# Patient Record
Sex: MALE | Race: ASIAN | NOT HISPANIC OR LATINO | Employment: OTHER | URBAN - METROPOLITAN AREA
[De-identification: names, ages, dates, MRNs, and addresses within clinical notes are randomized per-mention and may not be internally consistent; named-entity substitution may affect disease eponyms.]

---

## 2017-01-09 DIAGNOSIS — E11.9 TYPE 2 DIABETES MELLITUS WITHOUT COMPLICATION (H): Primary | ICD-10-CM

## 2017-01-09 NOTE — TELEPHONE ENCOUNTER
insulin aspart (NOVOLOG PEN) 100 UNIT/ML injection         Last Written Prescription Date: 11/30/2016  Last Fill Quantity: 9 mL, # refills: 0  Last Office Visit with [facility], P or [facility] prescribing provider:  8/11/2016        BP Readings from Last 3 Encounters:   08/11/16 118/52   01/21/16 104/52   07/28/15 132/72     MICROL       16   8/3/2016  No results found for this basename: microalbumin  CREATININE   Date Value Ref Range Status   08/03/2016 0.96 0.66 - 1.25 mg/dL Final   ]  GFR ESTIMATE   Date Value Ref Range Status   08/03/2016 76 >60 mL/min/1.7m2 Final     Comment:     Non  GFR Calc   01/18/2016 80 >60 mL/min/1.7m2 Final     Comment:     Non  GFR Calc   07/20/2015 80 >60 mL/min/1.7m2 Final     Comment:     Non  GFR Calc     GFR ESTIMATE IF BLACK   Date Value Ref Range Status   08/03/2016 >90   GFR Calc   >60 mL/min/1.7m2 Final   01/18/2016 >90   GFR Calc   >60 mL/min/1.7m2 Final   07/20/2015 >90   GFR Calc   >60 mL/min/1.7m2 Final     CHOL      149   8/3/2016  HDL       80   8/3/2016  LDL       55   8/3/2016  TRIG       70   8/3/2016  CHOLHDLRATIO      1.8   7/20/2015  AST       22   8/3/2016  ALT       33   8/3/2016  A1C      9.1   8/3/2016  A1C      9.8   1/18/2016  A1C      9.2   7/20/2015  A1C      9.4   4/6/2015  A1C      7.9   8/30/2014  POTASSIUM   Date Value Ref Range Status   08/03/2016 3.8 3.4 - 5.3 mmol/L Final

## 2017-01-11 ENCOUNTER — CARE COORDINATION (OUTPATIENT)
Dept: GERIATRIC MEDICINE | Facility: CLINIC | Age: 77
End: 2017-01-11

## 2017-01-11 RX ORDER — CELECOXIB 200 MG/1
200 CAPSULE ORAL EVERY EVENING
COMMUNITY
End: 2018-01-26

## 2017-01-11 NOTE — PROGRESS NOTES
Went to client s home to complete his annual health risk assessment, his PCA assessment and to screen him for EW services. Client,  and this CM were present for the assessment. Client s son was present in the home (stayed in the kitchen) but only participated in the assessment if needed.  Home Environment: Client lives in a single family home that is rambler style. There are a few steps to get into the home but otherwise client is on the main level. The home is owned by client s son, Brandan, but Brandan does not live at this address. Client and one of his other sons and daughter-in-law live at this address. Client s wife does spend much of her day at this address but technically lives at a different address and goes to that address in the evening. The home was very neat and clean with no odor present.  Social History: Client shared that there have been no recent changes in his social history.   Health Issues: Client s largest health issue is a his chronic pain. Client reports sever knee pain and shared that the MD recommended a knee replacement but reports he is fearful of surgery and does not want to get it done. Client also has chronic severe headaches. 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 Client shared that he now has daily headaches and that the pain will radiate from the center of his head to the right side and down the back of his head. Client shared that his neurologist did recommend surgery to remove the tumor but his PCP does not recommend it. Client stated that he does not want surgery. Client only takes tylenol for the pain and reports that it will help some. Encouraged client to talk with his PCP about alternative pain treatments since client is reporting that he continues to have pain. Client reports that this tumor causes hearing loss and dizziness. Client does have hearing aides and elects not to  wear them. Loud noises only cause more headaches. Speakers need to elevate tones. Client shared that when his headaches are sever he struggles with sleeping and focusing. Has headaches daily. Client reported a headache during this assessment and several times would tap the side of his head or put his head down in his hands. During the assessment client regularly put his head in his hands due to a headache. Client is diabetic and checks his blood sugars BID. He is on insulin (scheduled in the am and sliding scale in the pm). Client s diet is not very conducive to a diabetic diet. A1C continues to be elevated but daily BS readings are good. PCP has been monitoring this. Continued to encourage client and family about following a proper balanced diabetic diet vegetables, lean meats with fruits and low carb rice (as this is an important part of client s diet). Client does have an enlarged prostrate which creates an increase in urination. Client shared that he did get his vision checked last year and reports his vision is good with his glasses. However client's son shared that at the most recent eye appointment client was told that he does not see 20/20 with his glasses because of cataract and it was recommended to have cataract surgery and client declined. Client is current with his flu shot and pnuemovac shot and zostovax shot and tetanus. Client goes to the MD routinely. Client does not take calcium +D . Does take ASA daily, however it is OTC and reviewed the PIP program. Client shared that he has a lot of pain his right knee and gets cortisone injections about every 3 months. Son reports that the relief from pain only lasts about 2-3 weeks before the pain is back. Son shared that the orthopedic MD recommended surgery but client has declined. Reporting that the pain is in his low back as well but that he rests and it helps with the pain. He reports that he will take Tylenol PRN for this pain. No hospitalizations or ER  visits in the past year. Has had no falls in the past year but reports being fearful of falling.  ADL s/IADL s: Client reports that he needs assist with dressing, grooming, and bathing. Observed client at this assessment to be able to bring both arms up to his mouth. Client shared when he uses utensils his hands shake more when he uses fine motor skills. Observed client to ambulate with a standard front wheeled walker. Client's gait was slow but steady but would not bend his right knee. However most recent PCP note indicates that client uses a stationary bike for 15-30min per day. Client's son shared that the stationary bike is a sit down style and that client does use this when he is feeling good. Client reports that he receives no assist with ambulation. Observed client get off the couch on his own but he reports that when he has a headache or when he is really shaky he needs assist. Continues to report that he has dizziness at times. Observed client to ambulate to the bathroom without assist. Reports that he has frequent urination and reports that client has been able to make it to the toilet. Reports needing a pull up when he leaves the home in the event he is unable to get to a bathroom fast enough. Client needs help with getting on/off toilet and getting his pants back up. Client sits for all toileting activities. Client is able to position himself. Client shared that he doesn t use the phone and at times he will call his son if he needs something but otherwise doesn t use the phone. Client does not do any shopping, cooking, cleaning or laundry. Client s son (Brandan) sets up his medications weekly and does administer these medications. Client needs assistance with transportation. Son, Brandan, does the finances.   Mood/Behavior: Client was very pleasant during the assessment and at this assessment had the endurance to continue with the assessment until the end, however was starting to need to rest his head. Son  shared that client has been more angry lately and will more readily get upset with the small grandchildren, especially when they make noise. Encouraged son to try putting in some sound proofing in the bedroom so when client needs to get away from the noise his room will be very quiet. Client reports some memory loss however client was able to recall recent and past events and is able to direct his own care. Per client s son client does have some forgetfulness but that client is able to direct his own care and that client is able to recall events that are going on. Was able to recall this CM and past assessments. Was able to recall the surgical recommendations by MDs that he has elected not to pursue. No MH dx. Client scored 7 on the PHQ9. Client reported that his nightmares continue. Talked with client and family about ADC and client declined stating that the noise would only trigger headaches.  Services: Client is open to EW and is receiving PCA services along with Glucerna. The [employer] company stated that glucerna is EW only and client therefore needs to remain open to EW. Client reports drinking 2-3 cans of glucerna per day. Client would like to continue his PCA and Glucerna. PCA assessment completed and the recommended PCA is 16 units per day, which is the same as last assessment. Talked with client about more socialization such as the monthly [place] meeting or adult day care and client stated that he isn t interested particularly because of the headaches and that with loud noises triggering his headaches he doesn t want to cause his family to have to always come and get him when he can t stay due to a severe headache. Reviewed silver sneakers and client shared that he was interested in this.   POC: Client wants to continue living at his son s home with his services. Client would not call 911 due to language barrier and would call family in the event of an emergency. Client shared that there is someone always in  the home should he require intermittent assistance. Client would be able to evacuate in the event of an emergency, stating an emergency plan if there was a fire in the house. Client stated a plan in the event of a community emergency as well. Client did state if need be he does know how to dial 911. Reviewed that this CM is available PRN, will follow up in 6 months and will make annual home visits.  See [facility] for further information  ANASTACIA Velázquez  [employer]  [phone]

## 2017-01-12 ENCOUNTER — CARE COORDINATION (OUTPATIENT)
Dept: GERIATRIC MEDICINE | Facility: CLINIC | Age: 77
End: 2017-01-12

## 2017-01-12 ASSESSMENT — PATIENT HEALTH QUESTIONNAIRE - PHQ9: SUM OF ALL RESPONSES TO PHQ QUESTIONS 1-9: 7

## 2017-01-12 NOTE — PROGRESS NOTES
Completed POC and PCA assessment.  Gave to CMS to process and mail to client.  Updated EPIC POC and forward to CMS.  ANASTACIA Velázquez  [employer]  [phone]

## 2017-01-23 DIAGNOSIS — E11.9 TYPE 2 DIABETES MELLITUS WITHOUT COMPLICATION (H): Primary | ICD-10-CM

## 2017-01-23 RX ORDER — LANCETS
1 EACH MISCELLANEOUS 3 TIMES DAILY
Qty: 300 BOX | Refills: 3 | Status: SHIPPED | OUTPATIENT
Start: 2017-01-23 | End: 2018-01-12

## 2017-01-23 NOTE — TELEPHONE ENCOUNTER
Prescription approved per [facility]ill [facility]. MUKUL Babin R.N.

## 2017-01-27 ENCOUNTER — TELEPHONE (OUTPATIENT)
Dept: INTERNAL MEDICINE | Facility: CLINIC | Age: 77
End: 2017-01-27

## 2017-01-27 DIAGNOSIS — Z79.4 TYPE 2 DIABETES MELLITUS WITHOUT COMPLICATION, WITH LONG-TERM CURRENT USE OF INSULIN (H): ICD-10-CM

## 2017-01-27 DIAGNOSIS — E11.9 TYPE 2 DIABETES MELLITUS WITHOUT COMPLICATION, WITH LONG-TERM CURRENT USE OF INSULIN (H): ICD-10-CM

## 2017-01-27 NOTE — TELEPHONE ENCOUNTER
Spoke to pt's son. Pt is due for 6 month follow-up appt. Appt scheduled 2/10/17.  Fasting LAB appt scheduled prior to appt on 2/6/17.  Please order labs that pt will need.       [facility] is calling requesting refill of  insulin aspart (NOVOLOG PEN) 100 UNIT/ML injection. Pt is ok on med for now, will wait until appt with PCP.          Last Written Prescription Date: 1/10/17  Last Fill Quantity: 9ml, # refills: 0  Last Office Visit with [facility], [facility] or [facility] prescribing provider:  8/11/16

## 2017-01-29 PROBLEM — Z79.4 TYPE 2 DIABETES MELLITUS WITHOUT COMPLICATION, WITH LONG-TERM CURRENT USE OF INSULIN (H): Status: ACTIVE | Noted: 2017-01-29

## 2017-01-29 PROBLEM — E11.9 TYPE 2 DIABETES MELLITUS WITHOUT COMPLICATION, WITH LONG-TERM CURRENT USE OF INSULIN (H): Status: ACTIVE | Noted: 2017-01-29

## 2017-02-01 DIAGNOSIS — Z79.4 TYPE 2 DIABETES MELLITUS WITHOUT COMPLICATION, WITH LONG-TERM CURRENT USE OF INSULIN (H): Primary | ICD-10-CM

## 2017-02-01 DIAGNOSIS — E11.9 TYPE 2 DIABETES MELLITUS WITHOUT COMPLICATION, WITH LONG-TERM CURRENT USE OF INSULIN (H): Primary | ICD-10-CM

## 2017-02-01 NOTE — TELEPHONE ENCOUNTER
insulin aspart (NOVOLOG PEN) 100 UNIT/ML injection         Last Written Prescription Date: 11/10/2017  Last Fill Quantity: 9mL, # refills: 0  Last Office Visit with [facility], [facility] or [facility] prescribing provider:  8/11/2016   Next 5 appointments (look out 90 days)     Feb 10, 2017  8:15 AM   SHORT with Alfredo Aragon MD, [facility]   [facility] ([facility]    [address] [address] [phone]                   BP Readings from Last 3 Encounters:   08/11/16 118/52   01/21/16 104/52   07/28/15 132/72     MICROL       16   8/3/2016  No results found for this basename: microalbumin  CREATININE   Date Value Ref Range Status   08/03/2016 0.96 0.66 - 1.25 mg/dL Final   ]  GFR ESTIMATE   Date Value Ref Range Status   08/03/2016 76 >60 mL/min/1.7m2 Final     Comment:     Non  GFR Calc   01/18/2016 80 >60 mL/min/1.7m2 Final     Comment:     Non  GFR Calc   07/20/2015 80 >60 mL/min/1.7m2 Final     Comment:     Non  GFR Calc     GFR ESTIMATE IF BLACK   Date Value Ref Range Status   08/03/2016 >90   GFR Calc   >60 mL/min/1.7m2 Final   01/18/2016 >90   GFR Calc   >60 mL/min/1.7m2 Final   07/20/2015 >90   GFR Calc   >60 mL/min/1.7m2 Final     CHOL      149   8/3/2016  HDL       80   8/3/2016  LDL       55   8/3/2016  TRIG       70   8/3/2016  CHOLHDLRATIO      1.8   7/20/2015  AST       22   8/3/2016  ALT       33   8/3/2016  A1C      9.1   8/3/2016  A1C      9.8   1/18/2016  A1C      9.2   7/20/2015  A1C      9.4   4/6/2015  A1C      7.9   8/30/2014  POTASSIUM   Date Value Ref Range Status   08/03/2016 3.8 3.4 - 5.3 mmol/L Final

## 2017-02-02 ENCOUNTER — TELEPHONE (OUTPATIENT)
Dept: INTERNAL MEDICINE | Facility: CLINIC | Age: 77
End: 2017-02-02

## 2017-02-02 DIAGNOSIS — E11.9 TYPE 2 DIABETES MELLITUS WITHOUT COMPLICATION (H): Primary | ICD-10-CM

## 2017-02-02 NOTE — TELEPHONE ENCOUNTER
[facility] pharmacy requesting refill of RX insulin aspart (NOVOLOG PEN) 100 UNIT/ML injection.  Prescription approved per [facility] Refill Protocol. Pt has upcoming appt with PCP next week.

## 2017-02-06 DIAGNOSIS — E11.9 TYPE 2 DIABETES MELLITUS WITHOUT COMPLICATION, WITH LONG-TERM CURRENT USE OF INSULIN (H): ICD-10-CM

## 2017-02-06 DIAGNOSIS — Z79.4 TYPE 2 DIABETES MELLITUS WITHOUT COMPLICATION, WITH LONG-TERM CURRENT USE OF INSULIN (H): ICD-10-CM

## 2017-02-06 LAB
ANION GAP SERPL CALCULATED.3IONS-SCNC: 2 MMOL/L (ref 3–14)
BUN SERPL-MCNC: 20 MG/DL (ref 7–30)
CALCIUM SERPL-MCNC: 8.6 MG/DL (ref 8.5–10.1)
CHLORIDE SERPL-SCNC: 105 MMOL/L (ref 94–109)
CO2 SERPL-SCNC: 32 MMOL/L (ref 20–32)
CREAT SERPL-MCNC: 1.01 MG/DL (ref 0.66–1.25)
GFR SERPL CREATININE-BSD FRML MDRD: 71 ML/MIN/1.7M2
GLUCOSE SERPL-MCNC: 157 MG/DL (ref 70–99)
HBA1C MFR BLD: 9.1 % (ref 4.3–6)
POTASSIUM SERPL-SCNC: 4.5 MMOL/L (ref 3.4–5.3)
SODIUM SERPL-SCNC: 139 MMOL/L (ref 133–144)

## 2017-02-06 PROCEDURE — 83036 HEMOGLOBIN GLYCOSYLATED A1C: CPT | Performed by: INTERNAL MEDICINE

## 2017-02-06 PROCEDURE — 80048 BASIC METABOLIC PNL TOTAL CA: CPT | Performed by: INTERNAL MEDICINE

## 2017-02-06 PROCEDURE — 36415 COLL VENOUS BLD VENIPUNCTURE: CPT | Performed by: INTERNAL MEDICINE

## 2017-02-09 DIAGNOSIS — Z53.9 DIAGNOSIS NOT YET DEFINED: Primary | ICD-10-CM

## 2017-02-09 PROCEDURE — G0179 MD RECERTIFICATION HHA PT: HCPCS | Performed by: INTERNAL MEDICINE

## 2017-02-24 DIAGNOSIS — E78.5 HYPERLIPIDEMIA LDL GOAL <100: ICD-10-CM

## 2017-02-24 RX ORDER — ATORVASTATIN CALCIUM 80 MG/1
80 TABLET, FILM COATED ORAL DAILY
Qty: 30 TABLET | Refills: 5 | Status: SHIPPED | OUTPATIENT
Start: 2017-02-24 | End: 2017-08-13

## 2017-02-24 NOTE — TELEPHONE ENCOUNTER
atorvastatin     Last Written Prescription Date: 08/04/16  Last Fill Quantity: 30, # refills: 4  [place] [facility] [place] [facility] or [facility] prescribing provider: 08/11/16       Lab Results   Component Value Date    CHOL 149 08/03/2016     Lab Results   Component Value Date    HDL 80 08/03/2016     Lab Results   Component Value Date    LDL 55 08/03/2016     Lab Results   Component Value Date    TRIG 70 08/03/2016     Lab Results   Component Value Date    CHOLHDLRATIO 1.8 07/20/2015

## 2017-03-10 ENCOUNTER — TELEPHONE (OUTPATIENT)
Dept: INTERNAL MEDICINE | Facility: CLINIC | Age: 77
End: 2017-03-10

## 2017-03-10 DIAGNOSIS — E63.9 NUTRITIONAL DEFICIENCY: ICD-10-CM

## 2017-03-10 NOTE — TELEPHONE ENCOUNTER
[facility] is requesting Glucerna for the patient  Paperwork was faxed over  Please approve and fax back

## 2017-03-17 ENCOUNTER — OFFICE VISIT (OUTPATIENT)
Dept: INTERNAL MEDICINE | Facility: CLINIC | Age: 77
End: 2017-03-17
Payer: COMMERCIAL

## 2017-03-17 VITALS
OXYGEN SATURATION: 97 % | TEMPERATURE: 97.9 F | BODY MASS INDEX: 28.08 KG/M2 | SYSTOLIC BLOOD PRESSURE: 112 MMHG | DIASTOLIC BLOOD PRESSURE: 58 MMHG | HEART RATE: 69 BPM | WEIGHT: 174.7 LBS | HEIGHT: 66 IN

## 2017-03-17 DIAGNOSIS — M19.041 OSTEOARTHRITIS OF BOTH HANDS, UNSPECIFIED OSTEOARTHRITIS TYPE: ICD-10-CM

## 2017-03-17 DIAGNOSIS — M19.042 OSTEOARTHRITIS OF BOTH HANDS, UNSPECIFIED OSTEOARTHRITIS TYPE: ICD-10-CM

## 2017-03-17 DIAGNOSIS — Z79.4 TYPE 2 DIABETES MELLITUS WITHOUT COMPLICATION, WITH LONG-TERM CURRENT USE OF INSULIN (H): Primary | ICD-10-CM

## 2017-03-17 DIAGNOSIS — E11.9 TYPE 2 DIABETES MELLITUS WITHOUT COMPLICATION, WITH LONG-TERM CURRENT USE OF INSULIN (H): Primary | ICD-10-CM

## 2017-03-17 DIAGNOSIS — M54.32 BILATERAL SCIATICA: ICD-10-CM

## 2017-03-17 DIAGNOSIS — M54.31 BILATERAL SCIATICA: ICD-10-CM

## 2017-03-17 PROCEDURE — 99213 OFFICE O/P EST LOW 20 MIN: CPT | Performed by: INTERNAL MEDICINE

## 2017-03-17 NOTE — MR AVS SNAPSHOT
"              After Visit Summary   3/17/2017    Rabia Reed    MRN: [id number]           Patient Information     Date Of Birth          1940        Visit Information        Provider Department      3/17/2017 2:00 PM Alfredo Aragon MD; [facility]        Care Instructions     Follow-up with [facility] re: back and possible  Lumbar steroid injection   Stretching neck, hands in AM with warm water/heat   Possible future cataract surgery  Continue current medications  Fasting labs in October        Follow-ups after your visit        Who to contact     If you have questions or need follow up information about today's clinic visit or your schedule please contact [facility] directly at [phone].  Normal or non-critical lab and imaging results will be communicated to you by Ocschart, letter or phone within 4 business days after the clinic has received the results. If you do not hear from us within 7 days, please contact the clinic through Ocschart or phone. If you have a critical or abnormal lab result, we will notify you by phone as soon as possible.  Submit refill requests through [employer] or call your pharmacy and they will forward the refill request to us. Please allow 3 business days for your refill to be completed.          Additional Information About Your Visit        MyChart Information     [employer] lets you send messages to your doctor, view your test results, renew your prescriptions, schedule appointments and more. To sign up, go to [email or web address] . Click on \"Log in\" on the left side of the screen, which will take you to the Welcome page. Then click on \"Sign up Now\" on the right side of the page.     You will be asked to enter the access code listed below, as well as some personal information. Please follow the directions to create your username and password.     Your access code is: [email or web address]  Expires: 6/15/2017 " " 2:40 PM     Your access code will  in 90 days. If you need help or a new code, please call your [facility] or [phone].        Care EveryWhere ID     This is your Care EveryWhere ID. This could be used by other organizations to access your [place] medical records  [id number]        Your Vitals Were     Pulse Temperature Height Pulse Oximetry BMI (Body Mass Index)       69 97.9  F (36.6  C) (Oral) 5' 5.5\" (1.664 m) 97% 28.63 kg/m2        Blood Pressure from Last 3 Encounters:   17 112/58   16 118/52   16 104/52    Weight from Last 3 Encounters:   17 174 lb 11.2 oz (79.2 kg)   16 172 lb (78 kg)   16 172 lb (78 kg)              Today, you had the following     No orders found for display       Primary Care Provider Office Phone # Fax #    Alfredo Aragon [phone]       [facility] [address] [place] [id number]        Thank you!     Thank you for choosing [facility]  for your care. Our goal is always to provide you with excellent care. Hearing back from our patients is one way we can continue to improve our services. Please take a few minutes to complete the written survey that you may receive in the mail after your visit with us. Thank you!             Your Updated Medication List - Protect others around you: Learn how to safely use, store and throw away your medicines at www.disposemymeds.org.          This list is accurate as of: 3/17/17  2:40 PM.  Always use your most recent med list.                   Brand Name Dispense Instructions for use    ascorbic acid 1000 MG Tabs    vitamin C     Take 1,000 mg by mouth daily.       aspirin 81 MG tablet      1 tab po QD (Once per day)       atorvastatin 80 MG tablet    LIPITOR    30 tablet    Take 1 tablet (80 mg) by mouth daily       blood glucose monitoring lancets     300 Box    1 each 3 times daily       blood glucose monitoring test strip    no brand " specified    300 each    1 strip by In Vitro route 3 times daily       celecoxib 200 MG capsule    celeBREX     Take 200 mg by mouth daily       DOCUSATE SODIUM PO      Take 100 mg by mouth as needed for constipation       finasteride 5 MG tablet    PROSCAR    30 tablet    Take 1 tablet (5 mg) by mouth daily       GLUCERNA Liqd     1 month    1 can twice a day       insulin aspart 100 UNIT/ML injection    NovoLOG PEN    9 mL    Inject 10 Units Subcutaneous At Bedtime       insulin aspart prot & aspart injection    NovoLOG MIX 70/30 PEN    3 mL    30 units before breakfast, 20 units before dinner       ONE TOUCH ULTRA SYSTEM KIT W/DEVICE Kit     1 kit    One touch ultra 2 if covered by insurance       * pen needles 31G X 6 MM Misc     100 each    1 Device 3 times daily       * insulin pen needle 29G X 12.7MM    ADVOCATE INSULIN PEN NEEDLES    100 each    Use three pen needles daily or as directed.       tamsulosin 0.4 MG capsule    FLOMAX    30 capsule    Take 1 capsule (0.4 mg) by mouth daily       TYLENOL 500 MG tablet   Generic drug:  acetaminophen      Take 2 tablets by mouth every 6 hours as needed.       vardenafil 20 MG tablet    LEVITRA    5 tablet    Take 1 tablet (20 mg) by mouth daily as needed for erectile dysfunction       * Notice:  This list has 2 medication(s) that are the same as other medications prescribed for you. Read the directions carefully, and ask your doctor or other care provider to review them with you.

## 2017-03-17 NOTE — PROGRESS NOTES
SUBJECTIVE:                                                    Rabia Reed is a 76 year old male who presents to clinic today for the following health issues:      Diabetes Follow-up    Patient is checking blood sugars: 4 times daily.   Blood sugar testing frequency justification: Uncontrolled diabetes review they played nap they play national I think  Results are as follows:         am -          lunchtime - 100-170         suppertime - 116-180         bedtime - 112-169    Diabetic concerns: blood sugar frequently over 200 and low blood sugar several less than 70 in the past few weeks     Symptoms of hypoglycemia (low blood sugar): none     Paresthesias (numbness or burning in feet) or sores: Yes numbness in hands     Date of last diabetic eye exam: 10/2015        Pt's past medical history, family history, habits, medications and allergies were reviewed with the patient today.  See snap shot for  HCM status. Most recent lab results reviewed with pt. Problem list and histories reviewed & adjusted, as indicated.  Additional history as below:    Stiffness in joints hands with osteoarthritis. Denies CP, SOB, abdominal pain, polyuria, polydipsia  or skin problems. Stable gait with use of cane. No recent falls.  Patient has some chronic back pain with bilateral lower extremity radicular symptoms to the feet. Has seen orthopedics previously and has undergone an epidural steroid injection with benefit about a year ago per patient son who accompanies the patient today.  No records in the chart to review regarding this.  Patient's A1c lab chronically runs higher than his blood sugars. This has been confirmed with previously using continuous glucometer monitoring.  Has bilateral cataracts and has been told that he would be a surgical candidate but patient is worried about the surgery and therefore is not ready to proceed at this time. Patient describes mild clouding of vision    Lab Results   Component Value Date     " 02/06/2017     Lab Results   Component Value Date    A1C 9.1 02/06/2017     Lab Results   Component Value Date    CHOL 149 08/03/2016     Lab Results   Component Value Date    LDL 55 08/03/2016     Lab Results   Component Value Date    HDL 80 08/03/2016     Lab Results   Component Value Date    TRIG 70 08/03/2016     Lab Results   Component Value Date    CR 1.01 02/06/2017     Lab Results   Component Value Date    ALT 33 08/03/2016     Lab Results   Component Value Date    AST 22 08/03/2016     Lab Results   Component Value Date    MICROL 16 08/03/2016     Lab Results   Component Value Date    TSH 3.01 07/20/2015            Additional ROS:   Constitutional, HEENT, Cardiovascular, Pulmonary, GI and , Neuro, MSK and Psych review of systems/symptoms are otherwise negative or unchanged from previous, except as noted above.      OBJECTIVE:  /58  Pulse 69  Temp 97.9  F (36.6  C) (Oral)  Ht 5' 5.5\" (1.664 m)  Wt 174 lb 11.2 oz (79.2 kg)  SpO2 97%  BMI 28.63 kg/m2   Estimated body mass index is 28.63 kg/(m^2) as calculated from the following:    Height as of this encounter: 5' 5.5\" (1.664 m).    Weight as of this encounter: 174 lb 11.2 oz (79.2 kg).  Eye: PERRL, EOMI. Bilateral cataracts  HENT: ear canals and TM's normal and nose and mouth without ulcers or lesions   Neck: no adenopathy. Thyroid normal to palpation. No bruits  Pulm: Lungs clear to auscultation   CV: Regular rates and rhythm  GI: Soft, nontender, Normal active bowel sounds, No hepatosplenomegaly or masses palpable  Ext: Peripheral pulses intact. No edema. DIP and PIP joints hands with osteoarthritis changes. No acute warmth/erythema  Neuro: Normal strength and tone, sensory exam grossly normal. Stable gait with cane  Back: Mild tenderness to palpation bilateral paralumbar area. Neg SLRT bilaterally despite subjective sx .       Assessment/Plan: (See plan discussion below for further details)  1. Type 2 diabetes mellitus without complication, " with long-term current use of insulin (H)   continue current medications. Fasting labs in six months  - Hemoglobin A1c; Future  - Comprehensive metabolic panel; Future  - Lipid panel reflex to direct LDL; Future  - Albumin Random Urine Quantitative; Future  - TSH with free T4 reflex; Future    2. Osteoarthritis of both hands, unspecified osteoarthritis type  See plan discussion below. Continue Celebrex    3. Bilateral sciatica  Follow-up with ortho    Plan discussion:   Follow-up with [facility] re: back and possible  Lumbar steroid injection   Stretching  hands in AM with warm water/heat   Possible future cataract surgery  Continue current medications  Fasting labs in October       Alfredo Aragon MD  Internal Medicine Department  [facility]

## 2017-03-17 NOTE — PATIENT INSTRUCTIONS
Follow-up with [facility] re: back and possible  Lumbar steroid injection   Stretching neck, hands in AM with warm water/heat   Possible future cataract surgery  Continue current medications  Fasting labs in October

## 2017-03-19 PROBLEM — M19.041 OSTEOARTHRITIS OF BOTH HANDS, UNSPECIFIED OSTEOARTHRITIS TYPE: Status: ACTIVE | Noted: 2017-03-19

## 2017-03-19 PROBLEM — M19.042 OSTEOARTHRITIS OF BOTH HANDS, UNSPECIFIED OSTEOARTHRITIS TYPE: Status: ACTIVE | Noted: 2017-03-19

## 2017-04-09 DIAGNOSIS — R35.0 URINE FREQUENCY: ICD-10-CM

## 2017-04-10 NOTE — TELEPHONE ENCOUNTER
tamsulosin (FLOMAX) 0.4 MG 24 hr capsule         Last Written Prescription Date: 4/20/2016  Last Fill Quantity: 30, # refills: 8    Last Office Visit with [facility], [facility] or [facility] prescribing provider:  3/17/2017   Future Office Visit:      BP Readings from Last 3 Encounters:   03/17/17 112/58   08/11/16 118/52   01/21/16 104/52

## 2017-04-11 DIAGNOSIS — E11.9 TYPE 2 DIABETES MELLITUS WITHOUT COMPLICATION (H): ICD-10-CM

## 2017-04-11 RX ORDER — TAMSULOSIN HYDROCHLORIDE 0.4 MG/1
CAPSULE ORAL
Qty: 30 CAPSULE | Refills: 10 | Status: ON HOLD | OUTPATIENT
Start: 2017-04-11 | End: 2017-09-14

## 2017-04-11 NOTE — TELEPHONE ENCOUNTER
blood glucose monitoring (NO BRAND SPECIFIED) test strip      Last Written Prescription Date: 7/18/2016  Last Fill Quantity: 300,  # refills: 3   Last Office Visit with G, [facility] or [facility] prescribing provider: 3/17/2017

## 2017-05-02 PROCEDURE — G0179 MD RECERTIFICATION HHA PT: HCPCS | Performed by: INTERNAL MEDICINE

## 2017-06-10 DIAGNOSIS — Z53.9 DIAGNOSIS NOT YET DEFINED: Primary | ICD-10-CM

## 2017-06-10 PROCEDURE — G0179 MD RECERTIFICATION HHA PT: HCPCS | Performed by: INTERNAL MEDICINE

## 2017-06-20 ENCOUNTER — OFFICE VISIT (OUTPATIENT)
Dept: DERMATOLOGY | Facility: CLINIC | Age: 77
End: 2017-06-20
Payer: COMMERCIAL

## 2017-06-20 VITALS — SYSTOLIC BLOOD PRESSURE: 141 MMHG | HEART RATE: 71 BPM | DIASTOLIC BLOOD PRESSURE: 76 MMHG | OXYGEN SATURATION: 96 %

## 2017-06-20 DIAGNOSIS — I87.2 VENOUS STASIS DERMATITIS OF BOTH LOWER EXTREMITIES: Primary | ICD-10-CM

## 2017-06-20 DIAGNOSIS — N52.9 ERECTILE DYSFUNCTION, UNSPECIFIED ERECTILE DYSFUNCTION TYPE: ICD-10-CM

## 2017-06-20 PROCEDURE — 99202 OFFICE O/P NEW SF 15 MIN: CPT | Performed by: PHYSICIAN ASSISTANT

## 2017-06-20 RX ORDER — TRIAMCINOLONE ACETONIDE 1 MG/G
OINTMENT TOPICAL 2 TIMES DAILY
Qty: 80 G | Refills: 3 | Status: ON HOLD | OUTPATIENT
Start: 2017-06-20 | End: 2017-09-14

## 2017-06-20 NOTE — PROGRESS NOTES
HPI:   Rabia Reed is a 76 year old male who presents for evaluation of a rash on the lower legs  chief complaint  Location: bilateral lower legs   Condition present for:  Few years - rash is good currently, but will flare up in the winter. Has itchy, raised, red patches on both lower legs.    Previous treatments include: OTC Aveeno lotion once per day - this has helped    Review Of Systems  Eyes: negative  Ears/Nose/Throat: negative  Respiratory: No shortness of breath, dyspnea on exertion, cough, or hemoptysis  Cardiovascular: negative  Gastrointestinal: negative  Genitourinary: negative  Musculoskeletal: negative  Neurologic: negative  Psychiatric: negative        PHYSICAL EXAM:      Skin exam performed as follows: Type 3 skin. Mood appropriate  Alert and Oriented X 3. Well developed, well nourished in no distress.  General appearance: Normal  Head including face: Normal  Eyes: conjunctiva and lids: Normal  Mouth: Lips, teeth, gums: Normal  Neck: Normal  Chest-breast/axillae: Normal  Back: Normal  Spleen and liver: Normal  Cardiovascular: Exam of peripheral vascular system by observation for swelling, varicosities, edema: Normal  Genitalia: groin, buttocks: Normal  Extremities: digits/nails (clubbing): Normal  Eccrine and Apocrine glands: Normal  Right upper extremity: Normal  Left upper extremity: Normal  Right lower extremity: Normal  Left lower extremity: Normal  Skin: Scalp and body hair: See below    1. PIH on bilateral lower legs  2. Open comedones on cheeks    ASSESSMENT/PLAN:     1. Venous stasis dermatitis on bilateral lower legs; great today - advised on diagnosis and treatment options. Discussed from sluggish venous return and from increased pressure in lower legs.  Has tried OTC Aveeno in the past.   --Start TAC ointment BID PRN - only use this when flared for 1-2 weeks then PRN only  --Continue Aveeno moisturizer BID PRN        Follow-up: yearly/PRN sooner  CC:   Scribed By: Viola Alejandra MS,  CARI

## 2017-06-20 NOTE — MR AVS SNAPSHOT
"              After Visit Summary   2017    Rabia Reed    MRN: [id number]           Patient Information     Date Of Birth          1940        Visit Information        Provider Department      2017 9:15 AM Viola Alejandra PA-C; [facility] [employer] [facility]        Today's Diagnoses     Venous stasis dermatitis of both lower extremities    -  1       Follow-ups after your visit        Who to contact     If you have questions or need follow up information about today's clinic visit or your schedule please contact [facility] directly at [phone].  Normal or non-critical lab and imaging results will be communicated to you by Tigerstripehart, letter or phone within 4 business days after the clinic has received the results. If you do not hear from us within 7 days, please contact the clinic through Tigerstripehart or phone. If you have a critical or abnormal lab result, we will notify you by phone as soon as possible.  Submit refill requests through [employer] or call your pharmacy and they will forward the refill request to us. Please allow 3 business days for your refill to be completed.          Additional Information About Your Visit        MyChart Information     [employer] lets you send messages to your doctor, view your test results, renew your prescriptions, schedule appointments and more. To sign up, go to [email or web address] . Click on \"Log in\" on the left side of the screen, which will take you to the Welcome page. Then click on \"Sign up Now\" on the right side of the page.     You will be asked to enter the access code listed below, as well as some personal information. Please follow the directions to create your username and password.     Your access code is: [email or web address]  Expires: 2017 10:12 AM     Your access code will  in 90 days. If you need help or a new code, please call your [facility] or [phone].        Care " EveryWhere ID     This is your Care EveryWhere ID. This could be used by other organizations to access your [facility] medical records  [id number]        Your Vitals Were     Pulse Pulse Oximetry                71 96%           Blood Pressure from Last 3 Encounters:   06/20/17 141/76   03/17/17 112/58   08/11/16 118/52    Weight from Last 3 Encounters:   03/17/17 79.2 kg (174 lb 11.2 oz)   08/11/16 78 kg (172 lb)   01/21/16 78 kg (172 lb)              Today, you had the following     No orders found for display         Today's Medication Changes          These changes are accurate as of: 6/20/17 10:12 AM.  If you have any questions, ask your nurse or doctor.               Start taking these medicines.        Dose/Directions    triamcinolone 0.1 % ointment   Commonly known as:  KENALOG   Used for:  Venous stasis dermatitis of both lower extremities   Started by:  Viola Alejandra PA-C        Apply topically 2 times daily Apply to lower legs BID x 1-2 weeks PRN   Quantity:  80 g   Refills:  3            Where to get your medicines      These medications were sent to [id number] - [facility] [address] [address]  [address] [facility] [address]     Phone:  [phone]     triamcinolone 0.1 % ointment                Primary Care Provider Office Phone # Fax #    Alfredo Aragon [phone]       [facility] [address]  [facility] [address]        Thank you!     Thank you for choosing [facility]  for your care. Our goal is always to provide you with excellent care. Hearing back from our patients is one way we can continue to improve our services. Please take a few minutes to complete the written survey that you may receive in the mail after your visit with us. Thank you!             Your Updated Medication List - Protect others around you: Learn how to safely use, store and throw away your medicines at www.disposemymeds.org.           This list is accurate as of: 6/20/17 10:12 AM.  Always use your most recent med list.                   Brand Name Dispense Instructions for use    ascorbic acid 1000 MG Tabs    vitamin C     Take 1,000 mg by mouth daily.       aspirin 81 MG tablet      1 tab po QD (Once per day)       atorvastatin 80 MG tablet    LIPITOR    30 tablet    Take 1 tablet (80 mg) by mouth daily       blood glucose monitoring lancets     300 Box    1 each 3 times daily       celecoxib 200 MG capsule    celeBREX     Take 200 mg by mouth daily       DOCUSATE SODIUM PO      Take 100 mg by mouth as needed for constipation       finasteride 5 MG tablet    PROSCAR    30 tablet    Take 1 tablet (5 mg) by mouth daily       GLUCERNA Liqd     1 month    1 can twice a day       insulin aspart 100 UNIT/ML injection    NovoLOG PEN    9 mL    Inject 10 Units Subcutaneous At Bedtime       insulin aspart prot & aspart injection    NovoLOG MIX 70/30 PEN    3 mL    30 units before breakfast, 20 units before dinner       ONE TOUCH ULTRA SYSTEM KIT W/DEVICE Kit     1 kit    One touch ultra 2 if covered by insurance       ONE TOUCH ULTRA test strip   Generic drug:  blood glucose monitoring     200 each    test 3 times daily       * pen needles 31G X 6 MM Misc     100 each    1 Device 3 times daily       * insulin pen needle 29G X 12.7MM    ADVOCATE INSULIN PEN NEEDLES    100 each    Use three pen needles daily or as directed.       tamsulosin 0.4 MG capsule    FLOMAX    30 capsule    TAKE 1 CAPSULE (0.4 MG) BY MOUTH DAILY       triamcinolone 0.1 % ointment    KENALOG    80 g    Apply topically 2 times daily Apply to lower legs BID x 1-2 weeks PRN       TYLENOL 500 MG tablet   Generic drug:  acetaminophen      Take 2 tablets by mouth every 6 hours as needed.       vardenafil 20 MG tablet    LEVITRA    5 tablet    Take 1 tablet (20 mg) by mouth daily as needed for erectile dysfunction       * Notice:  This list has 2 medication(s) that are the same as other  medications prescribed for you. Read the directions carefully, and ask your doctor or other care provider to review them with you.

## 2017-06-20 NOTE — NURSING NOTE
"Initial /76  Pulse 71  SpO2 96% Estimated body mass index is 28.63 kg/(m^2) as calculated from the following:    Height as of 3/17/17: 1.664 m (5' 5.5\").    Weight as of 3/17/17: 79.2 kg (174 lb 11.2 oz). .      "

## 2017-06-21 RX ORDER — VARDENAFIL HCL 20 MG
TABLET ORAL
Qty: 2 TABLET | Refills: 2 | Status: ON HOLD | OUTPATIENT
Start: 2017-06-21 | End: 2017-09-14

## 2017-06-29 ENCOUNTER — CARE COORDINATION (OUTPATIENT)
Dept: GERIATRIC MEDICINE | Facility: CLINIC | Age: 77
End: 2017-06-29

## 2017-06-29 NOTE — PROGRESS NOTES
Called client's son, Brandan, to complete 6 month assessment and left a message requesting a return call.  Charis Garcia, [facility]  [phone]

## 2017-07-06 ENCOUNTER — CARE COORDINATION (OUTPATIENT)
Dept: GERIATRIC MEDICINE | Facility: CLINIC | Age: 77
End: 2017-07-06

## 2017-07-06 NOTE — PROGRESS NOTES
[facility] Six-Month Telephone Assessment    6 month telephone assessment completed on 7/6/17.    ER visits: No  Hospitalizations: No  TCU stays: No  Significant health status changes: Son shared that client is doing about the same, and has increased pain because of the change in MD.  Especially his back pain.  Falls/Injuries: No, but he slipped and caught himself so he didn't fall.  ADL/IADL changes: No  Changes in services: No    Caregiver Assessment follow up:  NA    Goals: See POC in chart for goal progress documentation.  Had to see a new orthopedic MD for his back pain wants therapy visits before giving more steroid injections.  Son shared that he will get client scheduled for the therapy visits.  Son shared that client's blood sugars have been good however his last A1C in Feb indicated no change from the previous lab.      Will see client in 6 months for an annual health risk assessment.   Encouraged client to call CM with any questions or concerns in the meantime.   ANASTACIA Velázquez  [facility]  [phone]

## 2017-07-06 NOTE — PROGRESS NOTES
Called client's son, Brandan, to completed 6 month assessment and left a message requesting a return call.  Charis Garcia, [facility]  [phone]

## 2017-07-10 DIAGNOSIS — Z79.4 TYPE 2 DIABETES MELLITUS WITHOUT COMPLICATION, WITH LONG-TERM CURRENT USE OF INSULIN (H): ICD-10-CM

## 2017-07-10 DIAGNOSIS — E11.9 TYPE 2 DIABETES MELLITUS WITHOUT COMPLICATION, WITH LONG-TERM CURRENT USE OF INSULIN (H): ICD-10-CM

## 2017-07-11 NOTE — TELEPHONE ENCOUNTER
insulin aspart prot & aspart (NOVOLOG MIX 70/30 PEN) injection         Last Written Prescription Date: 2/01/2017  Last Fill Quantity: 3mL, # refills: 0  Last Office Visit with G, P or [facility] prescribing provider:  3/17/2017        BP Readings from Last 3 Encounters:   06/20/17 141/76   03/17/17 112/58   08/11/16 118/52     Lab Results   Component Value Date    MICROL 16 08/03/2016     Lab Results   Component Value Date    UMALCR 14.42 08/03/2016     Creatinine   Date Value Ref Range Status   02/06/2017 1.01 0.66 - 1.25 mg/dL Final   ]  GFR Estimate   Date Value Ref Range Status   02/06/2017 71 >60 mL/min/1.7m2 Final     Comment:     Non  GFR Calc   08/03/2016 76 >60 mL/min/1.7m2 Final     Comment:     Non  GFR Calc   01/18/2016 80 >60 mL/min/1.7m2 Final     Comment:     Non  GFR Calc     GFR Estimate If Black   Date Value Ref Range Status   02/06/2017 86 >60 mL/min/1.7m2 Final     Comment:      GFR Calc   08/03/2016 >90   GFR Calc   >60 mL/min/1.7m2 Final   01/18/2016 >90   GFR Calc   >60 mL/min/1.7m2 Final     Lab Results   Component Value Date    CHOL 149 08/03/2016     Lab Results   Component Value Date    HDL 80 08/03/2016     Lab Results   Component Value Date    LDL 55 08/03/2016     Lab Results   Component Value Date    TRIG 70 08/03/2016     Lab Results   Component Value Date    CHOLHDLRATIO 1.8 07/20/2015     Lab Results   Component Value Date    AST 22 08/03/2016     Lab Results   Component Value Date    ALT 33 08/03/2016     Lab Results   Component Value Date    A1C 9.1 02/06/2017    A1C 9.1 08/03/2016    A1C 9.8 01/18/2016    A1C 9.2 07/20/2015    A1C 9.4 04/06/2015     Potassium   Date Value Ref Range Status   02/06/2017 4.5 3.4 - 5.3 mmol/L Final

## 2017-07-12 RX ORDER — INSULIN ASPART 100 [IU]/ML
INJECTION, SUSPENSION SUBCUTANEOUS
Qty: 45 ML | Refills: 3 | Status: ON HOLD | OUTPATIENT
Start: 2017-07-12 | End: 2017-09-14

## 2017-08-02 DIAGNOSIS — Z53.9 DIAGNOSIS NOT YET DEFINED: Primary | ICD-10-CM

## 2017-08-02 PROCEDURE — G0179 MD RECERTIFICATION HHA PT: HCPCS | Performed by: INTERNAL MEDICINE

## 2017-08-13 DIAGNOSIS — E78.5 HYPERLIPIDEMIA LDL GOAL <100: ICD-10-CM

## 2017-08-14 NOTE — TELEPHONE ENCOUNTER
Atorvastatin Calcium Oral Tablet 80 MG     Last Written Prescription Date: 02/24/17  Last Fill Quantity: 30, # refills: 5  Last Office Visit with G, P or [facility] prescribing provider: 03/17/17       Lab Results   Component Value Date    CHOL 149 08/03/2016     Lab Results   Component Value Date    HDL 80 08/03/2016     Lab Results   Component Value Date    LDL 55 08/03/2016     Lab Results   Component Value Date    TRIG 70 08/03/2016     Lab Results   Component Value Date    CHOLHDLRATIO 1.8 07/20/2015

## 2017-08-15 RX ORDER — ATORVASTATIN CALCIUM 80 MG/1
TABLET, FILM COATED ORAL
Qty: 30 TABLET | Refills: 0 | Status: SHIPPED | OUTPATIENT
Start: 2017-08-15 | End: 2017-09-02

## 2017-09-02 DIAGNOSIS — E78.5 HYPERLIPIDEMIA LDL GOAL <100: ICD-10-CM

## 2017-09-03 NOTE — TELEPHONE ENCOUNTER
atorvastatin (LIPITOR) 80 MG tablet     Last Written Prescription Date: 8/15/17  Last Fill Quantity: 30, # refills: 0  Last Office Visit with [facility], [facility] or [facility] prescribing provider: 3/17/17  Next 5 appointments (look out 90 days)     Sep 07, 2017  4:00 PM CDT   Pre-Op physical with Alfredo Aragon MD   [facility]    [address] [place] [id number] [phone]                   Lab Results   Component Value Date    CHOL 149 08/03/2016     Lab Results   Component Value Date    HDL 80 08/03/2016     Lab Results   Component Value Date    LDL 55 08/03/2016     Lab Results   Component Value Date    TRIG 70 08/03/2016     Lab Results   Component Value Date    CHOLHDLRATIO 1.8 07/20/2015

## 2017-09-06 NOTE — TELEPHONE ENCOUNTER
Routing refill request to provider for review/approval because:  Anabela given x1 and patient did not follow up, please advise  Labs not current:  lipids

## 2017-09-07 ENCOUNTER — OFFICE VISIT (OUTPATIENT)
Dept: INTERNAL MEDICINE | Facility: CLINIC | Age: 77
End: 2017-09-07
Payer: COMMERCIAL

## 2017-09-07 VITALS
SYSTOLIC BLOOD PRESSURE: 120 MMHG | OXYGEN SATURATION: 95 % | HEART RATE: 83 BPM | WEIGHT: 178 LBS | DIASTOLIC BLOOD PRESSURE: 78 MMHG | TEMPERATURE: 98 F | BODY MASS INDEX: 29.17 KG/M2

## 2017-09-07 DIAGNOSIS — R35.0 URINARY FREQUENCY: ICD-10-CM

## 2017-09-07 DIAGNOSIS — M25.561 CHRONIC PAIN OF RIGHT KNEE: ICD-10-CM

## 2017-09-07 DIAGNOSIS — G89.29 CHRONIC PAIN OF RIGHT KNEE: ICD-10-CM

## 2017-09-07 DIAGNOSIS — Z01.818 PREOP GENERAL PHYSICAL EXAM: Primary | ICD-10-CM

## 2017-09-07 DIAGNOSIS — E11.9 TYPE 2 DIABETES MELLITUS WITHOUT COMPLICATION, WITH LONG-TERM CURRENT USE OF INSULIN (H): ICD-10-CM

## 2017-09-07 DIAGNOSIS — R79.0 ABNORMAL IRON SATURATION: ICD-10-CM

## 2017-09-07 DIAGNOSIS — R71.8 ELEVATED MCV: ICD-10-CM

## 2017-09-07 DIAGNOSIS — Z79.4 TYPE 2 DIABETES MELLITUS WITHOUT COMPLICATION, WITH LONG-TERM CURRENT USE OF INSULIN (H): ICD-10-CM

## 2017-09-07 DIAGNOSIS — R97.20 ELEVATED PROSTATE SPECIFIC ANTIGEN (PSA): ICD-10-CM

## 2017-09-07 LAB
ALBUMIN UR-MCNC: NEGATIVE MG/DL
APPEARANCE UR: CLEAR
BILIRUB UR QL STRIP: NEGATIVE
COLOR UR AUTO: YELLOW
ERYTHROCYTE [DISTWIDTH] IN BLOOD BY AUTOMATED COUNT: 12.3 % (ref 10–15)
GLUCOSE UR STRIP-MCNC: 500 MG/DL
HBA1C MFR BLD: 8.5 % (ref 4.3–6)
HCT VFR BLD AUTO: 42.3 % (ref 40–53)
HGB BLD-MCNC: 14 G/DL (ref 13.3–17.7)
HGB UR QL STRIP: NEGATIVE
KETONES UR STRIP-MCNC: NEGATIVE MG/DL
LEUKOCYTE ESTERASE UR QL STRIP: NEGATIVE
MCH RBC QN AUTO: 34.4 PG (ref 26.5–33)
MCHC RBC AUTO-ENTMCNC: 33.1 G/DL (ref 31.5–36.5)
MCV RBC AUTO: 104 FL (ref 78–100)
NITRATE UR QL: NEGATIVE
PH UR STRIP: 6 PH (ref 5–7)
PLATELET # BLD AUTO: 187 10E9/L (ref 150–450)
RBC # BLD AUTO: 4.07 10E12/L (ref 4.4–5.9)
SOURCE: ABNORMAL
SP GR UR STRIP: 1.02 (ref 1–1.03)
UROBILINOGEN UR STRIP-ACNC: 0.2 EU/DL (ref 0.2–1)
WBC # BLD AUTO: 6.9 10E9/L (ref 4–11)

## 2017-09-07 PROCEDURE — 87641 MR-STAPH DNA AMP PROBE: CPT | Performed by: INTERNAL MEDICINE

## 2017-09-07 PROCEDURE — 83036 HEMOGLOBIN GLYCOSYLATED A1C: CPT | Performed by: INTERNAL MEDICINE

## 2017-09-07 PROCEDURE — 93000 ELECTROCARDIOGRAM COMPLETE: CPT | Performed by: INTERNAL MEDICINE

## 2017-09-07 PROCEDURE — 82728 ASSAY OF FERRITIN: CPT | Performed by: INTERNAL MEDICINE

## 2017-09-07 PROCEDURE — 80053 COMPREHEN METABOLIC PANEL: CPT | Performed by: INTERNAL MEDICINE

## 2017-09-07 PROCEDURE — 82043 UR ALBUMIN QUANTITATIVE: CPT | Performed by: INTERNAL MEDICINE

## 2017-09-07 PROCEDURE — 85027 COMPLETE CBC AUTOMATED: CPT | Performed by: INTERNAL MEDICINE

## 2017-09-07 PROCEDURE — 99215 OFFICE O/P EST HI 40 MIN: CPT | Performed by: INTERNAL MEDICINE

## 2017-09-07 PROCEDURE — 81003 URINALYSIS AUTO W/O SCOPE: CPT | Performed by: INTERNAL MEDICINE

## 2017-09-07 PROCEDURE — 83550 IRON BINDING TEST: CPT | Performed by: INTERNAL MEDICINE

## 2017-09-07 PROCEDURE — 84443 ASSAY THYROID STIM HORMONE: CPT | Performed by: INTERNAL MEDICINE

## 2017-09-07 PROCEDURE — 83540 ASSAY OF IRON: CPT | Performed by: INTERNAL MEDICINE

## 2017-09-07 PROCEDURE — 85045 AUTOMATED RETICULOCYTE COUNT: CPT | Performed by: INTERNAL MEDICINE

## 2017-09-07 PROCEDURE — 36415 COLL VENOUS BLD VENIPUNCTURE: CPT | Performed by: INTERNAL MEDICINE

## 2017-09-07 RX ORDER — ATORVASTATIN CALCIUM 80 MG/1
TABLET, FILM COATED ORAL
Qty: 90 TABLET | Refills: 0 | Status: ON HOLD | OUTPATIENT
Start: 2017-09-07 | End: 2017-09-14

## 2017-09-07 NOTE — PATIENT INSTRUCTIONS
Labs as ordered  Stop Aspirin and Celebrex between now and surgery  Nothing to eat/drink after midnight prior to surgery   Hold medication including insulin the day of surgery  The night before surgery, then take 15 units of 70/30 insulin with supper and 5 units of Novolog at bedtime. Continue usual doses of insulin otherwise between now and surgery  Fasting lab for cholesterol in the next 1 month  Flu shot in October if not given in hospital

## 2017-09-07 NOTE — PROGRESS NOTES
[address] [address]  [phone]  Dept: [phone]    PRE-OP EVALUATION:  Today's date: 2017    Rabia Reed (: 1940) presents for pre-operative evaluation assessment as requested by Dr. Allen.  He requires evaluation and anesthesia risk assessment prior to undergoing surgery/procedure for treatment of Chondromalacia of Patella .  Proposed procedure: Right Total Knee Arthoplasty    Date of Surgery/ Procedure: 2017  Time of Surgery/ Procedure: 7:30am  Hospital/Surgical Facility: [facility]  Primary Physician: Alfredo Aragon  Type of Anesthesia Anticipated: General    Patient has a Health Care Directive or Living Will:  NO    Preop Questions 2017   1.  Do you have a history of heart attack, stroke, stent, bypass or surgery on an artery in the head, neck, heart or legs? No   2.  Do you ever have any pain or discomfort in your chest? No   3.  Do you have a history of  Heart Failure? No   4.   Are you troubled by shortness of breath when:  walking on a level surface, or up a slight hill, or at night? No   5.  Do you currently have a cold, bronchitis or other respiratory infection? No   6.  Do you have a cough, shortness of breath, or wheezing? No   7.  Do you sometimes get pains in the calves of your legs when you walk? YES - only right below arthritic knee with walking and same sx when just having prolonged standing.  No more distal calf sx and does not get the pains when riding on a bike   8. Do you or anyone in your family have previous history of blood clots? No   9.  Do you or does anyone in your family have a serious bleeding problem such as prolonged bleeding following surgeries or cuts? No   10. Have you ever had problems with anemia or been told to take iron pills? No   11. Have you had any abnormal blood loss such as black, tarry or bloody stools? No   12. Have you ever had a blood transfusion? No   13. Have you or  any of your relatives ever had problems with anesthesia? No   14. Do you have sleep apnea, excessive snoring or daytime drowsiness? YES - snores occ. Has not been tested for JAG   15. Do you have any prosthetic heart valves? No   16. Do you have prosthetic joints? No           HPI:                                                      Due to language barrier, an  was present during the history-taking and subsequent discussion (and for part of the physical exam) with this patient.    Brief HPI related to upcoming procedure: Hx DJD right knee. Has failed conservative treatment including cortisone injections. Now presens for clearance to undergo surgical correction as above. See below for other medical issues.    Exercising with stationary bike 2-3x/week for 30-45 min without chest pain or shortness of breath. Hx diabetes.  A1C has chronically run higher than expected for level of blood sugar control and this has been confirmed through previous 7 day continuous glucometer studies and also with Endocrinology consultations. Most recently, blood sugars  in AM and approx 125 before supper. 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 Pt has declined surgery for this and last imaging from 2012 showed it to be 12mm size and unchanged from 4 years prior to that. Pt ambulates with a cane. No recent falls.  Stable mild chronic memory loss in addition      MEDICAL HISTORY:                                                    Patient Active Problem List    Diagnosis Date Noted     Osteoarthritis of both hands, unspecified osteoarthritis type 03/19/2017     Priority: Medium     Type 2 diabetes mellitus without complication, with long-term current use of insulin (H) 01/29/2017     Priority: Medium     Erectile dysfunction, unspecified erectile dysfunction type 01/25/2016     Priority: Medium     Elevated prostate specific antigen (PSA) 11/06/2013     Priority: Medium     ACP  "(advance care planning) 2012     Priority: Medium     Advance Care Planning 2017: ACP Review of Chart / Resources Provided:  Reviewed chart for advance care plan.  Rabia Reed has no plan or code status on file. Discussed available resources and provided with information. Confirmed/documented legally designated decision makers.  Added by Charis Garcia  Advance Care Planning 2016: ACP Review of Chart / Resources Provided:  Reviewed chart for advance care plan.  Rabia Reed has no plan or code status on file. Discussed available resources and provided with information. Confirmed/documented legally designated decision makers.  Added by Charis Garcia  Discussed advance care planning with patient; information given to patient to review. 2012          Health Care Home 2011     Priority: Medium     [facility] CARE COORDINATOR  ANASTACIA VELÁZQUEZ  [phone]  [facility] CARE PLAN SUMMARY    Client Name:  Rabia Reed  Address:  [address] [place], [place] [address] Phone: [phone] (home)    :  1940 Date of Assessment:  2017   Health Plan:  [employer]  Health Plan Number: [id number] Medical Assistance Number: [id number]  Financial Worker:     BALBIR :  ANASTACIA Velázquez  Phone:  [phone]   Fax:  [phone]   [facility] Enrollment Date: 2008 Case Mix:  D  Rate Cell:  B  Waiver Type:  EW   Primary Emergency Contact:  Brandan Reed  [place] Phone: [phone]  Work Phone: [phone]  Relation: Son Language:  Tajik  :  Yes:    Health Care Agent/POA:  None Advanced Directives/Living Will:  None   Primary Care Clinic:  [facility] Primary Dx;  Diabetes [EF11.9]  Secondary Dx:  Acoustic nerve disorder [H93.3X9]   Primary Physician:  Alfredo Aragon  Fax Number:  [phone]  Telephone Number:  [phone] Height:  5' 7\"  Weight:  172 lbs   Specialty Physician:    Dr. Irizarry- [facility] ENT  Audiologist:    Dr. Aj "  - [facility] (ENT)   Eye Care Provider:   Dr. Dixon, yearly appt.  Wears corrective lenses Dental Care Provider:    Dr. Edenilson Anna, [address] (made dentures)   Other:          [facility] CURRENT SERVICES SUMMARY  Equipment owned/DME history: bath bench w/o back, standard walker, built up silverware,  SERVICE TYPE/PROVIDER NAME/PHONE AUTH DATE FREQUENCY Units OR $ Amt DESCRIPTION   Medical Transportation: [facility] [facility] [phone]  Fax:  2/1/17-1/31/18 as needed     DME: [employer] [phone]  Fax: [phone] 2/1/17-1/31/18 Monthly $2.86 per can 2 cans of glucerna per day (Per [employer], glucerna is EW only)   DME: [facility] [phone]  Fax: [phone] 2/1/17-1/31/18 as needed  Pull ups (Med) Family to order as needed   PCA: [facility] [phone]  Fax: [phone] 1/12/17-1/10/18 daily  16 units per day and PCA supervision every other month     1/11/2017: Client declined ADC.              Allergies:    Allergies   Allergen Reactions     No Known Drug Allergies      Diagnoses:   Patient Active Problem List   Diagnosis     IMPOTENCE, ORGANIC ORIGN     CHONDROMALACIA PATELLAE     MEMORY LOSS     ACOUSTIC NERVE DISORDERS     TYPE 2 DIABETES, HBA1C GOAL < 7%     HYPERLIPIDEMIA LDL GOAL <100     BPH (benign prostatic hypertrophy)     Health Care Home     Pain in joint, lower leg     ACP (advance care planning)     Elevated prostate specific antigen (PSA)                            BPH (benign prostatic hypertrophy) 12/23/2010     Priority: Medium     HYPERLIPIDEMIA LDL GOAL <100 10/31/2010     Priority: Medium     Disorder of acoustic nerve      Priority: Medium     acoustical neuroma right side  Problem list name updated by automated process. 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     Priority: Medium      Past Medical History:   Diagnosis Date     Calculus of kidney       Chondromalacia of patella 5/03    right     Disorders of acoustic nerve 2/08    acoustical neuroma right side     Elevated prostate specific antigen (PSA) 11/6/2013     Essential hypertension, benign      Hyperlipidemia LDL goal <100       Impotence of organic origin      Inguinal hernia without mention of obstruction or gangrene, unilateral or unspecified, (not specified as recurrent) 1/03    right     Memory loss      Osteoarthritis of both hands, unspecified osteoarthritis type 3/19/2017     Tobacco use disorder      Type 2 diabetes mellitus without complication  (goal A1C<7) 10/24/2015     Unspecified nasal polyp      Past Surgical History:   Procedure Laterality Date     C APPENDECTOMY       C NONSPECIFIC PROCEDURE  4/01    nasal polyp excision     C NONSPECIFIC PROCEDURE  1/03    [facility] repair     C NONSPECIFIC PROCEDURE  April 2011     right knee arthroscopy     Current Outpatient Prescriptions   Medication Sig Dispense Refill     atorvastatin (LIPITOR) 80 MG tablet TAKE ONE TABLET BY MOUTH ONE TIME DAILY  30 tablet 0     NOVOLOG MIX 70/30 FLEXPEN (70-30) 100 UNIT/ML susp inject 30 units daily before breakfast & 20 units before dinner 45 mL 3     LEVITRA 20 MG tablet TAKE 1 TABLET (20 MG) BY MOUTH DAILY AS NEEDED FOR ERECTILE DYSFUNCTION 2 tablet 2     triamcinolone (KENALOG) 0.1 % ointment Apply topically 2 times daily Apply to lower legs BID x 1-2 weeks PRN 80 g 3     tamsulosin (FLOMAX) 0.4 MG capsule TAKE 1 CAPSULE (0.4 MG) BY MOUTH DAILY 30 capsule 10     ONE TOUCH ULTRA test strip test 3 times daily 200 each 3     insulin aspart (NOVOLOG PEN) 100 UNIT/ML injection Inject 10 Units Subcutaneous At Bedtime 9 mL 1     blood glucose monitoring (ONE TOUCH ULTRASOFT) lancets 1 each 3 times daily 300 Box 3     celecoxib (CELEBREX) 200 MG capsule Take 200 mg by mouth daily       insulin pen needle (ADVOCATE INSULIN PEN NEEDLES) 29G X 12.7MM Use three pen needles daily or as directed. 100 each 11     DOCUSATE SODIUM  PO Take 100 mg by mouth as needed for constipation       Insulin Pen Needle (PEN NEEDLES) 31G X 6 MM MISC 1 Device 3 times daily 100 each 11     finasteride (PROSCAR) 5 MG tablet Take 1 tablet (5 mg) by mouth daily 30 tablet 11     Blood Glucose Monitoring Suppl (ONE TOUCH ULTRA SYSTEM KIT) W/DEVICE KIT One touch ultra 2 if covered by insurance 1 kit 0     ascorbic acid (VITAMIN C) 1000 MG TABS Take 1,000 mg by mouth daily.       acetaminophen (TYLENOL) 500 MG tablet Take 2 tablets by mouth every 6 hours as needed.       GLUCERNA OR LIQD 1 can twice a day 1 month 11     ASPIRIN 81 MG OR TABS 1 tab po QD (Once per day)       OTC products: None, except as noted above    Allergies   Allergen Reactions     No Known Drug Allergies       Latex Allergy: NO    Social History   Substance Use Topics     Smoking status: Former Smoker     Packs/day: 0.50     Years: 25.00     Quit date: 1/17/2001     Smokeless tobacco: Never Used     Alcohol use No     History   Drug Use No       REVIEW OF SYSTEMS:                                                    C: NEGATIVE for fever, chills, change in weight  I: NEGATIVE for worrisome rashes, moles or lesions  E: NEGATIVE for   irritation with glasses. Mild reduction  acuity bilaterally with cataracts. Thinking about surgery in future but declines at this time (right worse than left)  E/M: NEGATIVE for ear pain, mouth and throat problems. See above re: right accoustical schwannoma  R: NEGATIVE for significant cough or SOB at rest and with using stationary bike  CV: NEGATIVE for chest pain, palpitations or peripheral edema  GI: NEGATIVE for nausea, abdominal pain, heartburn, or change in bowel habits. BM every other day  : NEGATIVE for  dysuria, or hematuria. Has nocturia every 2-3 hrs  M:  See HPI  N: NEGATIVE for weakness, paresthesias. Chronic  Mild unsteadiness with accoustical schwannoma.  Using cane with ambulation. No falls  Past 6 mos  E: POSITIVE for chronic diabetes mellitus.  Recent blood sugars as above. A1Cs don't correlate with blood sugar control. See below for DM labs  H: NEGATIVE for bleeding problems  P: NEGATIVE for changes in mood or affect    Lab Results   Component Value Date     09/07/2017     Lab Results   Component Value Date    A1C 8.5 09/07/2017     Lab Results   Component Value Date    CHOL 149 08/03/2016     Lab Results   Component Value Date    LDL 55 08/03/2016     Lab Results   Component Value Date    HDL 80 08/03/2016     Lab Results   Component Value Date    TRIG 70 08/03/2016     Lab Results   Component Value Date    CR 0.99 09/07/2017     Lab Results   Component Value Date    ALT 43 09/07/2017     Lab Results   Component Value Date    AST 32 09/07/2017     Lab Results   Component Value Date    MICROL 14 09/07/2017     Lab Results   Component Value Date    TSH 1.81 09/07/2017         EXAM:                                                    /78  Pulse 83  Temp 98  F (36.7  C) (Oral)  Wt 178 lb (80.7 kg)  SpO2 95%  BMI 29.17 kg/m2  Eye: PERRL, EOMI  HENT: ear canals and TM's normal and nose and mouth without ulcers or lesions   Neck: no adenopathy. Thyroid normal to palpation. No bruits  Pulm: Lungs clear to auscultation   CV: Regular rates and rhythm  GI: Soft, nontender, Normal active bowel sounds, No hepatosplenomegaly or masses palpable  Ext: Peripheral pulses intact. Trace BLE edema. Tenderness right knee joint line without effusion. Ligament exam grossly intact  Neuro: Normal strength and tone, sensory exam grossly normal.  Gait mildly antalgic but stable with use cane      DIAGNOSTICS:                                                    EKG:  NSR. Rate 79. No Acute ST/T changes    Recent Labs   Lab Test  02/06/17   0953  08/03/16   0813   02/18/14   0950   04/04/11   1652   HGB   --    --    --   14.4   --   14.6   PLT   --    --    --   208   --   196   NA  139  139   < >  143   < >  137   POTASSIUM  4.5  3.8   < >  3.9   < >  3.9   CR  1.01   0.96   < >  0.85   < >  0.90   A1C  9.1*  9.1*   < >  8.8*   < >  9.0*    < > = values in this interval not displayed.      Component      Latest Ref Rng & Units 9/7/2017   Sodium      133 - 144 mmol/L 140   Potassium      3.4 - 5.3 mmol/L 4.4   Chloride      94 - 109 mmol/L 105   Carbon Dioxide      20 - 32 mmol/L 32   Anion Gap      3 - 14 mmol/L 3   Glucose      70 - 99 mg/dL 188 (H)   Urea Nitrogen      7 - 30 mg/dL 20   Creatinine      0.66 - 1.25 mg/dL 0.99   GFR Estimate      >60 mL/min/1.7m2 73   GFR Estimate If Black      >60 mL/min/1.7m2 88   Calcium      8.5 - 10.1 mg/dL 8.2 (L)   Bilirubin Total      0.2 - 1.3 mg/dL 0.6   Albumin      3.4 - 5.0 g/dL 3.3 (L)   Protein Total      6.8 - 8.8 g/dL 7.0   Alkaline Phosphatase      40 - 150 U/L 115   ALT      0 - 70 U/L 43   AST      0 - 45 U/L 32   WBC      4.0 - 11.0 10e9/L 6.9   RBC Count      4.4 - 5.9 10e12/L 4.07 (L)   Hemoglobin      13.3 - 17.7 g/dL 14.0   Hematocrit      40.0 - 53.0 % 42.3   MCV      78 - 100 fl 104 (H)   MCH      26.5 - 33.0 pg 34.4 (H)   MCHC      31.5 - 36.5 g/dL 33.1   RDW      10.0 - 15.0 % 12.3   Platelet Count      150 - 450 10e9/L 187   Iron      35 - 180 ug/dL 142   Iron Binding Cap      240 - 430 ug/dL 266   Iron Saturation Index      15 - 46 % 53 (H)   % Retic      0.5 - 2.0 % 2.5 (H)   Absolute Retic      25 - 95 10e9/L 106.3 (H)   Specimen Description       Nares   S Aur Meth Resis PCR      NEG:Negative Negative   Hemoglobin A1C      4.3 - 6.0 % 8.5 (H)   TSH      0.40 - 4.00 mU/L 1.81   Ferritin      26 - 388 ng/mL 536 (H)       IMPRESSION:                                                    Reason for surgery/procedure:  Right knee DJD  Diagnosis/reason for consult:  Diabetes (now insulin dependent - recent blood sugar control better than that reflected by A1C score (See HPI above for details), elevated iron sats of unclear etiology (needs additional lab work-up), 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    REVISED CARDIAC RISK INDEX  The patient has the following serious cardiovascular risks for perioperative complications such as (MI, PE, VFib and 3  AV Block):  Diabetes Mellitus (on Insulin)  INTERPRETATION: 1 risks: Class II (low risk - 0.9% complication rate)    The patient has the following additional risks for perioperative complications:  Mild memory loss, hx snoring (pt not aware of known apnea but wife not here today confirms whether she has witnessed before)         RECOMMENDATIONS:                                                        Obstructive Sleep Apnea (or suspected sleep apnea)  Hospital staff are advised to monitor for sleep related oxygen desaturations due to risk for JAG with hx snoring    APPROVAL GIVEN to proceed with proposed procedure, without further diagnostic evaluation     PLAN:  Labs as ordered  Stop Aspirin and Celebrex between now and surgery  Nothing to eat/drink after midnight prior to surgery   Hold medication including insulin the day of surgery  The night before surgery, then take 15 units of 70/30 insulin with supper and 5 units of Novolog at bedtime. Continue usual doses of insulin otherwise between now and surgery  Fasting lab for cholesterol in the next 1 month  Flu shot in October if not given in hospital  Additional labs for work-up of elevated MCV and iron/iron sats  Monitor blood sugars closely 4x/day while hospitalized with insulin management per protocol       Signed Electronically by: Alfredo Aragon MD    Copy of this evaluation report is provided to requesting physician.    [facility] Preop Guidelines

## 2017-09-07 NOTE — NURSING NOTE
"Chief Complaint   Patient presents with     Pre-Op Exam       Initial /78  Pulse 83  Temp 98  F (36.7  C) (Oral)  Wt 178 lb (80.7 kg)  SpO2 95%  BMI 29.17 kg/m2 Estimated body mass index is 29.17 kg/(m^2) as calculated from the following:    Height as of 3/17/17: 5' 5.5\" (1.664 m).    Weight as of this encounter: 178 lb (80.7 kg).  Medication Reconciliation: complete  "

## 2017-09-07 NOTE — MR AVS SNAPSHOT
After Visit Summary   9/7/2017    Rabia Reed    MRN: [id number]           Patient Information     Date Of Birth          1940        Visit Information        Provider Department      9/7/2017 3:45 PM Alfredo Aragon MD; [facility] [employer] [employer] [facility]        Today's Diagnoses     Preop general physical exam    -  1    Type 2 diabetes mellitus without complication, with long-term current use of insulin (H)        Urinary frequency          Care Instructions    Labs as ordered  Stop Aspirin and Celebrex between now and surgery  Nothing to eat/drink after midnight prior to surgery   Hold medication including insulin the day of surgery  The night before surgery, then take 15 units of 70/30 insulin with supper and 5 units of Novolog at bedtime. Continue usual doses of insulin otherwise between now and surgery  Fasting lab for cholesterol in the next 1 month  Flu shot in October if not given in hospital          Follow-ups after your visit        Your next 10 appointments already scheduled     Sep 14, 2017   Procedure with Darnell Allen MD   [facility] (--)    [address]  [facility] [address] [phone]              Who to contact     If you have questions or need follow up information about today's clinic visit or your schedule please contact [facility] directly at [phone].  Normal or non-critical lab and imaging results will be communicated to you by MyChart, letter or phone within 4 business days after the clinic has received the results. If you do not hear from us within 7 days, please contact the clinic through MyChart or phone. If you have a critical or abnormal lab result, we will notify you by phone as soon as possible.  Submit refill requests through [employer] or call your pharmacy and they will forward the refill request to us. Please allow 3 business days for your refill to be completed.           Additional Information About Your Visit        MyChart Information     [email or web address] gives you secure access to your electronic health record. If you see a primary care provider, you can also send messages to your care team and make appointments. If you have questions, please call your primary care clinic.  If you do not have a primary care provider, please call [phone] and they will assist you.        Care EveryWhere ID     This is your Care EveryWhere ID. This could be used by other organizations to access your [facility] medical records  [id number]        Your Vitals Were     Pulse Temperature Pulse Oximetry BMI (Body Mass Index)          83 98  F (36.7  C) (Oral) 95% 29.17 kg/m2         Blood Pressure from Last 3 Encounters:   09/07/17 120/78   06/20/17 141/76   03/17/17 112/58    Weight from Last 3 Encounters:   09/07/17 178 lb (80.7 kg)   03/17/17 174 lb 11.2 oz (79.2 kg)   08/11/16 172 lb (78 kg)              We Performed the Following     Albumin Random Urine Quantitative     CBC with platelets     Comprehensive metabolic panel     EKG 12-lead complete w/read - Clinics     Ferritin     Hemoglobin A1c     Iron and iron binding capacity     Reticulocyte count     TSH with free T4 reflex     UA reflex to Microscopic and Culture        Primary Care Provider Office Phone # Fax #    Alfredo Aragon [phone]       [address] [facility] [address]        Equal Access to Services     KALEIGH BRAND : Hadii keanu braxtono Sojose manuel, waaxda luqadaha, qaybta kaalmada alfonzo jaime. So [facility] [phone].    ATENCIÓN: Si habla español, tiene a sylvester disposición servicios gratuitos de asistencia lingüística. Carmela al [phone].    We comply with applicable federal civil rights laws and [place] laws. We do not discriminate on the basis of race, color, national origin, age, disability sex, sexual orientation or gender identity.            Thank you!     Thank  you for choosing [facility]  for your care. Our goal is always to provide you with excellent care. Hearing back from our patients is one way we can continue to improve our services. Please take a few minutes to complete the written survey that you may receive in the mail after your visit with us. Thank you!             Your Updated Medication List - Protect others around you: Learn how to safely use, store and throw away your medicines at www.disposemymeds.org.          This list is accurate as of: 9/7/17  4:45 PM.  Always use your most recent med list.                   Brand Name Dispense Instructions for use Diagnosis    ascorbic acid 1000 MG Tabs    vitamin C     Take 1,000 mg by mouth daily.        aspirin 81 MG tablet      1 tab po QD (Once per day)        atorvastatin 80 MG tablet    LIPITOR    30 tablet    TAKE ONE TABLET BY MOUTH ONE TIME DAILY    Hyperlipidemia LDL goal <100       blood glucose monitoring lancets     300 Box    1 each 3 times daily    Type 2 diabetes mellitus without complication (H)       celecoxib 200 MG capsule    celeBREX     Take 200 mg by mouth daily        DOCUSATE SODIUM PO      Take 100 mg by mouth as needed for constipation        finasteride 5 MG tablet    PROSCAR    30 tablet    Take 1 tablet (5 mg) by mouth daily    BPH (benign prostatic hypertrophy)       GLUCERNA Liqd     1 month    1 can twice a day    Type II or unspecified type diabetes mellitus without mention of complication, not stated as uncontrolled, Nutritional deficiency       insulin aspart 100 UNIT/ML injection    NovoLOG PEN    9 mL    Inject 10 Units Subcutaneous At Bedtime    Type 2 diabetes mellitus without complication (H)       LEVITRA 20 MG tablet   Generic drug:  vardenafil     2 tablet    TAKE 1 TABLET (20 MG) BY MOUTH DAILY AS NEEDED FOR ERECTILE DYSFUNCTION    Erectile dysfunction, unspecified erectile dysfunction type       NovoLOG MIX 70/30 FLEXPEN injection   Generic drug:   insulin aspart prot & aspart     45 mL    inject 30 units daily before breakfast & 20 units before dinner    Type 2 diabetes mellitus without complication, with long-term current use of insulin (H)       ONE TOUCH ULTRA SYSTEM KIT W/DEVICE Kit     1 kit    One touch ultra 2 if covered by insurance    Type II or unspecified type diabetes mellitus without mention of complication, not stated as uncontrolled       ONE TOUCH ULTRA test strip   Generic drug:  blood glucose monitoring     200 each    test 3 times daily    Type 2 diabetes mellitus without complication (H)       * pen needles 31G X 6 MM Misc     100 each    1 Device 3 times daily    Type 2 diabetes, HbA1c goal < 7% (H)       * insulin pen needle 29G X 12.7MM    ADVOCATE INSULIN PEN NEEDLES    100 each    Use three pen needles daily or as directed.    Type 2 diabetes, HbA1c goal < 7% (H)       tamsulosin 0.4 MG capsule    FLOMAX    30 capsule    TAKE 1 CAPSULE (0.4 MG) BY MOUTH DAILY    Urine frequency       triamcinolone 0.1 % ointment    KENALOG    80 g    Apply topically 2 times daily Apply to lower legs BID x 1-2 weeks PRN    Venous stasis dermatitis of both lower extremities       TYLENOL 500 MG tablet   Generic drug:  acetaminophen      Take 2 tablets by mouth every 6 hours as needed.        * Notice:  This list has 2 medication(s) that are the same as other medications prescribed for you. Read the directions carefully, and ask your doctor or other care provider to review them with you.

## 2017-09-08 LAB
ALBUMIN SERPL-MCNC: 3.3 G/DL (ref 3.4–5)
ALP SERPL-CCNC: 115 U/L (ref 40–150)
ALT SERPL W P-5'-P-CCNC: 43 U/L (ref 0–70)
ANION GAP SERPL CALCULATED.3IONS-SCNC: 3 MMOL/L (ref 3–14)
AST SERPL W P-5'-P-CCNC: 32 U/L (ref 0–45)
BILIRUB SERPL-MCNC: 0.6 MG/DL (ref 0.2–1.3)
BUN SERPL-MCNC: 20 MG/DL (ref 7–30)
CALCIUM SERPL-MCNC: 8.2 MG/DL (ref 8.5–10.1)
CHLORIDE SERPL-SCNC: 105 MMOL/L (ref 94–109)
CO2 SERPL-SCNC: 32 MMOL/L (ref 20–32)
CREAT SERPL-MCNC: 0.99 MG/DL (ref 0.66–1.25)
CREAT UR-MCNC: 93 MG/DL
FERRITIN SERPL-MCNC: 536 NG/ML (ref 26–388)
GFR SERPL CREATININE-BSD FRML MDRD: 73 ML/MIN/1.7M2
GLUCOSE SERPL-MCNC: 188 MG/DL (ref 70–99)
IRON SATN MFR SERPL: 53 % (ref 15–46)
IRON SERPL-MCNC: 142 UG/DL (ref 35–180)
MICROALBUMIN UR-MCNC: 14 MG/L
MICROALBUMIN/CREAT UR: 15.25 MG/G CR (ref 0–17)
MRSA DNA SPEC QL NAA+PROBE: NEGATIVE
POTASSIUM SERPL-SCNC: 4.4 MMOL/L (ref 3.4–5.3)
PROT SERPL-MCNC: 7 G/DL (ref 6.8–8.8)
RETICS # AUTO: 106.3 10E9/L (ref 25–95)
RETICS/RBC NFR AUTO: 2.5 % (ref 0.5–2)
SODIUM SERPL-SCNC: 140 MMOL/L (ref 133–144)
SPECIMEN SOURCE: NORMAL
TIBC SERPL-MCNC: 266 UG/DL (ref 240–430)
TSH SERPL DL<=0.005 MIU/L-ACNC: 1.81 MU/L (ref 0.4–4)

## 2017-09-08 NOTE — TELEPHONE ENCOUNTER
Pt seen today but not fasting. Was instructed to have fasting labs in the next month or so after recovered from knee replacement surgery. Liver labs were drawn today and pending at this time. Will refill med for 3 mos and await FLP as previously ordered

## 2017-09-12 RX ORDER — CLINDAMYCIN PHOSPHATE 900 MG/50ML
900 INJECTION, SOLUTION INTRAVENOUS SEE ADMIN INSTRUCTIONS
Status: CANCELLED | OUTPATIENT
Start: 2017-09-12

## 2017-09-12 RX ORDER — CLINDAMYCIN PHOSPHATE 900 MG/50ML
900 INJECTION, SOLUTION INTRAVENOUS
Status: CANCELLED | OUTPATIENT
Start: 2017-09-12

## 2017-09-13 ENCOUNTER — CARE COORDINATION (OUTPATIENT)
Dept: GERIATRIC MEDICINE | Facility: CLINIC | Age: 77
End: 2017-09-13

## 2017-09-13 NOTE — PROGRESS NOTES
Received a call from client's son, Brandan, sharing that client will be going in for a right total knee replacement on 9/14/17.  Brandan inquired about TCUs.  Provided the names of local TCUs near client's home and near family.  Brandan shared that he will be talking with the  after client's surgery regarding rehab.  Asked Brandan how client finally made the decision to have the surgery.  Brandan shared that the pain was getting to unbearable for client and he could not take it any more.  Reviewed with Brandan that this CM would follow up during the transition.  Also encouraged Brandan to make communication sheets for client during his stay, with commonly asked questions with English on one side and Nepali on the other.  Brandan shared that he would make that. No further questions.  Charis Garcia, [facility]  [phone]

## 2017-09-14 ENCOUNTER — APPOINTMENT (OUTPATIENT)
Dept: GENERAL RADIOLOGY | Facility: CLINIC | Age: 77
DRG: 470 | End: 2017-09-14
Attending: ORTHOPAEDIC SURGERY
Payer: COMMERCIAL

## 2017-09-14 ENCOUNTER — HOSPITAL ENCOUNTER (INPATIENT)
Facility: CLINIC | Age: 77
LOS: 3 days | Discharge: NURSING, REHAB OR LONG-TERM CARE | DRG: 470 | End: 2017-09-17
Attending: ORTHOPAEDIC SURGERY | Admitting: ORTHOPAEDIC SURGERY
Payer: COMMERCIAL

## 2017-09-14 ENCOUNTER — APPOINTMENT (OUTPATIENT)
Dept: PHYSICAL THERAPY | Facility: CLINIC | Age: 77
DRG: 470 | End: 2017-09-14
Attending: ORTHOPAEDIC SURGERY
Payer: COMMERCIAL

## 2017-09-14 ENCOUNTER — ANESTHESIA EVENT (OUTPATIENT)
Dept: SURGERY | Facility: CLINIC | Age: 77
DRG: 470 | End: 2017-09-14
Payer: COMMERCIAL

## 2017-09-14 ENCOUNTER — ANESTHESIA (OUTPATIENT)
Dept: SURGERY | Facility: CLINIC | Age: 77
DRG: 470 | End: 2017-09-14
Payer: COMMERCIAL

## 2017-09-14 DIAGNOSIS — Z96.652 STATUS POST TOTAL LEFT KNEE REPLACEMENT: Primary | ICD-10-CM

## 2017-09-14 DIAGNOSIS — K59.03 DRUG-INDUCED CONSTIPATION: ICD-10-CM

## 2017-09-14 DIAGNOSIS — Z79.4 TYPE 2 DIABETES MELLITUS WITHOUT COMPLICATION, WITH LONG-TERM CURRENT USE OF INSULIN (H): ICD-10-CM

## 2017-09-14 DIAGNOSIS — E11.9 TYPE 2 DIABETES MELLITUS WITHOUT COMPLICATION, WITH LONG-TERM CURRENT USE OF INSULIN (H): ICD-10-CM

## 2017-09-14 LAB
ABO + RH BLD: NORMAL
ABO + RH BLD: NORMAL
BLD GP AB SCN SERPL QL: NORMAL
BLOOD BANK CMNT PATIENT-IMP: NORMAL
CREAT SERPL-MCNC: 0.82 MG/DL (ref 0.66–1.25)
GFR SERPL CREATININE-BSD FRML MDRD: >90 ML/MIN/1.7M2
GLUCOSE BLDC GLUCOMTR-MCNC: 104 MG/DL (ref 70–99)
GLUCOSE BLDC GLUCOMTR-MCNC: 170 MG/DL (ref 70–99)
GLUCOSE BLDC GLUCOMTR-MCNC: 177 MG/DL (ref 70–99)
GLUCOSE BLDC GLUCOMTR-MCNC: 183 MG/DL (ref 70–99)
GLUCOSE BLDC GLUCOMTR-MCNC: 204 MG/DL (ref 70–99)
GLUCOSE SERPL-MCNC: 99 MG/DL (ref 70–99)
INR PPP: 0.95 (ref 0.86–1.14)
PLATELET # BLD AUTO: 168 10E9/L (ref 150–450)
SPECIMEN EXP DATE BLD: NORMAL

## 2017-09-14 PROCEDURE — 86850 RBC ANTIBODY SCREEN: CPT | Performed by: ORTHOPAEDIC SURGERY

## 2017-09-14 PROCEDURE — 27210995 ZZH RX 272: Performed by: ORTHOPAEDIC SURGERY

## 2017-09-14 PROCEDURE — 27210794 ZZH OR GENERAL SUPPLY STERILE: Performed by: ORTHOPAEDIC SURGERY

## 2017-09-14 PROCEDURE — 25800025 ZZH RX 258: Performed by: ORTHOPAEDIC SURGERY

## 2017-09-14 PROCEDURE — 25000128 H RX IP 250 OP 636: Performed by: NURSE ANESTHETIST, CERTIFIED REGISTERED

## 2017-09-14 PROCEDURE — 25000125 ZZHC RX 250: Performed by: NURSE ANESTHETIST, CERTIFIED REGISTERED

## 2017-09-14 PROCEDURE — 40000171 ZZH STATISTIC PRE-PROCEDURE ASSESSMENT III: Performed by: ORTHOPAEDIC SURGERY

## 2017-09-14 PROCEDURE — C1776 JOINT DEVICE (IMPLANTABLE): HCPCS | Performed by: ORTHOPAEDIC SURGERY

## 2017-09-14 PROCEDURE — 25000128 H RX IP 250 OP 636: Performed by: ORTHOPAEDIC SURGERY

## 2017-09-14 PROCEDURE — 85610 PROTHROMBIN TIME: CPT | Performed by: ANESTHESIOLOGY

## 2017-09-14 PROCEDURE — 25000128 H RX IP 250 OP 636: Performed by: ANESTHESIOLOGY

## 2017-09-14 PROCEDURE — 97161 PT EVAL LOW COMPLEX 20 MIN: CPT | Mod: GP | Performed by: PHYSICAL THERAPIST

## 2017-09-14 PROCEDURE — 00000146 ZZHCL STATISTIC GLUCOSE BY METER IP

## 2017-09-14 PROCEDURE — 99207 ZZC CONSULT E&M CHANGED TO INITIAL LEVEL: CPT | Performed by: PHYSICIAN ASSISTANT

## 2017-09-14 PROCEDURE — 85049 AUTOMATED PLATELET COUNT: CPT | Performed by: ORTHOPAEDIC SURGERY

## 2017-09-14 PROCEDURE — 71000012 ZZH RECOVERY PHASE 1 LEVEL 1 FIRST HR: Performed by: ORTHOPAEDIC SURGERY

## 2017-09-14 PROCEDURE — 82947 ASSAY GLUCOSE BLOOD QUANT: CPT | Performed by: ANESTHESIOLOGY

## 2017-09-14 PROCEDURE — 86901 BLOOD TYPING SEROLOGIC RH(D): CPT | Performed by: ORTHOPAEDIC SURGERY

## 2017-09-14 PROCEDURE — 25000132 ZZH RX MED GY IP 250 OP 250 PS 637: Performed by: ORTHOPAEDIC SURGERY

## 2017-09-14 PROCEDURE — 97110 THERAPEUTIC EXERCISES: CPT | Mod: GP | Performed by: PHYSICAL THERAPIST

## 2017-09-14 PROCEDURE — 36415 COLL VENOUS BLD VENIPUNCTURE: CPT | Performed by: ANESTHESIOLOGY

## 2017-09-14 PROCEDURE — 27110028 ZZH OR GENERAL SUPPLY NON-STERILE: Performed by: ORTHOPAEDIC SURGERY

## 2017-09-14 PROCEDURE — 25000125 ZZHC RX 250: Performed by: ORTHOPAEDIC SURGERY

## 2017-09-14 PROCEDURE — 36000063 ZZH SURGERY LEVEL 4 EA 15 ADDTL MIN: Performed by: ORTHOPAEDIC SURGERY

## 2017-09-14 PROCEDURE — 99222 1ST HOSP IP/OBS MODERATE 55: CPT | Performed by: PHYSICIAN ASSISTANT

## 2017-09-14 PROCEDURE — 71000013 ZZH RECOVERY PHASE 1 LEVEL 1 EA ADDTL HR: Performed by: ORTHOPAEDIC SURGERY

## 2017-09-14 PROCEDURE — 40000193 ZZH STATISTIC PT WARD VISIT: Performed by: PHYSICAL THERAPIST

## 2017-09-14 PROCEDURE — 36000093 ZZH SURGERY LEVEL 4 1ST 30 MIN: Performed by: ORTHOPAEDIC SURGERY

## 2017-09-14 PROCEDURE — 82565 ASSAY OF CREATININE: CPT | Performed by: ORTHOPAEDIC SURGERY

## 2017-09-14 PROCEDURE — 40000986 XR KNEE PORT RT 1/2 VW: Mod: RT

## 2017-09-14 PROCEDURE — 36415 COLL VENOUS BLD VENIPUNCTURE: CPT | Performed by: ORTHOPAEDIC SURGERY

## 2017-09-14 PROCEDURE — 86900 BLOOD TYPING SEROLOGIC ABO: CPT | Performed by: ORTHOPAEDIC SURGERY

## 2017-09-14 PROCEDURE — 25000125 ZZHC RX 250: Performed by: ANESTHESIOLOGY

## 2017-09-14 PROCEDURE — 0SRC0J9 REPLACEMENT OF RIGHT KNEE JOINT WITH SYNTHETIC SUBSTITUTE, CEMENTED, OPEN APPROACH: ICD-10-PCS | Performed by: ORTHOPAEDIC SURGERY

## 2017-09-14 PROCEDURE — 37000008 ZZH ANESTHESIA TECHNICAL FEE, 1ST 30 MIN: Performed by: ORTHOPAEDIC SURGERY

## 2017-09-14 PROCEDURE — 27810169 ZZH OR IMPLANT GENERAL: Performed by: ORTHOPAEDIC SURGERY

## 2017-09-14 PROCEDURE — 37000009 ZZH ANESTHESIA TECHNICAL FEE, EACH ADDTL 15 MIN: Performed by: ORTHOPAEDIC SURGERY

## 2017-09-14 PROCEDURE — S0138 FINASTERIDE, 5 MG: HCPCS | Performed by: ORTHOPAEDIC SURGERY

## 2017-09-14 PROCEDURE — 12000007 ZZH R&B INTERMEDIATE

## 2017-09-14 DEVICE — BONE CEMENT RADIOPAQUE SIMPLEX HV FULL DOSE 6194-1-001: Type: IMPLANTABLE DEVICE | Site: KNEE | Status: FUNCTIONAL

## 2017-09-14 RX ORDER — PROCHLORPERAZINE MALEATE 5 MG
5 TABLET ORAL EVERY 6 HOURS PRN
Status: DISCONTINUED | OUTPATIENT
Start: 2017-09-14 | End: 2017-09-17 | Stop reason: HOSPADM

## 2017-09-14 RX ORDER — CELECOXIB 200 MG/1
200 CAPSULE ORAL EVERY EVENING
Status: DISCONTINUED | OUTPATIENT
Start: 2017-09-14 | End: 2017-09-17 | Stop reason: HOSPADM

## 2017-09-14 RX ORDER — AMOXICILLIN 250 MG
1-2 CAPSULE ORAL 2 TIMES DAILY
Status: DISCONTINUED | OUTPATIENT
Start: 2017-09-14 | End: 2017-09-17 | Stop reason: HOSPADM

## 2017-09-14 RX ORDER — CHLORHEXIDINE GLUCONATE 40 MG/ML
SOLUTION TOPICAL ONCE
Status: DISCONTINUED | OUTPATIENT
Start: 2017-09-14 | End: 2017-09-14 | Stop reason: HOSPADM

## 2017-09-14 RX ORDER — ACETAMINOPHEN 325 MG/1
975 TABLET ORAL EVERY 8 HOURS
Status: COMPLETED | OUTPATIENT
Start: 2017-09-14 | End: 2017-09-17

## 2017-09-14 RX ORDER — PROPOFOL 10 MG/ML
INJECTION, EMULSION INTRAVENOUS PRN
Status: DISCONTINUED | OUTPATIENT
Start: 2017-09-14 | End: 2017-09-14

## 2017-09-14 RX ORDER — FENTANYL CITRATE 0.05 MG/ML
25-50 INJECTION, SOLUTION INTRAMUSCULAR; INTRAVENOUS
Status: DISCONTINUED | OUTPATIENT
Start: 2017-09-14 | End: 2017-09-14 | Stop reason: HOSPADM

## 2017-09-14 RX ORDER — CEFAZOLIN SODIUM 2 G/100ML
2 INJECTION, SOLUTION INTRAVENOUS
Status: COMPLETED | OUTPATIENT
Start: 2017-09-14 | End: 2017-09-14

## 2017-09-14 RX ORDER — OXYCODONE HCL 10 MG/1
10 TABLET, FILM COATED, EXTENDED RELEASE ORAL EVERY 12 HOURS
Status: COMPLETED | OUTPATIENT
Start: 2017-09-14 | End: 2017-09-17

## 2017-09-14 RX ORDER — DIPHENHYDRAMINE HCL 12.5MG/5ML
12.5 LIQUID (ML) ORAL EVERY 6 HOURS PRN
Status: DISCONTINUED | OUTPATIENT
Start: 2017-09-14 | End: 2017-09-17 | Stop reason: HOSPADM

## 2017-09-14 RX ORDER — FENTANYL CITRATE 50 UG/ML
INJECTION, SOLUTION INTRAMUSCULAR; INTRAVENOUS PRN
Status: DISCONTINUED | OUTPATIENT
Start: 2017-09-14 | End: 2017-09-14

## 2017-09-14 RX ORDER — TAMSULOSIN HYDROCHLORIDE 0.4 MG/1
0.4 CAPSULE ORAL DAILY
Status: DISCONTINUED | OUTPATIENT
Start: 2017-09-14 | End: 2017-09-17 | Stop reason: HOSPADM

## 2017-09-14 RX ORDER — CEFAZOLIN SODIUM 2 G/100ML
2 INJECTION, SOLUTION INTRAVENOUS EVERY 8 HOURS
Status: COMPLETED | OUTPATIENT
Start: 2017-09-14 | End: 2017-09-15

## 2017-09-14 RX ORDER — LIDOCAINE HYDROCHLORIDE 20 MG/ML
INJECTION, SOLUTION INFILTRATION; PERINEURAL PRN
Status: DISCONTINUED | OUTPATIENT
Start: 2017-09-14 | End: 2017-09-14

## 2017-09-14 RX ORDER — ONDANSETRON 2 MG/ML
INJECTION INTRAMUSCULAR; INTRAVENOUS PRN
Status: DISCONTINUED | OUTPATIENT
Start: 2017-09-14 | End: 2017-09-14

## 2017-09-14 RX ORDER — EPHEDRINE SULFATE 50 MG/ML
INJECTION, SOLUTION INTRAMUSCULAR; INTRAVENOUS; SUBCUTANEOUS PRN
Status: DISCONTINUED | OUTPATIENT
Start: 2017-09-14 | End: 2017-09-14

## 2017-09-14 RX ORDER — ONDANSETRON 2 MG/ML
4 INJECTION INTRAMUSCULAR; INTRAVENOUS EVERY 30 MIN PRN
Status: DISCONTINUED | OUTPATIENT
Start: 2017-09-14 | End: 2017-09-14 | Stop reason: HOSPADM

## 2017-09-14 RX ORDER — PROPOFOL 10 MG/ML
INJECTION, EMULSION INTRAVENOUS CONTINUOUS PRN
Status: DISCONTINUED | OUTPATIENT
Start: 2017-09-14 | End: 2017-09-14

## 2017-09-14 RX ORDER — ONDANSETRON 4 MG/1
4 TABLET, ORALLY DISINTEGRATING ORAL EVERY 30 MIN PRN
Status: DISCONTINUED | OUTPATIENT
Start: 2017-09-14 | End: 2017-09-14 | Stop reason: HOSPADM

## 2017-09-14 RX ORDER — OXYCODONE HYDROCHLORIDE 5 MG/1
5-10 TABLET ORAL
Status: DISCONTINUED | OUTPATIENT
Start: 2017-09-14 | End: 2017-09-17 | Stop reason: HOSPADM

## 2017-09-14 RX ORDER — ONDANSETRON 4 MG/1
4 TABLET, ORALLY DISINTEGRATING ORAL EVERY 6 HOURS PRN
Status: DISCONTINUED | OUTPATIENT
Start: 2017-09-14 | End: 2017-09-17 | Stop reason: HOSPADM

## 2017-09-14 RX ORDER — FINASTERIDE 5 MG/1
5 TABLET, FILM COATED ORAL EVERY EVENING
Status: DISCONTINUED | OUTPATIENT
Start: 2017-09-14 | End: 2017-09-17 | Stop reason: HOSPADM

## 2017-09-14 RX ORDER — LIDOCAINE 40 MG/G
CREAM TOPICAL
Status: DISCONTINUED | OUTPATIENT
Start: 2017-09-14 | End: 2017-09-17 | Stop reason: HOSPADM

## 2017-09-14 RX ORDER — CEFAZOLIN SODIUM 1 G/3ML
1 INJECTION, POWDER, FOR SOLUTION INTRAMUSCULAR; INTRAVENOUS SEE ADMIN INSTRUCTIONS
Status: DISCONTINUED | OUTPATIENT
Start: 2017-09-14 | End: 2017-09-14 | Stop reason: HOSPADM

## 2017-09-14 RX ORDER — KETOROLAC TROMETHAMINE 30 MG/ML
30 INJECTION, SOLUTION INTRAMUSCULAR; INTRAVENOUS ONCE
Status: COMPLETED | OUTPATIENT
Start: 2017-09-14 | End: 2017-09-14

## 2017-09-14 RX ORDER — ALUMINA, MAGNESIA, AND SIMETHICONE 2400; 2400; 240 MG/30ML; MG/30ML; MG/30ML
15-30 SUSPENSION ORAL EVERY 4 HOURS PRN
Status: DISCONTINUED | OUTPATIENT
Start: 2017-09-14 | End: 2017-09-17 | Stop reason: HOSPADM

## 2017-09-14 RX ORDER — NICOTINE POLACRILEX 4 MG
15-30 LOZENGE BUCCAL
Status: DISCONTINUED | OUTPATIENT
Start: 2017-09-14 | End: 2017-09-17 | Stop reason: HOSPADM

## 2017-09-14 RX ORDER — DIPHENHYDRAMINE HYDROCHLORIDE 50 MG/ML
12.5 INJECTION INTRAMUSCULAR; INTRAVENOUS EVERY 6 HOURS PRN
Status: DISCONTINUED | OUTPATIENT
Start: 2017-09-14 | End: 2017-09-17 | Stop reason: HOSPADM

## 2017-09-14 RX ORDER — SODIUM CHLORIDE, SODIUM LACTATE, POTASSIUM CHLORIDE, CALCIUM CHLORIDE 600; 310; 30; 20 MG/100ML; MG/100ML; MG/100ML; MG/100ML
INJECTION, SOLUTION INTRAVENOUS CONTINUOUS
Status: DISCONTINUED | OUTPATIENT
Start: 2017-09-14 | End: 2017-09-14 | Stop reason: HOSPADM

## 2017-09-14 RX ORDER — ACETAMINOPHEN 325 MG/1
650 TABLET ORAL EVERY 4 HOURS PRN
Status: DISCONTINUED | OUTPATIENT
Start: 2017-09-17 | End: 2017-09-17 | Stop reason: HOSPADM

## 2017-09-14 RX ORDER — HYDROMORPHONE HYDROCHLORIDE 1 MG/ML
.3-.5 INJECTION, SOLUTION INTRAMUSCULAR; INTRAVENOUS; SUBCUTANEOUS EVERY 30 MIN PRN
Status: DISCONTINUED | OUTPATIENT
Start: 2017-09-14 | End: 2017-09-17 | Stop reason: HOSPADM

## 2017-09-14 RX ORDER — HYDROMORPHONE HYDROCHLORIDE 1 MG/ML
.3-.5 INJECTION, SOLUTION INTRAMUSCULAR; INTRAVENOUS; SUBCUTANEOUS EVERY 5 MIN PRN
Status: DISCONTINUED | OUTPATIENT
Start: 2017-09-14 | End: 2017-09-14 | Stop reason: HOSPADM

## 2017-09-14 RX ORDER — NALOXONE HYDROCHLORIDE 0.4 MG/ML
.1-.4 INJECTION, SOLUTION INTRAMUSCULAR; INTRAVENOUS; SUBCUTANEOUS
Status: DISCONTINUED | OUTPATIENT
Start: 2017-09-14 | End: 2017-09-17 | Stop reason: HOSPADM

## 2017-09-14 RX ORDER — MAGNESIUM HYDROXIDE 1200 MG/15ML
LIQUID ORAL PRN
Status: DISCONTINUED | OUTPATIENT
Start: 2017-09-14 | End: 2017-09-14 | Stop reason: HOSPADM

## 2017-09-14 RX ORDER — SODIUM CHLORIDE 9 MG/ML
INJECTION, SOLUTION INTRAVENOUS CONTINUOUS
Status: DISCONTINUED | OUTPATIENT
Start: 2017-09-14 | End: 2017-09-16 | Stop reason: CLARIF

## 2017-09-14 RX ORDER — ONDANSETRON 2 MG/ML
4 INJECTION INTRAMUSCULAR; INTRAVENOUS EVERY 6 HOURS PRN
Status: DISCONTINUED | OUTPATIENT
Start: 2017-09-14 | End: 2017-09-17 | Stop reason: HOSPADM

## 2017-09-14 RX ORDER — ATORVASTATIN CALCIUM 20 MG/1
80 TABLET, FILM COATED ORAL EVERY EVENING
Status: DISCONTINUED | OUTPATIENT
Start: 2017-09-14 | End: 2017-09-17 | Stop reason: HOSPADM

## 2017-09-14 RX ORDER — DEXTROSE MONOHYDRATE 25 G/50ML
25-50 INJECTION, SOLUTION INTRAVENOUS
Status: DISCONTINUED | OUTPATIENT
Start: 2017-09-14 | End: 2017-09-17 | Stop reason: HOSPADM

## 2017-09-14 RX ADMIN — HYDROMORPHONE HYDROCHLORIDE 0.5 MG: 1 INJECTION, SOLUTION INTRAMUSCULAR; INTRAVENOUS; SUBCUTANEOUS at 15:33

## 2017-09-14 RX ADMIN — SODIUM CHLORIDE, POTASSIUM CHLORIDE, SODIUM LACTATE AND CALCIUM CHLORIDE: 600; 310; 30; 20 INJECTION, SOLUTION INTRAVENOUS at 06:34

## 2017-09-14 RX ADMIN — CEFAZOLIN SODIUM 2 G: 2 INJECTION, SOLUTION INTRAVENOUS at 16:43

## 2017-09-14 RX ADMIN — CEFAZOLIN 1 G: 1 INJECTION, POWDER, FOR SOLUTION INTRAMUSCULAR; INTRAVENOUS at 09:38

## 2017-09-14 RX ADMIN — FENTANYL CITRATE 50 MCG: 50 INJECTION, SOLUTION INTRAMUSCULAR; INTRAVENOUS at 08:55

## 2017-09-14 RX ADMIN — TRANEXAMIC ACID 1 G: 100 INJECTION, SOLUTION INTRAVENOUS at 07:39

## 2017-09-14 RX ADMIN — FENTANYL CITRATE 50 MCG: 50 INJECTION, SOLUTION INTRAMUSCULAR; INTRAVENOUS at 10:45

## 2017-09-14 RX ADMIN — FENTANYL CITRATE 100 MCG: 50 INJECTION, SOLUTION INTRAMUSCULAR; INTRAVENOUS at 07:36

## 2017-09-14 RX ADMIN — SODIUM CHLORIDE: 9 INJECTION, SOLUTION INTRAVENOUS at 13:26

## 2017-09-14 RX ADMIN — ACETAMINOPHEN 975 MG: 325 TABLET, FILM COATED ORAL at 13:23

## 2017-09-14 RX ADMIN — TRANEXAMIC ACID 1 G: 100 INJECTION, SOLUTION INTRAVENOUS at 09:45

## 2017-09-14 RX ADMIN — Medication 10 MG: at 07:40

## 2017-09-14 RX ADMIN — ACETAMINOPHEN 975 MG: 325 TABLET, FILM COATED ORAL at 22:02

## 2017-09-14 RX ADMIN — LIDOCAINE HYDROCHLORIDE 100 MG: 20 INJECTION, SOLUTION INFILTRATION; PERINEURAL at 07:36

## 2017-09-14 RX ADMIN — SODIUM CHLORIDE, POTASSIUM CHLORIDE, SODIUM LACTATE AND CALCIUM CHLORIDE: 600; 310; 30; 20 INJECTION, SOLUTION INTRAVENOUS at 08:07

## 2017-09-14 RX ADMIN — OXYCODONE HYDROCHLORIDE 10 MG: 5 TABLET ORAL at 13:23

## 2017-09-14 RX ADMIN — Medication 10 MG: at 07:43

## 2017-09-14 RX ADMIN — HYDROMORPHONE HYDROCHLORIDE 0.5 MG: 1 INJECTION, SOLUTION INTRAMUSCULAR; INTRAVENOUS; SUBCUTANEOUS at 10:46

## 2017-09-14 RX ADMIN — FENTANYL CITRATE 50 MCG: 50 INJECTION, SOLUTION INTRAMUSCULAR; INTRAVENOUS at 08:14

## 2017-09-14 RX ADMIN — FENTANYL CITRATE 50 MCG: 50 INJECTION, SOLUTION INTRAMUSCULAR; INTRAVENOUS at 09:38

## 2017-09-14 RX ADMIN — SENNOSIDES AND DOCUSATE SODIUM 2 TABLET: 8.6; 5 TABLET ORAL at 22:02

## 2017-09-14 RX ADMIN — CELECOXIB 200 MG: 200 CAPSULE ORAL at 19:48

## 2017-09-14 RX ADMIN — PROPOFOL 120 MCG/KG/MIN: 10 INJECTION, EMULSION INTRAVENOUS at 07:36

## 2017-09-14 RX ADMIN — CEFAZOLIN SODIUM 2 G: 2 INJECTION, SOLUTION INTRAVENOUS at 07:38

## 2017-09-14 RX ADMIN — FINASTERIDE 5 MG: 5 TABLET, FILM COATED ORAL at 19:48

## 2017-09-14 RX ADMIN — LIDOCAINE HYDROCHLORIDE 1 ML: 10 INJECTION, SOLUTION EPIDURAL; INFILTRATION; INTRACAUDAL; PERINEURAL at 06:34

## 2017-09-14 RX ADMIN — ONDANSETRON 4 MG: 2 INJECTION INTRAMUSCULAR; INTRAVENOUS at 10:01

## 2017-09-14 RX ADMIN — HYDROMORPHONE HYDROCHLORIDE 0.5 MG: 1 INJECTION, SOLUTION INTRAMUSCULAR; INTRAVENOUS; SUBCUTANEOUS at 11:27

## 2017-09-14 RX ADMIN — ATORVASTATIN CALCIUM 80 MG: 20 TABLET, FILM COATED ORAL at 19:47

## 2017-09-14 RX ADMIN — OXYCODONE HYDROCHLORIDE 10 MG: 10 TABLET, FILM COATED, EXTENDED RELEASE ORAL at 18:27

## 2017-09-14 RX ADMIN — KETOROLAC TROMETHAMINE 30 MG: 30 INJECTION, SOLUTION INTRAMUSCULAR; INTRAVENOUS at 11:12

## 2017-09-14 RX ADMIN — PROPOFOL 200 MG: 10 INJECTION, EMULSION INTRAVENOUS at 07:36

## 2017-09-14 ASSESSMENT — LIFESTYLE VARIABLES: TOBACCO_USE: 1

## 2017-09-14 NOTE — ANESTHESIA PROCEDURE NOTES
Peripheral nerve/Neuraxial procedure note : Adductor canal  Pre-Procedure  Performed by AMBREEN CALDERON  Location: pre-op      Pre-Anesthestic Checklist: patient identified, IV checked, site marked, risks and benefits discussed, informed consent, monitors and equipment checked, at physician/surgeon's request and post-op pain management    Timeout  Correct Patient: Yes   Correct Procedure: Yes   Correct Site: Yes   Correct Laterality: Yes   Correct Position: Yes   Site Marked: Yes   .   Procedure Documentation    .    Procedure:    Adductor canal.  Local skin infiltrated with 3 mL of 1% lidocaine.     Ultrasound used to identify targeted nerve, plexus, or vascular marker and placed a needle adjacent to it., Ultrasound was used to visualize the spread of the anesthetic in close proximity to the above stated nerve. A permanent image is entered into the patient's record.  Patient Prep;mask, sterile gloves, chlorhexidine gluconate and isopropyl alcohol, patient draped.  Nerve Stim: Initial Level 1 mA. Lowest motor response mA..  Needle: insulated, short bevel Needle Gauge: 20.    Needle Length (Inches) 2  Insertion Method: Single Shot.       Assessment/Narrative  Paresthesias: No.  .  The placement was negative for: blood aspirated, painful injection and site bleeding.  Bolus given via needle..   Secured via.   Complications: none. Comments:  Single shot femoral nerve block via adductor canal  35 ml 0.5% Ropivacaine with 1:400,000 Epinephrine

## 2017-09-14 NOTE — ANESTHESIA CARE TRANSFER NOTE
Patient: Rabia Swansonuyen    Procedure(s):  RIGHT TOTAL KNEE ARTHROPLASTY  - Wound Class: I-Clean    Diagnosis: RIGHT KNEE DJD  Diagnosis Additional Information: Right knee degenerative arthritis    Anesthesia Type:   General, LMA     Note:  Airway :LMA  Patient transferred to:PACU  Comments: Report to RN, VSS      Vitals: (Last set prior to Anesthesia Care Transfer)    CRNA VITALS  9/14/2017 0956 - 9/14/2017 1026      9/14/2017             Resp Rate (observed): (!)  1    Resp Rate (set): 10                Electronically Signed By: JERMAIN Chilel [facility]  September 14, 2017  10:26 AM

## 2017-09-14 NOTE — IP AVS SNAPSHOT
"          [place] [address] [facility]: [phone]                                              INTERAGENCY TRANSFER FORM - LAB / IMAGING / EKG / EMG RESULTS   2017                    Hospital Admission Date: 2017  PIYUSH BOND   : 1940  Sex: Male        Attending Provider: Darnell Allen MD     Allergies:  No Known Drug Allergies    Infection:  None   Service:  SURGERY    Ht:  1.664 m (5' 5.5\")   Wt:  80.7 kg (178 lb)   Admission Wt:  --    BMI:  29.17 kg/m 2   BSA:  1.93 m 2            Patient PCP Information     Provider PCP Type    Alfredo Aragon MD General         Lab Results - 3 Days      Glucose by meter [[id number]]  Resulted: 17 1301, Result status: Final result    Ordering provider: Darnell Allen MD  17 1257 Resulting lab: POINT OF CARE TEST, GLUCOSE    Specimen Information    Type Source Collected On     17 1257          Components       Value Reference Range Flag Lab   Glucose 96 70 - 99 mg/dL  170            Glucose by meter [[id number]]  Resulted: 17 1120, Result status: Final result    Ordering provider: Darnell Allen MD  17 1116 Resulting lab: POINT OF CARE TEST, GLUCOSE    Specimen Information    Type Source Collected On     17 1116          Components       Value Reference Range Flag Lab   Glucose 90 70 - 99 mg/dL  170            Glucose by meter [[id number]] (Abnormal)  Resulted: 17 0925, Result status: Final result    Ordering provider: Darnell Allen MD  17 0920 Resulting lab: POINT OF CARE TEST, GLUCOSE    Specimen Information    Type Source Collected On     17 0920          Components       Value Reference Range Flag Lab   Glucose 111 70 - 99 mg/dL H 170            Hemoglobin [[id number]] (Abnormal)  Resulted: 17 0700, Result status: Final result    Ordering provider: Darnell Allen MD  17 0000 Resulting lab: [facility]    Specimen Information    Type Source Collected On   Blood "  09/16/17 0630          Components       Value Reference Range Flag Lab   Hemoglobin 12.2 13.3 - 17.7 g/dL L FrStHsLb            Glucose [[id number]]  Resulted: 09/16/17 0655, Result status: Final result    Ordering provider: Andrea Emmanuel MD  09/16/17 0000 Resulting lab: [facility]    Specimen Information    Type Source Collected On   Blood  09/16/17 0630          Components       Value Reference Range Flag Lab   Glucose 85 70 - 99 mg/dL  FrStHsLb            Glucose by meter [[id number]]  Resulted: 09/16/17 0236, Result status: Final result    Ordering provider: Darnell Allen MD  09/16/17 0201 Resulting lab: POINT OF CARE TEST, GLUCOSE    Specimen Information    Type Source Collected On     09/16/17 0201          Components       Value Reference Range Flag Lab   Glucose 70 70 - 99 mg/dL  170            Glucose by meter [[id number]]  Resulted: 09/15/17 2326, Result status: Final result    Ordering provider: Darnell Allen MD  09/15/17 2321 Resulting lab: POINT OF CARE TEST, GLUCOSE    Specimen Information    Type Source Collected On     09/15/17 2321          Components       Value Reference Range Flag Lab   Glucose 76 70 - 99 mg/dL  170            Glucose by meter [[id number]] (Abnormal)  Resulted: 09/15/17 2231, Result status: Final result    Ordering provider: Darnell Allen MD  09/15/17 2218 Resulting lab: POINT OF CARE TEST, GLUCOSE    Specimen Information    Type Source Collected On     09/15/17 2218          Components       Value Reference Range Flag Lab   Glucose 64 70 - 99 mg/dL L 170            Glucose by meter [[id number]] (Abnormal)  Resulted: 09/15/17 1830, Result status: Final result    Ordering provider: Darnell Allen MD  09/15/17 1826 Resulting lab: POINT OF CARE TEST, GLUCOSE    Specimen Information    Type Source Collected On     09/15/17 1826          Components       Value Reference Range Flag Lab   Glucose 219 70 - 99 mg/dL H 170            Glucose by meter  [[id number]] (Abnormal)  Resulted: 09/15/17 1231, Result status: Final result    Ordering provider: Darnell Allen MD  09/15/17 1224 Resulting lab: POINT OF CARE TEST, GLUCOSE    Specimen Information    Type Source Collected On     09/15/17 1224          Components       Value Reference Range Flag Lab   Glucose 243 70 - 99 mg/dL H 170            Hemoglobin [[id number]] (Abnormal)  Resulted: 09/15/17 0715, Result status: Final result    Ordering provider: Darnell Allen MD  09/15/17 0000 Resulting lab: [facility]    Specimen Information    Type Source Collected On   Blood  09/15/17 0708          Components       Value Reference Range Flag Lab   Hemoglobin 11.8 13.3 - 17.7 g/dL L FrStHsLb            Glucose by meter [[id number]] (Abnormal)  Resulted: 09/15/17 0712, Result status: Final result    Ordering provider: Darnell Allen MD  09/15/17 0653 Resulting lab: POINT OF CARE TEST, GLUCOSE    Specimen Information    Type Source Collected On     09/15/17 0653          Components       Value Reference Range Flag Lab   Glucose 230 70 - 99 mg/dL H 170            Glucose by meter [[id number]] (Abnormal)  Resulted: 09/15/17 0245, Result status: Final result    Ordering provider: Darnell Allen MD  09/15/17 0231 Resulting lab: POINT OF CARE TEST, GLUCOSE    Specimen Information    Type Source Collected On     09/15/17 0231          Components       Value Reference Range Flag Lab   Glucose 273 70 - 99 mg/dL H 170            Glucose by meter [[id number]] (Abnormal)  Resulted: 09/14/17 2201, Result status: Final result    Ordering provider: Darnell Allen MD  09/14/17 2156 Resulting lab: POINT OF CARE TEST, GLUCOSE    Specimen Information    Type Source Collected On     09/14/17 2156          Components       Value Reference Range Flag Lab   Glucose 183 70 - 99 mg/dL H 170            Glucose by meter [[id number]] (Abnormal)  Resulted: 09/14/17 2011, Result status: Final result    Ordering provider: Darnell Allen  MD [place]  09/14/17 2007 Resulting lab: POINT OF CARE TEST, GLUCOSE    Specimen Information    Type Source Collected On     09/14/17 2007          Components       Value Reference Range Flag Lab   Glucose 204 70 - 99 mg/dL H 170            Glucose by meter [[id number]] (Abnormal)  Resulted: 09/14/17 1810, Result status: Final result    Ordering provider: Darnell Allen MD  09/14/17 1803 Resulting lab: POINT OF CARE TEST, GLUCOSE    Specimen Information    Type Source Collected On     09/14/17 1803          Components       Value Reference Range Flag Lab   Glucose 177 70 - 99 mg/dL H 170            Creatinine [[id number]]  Resulted: 09/14/17 1508, Result status: Final result    Ordering provider: Darnell Allen MD  09/14/17 1219 Resulting lab: [facility]    Specimen Information    Type Source Collected On   Blood  09/14/17 1435          Components       Value Reference Range Flag Lab   Creatinine 0.82 0.66 - 1.25 mg/dL  FrStHsLb   GFR Estimate >90 >60 mL/min/1.7m2  FrStHsLb   Comment:  Non  GFR Calc   GFR Estimate If Black >90 >60 mL/min/1.7m2  FrStHsLb   Comment:   GFR Calc            Platelet count [[id number]]  Resulted: 09/14/17 1458, Result status: Final result    Ordering provider: Darnell Allen MD  09/14/17 1219 Resulting lab: [facility]    Specimen Information    Type Source Collected On   Blood  09/14/17 1435          Components       Value Reference Range Flag Lab   Platelet Count 168 150 - 450 10e9/L  FrStHsLb            Glucose by meter [[id number]] (Abnormal)  Resulted: 09/14/17 1101, Result status: Final result    Ordering provider: Darnell Allen MD  09/14/17 1056 Resulting lab: POINT OF CARE TEST, GLUCOSE    Specimen Information    Type Source Collected On     09/14/17 1056          Components       Value Reference Range Flag Lab   Glucose 170 70 - 99 mg/dL H 170            Glucose [[id number]]  Resulted: 09/14/17 0709, Result status: Final  result    Ordering provider: Carmen Clarke  09/14/17 0614 Resulting lab: [facility]    Specimen Information    Type Source Collected On   Blood  09/14/17 0649          Components       Value Reference Range Flag Lab   Glucose 99 70 - 99 mg/dL  [facility]            INR [[id number]]  Resulted: 09/14/17 0708, Result status: Final result    Ordering provider: Carmen Clarke  09/14/17 0614 Resulting lab: [facility]    Specimen Information    Type Source Collected On   Blood  09/14/17 0649          Components       Value Reference Range Flag Lab   INR 0.95 0.86 - 1.14  [facility]            Glucose by meter [[id number]] (Abnormal)  Resulted: 09/14/17 0650, Result status: Final result    Ordering provider: Darnell Allen MD  09/14/17 0647 Resulting lab: POINT OF CARE TEST, GLUCOSE    Specimen Information    Type Source Collected On     09/14/17 0647          Components       Value Reference Range Flag Lab   Glucose 104 70 - 99 mg/dL H 170            Testing Performed By     Lab - Abbreviation Name Director Address Valid Date Range    14 - FrStHsLb [facility] Unknown [address] [place] [address] 05/08/15 1057 - Present    170 - Unknown POINT OF CARE TEST, GLUCOSE Unknown Unknown 10/31/11 1114 - Present            Unresulted Labs (24h ago through future)    Start       Ordered    09/17/17 0600  Platelet count  (enoxaparin (LOVENOX) (Weight  kg with CrCl greater than 30 mL/min is prechecked))  EVERY THREE DAYS,   Routine     Comments:  Repeat every 3 days while on VTE prophylaxis. If no result is listed, this lab has not been done the past 365 days. LATEST LAB RESULT: Platelet Count (10e9/L)       Date                     Value                 09/07/2017               187              ----------      09/14/17 1219    09/17/17 0600  Creatinine  EVERY THREE DAYS,   Routine      09/16/17 1113    Unscheduled  Hemoglobin  CONDITIONAL X 2,   Routine      Comments:  Release on POD 1 and POD 2 if the morning Hgb is less than 8.0    09/14/17 1219         Imaging Results - 3 Days      XR Abdomen 1 View [[id number]]  Resulted: 09/16/17 1327, Result status: Final result    Ordering provider: Andrea Emmanuel MD  09/16/17 1012 Resulted by: Brenden Dorman MD    Performed: 09/16/17 1137 - 09/16/17 1147 Resulting lab: RADIOLOGY RESULTS    Narrative:       XR ABDOMEN 1 VW   9/16/2017 11:47 AM      HISTORY:  evaluate for obstruction - upright    COMPARISON: None.    FINDINGS: The gas pattern is normal. There is a dense calcification in  the left midabdomen. This measures about 1.5 cm in diameter. This is  projected over the region of the left kidney and it probably  represents a renal calculus. No free air.      Impression:       IMPRESSION:    1. No evidence for bowel obstruction.  2. Dense calcification in the left midabdomen. This probably  represents a kidney stone.  3. Moderate amount of stool..    ANDREAS DORMAN MD      XR Knee Port Right 1/2 Views [[id number]]  Resulted: 09/14/17 1245, Result status: Final result    Ordering provider: Darnell Allen MD  09/14/17 1046 Resulted by: Aga Tang MD    Performed: 09/14/17 1110 - 09/14/17 1125 Resulting lab: RADIOLOGY RESULTS    Narrative:       XR KNEE PORT RT 1/2 VW 9/14/2017 11:25 AM    HISTORY: Postop.    COMPARISON: None.      Impression:       IMPRESSION: Status post knee arthroplasty.  Hardware is intact.  Alignment is anatomic. There is a surgical drain within the knee  joint.    AGA TANG MD      Testing Performed By     Lab - Abbreviation Name Director Address Valid Date Range    104 - Rad Rslts RADIOLOGY RESULTS Unknown Unknown 02/16/05 1553 - Present            Encounter-Level Documents:     There are no encounter-level documents.      Order-Level Documents:     There are no order-level documents.

## 2017-09-14 NOTE — ANESTHESIA POSTPROCEDURE EVALUATION
Patient: Rabia Swansonuyen    Procedure(s):  RIGHT TOTAL KNEE ARTHROPLASTY  - Wound Class: I-Clean    Diagnosis:RIGHT KNEE DJD  Diagnosis Additional Information: Right knee degenerative arthritis    Anesthesia Type:  General, LMA    Note:  Anesthesia Post Evaluation    Patient location during evaluation: PACU  Patient participation: Able to fully participate in evaluation  Level of consciousness: awake  Pain management: adequate  Airway patency: patent  Cardiovascular status: acceptable  Respiratory status: acceptable  Hydration status: acceptable  PONV: none     Anesthetic complications: None          Last vitals:  Vitals:    09/14/17 1200 09/14/17 1230 09/14/17 1300   BP: 139/89 (!) 154/92 140/87   Resp: 16 16 16   Temp: 36.5  C (97.7  F)     SpO2: 100% 99% 96%         Electronically Signed By: Rah Tatum MD  September 14, 2017  1:11 PM

## 2017-09-14 NOTE — IP AVS SNAPSHOT
` `     [place] [address] [facility]: [phone]                 INTERAGENCY TRANSFER FORM - NOTES (H&P, Discharge Summary, Consults, Procedures, Therapies)   2017                    Hospital Admission Date: 2017  RABIA BOND   : 1940  Sex: Male        Patient PCP Information     Provider PCP Type    Alfredo Aragon MD General      History & Physicals     No notes of this type exist for this encounter.      Discharge Summaries     No notes of this type exist for this encounter.         Consult Notes      Consults by Vic Guadalupe MD at 2017  9:32 PM     Author:  Vic Guadalupe MD Service:  Hospitalist Author Type:  Physician    Filed:  9/15/2017 12:39 AM Date of Service:  2017  9:32 PM Creation Time:  2017  9:17 PM    Status:  Addendum :  Vic Guadalupe MD (Physician)     Consult Orders:    1. Hospitalist IP Consult: Patient to be seen: Routine - within 24 hours; insulin management; Consultant may enter orders: Yes [[id number]] ordered by Jose Alfredo Fernandez MD at 17                [facility]    Hospitalist Consultation    Date of Admission:  2017  Date of Consult (When I saw the patient):[KE1.1] 17[KE1.2]    Assessment & Plan[KE1.3]   Rabia Bond is a 77 year old, Turkmen speaking male with PMHx of acoustic schwannoma, insulin dependent T2DM, OA, hyperlipidemia, and HTN who was admitted on 2017 and is s/p right total knee arthroplasty. I was asked to see the patient for diabetes management. Vitals currently WNL. Pt currently stale.     S/P right TKA: Surgery performed by Dr. Allen. EBL 25 ccs. Peripheral nerve block performed.   -- Defer routine post-operative cares, IVF, DVT prophylaxis and pain control to primary service   -- Bowel regimen while on narcotics   -- Encourage pulmonary toilet; incentive spirometer at bedside   -- PT and OT in the AM   -- CBC and BMP in AM     T2DM, insulin dependent,  uncontrolled: A1C 8.5 from 9/7/17. Maintained on Novolog 70/30 at 30 U with breakfast, 20 U with dinner and Novolog 10-15 U at bedtime. Last dose of 70/30 was 15 U evening prior to admission, took Novolog 5 U at bedtime as well. Tried to advance diet with episode of vomiting this evening.   -- Novolog 70/30 at reduced dose of 15 U with breakfast and 10 U with dinner   -- Novolog 5 U at bedtime   -- Medium dose SSI ordered   -- BS per protocol[KE1.1]     Recent Labs  Lab 09/14/17 2007 09/14/17  1803 09/14/17  1056 09/14/17  0649 09/14/17  0647   GLC  --   --   --  99  --    * 177* 170*  --  104*[KE1.4]     Hyperlipidemia: Continue PTA statin     Acoustic schwannoma: Hx right hearing loss and some mild chronic imbalance from an accoustical schwannoma. Pt has declined surgery for this and last imaging from 2012 showed it to be 12mm size and unchanged from 4 years prior to that. Pt ambulates with a cane.    DVT Prophylaxis: Defer to primary service  Code Status:[KE1.1] Full Code[KE1.3]    Disposition: Expected discharge per Ortho[KE1.1]    Mercedez Hurt[KE1.3], CARI    This patient was discussed with Dr. Guadalupe of the Hospitalist Service who agrees with current plans as outlined above.[KE1.1]     Reason for Consult[KE1.3]   Reason for consult: I was asked by Dr. Allen to evaluate this patient for diabetes management .[KE1.1]    Primary Care Physician   Alfredo Aragon    Chief Complaint[KE1.3]   S/P Right TKA    History is obtained from the patient. Daughter used as .[KE1.1]    History of Present Illness[KE1.3]   Rabia Reed is a 77 year old, Spanish speaking male with PMHx of acoustic schwannoma, insulin dependent T2DM, OA, hyperlipidemia, and HTN who was admitted on 9/14/2017 and is s/p right total knee arthroplasty. I was asked to see the patient for diabetes management. Vitals currently WNL. Pt currently stale.     Pt attempted to eat supper brought in by family with resultant emesis. Has  not tried anything since. Pain well controlled. Denies SOB or chest pain. No recent changes to PTA medication regimen. Diabetic, managed by PCP. Last dose of 70/30 was 15 U evening prior to admission, took Novolog 5 U at bedtime as well. Tried to advance diet with episode of vomiting this evening. No acute concerns.[KE1.1]     Past Medical History[KE1.3]    1. Acoustic schwannoma   2. T2DM, insulin dependent, uncontrolled; last A1C 8.5 on 17   3. Osteoarthritis   4. Erectile dysfunction   5. Hyperlipidemia   6. HTN[KE1.1]     Past Surgical History[KE1.3]   I have reviewed this patient's surgical history and updated it with pertinent information if needed.[KE1.1]  Past Surgical History:   Procedure Laterality Date     C APPENDECTOMY       C NONSPECIFIC PROCEDURE      nasal polyp excision     C NONSPECIFIC PROCEDURE      RIH repair     C NONSPECIFIC PROCEDURE  2011     right knee arthroscopy[KE1.2]       Prior to Admission Medications   Prior to Admission Medications   Prescriptions Last Dose Informant Patient Reported? Taking?   ASPIRIN PO 2017 at pm Son Yes Yes   Sig: Take 81 mg by mouth every evening   ATORVASTATIN CALCIUM PO 2017 at 1800 Son Yes Yes   Sig: Take 80 mg by mouth every evening   Blood Glucose Monitoring Suppl (ONE TOUCH ULTRA SYSTEM KIT) W/DEVICE KIT  Son No No   Sig: One touch ultra 2 if covered by insurance   DOCUSATE SODIUM PO more than a week at prn Son Yes Yes   Sig: Take 100 mg by mouth daily as needed for constipation    Insulin Pen Needle (PEN NEEDLES) 31G X 6 MM MISC  Son No No   Si Device 3 times daily   ONE TOUCH ULTRA test strip  Son No No   Sig: test 3 times daily   TAMSULOSIN HCL PO 2017 at 1800 Son Yes Yes   Sig: Take 0.4 mg by mouth daily   TRAMADOL HCL PO 2017 at hs Son Yes Yes   Sig: Take 100 mg by mouth nightly as needed for moderate to severe pain (Takes 2 x 50mg tablet = 100mg dose)   Vardenafil HCl (LEVITRA PO) more than a week at prn Son  Yes Yes   Sig: Take 20 mg by mouth daily as needed (erectile dysfunction)   ascorbic acid (VITAMIN C) 1000 MG TABS 2017 Son Yes Yes   Sig: Take 1,000 mg by mouth daily.   blood glucose monitoring (ONE TOUCH ULTRASOFT) lancets  Son No No   Si each 3 times daily   celecoxib (CELEBREX) 200 MG capsule 2017 at pm Son Yes Yes   Sig: Take 200 mg by mouth every evening    finasteride (PROSCAR) 5 MG tablet 2017 at 1800 Son Yes Yes   Sig: Take 5 mg by mouth every evening    insulin aspart (NOVOLOG FLEXPEN) 100 UNIT/ML injection 5 units on 2017 at hs Son Yes Yes   Sig: Inject 10-15 Units Subcutaneous At Bedtime   insulin aspart prot & aspart (NOVOLOG MIX 70/30 FLEXPEN) injection 2017 at am Son Yes Yes   Sig: Inject 30 Units Subcutaneous every morning (before breakfast)   insulin aspart prot & aspart (NOVOLOG MIX 70/30 FLEXPEN) injection 15 units 2017 at pm Son Yes Yes   Sig: Inject 20 Units Subcutaneous every evening   insulin pen needle (ADVOCATE INSULIN PEN NEEDLES) 29G X 12.7MM  Son No No   Sig: Use three pen needles daily or as directed.      Facility-Administered Medications: None     Allergies   Allergies   Allergen Reactions     No Known Drug Allergies        Social History[KE1.3]   I have reviewed this patient's social history and updated it with pertinent information if needed. aRbia Reed[KE1.1]  reports that he quit smoking about 16 years ago. He has a 21.00 pack-year smoking history. He has never used smokeless tobacco. He reports that he does not drink alcohol or use illicit drugs.[KE1.2]    Family History[KE1.3]   I have reviewed this patient's family history and updated it with pertinent information if needed.[KE1.1]   Family History   Problem Relation Age of Onset     DIABETES Mother       age 85     Family History Negative Father       age 38,  in war     DIABETES Sister      DIABETES Brother      borderline diabetic[KE1.2]     Review of Systems[KE1.3]   The  10 point Review of Systems is negative other than noted in the HPI.[KE1.1]    Physical Exam   Temp: 97.8  F (36.6  C) Temp src: Oral BP: 127/70 Pulse: 67 Heart Rate: 64 Resp: 16 SpO2: 99 % O2 Device: Nasal cannula Oxygen Delivery: 2 LPM[KE1.3]  Vital Signs with Ranges[KE1.1]  Temp:  [97.4  F (36.3  C)-97.8  F (36.6  C)] 97.8  F (36.6  C)  Pulse:  [67-80] 67  Heart Rate:  [64-90] 64  Resp:  [14-18] 16  BP: (119-161)/(68-92) 127/70  SpO2:  [95 %-100 %] 99 %  0 lbs 0 oz[KE1.3]    CONSTITUTIONAL: Pt laying in bed, dressed in hospital garb. Appears comfortable. Cooperative with interview. Accompanied by daughter at bedside.  HEENT: Normocephalic, atraumatic. Negative for conjunctival redness or scleral icterus.  Oral mucosa pink and moist; negative for ulcerations, erythema, or exudates.    CARDIOVASCULAR: RRR, no murmurs, rubs, or extra heart sounds appreciated. Pulses +2/4 and regular in upper and lower extremities, bilaterally.   RESPIRATORY: No increased work of breathing.  CTA, bilat; no wheezes, rales, or rhonchi appreciated.  GASTROINTESTINAL:  Abdomen soft, non-distended. BS auscultated in all four quadrants. Negative for tenderness to palpation.  No masses or organomegaly noted.  MUSCULOSKELETAL: No gross deformities noted. Normal muscle tone.   HEMATOLOGIC/LYMPHATIC/IMMUNOLOGIC: Negative for lower extremity edema, bilaterally.  NEUROLOGIC: Alert and oriented to person, place, and time.  strength intact. No focal neuro deficits appreciated  SKIN: Warm, dry, intact. No jaundice noted. Negative for suspicious lesions, rashes, bruising, open sores or abrasions.[KE1.1]     Data[KE1.3]   -Data reviewed today: See below[KE1.1]     Recent Labs  Lab 09/14/17  1435 09/14/17  0649     --    INR  --  0.95   CR 0.82  --    GLC  --  99       Recent Results (from the past 24 hour(s))   XR Knee Port Right 1/2 Views    Narrative    XR KNEE PORT RT 1/2 VW 9/14/2017 11:25 AM    HISTORY: Postop.    COMPARISON: None.       Impression    IMPRESSION: Status post knee arthroplasty.  Hardware is intact.  Alignment is anatomic. There is a surgical drain within the knee  joint.    AGA DIAZ MD[KE1.3]                  Revision History        User Key Date/Time User Provider Type Action    > [N/A] 9/15/2017 12:39 AM Vic Guadalupe MD Physician Addend     [N/A] 9/14/2017 11:46 PM Mercedez Hurt PA-C Physician Assistant - C Addend     KE1.4 9/14/2017  9:32 PM Mercedez Hurt PA-C Physician Assistant - C Sign     KE1.2 9/14/2017  9:19 PM Merceedz Hurt PA-C Physician Assistant - C      KE1.3 9/14/2017  9:18 PM Mercedez Hurt PA-C Physician Assistant - C      KE1.1 9/14/2017  9:17 PM Mercedez Hurt PA-C Physician Assistant - C                      Progress Notes - Physician (Notes from 09/13/17 through 09/16/17)      Progress Notes by Iraida Montilla LICSW at 9/16/2017  4:12 PM     Author:  Iraida Montilla LICSW Service:  Social Work Author Type:      Filed:  9/16/2017  4:13 PM Date of Service:  9/16/2017  4:12 PM Creation Time:  9/16/2017  4:12 PM    Status:  Signed :  Iraida Montilla LICSW ()         SW:  D:  Received a call back from [facility].  They have a bed available for patient tomorrow.  Updated patient's son as to the above information.  P:  Will continue to follow.[SJ1.1]     Revision History        User Key Date/Time User Provider Type Action    > SJ1.1 9/16/2017  4:13 PM Iriada Montilla LICSW  Sign            Progress Notes by Darnell Allen MD at 9/16/2017 12:59 PM     Author:  Darnell Allen MD Service:  Orthopedics Author Type:  Physician    Filed:  9/16/2017  1:06 PM Date of Service:  9/16/2017 12:59 PM Creation Time:  9/16/2017 12:59 PM    Status:  Signed :  Darnell Allen MD (Physician)         Rabia Reed  9/16/2017  POD # 2    Doing well.  No immediate surgical complications identified.  No  excessive bleeding  Pain control marginal  Objective:  Blood pressure 135/63, pulse 67, temperature 98.1  F (36.7  C), temperature source Oral, resp. rate 16, SpO2 95 %.    Temperatures:  Current - Temp: 98.1  F (36.7  C); Max - Temp  Av.4  F (36.9  C)  Min: 97.5  F (36.4  C)  Max: 99.2  F (37.3  C)  Pulse range: No Data Recorded  Blood pressure range: Systolic (24hrs), Av , Min:135 , Max:149   ; Diastolic (24hrs), Av, Min:63, Max:74    Exam:  CMS: intact  alert, stable, wound ok      Labs:  Recent Labs   Lab Test  17   1700  17   0953  16   0813   POTASSIUM  4.4  4.5  3.8     Recent Labs   Lab Test  17   0630  09/15/17   0708  17   1700   HGB  12.2*  11.8*  14.0     Recent Labs   Lab Test  17   0649   INR  0.95     Recent Labs   Lab Test  17   1435  17   1700  14   0950   PLT  168  187  208       PLAN:  Continue physical therapy  Pain control measures  Additional problems to be followed by medicine physician  DC tomorrow. Paperwork and dictation done[SM1.1]         Revision History        User Key Date/Time User Provider Type Action    > SM1.1 2017  1:06 PM Darnell Allen MD Physician Sign            Progress Notes by Andrea Emmanuel MD at 2017 10:07 AM     Author:  Andrea Emmanuel MD Service:  Hospitalist Author Type:  Physician    Filed:  2017 10:21 AM Date of Service:  2017 10:07 AM Creation Time:  2017 10:07 AM    Status:  Signed :  Andrea Emmanuel MD (Physician)         [facility]  Hospitalist Progress Note  Date of service (when I saw the patient)09/15/2017:         Assessment and Plan:    Mr Rabia Reed is a 77 year old, Anguillan speaking male with PMHx of acoustic schwannoma, insulin dependent T2DM, OA, hyperlipidemia, and HTN who was admitted on 2017 and is s/p right total knee arthroplasty. I was asked to see the patient for diabetes management. Vitals  currently WNL. Pt currently stale.    [AM1.1]  Abdominal distension;  no flatus of BMs.   Abdominal X-ray to evaluate for obstruction[AM1.2]    S/P right TKA:   - Surgery performed by Dr. Allen.[AM1.1]under loco-regional[AM1.2] nerve block performed.   - Defer routine post-operative cares, IVF, DVT prophylaxis and pain control to primary service   --[AM1.1] Continue b[AM1.2]owel regimen while on narcotics   -- Encourage pulmonary toilet; incentive spirometer at bedside   -- PT and OT        T2DM, insulin dependent, uncontrolled:[AM1.1]  Glycemic levels well controled.[AM1.2]  A1C 8.5 from 9/7/17. Maintained on Novolog 70/30 at 30 U with breakfast, 20 U with dinner and Novolog 10-15 U at bedtime. Last dose of 70/30 was 15 U evening prior to admission, took Novolog 5 U at bedtime as well. Tried to advance diet with episode of vomiting this evening.   - BS > 200 on lower dose of 70/30   -[AM1.1] continue current dose of Lantus;[AM1.2] 20 U in AM, 30 U in PM, continue Medium ISS AC & HS       Acute Blood loss anemia:[AM1.1] stable[AM1.2]  - due to recent surgery  - Hgb 14.0preop -> 11.8   - monitor     Hyperlipidemia:   - Continue PTA statin       Acoustic schwannoma:   - Hx right hearing loss and some mild chronic imbalance from an accoustical schwannoma. Pt has declined surgery for this and last imaging from 2012 showed it to be 12mm size and unchanged from 4 years prior to that. Pt ambulates with a cane.      DVT Prophylaxis: Defer to primary service  Code Status: Full Code      Disposition: Expected discharge per Ortho[AM1.1]    Andrea Emmanuel MD  Internal Medicine, Hospitalist  Pager#; [phone][AM1.2]                 Interval History:   Pt seen with pt's son interpreting. No new c/o. RN reports elevated BS.              Medications:       insulin aspart prot & aspart  20 Units Subcutaneous Daily with supper     insulin aspart prot & aspart  30 Units Subcutaneous QAM AC     atorvastatin (LIPITOR) tablet 80 mg   80 mg Oral QPM     celecoxib  200 mg Oral QPM     finasteride  5 mg Oral QPM     tamsulosin (FLOMAX) capsule 0.4 mg  0.4 mg Oral Daily     sodium chloride (PF)  3 mL Intracatheter Q8H     enoxaparin  40 mg Subcutaneous Q24H     acetaminophen  975 mg Oral Q8H     senna-docusate  1-2 tablet Oral BID     oxyCODONE  10 mg Oral Q12H     insulin aspart  1-7 Units Subcutaneous TID AC     insulin aspart  1-5 Units Subcutaneous At Bedtime     diphenhydrAMINE, hydrOXYzine, lidocaine (buffered or not buffered), lidocaine 4%, sodium chloride (PF), sore throat lozenge, naloxone, alum & mag hydroxide-simethicone, [START ON 2017] acetaminophen, oxyCODONE, diphenhydrAMINE **OR** diphenhydrAMINE, ondansetron **OR** ondansetron, prochlorperazine **OR** prochlorperazine, melatonin, diazepam, HYDROmorphone, glucose **OR** dextrose **OR** glucagon               Physical Exam:   Blood pressure 149/69, pulse 67, temperature 98.2  F (36.8  C), resp. rate 16, SpO2 96 %.  Wt Readings from Last 4 Encounters:   17 80.7 kg (178 lb)   17 79.2 kg (174 lb 11.2 oz)   16 78 kg (172 lb)   16 78 kg (172 lb)         Vital Sign Ranges  Temperature Temp  Av.9  F (36.6  C)  Min: 97.5  F (36.4  C)  Max: 98.2  F (36.8  C)   Blood pressure Systolic (24hrs), Av , Min:124 , Max:137        Diastolic (24hrs), Av, Min:66, Max:73      Pulse Pulse  Av  Min: 67  Max: 67   Respirations Resp  Av  Min: 16  Max: 16   Pulse oximetry SpO2  Av %  Min: 97 %  Max: 99 %         Intake/Output Summary (Last 24 hours) at 09/15/17 1614  Last data filed at 09/15/17 1500   Gross per 24 hour   Intake              600 ml   Output             2025 ml   Net            -1425 ml       Constitutional: Sleeping, wakes up when called,no apparent distress   Lungs: Clear to auscultation bilaterally, no crackles or wheezing   Cardiovascular: Regular rate and rhythm, normal S1 and S2, and no murmur noted   Abdomen:[AM1.1] mildly  distended, hypoactive BS, Nu guardinsg[AM1.2]   Skin: No rashes, no cyanosis, no edema   Neuro:                Data:   All laboratory data reviewed[AM1.1]       Revision History        User Key Date/Time User Provider Type Action    > AM1.2 9/16/2017 10:21 AM Andrea Emmanuel MD Physician Sign     AM1.1 9/16/2017 10:07 AM Andrea Emmanuel MD Physician             Progress Notes by Geraldine Escalante MD at 9/15/2017  4:14 PM     Author:  Geraldine Escalante MD Service:  Hospitalist Author Type:  Physician    Filed:  9/15/2017  4:58 PM Date of Service:  9/15/2017  4:14 PM Creation Time:  9/15/2017  4:14 PM    Status:  Signed :  Geraldine Escalante MD (Physician)         [facility]  Hospitalist Progress Note  Date of service (when I saw the patient)09/15/2017:         Assessment and Plan:    Mr Rabia Reed is a 77 year old, Nepali speaking male with PMHx of acoustic schwannoma, insulin dependent T2DM, OA, hyperlipidemia, and HTN who was admitted on 9/14/2017 and is s/p right total knee arthroplasty. I was asked to see the patient for diabetes management. Vitals currently WNL. Pt currently stale.      S/P right TKA:[PK1.1]   -[PK1.2] Surgery performed by Dr. Allen. EBL 25 ccs. Peripheral nerve block performed.   - Defer routine post-operative cares, IVF, DVT prophylaxis and pain control to primary service   -- Bowel regimen while on narcotics   -- Encourage pulmonary toilet; incentive spirometer at bedside   -- PT and OT in the AM   -- CBC and BMP in AM       T2DM, insulin dependent, uncontrolled: A1C 8.5 from 9/7/17. Maintained on Novolog 70/30 at 30 U with breakfast, 20 U with dinner and Novolog 10-15 U at bedtime. Last dose of 70/30 was 15 U evening prior to admission, took Novolog 5 U at bedtime as well. Tried to advance diet with episode of vomiting this evening.   -[PK1.1] BS > 200 on lower dose of 70/30   - increase back to home doses 20 U in AM,  30 U in PM, continue Medium ISS AC & HS[PK1.2]  -[PK1.1] d/c scheduled[PK1.2] Novolog 5 U at bedtime   -[PK1.1] as he is eating food from home,family instructed for them to request RN to give necessary ISS before meal     Acute Blood loss anemia:  - due to recent surgery  - Hgb 14.0preop -> 11.8   - monitor[PK1.2]     Hyperlipidemia:[PK1.1]   -[PK1.2] Continue PTA statin       Acoustic schwannoma:[PK1.1]   -[PK1.2] Hx right hearing loss and some mild chronic imbalance from an accoustical schwannoma. Pt has declined surgery for this and last imaging from 2012 showed it to be 12mm size and unchanged from 4 years prior to that. Pt ambulates with a cane.      DVT Prophylaxis: Defer to primary service  Code Status: Full Code      Disposition: Expected discharge per Jomar Escalante MD.  Hospitalist [phone] (7am -6 pm)               Interval History:[PK1.1]   Pt seen with pt's son interpreting. No new c/o. RN reports elevated BS.[PK1.2]              Medications:       atorvastatin (LIPITOR) tablet 80 mg  80 mg Oral QPM     celecoxib  200 mg Oral QPM     finasteride  5 mg Oral QPM     tamsulosin (FLOMAX) capsule 0.4 mg  0.4 mg Oral Daily     sodium chloride (PF)  3 mL Intracatheter Q8H     enoxaparin  40 mg Subcutaneous Q24H     acetaminophen  975 mg Oral Q8H     senna-docusate  1-2 tablet Oral BID     oxyCODONE  10 mg Oral Q12H     insulin aspart  1-7 Units Subcutaneous TID AC     insulin aspart  1-5 Units Subcutaneous At Bedtime     insulin aspart  5 Units Subcutaneous At Bedtime     insulin aspart prot & aspart  15 Units Subcutaneous QAM AC     insulin aspart prot & aspart  10 Units Subcutaneous Daily with supper     diphenhydrAMINE, hydrOXYzine, lidocaine (buffered or not buffered), lidocaine 4%, sodium chloride (PF), sore throat lozenge, naloxone, alum & mag hydroxide-simethicone, [START ON 9/17/2017] acetaminophen, oxyCODONE, diphenhydrAMINE **OR** diphenhydrAMINE, ondansetron **OR** ondansetron,  prochlorperazine **OR** prochlorperazine, melatonin, diazepam, HYDROmorphone, glucose **OR** dextrose **OR** glucagon               Physical Exam:   Blood pressure 136/70, pulse 67, temperature 98  F (36.7  C), temperature source Oral, resp. rate 16, SpO2 97 %.  Wt Readings from Last 4 Encounters:   17 80.7 kg (178 lb)   17 79.2 kg (174 lb 11.2 oz)   16 78 kg (172 lb)   16 78 kg (172 lb)         Vital Sign Ranges  Temperature Temp  Av.9  F (36.6  C)  Min: 97.5  F (36.4  C)  Max: 98.2  F (36.8  C)   Blood pressure Systolic (24hrs), Av , Min:124 , Max:137        Diastolic (24hrs), Av, Min:66, Max:73      Pulse Pulse  Av  Min: 67  Max: 67   Respirations Resp  Av  Min: 16  Max: 16   Pulse oximetry SpO2  Av %  Min: 97 %  Max: 99 %         Intake/Output Summary (Last 24 hours) at 09/15/17 1614  Last data filed at 09/15/17 1500   Gross per 24 hour   Intake              600 ml   Output             2025 ml   Net            -1425 ml       Constitutional:[PK1.1] Sleeping, wakes up when called[PK1.2],no apparent distress   Lungs: Clear to auscultation bilaterally, no crackles or wheezing   Cardiovascular: Regular rate and rhythm, normal S1 and S2, and no murmur noted   Abdomen: Normal bowel sounds, soft, non-distended, non-tender   Skin: No rashes, no cyanosis, no edema   Neuro:                Data:[PK1.1]   All laboratory data reviewed[PK1.3]       Revision History        User Key Date/Time User Provider Type Action    > PK1.2 9/15/2017  4:58 PM Geraldine Escalante MD Physician Sign     PK1.3 9/15/2017  4:24 PM Geraldine Escalante MD Physician      PK1.1 9/15/2017  4:14 PM Geraldine Escalante MD Physician             Progress Notes by Daphne Jj MSW at 9/15/2017  4:30 PM     Author:  Daphne Jj MSW Service:  (none) Author Type:      Filed:  9/15/2017  4:37 PM Date of Service:  9/15/2017  4:30 PM Creation Time:  9/15/2017  4:30 PM     Status:  Signed :  Daphne Jj MSW ()         Care Transition Initial Assessment -   Reason For Consult: discharge planning  Met with: Reviewed medical record   Active Problems:    Status post total left knee replacement         DATA  Lives With: child(bernie), adult  Living Arrangements: house    Identified issues/concerns regarding health management: SW was consulted for discharge planning. Patient is Rabia, a 77 year-old male who was admitted on 9/14 for a total knee replacement. His tentative discharge date is 9/17. Patient speaks Arabic. Care coordinator met with patient and family for discharge planning, and relayed information to SW. SW reviewed medical record. Per physical therapy, recommendation is TCU. Patient requested [facility]. SW placed these referrals. Patient would prefer a private room, and is aware of the fees.             Quality Of Family Relationships: supportive, helpful, involved  Transportation Available: family or friend will provide (pt's children assist wtih transport)    ASSESSMENT  Cognitive Status:  awake, alert and oriented  Concerns to be addressed:  Discharge planning     PLAN  Financial costs for the patient includes Private room fees.  Patient given options and choices for discharge TCU choice.  Patient/family is agreeable to the plan?  Yes:   Patient Goals and Preferences: TCU.  Patient anticipates discharging to:  TCU.[NO1.1]    Daphne Jj[NO1.2], JESSICA FORTE[NO1.1]           Revision History        User Key Date/Time User Provider Type Action    > NO1.2 9/15/2017  4:37 PM Daphne Jj MSW  Sign     NO1.1 9/15/2017  4:30 PM Daphne Jj MSW              Progress Notes by Daphne Jj MSW at 9/15/2017  3:16 PM     Author:  Daphne Jj MSW Service:  (none) Author Type:      Filed:  9/15/2017  3:16 PM Date of Service:  9/15/2017  3:16 PM Creation Time:  9/15/2017  3:16  PM    Status:  Signed :  Daphne Jj MSW ()         DESHAWN    D: Received phone call from Charis at [facility]'s case management. Her phone number is [phone].    P: Continue to follow.[NO1.1]     Revision History        User Key Date/Time User Provider Type Action    > NO1.1 9/15/2017  3:16 PM Daphne Jj MSW  Sign            Progress Notes by Divya Vidal RN at 9/15/2017 10:27 AM     Author:  Divya Vidal RN Service:  Care Coordinator Author Type:      Filed:  9/15/2017 10:34 AM Date of Service:  9/15/2017 10:27 AM Creation Time:  9/15/2017 10:27 AM    Status:  Signed :  Divya Vidal RN ()         Spoke to son and patient while the  is here regarding discharge planning.  Ortho MD placed a SW consult for TCU discharge.  Patient says, through  that it it okay for son to assist in discharge planning.  Son says he would like to have [facility] as [facility]rick choice, [facility] as second choice, and [facility] as third choice.  He understands that his father will likely need transportation at discharge and this is billed to him/not covered under insurance.  He also says his father is hard of hearing, and that he prefers his father have a private room for a fee.  He understands it is $35-$45 estimated daily rate and not covered under insurance.  He plans to talk to his siblings about sharing this cost.  SW updated, will follow. Divya Rodríguez RN[JB1.1]     Revision History        User Key Date/Time User Provider Type Action    > JB1.1 9/15/2017 10:34 AM Divya Vidal RN Case Manager Sign            Progress Notes by Darnell Allen MD at 9/15/2017  9:28 AM     Author:  Darnell Allen MD Service:  Orthopedics Author Type:  Physician    Filed:  9/15/2017  9:33 AM Date of Service:  9/15/2017  9:28 AM Creation Time:  9/15/2017  9:28 AM    Status:  Signed :  Darnell Allen MD (Physician)          Rabia Reed  9/15/2017  POD # 1    Doing well.  No immediate surgical complications identified.  No excessive bleeding  Pain well-controlled.  Objective:  Blood pressure 133/66, pulse 67, temperature 97.5  F (36.4  C), temperature source Oral, resp. rate 16, SpO2 98 %.    Temperatures:  Current - Temp: 97.5  F (36.4  C); Max - Temp  Av.7  F (36.5  C)  Min: 97.4  F (36.3  C)  Max: 98.2  F (36.8  C)  Pulse range: Pulse  Av.5  Min: 67  Max: 80  Blood pressure range: Systolic (24hrs), Av , Min:119 , Max:161   ; Diastolic (24hrs), Av, Min:66, Max:92    Exam:  CMS: intact  alert, stable, dressing dry      Labs:  Recent Labs   Lab Test  17   1700  17   0953  16   0813   POTASSIUM  4.4  4.5  3.8     Recent Labs   Lab Test  09/15/17   0708  17   1700  14   0950   HGB  11.8*  14.0  14.4     Recent Labs   Lab Test  17   0649   INR  0.95     Recent Labs   Lab Test  17   1435  17   1700  14   0950   PLT  168  187  208       PLAN:  Start physical therapy  Pain control measures  Additional problems to be followed by medicine physician  Social service to see for TCU placement[SM1.1]         Revision History        User Key Date/Time User Provider Type Action    > SM1.1 9/15/2017  9:33 AM Darnell Allen MD Physician Sign            Progress Notes by Charlotte Emery, PT at 2017  4:55 PM     Author:  Charlotte Emery, PT Service:  (none) Author Type:  Physical Therapist    Filed:  2017  4:56 PM Date of Service:  2017  4:55 PM Creation Time:  2017  4:55 PM    Status:  Signed :  Charlotte Emery, PT (Physical Therapist)          17 1557   Quick Adds   Type of Visit Initial PT Evaluation   Living Environment   Lives With child(bernie), adult   Living Arrangements house   Home Accessibility stairs to enter home;bed and bath on same level;tub/shower is not walk in   Number of Stairs to Enter Home 2   Transportation Available family or  friend will provide  (pt's children assist wtih transport)   Self-Care   Dominant Hand right   Usual Activity Tolerance good   Current Activity Tolerance poor   Regular Exercise yes   Activity/Exercise Type biking  (stationary bicycle)   Exercise Amount/Frequency daily;30 mins   Equipment Currently Used at Home walker, rolling;cane, straight;shower chair   Functional Level Prior   Ambulation 1-->assistive equipment   Transferring 1-->assistive equipment   Toileting 1-->assistive equipment   Bathing 1-->assistive equipment   Dressing 0-->independent   Eating 0-->independent   Communication 0-->understands/communicates without difficulty   Swallowing 0-->swallows foods/liquids without difficulty   Cognition 1 - attention or memory deficits   Fall history within last six months no   Which of the above functional risks had a recent onset or change? ambulation;transferring;toileting;bathing;dressing   Prior Functional Level Comment Pt lives with his adult son in a house with 2 steps to enter, all needs met on main level with tub/shower. Pt was Mod I with FWW vs SEC for functional mobility. Pt was IND with ADLs/IADLs at baseline, however, pt's daughter does check on pt daily and assists as needed.    General Information   Onset of Illness/Injury or Date of Surgery - Date 09/14/17   Referring Physician Darnell Allen MD   Patient/Family Goals Statement Pt's son hopeful for pt to go to TCU   Pertinent History of Current Problem (include personal factors and/or comorbidities that impact the POC) Pt is a 78yo male seen POD#0 s/p R total knee arthroplasty   Precautions/Limitations fall precautions   Weight-Bearing Status - RLE weight-bearing as tolerated   General Observations Pt sleeping upon arrival,  present and son at bedside, pt with IV, hemovac, mayes   General Info Comments Activity: ambulate with assistance 3 times daily, out of bed day of surgery if pt tolerates   Cognitive Status Examination   Orientation  orientation to person, place and time   Level of Consciousness lethargic/somnolent   Follows Commands and Answers Questions 100% of the time;able to follow single-step instructions   Memory (pt's son reports baseline mild memory impairment)   Pain Assessment   Patient Currently in Pain No   Integumentary/Edema   Integumentary/Edema Comments Incision R knee with dressing and ace wrap intact, hemovac present   Posture    Posture Forward head position   Range of Motion (ROM)   ROM Comment RLE limited by pain, LLE WNL with AAROM   Strength   Strength Comments RLE limited by pain   Bed Mobility   Bed Mobility Comments unable to assess d/t lethargy   Transfer Skills   Transfer Comments unable to assess d/t lethargy   Gait   Gait Comments unable to assess d/t lethargy   Balance   Balance Comments unable to assess d/t lethargy   Sensory Examination   Sensory Perception Comments Denies N/T   Coordination   Coordination Comments unable to assess d/t lethargy   General Therapy Interventions   Planned Therapy Interventions balance training;bed mobility training;gait training;ROM;strengthening;stretching;transfer training;home program guidelines;progressive activity/exercise   Clinical Impression   Criteria for Skilled Therapeutic Intervention yes, treatment indicated   PT Diagnosis Impaired gait   Influenced by the following impairments Pain, weakness, decreased ROM, decreased activity tolerance   Functional limitations due to impairments Decreased safety and independence with functional transfers, gait, stairs   Clinical Presentation Stable/Uncomplicated   Clinical Decision Making (Complexity) Low complexity   Therapy Frequency` 2 times/day   Predicted Duration of Therapy Intervention (days/wks) 4 days   Anticipated Discharge Disposition Transitional Care Facility;Home with Home Therapy   Risk & Benefits of therapy have been explained Yes   Patient, Family & other staff in agreement with plan of care Yes   [facility]  "AM-PAC TM \"6 Clicks\"   2016, Trustees of [facility], under license to [employer].  All rights reserved.   6 Clicks Short Forms Basic Mobility Inpatient Short Form   [facility]-PAC  \"6 Clicks\" V.2 Basic Mobility Inpatient Short Form   1. Turning from your back to your side while in a flat bed without using bedrails? 2 - A Lot   2. Moving from lying on your back to sitting on the side of a flat bed without using bedrails? 2 - A Lot   3. Moving to and from a bed to a chair (including a wheelchair)? 2 - A Lot   4. Standing up from a chair using your arms (e.g., wheelchair, or bedside chair)? 2 - A Lot   5. To walk in hospital room? 2 - A Lot   6. Climbing 3-5 steps with a railing? 1 - Total   Basic Mobility Raw Score (Score out of 24.Lower scores equate to lower levels of function) 11   Total Evaluation Time   Total Evaluation Time (Minutes) 8[KJ1.1]        Revision History        User Key Date/Time User Provider Type Action    > KJ1.1 9/14/2017  4:56 PM Charlotte Emery, PT Physical Therapist Sign            Progress Notes by Leticia Izaguirre RN at 9/14/2017 11:51 AM     Author:  Leticia Izaguirre, RN Service:  (none) Author Type:  Registered Nurse    Filed:  9/14/2017 11:52 AM Date of Service:  9/14/2017 11:51 AM Creation Time:  9/14/2017 11:51 AM    Status:  Signed :  Leticia Izaguirre RN (Registered Nurse)         1145HR - DALJIT Tatum updated on pt stable status, pt now fully awake, VS and surgical sites are stable, pt reports improved pain.  Okay for pt to transfer to floor per DALJIT Tatum.[LN1.1]     Revision History        User Key Date/Time User Provider Type Action    > LN1.1 9/14/2017 11:52 AM Leticia Izaguirre, RN Registered Nurse Sign            Progress Notes by Nano Bradley at 9/14/2017  6:17 AM     Author:  Nano Bradley Service:  (none) Author Type:  (none)    Filed:  9/14/2017  6:17 AM Date of Service:  9/14/2017  6:17 AM Creation Time:  " 9/14/2017  6:17 AM    Status:  Signed :  Nano Bradley         Admission medication history interview status for the 9/14/2017  admission is complete. See EPIC admission navigator for prior to admission medications     Medication history source reliability:Good    Medication history interview source(s):Son    Medication history resources (including written lists, pill bottles, clinic record):None    [facility] pharmacy[facility]    Additional medication history information not noted on PTA med list :None    Time spent in this activity: 45 minutes    Prior to Admission medications    Medication Sig Last Dose Taking? Auth Provider   ATORVASTATIN CALCIUM PO Take 80 mg by mouth every evening 9/13/2017 at 1800 Yes Reported, Patient   insulin aspart prot & aspart (NOVOLOG MIX 70/30 FLEXPEN) injection Inject 30 Units Subcutaneous every morning (before breakfast) 9/13/2017 at am Yes Reported, Patient   insulin aspart prot & aspart (NOVOLOG MIX 70/30 FLEXPEN) injection Inject 20 Units Subcutaneous every evening 15 units 9/13/2017 at pm Yes Reported, Patient   insulin aspart (NOVOLOG FLEXPEN) 100 UNIT/ML injection Inject 10-15 Units Subcutaneous At Bedtime 5 units on 9/13/2017 at hs Yes Reported, Patient   TAMSULOSIN HCL PO Take 0.4 mg by mouth daily 9/13/2017 at 1800 Yes Reported, Patient   ASPIRIN PO Take 81 mg by mouth every evening 9/7/2017 at pm Yes Reported, Patient   TRAMADOL HCL PO Take 100 mg by mouth nightly as needed for moderate to severe pain (Takes 2 x 50mg tablet = 100mg dose) 9/12/2017 at hs Yes Reported, Patient   Vardenafil HCl (LEVITRA PO) Take 20 mg by mouth daily as needed (erectile dysfunction) more than a week at prn Yes Reported, Patient   celecoxib (CELEBREX) 200 MG capsule Take 200 mg by mouth every evening  9/7/2017 at pm Yes Reported, Patient   DOCUSATE SODIUM PO Take 100 mg by mouth daily as needed for constipation  more than a week at prn Yes Reported, Patient   finasteride (PROSCAR) 5 MG  tablet Take 5 mg by mouth every evening  9/13/2017 at 1800 Yes Alfredo Aragon MD   ascorbic acid (VITAMIN C) 1000 MG TABS Take 1,000 mg by mouth daily. 8/31/2017 Yes Reported, Patient   ONE TOUCH ULTRA test strip test 3 times daily   Alfredo Aragon MD   blood glucose monitoring (ONE TOUCH ULTRASOFT) lancets 1 each 3 times daily   Alfredo Aragon MD   insulin pen needle (ADVOCATE INSULIN PEN NEEDLES) 29G X 12.7MM Use three pen needles daily or as directed.   Alfredo Aragon MD   Insulin Pen Needle (PEN NEEDLES) 31G X 6 MM MISC 1 Device 3 times daily   Alfredo Aragon MD   Blood Glucose Monitoring Suppl (ONE TOUCH ULTRA SYSTEM KIT) W/DEVICE KIT One touch ultra 2 if covered by insurance   Alfredo Aragon MD[DS1.1]            Revision History        User Key Date/Time User Provider Type Action    > DS1.1 9/14/2017  6:17 AM Nano Bradley (none) Sign                  Procedure Notes     No notes of this type exist for this encounter.         Progress Notes - Therapies (Notes from 09/13/17 through 09/16/17)      Progress Notes by Charlotte Emery, PT at 9/14/2017  4:55 PM     Author:  Charlotte Emery PT Service:  (none) Author Type:  Physical Therapist    Filed:  9/14/2017  4:56 PM Date of Service:  9/14/2017  4:55 PM Creation Time:  9/14/2017  4:55 PM    Status:  Signed :  Charlotte Emery PT (Physical Therapist)          09/14/17 1557   Quick Adds   Type of Visit Initial PT Evaluation   Living Environment   Lives With child(bernie), adult   Living Arrangements house   Home Accessibility stairs to enter home;bed and bath on same level;tub/shower is not walk in   Number of Stairs to Enter Home 2   Transportation Available family or friend will provide  (pt's children assist wtih transport)   Self-Care   Dominant Hand right   Usual Activity Tolerance good   Current Activity Tolerance poor   Regular Exercise yes   Activity/Exercise Type biking  (stationary bicycle)   Exercise Amount/Frequency daily;30 mins   Equipment  Currently Used at Home walker, rolling;cane, straight;shower chair   Functional Level Prior   Ambulation 1-->assistive equipment   Transferring 1-->assistive equipment   Toileting 1-->assistive equipment   Bathing 1-->assistive equipment   Dressing 0-->independent   Eating 0-->independent   Communication 0-->understands/communicates without difficulty   Swallowing 0-->swallows foods/liquids without difficulty   Cognition 1 - attention or memory deficits   Fall history within last six months no   Which of the above functional risks had a recent onset or change? ambulation;transferring;toileting;bathing;dressing   Prior Functional Level Comment Pt lives with his adult son in a house with 2 steps to enter, all needs met on main level with tub/shower. Pt was Mod I with FWW vs SEC for functional mobility. Pt was IND with ADLs/IADLs at baseline, however, pt's daughter does check on pt daily and assists as needed.    General Information   Onset of Illness/Injury or Date of Surgery - Date 09/14/17   Referring Physician Darnell Allen MD   Patient/Family Goals Statement Pt's son hopeful for pt to go to TCU   Pertinent History of Current Problem (include personal factors and/or comorbidities that impact the POC) Pt is a 78yo male seen POD#0 s/p R total knee arthroplasty   Precautions/Limitations fall precautions   Weight-Bearing Status - RLE weight-bearing as tolerated   General Observations Pt sleeping upon arrival,  present and son at bedside, pt with IV, hemovac, mayes   General Info Comments Activity: ambulate with assistance 3 times daily, out of bed day of surgery if pt tolerates   Cognitive Status Examination   Orientation orientation to person, place and time   Level of Consciousness lethargic/somnolent   Follows Commands and Answers Questions 100% of the time;able to follow single-step instructions   Memory (pt's son reports baseline mild memory impairment)   Pain Assessment   Patient Currently in Pain No  "  Integumentary/Edema   Integumentary/Edema Comments Incision R knee with dressing and ace wrap intact, hemovac present   Posture    Posture Forward head position   Range of Motion (ROM)   ROM Comment RLE limited by pain, LLE WNL with AAROM   Strength   Strength Comments RLE limited by pain   Bed Mobility   Bed Mobility Comments unable to assess d/t lethargy   Transfer Skills   Transfer Comments unable to assess d/t lethargy   Gait   Gait Comments unable to assess d/t lethargy   Balance   Balance Comments unable to assess d/t lethargy   Sensory Examination   Sensory Perception Comments Denies N/T   Coordination   Coordination Comments unable to assess d/t lethargy   General Therapy Interventions   Planned Therapy Interventions balance training;bed mobility training;gait training;ROM;strengthening;stretching;transfer training;home program guidelines;progressive activity/exercise   Clinical Impression   Criteria for Skilled Therapeutic Intervention yes, treatment indicated   PT Diagnosis Impaired gait   Influenced by the following impairments Pain, weakness, decreased ROM, decreased activity tolerance   Functional limitations due to impairments Decreased safety and independence with functional transfers, gait, stairs   Clinical Presentation Stable/Uncomplicated   Clinical Decision Making (Complexity) Low complexity   Therapy Frequency` 2 times/day   Predicted Duration of Therapy Intervention (days/wks) 4 days   Anticipated Discharge Disposition Transitional Care Facility;Home with Home Therapy   Risk & Benefits of therapy have been explained Yes   Patient, Family & other staff in agreement with plan of care Yes   [facility] [employer]-PAC TM \"6 Clicks\"   2016, Trustees of [facility], under license to [employer].  All rights reserved.   6 Clicks Short Forms Basic Mobility Inpatient Short Form   [facility] AM-PAC  \"6 Clicks\" V.2 Basic Mobility Inpatient Short Form   1. Turning from your back to your side " while in a flat bed without using bedrails? 2 - A Lot   2. Moving from lying on your back to sitting on the side of a flat bed without using bedrails? 2 - A Lot   3. Moving to and from a bed to a chair (including a wheelchair)? 2 - A Lot   4. Standing up from a chair using your arms (e.g., wheelchair, or bedside chair)? 2 - A Lot   5. To walk in hospital room? 2 - A Lot   6. Climbing 3-5 steps with a railing? 1 - Total   Basic Mobility Raw Score (Score out of 24.Lower scores equate to lower levels of function) 11   Total Evaluation Time   Total Evaluation Time (Minutes) 8[KJ1.1]        Revision History        User Key Date/Time User Provider Type Action    > KJ1.1 9/14/2017  4:56 PM Charlotte Emery, PT Physical Therapist Sign            Progress Notes by Nano Bradley at 9/14/2017  6:17 AM     Author:  Nano Bradley Service:  (none) Author Type:  (none)    Filed:  9/14/2017  6:17 AM Date of Service:  9/14/2017  6:17 AM Creation Time:  9/14/2017  6:17 AM    Status:  Signed :  Nano Bradley         Admission medication history interview status for the 9/14/2017  admission is complete. See EPIC admission navigator for prior to admission medications     Medication history source reliability:Good    Medication history interview source(s):Son    Medication history resources (including written lists, pill bottles, clinic record):None    [facility] pharmacy[facility]    Additional medication history information not noted on PTA med list :None    Time spent in this activity: 45 minutes    Prior to Admission medications    Medication Sig Last Dose Taking? Auth Provider   ATORVASTATIN CALCIUM PO Take 80 mg by mouth every evening 9/13/2017 at 1800 Yes Reported, Patient   insulin aspart prot & aspart (NOVOLOG MIX 70/30 FLEXPEN) injection Inject 30 Units Subcutaneous every morning (before breakfast) 9/13/2017 at am Yes Reported, Patient   insulin aspart prot & aspart (NOVOLOG MIX 70/30 FLEXPEN) injection Inject 20  Units Subcutaneous every evening 15 units 9/13/2017 at pm Yes Reported, Patient   insulin aspart (NOVOLOG FLEXPEN) 100 UNIT/ML injection Inject 10-15 Units Subcutaneous At Bedtime 5 units on 9/13/2017 at hs Yes Reported, Patient   TAMSULOSIN HCL PO Take 0.4 mg by mouth daily 9/13/2017 at 1800 Yes Reported, Patient   ASPIRIN PO Take 81 mg by mouth every evening 9/7/2017 at pm Yes Reported, Patient   TRAMADOL HCL PO Take 100 mg by mouth nightly as needed for moderate to severe pain (Takes 2 x 50mg tablet = 100mg dose) 9/12/2017 at hs Yes Reported, Patient   Vardenafil HCl (LEVITRA PO) Take 20 mg by mouth daily as needed (erectile dysfunction) more than a week at prn Yes Reported, Patient   celecoxib (CELEBREX) 200 MG capsule Take 200 mg by mouth every evening  9/7/2017 at pm Yes Reported, Patient   DOCUSATE SODIUM PO Take 100 mg by mouth daily as needed for constipation  more than a week at prn Yes Reported, Patient   finasteride (PROSCAR) 5 MG tablet Take 5 mg by mouth every evening  9/13/2017 at 1800 Yes Alfredo Aragon MD   ascorbic acid (VITAMIN C) 1000 MG TABS Take 1,000 mg by mouth daily. 8/31/2017 Yes Reported, Patient   ONE TOUCH ULTRA test strip test 3 times daily   Alfredo Aragon MD   blood glucose monitoring (ONE TOUCH ULTRASOFT) lancets 1 each 3 times daily   Alfredo Aragon MD   insulin pen needle (ADVOCATE INSULIN PEN NEEDLES) 29G X 12.7MM Use three pen needles daily or as directed.   Alfredo Aragon MD   Insulin Pen Needle (PEN NEEDLES) 31G X 6 MM MISC 1 Device 3 times daily   Alfredo Aragon MD   Blood Glucose Monitoring Suppl (ONE TOUCH ULTRA SYSTEM KIT) W/DEVICE KIT One touch ultra 2 if covered by insurance   Alfredo Aragon MD[DS1.1]            Revision History        User Key Date/Time User Provider Type Action    > DS1.1 9/14/2017  6:17 AM Nano Bradley (none) Sign

## 2017-09-14 NOTE — PROGRESS NOTES
09/14/17 1557   Quick Adds   Type of Visit Initial PT Evaluation   Living Environment   Lives With child(bernie), adult   Living Arrangements house   Home Accessibility stairs to enter home;bed and bath on same level;tub/shower is not walk in   Number of Stairs to Enter Home 2   Transportation Available family or friend will provide  (pt's children assist wtih transport)   Self-Care   Dominant Hand right   Usual Activity Tolerance good   Current Activity Tolerance poor   Regular Exercise yes   Activity/Exercise Type biking  (stationary bicycle)   Exercise Amount/Frequency daily;30 mins   Equipment Currently Used at Home walker, rolling;cane, straight;shower chair   Functional Level Prior   Ambulation 1-->assistive equipment   Transferring 1-->assistive equipment   Toileting 1-->assistive equipment   Bathing 1-->assistive equipment   Dressing 0-->independent   Eating 0-->independent   Communication 0-->understands/communicates without difficulty   Swallowing 0-->swallows foods/liquids without difficulty   Cognition 1 - attention or memory deficits   Fall history within last six months no   Which of the above functional risks had a recent onset or change? ambulation;transferring;toileting;bathing;dressing   Prior Functional Level Comment Pt lives with his adult son in a house with 2 steps to enter, all needs met on main level with tub/shower. Pt was Mod I with FWW vs SEC for functional mobility. Pt was IND with ADLs/IADLs at baseline, however, pt's daughter does check on pt daily and assists as needed.    General Information   Onset of Illness/Injury or Date of Surgery - Date 09/14/17   Referring Physician Darnell Allen MD   Patient/Family Goals Statement Pt's son hopeful for pt to go to TCU   Pertinent History of Current Problem (include personal factors and/or comorbidities that impact the POC) Pt is a 76yo male seen POD#0 s/p R total knee arthroplasty   Precautions/Limitations fall precautions   Weight-Bearing  Status - RLE weight-bearing as tolerated   General Observations Pt sleeping upon arrival,  present and son at bedside, pt with IV, hemovac, mayes   General Info Comments Activity: ambulate with assistance 3 times daily, out of bed day of surgery if pt tolerates   Cognitive Status Examination   Orientation orientation to person, place and time   Level of Consciousness lethargic/somnolent   Follows Commands and Answers Questions 100% of the time;able to follow single-step instructions   Memory (pt's son reports baseline mild memory impairment)   Pain Assessment   Patient Currently in Pain No   Integumentary/Edema   Integumentary/Edema Comments Incision R knee with dressing and ace wrap intact, hemovac present   Posture    Posture Forward head position   Range of Motion (ROM)   ROM Comment RLE limited by pain, LLE WNL with AAROM   Strength   Strength Comments RLE limited by pain   Bed Mobility   Bed Mobility Comments unable to assess d/t lethargy   Transfer Skills   Transfer Comments unable to assess d/t lethargy   Gait   Gait Comments unable to assess d/t lethargy   Balance   Balance Comments unable to assess d/t lethargy   Sensory Examination   Sensory Perception Comments Denies N/T   Coordination   Coordination Comments unable to assess d/t lethargy   General Therapy Interventions   Planned Therapy Interventions balance training;bed mobility training;gait training;ROM;strengthening;stretching;transfer training;home program guidelines;progressive activity/exercise   Clinical Impression   Criteria for Skilled Therapeutic Intervention yes, treatment indicated   PT Diagnosis Impaired gait   Influenced by the following impairments Pain, weakness, decreased ROM, decreased activity tolerance   Functional limitations due to impairments Decreased safety and independence with functional transfers, gait, stairs   Clinical Presentation Stable/Uncomplicated   Clinical Decision Making (Complexity) Low complexity  "  Therapy Frequency` 2 times/day   Predicted Duration of Therapy Intervention (days/wks) 4 days   Anticipated Discharge Disposition Transitional Care Facility;Home with Home Therapy   Risk & Benefits of therapy have been explained Yes   Patient, Family & other staff in agreement with plan of care Yes   [facility] TM \"6 Clicks\"   2016, Trustees of [facility], under license to [employer].  All rights reserved.   6 Clicks Short Forms Basic Mobility Inpatient Short Form   [facility]  \"6 Clicks\" V.2 Basic Mobility Inpatient Short Form   1. Turning from your back to your side while in a flat bed without using bedrails? 2 - A Lot   2. Moving from lying on your back to sitting on the side of a flat bed without using bedrails? 2 - A Lot   3. Moving to and from a bed to a chair (including a wheelchair)? 2 - A Lot   4. Standing up from a chair using your arms (e.g., wheelchair, or bedside chair)? 2 - A Lot   5. To walk in hospital room? 2 - A Lot   6. Climbing 3-5 steps with a railing? 1 - Total   Basic Mobility Raw Score (Score out of 24.Lower scores equate to lower levels of function) 11   Total Evaluation Time   Total Evaluation Time (Minutes) 8     "

## 2017-09-14 NOTE — BRIEF OP NOTE
[facility] Brief Operative Note    Pre-operative diagnosis: RIGHT KNEE DJD   Post-operative diagnosis * No post-op diagnosis entered *  SAME   Procedure: Procedure(s):  RIGHT TOTAL KNEE ARTHROPLASTY  - Wound Class: I-Clean   Surgeon(s): Surgeon(s) and Role:     * Darnell Allen MD - Primary     * Mana Beasley PA-C - Assisting   Estimated blood loss: 25 mL    Specimens: * No specimens in log *   Findings: Medial oa

## 2017-09-14 NOTE — IP AVS SNAPSHOT
"` `     [place] [address] [facility]: [phone]                                              INTERAGENCY TRANSFER FORM - NURSING   2017                    Hospital Admission Date: 2017  PIYUSH BOND   : 1940  Sex: Male        Attending Provider: Darnell Allen MD     Allergies:  No Known Drug Allergies    Infection:  None   Service:  SURGERY    Ht:  1.664 m (5' 5.5\")   Wt:  80.7 kg (178 lb)   Admission Wt:  --    BMI:  29.17 kg/m 2   BSA:  1.93 m 2            Patient PCP Information     Provider PCP Type    Alfredo Aragon MD General      Current Code Status     Date Active Code Status Order ID Comments User Context       2017 12:19 PM Full Code [id number]  Darnell Allen MD Inpatient       Code Status History     Date Active Date Inactive Code Status Order ID Comments User Context    This patient has a current code status but no historical code status.      Advance Directives        Does patient have a scanned Advance Directive/ACP document in EPIC?           No        Hospital Problems as of 2017              Priority Class Noted POA    Status post total left knee replacement Medium  2017 Yes      Non-Hospital Problems as of 2017              Priority Class Noted    Chondromalacia of patella Medium  2003    Memory loss Medium  Unknown    Disorder of acoustic nerve Medium  Unknown    HYPERLIPIDEMIA LDL GOAL <100 Medium  10/31/2010    BPH (benign prostatic hypertrophy) Medium  2010    Health Care Home Medium  2011    ACP (advance care planning) Medium  2012    Elevated prostate specific antigen (PSA) Medium  2013    Erectile dysfunction, unspecified erectile dysfunction type Medium  2016    Type 2 diabetes mellitus without complication, with long-term current use of insulin (H) Medium  2017    Osteoarthritis of both hands, unspecified osteoarthritis type Medium  3/19/2017      Immunizations     Name Date      Influenza (H1N1) " 01/14/10     Influenza (High Dose) 3 valent vaccine 09/21/15     Influenza (High Dose) 3 valent vaccine 09/14/14     Influenza (High Dose) 3 valent vaccine 09/12/13     Influenza (High Dose) 3 valent vaccine 09/10/12     Influenza (IIV3) 09/09/11     Influenza (IIV3) 09/09/11     Influenza (IIV3) 10/05/09     Influenza (IIV3) 11/04/08     Influenza (IIV3) 11/09/07     Influenza (IIV3) 11/04/05     Influenza (IIV3) 11/16/04     Influenza (IIV3) 12/01/01     Pneumococcal (PCV 13) 07/28/15     Pneumococcal (PCV 7) 01/24/12     Pneumococcal 23 valent 01/18/02     TD (ADULT, 7+) 04/27/01     TDAP Vaccine (Adacel) 08/24/12     Zoster vaccine, live 01/14/10          END      ASSESSMENT     Discharge Profile Flowsheet     EXPECTED DISCHARGE     Did the patient decline [facility]  and sign paper waiver form? (Place signed waiver form on chart)  no 09/13/17 1333    Expected Discharge Date  09/17/17 (TCU) 09/15/17 1010   FINAL RESOURCES      DISCHARGE NEEDS ASSESSMENT     Resources List  Skilled Nursing Facility 09/17/17 1217    Equipment Currently Used at Home  walker, rolling;cane, straight;shower chair 09/14/17 1602   Skilled Nursing Facility  [facility] [phone], Fax: [phone] 09/17/17 1217    Transportation Available  family or friend will provide (pt's children assist wtih transport) 09/14/17 1602   PAS Number  [id number] 09/17/17 1217    # of Referrals Placed by [facility]  Transportation;Senior Linkage Line;Post Acute Facilities 09/17/17 1217   Senior Linkage Line Referral Placed  09/17/17 09/17/17 1217    GASTROINTESTINAL (ADULT,PEDIATRIC,OB)     SKIN      GI WDL  ex 09/17/17 0849   Inspection of bony prominences  Full 09/17/17 0849    Abdominal Appearance  distended;firm 09/17/17 0849   Skin WDL  ex 09/17/17 0849    All Quadrants Bowel Sounds  audible and active in all quadrants 09/17/17 0849   Skin Temperature  warm 09/16/17 2101    Passing flatus  no 09/17/17 0034   Skin Moisture  dry 09/16/17  "2101    COMMUNICATION ASSESSMENT     Skin Integrity  incision(s);scar(s);scab(s) 09/17/17 0849    Patient's communication style  spoken language (non-English) 09/13/17 1333   Skin Elasticity  quick return to original state 09/16/17 1735    Patient's primary language  Indonesian 09/13/17 1333   SAFETY       services offered to the patient? (Install  services phone or TTY, if applicable)  yes 09/13/17 1333   Safety WDL  WDL 09/17/17 1146                 Assessment WDL (Within Defined Limits) Definitions           Safety WDL     Effective: 09/28/15    Row Information: <b>WDL Definition:</b> Bed in low position, wheels locked; call light in reach; upper side rails up x 2; ID band on<br> <font color=\"gray\"><i>Item=AS safety wdl>>List=AS safety wdl>>Version=F14</i></font>      Skin WDL     Effective: 09/28/15    Row Information: <b>WDL Definition:</b> Warm; dry; intact; elastic; without discoloration; pressure points without redness<br> <font color=\"gray\"><i>Item=AS skin wdl>>List=AS skin wdl>>Version=F14</i></font>      Vitals     Vital Signs Flowsheet     VITAL SIGNS     Pain Orientation  Right 09/17/17 1222    Temp  98.4  F (36.9  C) 09/17/17 0750   Pain Descriptors  Aching 09/16/17 2053    Temp src  Oral 09/17/17 0750   Pain Management Interventions  around-the-clock dosing utilized;cold applied 09/16/17 2101    Resp  16 09/17/17 0845   Pain Intervention(s)  Medication (See eMAR) 09/17/17 1222    Pulse  76 09/17/17 0750   Response to Interventions  Absence of nonverbal indicators of pain 09/17/17 0114    Heart Rate  77 09/17/17 0750   ANALGESIA SIDE EFFECTS MONITORING      Pulse/Heart Rate Source  Monitor 09/17/17 0750   Side Effects Monitoring: Respiratory Quality  R 09/17/17 1222    BP  113/63 09/17/17 0750   Side Effects Monitoring: Respiratory Depth  N 09/17/17 1222    BP Location  Right arm 09/17/17 0750   Side Effects Monitoring: Sedation Level  1 09/17/17 1222    OXYGEN THERAPY     " POSITIONING      SpO2  94 % 09/17/17 0750   Body Position  supine 09/17/17 1146    O2 Device  None (Room air) 09/17/17 0750   Head of Bed (HOB)  HOB at 20-30 degrees 09/17/17 1146    Oxygen Delivery  -- 09/17/17 0048   Chair  Upright in chair 09/17/17 1146    PAIN/COMFORT     Positioning/Transfer Devices  immobilization device (on at night) 09/16/17 2101    Patient Currently in Pain  yes 09/17/17 1222   DAILY CARE      Preferred Pain Scale  number (Numeric Rating Pain Scale) 09/17/17 1222   Activity Type  activity adjusted per tolerance 09/17/17 1146    Patient's Stated Pain Goal  3 09/15/17 0132   Activity Level of Assistance  assistance, 2 people 09/17/17 1146    0-10 Pain Scale  6 09/17/17 1222   Activity Assistive Device  gait belt;walker 09/17/17 1146    Pain Location  Knee 09/17/17 1222                 Patient Lines/Drains/Airways Status    Active LINES/DRAINS/AIRWAYS     Name: Placement date: Placement time: Site: Days: Last dressing change:    Incision/Surgical Site 09/14/17 Right;Anterior Knee 09/14/17   1011    3             Patient Lines/Drains/Airways Status    Active PICC/CVC     None            Intake/Output Detail Report     Date Intake     Output     Net    Shift P.O. I.V. IV Piggyback Total Urine Drains Blood Total       Noc 09/15/17 2300 - 09/16/17 0659 -- -- -- -- 1000 -- -- 1000 -1000    Day 09/16/17 0700 - 09/16/17 1459 -- -- -- -- -- -- -- -- 0    Lavinia 09/16/17 1500 - 09/16/17 2259 300 -- -- 300 550 -- -- 550 -250    Noc 09/16/17 2300 - 09/17/17 0659 -- -- -- -- 725 -- -- 725 -725    Day 09/17/17 0700 - 09/17/17 1459 -- -- -- -- -- -- -- -- 0      Last Void/BM       Most Recent Value    Urine Occurrence     Stool Occurrence 1 at 09/17/2017 1200      Case Management/Discharge Planning     Case Management/Discharge Planning Flowsheet     REFERRAL INFORMATION     Expected Discharge Date  09/17/17 (TCU) 09/15/17 1010    Did the Initial Social Work Assessment result in a Social Work Case?  Yes  09/15/17 1630   DISCHARGE PLANNING      Admission Type  inpatient 09/15/17 1630   Transportation Available  family or friend will provide (pt's children assist wtih transport) 09/14/17 1602    Arrived From  home or self-care 09/15/17 1630   FINAL NOTE      Referral Source  physician 09/15/17 1630   Final Note  D/C to [place] 09/17/17 1217    # of Referrals Placed by CTS  Transportation;Senior Linkage Line;Post Acute Facilities 09/17/17 1217   FINAL RESOURCES      Post Acute Facilities  TCU 09/17/17 1217   Equipment Currently Used at Home  walker, rolling;cane, straight;shower chair 09/14/17 1602    Reason For Consult  discharge planning 09/15/17 1630   Resources List  Skilled Nursing Facility 09/17/17 1217    Record Reviewed  medical record 09/15/17 1630   Skilled Nursing Facility  [facility] [phone], Fax: [phone] 09/17/17 1217     Assigned to Case  Daphne Jj 09/15/17 1630   PAS Number  [id number] 09/17/17 1217    LIVING ENVIRONMENT     Senior Linkage Line Referral Placed  09/17/17 09/17/17 1217    Lives With  child(bernie), adult 09/15/17 1630   ABUSE RISK SCREEN      Living Arrangements  house 09/15/17 1630   QUESTION TO PATIENT:  Has a member of your family or a partner(now or in the past) intimidated, hurt, manipulated, or controlled you in any way?  no 09/14/17 0612    Quality Of Family Relationships  supportive;helpful;involved 09/15/17 1630   QUESTION TO PATIENT: Do you feel safe going back to the place where you are living?  yes 09/14/17 0612    Able to Return to Prior Living Arrangements  no 09/15/17 1630   OBSERVATION: Is there reason to believe there has been maltreatment of a vulnerable adult (ie. Physical/Sexual/Emotional abuse, self neglect, lack of adequate food, shelter, medical care, or financial exploitation)?  no 09/14/17 0612    COPING/STRESS     (R) MENTAL HEALTH SUICIDE RISK      Major Change/Loss/Stressor  none 09/14/17 0706   Are you depressed or  being treated for depression?  No 09/14/17 1248    EXPECTED DISCHARGE

## 2017-09-14 NOTE — IP AVS SNAPSHOT
[address] Special[facility]    [address] [place] [address]    Phone:  [phone]                                       After Visit Summary   9/14/2017    Rabia Reed    MRN: [id number]           After Visit Summary Signature Page     I have received my discharge instructions, and my questions have been answered. I have discussed any challenges I see with this plan with the nurse or doctor.    ..........................................................................................................................................  Patient/Patient Representative Signature      ..........................................................................................................................................  Patient Representative Print Name and Relationship to Patient    ..................................................               ................................................  Date                                            Time    ..........................................................................................................................................  Reviewed by Signature/Title    ...................................................              ..............................................  Date                                                            Time

## 2017-09-14 NOTE — OP NOTE
DATE OF PROCEDURE:  09/14/2017       PREOPERATIVE DIAGNOSIS:  Right knee osteoarthrosis.      POSTOPERATIVE DIAGNOSIS:  Right knee osteoarthrosis.      PROCEDURE:  Right total knee arthroplasty.      ASSISTANT:  LINDSEY Cardenas      ANESTHESIA:  Regional.      COMPLICATIONS:  None.      DESCRIPTION OF PROCEDURE:  Mr. Rabia Reed was brought to the operating room.  An anesthetic was administered.  He received prophylactic antibiotics.  These will be discontinued within 24 hours of surgery.  He will be on Lovenox, changed over to aspirin for DVT prophylaxis.  His right lower extremity was prepped and draped in the usual sterile fashion.  A timeout was called.  The limb was exsanguinated and tourniquet inflated.  I made a sharp anterior incision.  I made a medial arthrotomy.  A straw-colored effusion was expressed.  He did have about a 15-degree flexion contracture.  He had significant varus deformity.  I went ahead and cut the patella from 26 to 16,    prepared it for a size 35 button.  I placed an intramedullary guide.  We used a 5 degree valgus bushing.  I made my distal femoral cut.  I elected to take an additional 3 mm of bone due to the fact that he had a flexion contracture, and I was going to use a PS component.  I measured it to be a size 4, made my 5-in-1 cut and then my box cut.      I set my tibial extramedullary cutting guide in the long axis of the tibia.  I took 2 mm from the more deficient medial compartment.  I ended up having to take 2 more.  I did go ahead and remove some of the medial bone to lateralize the component somewhat.  I measured it to be a size 4.  I cemented everything in place with a 12.  The lateral side did lift off a little bit.  I debrided all cement.  A 4 was stable in extension with just a little lateral laxity.  Once the extensor mechanism closed passively, we flex to 140.  During cementation, I did copiously irrigate and then soak for a total of 3 minutes with a dilute  Betadine solution.  I placed a drain deep, closed the extensor mechanism with Ethibond, 2-0 in the subq, staples in the skin.  Bulky sterile dressing was applied.        He returned to the recovery room in stable condition.  All sponge and needle counts were correct at the end of the procedure.  There were no known complications.         ROSALES AMEZCUA MD             D: 2017 10:25   T: 2017 17:27   MT: MADISYN#[id number]      Name:     PIYUSH BOND   MRN:      [id number]        Account:        [id number]   :      1940           Procedure Date: 2017      Document: [id number]

## 2017-09-14 NOTE — PLAN OF CARE
Problem: Goal Outcome Summary  Goal: Goal Outcome Summary  PT: PT orders received, evaluation completed, treatment initiated. Pt is a 78yo male seen POD#0 s/p R total knee arthroplasty. Pt's son present at time of evaluation and providing PLOF information d/t pt's lethargy. Pt lives with his adult son in a house with 2 steps to enter, all needs met on main level with tub/shower. Pt was Mod I with FWW vs SEC for functional mobility. Pt was IND with ADLs/IADLs at baseline, however, pt's daughter does check on pt daily and assists as needed.      Discharge Planner PT   Patient plan for discharge: Pt's son reports interest in TCU, pt in agreement  Current status:  present. Pt lethargic upon PT arrival, able to awaken for introduction of PT role and POC. Pt drowsy but able to participate in supine TKA exercises, guarding with flexion. Pain 0/10 at rest, 3/10 with exercise. R knee AAROM 16-58 degrees. Deferred mobility at this time d/t lethargy as pt having difficulty staying awake during exercises. Encouraged pt to dangle with nursing this evening when more alert. Updated RN.   Barriers to return to prior living situation: weakness, decreased ROM, decreased activity tolerance, stairs to enter  Recommendations for discharge: TBD on POD2  Rationale for recommendations: TBD on POD2       Entered by: Charlotte Emery 09/14/2017 4:44 PM

## 2017-09-14 NOTE — PROGRESS NOTES
Admission medication history interview status for the 9/14/2017  admission is complete. See EPIC admission navigator for prior to admission medications     Medication history source reliability:Good    Medication history interview source(s):Son    Medication history resources (including written lists, pill bottles, clinic record):None    [facility] pharmacy[facility]    Additional medication history information not noted on PTA med list :None    Time spent in this activity: 45 minutes    Prior to Admission medications    Medication Sig Last Dose Taking? Auth Provider   ATORVASTATIN CALCIUM PO Take 80 mg by mouth every evening 9/13/2017 at 1800 Yes Reported, Patient   insulin aspart prot & aspart (NOVOLOG MIX 70/30 FLEXPEN) injection Inject 30 Units Subcutaneous every morning (before breakfast) 9/13/2017 at am Yes Reported, Patient   insulin aspart prot & aspart (NOVOLOG MIX 70/30 FLEXPEN) injection Inject 20 Units Subcutaneous every evening 15 units 9/13/2017 at pm Yes Reported, Patient   insulin aspart (NOVOLOG FLEXPEN) 100 UNIT/ML injection Inject 10-15 Units Subcutaneous At Bedtime 5 units on 9/13/2017 at hs Yes Reported, Patient   TAMSULOSIN HCL PO Take 0.4 mg by mouth daily 9/13/2017 at 1800 Yes Reported, Patient   ASPIRIN PO Take 81 mg by mouth every evening 9/7/2017 at pm Yes Reported, Patient   TRAMADOL HCL PO Take 100 mg by mouth nightly as needed for moderate to severe pain (Takes 2 x 50mg tablet = 100mg dose) 9/12/2017 at hs Yes Reported, Patient   Vardenafil HCl (LEVITRA PO) Take 20 mg by mouth daily as needed (erectile dysfunction) more than a week at prn Yes Reported, Patient   celecoxib (CELEBREX) 200 MG capsule Take 200 mg by mouth every evening  9/7/2017 at pm Yes Reported, Patient   DOCUSATE SODIUM PO Take 100 mg by mouth daily as needed for constipation  more than a week at prn Yes Reported, Patient   finasteride (PROSCAR) 5 MG tablet Take 5 mg by mouth every evening  9/13/2017 at 1800 Yes Alfredo Aragon MD    ascorbic acid (VITAMIN C) 1000 MG TABS Take 1,000 mg by mouth daily. 8/31/2017 Yes Reported, Patient   ONE TOUCH ULTRA test strip test 3 times daily   Alfredo Aragon MD   blood glucose monitoring (ONE TOUCH ULTRASOFT) lancets 1 each 3 times daily   Alfredo Aragon MD   insulin pen needle (ADVOCATE INSULIN PEN NEEDLES) 29G X 12.7MM Use three pen needles daily or as directed.   Alfredo Aragon MD   Insulin Pen Needle (PEN NEEDLES) 31G X 6 MM MISC 1 Device 3 times daily   Alfredo Aragon MD   Blood Glucose Monitoring Suppl (ONE TOUCH ULTRA SYSTEM KIT) W/DEVICE KIT One touch ultra 2 if covered by insurance   Alfredo Aragon MD

## 2017-09-14 NOTE — IP AVS SNAPSHOT
MRN:[id number]                      After Visit Summary   9/14/2017    Rabia Reed    MRN: [id number]           Thank you!     Thank you for choosing [facility] for your care. Our goal is always to provide you with excellent care. Hearing back from our patients is one way we can continue to improve our services. Please take a few minutes to complete the written survey that you may receive in the mail after you visit with us. Thank you!        Patient Information     Date Of Birth          1940        Designated Caregiver       Most Recent Value    Caregiver    Will someone help with your care after discharge? yes    Name of designated caregiver chucho    Phone number of caregiver     Caregiver address [place]      About your hospital stay     You were admitted on:  September 14, 2017 You last received care in the:  [facility] [address] [facility]    You were discharged on:  September 17, 2017       Who to Call     For medical emergencies, please call 911.  For non-urgent questions about your medical care, please call your primary care provider or clinic, [phone]  For questions related to your surgery, please call your surgery clinic        Attending Provider     Provider Specialty    Darnell Allen MD Orthopedics       Primary Care Provider Office Phone # Fax #    Alfredo Aragon [phone]      After Care Instructions     Activity - Ambulate in hallway       Every shift            Activity - Up ad vera           Additional Discharge Instructions       Knee immobilizer qhs for 7 days after discharge from hospital  Suman rodriguez until seen in follow up. May be off at night and to shower            General info for SNF       Length of Stay Estimate: Short Term Care: Estimated # of Days <30  Condition at Discharge: Improving  Level of care:skilled   Rehabilitation Potential: Excellent  Admission H&P remains valid and up-to-date: Yes  Recent Chemotherapy:  "N/A  Use Nursing Home Standing Orders: Yes            Mantoux instructions       Give two-step Mantoux (PPD) Per Facility Policy Yes            Weight bearing status       WBAT            Wound care (specify)       Site:   Left knee  Instructions:  Daily dry dressing change until wound dry then leave open to air                  Follow-up Appointments     Follow Up and recommended labs and tests       F/U Dr. Allen 2 weeks post op                  Additional Services     Occupational Therapy Adult Consult       Evaluate and treat as clinically indicated.    Reason:  tka            Physical Therapy Adult Consult       Evaluate and treat as clinically indicated.    Reason:  tka                  Future tests that were ordered for you     AntiEmbolism Stockings       Bilateral below knee length.On in the morning, off at night                  Further instructions from your care team       TOTAL KNEE REPLACEMENT TAKE HOME INSTRUCTIONS  Your surgeon will answer any questions about your progress. General guidelines for your care are listed below. Your surgeon may give additional instructions for your care at home. Please follow them carefully    Activity Level  1. Physical activity may be resumed gradually according to your comfort level and your surgeon s instructions. Follow your exercise program as instructed by your therapist. Do exercises at least twice a day. you may ice your knee after exercising.  2. Complete exercises two hours before bedtime to minimize the effect pain may have on sleep.  Refer to pages 19-22 of you \"Total Knee Replacement\" booklet for details  3. Do not wear your knee immobilizer unless your doctor has specifically told you to continue it.    Good Health Practices  1. Maintain an adequate fluid intake and eat a well balanced diet.  2. Be sure to include the basic food groups, such as dairy products, meat/fish, vegetables, and fruit. Each of these foods contribute to your wound healing and " increasing your strength.  3. Surgery, decreased activity and pain medication all contribute to a decrease in bowel activity that can result in constipation. It is recommended that you increase your liquid intake, add fiber to your diet, increase activity, and decrease pain medication use. If you have any problems, notify your physician.  4. Wear your anti-embolism stockings day and night until seen by your surgeon if you were issued these at discharge.  (Not all patients will have anti-embolism stockings) Remove twice a day for one hour at a time. You may hand wash and air dry your stockings.  5. Notify your dentist of your total knee surgery and call your dentist one week before a dental appointment for antibiotics, if your dentist will not prescribe antibiotics then call your surgeon to ask for next steps.      Incision/Dressing Care  1. Keep incision clean and dry per surgeons instructions  2. Cover incision if you are still having drainage.  3.  If you have a waterproof dressing __________________________   Shower.    Things to Watch For  1. Check incision daily for increased redness, tenderness, swelling, or drainage along the incision line. If these occur, please notify your doctor. Also, call if you develop a fever above 101 .  2. Please notify your doctor if you experience any calf pain and/or if you have surgical pain not relieved by the pain medication prescribed by your doctor.        Revised 05/8/17      Patient will discharge to [facility] today via [facility] wheelchair at 13:00.  [facility]'s phone number is [phone].    Pending Results     No orders found from 9/12/2017 to 9/15/2017.            Statement of Approval     Ordered          09/17/17 1038  I have reviewed and agree with all the recommendations and orders detailed in this document.  EFFECTIVE NOW     Approved and electronically signed by:  Darnell Elder MD           09/16/17 1033  I have reviewed and agree with all the  recommendations and orders detailed in this document.  EFFECTIVE NOW     Approved and electronically signed by:  Darnell Allen MD             Admission Information     Date & Time Provider Department Dept. Phone    9/14/2017 Darnell Allen MD [facility] [address] [facility] [phone]      Your Vitals Were     Blood Pressure Pulse Temperature Respirations Pulse Oximetry       113/63 (BP Location: Right arm) 76 98.4  F (36.9  C) (Oral) 16 94%       MyChart Information     [employer] gives you secure access to your electronic health record. If you see a primary care provider, you can also send messages to your care team and make appointments. If you have questions, please call your primary care clinic.  If you do not have a primary care provider, please call [phone] and they will assist you.        Care EveryWhere ID     This is your Care EveryWhere ID. This could be used by other organizations to access your [facility] medical records  [id number]        Equal Access to Services     KALEIGH BRAND AH: Hadii keanu braxtono Sojose manuel, waaxda luqadaha, qaybta kaalmada ademarco antonio, alfonzo oconnor . So [facility] [phone].    ATENCIÓN: Si habla español, tiene a sylvester disposición servicios gratuitos de asistencia lingüística. Llame al [phone].    We comply with applicable federal civil rights laws and [place] laws. We do not discriminate on the basis of race, color, national origin, age, disability sex, sexual orientation or gender identity.               Review of your medicines      START taking        Dose / Directions    melatonin 3 MG tablet        Dose:  3 mg   Take 1 tablet (3 mg) by mouth nightly as needed for sleep   Refills:  0       * oxyCODONE 10 MG 12 hr tablet   Commonly known as:  OXYCONTIN        Dose:  10 mg   Take 1 tablet (10 mg) by mouth every 12 hours   Quantity:  10 tablet   Refills:  0       * oxyCODONE 5 MG IR tablet   Commonly known as:  ROXICODONE        Dose:   5-10 mg   Take 1-2 tablets (5-10 mg) by mouth every 3 hours as needed for moderate to severe pain   Quantity:  60 tablet   Refills:  0       senna-docusate 8.6-50 MG per tablet   Commonly known as:  SENOKOT-S;PERICOLACE        Dose:  1-2 tablet   Take 1-2 tablets by mouth 2 times daily   Quantity:  100 tablet   Refills:  0       * Notice:  This list has 2 medication(s) that are the same as other medications prescribed for you. Read the directions carefully, and ask your doctor or other care provider to review them with you.      CONTINUE these medicines which may have CHANGED, or have new prescriptions. If we are uncertain of the size of tablets/capsules you have at home, strength may be listed as something that might have changed.        Dose / Directions    aspirin 325 MG tablet   This may have changed:    - medication strength  - how much to take  - when to take this        Dose:  325 mg   Take 1 tablet (325 mg) by mouth 2 times daily   Quantity:  90 tablet   Refills:  3       * insulin aspart prot & aspart injection   Commonly known as:  NovoLOG MIX 70/30 FLEXPEN   This may have changed:  how much to take        Dose:  20 Units   Inject 20 Units Subcutaneous every morning (before breakfast)   Quantity:  3 mL   Refills:  1       * insulin aspart prot & aspart injection   Commonly known as:  NovoLOG MIX 70/30 FLEXPEN   This may have changed:  how much to take   Used for:  Type 2 diabetes mellitus without complication, with long-term current use of insulin (H)        Dose:  15 Units   Inject 15 Units Subcutaneous every evening   Quantity:  3 mL   Refills:  1       * Notice:  This list has 2 medication(s) that are the same as other medications prescribed for you. Read the directions carefully, and ask your doctor or other care provider to review them with you.      CONTINUE these medicines which have NOT CHANGED        Dose / Directions    ascorbic acid 1000 MG Tabs   Commonly known as:  vitamin C        Dose:   1000 mg   Take 1,000 mg by mouth daily.   Refills:  0       ATORVASTATIN CALCIUM PO        Dose:  80 mg   Take 80 mg by mouth every evening   Refills:  0       blood glucose monitoring lancets   Used for:  Type 2 diabetes mellitus without complication (H)        Dose:  1 each   1 each 3 times daily   Quantity:  300 Box   Refills:  3       celecoxib 200 MG capsule   Commonly known as:  celeBREX        Dose:  200 mg   Take 200 mg by mouth every evening   Refills:  0       finasteride 5 MG tablet   Commonly known as:  PROSCAR   Used for:  BPH (benign prostatic hypertrophy)        Dose:  5 mg   Take 5 mg by mouth every evening   Quantity:  30 tablet   Refills:  11       LEVITRA PO        Dose:  20 mg   Take 20 mg by mouth daily as needed (erectile dysfunction)   Refills:  0       NovoLOG FLEXPEN 100 UNIT/ML injection   Generic drug:  insulin aspart        Dose:  10-15 Units   Inject 10-15 Units Subcutaneous At Bedtime   Refills:  0       ONE TOUCH ULTRA SYSTEM KIT W/DEVICE Kit   Used for:  Type II or unspecified type diabetes mellitus without mention of complication, not stated as uncontrolled        One touch ultra 2 if covered by insurance   Quantity:  1 kit   Refills:  0       ONE TOUCH ULTRA test strip   Used for:  Type 2 diabetes mellitus without complication (H)   Generic drug:  blood glucose monitoring        test 3 times daily   Quantity:  200 each   Refills:  3       * pen needles 31G X 6 MM Misc   Used for:  Type 2 diabetes, HbA1c goal < 7% (H)        Dose:  1 Device   1 Device 3 times daily   Quantity:  100 each   Refills:  11       * insulin pen needle 29G X 12.7MM   Commonly known as:  ADVOCATE INSULIN PEN NEEDLES   Used for:  Type 2 diabetes, HbA1c goal < 7% (H)        Use three pen needles daily or as directed.   Quantity:  100 each   Refills:  11       TAMSULOSIN HCL PO        Dose:  0.4 mg   Take 0.4 mg by mouth daily   Refills:  0       * Notice:  This list has 2 medication(s) that are the same as  other medications prescribed for you. Read the directions carefully, and ask your doctor or other care provider to review them with you.      STOP taking     DOCUSATE SODIUM PO           TRAMADOL HCL PO                Where to get your medicines      Some of these will need a paper prescription and others can be bought over the counter. Ask your nurse if you have questions.     You don't need a prescription for these medications     aspirin 325 MG tablet    insulin aspart prot & aspart injection    insulin aspart prot & aspart injection    melatonin 3 MG tablet    senna-docusate 8.6-50 MG per tablet         Information about where to get these medications is not yet available     ! Ask your nurse or doctor about these medications     oxyCODONE 10 MG 12 hr tablet    oxyCODONE 5 MG IR tablet                Protect others around you: Learn how to safely use, store and throw away your medicines at [email or web address].             Medication List: This is a list of all your medications and when to take them. Check marks below indicate your daily home schedule. Keep this list as a reference.      Medications           Morning Afternoon Evening Bedtime As Needed    ascorbic acid 1000 MG Tabs   Commonly known as:  vitamin C   Take 1,000 mg by mouth daily.                                aspirin 325 MG tablet   Take 1 tablet (325 mg) by mouth 2 times daily                                ATORVASTATIN CALCIUM PO   Take 80 mg by mouth every evening   Last time this was given:  80 mg on 9/16/2017  8:18 PM                                blood glucose monitoring lancets   1 each 3 times daily                                celecoxib 200 MG capsule   Commonly known as:  celeBREX   Take 200 mg by mouth every evening   Last time this was given:  200 mg on 9/16/2017  8:18 PM                                finasteride 5 MG tablet   Commonly known as:  PROSCAR   Take 5 mg by mouth every evening   Last time this was given:  5 mg on  9/16/2017  8:18 PM                                * insulin aspart prot & aspart injection   Commonly known as:  NovoLOG MIX 70/30 FLEXPEN   Inject 20 Units Subcutaneous every morning (before breakfast)   Last time this was given:  15 Units on 9/17/2017  8:32 AM                                * insulin aspart prot & aspart injection   Commonly known as:  NovoLOG MIX 70/30 FLEXPEN   Inject 15 Units Subcutaneous every evening   Last time this was given:  15 Units on 9/17/2017  8:32 AM                                LEVITRA PO   Take 20 mg by mouth daily as needed (erectile dysfunction)                                melatonin 3 MG tablet   Take 1 tablet (3 mg) by mouth nightly as needed for sleep                                NovoLOG FLEXPEN 100 UNIT/ML injection   Inject 10-15 Units Subcutaneous At Bedtime   Last time this was given:  2 Units on 9/15/2017  6:27 PM   Generic drug:  insulin aspart                                ONE TOUCH ULTRA SYSTEM KIT W/DEVICE Kit   One touch ultra 2 if covered by insurance                                ONE TOUCH ULTRA test strip   test 3 times daily   Generic drug:  blood glucose monitoring                                * oxyCODONE 10 MG 12 hr tablet   Commonly known as:  OXYCONTIN   Take 1 tablet (10 mg) by mouth every 12 hours   Last time this was given:  10 mg on 9/17/2017  6:20 AM                                * oxyCODONE 5 MG IR tablet   Commonly known as:  ROXICODONE   Take 1-2 tablets (5-10 mg) by mouth every 3 hours as needed for moderate to severe pain   Last time this was given:  10 mg on 9/17/2017 12:22 PM                                * pen needles 31G X 6 MM Misc   1 Device 3 times daily                                * insulin pen needle 29G X 12.7MM   Commonly known as:  ADVOCATE INSULIN PEN NEEDLES   Use three pen needles daily or as directed.                                senna-docusate 8.6-50 MG per tablet   Commonly known as:  SENOKOT-S;PERICOLACE   Take  1-2 tablets by mouth 2 times daily   Last time this was given:  2 tablets on 9/17/2017  8:34 AM                                TAMSULOSIN HCL PO   Take 0.4 mg by mouth daily   Last time this was given:  0.4 mg on 9/17/2017  8:34 AM                                * Notice:  This list has 6 medication(s) that are the same as other medications prescribed for you. Read the directions carefully, and ask your doctor or other care provider to review them with you.

## 2017-09-14 NOTE — PLAN OF CARE
Alert and oriented x4. VSS. Pain controled with oxycodone, dilaudid, scheduled tylenol and oxycontin. CMS intact. Ledesma patent, HVC patent, IVF runnin. Speaks mostly Prydeinig some english. Dressing CDI. Plan to continue to monitor.

## 2017-09-14 NOTE — ANESTHESIA PREPROCEDURE EVALUATION
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     Elevated prostate specific antigen (PSA) 11/6/2013     Essential hypertension, benign      Hyperlipidemia LDL goal <100       Impotence of organic origin      Inguinal hernia without mention of obstruction or gangrene, unilateral or unspecified, (not specified as recurrent) 1/03    right     Memory loss      Osteoarthritis of both hands, unspecified osteoarthritis type 3/19/2017     Tobacco use disorder      Type 2 diabetes mellitus without complication  (goal A1C<7) 10/24/2015     Unspecified nasal polyp      Urinary frequency     nocturia x 5     Past Surgical History:   Procedure Laterality Date     C APPENDECTOMY       C NONSPECIFIC PROCEDURE  4/01    nasal polyp excision     C NONSPECIFIC PROCEDURE  1/03    RIH repair     C NONSPECIFIC PROCEDURE  April 2011     right knee arthroscopy     Prior to Admission medications    Medication Sig Start Date End Date Taking? Authorizing Provider   ATORVASTATIN CALCIUM PO Take 80 mg by mouth every evening   Yes Reported, Patient   insulin aspart prot & aspart (NOVOLOG MIX 70/30 FLEXPEN) injection Inject 30 Units Subcutaneous every morning (before breakfast)   Yes Reported, Patient   insulin aspart prot & aspart (NOVOLOG MIX 70/30 FLEXPEN) injection Inject 20 Units Subcutaneous every evening   Yes Reported, Patient   insulin aspart (NOVOLOG FLEXPEN) 100 UNIT/ML injection Inject 10-15 Units Subcutaneous At Bedtime   Yes Reported, Patient   TAMSULOSIN HCL PO Take 0.4 mg by mouth daily   Yes Reported, Patient   ASPIRIN PO Take 81 mg by mouth every evening   Yes Reported, Patient   TRAMADOL HCL PO Take 100 mg by mouth nightly as needed for  moderate to severe pain (Takes 2 x 50mg tablet = 100mg dose)   Yes Reported, Patient   Vardenafil HCl (LEVITRA PO) Take 20 mg by mouth daily as needed (erectile dysfunction)   Yes Reported, Patient   celecoxib (CELEBREX) 200 MG capsule Take 200 mg by mouth every evening    Yes Reported, Patient   DOCUSATE SODIUM PO Take 100 mg by mouth daily as needed for constipation    Yes Reported, Patient   finasteride (PROSCAR) 5 MG tablet Take 5 mg by mouth every evening  2/19/15  Yes Alfredo Aragon MD   ascorbic acid (VITAMIN C) 1000 MG TABS Take 1,000 mg by mouth daily.   Yes Reported, Patient   ONE TOUCH ULTRA test strip test 3 times daily 4/11/17   Alfredo Aragon MD   blood glucose monitoring (ONE TOUCH ULTRASOFT) lancets 1 each 3 times daily 1/23/17   Alfredo Aragon MD   insulin pen needle (ADVOCATE INSULIN PEN NEEDLES) 29G X 12.7MM Use three pen needles daily or as directed. 9/26/16   Alfredo Aragon MD   Insulin Pen Needle (PEN NEEDLES) 31G X 6 MM MISC 1 Device 3 times daily 10/2/15   Alfredo Aragon MD   Blood Glucose Monitoring Suppl (ONE TOUCH ULTRA SYSTEM KIT) W/DEVICE KIT One touch ultra 2 if covered by insurance 8/5/14   Alfredo Aragon MD     Current Facility-Administered Medications Ordered in Epic   Medication Dose Route Frequency Last Rate Last Dose     chlorhexidine 4 % solution   Topical Once        Or     chlorhexidine 2 % pads   Topical Once        Or     antimicrobial soap   Topical Once         chlorhexidine 4 % solution   Topical Once        Or     chlorhexidine 2 % pads   Topical Once        Or     antimicrobial soap   Topical Once         chlorhexidine 2 % pads   Topical Once        Or     antimicrobial soap   Topical Once         ceFAZolin sodium-dextrose (ANCEF) infusion 2 g  2 g Intravenous Pre-Op/Pre-procedure x 1 dose         ceFAZolin (ANCEF) 1 g vial to attach to  ml bag for ADULT or 50 ml bag for PEDS  1 g Intravenous See Admin Instructions         tranexamic acid (CYKLOKAPRON) 1 g in NaCl 0.9 %  60 mL bolus  1 g Intravenous Once         tranexamic acid (CYKLOKAPRON) 1 g in NaCl 0.9 % 60 mL bolus  1 g Intravenous Once         lidocaine 1 % 1 mL  1 mL Other Q1H PRN   1 mL at 09/14/17 0634     lactated ringers infusion   Intravenous Continuous 25 mL/hr at 09/14/17 0634       No current Epic-ordered outpatient prescriptions on file.     Wt Readings from Last 1 Encounters:   09/07/17 80.7 kg (178 lb)     Temp Readings from Last 1 Encounters:   09/07/17 36.7  C (98  F) (Oral)     BP Readings from Last 6 Encounters:   09/07/17 120/78   06/20/17 141/76   03/17/17 112/58   08/11/16 118/52   01/21/16 104/52   07/28/15 132/72     Pulse Readings from Last 4 Encounters:   09/07/17 83   06/20/17 71   03/17/17 69   08/11/16 71     Resp Readings from Last 1 Encounters:   05/12/09 16     SpO2 Readings from Last 1 Encounters:   09/07/17 95%     Recent Labs   Lab Test  09/07/17   1700  02/06/17   0953   NA  140  139   POTASSIUM  4.4  4.5   CHLORIDE  105  105   CO2  32  32   ANIONGAP  3  2*   GLC  188*  157*   BUN  20  20   CR  0.99  1.01   MABEL  8.2*  8.6     Recent Labs   Lab Test  09/07/17   1700  08/03/16   0813   AST  32  22   ALT  43  33     Recent Labs   Lab Test  09/07/17   1700  02/18/14   0950   WBC  6.9  7.7   HGB  14.0  14.4   PLT  187  208     No results for input(s): INR in the last 94989 hours.    Invalid input(s): APTT   No results for input(s): TROPI in the last 70966 hours.  RECENT LABS:   ECG:   ECHO:   CXR:    Anesthesia Evaluation     . Pt has had prior anesthetic.     No history of anesthetic complications          ROS/MED HX    ENT/Pulmonary:     (+)tobacco use, Past use , . .   (-) sleep apnea   Neurologic:       Cardiovascular:     (+) Dyslipidemia, hypertension----. : . . . :. .       METS/Exercise Tolerance:     Hematologic:         Musculoskeletal:   (+) arthritis, , , -       GI/Hepatic:        (-) GERD   Renal/Genitourinary:     (+) Nephrolithiasis , BPH,       Endo:     (+) type II DM .       Psychiatric:         Infectious Disease:         Malignancy:         Other:                     Physical Exam  Normal systems: cardiovascular and pulmonary    Airway   Mallampati: I  Neck ROM: full    Dental   (+) caps    Cardiovascular       Pulmonary                     Anesthesia Plan      History & Physical Review      ASA Status:  3 .    NPO Status:  > 8 hours    Plan for General and LMA with Intravenous induction. Maintenance will be Balanced.    PONV prophylaxis:  Ondansetron (or other 5HT-3)  Pt requesting general anesthesia. SAB offered and declined.      Postoperative Care  Postoperative pain management:  IV analgesics and Peripheral nerve block (Single Shot).      Consents  Anesthetic plan, risks, benefits and alternatives discussed with:  Patient and Daughter/Son..                          .

## 2017-09-14 NOTE — IP AVS SNAPSHOT
[place] [address] [facility]: [phone]            Medication Administration Report for Rabia Reed as of 09/17/17 1245   Legend:    Given Hold Not Given Due Canceled Entry Other Actions    Time Time (Time) Time  Time-Action       Inactive    Active    Linked        Medications 09/11/17 09/12/17 09/13/17 09/14/17 09/15/17 09/16/17 09/17/17    acetaminophen (TYLENOL) tablet 650 mg  Dose: 650 mg Freq: EVERY 4 HOURS PRN Route: PO  PRN Reason: other  PRN Comment: surgical pain  Start: 09/17/17 1400   Admin Instructions: May give first dose 4 hours after last scheduled dose of acetaminophen.  Maximum acetaminophen dose from all sources = 75 mg/kg/day not to exceed 4 grams/day.               alum & mag hydroxide-simethicone (MYLANTA ES/MAALOX  ES) suspension 15-30 mL  Dose: 15-30 mL Freq: EVERY 4 HOURS PRN Route: PO  PRN Reason: indigestion  Start: 09/14/17 1219   Admin Instructions: Shake well.               atorvastatin (LIPITOR) tablet 80 mg  Dose: 80 mg Freq: EVERY EVENING Route: PO  Start: 09/14/17 2000 1947 (80 mg)-Given        2018 (80 mg)-Given        2018 (80 mg)-Given        [ ] 2000           benzocaine-menthol (CHLORASEPTIC) 6-10 MG lozenge 1-2 lozenge  Dose: 1-2 lozenge Freq: EVERY 1 HOUR PRN Route: BU  PRN Reason: sore throat  PRN Comment: sore throat without fever  Start: 09/14/17 1219              celecoxib (celeBREX) capsule 200 mg  Dose: 200 mg Freq: EVERY EVENING Route: PO  Start: 09/14/17 2000 1948 (200 mg)-Given        2018 (200 mg)-Given        2018 (200 mg)-Given        [ ] 2000           diazepam (VALIUM) half-tab 2.5-5 mg  Dose: 2.5-5 mg Freq: EVERY 6 HOURS PRN Route: PO  PRN Reason: other  PRN Comment: spasm  Start: 09/14/17 1219   Admin Instructions: For max of 9 doses post op with first dose given in PACU.               diphenhydrAMINE (BENADRYL) capsule 25 mg  Dose: 25 mg Freq: EVERY 6 HOURS PRN Route: PO  PRN Reason: itching  Start: 09/15/17 113               diphenhydrAMINE (BENADRYL) solution 12.5 mg  Dose: 12.5 mg Freq: EVERY 6 HOURS PRN Route: PO  PRN Reason: itching  Start: 09/14/17 1219   Admin Instructions: Caution to be used when administering multiple CNS depressing meds within a short time frame.              Or  diphenhydrAMINE (BENADRYL) injection 12.5 mg  Dose: 12.5 mg Freq: EVERY 6 HOURS PRN Route: IV  PRN Reason: itching  PRN Comment: Only give if patient unable to take PO.  Start: 09/14/17 1219   Admin Instructions: Caution to be used when administering multiple CNS depressing meds within a short time frame.               enoxaparin (LOVENOX) injection 40 mg  Dose: 40 mg Freq: EVERY 24 HOURS Route: SC  Start: 09/15/17 0700   Admin Instructions: Check to make sure start date/time is 12-24 hours post op unless documented complication, AND no sooner than 22 hours post op if spinal anesthesia used.   Continue until discharge to home. HOLD if platelet count falls below 50% of baseline or less than 100,000/ L and notify provider.         0651 (40 mg)-Given        0622 (40 mg)-Given        0620 (40 mg)-Given           finasteride (PROSCAR) tablet 5 mg  Dose: 5 mg Freq: EVERY EVENING Route: PO  Start: 09/14/17 2000   Admin Instructions: *Do not handle tablets if you are pregnant*        1948 (5 mg)-Given        2016 (5 mg)-Given        2018 (5 mg)-Given        [ ] 2000           glucose 40 % gel 15-30 g  Dose: 15-30 g Freq: EVERY 15 MIN PRN Route: PO  PRN Reason: low blood sugar  Start: 09/14/17 2101   Admin Instructions: Give 15 g for BG 51 to 69 mg/dL IF patient is conscious and able to swallow. Give 30 g for BG less than or equal to 50 mg/dL IF patient is conscious and able to swallow. Do NOT give glucose gel via enteral tube.  IF patient has enteral tube: give apple juice 120 mL (4 oz or 15 g of CHO) via enteral tube for BG 51 to 69 mg/dL.  Give apple juice 240 mL (8 oz or 30 g of CHO) via enteral tube for BG less than or equal to 50 mg/dL.    ~Oral gel is  preferable for conscious and able to swallow patient.   ~IF gel unavailable or patient refuses may provide apple juice 120 mL (4 oz or 15 g of CHO). Document juice on I and O flowsheet.          2213 (15 g)-Given           Or  dextrose 50 % injection 25-50 mL  Dose: 25-50 mL Freq: EVERY 15 MIN PRN Route: IV  PRN Reason: low blood sugar  Start: 09/14/17 2101   Admin Instructions: Use if have IV access, BG less than 70 mg/dL and meet dose criteria below:  Dose if conscious and alert (or disorientated) and NPO = 25 mL  Dose if unconscious / not alert = 50 mL  Vesicant.                     Or  glucagon injection 1 mg  Dose: 1 mg Freq: EVERY 15 MIN PRN Route: SC  PRN Reason: low blood sugar  PRN Comment: May repeat x 1 only  Start: 09/14/17 2101   Admin Instructions: May give SQ or IM. ONLY use glucagon IF patient has NO IV access AND is UNABLE to swallow AND blood glucose is LESS than or EQUAL to 50 mg/dL.                      HYDROmorphone (PF) (DILAUDID) injection 0.3-0.5 mg  Dose: 0.3-0.5 mg Freq: EVERY 30 MIN PRN Route: IV  PRN Reasons: moderate to severe pain,other  PRN Comment: breakthrough pain  Start: 09/14/17 1431       1533 (0.5 mg)-Given        1458 (0.5 mg)-Given             hydrOXYzine (ATARAX) tablet 25 mg  Dose: 25 mg Freq: EVERY 6 HOURS PRN Route: PO  PRN Reason: itching  Start: 09/15/17 1139        1225 (25 mg)-Given        2027 (25 mg)-Given            insulin aspart (NovoLOG) inj (RAPID ACTING)  Dose: 1-5 Units Freq: AT BEDTIME Route: SC  Start: 09/14/17 2200   Admin Instructions: MEDIUM INSULIN RESISTANCE DOSING    Do Not give Bedtime Correction Insulin if BG less than  200.   For  - 249 give 1 units.   For  - 299 give 2 units.   For  - 349 give 3 units.   For  -399 give 4 units.   For BG greater than or equal to 400 give 5 units.  Notify provider if glucose greater than or equal to 350 mg/dL after administration of correction dose.  If given at mealtime, must be administered  5 min before meal or immediately after.         (0027)-Not Given       (2227)-Not Given        (2134)-Not Given        [ ] 2200           insulin aspart (NovoLOG) inj (RAPID ACTING)  Dose: 1-7 Units Freq: 3 TIMES DAILY BEFORE MEALS Route: SC  Start: 09/15/17 0730   Admin Instructions: Correction Scale - MEDIUM INSULIN RESISTANCE DOSING     Do Not give Correction Insulin if Pre-Meal BG less than 140.   For Pre-Meal  - 189 give 1 unit.   For Pre-Meal  - 239 give 2 units.   For Pre-Meal  - 289 give 3 units.   For Pre-Meal  - 339 give 4 units.   For Pre-Meal - 399 give 5 units.   For Pre-Meal -449 give 6 units  For Pre-Meal BG greater than or equal to 450 give 7 units.   To be given with prandial insulin, and based on pre-meal blood glucose.    Notify provider if glucose greater than or equal to 350 mg/dL after administration of correction dose.  If given at mealtime, must be administered 5 min before meal or immediately after.         0903 (2 Units)-Given       1230 (3 Units)-Given       1827 (2 Units)-Given        (0921)-Not Given [C]       (1333)-Not Given       (1644)-Not Given [C]        (0747)-Not Given       (1144)-Not Given       [ ] 1700           insulin aspart prot & aspart (NovoLOG MIX 70/30 PEN) injection 15 Units  Dose: 15 Units Freq: EVERY MORNING BEFORE BREAKFAST Route: SC  Start: 09/17/17 0800   Admin Instructions: Must be administered 5 min before meal or immediately after.           0832 (15 Units)-Given           insulin aspart prot & aspart (NovoLOG MIX 70/30 PEN) injection 20 Units  Dose: 20 Units Freq: DAILY WITH SUPPER Route: SC  Start: 09/15/17 1700   Admin Instructions: Must be administered 5 min before meal or immediately after.         1826 (20 Units)-Given [C]        1858 (17 Units)-Given [C]        [ ] 1700           lidocaine (LMX4) cream  Freq: EVERY 1 HOUR PRN Route: Top  PRN Reason: pain  PRN Comment: with VAD insertion or accessing implanted  "port.  Start: 09/14/17 1219   Admin Instructions: Do NOT give if patient has a history of allergy to any local anesthetic or any \"fareed\" product.   Apply 30 minutes prior to VAD insertion or port access.  MAX Dose:  2.5 g (  of 5 g tube)               lidocaine 1 % 1 mL  Dose: 1 mL Freq: EVERY 1 HOUR PRN Route: OTHER  PRN Comment: mild pain with VAD insertion or accessing implanted port  Start: 09/14/17 1219   Admin Instructions: Do NOT give if patient has a history of allergy to any local anesthetic or any \"fareed\" product. MAX dose 1 mL subcutaneous OR intradermal in divided doses.               melatonin tablet 3 mg  Dose: 3 mg Freq: AT BEDTIME PRN Route: PO  PRN Reason: sleep  Start: 09/15/17 2000   Admin Instructions: POD 1.  Do not give unless at least 6 hours of uninterrupted sleep is expected.               naloxone (NARCAN) injection 0.1-0.4 mg  Dose: 0.1-0.4 mg Freq: EVERY 2 MIN PRN Route: IV  PRN Reason: opioid reversal  Start: 09/14/17 1219   Admin Instructions: For respiratory rate LESS than or EQUAL to 8.  Partial reversal dose:  0.1 mg titrated q 2 minutes for Analgesia Side Effects Monitoring Sedation Level of 3 (frequently drowsy, arousable, drifts to sleep during conversation).Full reversal dose:  0.4 mg bolus for Analgesia Side Effects Monitoring Sedation Level of 4 (somnolent, minimal or no response to stimulation).               ondansetron (ZOFRAN-ODT) ODT tab 4 mg  Dose: 4 mg Freq: EVERY 6 HOURS PRN Route: PO  PRN Reasons: nausea,vomiting  Start: 09/14/17 1219   Admin Instructions: This is Step 1 of nausea and vomiting management.  If nausea not resolved in 15 minutes, go to Step 2 prochlorperazine (COMPAZINE). Do not push through foil backing. Peel back foil and gently remove. Place on tongue immediately. Administration with liquid unnecessary          0922 (4 mg)-Given       1642 (4 mg)-Given           Or  ondansetron (ZOFRAN) injection 4 mg  Dose: 4 mg Freq: EVERY 6 HOURS PRN Route: IV  PRN " Reasons: nausea,vomiting  Start: 09/14/17 1219   Admin Instructions: This is Step 1 of nausea and vomiting management.  If nausea not resolved in 15 minutes, go to Step 2 prochlorperazine (COMPAZINE).  Irritant.                             oxyCODONE (ROXICODONE) IR tablet 5-10 mg  Dose: 5-10 mg Freq: EVERY 3 HOURS PRN Route: PO  PRN Reason: moderate to severe pain  Start: 09/14/17 1219   Admin Instructions: Hold while on PCA or with regular IV opioid dosing.  IF CrCl UNKNOWN start at lowest end of dosing range.        1323 (10 mg)-Given        0101 (10 mg)-Given       0908 (10 mg)-Given       1225 (10 mg)-Given       1718 (10 mg)-Given       2019 (10 mg)-Given        0912 (10 mg)-Given       1235 (10 mg)-Given       1642 (10 mg)-Given       2018 (10 mg)-Given       2322 (10 mg)-Given        0834 (5 mg)-Given       1222 (10 mg)-Given           prochlorperazine (COMPAZINE) injection 5 mg  Dose: 5 mg Freq: EVERY 6 HOURS PRN Route: IV  PRN Reasons: nausea,vomiting  Start: 09/14/17 1219   Admin Instructions: This is Step 2 of nausea and vomiting management.   If nausea not resolved in 15 minutes, give metoclopramide (REGLAN) if ordered (step 3 of nausea and vomiting management)              Or  prochlorperazine (COMPAZINE) tablet 5 mg  Dose: 5 mg Freq: EVERY 6 HOURS PRN Route: PO  PRN Reasons: nausea,vomiting  Start: 09/14/17 1219   Admin Instructions: This is Step 2 of nausea and vomiting management.   If nausea not resolved in 15 minutes, give metoclopramide (REGLAN) if ordered (step 3 of nausea and vomiting management)               senna-docusate (SENOKOT-S;PERICOLACE) 8.6-50 MG per tablet 1-2 tablet  Dose: 1-2 tablet Freq: 2 TIMES DAILY Route: PO  Start: 09/14/17 1230   Admin Instructions: Start with 1 tablet PO BID, If no bowel movement in 24 hours, increase to 2 tablets PO BID.  Hold for loose stools.        (1317)-Not Given       2202 (2 tablet)-Given        0908 (1 tablet)-Given       2019 (2 tablet)-Given         0922 (2 tablet)-Given       2018 (1 tablet)-Given        0834 (2 tablet)-Given       [ ] 2100           tamsulosin (FLOMAX) capsule 0.4 mg  Dose: 0.4 mg Freq: DAILY Route: PO  Start: 09/14/17 1230       (1317)-Not Given        0908 (0.4 mg)-Given        0922 (0.4 mg)-Given        0834 (0.4 mg)-Given          Completed Medications  Medications 09/11/17 09/12/17 09/13/17 09/14/17 09/15/17 09/16/17 09/17/17         Dose: 975 mg Freq: EVERY 8 HOURS Route: PO  Start: 09/14/17 1400   End: 09/17/17 0620   Admin Instructions: Do not use if patient has an active opioid/acetaminophen analgesic order for pain  Maximum acetaminophen dose from all sources = 75 mg/kg/day not to exceed 4 grams/day.        1323 (975 mg)-Given       2202 (975 mg)-Given        0511 (975 mg)-Given       1458 (975 mg)-Given       2227 (975 mg)-Given        0622 (975 mg)-Given       1425 (975 mg)-Given       2213 (975 mg)-Given        0620 (975 mg)-Given             Dose: 2 g Freq: EVERY 8 HOURS Route: IV  Indications of Use: SURGICAL PROPHYLAXIS  Start: 09/14/17 1730   End: 09/15/17 0132   Admin Instructions: First post-op dose due 8 hours after intra-op dose, see eMAR.          1643 (2 g)-Given        0102 (2 g)-Given               Dose: 10 mg Freq: EVERY 12 HOURS Route: PO  Start: 09/14/17 1800   End: 09/17/17 0620   Admin Instructions: DO NOT CRUSH.        1827 (10 mg)-Given        0511 (10 mg)-Given       1817 (10 mg)-Given        0622 (10 mg)-Given       1642 (10 mg)-Given               0620 (10 mg)-Given             Dose: 1 enema Freq: ONCE Route: RE  Start: 09/17/17 1045   End: 09/17/17 1141   Admin Instructions: For children greater than or equal to 12 years           1141 (1 enema)-Given          Discontinued Medications  Medications 09/11/17 09/12/17 09/13/17 09/14/17 09/15/17 09/16/17 09/17/17         Rate: 75 mL/hr Freq: CONTINUOUS Route: IV  Start: 09/14/17 1230   End: 09/16/17 1237   Admin Instructions: Change to saline lock when PO  well tolerated.        1326 ( )-New Bag        0240 ( )-New Bag        1237-Med Discontinued          Dose: 5 Units Freq: AT BEDTIME Route: SC  Start: 09/14/17 2200   End: 09/15/17 1659   Admin Instructions: If given at mealtime, must be administered 5 min before meal or immediately after.         0028 (5 Units)-Given       1659-Med Discontinued           Dose: 10 Units Freq: DAILY WITH SUPPER Route: SC  Start: 09/14/17 2130   End: 09/15/17 1621   Admin Instructions: Must be administered 5 min before meal or immediately after.         (0027)-Not Given       1621-Med Discontinued           Dose: 13 Units Freq: DAILY WITH SUPPER Route: SC  Start: 09/15/17 1700   End: 09/15/17 1659   Admin Instructions: Must be administered 5 min before meal or immediately after.         1659-Med Discontinued           Dose: 15 Units Freq: EVERY MORNING BEFORE BREAKFAST Route: SC  Start: 09/15/17 0730   End: 09/15/17 1621   Admin Instructions: Must be administered 5 min before meal or immediately after.         0903 (15 Units)-Given       1621-Med Discontinued           Dose: 18 Units Freq: EVERY MORNING BEFORE BREAKFAST Route: SC  Start: 09/16/17 0730   End: 09/15/17 1659   Admin Instructions: Must be administered 5 min before meal or immediately after.         1659-Med Discontinued           Dose: 30 Units Freq: EVERY MORNING BEFORE BREAKFAST Route: SC  Start: 09/16/17 0730   End: 09/17/17 0746   Admin Instructions: Must be administered 5 min before meal or immediately after.          0930 (30 Units)-Given        0746-Med Discontinued               Dose: 3 mL Freq: EVERY 8 HOURS Route: IK  Start: 09/14/17 1400   End: 09/16/17 1237   Admin Instructions: And Q1H PRN, to lock peripheral IV dormant line.        (1317)-Not Given       (2246)-Not Given               1539 (3 mL)-Given       (2227)-Not Given        0623 (3 mL)-Given       1237-Med Discontinued          Dose: 3 mL Freq: EVERY 1 HOUR PRN Route: IK  PRN Reason: line flush  PRN  Comment: for peripheral IV flush post IV meds  Start: 09/14/17 1219   End: 09/16/17 1237         1237-Med Discontinued     Medications 09/11/17 09/12/17 09/13/17 09/14/17 09/15/17 09/16/17 09/17/17

## 2017-09-14 NOTE — PROGRESS NOTES
1145HR - DALJIT Tatum updated on pt stable status, pt now fully awake, VS and surgical sites are stable, pt reports improved pain.  Okay for pt to transfer to floor per DALJIT Tatum.

## 2017-09-15 ENCOUNTER — CARE COORDINATION (OUTPATIENT)
Dept: GERIATRIC MEDICINE | Facility: CLINIC | Age: 77
End: 2017-09-15

## 2017-09-15 ENCOUNTER — APPOINTMENT (OUTPATIENT)
Dept: PHYSICAL THERAPY | Facility: CLINIC | Age: 77
DRG: 470 | End: 2017-09-15
Attending: ORTHOPAEDIC SURGERY
Payer: COMMERCIAL

## 2017-09-15 LAB
GLUCOSE BLDC GLUCOMTR-MCNC: 219 MG/DL (ref 70–99)
GLUCOSE BLDC GLUCOMTR-MCNC: 230 MG/DL (ref 70–99)
GLUCOSE BLDC GLUCOMTR-MCNC: 243 MG/DL (ref 70–99)
GLUCOSE BLDC GLUCOMTR-MCNC: 273 MG/DL (ref 70–99)
GLUCOSE BLDC GLUCOMTR-MCNC: 64 MG/DL (ref 70–99)
GLUCOSE BLDC GLUCOMTR-MCNC: 76 MG/DL (ref 70–99)
HGB BLD-MCNC: 11.8 G/DL (ref 13.3–17.7)

## 2017-09-15 PROCEDURE — S0138 FINASTERIDE, 5 MG: HCPCS | Performed by: ORTHOPAEDIC SURGERY

## 2017-09-15 PROCEDURE — 99207 ZZC MOONLIGHTING INDICATOR: CPT | Performed by: INTERNAL MEDICINE

## 2017-09-15 PROCEDURE — 12000007 ZZH R&B INTERMEDIATE

## 2017-09-15 PROCEDURE — 99232 SBSQ HOSP IP/OBS MODERATE 35: CPT | Performed by: INTERNAL MEDICINE

## 2017-09-15 PROCEDURE — 25000128 H RX IP 250 OP 636: Performed by: ORTHOPAEDIC SURGERY

## 2017-09-15 PROCEDURE — 97116 GAIT TRAINING THERAPY: CPT | Mod: GP

## 2017-09-15 PROCEDURE — 25000131 ZZH RX MED GY IP 250 OP 636 PS 637: Performed by: PHYSICIAN ASSISTANT

## 2017-09-15 PROCEDURE — 97530 THERAPEUTIC ACTIVITIES: CPT | Mod: GP

## 2017-09-15 PROCEDURE — 00000146 ZZHCL STATISTIC GLUCOSE BY METER IP

## 2017-09-15 PROCEDURE — 40000193 ZZH STATISTIC PT WARD VISIT

## 2017-09-15 PROCEDURE — 85018 HEMOGLOBIN: CPT | Performed by: ORTHOPAEDIC SURGERY

## 2017-09-15 PROCEDURE — 25000132 ZZH RX MED GY IP 250 OP 250 PS 637: Performed by: ORTHOPAEDIC SURGERY

## 2017-09-15 PROCEDURE — 99207 ZZC CDG-MDM COMPONENT: MEETS MODERATE - UP CODED: CPT | Performed by: INTERNAL MEDICINE

## 2017-09-15 PROCEDURE — 97110 THERAPEUTIC EXERCISES: CPT | Mod: GP

## 2017-09-15 PROCEDURE — 25000131 ZZH RX MED GY IP 250 OP 636 PS 637: Performed by: INTERNAL MEDICINE

## 2017-09-15 PROCEDURE — 36415 COLL VENOUS BLD VENIPUNCTURE: CPT | Performed by: ORTHOPAEDIC SURGERY

## 2017-09-15 RX ORDER — HYDROXYZINE HYDROCHLORIDE 25 MG/1
25 TABLET, FILM COATED ORAL EVERY 6 HOURS PRN
Status: DISCONTINUED | OUTPATIENT
Start: 2017-09-15 | End: 2017-09-17 | Stop reason: HOSPADM

## 2017-09-15 RX ORDER — DIPHENHYDRAMINE HCL 25 MG
25 CAPSULE ORAL EVERY 6 HOURS PRN
Status: DISCONTINUED | OUTPATIENT
Start: 2017-09-15 | End: 2017-09-17 | Stop reason: HOSPADM

## 2017-09-15 RX ADMIN — FINASTERIDE 5 MG: 5 TABLET, FILM COATED ORAL at 20:16

## 2017-09-15 RX ADMIN — CEFAZOLIN SODIUM 2 G: 2 INJECTION, SOLUTION INTRAVENOUS at 01:02

## 2017-09-15 RX ADMIN — INSULIN ASPART 2 UNITS: 100 INJECTION, SOLUTION INTRAVENOUS; SUBCUTANEOUS at 09:03

## 2017-09-15 RX ADMIN — INSULIN ASPART 3 UNITS: 100 INJECTION, SOLUTION INTRAVENOUS; SUBCUTANEOUS at 12:30

## 2017-09-15 RX ADMIN — INSULIN ASPART 20 UNITS: 100 INJECTION, SUSPENSION SUBCUTANEOUS at 18:26

## 2017-09-15 RX ADMIN — OXYCODONE HYDROCHLORIDE 10 MG: 10 TABLET, FILM COATED, EXTENDED RELEASE ORAL at 05:11

## 2017-09-15 RX ADMIN — SODIUM CHLORIDE: 9 INJECTION, SOLUTION INTRAVENOUS at 02:40

## 2017-09-15 RX ADMIN — OXYCODONE HYDROCHLORIDE 10 MG: 10 TABLET, FILM COATED, EXTENDED RELEASE ORAL at 18:17

## 2017-09-15 RX ADMIN — TAMSULOSIN HYDROCHLORIDE 0.4 MG: 0.4 CAPSULE ORAL at 09:08

## 2017-09-15 RX ADMIN — OXYCODONE HYDROCHLORIDE 10 MG: 5 TABLET ORAL at 17:18

## 2017-09-15 RX ADMIN — ATORVASTATIN CALCIUM 80 MG: 20 TABLET, FILM COATED ORAL at 20:18

## 2017-09-15 RX ADMIN — INSULIN ASPART 5 UNITS: 100 INJECTION, SOLUTION INTRAVENOUS; SUBCUTANEOUS at 00:28

## 2017-09-15 RX ADMIN — CELECOXIB 200 MG: 200 CAPSULE ORAL at 20:18

## 2017-09-15 RX ADMIN — ENOXAPARIN SODIUM 40 MG: 40 INJECTION SUBCUTANEOUS at 06:51

## 2017-09-15 RX ADMIN — ACETAMINOPHEN 975 MG: 325 TABLET, FILM COATED ORAL at 22:27

## 2017-09-15 RX ADMIN — SENNOSIDES AND DOCUSATE SODIUM 1 TABLET: 8.6; 5 TABLET ORAL at 09:08

## 2017-09-15 RX ADMIN — OXYCODONE HYDROCHLORIDE 10 MG: 5 TABLET ORAL at 01:01

## 2017-09-15 RX ADMIN — INSULIN ASPART 2 UNITS: 100 INJECTION, SOLUTION INTRAVENOUS; SUBCUTANEOUS at 18:27

## 2017-09-15 RX ADMIN — OXYCODONE HYDROCHLORIDE 10 MG: 5 TABLET ORAL at 12:25

## 2017-09-15 RX ADMIN — SENNOSIDES AND DOCUSATE SODIUM 2 TABLET: 8.6; 5 TABLET ORAL at 20:19

## 2017-09-15 RX ADMIN — HYDROXYZINE HYDROCHLORIDE 25 MG: 25 TABLET ORAL at 12:25

## 2017-09-15 RX ADMIN — INSULIN ASPART 15 UNITS: 100 INJECTION, SUSPENSION SUBCUTANEOUS at 09:03

## 2017-09-15 RX ADMIN — ACETAMINOPHEN 975 MG: 325 TABLET, FILM COATED ORAL at 14:58

## 2017-09-15 RX ADMIN — OXYCODONE HYDROCHLORIDE 10 MG: 5 TABLET ORAL at 09:08

## 2017-09-15 RX ADMIN — HYDROMORPHONE HYDROCHLORIDE 0.5 MG: 1 INJECTION, SOLUTION INTRAMUSCULAR; INTRAVENOUS; SUBCUTANEOUS at 14:58

## 2017-09-15 RX ADMIN — OXYCODONE HYDROCHLORIDE 10 MG: 5 TABLET ORAL at 20:19

## 2017-09-15 RX ADMIN — ACETAMINOPHEN 975 MG: 325 TABLET, FILM COATED ORAL at 05:11

## 2017-09-15 NOTE — PROGRESS NOTES
Client was admitted on 9/14/17 to [facility] for a planned surgery, Right total knee arthroplasty.  Called client's son, Brandan, to follow up and left a message.  Called the hospital Daphne HESS, and updated her on the community POC.  Charis Garcia, [facility]  [phone]

## 2017-09-15 NOTE — PROGRESS NOTES
Care Transition Initial Assessment - DESHAWN  Reason For Consult: discharge planning  Met with: Reviewed medical record   Active Problems:    Status post total left knee replacement         DATA  Lives With: child(bernie), adult  Living Arrangements: house    Identified issues/concerns regarding health management: SW was consulted for discharge planning. Patient is Rabia, a 77 year-old male who was admitted on 9/14 for a total knee replacement. His tentative discharge date is 9/17. Patient speaks Maltese. Care coordinator met with patient and family for discharge planning, and relayed information to DESHAWN. DESHAWN reviewed medical record. Per physical therapy, recommendation is TCU. Patient requested Lili Sena and Harpreet Song. SW placed these referrals. Patient would prefer a private room, and is aware of the fees.             Quality Of Family Relationships: supportive, helpful, involved  Transportation Available: family or friend will provide (pt's children assist wtih transport)    ASSESSMENT  Cognitive Status:  awake, alert and oriented  Concerns to be addressed:  Discharge planning     PLAN  Financial costs for the patient includes Private room fees.  Patient given options and choices for discharge TCU choice.  Patient/family is agreeable to the plan?  Yes:   Patient Goals and Preferences: TCU.  Patient anticipates discharging to:  TCU.    Daphne Jj, MSW, LGSW

## 2017-09-15 NOTE — PLAN OF CARE
Problem: Goal Outcome Summary  Goal: Goal Outcome Summary  Discharge Planner PT   Patient plan for discharge: Son and pt plan on TCu  Current status: Pt requires mod assist for bed mobility, fair sitting balance at EOB. Pt performed sit to/from stand with min assist. Pt ambulated 5' with FWW and KI donned with min assist. Pt with minimal weightbearing thru right LE, cues to weight shift onto extremity. Right knee AAROM 14-66 degrees.  Barriers to return to prior living situation: Decreased activity tolerance, level of assist, limited mobility.  Recommendations for discharge: TBD POD #2  Rationale for recommendations: TBD POD#2       Entered by: Carmen Caban 09/15/2017 12:24 PM

## 2017-09-15 NOTE — PLAN OF CARE
Problem: Goal Outcome Summary  Goal: Goal Outcome Summary  Outcome: Improving  Pt is [place] speaks and understands  very little english.  not requested. Son (Brandan) helps with interpretation ( present or give call) Pt is very stoic about pain. Pain meds must be encouraged. Pt dangled and stood this evening. Has fear about walking due to pain. Continue to monitor

## 2017-09-15 NOTE — PROGRESS NOTES
[facility]  Hospitalist Progress Note  Date of service (when I saw the patient)09/15/2017:         Assessment and Plan:    Mr Rabia Reed is a 77 year old, Northern Irish speaking male with PMHx of acoustic schwannoma, insulin dependent T2DM, OA, hyperlipidemia, and HTN who was admitted on 9/14/2017 and is s/p right total knee arthroplasty. I was asked to see the patient for diabetes management. Vitals currently WNL. Pt currently stale.      S/P right TKA:   - Surgery performed by Dr. Allen. EBL 25 ccs. Peripheral nerve block performed.   - Defer routine post-operative cares, IVF, DVT prophylaxis and pain control to primary service   -- Bowel regimen while on narcotics   -- Encourage pulmonary toilet; incentive spirometer at bedside   -- PT and OT in the AM   -- CBC and BMP in AM       T2DM, insulin dependent, uncontrolled: A1C 8.5 from 9/7/17. Maintained on Novolog 70/30 at 30 U with breakfast, 20 U with dinner and Novolog 10-15 U at bedtime. Last dose of 70/30 was 15 U evening prior to admission, took Novolog 5 U at bedtime as well. Tried to advance diet with episode of vomiting this evening.   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 Pt has declined surgery for this and last imaging from 2012 showed it to be 12mm size and unchanged from 4 years prior to that. Pt ambulates with a cane.      DVT Prophylaxis: Defer to primary service  Code Status: Full Code      Disposition: Expected discharge per Jomar Escalante  MD.  Hospitalist [phone] (7am -6 pm)               Interval History:   Pt seen with pt's son interpreting. No new c/o. RN reports elevated BS.              Medications:       atorvastatin (LIPITOR) tablet 80 mg  80 mg Oral QPM     celecoxib  200 mg Oral QPM     finasteride  5 mg Oral QPM     tamsulosin (FLOMAX) capsule 0.4 mg  0.4 mg Oral Daily     sodium chloride (PF)  3 mL Intracatheter Q8H     enoxaparin  40 mg Subcutaneous Q24H     acetaminophen  975 mg Oral Q8H     senna-docusate  1-2 tablet Oral BID     oxyCODONE  10 mg Oral Q12H     insulin aspart  1-7 Units Subcutaneous TID AC     insulin aspart  1-5 Units Subcutaneous At Bedtime     insulin aspart  5 Units Subcutaneous At Bedtime     insulin aspart prot & aspart  15 Units Subcutaneous QAM AC     insulin aspart prot & aspart  10 Units Subcutaneous Daily with supper     diphenhydrAMINE, hydrOXYzine, lidocaine (buffered or not buffered), lidocaine 4%, sodium chloride (PF), sore throat lozenge, naloxone, alum & mag hydroxide-simethicone, [START ON 2017] acetaminophen, oxyCODONE, diphenhydrAMINE **OR** diphenhydrAMINE, ondansetron **OR** ondansetron, prochlorperazine **OR** prochlorperazine, melatonin, diazepam, HYDROmorphone, glucose **OR** dextrose **OR** glucagon               Physical Exam:   Blood pressure 136/70, pulse 67, temperature 98  F (36.7  C), temperature source Oral, resp. rate 16, SpO2 97 %.  Wt Readings from Last 4 Encounters:   17 80.7 kg (178 lb)   17 79.2 kg (174 lb 11.2 oz)   16 78 kg (172 lb)   16 78 kg (172 lb)         Vital Sign Ranges  Temperature Temp  Av.9  F (36.6  C)  Min: 97.5  F (36.4  C)  Max: 98.2  F (36.8  C)   Blood pressure Systolic (24hrs), Av , Min:124 , Max:137        Diastolic (24hrs), Av, Min:66, Max:73      Pulse Pulse  Av  Min: 67  Max: 67   Respirations Resp  Av  Min: 16  Max: 16   Pulse oximetry SpO2  Av %  Min: 97 %  Max: 99 %         Intake/Output  Summary (Last 24 hours) at 09/15/17 1614  Last data filed at 09/15/17 1500   Gross per 24 hour   Intake              600 ml   Output             2025 ml   Net            -1425 ml       Constitutional: Sleeping, wakes up when called,no apparent distress   Lungs: Clear to auscultation bilaterally, no crackles or wheezing   Cardiovascular: Regular rate and rhythm, normal S1 and S2, and no murmur noted   Abdomen: Normal bowel sounds, soft, non-distended, non-tender   Skin: No rashes, no cyanosis, no edema   Neuro:                Data:   All laboratory data reviewed

## 2017-09-15 NOTE — PLAN OF CARE
Problem: Goal Outcome Summary  Goal: Goal Outcome Summary  Outcome: Improving  Pt is A & O x4, Czech speaking only. Lungs sound clear, bowel sounds active, cms intact. Received oxycodone, tylenol, IV dilaudid. Voiding. SL. Continue to monitor.

## 2017-09-15 NOTE — CONSULTS
[facility]    Hospitalist Consultation    Date of Admission:  9/14/2017  Date of Consult (When I saw the patient): 09/14/17    Assessment & Plan   Rabia Reed is a 77 year old, Welsh speaking male with PMHx of acoustic schwannoma, insulin dependent T2DM, OA, hyperlipidemia, and HTN who was admitted on 9/14/2017 and is s/p right total knee arthroplasty. I was asked to see the patient for diabetes management. Vitals currently WNL. Pt currently stale.     S/P right TKA: Surgery performed by Dr. Allen. EBL 25 ccs. Peripheral nerve block performed.   -- Defer routine post-operative cares, IVF, DVT prophylaxis and pain control to primary service   -- Bowel regimen while on narcotics   -- Encourage pulmonary toilet; incentive spirometer at bedside   -- PT and OT in the AM   -- CBC and BMP in AM     T2DM, insulin dependent, uncontrolled: A1C 8.5 from 9/7/17. Maintained on Novolog 70/30 at 30 U with breakfast, 20 U with dinner and Novolog 10-15 U at bedtime. Last dose of 70/30 was 15 U evening prior to admission, took Novolog 5 U at bedtime as well. Tried to advance diet with episode of vomiting this evening.   -- Novolog 70/30 at reduced dose of 15 U with breakfast and 10 U with dinner   -- Novolog 5 U at bedtime   -- Medium dose SSI ordered   -- BS per protocol     Recent Labs  Lab 09/14/17 2007 09/14/17  1803 09/14/17  1056 09/14/17  0649 09/14/17  0647   GLC  --   --   --  99  --    * 177* 170*  --  104*     Hyperlipidemia: Continue PTA statin     Acoustic schwannoma: Hx right hearing loss and some mild chronic imbalance from an accoustical schwannoma. Pt has declined surgery for this and last imaging from 2012 showed it to be 12mm size and unchanged from 4 years prior to that. Pt ambulates with a cane.    DVT Prophylaxis: Defer to primary service  Code Status: Full Code    Disposition: Expected discharge per Ortho    Mercedez Hurt PA-C    This patient was discussed with Dr. Guadalupe  of the Hospitalist Service who agrees with current plans as outlined above.     Reason for Consult   Reason for consult: I was asked by Dr. Allen to evaluate this patient for diabetes management .    Primary Care Physician   Alfredo Aragon    Chief Complaint   S/P Right TKA    History is obtained from the patient. Daughter used as .    History of Present Illness   Rabia Reed is a 77 year old, Ukrainian speaking male with PMHx of acoustic schwannoma, insulin dependent T2DM, OA, hyperlipidemia, and HTN who was admitted on 9/14/2017 and is s/p right total knee arthroplasty. I was asked to see the patient for diabetes management. Vitals currently WNL. Pt currently stale.     Pt attempted to eat supper brought in by family with resultant emesis. Has not tried anything since. Pain well controlled. Denies SOB or chest pain. No recent changes to PTA medication regimen. Diabetic, managed by PCP. Last dose of 70/30 was 15 U evening prior to admission, took Novolog 5 U at bedtime as well. Tried to advance diet with episode of vomiting this evening. No acute concerns.     Past Medical History    1. Acoustic schwannoma   2. T2DM, insulin dependent, uncontrolled; last A1C 8.5 on 9/7/17   3. Osteoarthritis   4. Erectile dysfunction   5. Hyperlipidemia   6. HTN     Past Surgical History   I have reviewed this patient's surgical history and updated it with pertinent information if needed.  Past Surgical History:   Procedure Laterality Date     C APPENDECTOMY       C NONSPECIFIC PROCEDURE  4/01    nasal polyp excision     C NONSPECIFIC PROCEDURE  1/03    [facility] repair     C NONSPECIFIC PROCEDURE  April 2011     right knee arthroscopy       Prior to Admission Medications   Prior to Admission Medications   Prescriptions Last Dose Informant Patient Reported? Taking?   ASPIRIN PO 9/7/2017 at pm Son Yes Yes   Sig: Take 81 mg by mouth every evening   ATORVASTATIN CALCIUM PO 9/13/2017 at 1800 Son Yes Yes   Sig: Take 80 mg by mouth  every evening   Blood Glucose Monitoring Suppl (ONE TOUCH ULTRA SYSTEM KIT) W/DEVICE KIT  Son No No   Sig: One touch ultra 2 if covered by insurance   DOCUSATE SODIUM PO more than a week at prn Son Yes Yes   Sig: Take 100 mg by mouth daily as needed for constipation    Insulin Pen Needle (PEN NEEDLES) 31G X 6 MM MISC  Son No No   Si Device 3 times daily   ONE TOUCH ULTRA test strip  Son No No   Sig: test 3 times daily   TAMSULOSIN HCL PO 2017 at 1800 Son Yes Yes   Sig: Take 0.4 mg by mouth daily   TRAMADOL HCL PO 2017 at hs Son Yes Yes   Sig: Take 100 mg by mouth nightly as needed for moderate to severe pain (Takes 2 x 50mg tablet = 100mg dose)   Vardenafil HCl (LEVITRA PO) more than a week at prn Son Yes Yes   Sig: Take 20 mg by mouth daily as needed (erectile dysfunction)   ascorbic acid (VITAMIN C) 1000 MG TABS 2017 Son Yes Yes   Sig: Take 1,000 mg by mouth daily.   blood glucose monitoring (ONE TOUCH ULTRASOFT) lancets  Son No No   Si each 3 times daily   celecoxib (CELEBREX) 200 MG capsule 2017 at pm Son Yes Yes   Sig: Take 200 mg by mouth every evening    finasteride (PROSCAR) 5 MG tablet 2017 at 1800 Son Yes Yes   Sig: Take 5 mg by mouth every evening    insulin aspart (NOVOLOG FLEXPEN) 100 UNIT/ML injection 5 units on 2017 at hs Son Yes Yes   Sig: Inject 10-15 Units Subcutaneous At Bedtime   insulin aspart prot & aspart (NOVOLOG MIX 70/30 FLEXPEN) injection 2017 at am Son Yes Yes   Sig: Inject 30 Units Subcutaneous every morning (before breakfast)   insulin aspart prot & aspart (NOVOLOG MIX 70/30 FLEXPEN) injection 15 units 2017 at pm Son Yes Yes   Sig: Inject 20 Units Subcutaneous every evening   insulin pen needle (ADVOCATE INSULIN PEN NEEDLES) 29G X 12.7MM  Son No No   Sig: Use three pen needles daily or as directed.      Facility-Administered Medications: None     Allergies   Allergies   Allergen Reactions     No Known Drug Allergies        Social History    I have reviewed this patient's social history and updated it with pertinent information if needed. Rabia Reed  reports that he quit smoking about 16 years ago. He has a 21.00 pack-year smoking history. He has never used smokeless tobacco. He reports that he does not drink alcohol or use illicit drugs.    Family History   I have reviewed this patient's family history and updated it with pertinent information if needed.   Family History   Problem Relation Age of Onset     DIABETES Mother       age 85     Family History Negative Father       age 38,  in war     DIABETES Sister      DIABETES Brother      borderline diabetic     Review of Systems   The 10 point Review of Systems is negative other than noted in the HPI.    Physical Exam   Temp: 97.8  F (36.6  C) Temp src: Oral BP: 127/70 Pulse: 67 Heart Rate: 64 Resp: 16 SpO2: 99 % O2 Device: Nasal cannula Oxygen Delivery: 2 LPM  Vital Signs with Ranges  Temp:  [97.4  F (36.3  C)-97.8  F (36.6  C)] 97.8  F (36.6  C)  Pulse:  [67-80] 67  Heart Rate:  [64-90] 64  Resp:  [14-18] 16  BP: (119-161)/(68-92) 127/70  SpO2:  [95 %-100 %] 99 %  0 lbs 0 oz    CONSTITUTIONAL: Pt laying in bed, dressed in hospital garb. Appears comfortable. Cooperative with interview. Accompanied by daughter at bedside.  HEENT: Normocephalic, atraumatic. Negative for conjunctival redness or scleral icterus.  Oral mucosa pink and moist; negative for ulcerations, erythema, or exudates.    CARDIOVASCULAR: RRR, no murmurs, rubs, or extra heart sounds appreciated. Pulses +2/4 and regular in upper and lower extremities, bilaterally.   RESPIRATORY: No increased work of breathing.  CTA, bilat; no wheezes, rales, or rhonchi appreciated.  GASTROINTESTINAL:  Abdomen soft, non-distended. BS auscultated in all four quadrants. Negative for tenderness to palpation.  No masses or organomegaly noted.  MUSCULOSKELETAL: No gross deformities noted. Normal muscle tone.    HEMATOLOGIC/LYMPHATIC/IMMUNOLOGIC: Negative for lower extremity edema, bilaterally.  NEUROLOGIC: Alert and oriented to person, place, and time.  strength intact. No focal neuro deficits appreciated  SKIN: Warm, dry, intact. No jaundice noted. Negative for suspicious lesions, rashes, bruising, open sores or abrasions.     Data   -Data reviewed today: See below     Recent Labs  Lab 09/14/17  1435 09/14/17  0649     --    INR  --  0.95   CR 0.82  --    GLC  --  99       Recent Results (from the past 24 hour(s))   XR Knee Port Right 1/2 Views    Narrative    XR KNEE PORT RT 1/2 VW 9/14/2017 11:25 AM    HISTORY: Postop.    COMPARISON: None.      Impression    IMPRESSION: Status post knee arthroplasty.  Hardware is intact.  Alignment is anatomic. There is a surgical drain within the knee  joint.    AGA DIAZ MD

## 2017-09-15 NOTE — PROGRESS NOTES
DESHAWN    D: Received phone call from Charis at [facility]'s case management. Her phone number is [phone].    P: Continue to follow.

## 2017-09-15 NOTE — PROGRESS NOTES
Spoke to son and patient while the  is here regarding discharge planning.  Ortho MD placed a SW consult for TCU discharge.  Patient says, through  that it it okay for son to assist in discharge planning.  Son says he would like to have [facility] as patrick choice, [facility] as second choice, and [facility] as third choice.  He understands that his father will likely need transportation at discharge and this is billed to him/not covered under insurance.  He also says his father is hard of hearing, and that he prefers his father have a private room for a fee.  He understands it is $35-$45 estimated daily rate and not covered under insurance.  He plans to talk to his siblings about sharing this cost.  SW updated, will follow. Divya Rodríguez RN

## 2017-09-15 NOTE — PROGRESS NOTES
Rabia Reed  9/15/2017  POD # 1    Doing well.  No immediate surgical complications identified.  No excessive bleeding  Pain well-controlled.  Objective:  Blood pressure 133/66, pulse 67, temperature 97.5  F (36.4  C), temperature source Oral, resp. rate 16, SpO2 98 %.    Temperatures:  Current - Temp: 97.5  F (36.4  C); Max - Temp  Av.7  F (36.5  C)  Min: 97.4  F (36.3  C)  Max: 98.2  F (36.8  C)  Pulse range: Pulse  Av.5  Min: 67  Max: 80  Blood pressure range: Systolic (24hrs), Av , Min:119 , Max:161   ; Diastolic (24hrs), Av, Min:66, Max:92    Exam:  CMS: intact  alert, stable, dressing dry      Labs:  Recent Labs   Lab Test  17   1700  17   0953  16   0813   POTASSIUM  4.4  4.5  3.8     Recent Labs   Lab Test  09/15/17   0708  17   1700  14   0950   HGB  11.8*  14.0  14.4     Recent Labs   Lab Test  17   0649   INR  0.95     Recent Labs   Lab Test  17   1435  17   1700  14   0950   PLT  168  187  208       PLAN:  Start physical therapy  Pain control measures  Additional problems to be followed by medicine physician  Social service to see for TCU placement

## 2017-09-16 ENCOUNTER — APPOINTMENT (OUTPATIENT)
Dept: PHYSICAL THERAPY | Facility: CLINIC | Age: 77
DRG: 470 | End: 2017-09-16
Attending: ORTHOPAEDIC SURGERY
Payer: COMMERCIAL

## 2017-09-16 ENCOUNTER — APPOINTMENT (OUTPATIENT)
Dept: GENERAL RADIOLOGY | Facility: CLINIC | Age: 77
DRG: 470 | End: 2017-09-16
Attending: HOSPITALIST
Payer: COMMERCIAL

## 2017-09-16 LAB
GLUCOSE BLDC GLUCOMTR-MCNC: 111 MG/DL (ref 70–99)
GLUCOSE BLDC GLUCOMTR-MCNC: 52 MG/DL (ref 70–99)
GLUCOSE BLDC GLUCOMTR-MCNC: 56 MG/DL (ref 70–99)
GLUCOSE BLDC GLUCOMTR-MCNC: 70 MG/DL (ref 70–99)
GLUCOSE BLDC GLUCOMTR-MCNC: 82 MG/DL (ref 70–99)
GLUCOSE BLDC GLUCOMTR-MCNC: 90 MG/DL (ref 70–99)
GLUCOSE BLDC GLUCOMTR-MCNC: 96 MG/DL (ref 70–99)
GLUCOSE SERPL-MCNC: 85 MG/DL (ref 70–99)
HGB BLD-MCNC: 12.2 G/DL (ref 13.3–17.7)

## 2017-09-16 PROCEDURE — 36415 COLL VENOUS BLD VENIPUNCTURE: CPT | Performed by: ORTHOPAEDIC SURGERY

## 2017-09-16 PROCEDURE — 85018 HEMOGLOBIN: CPT | Performed by: ORTHOPAEDIC SURGERY

## 2017-09-16 PROCEDURE — 82947 ASSAY GLUCOSE BLOOD QUANT: CPT | Performed by: ORTHOPAEDIC SURGERY

## 2017-09-16 PROCEDURE — 12000007 ZZH R&B INTERMEDIATE

## 2017-09-16 PROCEDURE — 97116 GAIT TRAINING THERAPY: CPT | Mod: GP

## 2017-09-16 PROCEDURE — 25000125 ZZHC RX 250: Performed by: ORTHOPAEDIC SURGERY

## 2017-09-16 PROCEDURE — 40000894 ZZH STATISTIC OT IP EVAL DEFER: Performed by: OCCUPATIONAL THERAPIST

## 2017-09-16 PROCEDURE — 97110 THERAPEUTIC EXERCISES: CPT | Mod: GP

## 2017-09-16 PROCEDURE — 97530 THERAPEUTIC ACTIVITIES: CPT | Mod: GP

## 2017-09-16 PROCEDURE — 40000193 ZZH STATISTIC PT WARD VISIT

## 2017-09-16 PROCEDURE — 99232 SBSQ HOSP IP/OBS MODERATE 35: CPT | Performed by: HOSPITALIST

## 2017-09-16 PROCEDURE — 25000132 ZZH RX MED GY IP 250 OP 250 PS 637: Performed by: PHYSICIAN ASSISTANT

## 2017-09-16 PROCEDURE — S0138 FINASTERIDE, 5 MG: HCPCS | Performed by: ORTHOPAEDIC SURGERY

## 2017-09-16 PROCEDURE — 00000146 ZZHCL STATISTIC GLUCOSE BY METER IP

## 2017-09-16 PROCEDURE — 25000128 H RX IP 250 OP 636: Performed by: ORTHOPAEDIC SURGERY

## 2017-09-16 PROCEDURE — 25000132 ZZH RX MED GY IP 250 OP 250 PS 637: Performed by: ORTHOPAEDIC SURGERY

## 2017-09-16 PROCEDURE — 74000 XR ABDOMEN 1 VW: CPT

## 2017-09-16 RX ORDER — OXYCODONE HYDROCHLORIDE 5 MG/1
5-10 TABLET ORAL
Qty: 60 TABLET | Refills: 0 | Status: SHIPPED | DISCHARGE
Start: 2017-09-16 | End: 2018-01-26

## 2017-09-16 RX ORDER — AMOXICILLIN 250 MG
1-2 CAPSULE ORAL 2 TIMES DAILY
Qty: 100 TABLET | DISCHARGE
Start: 2017-09-16 | End: 2017-09-28

## 2017-09-16 RX ORDER — ASPIRIN 325 MG
325 TABLET ORAL 2 TIMES DAILY
Qty: 90 TABLET | Refills: 3 | DISCHARGE
Start: 2017-09-16 | End: 2018-10-03

## 2017-09-16 RX ORDER — OXYCODONE HCL 10 MG/1
10 TABLET, FILM COATED, EXTENDED RELEASE ORAL EVERY 12 HOURS
Qty: 10 TABLET | Refills: 0 | Status: SHIPPED | DISCHARGE
Start: 2017-09-16 | End: 2017-09-28

## 2017-09-16 RX ORDER — LANOLIN ALCOHOL/MO/W.PET/CERES
3 CREAM (GRAM) TOPICAL
DISCHARGE
Start: 2017-09-16 | End: 2018-01-26

## 2017-09-16 RX ADMIN — ACETAMINOPHEN 975 MG: 325 TABLET, FILM COATED ORAL at 06:22

## 2017-09-16 RX ADMIN — SENNOSIDES AND DOCUSATE SODIUM 1 TABLET: 8.6; 5 TABLET ORAL at 20:18

## 2017-09-16 RX ADMIN — OXYCODONE HYDROCHLORIDE 10 MG: 5 TABLET ORAL at 23:22

## 2017-09-16 RX ADMIN — OXYCODONE HYDROCHLORIDE 10 MG: 10 TABLET, FILM COATED, EXTENDED RELEASE ORAL at 16:42

## 2017-09-16 RX ADMIN — OXYCODONE HYDROCHLORIDE 10 MG: 5 TABLET ORAL at 20:18

## 2017-09-16 RX ADMIN — ONDANSETRON 4 MG: 4 TABLET, ORALLY DISINTEGRATING ORAL at 16:42

## 2017-09-16 RX ADMIN — INSULIN ASPART 17 UNITS: 100 INJECTION, SUSPENSION SUBCUTANEOUS at 18:58

## 2017-09-16 RX ADMIN — ACETAMINOPHEN 975 MG: 325 TABLET, FILM COATED ORAL at 22:13

## 2017-09-16 RX ADMIN — SENNOSIDES AND DOCUSATE SODIUM 2 TABLET: 8.6; 5 TABLET ORAL at 09:22

## 2017-09-16 RX ADMIN — TAMSULOSIN HYDROCHLORIDE 0.4 MG: 0.4 CAPSULE ORAL at 09:22

## 2017-09-16 RX ADMIN — ACETAMINOPHEN 975 MG: 325 TABLET, FILM COATED ORAL at 14:25

## 2017-09-16 RX ADMIN — HYDROXYZINE HYDROCHLORIDE 25 MG: 25 TABLET ORAL at 20:27

## 2017-09-16 RX ADMIN — OXYCODONE HYDROCHLORIDE 10 MG: 5 TABLET ORAL at 12:35

## 2017-09-16 RX ADMIN — ATORVASTATIN CALCIUM 80 MG: 20 TABLET, FILM COATED ORAL at 20:18

## 2017-09-16 RX ADMIN — OXYCODONE HYDROCHLORIDE 10 MG: 5 TABLET ORAL at 16:42

## 2017-09-16 RX ADMIN — OXYCODONE HYDROCHLORIDE 10 MG: 5 TABLET ORAL at 09:12

## 2017-09-16 RX ADMIN — DEXTROSE 15 G: 15 GEL ORAL at 22:13

## 2017-09-16 RX ADMIN — OXYCODONE HYDROCHLORIDE 10 MG: 10 TABLET, FILM COATED, EXTENDED RELEASE ORAL at 06:22

## 2017-09-16 RX ADMIN — ONDANSETRON 4 MG: 4 TABLET, ORALLY DISINTEGRATING ORAL at 09:22

## 2017-09-16 RX ADMIN — CELECOXIB 200 MG: 200 CAPSULE ORAL at 20:18

## 2017-09-16 RX ADMIN — FINASTERIDE 5 MG: 5 TABLET, FILM COATED ORAL at 20:18

## 2017-09-16 RX ADMIN — ENOXAPARIN SODIUM 40 MG: 40 INJECTION SUBCUTANEOUS at 06:22

## 2017-09-16 NOTE — PLAN OF CARE
Problem: Goal Outcome Summary  Goal: Goal Outcome Summary  Outcome: Improving  Pt doing well tonight, up with SBA of 1 and walker.  Pt taking oxycodone for good pain relief.  Pt's son Brandan at the bedside.  Pt is progressing towards his goals.

## 2017-09-16 NOTE — PROGRESS NOTES
Rabia Reed  2017  POD # 2    Doing well.  No immediate surgical complications identified.  No excessive bleeding  Pain control marginal  Objective:  Blood pressure 135/63, pulse 67, temperature 98.1  F (36.7  C), temperature source Oral, resp. rate 16, SpO2 95 %.    Temperatures:  Current - Temp: 98.1  F (36.7  C); Max - Temp  Av.4  F (36.9  C)  Min: 97.5  F (36.4  C)  Max: 99.2  F (37.3  C)  Pulse range: No Data Recorded  Blood pressure range: Systolic (24hrs), Av , Min:135 , Max:149   ; Diastolic (24hrs), Av, Min:63, Max:74    Exam:  CMS: intact  alert, stable, wound ok      Labs:  Recent Labs   Lab Test  17   1700  17   0953  16   0813   POTASSIUM  4.4  4.5  3.8     Recent Labs   Lab Test  17   0630  09/15/17   0708  17   1700   HGB  12.2*  11.8*  14.0     Recent Labs   Lab Test  17   0649   INR  0.95     Recent Labs   Lab Test  17   1435  17   1700  14   0950   PLT  168  187  208       PLAN:  Continue physical therapy  Pain control measures  Additional problems to be followed by medicine physician  DC tomorrow. Paperwork and dictation done

## 2017-09-16 NOTE — PLAN OF CARE
Problem: Goal Outcome Summary  Goal: Goal Outcome Summary  Discharge Planner PT   Patient plan for discharge: plan per chart is to d/c to TCU  Current status:  present throughout session. Patient reports pain is 5-6/10.  R knee AAROM 15-65.  Patient performed supine > L side of bed with Min A and sit <> stand with FWW, KI and Min A.  Patient ambulated 10 feet with FWW, KI and CGA.  Barriers to return to prior living situation: requires Min A for all functional mobility and ambulation  Recommendations for discharge: patient may benefit from TCU placement  Rationale for recommendations: increased assist required with functional mobility, decreased knee ROM, increased pain       Entered by: Lesa Jaimes 09/16/2017 11:58 AM

## 2017-09-16 NOTE — DISCHARGE SUMMARY
DATE OF ADMISSION:  2017    DATE OF DISCHARGE:  2017       ADMITTING DIAGNOSIS:  Left knee osteoarthrosis.      PROCEDURE THIS ADMISSION:  Left total knee arthroplasty.      HOSPITAL COURSE:  Mr. Piyush Reed was admitted.  He underwent the above-noted procedure.  He tolerated it well.  Postoperatively, he mobilized and stabilized and was ready for discharge to the nursing home setting.      DISCHARGE MEDICATIONS:  Those of admission plus:   1.  OxyContin 10 mg p.o. b.i.d. x3 days, then each day at bedtime for 4 days.   2.  Oxycodone 5-10 mg p.o. q.3 h. p.r.n. pain.   3.  Senokot-S for stool softening.    4.  Aspirin 325 mg 1 p.o. b.i.d. for DVT prophylaxis   5.  Melatonin 3 mg p.o. each day at bedtime p.r.n. insomnia.      DISCHARGE ACTIVITIES:  Weightbear as tolerated, left lower extremity.  Participate in routine physical therapy and occupational therapy.      DISCHARGE FOLLOWUP:  He should have a daily dry dressing change until his wound is dry.  He should wear his ISAIAS hose until seen in followup.  This may be off at night.  He should wear a knee immobilizer at each day at bedtime for 7 days after discharge from the hospital.  He should follow up with Dr. Amezcua 2 weeks postop.         ROSALES AMEZCUA MD             D: 2017 10:32   T: 2017 18:25   MT: [id number]      Name:     PIYUSH REED   MRN:      [id number]        Account:        [id number]   :      1940           Admit Date:                                       Discharge Date:       Document: [id number]

## 2017-09-16 NOTE — PROGRESS NOTES
[facility]  Hospitalist Progress Note  Date of service (when I saw the patient)09/15/2017:         Assessment and Plan:    Mr Rabia Reed is a 77 year old, Guinean speaking male with PMHx of acoustic schwannoma, insulin dependent T2DM, OA, hyperlipidemia, and HTN who was admitted on 9/14/2017 and is s/p right total knee arthroplasty. I was asked to see the patient for diabetes management. Vitals currently WNL. Pt currently stale.      Abdominal distension;  no flatus of BMs.   Abdominal X-ray to evaluate for obstruction    S/P right TKA:   - Surgery performed by Dr. Allen.under loco-regional nerve block performed.   - Defer routine post-operative cares, IVF, DVT prophylaxis and pain control to primary service   -- Continue bowel regimen while on narcotics   -- Encourage pulmonary toilet; incentive spirometer at bedside   -- PT and OT        T2DM, insulin dependent, uncontrolled:  Glycemic levels well controled.  A1C 8.5 from 9/7/17. Maintained on Novolog 70/30 at 30 U with breakfast, 20 U with dinner and Novolog 10-15 U at bedtime. Last dose of 70/30 was 15 U evening prior to admission, took Novolog 5 U at bedtime as well. Tried to advance diet with episode of vomiting this evening.   - BS > 200 on lower dose of 70/30   - continue current dose of Lantus; 20 U in AM, 30 U in PM, continue Medium ISS AC & HS       Acute Blood loss anemia: stable  - due to recent surgery  - Hgb 14.0preop -> 11.8   - monitor     Hyperlipidemia:   - Continue PTA statin       Acoustic schwannoma:   - Hx right hearing loss and some mild chronic imbalance from an accoustical schwannoma. Pt has declined surgery for this and last imaging from 2012 showed it to be 12mm size and unchanged from 4 years prior to that. Pt ambulates with a cane.      DVT Prophylaxis: Defer to primary service  Code Status: Full Code      Disposition: Expected discharge per Jomar Emmanuel MD  Internal Medicine, Hospitalist  Pager#; [phone]                 Interval History:   Pt seen with pt's son interpreting. No new c/o. RN reports elevated BS.              Medications:       insulin aspart prot & aspart  20 Units Subcutaneous Daily with supper     insulin aspart prot & aspart  30 Units Subcutaneous QAM AC     atorvastatin (LIPITOR) tablet 80 mg  80 mg Oral QPM     celecoxib  200 mg Oral QPM     finasteride  5 mg Oral QPM     tamsulosin (FLOMAX) capsule 0.4 mg  0.4 mg Oral Daily     sodium chloride (PF)  3 mL Intracatheter Q8H     enoxaparin  40 mg Subcutaneous Q24H     acetaminophen  975 mg Oral Q8H     senna-docusate  1-2 tablet Oral BID     oxyCODONE  10 mg Oral Q12H     insulin aspart  1-7 Units Subcutaneous TID AC     insulin aspart  1-5 Units Subcutaneous At Bedtime     diphenhydrAMINE, hydrOXYzine, lidocaine (buffered or not buffered), lidocaine 4%, sodium chloride (PF), sore throat lozenge, naloxone, alum & mag hydroxide-simethicone, [START ON 2017] acetaminophen, oxyCODONE, diphenhydrAMINE **OR** diphenhydrAMINE, ondansetron **OR** ondansetron, prochlorperazine **OR** prochlorperazine, melatonin, diazepam, HYDROmorphone, glucose **OR** dextrose **OR** glucagon               Physical Exam:   Blood pressure 149/69, pulse 67, temperature 98.2  F (36.8  C), resp. rate 16, SpO2 96 %.  Wt Readings from Last 4 Encounters:   17 80.7 kg (178 lb)   17 79.2 kg (174 lb 11.2 oz)   16 78 kg (172 lb)   16 78 kg (172 lb)         Vital Sign Ranges  Temperature Temp  Av.9  F (36.6  C)  Min: 97.5  F (36.4  C)  Max: 98.2  F (36.8  C)   Blood pressure Systolic (24hrs), Av , Min:124 , Max:137        Diastolic (24hrs), Av, Min:66, Max:73      Pulse Pulse  Av  Min: 67  Max: 67   Respirations Resp  Av  Min: 16  Max: 16   Pulse oximetry SpO2  Av %  Min: 97 %  Max: 99 %         Intake/Output Summary (Last 24 hours) at 09/15/17 1614  Last data filed at 09/15/17 1500   Gross per 24 hour   Intake               600 ml   Output             2025 ml   Net            -1425 ml       Constitutional: Sleeping, wakes up when called,no apparent distress   Lungs: Clear to auscultation bilaterally, no crackles or wheezing   Cardiovascular: Regular rate and rhythm, normal S1 and S2, and no murmur noted   Abdomen: mildly distended, hypoactive BS, Nu guardinsg   Skin: No rashes, no cyanosis, no edema   Neuro:                Data:   All laboratory data reviewed

## 2017-09-16 NOTE — PLAN OF CARE
Problem: Goal Outcome Summary  Goal: Goal Outcome Summary  OT: Following chart review, pt eval deferred to next level of care, pt to discharge to TCU, IP needs met with PT at this time. Orders completed

## 2017-09-16 NOTE — PROGRESS NOTES
SW:  D:  Received a call back from [facility].  They have a bed available for patient tomorrow.  Updated patient's son as to the above information.  P:  Will continue to follow.

## 2017-09-16 NOTE — PLAN OF CARE
Problem: Goal Outcome Summary  Goal: Goal Outcome Summary  Outcome: Improving  Pt had one episode of nausea and vomiting. Offered ginger ale and crackers with relief. Blood sugar 64, offered orange juice and rechecked after 1 hour and it was 76. Re-checked X2 - 70, 85. No signs and symptoms of hypoglycemia. Voiding adequately per urinal. Up with assist of 1 with a walker. Pain managed by PRN oxycodone, scheduled tylenol and OxyContin. Progressing well per POC.

## 2017-09-17 ENCOUNTER — APPOINTMENT (OUTPATIENT)
Dept: PHYSICAL THERAPY | Facility: CLINIC | Age: 77
DRG: 470 | End: 2017-09-17
Attending: ORTHOPAEDIC SURGERY
Payer: COMMERCIAL

## 2017-09-17 VITALS
DIASTOLIC BLOOD PRESSURE: 63 MMHG | SYSTOLIC BLOOD PRESSURE: 113 MMHG | RESPIRATION RATE: 16 BRPM | OXYGEN SATURATION: 94 % | TEMPERATURE: 98.4 F | HEART RATE: 76 BPM

## 2017-09-17 LAB
CREAT SERPL-MCNC: 1.01 MG/DL (ref 0.66–1.25)
GFR SERPL CREATININE-BSD FRML MDRD: 71 ML/MIN/1.7M2
GLUCOSE BLDC GLUCOMTR-MCNC: 101 MG/DL (ref 70–99)
GLUCOSE BLDC GLUCOMTR-MCNC: 128 MG/DL (ref 70–99)
GLUCOSE BLDC GLUCOMTR-MCNC: 153 MG/DL (ref 70–99)
GLUCOSE BLDC GLUCOMTR-MCNC: 63 MG/DL (ref 70–99)
GLUCOSE BLDC GLUCOMTR-MCNC: 70 MG/DL (ref 70–99)
GLUCOSE BLDC GLUCOMTR-MCNC: 87 MG/DL (ref 70–99)
GLUCOSE BLDC GLUCOMTR-MCNC: 88 MG/DL (ref 70–99)
PLATELET # BLD AUTO: 180 10E9/L (ref 150–450)

## 2017-09-17 PROCEDURE — 97110 THERAPEUTIC EXERCISES: CPT | Mod: GP | Performed by: PHYSICAL THERAPY ASSISTANT

## 2017-09-17 PROCEDURE — 97116 GAIT TRAINING THERAPY: CPT | Mod: GP | Performed by: PHYSICAL THERAPY ASSISTANT

## 2017-09-17 PROCEDURE — 36415 COLL VENOUS BLD VENIPUNCTURE: CPT | Performed by: ORTHOPAEDIC SURGERY

## 2017-09-17 PROCEDURE — 00000146 ZZHCL STATISTIC GLUCOSE BY METER IP

## 2017-09-17 PROCEDURE — 40000193 ZZH STATISTIC PT WARD VISIT: Performed by: PHYSICAL THERAPY ASSISTANT

## 2017-09-17 PROCEDURE — 25000128 H RX IP 250 OP 636: Performed by: ORTHOPAEDIC SURGERY

## 2017-09-17 PROCEDURE — 99232 SBSQ HOSP IP/OBS MODERATE 35: CPT | Performed by: INTERNAL MEDICINE

## 2017-09-17 PROCEDURE — 25000132 ZZH RX MED GY IP 250 OP 250 PS 637: Performed by: ORTHOPAEDIC SURGERY

## 2017-09-17 PROCEDURE — 25000132 ZZH RX MED GY IP 250 OP 250 PS 637: Performed by: INTERNAL MEDICINE

## 2017-09-17 PROCEDURE — 82565 ASSAY OF CREATININE: CPT | Performed by: ORTHOPAEDIC SURGERY

## 2017-09-17 PROCEDURE — 85049 AUTOMATED PLATELET COUNT: CPT | Performed by: ORTHOPAEDIC SURGERY

## 2017-09-17 PROCEDURE — 97530 THERAPEUTIC ACTIVITIES: CPT | Mod: GP | Performed by: PHYSICAL THERAPY ASSISTANT

## 2017-09-17 RX ADMIN — OXYCODONE HYDROCHLORIDE 10 MG: 10 TABLET, FILM COATED, EXTENDED RELEASE ORAL at 06:20

## 2017-09-17 RX ADMIN — SODIUM PHOSPHATE, DIBASIC AND SODIUM PHOSPHATE, MONOBASIC 1 ENEMA: 7; 19 ENEMA RECTAL at 11:41

## 2017-09-17 RX ADMIN — SENNOSIDES AND DOCUSATE SODIUM 2 TABLET: 8.6; 5 TABLET ORAL at 08:34

## 2017-09-17 RX ADMIN — TAMSULOSIN HYDROCHLORIDE 0.4 MG: 0.4 CAPSULE ORAL at 08:34

## 2017-09-17 RX ADMIN — OXYCODONE HYDROCHLORIDE 10 MG: 5 TABLET ORAL at 12:22

## 2017-09-17 RX ADMIN — OXYCODONE HYDROCHLORIDE 5 MG: 5 TABLET ORAL at 08:34

## 2017-09-17 RX ADMIN — ENOXAPARIN SODIUM 40 MG: 40 INJECTION SUBCUTANEOUS at 06:20

## 2017-09-17 RX ADMIN — ACETAMINOPHEN 975 MG: 325 TABLET, FILM COATED ORAL at 06:20

## 2017-09-17 NOTE — PLAN OF CARE
Problem: Goal Outcome Summary  Goal: Goal Outcome Summary  Discharge Planner PT   Patient plan for discharge: TCU  Current status: Pt performed supine to sit transfer with min assist.  Pt c/o feeling dizzy when sitting on EOB and needed to rest a couple minutes.  BP in sitting was 140/72.  Pt transferred sit to/from stand with min assist.  Pt performed gait training x 40 ft using wheeled walker and CGA.  Distance limited as pt needed to void.  Pt reported he thought is blood sugar was low.  Nurse was notified.  Knee AAROM is 12-80 degrees.  Barriers to return to prior living situation: requires assist for all functional mobility and ambulation  Recommendations for discharge: TCU per plan established by the PT.   Rationale for recommendations: increased assist required with functional mobility, decreased knee ROM, increased pain       Entered by: Linette Cox 09/17/2017 2:10 PM       Pt discharged to TCU today.  PT goals not met.

## 2017-09-17 NOTE — PROVIDER NOTIFICATION
MD Notification    Notified Person:  Hospitalist     Notified Persons Name: Dr. Elder    Notification Date/Time: 17 3250    Notification Interaction:  Paged Physician    Purpose of Notification: wondering insulin dosing for this morning. BG last night 56. This morning B    Orders Received: decrease insulin aspart prot & aspart dose in half to 15 units.

## 2017-09-17 NOTE — PROGRESS NOTES
SW:  D:  Received discharge orders for patient.  Bed available at [facility] for today.  [employer] wheelchair ride set up for 13:00 today.  Patient informed of the plan and in agreement to the plan.  Updated [facility] and faxed the orders and the PAS.    PAS-RR    D: Per DHS regulation, SW completed and submitted PAS-RR to [place] [employer] via the Senior LinkAge Line.  PAS-RR confirmation # is : [id number].    I: SW spoke with patient and son and they are aware a PAS-RR has been submitted.  SW reviewed with patient and son that they may be contacted for a follow up appointment within 10 days of hospital discharge if their SNF stay is < 30 days.  Contact information for [facility] LinkAge Line was also provided.    A: Patient and son verbalized understanding.    P: Further questions may be directed to [facility] LinkAge Line at #[phone], option #4 for PAS-RR staff.

## 2017-09-17 NOTE — DISCHARGE INSTRUCTIONS
"TOTAL KNEE REPLACEMENT TAKE HOME INSTRUCTIONS  Your surgeon will answer any questions about your progress. General guidelines for your care are listed below. Your surgeon may give additional instructions for your care at home. Please follow them carefully    Activity Level  1. Physical activity may be resumed gradually according to your comfort level and your surgeon s instructions. Follow your exercise program as instructed by your therapist. Do exercises at least twice a day. you may ice your knee after exercising.  2. Complete exercises two hours before bedtime to minimize the effect pain may have on sleep.  Refer to pages 19-22 of you \"Total Knee Replacement\" booklet for details  3. Do not wear your knee immobilizer unless your doctor has specifically told you to continue it.    Good Health Practices  1. Maintain an adequate fluid intake and eat a well balanced diet.  2. Be sure to include the basic food groups, such as dairy products, meat/fish, vegetables, and fruit. Each of these foods contribute to your wound healing and increasing your strength.  3. Surgery, decreased activity and pain medication all contribute to a decrease in bowel activity that can result in constipation. It is recommended that you increase your liquid intake, add fiber to your diet, increase activity, and decrease pain medication use. If you have any problems, notify your physician.  4. Wear your anti-embolism stockings day and night until seen by your surgeon if you were issued these at discharge.  (Not all patients will have anti-embolism stockings) Remove twice a day for one hour at a time. You may hand wash and air dry your stockings.  5. Notify your dentist of your total knee surgery and call your dentist one week before a dental appointment for antibiotics, if your dentist will not prescribe antibiotics then call your surgeon to ask for next steps.      Incision/Dressing Care  1. Keep incision clean and dry per surgeons " instructions  2. Cover incision if you are still having drainage.  3.  If you have a waterproof dressing __________________________   Shower.    Things to Watch For  1. Check incision daily for increased redness, tenderness, swelling, or drainage along the incision line. If these occur, please notify your doctor. Also, call if you develop a fever above 101 .  2. Please notify your doctor if you experience any calf pain and/or if you have surgical pain not relieved by the pain medication prescribed by your doctor.        Revised 05/8/17      Patient will discharge to [facility] today via [facility] wheelchair at 13:00.  [facility] phone number is [phone].

## 2017-09-17 NOTE — PLAN OF CARE
Problem: Goal Outcome Summary  Goal: Goal Outcome Summary  Outcome: Improving  Difficult to assess orientation d/t language barrier. VSS on RA. Dressing c/d/i. CMS intact. Regular diet. Up with 2 and walker. Abd very distended and pt not passing gas yet. VS active. BS low at beginning of shift but corrected with oral intake. Possible d/c to rehab today. Will continue to monitor.

## 2017-09-17 NOTE — PLAN OF CARE
Problem: Goal Outcome Summary  Goal: Goal Outcome Summary  Outcome: Adequate for Discharge Date Met:  09/17/17  JOELLE SYED. Albanian speaking,  used. Knee pain managed with oxycodone. Dressing changed. CMS intact. Abd distended, firm, fleet enema given with large BM results. Up Ax2 GB walker. Tolerating diet, eating small amounts. , 70,128, insulin dosing changed this morning per MD. D/c to [facility] at 1300. D/c instructions given.

## 2017-09-17 NOTE — PROGRESS NOTES
[facility]    Hospitalist Progress Note    Assessment & Plan   Mr Rabia Reed is a 77 year old, Anguillan speaking male with PMHx of acoustic schwannoma, insulin dependent T2DM, OA, hyperlipidemia, and HTN who was admitted on 9/14/2017 and is s/p right total knee arthroplasty. Hospitalist service consulting for diabetes and medical management.    Constipation: No BM for 4 days; AXR yesterday w/o evidence of obstruction. Continues to receive opioids.  --FLEET enema today  --Continue Senna Docusate  --Add psyllium daily at discharge    S/p R TKA: Surgery performed by Dr. Allen, loco-regional nerve block performed.  - Routine post-op cares, IVF, DVT ppx, pain control to primary service  - Bowel regimen as above  - Continue incentive spirometer  - PT/OT    DM2: A1c 8.5% on 9/7/17. Home regimen is Novolog 70/30, 20 units with breakfast, 15 units with dinner, and short acting sliding scale with meals at at bedtime. A slightly higher regimen of 30units qAM/20units qPM was resumed after surgery however blood sugars dipped significantly to 50-70s on 9/16 and this morning (70s). Discussed with patient--most likely due change from his usual [place]an cuisine. Appetite is improving--patient would like to be discharged on PTA dose.  - Plan to discharge patient on Novolog 20 units qAM and 15 units qPM, with decreased doses given if blood sugar is < 140  - Continue Medium dose SSI AC & HS      Acute bood loss anemia, resolved: From surgery. Last hemoglobin 12.2 on 9/16.      Hyperlipidemia:   - Continue PTA statin       Hx Acoustic schwannoma: Hx right hearing loss and some mild chronic imbalance from an accoustical schwannoma. Pt has declined surgery for this and last imaging from 2012 showed it to be 12mm size and unchanged from 4 years prior to that. Pt ambulates with a cane.    DVT Prophylaxis: Per primary service  Code Status: Full Code  PT/OT: PT ordered    Disposition: Expected discharge today    Darnell  Teressa Elder MD  Text Page  (7am to 6pm)  Interval History   Patient feeling well today--pain is 5/10 and controlled, appetite is good. Discussed his insulin regimen and will decrease his insulin 70/30 to 20units am/15units pm as he is used to eating Albanian food at home. Patient does endorse constipation but does not have abdominal pain--has not had a bowel movement for 4 days.     present during visit.    -Data reviewed today: I reviewed all new labs and imaging results over the last 24 hours. I personally reviewed the abdominal x-ray image(s) showing moderate stool.    Physical Exam   Temp: 98.4  F (36.9  C) Temp src: Oral BP: 113/63 Pulse: 76 Heart Rate: 77 Resp: 16 SpO2: 94 % O2 Device: None (Room air)    There were no vitals filed for this visit.  Vital Signs with Ranges  Temp:  [98  F (36.7  C)-98.4  F (36.9  C)] 98.4  F (36.9  C)  Pulse:  [76] 76  Heart Rate:  [76-92] 77  Resp:  [16-18] 16  BP: (113-142)/(63-74) 113/63  SpO2:  [94 %-97 %] 94 %  I/O last 3 completed shifts:  In: 300 [P.O.:300]  Out: 1275 [Urine:1275]    Constitutional: Elderly male in NAD  Respiratory: Mild crackles at the bases without wheezing, good breath sounds throughout  Cardiovascular: RRR no mrg  GI: Distended, taut, not tender to palpation  Skin/Integumen: R knee with dressings with minimal serosanguinous dressing, and trace pitting edema in R leg only  Other:  Good affect    Medications        insulin aspart prot & aspart  15 Units Subcutaneous QAM AC     sodium phosphate  1 enema Rectal Once     insulin aspart prot & aspart  20 Units Subcutaneous Daily with supper     atorvastatin (LIPITOR) tablet 80 mg  80 mg Oral QPM     celecoxib  200 mg Oral QPM     finasteride  5 mg Oral QPM     tamsulosin (FLOMAX) capsule 0.4 mg  0.4 mg Oral Daily     enoxaparin  40 mg Subcutaneous Q24H     senna-docusate  1-2 tablet Oral BID     insulin aspart  1-7 Units Subcutaneous TID AC     insulin aspart  1-5 Units Subcutaneous At Bedtime        Data     Recent Labs  Lab 09/17/17  0640 09/16/17  0630 09/15/17  0708 09/14/17  1435 09/14/17  0649   HGB  --  12.2* 11.8*  --   --      --   --  168  --    INR  --   --   --   --  0.95   CR 1.01  --   --  0.82  --    GLC  --  85  --   --  99       Imaging:   Recent Results (from the past 24 hour(s))   XR Abdomen 1 View    Narrative    XR ABDOMEN 1 VW   9/16/2017 11:47 AM      HISTORY:  evaluate for obstruction - upright    COMPARISON: None.    FINDINGS: The gas pattern is normal. There is a dense calcification in  the left midabdomen. This measures about 1.5 cm in diameter. This is  projected over the region of the left kidney and it probably  represents a renal calculus. No free air.      Impression    IMPRESSION:    1. No evidence for bowel obstruction.  2. Dense calcification in the left midabdomen. This probably  represents a kidney stone.  3. Moderate amount of stool..    ANDREAS ROBERTSON MD

## 2017-09-18 ENCOUNTER — CARE COORDINATION (OUTPATIENT)
Dept: GERIATRIC MEDICINE | Facility: CLINIC | Age: 77
End: 2017-09-18

## 2017-09-18 ENCOUNTER — ASSISTED LIVING VISIT (OUTPATIENT)
Dept: GERIATRICS | Facility: CLINIC | Age: 77
End: 2017-09-18
Payer: COMMERCIAL

## 2017-09-18 VITALS
OXYGEN SATURATION: 93 % | TEMPERATURE: 98.8 F | RESPIRATION RATE: 18 BRPM | DIASTOLIC BLOOD PRESSURE: 66 MMHG | HEART RATE: 81 BPM | HEIGHT: 65 IN | SYSTOLIC BLOOD PRESSURE: 125 MMHG | WEIGHT: 225.6 LBS | BODY MASS INDEX: 37.59 KG/M2

## 2017-09-18 DIAGNOSIS — Z79.4 TYPE 2 DIABETES MELLITUS WITHOUT COMPLICATION, WITH LONG-TERM CURRENT USE OF INSULIN (H): ICD-10-CM

## 2017-09-18 DIAGNOSIS — L29.9 PRURITIC DISORDER: ICD-10-CM

## 2017-09-18 DIAGNOSIS — Z47.1 AFTERCARE FOLLOWING RIGHT KNEE JOINT REPLACEMENT SURGERY: ICD-10-CM

## 2017-09-18 DIAGNOSIS — D62 ANEMIA DUE TO BLOOD LOSS, ACUTE: ICD-10-CM

## 2017-09-18 DIAGNOSIS — K59.03 DRUG-INDUCED CONSTIPATION: ICD-10-CM

## 2017-09-18 DIAGNOSIS — E11.9 TYPE 2 DIABETES MELLITUS WITHOUT COMPLICATION, WITH LONG-TERM CURRENT USE OF INSULIN (H): ICD-10-CM

## 2017-09-18 DIAGNOSIS — M17.11 PRIMARY OSTEOARTHRITIS OF RIGHT KNEE: Primary | ICD-10-CM

## 2017-09-18 DIAGNOSIS — Z96.651 AFTERCARE FOLLOWING RIGHT KNEE JOINT REPLACEMENT SURGERY: ICD-10-CM

## 2017-09-18 PROCEDURE — 99309 SBSQ NF CARE MODERATE MDM 30: CPT | Performed by: NURSE PRACTITIONER

## 2017-09-18 NOTE — PROGRESS NOTES
[facility]  PRIMARY CARE PROVIDER AND CLINIC:  Alfredo Aragon [address]  / [facility] [address]  Chief Complaint   Patient presents with     Hospital F/U       HPI:    Rabia Reed is a 77 year old  (1940),PMH of DJD, DMII, presented to hospital for elective right TKA admitted to the [facility].  Hospital stay 9/14/17 through 9/17/17.   DJD s/p right TKA Dr. Allen  Anemia: Hgb stable at discharge 12.2 no transfusion   Admitted to this facility for  rehab, medical management and nursing care.  HPI information obtained from: facility chart records, facility staff, patient report and [facility] chart review.  Current issues are:  On exam today patient is alert, resting in bed, needed daughters who are at bedside to translate, concerns today are patient has itchy back and legs, has some scratches on right thigh and buttocks from scratching it appears, daughters state pain in right knee continues, he did have a BM yestarday, no N/V/D, fever, chills, cough, congestion, SOB no other concerns on exam today    Last 3 BPs:125/66, 157/93, 119/66  HR Ranges: 70-93  Admission Weight: 225.6lbs  Current Weight: (9/17) 225.6lbs  Blood Sugar Range:   HS: 128    CODE STATUS/ADVANCE DIRECTIVES DISCUSSION:   CPR/Full code   Patient's living condition: lives in a skilled nursing facility    ALLERGIES:No known drug allergies  PAST MEDICAL HISTORY:  has a past medical history of Calculus of kidney; 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 He also has no past medical history of Basal cell carcinoma; Malignant melanoma (H); PONV (postoperative nausea and vomiting); Skin cancer; or Squamous cell carcinoma.  PAST SURGICAL HISTORY:  has a past surgical history that includes NONSPECIFIC PROCEDURE (4/01); APPENDECTOMY; NONSPECIFIC PROCEDURE (1/03); and NONSPECIFIC PROCEDURE (April 2011).  FAMILY HISTORY: family history includes DIABETES in his brother, mother, and sister; Family History Negative in his father.  SOCIAL HISTORY:  reports that he quit smoking about 16 years ago. He has a 21.00 pack-year smoking history. He has never used smokeless tobacco. He reports that he does not drink alcohol or use illicit drugs.    Post Discharge Medication Reconciliation Status: discharge medications reconciled, continue medications without change.  Current Outpatient Prescriptions   Medication Sig Dispense Refill     DIPHENHYDRAMINE HCL EX (BENADRYL CREAM 1-0.1%) Apply to affected areas topically as needed for itch TID. Thighs, buttocks, and groin) per on call Dr. Salcedo.       insulin aspart prot & aspart (NOVOLOG MIX 70/30 FLEXPEN) injection Inject 20 Units Subcutaneous every morning (before breakfast) 3 mL 1     insulin aspart prot & aspart (NOVOLOG MIX 70/30 FLEXPEN) injection Inject 15 Units Subcutaneous every evening 3 mL 1     melatonin 3 MG tablet Take 1 tablet (3 mg) by mouth nightly as needed for sleep       oxyCODONE (OXYCONTIN) 10 MG 12 hr tablet Take 1 tablet (10 mg) by mouth every 12 hours 10 tablet 0     oxyCODONE (ROXICODONE) 5 MG IR tablet Take 1-2 tablets (5-10 mg) by mouth every 3 hours as needed for moderate to severe pain 60 tablet 0     senna-docusate (SENOKOT-S;PERICOLACE) 8.6-50 MG per tablet Take 1-2 tablets by mouth 2 times daily 100 tablet      aspirin 325 MG tablet Take 1 tablet (325 mg) by mouth 2 times daily 90 tablet 3     ATORVASTATIN  "CALCIUM PO Take 80 mg by mouth every evening       TAMSULOSIN HCL PO Take 0.4 mg by mouth daily       ONE TOUCH ULTRA test strip test 3 times daily 200 each 3     blood glucose monitoring (ONE TOUCH ULTRASOFT) lancets 1 each 3 times daily 300 Box 3     celecoxib (CELEBREX) 200 MG capsule Take 200 mg by mouth every evening        insulin pen needle (ADVOCATE INSULIN PEN NEEDLES) 29G X 12.7MM Use three pen needles daily or as directed. 100 each 11     Insulin Pen Needle (PEN NEEDLES) 31G X 6 MM MISC 1 Device 3 times daily 100 each 11     finasteride (PROSCAR) 5 MG tablet Take 5 mg by mouth every evening  30 tablet 11     Blood Glucose Monitoring Suppl (ONE TOUCH ULTRA SYSTEM KIT) W/DEVICE KIT One touch ultra 2 if covered by insurance 1 kit 0     ascorbic acid (VITAMIN C) 1000 MG TABS Take 1,000 mg by mouth daily.       insulin aspart (NOVOLOG FLEXPEN) 100 UNIT/ML injection Inject 10-15 Units Subcutaneous At Bedtime         ROS:  10 point ROS of systems including Constitutional, Eyes, Respiratory, Cardiovascular, Gastroenterology, Genitourinary, Integumentary, Muscularskeletal, Psychiatric were all negative except for pertinent positives noted in my HPI.    Exam:  /66  Pulse 81  Temp 98.8  F (37.1  C)  Resp 18  Ht 5' 5\" (1.651 m)  Wt 225 lb 9.6 oz (102.3 kg)  SpO2 93%  BMI 37.54 kg/m2  GENERAL APPEARANCE:  Alert, in no distress  ENT:  Mouth and posterior oropharynx normal, moist mucous membranes, normal hearing acuity  EYES:  EOM, conjunctivae, lids, pupils and irises normal, PERRL  RESP:  respiratory effort and palpation of chest normal, lungs clear to auscultation , no respiratory distress  CV:  Palpation and auscultation of heart done , regular rate and rhythm, no murmur, rub, or gallop, peripheral edema trace+ in LE bilaterally  ABDOMEN:  normal bowel sounds, soft, nontender, no hepatosplenomegaly or other masses  M/S:   Examination of:   right upper extremity, left upper extremity and left lower " extremity  Inspection, ROM, stability and muscle strength normal and decreased ROM and strength RLE  SKIN:  Inspection of skin and subcutaneous tissue baseline, did not visualize right knee incnision  NEURO:   Cranial nerves 2-12 are normal tested and grossly at patient's baseline, speech WNL    Lab/Diagnostic data:    CBC RESULTS:   Recent Labs   Lab Test  09/17/17   0640  09/16/17   0630  09/15/17   0708  09/14/17   1435  09/07/17   1700  02/18/14   0950   WBC   --    --    --    --   6.9  7.7   RBC   --    --    --    --   4.07*  4.26*   HGB   --   12.2*  11.8*   --   14.0  14.4   HCT   --    --    --    --   42.3  42.6   MCV   --    --    --    --   104*  100   MCH   --    --    --    --   34.4*  33.8*   MCHC   --    --    --    --   33.1  33.8   RDW   --    --    --    --   12.3  12.3   PLT  180   --    --   168  187  208       Last Basic Metabolic Panel:  Recent Labs   Lab Test  09/17/17   0640  09/16/17   0630  09/14/17   1435  09/14/17   0649  09/07/17   1700  02/06/17   0953   NA   --    --    --    --   140  139   POTASSIUM   --    --    --    --   4.4  4.5   CHLORIDE   --    --    --    --   105  105   MABEL   --    --    --    --   8.2*  8.6   CO2   --    --    --    --   32  32   BUN   --    --    --    --   20  20   CR  1.01   --   0.82   --   0.99  1.01   GLC   --   85   --   99  188*  157*       Liver Function Studies -   Recent Labs   Lab Test  09/07/17   1700  08/03/16   0813   PROTTOTAL  7.0  7.5   ALBUMIN  3.3*  3.3*   BILITOTAL  0.6  0.9   ALKPHOS  115  102   AST  32  22   ALT  43  33       TSH   Date Value Ref Range Status   09/07/2017 1.81 0.40 - 4.00 mU/L Final   07/20/2015 3.01 0.40 - 4.00 mU/L Final   ]    Lab Results   Component Value Date    A1C 8.5 09/07/2017    A1C 9.1 02/06/2017       ASSESSMENT/PLAN:  Primary osteoarthritis of right knee  Aftercare following right knee joint replacement surgery  Acute/ongoing: PT and OT for strengthening, oxycontin 10mg q 12 hours taper dose written,  oxycodone 5-10mg q 3 hours prn, ASA 325mg BID, f/u with ortho as directed     Anemia due to blood loss, acute  Acute; Hgb in aM, follow    Drug-induced constipation  Acute/ongoing: senna s 2 PO BID, add miralax 17gm QD prn    Type 2 diabetes mellitus without complication, with long-term current use of insulin (H)  Ongoing: blood sugar monitoring QID and prn, insulin 70/30 20 units SQ QAM and 15 units SQ qhs, DC novolog 10-15units qhs    Pruritic disorder  Acute/ongoing: start sarna lotion apply to affected areas BID and prn       Orders:  BMP and hgb in AM  DC novolog 10-15 units qhs  Senna s 2 PO BID  miralax 17gm QD prn  sarna lotion to affected areas BID        Electronically signed by:  Tonya Lynn Haase, APRN CNP

## 2017-09-18 NOTE — PROGRESS NOTES
Received a voice mail message from client's son, Brandan, left over the weekend, stating that client's surgery went and that client is recovering well. Brandan shared that PT was working client hard (Which Brandan added that the thought was good for client) and that client has been having pain but that the nurses make sure that client gets pain medication when he has pain.  Brandan also shared that there was a plan for discharge for 9/17/17.  Reviewed EPIC and client discharged from the hospital on 9/17/17 to [facility].  [facility] and left a message with the community POC and requested to be notified of care conferences and discharge plans.  Called client's son, Brandan, and left a message thanking him for the update and to please let this CM know if he has questions and if he learns of any care conference dates and times.  Charis Garcia, [facility]  [phone]

## 2017-09-19 ENCOUNTER — TRANSFERRED RECORDS (OUTPATIENT)
Dept: HEALTH INFORMATION MANAGEMENT | Facility: CLINIC | Age: 77
End: 2017-09-19

## 2017-09-19 ENCOUNTER — NURSING HOME VISIT (OUTPATIENT)
Dept: GERIATRICS | Facility: CLINIC | Age: 77
End: 2017-09-19
Payer: COMMERCIAL

## 2017-09-19 DIAGNOSIS — Z96.651 AFTERCARE FOLLOWING RIGHT KNEE JOINT REPLACEMENT SURGERY: Primary | ICD-10-CM

## 2017-09-19 DIAGNOSIS — E11.9 TYPE 2 DIABETES MELLITUS WITHOUT COMPLICATION, WITH LONG-TERM CURRENT USE OF INSULIN (H): ICD-10-CM

## 2017-09-19 DIAGNOSIS — Z79.4 TYPE 2 DIABETES MELLITUS WITHOUT COMPLICATION, WITH LONG-TERM CURRENT USE OF INSULIN (H): ICD-10-CM

## 2017-09-19 DIAGNOSIS — Z47.1 AFTERCARE FOLLOWING RIGHT KNEE JOINT REPLACEMENT SURGERY: Primary | ICD-10-CM

## 2017-09-19 DIAGNOSIS — D62 ANEMIA DUE TO BLOOD LOSS, ACUTE: ICD-10-CM

## 2017-09-19 LAB
BUN SERPL-MCNC: 18 MG/DL (ref 9–26)
CALCIUM SERPL-MCNC: 8.4 MG/DL (ref 8.4–10.2)
CHLORIDE SERPLBLD-SCNC: 105 MMOL/L (ref 98–109)
CO2 SERPL-SCNC: 25 MMOL/L (ref 22–31)
CREAT SERPL-MCNC: 0.9 MG/DL (ref 0.73–1.18)
GFR SERPL CREATININE-BSD FRML MDRD: >60 ML/MIN/1.73M2
GLUCOSE SERPL-MCNC: 220 MG/DL (ref 70–100)
HEMOGLOBIN: 10.9 G/DL (ref 13.4–17.5)
POTASSIUM SERPL-SCNC: 4.3 MMOL/L (ref 3.5–5.2)
SODIUM SERPL-SCNC: 137 MMOL/L (ref 136–145)

## 2017-09-19 PROCEDURE — 99305 1ST NF CARE MODERATE MDM 35: CPT | Performed by: INTERNAL MEDICINE

## 2017-09-19 NOTE — PROGRESS NOTES
Received a voice mail message form the [facility], Marisol ([phone]).  In her message she shared that she has scheduled a care conference with family for 9/26/17 at 1pm and the son, Brandan, will be there.  Called Marisol back and left a message updating her that this CM will be at the care conference.  Charis Garcia, [facility]  [phone]

## 2017-09-20 ENCOUNTER — CARE COORDINATION (OUTPATIENT)
Dept: GERIATRIC MEDICINE | Facility: CLINIC | Age: 77
End: 2017-09-20

## 2017-09-20 NOTE — PROGRESS NOTES
Received a voice mail message from client's son, Brandan. He is doing well. Brandan shared that client is doing twice per day PT and OT, and is making progress.  Brandan reports that client has had some constipation and the RNs have given him some stool softeners.  Reports that his pain is well managed stating that when client has pain they give him medications.  Reports that client is a little sad about not being home, but family is continuing to encouraging him to make progress.  Brandan also shared the care conference date and time.  No need to return call.  Charis Garcia, [facility]  [phone]

## 2017-09-22 NOTE — PROGRESS NOTES
[place] [facility]  PRIMARY CARE PROVIDER AND CLINIC:  Alfredo Aragon [address] [place] [address] / [facility] [address]      Pt was seen by Dr Morgan on 9/19/17 for an initial TCU visit    HPI:    Rabia Reed is a 77 year old  (1940), PMH  DM type 2, who underwent a R TKA by Dr Allen on 9/14/17.    Post op course was uncomplicated     Admitted to this facility for  rehab, medical management and nursing care.      Pt indicates moderate R knee pain    He denies chest pain, SOB, cough  He has had a BM since surgery.    Vitals stable since admission to TCU.  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 He also has no past medical history of Basal cell carcinoma; Malignant melanoma (H); PONV (postoperative nausea and vomiting); Skin cancer; or Squamous cell carcinoma.  PAST SURGICAL HISTORY:  has a past surgical history that includes NONSPECIFIC PROCEDURE (4/01); APPENDECTOMY; NONSPECIFIC PROCEDURE (1/03); NONSPECIFIC PROCEDURE (April 2011); and Arthroplasty knee (Right, 9/14/2017).  FAMILY HISTORY: family history includes DIABETES in his brother, mother, and sister; Family History Negative in his father.  SOCIAL HISTORY:  reports that he quit smoking about 16  years ago. He has a 21.00 pack-year smoking history. He has never used smokeless tobacco. He reports that he does not drink alcohol or use illicit drugs.        Post Discharge Medication Reconciliation Status:   .  Current Outpatient Prescriptions   Medication Sig Dispense Refill     DIPHENHYDRAMINE HCL EX (BENADRYL CREAM 1-0.1%) Apply to affected areas topically as needed for itch TID. Thighs, buttocks, and groin) per on call Dr. Salcedo.       insulin aspart prot & aspart (NOVOLOG MIX 70/30 FLEXPEN) injection Inject 20 Units Subcutaneous every morning (before breakfast) 3 mL 1     insulin aspart prot & aspart (NOVOLOG MIX 70/30 FLEXPEN) injection Inject 15 Units Subcutaneous every evening 3 mL 1     melatonin 3 MG tablet Take 1 tablet (3 mg) by mouth nightly as needed for sleep       oxyCODONE (OXYCONTIN) 10 MG 12 hr tablet Take 1 tablet (10 mg) by mouth every 12 hours 10 tablet 0     oxyCODONE (ROXICODONE) 5 MG IR tablet Take 1-2 tablets (5-10 mg) by mouth every 3 hours as needed for moderate to severe pain 60 tablet 0     senna-docusate (SENOKOT-S;PERICOLACE) 8.6-50 MG per tablet Take 1-2 tablets by mouth 2 times daily 100 tablet      aspirin 325 MG tablet Take 1 tablet (325 mg) by mouth 2 times daily 90 tablet 3     ATORVASTATIN CALCIUM PO Take 80 mg by mouth every evening       insulin aspart (NOVOLOG FLEXPEN) 100 UNIT/ML injection Inject 10-15 Units Subcutaneous At Bedtime       TAMSULOSIN HCL PO Take 0.4 mg by mouth daily       ONE TOUCH ULTRA test strip test 3 times daily 200 each 3     blood glucose monitoring (ONE TOUCH ULTRASOFT) lancets 1 each 3 times daily 300 Box 3     celecoxib (CELEBREX) 200 MG capsule Take 200 mg by mouth every evening        insulin pen needle (ADVOCATE INSULIN PEN NEEDLES) 29G X 12.7MM Use three pen needles daily or as directed. 100 each 11     Insulin Pen Needle (PEN NEEDLES) 31G X 6 MM MISC 1 Device 3 times daily 100 each 11     finasteride (PROSCAR) 5 MG tablet Take 5 mg by  mouth every evening  30 tablet 11     Blood Glucose Monitoring Suppl (ONE TOUCH ULTRA SYSTEM KIT) W/DEVICE KIT One touch ultra 2 if covered by insurance 1 kit 0     ascorbic acid (VITAMIN C) 1000 MG TABS Take 1,000 mg by mouth daily.         ROS:  10 point ROS neg except as noted above.      Exam:    GENERAL APPEARANCE:  Alert, in no distress, appears well  ENT:  Mouth and posterior oropharynx normal, moist mucous membranes, normal hearing acuity  EYES:  EOM, conjunctivae, lids, pupils and irises normal, PERRL  RESP:  Lungs clear  CV:  RRR no M  ABDOMEN:  Soft, non-tender  M/S:   R knee incision was not examined, trace calf edema  Gait was not assessed  NEURO:   Cranial nerves 2-12 are normal tested and grossly at patient's baseline, speech WNL    Lab/Diagnostic data:    CBC RESULTS:   Recent Labs   Lab Test  09/17/17   0640  09/16/17   0630  09/15/17   0708  09/14/17   1435  09/07/17   1700  02/18/14   0950   WBC   --    --    --    --   6.9  7.7   RBC   --    --    --    --   4.07*  4.26*   HGB   --   12.2*  11.8*   --   14.0  14.4   HCT   --    --    --    --   42.3  42.6   MCV   --    --    --    --   104*  100   MCH   --    --    --    --   34.4*  33.8*   MCHC   --    --    --    --   33.1  33.8   RDW   --    --    --    --   12.3  12.3   PLT  180   --    --   168  187  208       Last Basic Metabolic Panel:  Recent Labs   Lab Test  09/17/17   0640  09/16/17   0630  09/14/17   1435  09/14/17   0649  09/07/17   1700  02/06/17   0953   NA   --    --    --    --   140  139   POTASSIUM   --    --    --    --   4.4  4.5   CHLORIDE   --    --    --    --   105  105   MABEL   --    --    --    --   8.2*  8.6   CO2   --    --    --    --   32  32   BUN   --    --    --    --   20  20   CR  1.01   --   0.82   --   0.99  1.01   GLC   --   85   --   99  188*  157*       Liver Function Studies -   Recent Labs   Lab Test  09/07/17   1700  08/03/16   0813   PROTTOTAL  7.0  7.5   ALBUMIN  3.3*  3.3*   BILITOTAL  0.6  0.9    ALKPHOS  115  102   AST  32  22   ALT  43  33       TSH   Date Value Ref Range Status   09/07/2017 1.81 0.40 - 4.00 mU/L Final   07/20/2015 3.01 0.40 - 4.00 mU/L Final   ]    Lab Results   Component Value Date    A1C 8.5 09/07/2017    A1C 9.1 02/06/2017       ASSESSMENT/PLAN:    Primary osteoarthritis of right knee  Aftercare following right knee joint replacement surgery  Pt is doing well  Plan pain control, therapies, ASA for DVT proph. Bowel regimen      Anemia due to blood loss, acute  Acute, mild  Plan monitor Hgb, symptoms      Type 2 diabetes mellitus without complication, with long-term current use of insulin (H)  Stable  Plan continue current insulin regimen, monitor BG      Jay Morgan MD

## 2017-09-25 ENCOUNTER — CARE COORDINATION (OUTPATIENT)
Dept: GERIATRIC MEDICINE | Facility: CLINIC | Age: 77
End: 2017-09-25

## 2017-09-25 NOTE — PROGRESS NOTES
Received a call from the [facility], Marisol.  She shared that client is planned for discharge from the facility on 9/30/17.  The plan is for home PT and OT, and Marisol shared that she will make a referral to  Home care.  Thanked Marisol for the update.  Charis Garcia, [facility]  [phone]

## 2017-09-26 ENCOUNTER — CARE COORDINATION (OUTPATIENT)
Dept: GERIATRIC MEDICINE | Facility: CLINIC | Age: 77
End: 2017-09-26

## 2017-09-27 ENCOUNTER — CARE COORDINATION (OUTPATIENT)
Dept: GERIATRIC MEDICINE | Facility: CLINIC | Age: 77
End: 2017-09-27

## 2017-09-27 NOTE — PROGRESS NOTES
Late entry from 9/26/17: Went to [facility] for TCU care conference.  Client elected not to attend.  Client's son (Brandan), NHSW (Marisol), NH RN (Viola) and therapy (Elena) and this CM were present.  PT: Client met his goals for bed mobility and transfer, reporting both independent.  Client will still weep with therapy at times due to pain.  Nursing is working on timing of pain medications.  PT continues to work on ROM, stating that client has not achieved the 90degree bend yet, and the goal is 90 degrees, preferably 100-110 degrees.  Can to 4 steps with SBA.  Is ambulating 250 feet with walker.    OT: Dressing himself. Requires assist with bathing and a tub transfer bench has been recommended.  [facility] will issue.  OT has been recommending that client do his own bathing and that client needs more encouragement to do so.    RN: Incision looks good and client has a follow up appointment on 9/29/17 to have the staples removed.  Blood sugars have been in the 150-200 range. The only change in mediations has been the addition of pain meds and an increase in ASA.  RN will place new Rx's to client's pharmacy.  Discharge plan: Plan is for client to discharge home on 9/30/17 at 11am.  [facility] will place referral to  Home Care for home PT and HHA.  Reviewed that client already has PCA and that HHA would be duplication of services.    Met with client and client's son after the care conference.  Client looked so much happier than this CM had ever seen client in the past.  Both client and family talked about how client has been engaging more with his table mates in the dining room, even though client only speaks very little English.  Again talked about [facility].  Reviewed that this CM strongly recommends it.  Both client and son were very agreeable to this.  Reviewed that this CM would allow client about a week at home and would then talk about having client go to [facility] at least once per week.  Client also shared that while he has  pain, the pain is different and he is over all feeling much better, and reported less pain in his hip.  Client and son had no further questions.  Charis Garcia, [facility]  [phone]

## 2017-09-28 ENCOUNTER — DISCHARGE SUMMARY NURSING HOME (OUTPATIENT)
Dept: GERIATRICS | Facility: CLINIC | Age: 77
End: 2017-09-28
Payer: COMMERCIAL

## 2017-09-28 VITALS
HEART RATE: 82 BPM | SYSTOLIC BLOOD PRESSURE: 110 MMHG | OXYGEN SATURATION: 97 % | TEMPERATURE: 97.9 F | HEIGHT: 65 IN | DIASTOLIC BLOOD PRESSURE: 58 MMHG | RESPIRATION RATE: 16 BRPM

## 2017-09-28 DIAGNOSIS — Z96.652 STATUS POST TOTAL LEFT KNEE REPLACEMENT: ICD-10-CM

## 2017-09-28 DIAGNOSIS — M17.11 PRIMARY OSTEOARTHRITIS OF RIGHT KNEE: Primary | ICD-10-CM

## 2017-09-28 DIAGNOSIS — E11.9 TYPE 2 DIABETES MELLITUS WITHOUT COMPLICATION, WITH LONG-TERM CURRENT USE OF INSULIN (H): ICD-10-CM

## 2017-09-28 DIAGNOSIS — Z47.1 AFTERCARE FOLLOWING RIGHT KNEE JOINT REPLACEMENT SURGERY: ICD-10-CM

## 2017-09-28 DIAGNOSIS — Z96.651 AFTERCARE FOLLOWING RIGHT KNEE JOINT REPLACEMENT SURGERY: ICD-10-CM

## 2017-09-28 DIAGNOSIS — K59.03 DRUG-INDUCED CONSTIPATION: ICD-10-CM

## 2017-09-28 DIAGNOSIS — Z79.4 TYPE 2 DIABETES MELLITUS WITHOUT COMPLICATION, WITH LONG-TERM CURRENT USE OF INSULIN (H): ICD-10-CM

## 2017-09-28 DIAGNOSIS — D62 ANEMIA DUE TO BLOOD LOSS, ACUTE: ICD-10-CM

## 2017-09-28 PROCEDURE — 99316 NF DSCHRG MGMT 30 MIN+: CPT | Performed by: NURSE PRACTITIONER

## 2017-09-28 RX ORDER — AMOXICILLIN 250 MG
2 CAPSULE ORAL 2 TIMES DAILY
Status: ON HOLD | COMMUNITY
End: 2018-10-05

## 2017-09-28 RX ORDER — POLYETHYLENE GLYCOL 3350 17 G/17G
17 POWDER, FOR SOLUTION ORAL DAILY PRN
COMMUNITY
End: 2018-01-26

## 2017-09-28 NOTE — PROGRESS NOTES
[place] [facility] DISCHARGE SUMMARY    PATIENT'S NAME: Rabia Reed  YOB: 1940  MEDICAL RECORD NUMBER:  [id number]    PRIMARY CARE PROVIDER AND CLINIC RESPONSIBLE AFTER TRANSFER: Alfredo Aragon [address] [place] [address] / [facility] [address]     CODE STATUS/ADVANCE DIRECTIVES DISCUSSION:   CPR/Full code        Allergies   Allergen Reactions     No Known Drug Allergies        TRANSFERRING PROVIDERS: Tonya Lynn Haase, APRN CNP, Dr. Eddie MD  DATE OF SNF ADMISSION:  September / 17 / 2017  DATE OF SNF (anticipated) DISCHARGE: September / 30 / 2017  DISCHARGE DISPOSITION: [facility] Provider   Nursing Facility: [facility] stay 9/14/17 to 9/17/17.     Condition on Discharge:  Stable.  Function:  Walking indep using a RW up to 180 feet, indep with ADL's  Cognitive Scores: BIMS 15/15 and Unable to complete SBT due to language barrier    Equipment: walker    DISCHARGE DIAGNOSIS:   1. Primary osteoarthritis of right knee    2. Aftercare following right knee joint replacement surgery    3. Anemia due to blood loss, acute    4. Type 2 diabetes mellitus without complication, with long-term current use of insulin (H)    5. Drug-induced constipation    6. 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 He also has no past medical history of  Basal cell carcinoma; Malignant melanoma (H); PONV (postoperative nausea and vomiting); Skin cancer; or Squamous cell carcinoma.    DISCHARGE MEDICATIONS:  Current Outpatient Prescriptions   Medication Sig Dispense Refill     polyethylene glycol (MIRALAX/GLYCOLAX) powder Take 17 g by mouth daily as needed for constipation       camphor-menthol (DERMASARRA) 0.5-0.5 % LOTN Apply topically 2 times daily Apply to back, legs topically twice daily for itching       senna-docusate (SENOKOT-S;PERICOLACE) 8.6-50 MG per tablet Take 2 tablets by mouth 2 times daily       DIPHENHYDRAMINE HCL EX (BENADRYL CREAM 1-0.1%) Apply to affected areas topically as needed for itch TID. Thighs, buttocks, and groin) per on call Dr. Salcedo.       insulin aspart prot & aspart (NOVOLOG MIX 70/30 FLEXPEN) injection Inject 20 Units Subcutaneous every morning (before breakfast) 3 mL 1     insulin aspart prot & aspart (NOVOLOG MIX 70/30 FLEXPEN) injection Inject 15 Units Subcutaneous every evening 3 mL 1     melatonin 3 MG tablet Take 1 tablet (3 mg) by mouth nightly as needed for sleep       oxyCODONE (ROXICODONE) 5 MG IR tablet Take 1-2 tablets (5-10 mg) by mouth every 3 hours as needed for moderate to severe pain 60 tablet 0     aspirin 325 MG tablet Take 1 tablet (325 mg) by mouth 2 times daily 90 tablet 3     ATORVASTATIN CALCIUM PO Take 80 mg by mouth every evening       insulin aspart (NOVOLOG FLEXPEN) 100 UNIT/ML injection Inject 10-15 Units Subcutaneous At Bedtime       TAMSULOSIN HCL PO Take 0.4 mg by mouth daily       ONE TOUCH ULTRA test strip test 3 times daily 200 each 3     blood glucose monitoring (ONE TOUCH ULTRASOFT) lancets 1 each 3 times daily 300 Box 3     celecoxib (CELEBREX) 200 MG capsule Take 200 mg by mouth every evening        insulin pen needle (ADVOCATE INSULIN PEN NEEDLES) 29G X 12.7MM Use three pen needles daily or as directed. 100 each 11     Insulin Pen Needle (PEN NEEDLES) 31G X 6 MM MISC 1 Device 3 times daily  "100 each 11     finasteride (PROSCAR) 5 MG tablet Take 5 mg by mouth every evening  30 tablet 11     Blood Glucose Monitoring Suppl (ONE TOUCH ULTRA SYSTEM KIT) W/DEVICE KIT One touch ultra 2 if covered by insurance 1 kit 0     ascorbic acid (VITAMIN C) 1000 MG TABS Take 1,000 mg by mouth daily.         MEDICATION CHANGES/RATIONALE:   Started on bowel medications for constipation with improvement.     Last 3 BPs:110/58, 128/73, 137/80  HR Ranges; 67-99  Admission Weight: 174.7lbs  Current Weight:(9/28)165.2, (9/26) 166.2, (9/20)171.3lbs  Blood Sugar Range:   PM: 304   HS: 128     Controlled medications sent with patient: Script for oxycodone 5mg q 4 hours prn medication for 30 tabs and 0 refills given to patient at dischage to have them fill at their out patient pharmacy     ROS:    10 point ROS of systems including Constitutional, Eyes, Respiratory, Cardiovascular, Gastroenterology, Genitourinary, Integumentary, Muscularskeletal, Psychiatric were all negative except for pertinent positives noted in my HPI.    Physical Exam:   Vitals: /58  Pulse 82  Temp 97.9  F (36.6  C)  Resp 16  Ht 5' 5\" (1.651 m)  SpO2 97%  BMI= There is no height or weight on file to calculate BMI.  GENERAL APPEARANCE:  Alert, in no distress  ENT:  Mouth and posterior oropharynx normal, moist mucous membranes, normal hearing acuity  EYES:  EOM, conjunctivae, lids, pupils and irises normal, PERRL  RESP:  respiratory effort and palpation of chest normal, lungs clear to auscultation , no respiratory distress  CV:  Palpation and auscultation of heart done , regular rate and rhythm, no murmur, rub, or gallop, peripheral edema trace+ in LE bilaterally  ABDOMEN:  normal bowel sounds, soft, nontender, no hepatosplenomegaly or other masses  M/S:   Examination of:   right upper extremity, left upper extremity and left lower extremity  Inspection, ROM, stability and muscle strength normal and decreased ROM and strength RLE  SKIN:  Inspection of " skin and subcutaneous tissue baseline, did not visualize right knee incnision  NEURO:   Cranial nerves 2-12 are normal tested and grossly at patient's baseline, speech WNL    HPI Nursing Facility Course: Patient progressed in therapy to walking up to 180 feet with a RW independently. Patient is indep with ADL's, toileting, will DC to home with Home PT.     HPI information obtained from: facility chart records, facility staff, patient report and [facility] chart review.    ASSESSMENT/PLAN:  Primary osteoarthritis of right knee  Aftercare following right knee joint replacement surgery  Acute/ongoing: DC home with home PT, oxycontin tapered off while in TCU, continue oxycodone at decreased doseage oxycodone 5mg q 4 hours prn, ASA 325mg BID, f/u with ortho as directed      Anemia due to blood loss, acute  Acute; Hgb stable, f/u with PCP      Drug-induced constipation  Acute/ongoing: senna s 2 PO BID,  miralax 17gm QD prn     Type 2 diabetes mellitus without complication, with long-term current use of insulin (H)  Ongoing: blood sugar monitoring QID and prn, insulin 70/30 20 units SQ QAM and 15 units SQ qhs  Continue home regimen and f/u with PCP      Pruritic disorder  Acute/ongoing: continue sarna lotion apply to affected areas BID and prn         DISCHARGE PLAN:  Physical Therapy  Patient instructed to follow-up with:  PCP in 7 days      Current [place] scheduled appointments:  Future Appointments  Date Time Provider Department Center   10/6/2017 11:30 AM Alfredo Aragon MD [facility]       MTM referral needed and placed by this provider: No    Pending labs: none  SNF labs   CBC RESULTS:   Recent Labs   Lab Test 09/19/17 09/17/17   0640  09/16/17   0630   09/14/17   1435  09/07/17   1700  02/18/14   0950   WBC   --    --    --    --    --   6.9  7.7   RBC   --    --    --    --    --   4.07*  4.26*   HGB  10.9*   --   12.2*   < >   --   14.0  14.4   HCT   --    --    --    --    --   42.3  42.6   MCV   --    --    --     --    --   104*  100   MCH   --    --    --    --    --   34.4*  33.8*   MCHC   --    --    --    --    --   33.1  33.8   RDW   --    --    --    --    --   12.3  12.3   PLT   --   180   --    --   168  187  208    < > = values in this interval not displayed.       Last Basic Metabolic Panel:  Recent Labs   Lab Test 09/19/17 09/17/17   0640  09/16/17   0630   09/07/17   1700   NA  137   --    --    --   140   POTASSIUM  4.3   --    --    --   4.4   CHLORIDE  105   --    --    --   105   MABEL  8.4   --    --    --   8.2*   CO2  25   --    --    --   32   BUN  18   --    --    --   20   CR  0.90  1.01   --    < >  0.99   GLC  220*   --   85   < >  188*    < > = values in this interval not displayed.       Liver Function Studies -   Recent Labs   Lab Test  09/07/17   1700  08/03/16   0813   PROTTOTAL  7.0  7.5   ALBUMIN  3.3*  3.3*   BILITOTAL  0.6  0.9   ALKPHOS  115  102   AST  32  22   ALT  43  33       TSH   Date Value Ref Range Status   09/07/2017 1.81 0.40 - 4.00 mU/L Final   07/20/2015 3.01 0.40 - 4.00 mU/L Final       Lab Results   Component Value Date    A1C 8.5 09/07/2017    A1C 9.1 02/06/2017       Discharge Treatments:none    TOTAL DISCHARGE TIME:   Greater than 30 minutes  Electronically signed by:  Tonya Lynn Haase, APRN CNP

## 2017-10-02 ENCOUNTER — TELEPHONE (OUTPATIENT)
Dept: INTERNAL MEDICINE | Facility: CLINIC | Age: 77
End: 2017-10-02

## 2017-10-02 ENCOUNTER — CARE COORDINATION (OUTPATIENT)
Dept: GERIATRIC MEDICINE | Facility: CLINIC | Age: 77
End: 2017-10-02

## 2017-10-02 NOTE — PROGRESS NOTES
The discharge plan was that client was going to discharge on 9/30/17 to home.  Called client's son, Brandan, to verify discharge and to follow up.  Left son a message requesting a return call.  ANASTACIA Velázquez  [facility]  [phone]

## 2017-10-02 NOTE — TELEPHONE ENCOUNTER
Routed to [facility].  This encounter closed     Jennifer Boo RN / Clinical Care Coordinator     [address] [address]  [email or web address]  Office :  [phone] / Fax :  [phone]

## 2017-10-03 ENCOUNTER — CARE COORDINATION (OUTPATIENT)
Dept: GERIATRIC MEDICINE | Facility: CLINIC | Age: 77
End: 2017-10-03

## 2017-10-03 NOTE — PROGRESS NOTES
Received a voice mail message from client's son, Brandan.  In his message he shared that client is doing really well and that home care has already been out to start therapy.  Brandan shared that client is really happy now with his new knee.    Called Brandan back and left another message thanking him for the update and inquired about ADC for client to maintain his mood and requested a return call.  Charis Garcia, [facility]  [phone]

## 2017-10-04 ENCOUNTER — CARE COORDINATION (OUTPATIENT)
Dept: GERIATRIC MEDICINE | Facility: CLINIC | Age: 77
End: 2017-10-04

## 2017-10-04 NOTE — PROGRESS NOTES
Received a return call from client's son, Brandan.  He shared that he talked with client about going to [facility] and at this time client wants to wait until he is more stable on his feet.  Brandan shared that client would like a standard bath bench.  Asked Brandan if the NH had sent client home with a tub transfer bench as the NH stated that was the plan.  Brandan confirmed that client did get that equipment but that client would like a chair better.  Reviewed that therapy had recommended the tub transfer bench because it would allow client to be independent with bathing.  Son shared that client has been asking him to assist with bathing again.  Encouraged Brandan to talk with the therapists when they come out next regarding which type of equipment they would recommend for client to be the most independent.  Also reviewed that the insurance will not purchase two types of shower chairs, because client already received one.  Son shared that he understood and will keep this CM posted on what the therapists recommend at the next visit.  Charis Garcia, [facility]  [phone]

## 2017-10-06 ENCOUNTER — OFFICE VISIT (OUTPATIENT)
Dept: INTERNAL MEDICINE | Facility: CLINIC | Age: 77
End: 2017-10-06
Payer: COMMERCIAL

## 2017-10-06 VITALS
SYSTOLIC BLOOD PRESSURE: 120 MMHG | HEART RATE: 78 BPM | BODY MASS INDEX: 28.62 KG/M2 | OXYGEN SATURATION: 96 % | WEIGHT: 172 LBS | TEMPERATURE: 98.2 F | DIASTOLIC BLOOD PRESSURE: 60 MMHG

## 2017-10-06 DIAGNOSIS — R79.0 ABNORMAL IRON SATURATION: ICD-10-CM

## 2017-10-06 DIAGNOSIS — Z96.651 S/P TOTAL KNEE ARTHROPLASTY, RIGHT: Primary | ICD-10-CM

## 2017-10-06 DIAGNOSIS — R97.20 ELEVATED PROSTATE SPECIFIC ANTIGEN (PSA): ICD-10-CM

## 2017-10-06 DIAGNOSIS — R71.8 ELEVATED MCV: ICD-10-CM

## 2017-10-06 LAB
ERYTHROCYTE [DISTWIDTH] IN BLOOD BY AUTOMATED COUNT: 12.5 % (ref 10–15)
HCT VFR BLD AUTO: 39.1 % (ref 40–53)
HGB BLD-MCNC: 12.4 G/DL (ref 13.3–17.7)
IRON SATN MFR SERPL: 32 % (ref 15–46)
IRON SERPL-MCNC: 81 UG/DL (ref 35–180)
MCH RBC QN AUTO: 33.5 PG (ref 26.5–33)
MCHC RBC AUTO-ENTMCNC: 31.7 G/DL (ref 31.5–36.5)
MCV RBC AUTO: 106 FL (ref 78–100)
PLATELET # BLD AUTO: 259 10E9/L (ref 150–450)
PSA SERPL-ACNC: 1.03 UG/L (ref 0–4)
RBC # BLD AUTO: 3.7 10E12/L (ref 4.4–5.9)
TIBC SERPL-MCNC: 252 UG/DL (ref 240–430)
WBC # BLD AUTO: 6.7 10E9/L (ref 4–11)

## 2017-10-06 PROCEDURE — 85027 COMPLETE CBC AUTOMATED: CPT | Performed by: INTERNAL MEDICINE

## 2017-10-06 PROCEDURE — 36415 COLL VENOUS BLD VENIPUNCTURE: CPT | Performed by: INTERNAL MEDICINE

## 2017-10-06 PROCEDURE — 99214 OFFICE O/P EST MOD 30 MIN: CPT | Performed by: INTERNAL MEDICINE

## 2017-10-06 PROCEDURE — 83540 ASSAY OF IRON: CPT | Performed by: INTERNAL MEDICINE

## 2017-10-06 PROCEDURE — G0103 PSA SCREENING: HCPCS | Performed by: INTERNAL MEDICINE

## 2017-10-06 PROCEDURE — 81256 HFE GENE: CPT | Performed by: INTERNAL MEDICINE

## 2017-10-06 PROCEDURE — 83550 IRON BINDING TEST: CPT | Performed by: INTERNAL MEDICINE

## 2017-10-06 NOTE — PROGRESS NOTES
SUBJECTIVE:   Rabia Reed is a 77 year old male who presents to clinic today for the following health issues:  Chief Complaint   Patient presents with     FU Hospitalization     Hospital Follow-up Visit:    Hospital/Nursing Home/ Rehab Facility: [facility]  Date of Admission: 09/14/2017  Date of Discharge: 09/17/2017 from hospital  NH  Rehab admission 9/17/17 - 9/30/17  Reason(s) for Admission: Left  total Knee Arthroplasty            Problems taking medications regularly:  None       Medication changes since discharge: None       Problems adhering to non-medication therapy:  None    Summary of hospitalization:  [facility] and NH rehab  discharge summaries reviewed  Diagnostic Tests/Treatments reviewed.  Follow up needed: labs  Other Healthcare Providers Involved in Patient s Care:         ortho, PT  Update since discharge: improved.     Post Discharge Medication Reconciliation: discharge medications reconciled, continue medications without change.  Plan of care communicated with patient, son and intrepreter     Coding guidelines for this visit:  Type of Medical   Decision Making Face-to-Face Visit       within 7 Days of discharge Face-to-Face Visit        within 14 days of discharge   Moderate Complexity [id number]   High Complexity [id number]          Pt's past medical history, family history, habits, medications and allergies were reviewed with the patient today.  See snap shot for  HCM status. Most recent lab results reviewed with pt. Problem list and histories reviewed & adjusted, as indicated.  Additional history as below:    Hospital  And NH rehab discharge summaries reviewed. Part of the  NH summary as below:    TRANSFERRING PROVIDERS: Tonya Lynn Haase, APRN CNP, Dr. Eddie MD  DATE OF SNF ADMISSION:  September / 17 / 2017  DATE OF SNF (anticipated) DISCHARGE: September / 30 / 2017  DISCHARGE DISPOSITION: FMG Provider   Nursing Facility: [facility]  [facility] stay 9/14/17 to 9/17/17.      Condition on Discharge:  Stable.  Function:  Walking indep using a RW up to 180 feet, indep with ADL's  Cognitive Scores: BIMS 15/15 and Unable to complete SBT due to language barrier    Equipment: walker     DISCHARGE DIAGNOSIS:   1. Primary osteoarthritis of right knee    2. Aftercare following right knee joint replacement surgery    3. Anemia due to blood loss, acute    4. Type 2 diabetes mellitus without complication, with long-term current use of insulin (H)    5. Drug-induced constipation    6. Status post total left knee replacement         MEDICATION CHANGES/RATIONALE:   Started on bowel medications for constipation with improvement.      Last 3 BPs:110/58, 128/73, 137/80  HR Ranges; 67-99  Admission Weight: 174.7lbs  Current Weight:(9/28)165.2, (9/26) 166.2, (9/20)171.3lbs  Blood Sugar Range:   PM: 304   HS: 128      Controlled medications sent with patient: Script for oxycodone 5mg q 4 hours prn medication for 30 tabs and 0 refills given to patient at dischage to have them fill at their out patient pharmacy        ASSESSMENT/PLAN:  Primary osteoarthritis of right knee  Aftercare following right knee joint replacement surgery  Acute/ongoing: DC home with home PT, oxycontin tapered off while in TCU, continue oxycodone at decreased doseage oxycodone 5mg q 4 hours prn, ASA 325mg BID, f/u with ortho as directed       Anemia due to blood loss, acute  Acute; Hgb stable, f/u with PCP       Drug-induced constipation  Acute/ongoing: senna s 2 PO BID,  miralax 17gm QD prn      Type 2 diabetes mellitus without complication, with long-term current use of insulin (H)  Ongoing: blood sugar monitoring QID and prn, insulin 70/30 20 units SQ QAM and 15 units SQ qhs  Continue home regimen and f/u with PCP       Pruritic disorder  Acute/ongoing: continue sarna lotion apply to affected areas BID and prn           DISCHARGE PLAN:  Physical Therapy  Patient instructed to follow-up  "with:  PCP in 7 days         Had staples out right knee 9/29 and has f/u again late October. Doing PT at home.  Still having moderate pain and using Oxycodone every 4-6 hrs.  BMs OK with bowel regimen. Appetite had been reduced but now back to normal now.  Weight down 6 pounds.  No shortness of breath, chest pain or abd pain. Urinating OK.  No dysuria or straining.   Blood sugars 126 this AM. Generally < 150 later part of the day  Due for f/u labs with elevated iron sats on labs prior to surgery     Additional ROS:   Constitutional, HEENT, Cardiovascular, Pulmonary, GI and , Neuro, MSK and Psych review of systems/symptoms are otherwise negative or unchanged from previous, except as noted above.      OBJECTIVE:  /60  Pulse 78  Temp 98.2  F (36.8  C) (Oral)  Wt 172 lb (78 kg)  SpO2 96%  BMI 28.62 kg/m2   Estimated body mass index is 28.62 kg/(m^2) as calculated from the following:    Height as of 9/28/17: 5' 5\" (1.651 m).    Weight as of this encounter: 172 lb (78 kg).  Eye: PERRL, EOMI  HENT: ear canals and TM's normal and nose and mouth without ulcers or lesions   Neck: no adenopathy. Thyroid normal to palpation. No bruits  Pulm: Lungs clear to auscultation   CV: Regular rates and rhythm  GI: Soft, nontender, Normal active bowel sounds, No hepatosplenomegaly or masses palpable  Ext: Peripheral pulses intact.  Mild expected right knee and distal RLE edema. Right knee TKA incision C/D?I. Staples out. Minimal erythema/warmth as expected pos-op. No drainage. Able to flex knee to 90 degrees  Neuro: Normal strength and tone, sensory exam grossly normal    Assessment/Plan: (See plan discussion below for further details)  1. S/P total knee arthroplasty, right  Doing well. Continue therapy and ortho f/u. Pain meds per ortho    2. Abnormal iron saturation  Due for lab f/u after recent surgical blood loss  - CBC with platelets  - Iron and iron binding capacity  - Hemochromatosis mutation       3. Elevated " MCV  Labs as ordered  - CBC with platelets    4. Elevated prostate specific antigen (PSA)  Due for lab f/u.   - PSA, screen    Plan discussion:  Continue current meds  Labs today as ordered  Pain meds through orthopedics       Alfredo Aragon MD  Internal Medicine Department  [facility]

## 2017-10-06 NOTE — MR AVS SNAPSHOT
After Visit Summary   10/6/2017    Rabia Reed    MRN: [id number]           Patient Information     Date Of Birth          1940        Visit Information        Provider Department      10/6/2017 11:15 AM Alfredo Aragon MD; [employer] [facility]        Today's Diagnoses     S/P total knee arthroplasty, right    -  1    Abnormal iron saturation        Elevated MCV        Elevated prostate specific antigen (PSA)           Follow-ups after your visit        Who to contact     If you have questions or need follow up information about today's clinic visit or your schedule please contact [facility] directly at [phone].  Normal or non-critical lab and imaging results will be communicated to you by MyChart, letter or phone within 4 business days after the clinic has received the results. If you do not hear from us within 7 days, please contact the clinic through [employer] or phone. If you have a critical or abnormal lab result, we will notify you by phone as soon as possible.  Submit refill requests through [employer] or call your pharmacy and they will forward the refill request to us. Please allow 3 business days for your refill to be completed.          Additional Information About Your Visit        MyChart Information     [employer] gives you secure access to your electronic health record. If you see a primary care provider, you can also send messages to your care team and make appointments. If you have questions, please call your primary care clinic.  If you do not have a primary care provider, please call [phone] and they will assist you.        Care EveryWhere ID     This is your Care EveryWhere ID. This could be used by other organizations to access your [facility] medical records  [id number]        Your Vitals Were     Pulse Temperature Pulse Oximetry BMI (Body Mass Index)          78 98.2  F (36.8  C) (Oral) 96% 28.62 kg/m2          Blood Pressure from Last 3 Encounters:   10/06/17 120/60   09/28/17 110/58   09/18/17 125/66    Weight from Last 3 Encounters:   10/06/17 172 lb (78 kg)   09/18/17 225 lb 9.6 oz (102.3 kg)   09/07/17 178 lb (80.7 kg)              We Performed the Following     CBC with platelets     Hemochromatosis mutation     Iron and iron binding capacity     PSA, screen        Primary Care Provider Office Phone # Fax #    Alfredo Aragon [phone]       [address] [facility] [address]        Equal Access to Services     [facility]: Hadii aad ku hadasho Soomaali, waaxda luqadaha, qaybta kaalmada adeegyada, alfonzo brown haydelores adekelvin oconnor . So [facility] [phone].    ATENCIÓN: Si habla español, tiene a sylvester disposición servicios gratuitos de asistencia lingüística. Llame al [phone].    We comply with applicable federal civil rights laws and [place] laws. We do not discriminate on the basis of race, color, national origin, age, disability, sex, sexual orientation, or gender identity.            Thank you!     Thank you for choosing [facility]  for your care. Our goal is always to provide you with excellent care. Hearing back from our patients is one way we can continue to improve our services. Please take a few minutes to complete the written survey that you may receive in the mail after your visit with us. Thank you!             Your Updated Medication List - Protect others around you: Learn how to safely use, store and throw away your medicines at www.disposemymeds.org.          This list is accurate as of: 10/6/17 11:59 PM.  Always use your most recent med list.                   Brand Name Dispense Instructions for use Diagnosis    ascorbic acid 1000 MG Tabs    vitamin C     Take 1,000 mg by mouth daily.        aspirin 325 MG tablet     90 tablet    Take 1 tablet (325 mg) by mouth 2 times daily    Status post total left knee replacement       ATORVASTATIN CALCIUM PO       Take 80 mg by mouth every evening        blood glucose monitoring lancets     300 Box    1 each 3 times daily    Type 2 diabetes mellitus without complication (H)       camphor-menthol 0.5-0.5 % Lotn    DERMASARRA     Apply topically 2 times daily Apply to back, legs topically twice daily for itching        celecoxib 200 MG capsule    celeBREX     Take 200 mg by mouth every evening        DIPHENHYDRAMINE HCL EX      (BENADRYL CREAM 1-0.1%) Apply to affected areas topically as needed for itch TID. Thighs, buttocks, and groin) per on call Dr. Salcedo.        finasteride 5 MG tablet    PROSCAR    30 tablet    Take 5 mg by mouth every evening    BPH (benign prostatic hypertrophy)       * insulin aspart prot & aspart injection    NovoLOG MIX 70/30 FLEXPEN    3 mL    Inject 20 Units Subcutaneous every morning (before breakfast)    Status post total left knee replacement       * insulin aspart prot & aspart injection    NovoLOG MIX 70/30 FLEXPEN    3 mL    Inject 15 Units Subcutaneous every evening    Type 2 diabetes mellitus without complication, with long-term current use of insulin (H)       melatonin 3 MG tablet      Take 1 tablet (3 mg) by mouth nightly as needed for sleep    Status post total left knee replacement       NovoLOG FLEXPEN 100 UNIT/ML injection   Generic drug:  insulin aspart      Inject 10-15 Units Subcutaneous At Bedtime        ONE TOUCH ULTRA SYSTEM KIT W/DEVICE Kit     1 kit    One touch ultra 2 if covered by insurance    Type II or unspecified type diabetes mellitus without mention of complication, not stated as uncontrolled       ONE TOUCH ULTRA test strip   Generic drug:  blood glucose monitoring     200 each    test 3 times daily    Type 2 diabetes mellitus without complication (H)       oxyCODONE 5 MG IR tablet    ROXICODONE    60 tablet    Take 1-2 tablets (5-10 mg) by mouth every 3 hours as needed for moderate to severe pain    Status post total left knee replacement       * pen needles  31G X 6 MM Misc     100 each    1 Device 3 times daily    Type 2 diabetes, HbA1c goal < 7% (H)       * insulin pen needle 29G X 12.7MM    ADVOCATE INSULIN PEN NEEDLES    100 each    Use three pen needles daily or as directed.    Type 2 diabetes, HbA1c goal < 7% (H)       polyethylene glycol powder    MIRALAX/GLYCOLAX     Take 17 g by mouth daily as needed for constipation        senna-docusate 8.6-50 MG per tablet    SENOKOT-S;PERICOLACE     Take 2 tablets by mouth 2 times daily        TAMSULOSIN HCL PO      Take 0.4 mg by mouth daily        * Notice:  This list has 4 medication(s) that are the same as other medications prescribed for you. Read the directions carefully, and ask your doctor or other care provider to review them with you.

## 2017-10-06 NOTE — NURSING NOTE
"Chief Complaint   Patient presents with     FU Hospitalization       Initial /60  Pulse 78  Temp 98.2  F (36.8  C) (Oral)  Wt 172 lb (78 kg)  SpO2 96%  BMI 28.62 kg/m2 Estimated body mass index is 28.62 kg/(m^2) as calculated from the following:    Height as of 9/28/17: 5' 5\" (1.651 m).    Weight as of this encounter: 172 lb (78 kg).  Medication Reconciliation: complete  "

## 2017-10-10 DIAGNOSIS — E11.9 TYPE 2 DIABETES, HBA1C GOAL < 7% (H): ICD-10-CM

## 2017-10-10 RX ORDER — PEN NEEDLE, DIABETIC 32GX 5/32"
NEEDLE, DISPOSABLE MISCELLANEOUS
Qty: 100 EACH | Refills: 5 | Status: SHIPPED | OUTPATIENT
Start: 2017-10-10 | End: 2018-04-05

## 2017-10-11 LAB — COPATH REPORT: NORMAL

## 2017-10-25 ENCOUNTER — DOCUMENTATION ONLY (OUTPATIENT)
Dept: CARE COORDINATION | Facility: CLINIC | Age: 77
End: 2017-10-25

## 2017-10-25 NOTE — PROGRESS NOTES
MD DISCHARGE SUMMARY  PT Discharge    Pt homebound secondary to pain and taxing effort.    SUBJECTIVE Patient states he has been doing his exercises 2x/day, and walking frequently. Reports his knee feels better, but still is stiff and sore in the AM especially.     Upcoming Appointments 10/27 with surgeon    OBJECTIVE  BP  132/76  HR  81  O2 Sats 97percent  Skin integrity R knee incision healed  Mobility Tool Score 1    OBJECTIVE CHANGES AND PROGRESS SINCE INITIAL EVALUATION IN THE FOLLOWING AREAS  ROM/strength LLE ROM WFL. RLE ROM WFL with exception of R knee 2 degrees extension to 109 degrees flexion. LLE MMT 5/5, RLE MMT 4plus/5 hip flexors, quads, DFs, 4/5 hip abductors, hamstrings, PFs.  Balance tests Tinetti balance score   23/28, indicating moderate risk for falls.  Gait/posture Patient ambulates with cane with improved speed, improved step length and nivia, as well as improved heel/toe pattern. Able to ambulate without AD with only slight antalgia. Ascend/descend stairs reciprocally with handrail.  Transfers/mobility Mod I with use of UEs for all sit/stand and supine/sit. Mary for shower transfer, SBA for car transfers.    Other skilled interventions provided today PT Reassessment completed. Educated on recommendation for OK to ambulate without AD from room to room, but to continue to use cane when fatigued/pain, poor lighting, and when leaving home. Pt able to demonstrate standing exercises including heel raises, squats, marching, hip abd, hip ext, hamstring curls, hamstring stretch, knee flexion lunge, and step ups. Performing 2x/day, as well as recumbent cycle 2x/day for 5 to 10 minutes. Ambulated with patient outside x15 minutes with cane and cuing for step length, heel strike, and increased speed. Educated on importance of continuing with exercises, especially flexion based, and use of ice pack for pain management.    ASSESSMENT/SUMMARY Patient has me all PT goals with slow gradual progress. Indep  with bed mobility, transfers, ambulation, and stairs. Improvements noted in ROM, strength, and balance with decreased pain. Continued end range knee flexion and extension limitations, with ROM from 2 to 109 degrees. Indep and diligent with HEP daily. Much family support. No further skilled PT indicated.    Initial Goals Readdressed  Goals and progress to be met by 10/21  During course of therapy visits, patient will verbalize/demonstrate pain control techniques to allow for full participation in the rehabilitation process. GOAL MET  Patient will regain knee flexion to at least 100 degrees in order to sit comfortably for meals, resting. GOAL MET  Patient will improve knee extension to at least 2 degrees to improve overall gait pattern. GOAL MET  Patient will regain knee adequate ROM to demonstrate safe ability to negotiate stairs.  GOAL MET  Patient will demonstrate safe ambulation on all surfaces with or without assistive device as appropriate. GOAL MET  Patient will demonstrate independent car transfers on both  and passenger sides. GOAL MET  At PA, patient will demonstrate correct performance of home exercise routine for continued functional strength and motion gains. GOAL MET    Patient seen for  9 PT visits from 10/2 to 10/25 for ROM, strength, transfers, gait training including stairs, balance, instruction in HEP and pain management.

## 2017-11-27 PROCEDURE — G0180 MD CERTIFICATION HHA PATIENT: HCPCS | Performed by: INTERNAL MEDICINE

## 2017-11-28 ENCOUNTER — CARE COORDINATION (OUTPATIENT)
Dept: GERIATRIC MEDICINE | Facility: CLINIC | Age: 77
End: 2017-11-28

## 2017-11-28 NOTE — PROGRESS NOTES
Spoke with , David Ambrocio.  Was able to schedule client's annual HRA for 12/6/17 at 1pm.  Called client's son, Brandan, and left a message updating him on the assessment date and time.  Called Zee Chaves and booked the .  Charis Garcia, [facility]  [phone]

## 2017-11-30 DIAGNOSIS — E78.5 HYPERLIPIDEMIA LDL GOAL <100: ICD-10-CM

## 2017-11-30 NOTE — TELEPHONE ENCOUNTER
Routing refill request to provider for review/approval because:  Labs not current:  FLP  Medication is reported/historical

## 2017-12-01 RX ORDER — ATORVASTATIN CALCIUM 80 MG/1
TABLET, FILM COATED ORAL
Qty: 90 TABLET | Refills: 0 | Status: SHIPPED | OUTPATIENT
Start: 2017-12-01 | End: 2018-01-26

## 2017-12-06 ENCOUNTER — CARE COORDINATION (OUTPATIENT)
Dept: GERIATRIC MEDICINE | Facility: CLINIC | Age: 77
End: 2017-12-06

## 2017-12-06 RX ORDER — ACETAMINOPHEN 500 MG
1000 TABLET ORAL 3 TIMES DAILY
Status: ON HOLD | COMMUNITY
End: 2019-01-05

## 2017-12-06 ASSESSMENT — PATIENT HEALTH QUESTIONNAIRE - PHQ9: SUM OF ALL RESPONSES TO PHQ QUESTIONS 1-9: 0

## 2017-12-07 ENCOUNTER — DOCUMENTATION ONLY (OUTPATIENT)
Dept: OTHER | Facility: CLINIC | Age: 77
End: 2017-12-07

## 2017-12-07 DIAGNOSIS — Z71.89 ACP (ADVANCE CARE PLANNING): Chronic | ICD-10-CM

## 2017-12-07 NOTE — PROGRESS NOTES
[facility] Home Visit Assessment     Home visit for Health Risk Assessment with Rabia Reed completed on December 6, 2017  Member resides in a single family home that is rambler style. There are a few steps to get into the home but otherwise client is on the main level.  The laundry is in the basement, which has a full flight of stairs.  Member lives with family spouse, and adult children.  Client will regularly go back and forth between two houses, stay some nights with his daughter.  Present at assessment: member, this care coordinator,  and adult son (Brandan).    Current Care Plan  Member currently receiving the following services: PCA (16 units per day), Supplies glucerna and incontinent supplies (these are only PRN).  Has informal support from family member to be able to provide around the clock physical assistance.    Health Issues: Client had Right total knee arthroplasty 9/14/17 and had a TCU stay post surgery.  Client shared that he continues to have some pain in his knee, but is striving to get better.  He reports that he does his PT exercises daily. 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  Client reports that he has not had a severe headache in a long time, not recalling when the last time he had a headache.  Previously client had very debilitating headaches, where he was not able to stay focused to do day to day tasks, would cause him to isolate due to noise and light, and made it difficult to sleep.  Client shared that he believes that his headaches are gone because of the pain medications he is on for the knee replacement.  Client is concerned that his headaches will return when he no longer is able to use the pain medications.  Encouraged him to talk with his PCP regarding pain management.  Client reported that he has been sleeping better, reporting that he is sleeping about 4 hours at a time, vs in the past  "he was only sleeping a couple of hours at a time.  Client reports that over all he is just feeling so much better.  Reports that his overall pain has greatly improved.  Inquired about the tremor (at previous assessment client appeared to have tremors in his hands) and client reports that this is better.  This CM noted that client was greatly more alert and engaging in this assessment, than at any previous assessment (dating back to 2010).      Medication Review  Medication reconciliation completed in Epic:Yes  Medication set-up & administration: Family/informal caregiver sets up weekly and family/caregiver administers medications.  Medication understanding/adherence (by member, family or CC): Client was more engaging regarding his medication and was aware of why he is taking them, however shared that his son does manage all of his medications and give him his insulin.     Mental/Behavioral Health   Depression Screening: See PHQ assessment flowsheet.   Mental Health Diagnosis: No   No current MH services.  Client's mood was the happiest this CM has seen.  Client was very engaging in the assessment, laughing/joking during assessment, greeting this CM at the door, was able to recall this CM from the NH visit (where in the past client has struggled with recalling this CM), he had lots he wanted to share about himself and how he was doing (where in the past he would regularly say \"I don't know\" and would ask this CM to talk with the son).  During the assessment client wanted to talk and share his thoughts.  Inquired with son how client's mood has been.  Son shared that client is doing so much better.  At previous assessment son shared that client was very short tempered and easily angered by the children in the family, because the kids made noise.  Son reports that this behavior has not been occurring. Previous PHQ9 score was 7 and at this assessment client scored 0.  Client talked about activities that he enjoys doing " (watching TV, listening to the radio and reading ghost stories).    Falls in last 12 months: Yes: Reports that he fell in the driveway prior to his surgery. If yes, was an injury sustained? No    ADL/IADL Dependencies: Ambulation-walker, Bathing, Dressing, Grooming, Toileting, Cleaning, Cooking, Laundry, Shopping, Medication Management, Money Management and Transportation.  While client reports that he is able to eat on his own now, in the past client hand had tremors that made it difficult to eat, and reports that this is improved. Family is concerned that client will require more assistance again in the future once the pain medications stop.  Encouraged family to talk with the PCP regarding pain management.    [facility] sponsored benefits: Shared information re: Silver Sneakers/gym memberships, ASA, Calcium +D.    PCA Assessment completed at visit: Yes   If yes, will process assessment and communicate results to member within 10 days.  Elderly Waiver Eligibility: Yes-will continue on EW    Care Plan & Recommendations: Client will continue with PCA services.  Client continues to receive Glucerna monthly.  He only requires incontinent products PRN and family shared that this is when client is really sick that he needs this.  This CM encouraged client/family to make a PCP appointment to talk about pain management as the pain medication client is currently on is for his knee replacement.    See [facility] for detailed assessment information.    Follow-Up Plan: Member informed of future contact, plan to f/u with member with a 6 month telephone assessment.  Contact information shared with member and family, encouraged member to call with any questions or concerns at any time.  [facility] continuum providers: Please refer to Health Care Home on the Epic Problem List to view this patient's [facility] Care Plan Summary.  Charis Garcia ERIC  [facility]  [phone]

## 2017-12-08 ENCOUNTER — CARE COORDINATION (OUTPATIENT)
Dept: GERIATRIC MEDICINE | Facility: CLINIC | Age: 77
End: 2017-12-08

## 2017-12-13 ENCOUNTER — CARE COORDINATION (OUTPATIENT)
Dept: GERIATRIC MEDICINE | Facility: CLINIC | Age: 77
End: 2017-12-13

## 2017-12-13 NOTE — PROGRESS NOTES
Late entry from 12/8/17: Received an email from client's son, Brandan, inquiring about what the name of the pain clinic is.  Called Brandan back and updated him on the various pain clinics and encouraged him to make an appointment, and to keep track of any changes with client.  Brandan shared that he would.  Charis Garcia, [facility]  [phone]

## 2017-12-14 ENCOUNTER — CARE COORDINATION (OUTPATIENT)
Dept: GERIATRIC MEDICINE | Facility: CLINIC | Age: 77
End: 2017-12-14

## 2017-12-14 NOTE — PROGRESS NOTES
Received a request to submit a DTR for the reduction of PCA. Documentation completed and faxed to the health plan.  aware.    Heidi Zheng RN  Utilization   [facility]  [phone]

## 2017-12-14 NOTE — PROGRESS NOTES
Late entry from 12/14/17: Called client's son, Brandan, to follow up on client' pain.  Brandan shared that client's hip pain has come back and that client has been reporting a lot more hip pain, with the pain going down into his legs.  Inquired about the headaches.  Brandan shared that the homecare RN, was out to see client today and that he overheard client telling her that he didn't sleep well the night before stating that he had a bad headache.  Reviewed with Brandan that after reviewing the PCA assessment that client did have an improvement in his eating abilities due to no tremors in his hands. Reviewed that reduction that would be taking place.  Reviewed with Brandan client's appeal rights and that he would be getting information in the mail. Brandan also shared that he got client an appointment at the pain clinic but that client could not get in for 3 weeks.  Continue to encourage Brandan to keep records of the pain.  Brandan had no further questions.  Charis Garcia, [facility]  [phone]

## 2017-12-14 NOTE — PROGRESS NOTES
Completed POC and PCA assessment and gave to CMS to mail to client and process.  ANASTACIA Velázquez  [facility]  [phone]

## 2017-12-14 NOTE — PROGRESS NOTES
Dr. Aragon    I am the [facility] case manager for this client, and I am writing to notify you of a change in services.   PCA services have been reduced by 30 minutes per day.   This change has occurred because he reports being able to eat independently, reporting that his hands no longer shake.    I am required by the health plan to notify you of this change. No action is required on your part. Please do not hesitate to contact me with any questions or concerns. Thank you.    ANASTACIA Velázquez  [facility]  [phone]

## 2017-12-15 ENCOUNTER — CARE COORDINATION (OUTPATIENT)
Dept: GERIATRIC MEDICINE | Facility: CLINIC | Age: 77
End: 2017-12-15

## 2017-12-15 NOTE — PROGRESS NOTES
Received after visit chart from care coordinator.  Completed following tasks: Mailed copy of care plan to client and Updated services in access  Chart was returned to CC.   [employer]:  Faxed completed PCA assessment to [facility] and mailed copies to member.  Faxed MD Communication to PCP.  DTR - ANNIKA Emailed dtr from to [employer].    Mariza Parr  Case Management Specialist  [facility]  [phone]

## 2018-01-02 ENCOUNTER — MEDICAL CORRESPONDENCE (OUTPATIENT)
Dept: HEALTH INFORMATION MANAGEMENT | Facility: CLINIC | Age: 78
End: 2018-01-02

## 2018-01-04 ENCOUNTER — TELEPHONE (OUTPATIENT)
Dept: INTERNAL MEDICINE | Facility: CLINIC | Age: 78
End: 2018-01-04

## 2018-01-04 DIAGNOSIS — D64.9 ANEMIA, UNSPECIFIED TYPE: ICD-10-CM

## 2018-01-04 DIAGNOSIS — E11.9 TYPE 2 DIABETES MELLITUS WITHOUT COMPLICATION, WITH LONG-TERM CURRENT USE OF INSULIN (H): Primary | ICD-10-CM

## 2018-01-04 DIAGNOSIS — Z79.4 TYPE 2 DIABETES MELLITUS WITHOUT COMPLICATION, WITH LONG-TERM CURRENT USE OF INSULIN (H): Primary | ICD-10-CM

## 2018-01-04 DIAGNOSIS — E78.5 HYPERLIPIDEMIA LDL GOAL <100: ICD-10-CM

## 2018-01-04 NOTE — TELEPHONE ENCOUNTER
Pt has a check-up appt scheduled on Friday 1/26/18 at 10am.  Lab appt scheduled 1 week prior.  Please order labs for pt to get before his appt with Dr. Aargon on 1/26.

## 2018-01-04 NOTE — TELEPHONE ENCOUNTER
Labs ordered. Call pt's son Brandan to remind that pt to be fasting for lab appt (pt doesn't speak English)

## 2018-01-08 DIAGNOSIS — Z53.9 DIAGNOSIS NOT YET DEFINED: Primary | ICD-10-CM

## 2018-01-08 PROCEDURE — G0179 MD RECERTIFICATION HHA PT: HCPCS | Performed by: INTERNAL MEDICINE

## 2018-01-12 DIAGNOSIS — E11.9 TYPE 2 DIABETES MELLITUS WITHOUT COMPLICATION (H): ICD-10-CM

## 2018-01-12 NOTE — TELEPHONE ENCOUNTER
Requested Prescriptions   Pending Prescriptions Disp Refills     blood glucose monitoring (ONE TOUCH ULTRASOFT) lancets [Pharmacy Med Name: OneTouch UltraSoft Lancets Miscellaneous]  Last Written Prescription Date:  1/23/2017  Last Fill Quantity: 300,  # refills: 3   Last Office Visit with [facility] or  Health prescribing provider:  10/06/2017   Future Office Visit:    Next 5 appointments (look out 90 days)     Jan 26, 2018 10:00 AM CST   [facility]    [address] [place] [id number]   [phone]                  200 each 2     Sig: USE 3 TIMES DAILY    Diabetic Supplies Protocol Passed    1/12/2018  7:00 AM       Passed - Patient is 18 years of age or older       Passed - Patient has had appt within past 6 mos    IV to PO - Antibiotics     None                    ONETOUCH ULTRA test strip [Pharmacy Med Name: OneTouch Ultra Blue In Vitro Strip]  Last Written Prescription Date:  4/11/2017  Last Fill Quantity: 200,  # refills: 3   Last Office Visit with [facility] or [facility] prescribing provider:  10/06/2017   Future Office Visit:    Next 5 appointments (look out 90 days)     Jan 26, 2018 10:00 AM CST   SHORT with [facility]o [facility] MD   [facility]    [address] [place] [id number]   [phone]                  200 each 2     Sig: Use to test 3 times daily    Diabetic Supplies Protocol Passed    1/12/2018  7:00 AM       Passed - Patient is 18 years of age or older       Passed - Patient has had appt within past 6 mos    IV to PO - Antibiotics     None

## 2018-01-16 RX ORDER — LANCETS
EACH MISCELLANEOUS
Qty: 200 EACH | Refills: 0 | Status: SHIPPED | OUTPATIENT
Start: 2018-01-16 | End: 2018-01-26

## 2018-01-20 DIAGNOSIS — E78.5 HYPERLIPIDEMIA LDL GOAL <100: ICD-10-CM

## 2018-01-20 DIAGNOSIS — D64.9 ANEMIA, UNSPECIFIED TYPE: ICD-10-CM

## 2018-01-20 DIAGNOSIS — Z79.4 TYPE 2 DIABETES MELLITUS WITHOUT COMPLICATION, WITH LONG-TERM CURRENT USE OF INSULIN (H): ICD-10-CM

## 2018-01-20 DIAGNOSIS — E11.9 TYPE 2 DIABETES MELLITUS WITHOUT COMPLICATION, WITH LONG-TERM CURRENT USE OF INSULIN (H): ICD-10-CM

## 2018-01-20 LAB
ANION GAP SERPL CALCULATED.3IONS-SCNC: 5 MMOL/L (ref 3–14)
BUN SERPL-MCNC: 17 MG/DL (ref 7–30)
CALCIUM SERPL-MCNC: 8.5 MG/DL (ref 8.5–10.1)
CHLORIDE SERPL-SCNC: 107 MMOL/L (ref 94–109)
CHOLEST SERPL-MCNC: 154 MG/DL
CO2 SERPL-SCNC: 30 MMOL/L (ref 20–32)
CREAT SERPL-MCNC: 0.88 MG/DL (ref 0.66–1.25)
ERYTHROCYTE [DISTWIDTH] IN BLOOD BY AUTOMATED COUNT: 12.6 % (ref 10–15)
GFR SERPL CREATININE-BSD FRML MDRD: 83 ML/MIN/1.7M2
GLUCOSE SERPL-MCNC: 160 MG/DL (ref 70–99)
HBA1C MFR BLD: 8.7 % (ref 4.3–6)
HCT VFR BLD AUTO: 47.8 % (ref 40–53)
HDLC SERPL-MCNC: 93 MG/DL
HGB BLD-MCNC: 15.6 G/DL (ref 13.3–17.7)
LDLC SERPL CALC-MCNC: 49 MG/DL
MCH RBC QN AUTO: 32.8 PG (ref 26.5–33)
MCHC RBC AUTO-ENTMCNC: 32.6 G/DL (ref 31.5–36.5)
MCV RBC AUTO: 101 FL (ref 78–100)
NONHDLC SERPL-MCNC: 61 MG/DL
PLATELET # BLD AUTO: 192 10E9/L (ref 150–450)
POTASSIUM SERPL-SCNC: 4.5 MMOL/L (ref 3.4–5.3)
RBC # BLD AUTO: 4.75 10E12/L (ref 4.4–5.9)
SODIUM SERPL-SCNC: 142 MMOL/L (ref 133–144)
TRIGL SERPL-MCNC: 59 MG/DL
WBC # BLD AUTO: 6.7 10E9/L (ref 4–11)

## 2018-01-20 PROCEDURE — 80048 BASIC METABOLIC PNL TOTAL CA: CPT | Performed by: INTERNAL MEDICINE

## 2018-01-20 PROCEDURE — 80061 LIPID PANEL: CPT | Performed by: INTERNAL MEDICINE

## 2018-01-20 PROCEDURE — 83036 HEMOGLOBIN GLYCOSYLATED A1C: CPT | Performed by: INTERNAL MEDICINE

## 2018-01-20 PROCEDURE — 36415 COLL VENOUS BLD VENIPUNCTURE: CPT | Performed by: INTERNAL MEDICINE

## 2018-01-20 PROCEDURE — 85027 COMPLETE CBC AUTOMATED: CPT | Performed by: INTERNAL MEDICINE

## 2018-01-26 ENCOUNTER — OFFICE VISIT (OUTPATIENT)
Dept: INTERNAL MEDICINE | Facility: CLINIC | Age: 78
End: 2018-01-26
Payer: COMMERCIAL

## 2018-01-26 VITALS
SYSTOLIC BLOOD PRESSURE: 122 MMHG | HEIGHT: 65 IN | WEIGHT: 174 LBS | OXYGEN SATURATION: 97 % | BODY MASS INDEX: 28.99 KG/M2 | DIASTOLIC BLOOD PRESSURE: 70 MMHG | RESPIRATION RATE: 16 BRPM | TEMPERATURE: 97.9 F | HEART RATE: 82 BPM

## 2018-01-26 DIAGNOSIS — E11.9 TYPE 2 DIABETES MELLITUS WITHOUT COMPLICATION, WITH LONG-TERM CURRENT USE OF INSULIN (H): Primary | ICD-10-CM

## 2018-01-26 DIAGNOSIS — M19.91 PRIMARY OSTEOARTHRITIS, UNSPECIFIED SITE: Primary | ICD-10-CM

## 2018-01-26 DIAGNOSIS — E78.5 HYPERLIPIDEMIA LDL GOAL <100: ICD-10-CM

## 2018-01-26 DIAGNOSIS — Z23 NEED FOR PROPHYLACTIC VACCINATION AGAINST STREPTOCOCCUS PNEUMONIAE (PNEUMOCOCCUS): ICD-10-CM

## 2018-01-26 DIAGNOSIS — Z79.4 TYPE 2 DIABETES MELLITUS WITHOUT COMPLICATION, WITH LONG-TERM CURRENT USE OF INSULIN (H): Primary | ICD-10-CM

## 2018-01-26 PROCEDURE — G0009 ADMIN PNEUMOCOCCAL VACCINE: HCPCS | Performed by: INTERNAL MEDICINE

## 2018-01-26 PROCEDURE — 90732 PPSV23 VACC 2 YRS+ SUBQ/IM: CPT | Performed by: INTERNAL MEDICINE

## 2018-01-26 PROCEDURE — 99214 OFFICE O/P EST MOD 30 MIN: CPT | Mod: 25 | Performed by: INTERNAL MEDICINE

## 2018-01-26 PROCEDURE — 99207 C FOOT EXAM  NO CHARGE: CPT | Performed by: INTERNAL MEDICINE

## 2018-01-26 PROCEDURE — 82985 ASSAY OF GLYCATED PROTEIN: CPT | Mod: 90 | Performed by: INTERNAL MEDICINE

## 2018-01-26 PROCEDURE — 36415 COLL VENOUS BLD VENIPUNCTURE: CPT | Performed by: INTERNAL MEDICINE

## 2018-01-26 PROCEDURE — 99000 SPECIMEN HANDLING OFFICE-LAB: CPT | Performed by: INTERNAL MEDICINE

## 2018-01-26 RX ORDER — CELECOXIB 200 MG/1
200 CAPSULE ORAL DAILY
Qty: 90 CAPSULE | Refills: 1 | Status: SHIPPED | OUTPATIENT
Start: 2018-01-26 | End: 2018-05-11

## 2018-01-26 RX ORDER — LANCETS
EACH MISCELLANEOUS
Qty: 200 EACH | Refills: 3 | Status: SHIPPED | OUTPATIENT
Start: 2018-01-26 | End: 2020-02-10

## 2018-01-26 RX ORDER — ATORVASTATIN CALCIUM 80 MG/1
80 TABLET, FILM COATED ORAL DAILY
Qty: 90 TABLET | Refills: 3 | Status: ON HOLD | OUTPATIENT
Start: 2018-01-26 | End: 2019-01-05

## 2018-01-26 NOTE — MR AVS SNAPSHOT
After Visit Summary   1/26/2018    Rabia Reed    MRN: [id number]           Patient Information     Date Of Birth          1940        Visit Information        Provider Department      1/26/2018 9:45 AM Alfredo Aragon MD; [facility] [employer] [employer] [facility]        Today's Diagnoses     Type 2 diabetes mellitus without complication, with long-term current use of insulin (H)    -  1    Hyperlipidemia LDL goal <100        Need for prophylactic vaccination against Streptococcus pneumoniae (pneumococcus)           Follow-ups after your visit        Future tests that were ordered for you today     Open Future Orders        Priority Expected Expires Ordered    Comprehensive metabolic panel Routine 7/26/2018 11/23/2018 1/27/2018    Hemoglobin A1c Routine 7/26/2018 11/23/2018 1/27/2018    TSH with free T4 reflex Routine 7/26/2018 11/23/2018 1/27/2018    Albumin Random Urine Quantitative with Creat Ratio Routine 7/26/2018 12/23/2018 1/27/2018            Who to contact     If you have questions or need follow up information about today's clinic visit or your schedule please contact [facility] directly at [phone].  Normal or non-critical lab and imaging results will be communicated to you by [employer], letter or phone within 4 business days after the clinic has received the results. If you do not hear from us within 7 days, please contact the clinic through [employer] or phone. If you have a critical or abnormal lab result, we will notify you by phone as soon as possible.  Submit refill requests through [employer] or call your pharmacy and they will forward the refill request to us. Please allow 3 business days for your refill to be completed.          Additional Information About Your Visit        MyChart Information     [employer] gives you secure access to your electronic health record. If you see a primary care provider, you can also send messages to your  "care team and make appointments. If you have questions, please call your primary care clinic.  If you do not have a primary care provider, please call [phone] and they will assist you.        Care EveryWhere ID     This is your Care EveryWhere ID. This could be used by other organizations to access your [facility] medical records  [id number]        Your Vitals Were     Pulse Temperature Respirations Height Pulse Oximetry BMI (Body Mass Index)    82 97.9  F (36.6  C) (Oral) 16 5' 5\" (1.651 m) 97% 28.96 kg/m2       Blood Pressure from Last 3 Encounters:   01/26/18 122/70   10/06/17 120/60   09/28/17 110/58    Weight from Last 3 Encounters:   01/26/18 174 lb (78.9 kg)   10/06/17 172 lb (78 kg)   09/18/17 225 lb 9.6 oz (102.3 kg)              We Performed the Following     ADMIN MEDICARE: Pneumococcal Vaccine ()     EYE EXAM (SIMPLE-NONBILLABLE)     FOOT EXAM     Fructosamine     Pneumococcal vaccine 23 valent PPSV23  (Pneumovax) [15815]          Today's Medication Changes          These changes are accurate as of 1/26/18 11:59 PM.  If you have any questions, ask your nurse or doctor.               Start taking these medicines.        Dose/Directions    ACE/ARB/ARNI NOT PRESCRIBED (INTENTIONAL)   Used for:  Type 2 diabetes mellitus without complication, with long-term current use of insulin (H)   Started by:  Alfredo Aragon MD        Please choose reason not prescribed, below   Refills:  0         These medicines have changed or have updated prescriptions.        Dose/Directions    atorvastatin 80 MG tablet   Commonly known as:  LIPITOR   This may have changed:  See the new instructions.   Used for:  Hyperlipidemia LDL goal <100   Changed by:  Alfredo Aragon MD        Dose:  80 mg   Take 1 tablet (80 mg) by mouth daily   Quantity:  90 tablet   Refills:  3       blood glucose monitoring lancets   This may have changed:  See the new instructions.   Used for:  Type 2 diabetes mellitus without complication, with " long-term current use of insulin (H)   Changed by:  Alfredo Aragon MD        USE 3 TIMES DAILY   Quantity:  200 each   Refills:  3       blood glucose monitoring test strip   Commonly known as:  ONETOUCH ULTRA   This may have changed:  See the new instructions.   Used for:  Type 2 diabetes mellitus without complication, with long-term current use of insulin (H)   Changed by:  Alfredo Aragon MD        Use to test 3 times daily   Quantity:  200 each   Refills:  3       celecoxib 200 MG capsule   Commonly known as:  celeBREX   This may have changed:    - when to take this  - additional instructions   Used for:  Primary osteoarthritis, unspecified site   Changed by:  Alfredo Aragon MD        Dose:  200 mg   Take 1 capsule (200 mg) by mouth daily With food for arthritis pain   Quantity:  90 capsule   Refills:  1            Where to get your medicines      These medications were sent to [id number] - [place], [place] - [address] [address]  [address] [address] [address]     Phone:  [phone]     atorvastatin 80 MG tablet    blood glucose monitoring lancets    blood glucose monitoring test strip    celecoxib 200 MG capsule         Some of these will need a paper prescription and others can be bought over the counter.  Ask your nurse if you have questions.     You don't need a prescription for these medications     ACE/ARB/ARNI NOT PRESCRIBED (INTENTIONAL)                Primary Care Provider Office Phone # Fax #    Alfredo Aragon [phone]       [address] [facility] [address]        Equal Access to Services     KALEIGH BRAND AH: Hadii keanu ku hadasho Soomaali, waaxda luqadaha, qaybta kaalmada adeegyada, waxay stephanie haydelores gutierrez. So [facility] [phone].    ATENCIÓN: Si habla español, tiene a sylvester disposición servicios gratuitos de asistencia lingüística. Llame al [phone].    We comply with applicable federal civil rights laws and [place] laws. We do not  discriminate on the basis of race, color, national origin, age, disability, sex, sexual orientation, or gender identity.            Thank you!     Thank you for choosing [facility]  for your care. Our goal is always to provide you with excellent care. Hearing back from our patients is one way we can continue to improve our services. Please take a few minutes to complete the written survey that you may receive in the mail after your visit with us. Thank you!             Your Updated Medication List - Protect others around you: Learn how to safely use, store and throw away your medicines at www.disposemymeds.org.          This list is accurate as of 1/26/18 11:59 PM.  Always use your most recent med list.                   Brand Name Dispense Instructions for use Diagnosis    ACE/ARB/ARNI NOT PRESCRIBED (INTENTIONAL)      Please choose reason not prescribed, below    Type 2 diabetes mellitus without complication, with long-term current use of insulin (H)       ascorbic acid 1000 MG Tabs    vitamin C     Take 1,000 mg by mouth daily.        aspirin 325 MG tablet     90 tablet    Take 1 tablet (325 mg) by mouth 2 times daily    Status post total left knee replacement       atorvastatin 80 MG tablet    LIPITOR    90 tablet    Take 1 tablet (80 mg) by mouth daily    Hyperlipidemia LDL goal <100       blood glucose monitoring lancets     200 each    USE 3 TIMES DAILY    Type 2 diabetes mellitus without complication, with long-term current use of insulin (H)       blood glucose monitoring test strip    ONETOUCH ULTRA    200 each    Use to test 3 times daily    Type 2 diabetes mellitus without complication, with long-term current use of insulin (H)       camphor-menthol 0.5-0.5 % Lotn    DERMASARRA     Apply topically 2 times daily Apply to back, legs topically twice daily for itching        celecoxib 200 MG capsule    celeBREX    90 capsule    Take 1 capsule (200 mg) by mouth daily With food for  arthritis pain    Primary osteoarthritis, unspecified site       DIPHENHYDRAMINE HCL EX      (BENADRYL CREAM 1-0.1%) Apply to affected areas topically as needed for itch TID. Thighs, buttocks, and groin) per on call Dr. Salcedo.        * insulin aspart prot & aspart injection    NovoLOG MIX 70/30 FLEXPEN    3 mL    Inject 20 Units Subcutaneous every morning (before breakfast)    Status post total left knee replacement       * insulin aspart prot & aspart injection    NovoLOG MIX 70/30 FLEXPEN    3 mL    Inject 15 Units Subcutaneous every evening    Type 2 diabetes mellitus without complication, with long-term current use of insulin (H)       NovoLOG FLEXPEN 100 UNIT/ML injection   Generic drug:  insulin aspart      Inject 10-15 Units Subcutaneous At Bedtime        ONETOUCH ULTRA SYSTEM KIT W/DEVICE Kit     1 kit    One touch ultra 2 if covered by insurance    Type II or unspecified type diabetes mellitus without mention of complication, not stated as uncontrolled       * pen needles 31G X 6 MM Misc     100 each    1 Device 3 times daily    Type 2 diabetes, HbA1c goal < 7% (H)       * BD ISREAL U/F 32G X 4 MM   Generic drug:  insulin pen needle     100 each    TEST 3 TIMES DAILY    Type 2 diabetes, HbA1c goal < 7% (H)       senna-docusate 8.6-50 MG per tablet    SENOKOT-S;PERICOLACE     Take 2 tablets by mouth 2 times daily        TAMSULOSIN HCL PO      Take 0.4 mg by mouth daily        TYLENOL 500 MG tablet   Generic drug:  acetaminophen      Take 500-1,000 mg by mouth every 6 hours as needed for mild pain        * Notice:  This list has 4 medication(s) that are the same as other medications prescribed for you. Read the directions carefully, and ask your doctor or other care provider to review them with you.

## 2018-01-26 NOTE — TELEPHONE ENCOUNTER
celecoxib (celeBREX) capsule 200 mg       Last Written Prescription Date:  0  Last Fill Quantity: 0,   # refills: 0  Last Office Visit: 1/26/2018  Future Office visit:       Routing refill request to provider for review/approval because:  Drug not active on patient's medication list

## 2018-01-26 NOTE — PROGRESS NOTES
SUBJECTIVE:   Rabia Reed is a 77 year old male who presents to clinic today for the following health issues:  Chief Complaint   Patient presents with     Diabetes     Hyperlipidemia     Due to language barrier, an  was present during the history-taking and subsequent discussion (and for part of the physical exam) with this patient.      Diabetes Follow-up    Patient is checking blood sugars: three times daily.   Blood sugar testing frequency justification: Uncontrolled diabetes  Results are as follows:         am - 100's to 120's         lunchtime - 150's to 160's         suppertime - 140's to 150's    Diabetic concerns: None     Symptoms of hypoglycemia (low blood sugar): none     Paresthesias (numbness or burning in feet) or sores: No, but some numbness in hands     Date of last diabetic eye exam: 2017    BP Readings from Last 2 Encounters:   10/06/17 120/60   09/28/17 110/58     Hemoglobin A1C (%)   Date Value   01/20/2018 8.7 (H)   09/07/2017 8.5 (H)     LDL Cholesterol Calculated (mg/dL)   Date Value   01/20/2018 49   08/03/2016 55     Hyperlipidemia Follow-Up      Rate your low fat/cholesterol diet?: good    Taking statin?  Yes, no muscle aches from statin    Other lipid medications/supplements?:  none      Amount of exercise or physical activity: 4-5 days/week for an average of greater than 60 minutes    Problems taking medications regularly: No    Medication side effects: none    Diet: regular (no restrictions)      Pt's past medical history, family history, habits, medications and allergies were reviewed with the patient today.  See snap shot for  HCM status. Most recent lab results reviewed with pt. Problem list and histories reviewed & adjusted, as indicated.  Additional history as below:    Lab Results   Component Value Date     01/20/2018     Lab Results   Component Value Date    A1C 8.7 01/20/2018     Lab Results   Component Value Date    CHOL 154 01/20/2018     Lab Results  "  Component Value Date    LDL 49 01/20/2018     Lab Results   Component Value Date    HDL 93 01/20/2018     Lab Results   Component Value Date    TRIG 59 01/20/2018     Lab Results   Component Value Date    CR 0.88 01/20/2018     Lab Results   Component Value Date    ALT 43 09/07/2017     Lab Results   Component Value Date    AST 32 09/07/2017     Lab Results   Component Value Date    MICROL 14 09/07/2017     Lab Results   Component Value Date    TSH 1.81 09/07/2017     Denies CP, SOB, abdominal pain, polyuria, polydipsia, vision changes, extremity numbness/parasthesias (except rarely  Right 2nd and 3rd fingers)  or skin problems.  No falls  Needs paperwork for Chris Bell re: food supplements with Ensure BID. Stable weight       Additional ROS:   Constitutional, HEENT, Cardiovascular, Pulmonary, GI and , Neuro, MSK and Psych review of systems/symptoms are otherwise negative or unchanged from previous, except as noted above.      OBJECTIVE:  /70  Pulse 82  Temp 97.9  F (36.6  C) (Oral)  Resp 16  Ht 5' 5\" (1.651 m)  Wt 174 lb (78.9 kg)  SpO2 97%  BMI 28.96 kg/m2   Estimated body mass index is 28.96 kg/(m^2) as calculated from the following:    Height as of this encounter: 5' 5\" (1.651 m).    Weight as of this encounter: 174 lb (78.9 kg).  Eye: PERRL, EOMI  HENT: ear canals and TM's normal and nose and mouth without ulcers or lesions   Neck: no adenopathy. Thyroid normal to palpation. No bruits  Pulm: Lungs clear to auscultation   CV: Regular rates and rhythm  GI: Soft, nontender, Normal active bowel sounds, No hepatosplenomegaly or masses palpable  Ext: Peripheral pulses intact. No edema.  Neuro: Normal strength and tone, sensory exam grossly     Assessment/Plan: (See plan discussion below for further details)  1. Type 2 diabetes mellitus without complication, with long-term current use of insulin (H)   Chronic hx of A1C not matching blood sugars. HAs occurred also in past with CGM done. Will check " fructosamine to see if more accurate. COntinue meds. Overall sugars controlled per glucometer reading  - ACE/ARB/ARNI NOT PRESCRIBED, INTENTIONAL,; Please choose reason not prescribed, below  - Fructosamine    2. Hyperlipidemia LDL goal <100  controlled  - atorvastatin (LIPITOR) 80 MG tablet; Take 1 tablet (80 mg) by mouth daily  Dispense: 90 tablet; Refill: 3    Plan discussion:  See above.  See me  In 6 mos       Alfredo Aragon MD  Internal Medicine Department  [facility]

## 2018-01-26 NOTE — NURSING NOTE
Screening Questionnaire for Adult Immunization    Are you sick today?   No   Do you have allergies to medications, food, a vaccine component or latex?   No   Have you ever had a serious reaction after receiving a vaccination?   No   Do you have a long-term health problem with heart disease, lung disease, asthma, kidney disease, metabolic disease (e.g. diabetes), anemia, or other blood disorder?   Yes   Do you have cancer, leukemia, HIV/AIDS, or any other immune system problem?   No   In the past 3 months, have you taken medications that affect  your immune system, such as prednisone, other steroids, or anticancer drugs; drugs for the treatment of rheumatoid arthritis, Crohn s disease, or psoriasis; or have you had radiation treatments?   No   Have you had a seizure, or a brain or other nervous system problem?   No   During the past year, have you received a transfusion of blood or blood     products, or been given immune (gamma) globulin or antiviral drug?   No   For women: Are you pregnant or is there a chance you could become        pregnant during the next month?   No   Have you received any vaccinations in the past 4 weeks?   No     Immunization questionnaire was positive for at least one answer.  Notified .        Per orders of Dr. Aragon, injection of [id number] given by Dolores Ellington. Patient instructed to remain in clinic for 15 minutes afterwards, and to report any adverse reaction to me immediately.       Screening performed by Dolores Ellington on 1/26/2018 at 11:19 AM.

## 2018-01-27 ENCOUNTER — MYC MEDICAL ADVICE (OUTPATIENT)
Dept: INTERNAL MEDICINE | Facility: CLINIC | Age: 78
End: 2018-01-27

## 2018-01-27 DIAGNOSIS — Z79.4 TYPE 2 DIABETES MELLITUS WITHOUT COMPLICATION, WITH LONG-TERM CURRENT USE OF INSULIN (H): Primary | ICD-10-CM

## 2018-01-27 DIAGNOSIS — E11.9 TYPE 2 DIABETES MELLITUS WITHOUT COMPLICATION, WITH LONG-TERM CURRENT USE OF INSULIN (H): Primary | ICD-10-CM

## 2018-01-27 LAB — FRUCTOSAMINE SERPL-SCNC: 387 UMOL/L (ref 170–285)

## 2018-01-29 ENCOUNTER — TELEPHONE (OUTPATIENT)
Dept: INTERNAL MEDICINE | Facility: CLINIC | Age: 78
End: 2018-01-29

## 2018-02-07 DIAGNOSIS — Z79.4 TYPE 2 DIABETES MELLITUS WITHOUT COMPLICATION, WITH LONG-TERM CURRENT USE OF INSULIN (H): Primary | ICD-10-CM

## 2018-02-07 DIAGNOSIS — E11.9 TYPE 2 DIABETES MELLITUS WITHOUT COMPLICATION, WITH LONG-TERM CURRENT USE OF INSULIN (H): Primary | ICD-10-CM

## 2018-02-08 NOTE — TELEPHONE ENCOUNTER
insulin aspart (NOVOLOG FLEXPEN) 100 UNIT/ML injection      Last Written Prescription Date:  0  Last Fill Quantity: 0,   # refills: 0  Last Office Visit: 1/26/2018  Future Office visit:       Routing refill request to provider for review/approval because:  Medication is reported/historical

## 2018-03-06 DIAGNOSIS — Z53.9 DIAGNOSIS NOT YET DEFINED: Primary | ICD-10-CM

## 2018-03-06 PROCEDURE — G0179 MD RECERTIFICATION HHA PT: HCPCS | Performed by: INTERNAL MEDICINE

## 2018-04-05 DIAGNOSIS — E11.9 TYPE 2 DIABETES, HBA1C GOAL < 7% (H): ICD-10-CM

## 2018-04-05 RX ORDER — PEN NEEDLE, DIABETIC 32GX 5/32"
NEEDLE, DISPOSABLE MISCELLANEOUS
Qty: 100 EACH | Refills: 1 | Status: SHIPPED | OUTPATIENT
Start: 2018-04-05 | End: 2018-06-20

## 2018-04-16 DIAGNOSIS — R35.0 URINE FREQUENCY: ICD-10-CM

## 2018-04-17 NOTE — TELEPHONE ENCOUNTER
"Requested Prescriptions   Pending Prescriptions Disp Refills     tamsulosin (FLOMAX) 0.4 MG capsule [Pharmacy Med Name: Tamsulosin HCl Oral Capsule 0.4 MG] 30 capsule 9      Last Written Prescription Date:    Last Fill Quantity: ,  # refills:    Last office visit: 1/26/2018 with prescribing provider:  01/26/18   Future Office Visit:  0 Sig: TAKE ONE CAPSULE BY MOUTH ONE TIME DAILY    Alpha Blockers Passed    4/16/2018  5:34 PM       Passed - Blood pressure under 140/90 in past 12 months    BP Readings from Last 3 Encounters:   01/26/18 122/70   10/06/17 120/60   09/28/17 110/58                Passed - Recent (12 mo) or future (30 days) visit within the authorizing provider's specialty    Patient had office visit in the last 12 months or has a visit in the next 30 days with authorizing provider or within the authorizing provider's specialty.  See \"Patient Info\" tab in inbasket, or \"Choose Columns\" in Meds & Orders section of the refill encounter.           Passed - Patient does not have Tadalafil, Vardenafil, or Sildenafil on their medication list       Passed - Patient is 18 years of age or older        "

## 2018-04-18 RX ORDER — TAMSULOSIN HYDROCHLORIDE 0.4 MG/1
CAPSULE ORAL
Qty: 90 CAPSULE | Refills: 3 | Status: SHIPPED | OUTPATIENT
Start: 2018-04-18 | End: 2018-12-30

## 2018-05-11 DIAGNOSIS — M19.91 PRIMARY OSTEOARTHRITIS, UNSPECIFIED SITE: ICD-10-CM

## 2018-05-13 NOTE — TELEPHONE ENCOUNTER
"Requested Prescriptions   Pending Prescriptions Disp Refills     celecoxib (CELEBREX) 200 MG capsule [Pharmacy Med Name: Celecoxib Oral Capsule 200 MG]  Last Written Prescription Date:  01/26/2018  Last Fill Quantity: 90,  # refills: 1   Last office visit: 1/26/2018 with prescribing provider:  JOSE    Future Office Visit:     90 capsule 0     Sig: Take 1 capsule (200 mg) by mouth daily With food for arthritis pain    NSAID Medications Failed    5/11/2018  6:22 PM       Failed - Patient is age 6-64 years       Passed - Blood pressure under 140/90 in past 12 months    BP Readings from Last 3 Encounters:   01/26/18 122/70   10/06/17 120/60   09/28/17 110/58                Passed - Normal ALT on file in past 12 months    Recent Labs   Lab Test  09/07/17   1700   ALT  43            Passed - Normal AST on file in past 12 months    Recent Labs   Lab Test  09/07/17   1700   AST  32            Passed - Recent (12 mo) or future (30 days) visit within the authorizing provider's specialty    Patient had office visit in the last 12 months or has a visit in the next 30 days with authorizing provider or within the authorizing provider's specialty.  See \"Patient Info\" tab in inbasket, or \"Choose Columns\" in Meds & Orders section of the refill encounter.           Passed - Normal CBC on file in past 12 months    Recent Labs   Lab Test  01/20/18   0937   WBC  6.7   RBC  4.75   HGB  15.6   HCT  47.8   PLT  192            Passed - Normal serum creatinine on file in past 12 months    Recent Labs   Lab Test  01/20/18   0937   CR  0.88               "

## 2018-05-15 RX ORDER — CELECOXIB 200 MG/1
CAPSULE ORAL
Qty: 90 CAPSULE | Refills: 1 | Status: SHIPPED | OUTPATIENT
Start: 2018-05-15 | End: 2018-11-10

## 2018-06-06 ENCOUNTER — TRANSFERRED RECORDS (OUTPATIENT)
Dept: HEALTH INFORMATION MANAGEMENT | Facility: CLINIC | Age: 78
End: 2018-06-06

## 2018-06-07 ENCOUNTER — PATIENT OUTREACH (OUTPATIENT)
Dept: GERIATRIC MEDICINE | Facility: CLINIC | Age: 78
End: 2018-06-07

## 2018-06-07 ASSESSMENT — ACTIVITIES OF DAILY LIVING (ADL)
DEPENDENT_IADLS:: CLEANING;COOKING;LAUNDRY;SHOPPING;MEAL PREPARATION;MEDICATION MANAGEMENT;MONEY MANAGEMENT;TRANSPORTATION

## 2018-06-07 NOTE — PROGRESS NOTES
[facility] Care Coordination Contact  Called member's son, Brandan, to complete 6 month assessment and left a message requesting a return call.  Charis Garcia ERIC  [facility]  [phone]

## 2018-06-09 DIAGNOSIS — Z96.652 STATUS POST TOTAL LEFT KNEE REPLACEMENT: ICD-10-CM

## 2018-06-11 NOTE — TELEPHONE ENCOUNTER
Routing refill request to provider for review/approval because:  Labs not current:  A1C  Dose requested different than on current med list   Due for diabetic follow up

## 2018-06-13 RX ORDER — INSULIN ASPART 100 [IU]/ML
INJECTION, SUSPENSION SUBCUTANEOUS
Qty: 45 ML | Refills: 1 | Status: SHIPPED | OUTPATIENT
Start: 2018-06-13 | End: 2018-10-03

## 2018-06-14 DIAGNOSIS — Z53.9 DIAGNOSIS NOT YET DEFINED: Primary | ICD-10-CM

## 2018-06-14 PROCEDURE — G0179 MD RECERTIFICATION HHA PT: HCPCS | Performed by: INTERNAL MEDICINE

## 2018-06-20 DIAGNOSIS — E11.9 TYPE 2 DIABETES, HBA1C GOAL < 7% (H): ICD-10-CM

## 2018-06-21 RX ORDER — PEN NEEDLE, DIABETIC 32GX 5/32"
NEEDLE, DISPOSABLE MISCELLANEOUS
Qty: 100 EACH | Refills: 0 | Status: SHIPPED | OUTPATIENT
Start: 2018-06-21 | End: 2018-08-01

## 2018-06-21 NOTE — TELEPHONE ENCOUNTER
"Requested Prescriptions   Pending Prescriptions Disp Refills     BD ISREAL U/F 32G X 4 MM insulin pen needle [Pharmacy Med Name: BD Pen Needle Isreal U/F Miscellaneous 32G X 4 MM] 100 each 0     Sig: TEST 3 TIMES DAILY    Diabetic Supplies Protocol Passed    6/20/2018  6:12 PM       Passed - Patient is 18 years of age or older       Passed - Recent (6 mo) or future (30 days) visit within the authorizing provider's specialty    Patient had office visit in the last 6 months or has a visit in the next 30 days with authorizing provider.  See \"Patient Info\" tab in inbasket, or \"Choose Columns\" in Meds & Orders section of the refill encounter.            Last Written Prescription Date:  4/5/18  Last Fill Quantity: 100,  # refills: 1   Last office visit: 1/26/2018 with prescribing provider:  1/26/18   Future Office Visit:      "

## 2018-07-22 DIAGNOSIS — Z79.4 TYPE 2 DIABETES MELLITUS WITHOUT COMPLICATION, WITH LONG-TERM CURRENT USE OF INSULIN (H): Primary | ICD-10-CM

## 2018-07-22 DIAGNOSIS — E11.9 TYPE 2 DIABETES MELLITUS WITHOUT COMPLICATION, WITH LONG-TERM CURRENT USE OF INSULIN (H): Primary | ICD-10-CM

## 2018-07-22 NOTE — TELEPHONE ENCOUNTER
"Requested Prescriptions   Pending Prescriptions Disp Refills     NOVOLOG FLEXPEN 100 UNIT/ML soln [Pharmacy Med Name: NovoLOG FlexPen Subcutaneous Solution Pen-injector 100 UNIT/ML]  Last Written Prescription Date:  2/13/2018  Last Fill Quantity: 15 mL,  # refills: 11   Last Office Visit: 1/26/2018   Future Office Visit:    6 mL 0     Sig: INJECT 10 UNITS SUB-Q DAILY AT BEDTIME    Short Acting Insulin Protocol Failed    7/22/2018  1:36 PM       Failed - HgbA1C in past 3 or 6 months    If HgbA1C is 8 or greater, it needs to be on file within the past 3 months.  If less than 8, must be on file within the past 6 months.     Recent Labs   Lab Test  01/20/18   0937   A1C  8.7*            Passed - Blood pressure less than 140/90 in past 6 months    BP Readings from Last 3 Encounters:   01/26/18 122/70   10/06/17 120/60   09/28/17 110/58                Passed - LDL on file in past 12 months    Recent Labs   Lab Test  01/20/18   0937   LDL  49            Passed - Microalbumin on file in past 12 months    Recent Labs   Lab Test  09/07/17   1730   MICROL  14   UMALCR  15.25            Passed - Serum creatinine on file in past 12 months    Recent Labs   Lab Test  01/20/18   0937   CR  0.88            Passed - Patient is age 18 or older       Passed - Recent (6 mo) or future (30 days) visit within the authorizing provider's specialty    Patient had office visit in the last 6 months or has a visit in the next 30 days with authorizing provider or within the authorizing provider's specialty.  See \"Patient Info\" tab in inbasket, or \"Choose Columns\" in Meds & Orders section of the refill encounter.              "

## 2018-07-24 NOTE — TELEPHONE ENCOUNTER
Routing refill request to provider for review/approval because:  Requested medication is different than current med list

## 2018-07-25 RX ORDER — INSULIN ASPART 100 [IU]/ML
INJECTION, SOLUTION INTRAVENOUS; SUBCUTANEOUS
Qty: 6 ML | Refills: 0 | OUTPATIENT
Start: 2018-07-25

## 2018-07-25 NOTE — TELEPHONE ENCOUNTER
Per review of med list, pt already has  Novolog RF  From Feb 2018 for correct dose of 10-15 units at bedtime that has refilled available for 1 year. Therefore should not be getting RF request now from pharmacy using old sig. Pharmacy to fill Novolog from Rx already sent to them from Feb 2018. Inform pharmacy

## 2018-07-26 DIAGNOSIS — Z53.9 DIAGNOSIS NOT YET DEFINED: Primary | ICD-10-CM

## 2018-07-26 PROCEDURE — 99207 C MD RECERTIFICATION HHA PT: CPT | Performed by: INTERNAL MEDICINE

## 2018-08-01 DIAGNOSIS — E11.9 TYPE 2 DIABETES, HBA1C GOAL < 7% (H): ICD-10-CM

## 2018-08-01 NOTE — LETTER
[facility]  [address] [id number] [phone]            Rabia Reed  [address] [address]        August 2, 2018    Dear Rabia,    While refilling your prescription today, we noticed that you are due for an appointment with your provider.  We will refill your prescription for 30 days, but a follow-up appointment must be made before any additional refills can be approved.     Taking care of your health is important to us and we look forward to seeing you in the near future.  Please call us at [phone] or [phone] (or use [employer]) to schedule an appointment.     Please disregard this notice if you have already made an appointment.    Sincerely,        [facility]

## 2018-08-02 RX ORDER — PEN NEEDLE, DIABETIC 32GX 5/32"
NEEDLE, DISPOSABLE MISCELLANEOUS
Qty: 100 EACH | Refills: 0 | Status: SHIPPED | OUTPATIENT
Start: 2018-08-02 | End: 2020-06-22

## 2018-08-02 NOTE — TELEPHONE ENCOUNTER
"BD ISREAL U/F 32G X 4 MM insulin pen needle  Last Written Prescription Date:  6-21-18  Last Fill Quantity: 100,  # refills: 0   Last office visit: 1/26/2018 with prescribing provider:  1-26-18   Future Office Visit:        Requested Prescriptions   Pending Prescriptions Disp Refills     ULTICARE MICRO 32G X 4 MM insulin pen needle [Pharmacy Med Name: UltiCare Micro Pen Needles Miscellaneous 32G X 4 MM] 100 each 0     Sig: TEST 3 TIMES DAILY    Diabetic Supplies Protocol Failed    8/1/2018  8:21 PM       Failed - Recent (6 mo) or future (30 days) visit within the authorizing provider's specialty    Patient had office visit in the last 6 months or has a visit in the next 30 days with authorizing provider.  See \"Patient Info\" tab in inbasket, or \"Choose Columns\" in Meds & Orders section of the refill encounter.           Passed - Patient is 18 years of age or older          "

## 2018-08-13 ENCOUNTER — TRANSFERRED RECORDS (OUTPATIENT)
Dept: HEALTH INFORMATION MANAGEMENT | Facility: CLINIC | Age: 78
End: 2018-08-13

## 2018-08-13 LAB — PHQ9 SCORE: 12

## 2018-09-11 PROCEDURE — G0179 MD RECERTIFICATION HHA PT: HCPCS | Performed by: INTERNAL MEDICINE

## 2018-09-28 DIAGNOSIS — E11.9 TYPE 2 DIABETES MELLITUS WITHOUT COMPLICATION, WITH LONG-TERM CURRENT USE OF INSULIN (H): Primary | ICD-10-CM

## 2018-09-28 DIAGNOSIS — Z79.4 TYPE 2 DIABETES MELLITUS WITHOUT COMPLICATION, WITH LONG-TERM CURRENT USE OF INSULIN (H): Primary | ICD-10-CM

## 2018-09-29 NOTE — TELEPHONE ENCOUNTER
"Requested Prescriptions   Pending Prescriptions Disp Refills     ULTICARE MICRO 32G X 4 MM insulin pen needle [Pharmacy Med Name: UltiCare Micro Pen Needles Miscellaneous 32G X 4 MM]  Last Written Prescription Date:  08/002/2018  Last Fill Quantity: 100,  # refills: 0   Last Office Visit: 1/26/2018   Future Office Visit:    Next 5 appointments (look out 90 days)     Oct 02, 2018  3:15 PM CDT   Pre-Op physical with Alfredo Aragon MD, [facility]   [facility]    [address] [address] [phone]                  100 each 0     Sig: TEST 3 TIMES DAILY; pt needs to be seen.    Diabetic Supplies Protocol Passed    9/28/2018  5:24 PM       Passed - Patient is 18 years of age or older       Passed - Recent (6 mo) or future (30 days) visit within the authorizing provider's specialty    Patient had office visit in the last 6 months or has a visit in the next 30 days with authorizing provider.  See \"Patient Info\" tab in inbasket, or \"Choose Columns\" in Meds & Orders section of the refill encounter.              "

## 2018-10-01 NOTE — TELEPHONE ENCOUNTER
Routing refill request to provider for review/approval because:  Anabela given x1 and patient did not follow up, please advise  Looks like patient has upcoming OV tomorrow for a Pre-op. Please advise of refill request.     Rosangela HOGUE RN, BSN, PHN

## 2018-10-02 ENCOUNTER — OFFICE VISIT (OUTPATIENT)
Dept: INTERNAL MEDICINE | Facility: CLINIC | Age: 78
End: 2018-10-02
Payer: COMMERCIAL

## 2018-10-02 VITALS
WEIGHT: 179 LBS | SYSTOLIC BLOOD PRESSURE: 120 MMHG | BODY MASS INDEX: 29.79 KG/M2 | TEMPERATURE: 97.6 F | DIASTOLIC BLOOD PRESSURE: 70 MMHG | RESPIRATION RATE: 16 BRPM | OXYGEN SATURATION: 92 % | HEART RATE: 82 BPM

## 2018-10-02 DIAGNOSIS — Z01.818 PREOP GENERAL PHYSICAL EXAM: Primary | ICD-10-CM

## 2018-10-02 DIAGNOSIS — G89.29 CHRONIC PAIN OF LEFT KNEE: ICD-10-CM

## 2018-10-02 DIAGNOSIS — Z79.4 TYPE 2 DIABETES MELLITUS WITHOUT COMPLICATION, WITH LONG-TERM CURRENT USE OF INSULIN (H): ICD-10-CM

## 2018-10-02 DIAGNOSIS — E11.9 TYPE 2 DIABETES MELLITUS WITHOUT COMPLICATION, WITH LONG-TERM CURRENT USE OF INSULIN (H): ICD-10-CM

## 2018-10-02 DIAGNOSIS — M25.562 CHRONIC PAIN OF LEFT KNEE: ICD-10-CM

## 2018-10-02 LAB
ALBUMIN SERPL-MCNC: 3.4 G/DL (ref 3.4–5)
ALP SERPL-CCNC: 147 U/L (ref 40–150)
ALT SERPL W P-5'-P-CCNC: 41 U/L (ref 0–70)
ANION GAP SERPL CALCULATED.3IONS-SCNC: 3 MMOL/L (ref 3–14)
AST SERPL W P-5'-P-CCNC: 28 U/L (ref 0–45)
BILIRUB SERPL-MCNC: 0.5 MG/DL (ref 0.2–1.3)
BUN SERPL-MCNC: 25 MG/DL (ref 7–30)
CALCIUM SERPL-MCNC: 8.2 MG/DL (ref 8.5–10.1)
CHLORIDE SERPL-SCNC: 106 MMOL/L (ref 94–109)
CO2 SERPL-SCNC: 31 MMOL/L (ref 20–32)
CREAT SERPL-MCNC: 1.01 MG/DL (ref 0.66–1.25)
ERYTHROCYTE [DISTWIDTH] IN BLOOD BY AUTOMATED COUNT: 11.9 % (ref 10–15)
GFR SERPL CREATININE-BSD FRML MDRD: 71 ML/MIN/1.7M2
GLUCOSE SERPL-MCNC: 234 MG/DL (ref 70–99)
HBA1C MFR BLD: 8.6 % (ref 0–5.6)
HCT VFR BLD AUTO: 42.1 % (ref 40–53)
HGB BLD-MCNC: 13.5 G/DL (ref 13.3–17.7)
MCH RBC QN AUTO: 33.3 PG (ref 26.5–33)
MCHC RBC AUTO-ENTMCNC: 32.1 G/DL (ref 31.5–36.5)
MCV RBC AUTO: 104 FL (ref 78–100)
MRSA DNA SPEC QL NAA+PROBE: NEGATIVE
PLATELET # BLD AUTO: 167 10E9/L (ref 150–450)
POTASSIUM SERPL-SCNC: 3.9 MMOL/L (ref 3.4–5.3)
PROT SERPL-MCNC: 7.1 G/DL (ref 6.8–8.8)
RBC # BLD AUTO: 4.05 10E12/L (ref 4.4–5.9)
SODIUM SERPL-SCNC: 140 MMOL/L (ref 133–144)
SPECIMEN SOURCE: NORMAL
TSH SERPL DL<=0.005 MIU/L-ACNC: 1.16 MU/L (ref 0.4–4)
WBC # BLD AUTO: 6.6 10E9/L (ref 4–11)

## 2018-10-02 PROCEDURE — 93000 ELECTROCARDIOGRAM COMPLETE: CPT | Performed by: INTERNAL MEDICINE

## 2018-10-02 PROCEDURE — 80053 COMPREHEN METABOLIC PANEL: CPT | Performed by: INTERNAL MEDICINE

## 2018-10-02 PROCEDURE — 82043 UR ALBUMIN QUANTITATIVE: CPT | Performed by: INTERNAL MEDICINE

## 2018-10-02 PROCEDURE — 36415 COLL VENOUS BLD VENIPUNCTURE: CPT | Performed by: INTERNAL MEDICINE

## 2018-10-02 PROCEDURE — 87641 MR-STAPH DNA AMP PROBE: CPT | Performed by: INTERNAL MEDICINE

## 2018-10-02 PROCEDURE — 83036 HEMOGLOBIN GLYCOSYLATED A1C: CPT | Performed by: INTERNAL MEDICINE

## 2018-10-02 PROCEDURE — 85027 COMPLETE CBC AUTOMATED: CPT | Performed by: INTERNAL MEDICINE

## 2018-10-02 PROCEDURE — 99215 OFFICE O/P EST HI 40 MIN: CPT | Performed by: INTERNAL MEDICINE

## 2018-10-02 PROCEDURE — 84443 ASSAY THYROID STIM HORMONE: CPT | Performed by: INTERNAL MEDICINE

## 2018-10-02 RX ORDER — FINASTERIDE 5 MG/1
TABLET, FILM COATED ORAL
Refills: 3 | COMMUNITY
Start: 2018-08-01 | End: 2018-10-03

## 2018-10-02 RX ORDER — BLOOD SUGAR DIAGNOSTIC
1 STRIP MISCELLANEOUS 3 TIMES DAILY
Qty: 100 EACH | Refills: 11 | Status: SHIPPED | OUTPATIENT
Start: 2018-10-02 | End: 2019-10-29

## 2018-10-02 NOTE — MR AVS SNAPSHOT
After Visit Summary   10/2/2018    Rabia Reed    MRN: [id number]           Patient Information     Date Of Birth          1940        Visit Information        Provider Department      10/2/2018 3:15 PM Alfredo Aragon MD; [facility]        Today's Diagnoses     Preop general physical exam    -  1    Type 2 diabetes mellitus without complication, with long-term current use of insulin (H)          Care Instructions     Stay off of Aspirin and Celebrex between now and surgery   Take 70/30 insulin 14 units  At supper/dinner the night before surgery  Take Novolog 6 units at bedtime the nioght before surgery   Nothing to eat/drink after midnight prior to surgery. No insulin the morning of surgery          Follow-ups after your visit        Your next 10 appointments already scheduled     Oct 05, 2018   Procedure with Darnell Allen MD   [facility] (--)    [address]  [facility] [id number] [phone]              Who to contact     If you have questions or need follow up information about today's clinic visit or your schedule please contact [facility] directly at [phone].  Normal or non-critical lab and imaging results will be communicated to you by MyChart, letter or phone within 4 business days after the clinic has received the results. If you do not hear from us within 7 days, please contact the clinic through MyChart or phone. If you have a critical or abnormal lab result, we will notify you by phone as soon as possible.  Submit refill requests through [employer] or call your pharmacy and they will forward the refill request to us. Please allow 3 business days for your refill to be completed.          Additional Information About Your Visit        [employer] Information     [employer] gives you secure access to your electronic health record. If you see a primary care provider, you can  also send messages to your care team and make appointments. If you have questions, please call your primary care clinic.  If you do not have a primary care provider, please call [phone] and they will assist you.        Care EveryWhere ID     This is your Care EveryWhere ID. This could be used by other organizations to access your [place] medical records  [id number]        Your Vitals Were     Pulse Temperature Respirations Pulse Oximetry BMI (Body Mass Index)       82 97.6  F (36.4  C) (Oral) 16 92% 29.79 kg/m2        Blood Pressure from Last 3 Encounters:   10/02/18 120/70   01/26/18 122/70   10/06/17 120/60    Weight from Last 3 Encounters:   10/02/18 179 lb (81.2 kg)   01/26/18 174 lb (78.9 kg)   10/06/17 172 lb (78 kg)              We Performed the Following     EKG 12-lead complete w/read - Clinics        Primary Care Provider Office Phone # Fax #    Alfredo Aragon [phone]       [address] [place] [address]        Equal Access to Services     KALEIGH BRAND : Hadii aad ku hadasho Soomaali, waaxda luqadaha, qaybta kaalmada adekelvinyada, alfonzo oconnor . So [facility] [phone].    ATENCIÓN: Si habla español, tiene a sylvester disposición servicios gratuitos de asistencia lingüística. [facility] [phone].    We comply with applicable federal civil rights laws and [place] laws. We do not discriminate on the basis of race, color, national origin, age, disability, sex, sexual orientation, or gender identity.            Thank you!     Thank you for choosing [facility]  for your care. Our goal is always to provide you with excellent care. Hearing back from our patients is one way we can continue to improve our services. Please take a few minutes to complete the written survey that you may receive in the mail after your visit with us. Thank you!             Your Updated Medication List - Protect others around you: Learn how to safely use, store  and throw away your medicines at www.disposemymeds.org.          This list is accurate as of 10/2/18  4:13 PM.  Always use your most recent med list.                   Brand Name Dispense Instructions for use Diagnosis    ACE/ARB/ARNI NOT PRESCRIBED (INTENTIONAL)      Please choose reason not prescribed, below    Type 2 diabetes mellitus without complication, with long-term current use of insulin (H)       ascorbic acid 1000 MG Tabs    vitamin C     Take 1,000 mg by mouth daily.        aspirin 325 MG tablet     90 tablet    Take 1 tablet (325 mg) by mouth 2 times daily    Status post total left knee replacement       atorvastatin 80 MG tablet    LIPITOR    90 tablet    Take 1 tablet (80 mg) by mouth daily    Hyperlipidemia LDL goal <100       blood glucose monitoring lancets     200 each    USE 3 TIMES DAILY    Type 2 diabetes mellitus without complication, with long-term current use of insulin (H)       blood glucose monitoring test strip    ONETOUCH ULTRA    200 each    Use to test 3 times daily    Type 2 diabetes mellitus without complication, with long-term current use of insulin (H)       camphor-menthol 0.5-0.5 % Lotn    DERMASARRA     Apply topically 2 times daily Apply to back, legs topically twice daily for itching        celecoxib 200 MG capsule    celeBREX    90 capsule    Take 1 capsule (200 mg) by mouth daily With food for arthritis pain    Primary osteoarthritis, unspecified site       DIPHENHYDRAMINE HCL EX      (BENADRYL CREAM 1-0.1%) Apply to affected areas topically as needed for itch TID. Thighs, buttocks, and groin) per on call Dr. Salcedo.        finasteride 5 MG tablet    PROSCAR          insulin aspart 100 UNIT/ML injection    NovoLOG FLEXPEN    15 mL    Inject 10-15 Units Subcutaneous At Bedtime    Type 2 diabetes mellitus without complication, with long-term current use of insulin (H)       NovoLOG MIX 70/30 FLEXPEN injection   Generic drug:  insulin aspart prot & aspart     45 mL    INJECT  30 UNITS DAILY BEFORE BREAKFAST AND 20 UNITS BEFORE DINNER.    Status post total left knee replacement       ONETOUCH ULTRA SYSTEM KIT w/Device Kit     1 kit    One touch ultra 2 if covered by insurance    Type II or unspecified type diabetes mellitus without mention of complication, not stated as uncontrolled       * pen needles 31G X 6 MM Misc     100 each    1 Device 3 times daily    Type 2 diabetes, HbA1c goal < 7% (H)       * ULTICARE MICRO 32G X 4 MM   Generic drug:  insulin pen needle     100 each    TEST 3 TIMES DAILY    Type 2 diabetes, HbA1c goal < 7% (H)       senna-docusate 8.6-50 MG per tablet    SENOKOT-S;PERICOLACE     Take 2 tablets by mouth 2 times daily        * TAMSULOSIN HCL PO      Take 0.4 mg by mouth daily        * tamsulosin 0.4 MG capsule    FLOMAX    90 capsule    TAKE ONE CAPSULE BY MOUTH ONE TIME DAILY    Urine frequency       TYLENOL 500 MG tablet   Generic drug:  acetaminophen      Take 500-1,000 mg by mouth every 6 hours as needed for mild pain        * Notice:  This list has 4 medication(s) that are the same as other medications prescribed for you. Read the directions carefully, and ask your doctor or other care provider to review them with you.

## 2018-10-02 NOTE — PATIENT INSTRUCTIONS
Stay off of Aspirin and Celebrex between now and surgery   Take 70/30 insulin 14 units  At supper/dinner the night before surgery  Take Novolog 6 units at bedtime the nioght before surgery   Nothing to eat/drink after midnight prior to surgery. No insulin the morning of surgery

## 2018-10-02 NOTE — PROGRESS NOTES
[address] [address]  [phone]  Dept: [phone]    PRE-OP EVALUATION:  Today's date: 10/2/2018    Rabia Reed (: 1940) presents for pre-operative evaluation assessment as requested by Dr. Allen.  He requires evaluation and anesthesia risk assessment prior to undergoing surgery/procedure for treatment of Knee Pain .    Proposed Surgery/ Procedure: Arthroplasty Knee, Left  Date of Surgery/ Procedure: 10/05/2018  Time of Surgery/ Procedure: 7:30am  Hospital/Surgical Facility: [facility]   Primary Physician: Alfredo Aragon  Type of Anesthesia Anticipated: General    Patient has a Health Care Directive or Living Will:  YES     1. NO - Do you have a history of heart attack, stroke, stent, bypass or surgery on an artery in the head, neck, heart or legs?  2. NO - Do you ever have any pain or discomfort in your chest?  3. NO - Do you have a history of  Heart Failure?  4. NO - Are you troubled by shortness of breath when: walking on the level, up a slight hill or at night?  5. NO - Do you currently have a cold, bronchitis or other respiratory infection?  6. NO - Do you have a cough, shortness of breath or wheezing?  7. YES  - Do you sometimes get pains in the calves of your legs when you walk? Only at the knee. Not actual claudication-like sx  8. NO - Do you or anyone in your family have previous history of blood clots?  9. NO - Do you or does anyone in your family have a serious bleeding problem such as prolonged bleeding following surgeries or cuts?  10. NO - Have you ever had problems with anemia or been told to take iron pills?  11. NO - Have you had any abnormal blood loss such as black, tarry or bloody stools, or abnormal vaginal bleeding?  12. NO - Have you ever had a blood transfusion?  13. NO - Have you or any of your relatives ever had problems with anesthesia?  14. NO - Do you have sleep apnea, excessive snoring or daytime  drowsiness?  15. NO - Do you have any prosthetic heart valves?  16. YES - Do you have prosthetic joints? RTKA  17. NO - Is there any chance that you may be pregnant?      HPI:     HPI related to upcoming procedure:  Hx chronic left knee pain with DJD. Has failed conservative treatment. Now presents for clearance to undergo surgical correction with TKA. See below for other medical issues. Hx type 2 IDDM. Has chronic hx in which A1C results have reflected higher average blood sugars readings than what has been seen on  either 4x/day blood sugar monitoring or prior  CGM studies. This has been also confirmed with evaluations  by endocrinology.  A1C has ranged from 8.5-8.7 past 1 year. Pt has good exercise tolerance. Riding stationary bicycle for 20 min daily without chest pain or shortness of breath. Also does other weight resistance toning exercises of UEs and LEs. See below for other medical issues    Due to language barrier, an  was present during the history-taking and subsequent discussion (and for part of the physical exam) with this patient.         MEDICAL HISTORY:     Patient Active Problem List    Diagnosis Date Noted     Anemia, unspecified type 01/04/2018     Priority: Medium     Status post total left knee replacement 09/14/2017     Priority: Medium     Type 2 diabetes mellitus without complication, with long-term current use of insulin (H) 01/29/2017     Priority: Medium     Erectile dysfunction, unspecified erectile dysfunction type 01/25/2016     Priority: Medium     Elevated prostate specific antigen (PSA) 11/06/2013     Priority: Medium     ACP (advance care planning) 08/24/2012     Priority: Medium     Advance Care Planning 1/11/2017: ACP Review of Chart / Resources Provided:  Reviewed chart for advance care plan.  Rabia Reed has no plan or code status on file. Discussed available resources and provided with information. Confirmed/documented legally designated decision makers.  Added by  "Charis Garcia  Advance Care Planning 2016: ACP Review of Chart / Resources Provided:  Reviewed chart for advance care plan.  Rabia Reed has no plan or code status on file. Discussed available resources and provided with information. Confirmed/documented legally designated decision makers.  Added by Charis Garcia  Discussed advance care planning with patient; information given to patient to review. 2012        Health Care Home 2011     Priority: Medium     [facility] CARE COORDINATOR  ANASTACIA VELÁZQUEZ  [phone]  [facility] CARE PLAN SUMMARY    Client Name:  Rabia Reed  Address:  [address] [place] [id number] Phone: [phone] (home)    :  1940 Date of Assessment:  2017   Health Plan:  [employer]  Health Plan Number: [id number] Medical Assistance Number: [id number]  Financial Worker:     [facility] :  ANASTACIA Velázquez  Phone:  [phone]   Fax:  [phone]   [facility] Enrollment Date: 2008 Case Mix:  D  Rate Cell:  B  Waiver Type:  EW   Primary Emergency Contact:  Brandan Reed  Home Phone: [phone]  Work Phone: [phone]  Relation: Son Language:  Omani  :  Yes:    Health Care Agent/POA: Brandan Reed (Son)  Home Phone: [phone] Advanced Directives/Living Will:  None   Primary Care Clinic:  [facility] Primary Dx;  Diabetes [EF11.9]  Secondary Dx:  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 Irizarry- [facility] ENT  Audiologist:    Dr. Aj  - [facility]aryng[facility] (ENT)   Eye Care Provider:   Dr. Dixon, yearly appt.  Wears corrective lenses Dental Care Provider:    Dr. Edenilson Anna, [facility] (made dentures)   Other:          [facility] CURRENT SERVICES SUMMARY  Equipment owned/DME history: bath bench w/o back, standard walker, built up " silverware,  SERVICE TYPE/PROVIDER NAME/PHONE AUTH DATE FREQUENCY Units OR $ Amt DESCRIPTION   Medical Transportation: [facility] [phone]  Fax:  1/1/2018-12/31/18 as needed     DME: [employer] [phone]  Fax: [phone] 1/1/2018-12/31/18 Monthly $2.86 per can 2 cans of glucerna per day (Per [employer], glucerna is EW only)   DME: [facility] [phone]  Fax: [phone] 1/1/2018-12/31/18 as needed  Pull ups (Med) Family to order as needed   PCA: [employer] [phone]  Fax: [phone] 1/12/17-1/10/18      12/14/17: DTR issued daily  16 units per day and PCA supervision every other month    12/6/17 Assessment: 14 units per day and supervision every other month. DTR submitted.     12/6/17: Client declined ADC at the time of the assessment but will continue to consider this.              Allergies:    Allergies   Allergen Reactions     No Known Drug Allergies      Diagnoses:   Patient Active Problem List   Diagnosis     IMPOTENCE, ORGANIC ORIGN     CHONDROMALACIA PATELLAE     MEMORY LOSS     ACOUSTIC NERVE DISORDERS     TYPE 2 DIABETES, HBA1C GOAL < 7%     HYPERLIPIDEMIA LDL GOAL <100     BPH (benign prostatic hypertrophy)     Health Care Home     Pain in joint, lower leg     ACP (advance care planning)     Elevated prostate specific antigen (PSA)                            BPH (benign prostatic hypertrophy) 12/23/2010     Priority: Medium     HYPERLIPIDEMIA LDL GOAL <100 10/31/2010     Priority: Medium     Disorder of acoustic nerve      Priority: Medium     acoustical neuroma right side  Problem list name updated by automated process. 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     Priority: Medium      Past Medical History:   Diagnosis Date     Anemia, unspecified type 1/4/2018     Calculus of kidney     renal calculi     Chondromalacia of patella 5/03    right     Disorders of acoustic nerve 2/08     acoustical neuroma right side     Elevated prostate specific antigen (PSA) 11/6/2013     Essential hypertension, benign      Hyperlipidemia LDL goal <100       Impotence of organic origin      Inguinal hernia without mention of obstruction or gangrene, unilateral or unspecified, (not specified as recurrent) 1/03    right     Memory loss      Osteoarthritis of both hands, unspecified osteoarthritis type 3/19/2017     Tobacco use disorder      Type 2 diabetes mellitus without complication  (goal A1C<7) 10/24/2015     Unspecified nasal polyp      Urinary frequency     nocturia x 5     Past Surgical History:   Procedure Laterality Date     ARTHROPLASTY KNEE Right 9/14/2017    Procedure: ARTHROPLASTY KNEE;  RIGHT TOTAL KNEE ARTHROPLASTY ;  Surgeon: Darnell Allen MD;  Location: [facility] OR     C APPENDECTOMY       C NONSPECIFIC PROCEDURE  4/01    nasal polyp excision     C NONSPECIFIC PROCEDURE  1/03    RIH repair     C NONSPECIFIC PROCEDURE  April 2011     right knee arthroscopy     Current Outpatient Prescriptions   Medication Sig Dispense Refill     ACE/ARB/ARNI NOT PRESCRIBED, INTENTIONAL, Please choose reason not prescribed, below       acetaminophen (TYLENOL) 500 MG tablet Take 500-1,000 mg by mouth every 6 hours as needed for mild pain       ascorbic acid (VITAMIN C) 1000 MG TABS Take 1,000 mg by mouth daily.       aspirin 325 MG tablet Take 1 tablet (325 mg) by mouth 2 times daily 90 tablet 3     atorvastatin (LIPITOR) 80 MG tablet Take 1 tablet (80 mg) by mouth daily 90 tablet 3     blood glucose monitoring (ONE TOUCH ULTRASOFT) lancets USE 3 TIMES DAILY 200 each 3     blood glucose monitoring (ONETOUCH ULTRA) test strip Use to test 3 times daily 200 each 3     Blood Glucose Monitoring Suppl (ONE TOUCH ULTRA SYSTEM KIT) W/DEVICE KIT One touch ultra 2 if covered by insurance 1 kit 0     camphor-menthol (DERMASARRA) 0.5-0.5 % LOTN Apply topically 2 times daily Apply to back, legs topically twice daily for itching        celecoxib (CELEBREX) 200 MG capsule Take 1 capsule (200 mg) by mouth daily With food for arthritis pain 90 capsule 1     DIPHENHYDRAMINE HCL EX (BENADRYL CREAM 1-0.1%) Apply to affected areas topically as needed for itch TID. Thighs, buttocks, and groin) per on call Dr. Salcedo.       insulin aspart (NOVOLOG FLEXPEN) 100 UNIT/ML injection Inject 10-15 Units Subcutaneous At Bedtime 15 mL 11     Insulin Pen Needle (PEN NEEDLES) 31G X 6 MM MISC 1 Device 3 times daily 100 each 11     NOVOLOG MIX 70/30 FLEXPEN (70-30) 100 UNIT/ML susp INJECT 30 UNITS DAILY BEFORE BREAKFAST AND 20 UNITS BEFORE DINNER. 45 mL 1     senna-docusate (SENOKOT-S;PERICOLACE) 8.6-50 MG per tablet Take 2 tablets by mouth 2 times daily       tamsulosin (FLOMAX) 0.4 MG capsule TAKE ONE CAPSULE BY MOUTH ONE TIME DAILY  90 capsule 3     TAMSULOSIN HCL PO Take 0.4 mg by mouth daily       ULTICARE MICRO 32G X 4 MM insulin pen needle TEST 3 TIMES DAILY 100 each 0     OTC products: None, except as noted above    Allergies   Allergen Reactions     No Known Drug Allergies       Latex Allergy: NO    Social History   Substance Use Topics     Smoking status: Former Smoker     Packs/day: 0.50     Years: 42.00     Quit date: 1/17/2001     Smokeless tobacco: Never Used     Alcohol use No     History   Drug Use No       REVIEW OF SYSTEMS:   CONSTITUTIONAL: NEGATIVE for fever, chills,  Weight up 5 pounds  INTEGUMENTARY/SKIN: NEGATIVE for worrisome rashes, moles or lesions  EYES: NEGATIVE for vision changes or irritation  ENT/MOUTH: NEGATIVE for ear pain , mouth and throat problems. 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 Rare minimal peripheral edema. No PND, orthopnea  GI: NEGATIVE for nausea, abdominal pain, heartburn, or change in bowel habits with current meds  : NEGATIVE for dysuria, or hematuria.  U On Flomax but still some slowing if urine flow.  Nocturia x2 but goes back to sleep  MUSCULOSKELETAL:  See HPI.  Mild stiffness in general joints  NEURO: NEGATIVE for weakness,   paresthesias. No recent falls.    ENDOCRINE:  POSITIVE for diabetes mellitus. 140s in AM.  160s in the PM  HEME: NEGATIVE for bleeding problems.  PSYCHIATRIC: NEGATIVE for changes in mood or affect    EXAM:   /70  Pulse 82  Temp 97.6  F (36.4  C) (Oral)  Resp 16  Wt 179 lb (81.2 kg)  SpO2 92%  BMI 29.79 kg/m2  Eye: PERRL, EOMI  HENT: ear canals and TM's normal and nose and mouth without ulcers or lesions   Neck: no adenopathy. Thyroid normal to palpation. No bruits  Pulm: Lungs clear to auscultation   CV: Regular rates and rhythm  GI: Soft, nontender, Normal active bowel sounds, No hepatosplenomegaly or masses palpable  Ext: Peripheral pulses intact. No edema. RTKA scar  Neuro: Normal strength and tone, sensory exam grossly normal. Stable antalgic gait with cane      DIAGNOSTICS:   EKG:  NSR with rate 85. No acute ST/T changes    Component      Latest Ref Rng & Units 1/20/2018 10/2/2018   Sodium      133 - 144 mmol/L  140   Potassium      3.4 - 5.3 mmol/L  3.9   Chloride      94 - 109 mmol/L  106   Carbon Dioxide      20 - 32 mmol/L  31   Anion Gap      3 - 14 mmol/L  3   Glucose      70 - 99 mg/dL  234 (H)   Urea Nitrogen      7 - 30 mg/dL  25   Creatinine      0.66 - 1.25 mg/dL  1.01   GFR Estimate      >60 mL/min/1.7m2  71   GFR Estimate If Black      >60 mL/min/1.7m2  86   Calcium      8.5 - 10.1 mg/dL  8.2 (L)   Bilirubin Total      0.2 - 1.3 mg/dL  0.5   Albumin      3.4 - 5.0 g/dL  3.4   Protein Total      6.8 - 8.8 g/dL  7.1   Alkaline Phosphatase      40 - 150 U/L  147   ALT      0 - 70 U/L  41   AST      0 - 45 U/L  28   WBC      4.0 - 11.0 10e9/L  6.6   RBC Count      4.4 - 5.9 10e12/L  4.05 (L)   Hemoglobin      13.3 - 17.7 g/dL  13.5   Hematocrit      40.0 - 53.0 %  42.1   MCV      78 - 100 fl  104 (H)   MCH      26.5 - 33.0 pg  33.3 (H)   MCHC      31.5 - 36.5  g/dL  32.1   RDW      10.0 - 15.0 %  11.9   Platelet Count      150 - 450 10e9/L  167   Specimen Description        Nares   Methicillin Resist/Sens S. aureus PCR      NEG:Negative  Negative   Hemoglobin A1C      0 - 5.6 % 8.7 (H) 8.6 (H)   TSH      0.40 - 4.00 mU/L  1.16       IMPRESSION:   Reason for surgery/procedure:  Left knee DJD  Diagnosis/reason for consult:   1. Type 2 IDDM. A1C 8.6. See HPI. Pt chronically has run A1Cs elevated  above what is seen on blood sugar monitoring throughout the day for years. Therefore do not feel surgery needs to be delayed to improve blood sugar control further. Blood sugar elevated with this one lab test but pt had eaten right before the lab draw. Blood sugars have been running in the 140-160 range  Per review of recent blood sugars brought to appt today.  2.  BPH. Overall stable with Flomax, Finasteride  3. Hx right vestibular schwannoma. Pt has declined surgical intervention.  No recent falls. Has chronic right hearing loss  4. Hyperlipidemia. On statin    The proposed surgical procedure is considered INTERMEDIATE risk.    REVISED CARDIAC RISK INDEX  The patient has the following serious cardiovascular risks for perioperative complications such as (MI, PE, VFib and 3  AV Block):  Diabetes Mellitus (on Insulin)  INTERPRETATION: 1 risks: Class II (low risk - 0.9% complication rate)    The patient has the following additional risks for perioperative complications:  No identified additional risks         RECOMMENDATIONS:       Cardiovascular Risk  Performs 4 METs exercise without symptoms (Bicycling at 8.5 minutes per mile (7 miles/hour)) .      APPROVAL GIVEN to proceed with proposed procedure, without further diagnostic evaluation     PLAN:   Stay off of Aspirin and Celebrex between now and surgery (were stopped 1 week prior to surgery)   Take 70/30 insulin 14 units  At supper/dinner the night before surgery  Take Novolog 6 units at bedtime the nioght before surgery   Nothing  to eat/drink after midnight prior to surgery. No insulin the morning of surgery    Signed Electronically by: Alfredo Aragon MD    Copy of this evaluation report is provided to requesting physician.    [facility] Preop Guidelines    Revised Cardiac Risk Index

## 2018-10-03 LAB
CREAT UR-MCNC: 76 MG/DL
MICROALBUMIN UR-MCNC: 10 MG/L
MICROALBUMIN/CREAT UR: 13.77 MG/G CR (ref 0–17)

## 2018-10-03 RX ORDER — PEN NEEDLE, DIABETIC 32GX 5/32"
NEEDLE, DISPOSABLE MISCELLANEOUS
Qty: 100 EACH | Refills: 0 | OUTPATIENT
Start: 2018-10-03

## 2018-10-04 RX ORDER — CLINDAMYCIN PHOSPHATE 900 MG/50ML
900 INJECTION, SOLUTION INTRAVENOUS
Status: CANCELLED | OUTPATIENT
Start: 2018-10-04

## 2018-10-04 RX ORDER — CLINDAMYCIN PHOSPHATE 900 MG/50ML
900 INJECTION, SOLUTION INTRAVENOUS SEE ADMIN INSTRUCTIONS
Status: CANCELLED | OUTPATIENT
Start: 2018-10-04

## 2018-10-04 NOTE — H&P (VIEW-ONLY)
[address] [address]  [phone]  Dept: [phone]    PRE-OP EVALUATION:  Today's date: 10/2/2018    Rabia Reed (: 1940) presents for pre-operative evaluation assessment as requested by Dr. Allen.  He requires evaluation and anesthesia risk assessment prior to undergoing surgery/procedure for treatment of Knee Pain .    Proposed Surgery/ Procedure: Arthroplasty Knee, Left  Date of Surgery/ Procedure: 10/05/2018  Time of Surgery/ Procedure: 7:30am  Hospital/Surgical Facility: [facility]   Primary Physician: Alfredo Aragon  Type of Anesthesia Anticipated: General    Patient has a Health Care Directive or Living Will:  YES     1. NO - Do you have a history of heart attack, stroke, stent, bypass or surgery on an artery in the head, neck, heart or legs?  2. NO - Do you ever have any pain or discomfort in your chest?  3. NO - Do you have a history of  Heart Failure?  4. NO - Are you troubled by shortness of breath when: walking on the level, up a slight hill or at night?  5. NO - Do you currently have a cold, bronchitis or other respiratory infection?  6. NO - Do you have a cough, shortness of breath or wheezing?  7. YES  - Do you sometimes get pains in the calves of your legs when you walk? Only at the knee. Not actual claudication-like sx  8. NO - Do you or anyone in your family have previous history of blood clots?  9. NO - Do you or does anyone in your family have a serious bleeding problem such as prolonged bleeding following surgeries or cuts?  10. NO - Have you ever had problems with anemia or been told to take iron pills?  11. NO - Have you had any abnormal blood loss such as black, tarry or bloody stools, or abnormal vaginal bleeding?  12. NO - Have you ever had a blood transfusion?  13. NO - Have you or any of your relatives ever had problems with anesthesia?  14. NO - Do you have sleep apnea, excessive snoring or daytime  drowsiness?  15. NO - Do you have any prosthetic heart valves?  16. YES - Do you have prosthetic joints? RTKA  17. NO - Is there any chance that you may be pregnant?      HPI:     HPI related to upcoming procedure:  Hx chronic left knee pain with DJD. Has failed conservative treatment. Now presents for clearance to undergo surgical correction with TKA. See below for other medical issues. Hx type 2 IDDM. Has chronic hx in which A1C results have reflected higher average blood sugars readings than what has been seen on  either 4x/day blood sugar monitoring or prior  CGM studies. This has been also confirmed with evaluations  by endocrinology.  A1C has ranged from 8.5-8.7 past 1 year. Pt has good exercise tolerance. Riding stationary bicycle for 20 min daily without chest pain or shortness of breath. Also does other weight resistance toning exercises of UEs and LEs. See below for other medical issues    Due to language barrier, an  was present during the history-taking and subsequent discussion (and for part of the physical exam) with this patient.         MEDICAL HISTORY:     Patient Active Problem List    Diagnosis Date Noted     Anemia, unspecified type 01/04/2018     Priority: Medium     Status post total left knee replacement 09/14/2017     Priority: Medium     Type 2 diabetes mellitus without complication, with long-term current use of insulin (H) 01/29/2017     Priority: Medium     Erectile dysfunction, unspecified erectile dysfunction type 01/25/2016     Priority: Medium     Elevated prostate specific antigen (PSA) 11/06/2013     Priority: Medium     ACP (advance care planning) 08/24/2012     Priority: Medium     Advance Care Planning 1/11/2017: ACP Review of Chart / Resources Provided:  Reviewed chart for advance care plan.  Rabia Reed has no plan or code status on file. Discussed available resources and provided with information. Confirmed/documented legally designated decision makers.  Added by  "Charis Garcia  Advance Care Planning 2016: ACP Review of Chart / Resources Provided:  Reviewed chart for advance care plan.  Rabia Reed has no plan or code status on file. Discussed available resources and provided with information. Confirmed/documented legally designated decision makers.  Added by Charis Garcia  Discussed advance care planning with patient; information given to patient to review. 2012        Health Care Home 2011     Priority: Medium     [facility] CARE COORDINATOR  ANASTACIA VELÁZQUEZ  [phone]  [facility] CARE PLAN SUMMARY    Client Name:  Rabia Reed  Address:  [address] [place] [address] Phone: [phone] (home)    :  1940 Date of Assessment:  2017   Health Plan:  [employer]  Health Plan Number: [id number] Medical Assistance Number: [id number]  Financial Worker:     [facility] :  ANASTACIA Velázquez  Phone:  [phone]   Fax:  [phone]   [facility] Enrollment Date: 2008 Case Mix:  D  Rate Cell:  B  Waiver Type:  EW   Primary Emergency Contact:  Brandan Reed  Home Phone: [phone]  Work Phone: [phone]  Relation: Son Language:  Guyanese  :  Yes:    Health Care Agent/POA: Brandan Reed (Son)  Home Phone: [phone] Advanced Directives/Living Will:  None   Primary Care Clinic:  [facility] Primary Dx;  Diabetes [EF11.9]  Secondary Dx:  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 Irizarry- [facility] ENT  Audiologist:    Dr. Aj  - [facility] Otolaryngology (ENT)   Eye Care Provider:   Dr. Dixon, yearly appt.  Wears corrective lenses Dental Care Provider:    Dr. Edenilson Anna, [facility] (made dentures)   Other:          [facility] CURRENT SERVICES SUMMARY  Equipment owned/DME history: bath bench w/o back, standard walker, built up " silverware,  SERVICE TYPE/PROVIDER NAME/PHONE AUTH DATE FREQUENCY Units OR $ Amt DESCRIPTION   Medical Transportation: [employer] [phone]  Fax:  1/1/2018-12/31/18 as needed     DME: [employer] [phone]  Fax: [phone] 1/1/2018-12/31/18 Monthly $2.86 per can 2 cans of glucerna per day (Per [employer], glucerna is EW only)   DME: [employer] [facility] [phone]  Fax: [phone] 1/1/2018-12/31/18 as needed  Pull ups (Med) Family to order as needed   PCA: [employer] [phone]  Fax: [phone] 1/12/17-1/10/18      12/14/17: DTR issued daily  16 units per day and PCA supervision every other month    12/6/17 Assessment: 14 units per day and supervision every other month. DTR submitted.     12/6/17: Client declined ADC at the time of the assessment but will continue to consider this.              Allergies:    Allergies   Allergen Reactions     No Known Drug Allergies      Diagnoses:   Patient Active Problem List   Diagnosis     IMPOTENCE, ORGANIC ORIGN     CHONDROMALACIA PATELLAE     MEMORY LOSS     ACOUSTIC NERVE DISORDERS     TYPE 2 DIABETES, HBA1C GOAL < 7%     HYPERLIPIDEMIA LDL GOAL <100     BPH (benign prostatic hypertrophy)     Health Care Home     Pain in joint, lower leg     ACP (advance care planning)     Elevated prostate specific antigen (PSA)                            BPH (benign prostatic hypertrophy) 12/23/2010     Priority: Medium     HYPERLIPIDEMIA LDL GOAL <100 10/31/2010     Priority: Medium     Disorder of acoustic nerve      Priority: Medium     acoustical neuroma right side  Problem list name updated by automated process. 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     Priority: Medium      Past Medical History:   Diagnosis Date     Anemia, unspecified type 1/4/2018     Calculus of kidney     renal calculi     Chondromalacia of patella 5/03    right     Disorders of acoustic nerve 2/08     acoustical neuroma right side     Elevated prostate specific antigen (PSA) 11/6/2013     Essential hypertension, benign      Hyperlipidemia LDL goal <100       Impotence of organic origin      Inguinal hernia without mention of obstruction or gangrene, unilateral or unspecified, (not specified as recurrent) 1/03    right     Memory loss      Osteoarthritis of both hands, unspecified osteoarthritis type 3/19/2017     Tobacco use disorder      Type 2 diabetes mellitus without complication  (goal A1C<7) 10/24/2015     Unspecified nasal polyp      Urinary frequency     nocturia x 5     Past Surgical History:   Procedure Laterality Date     ARTHROPLASTY KNEE Right 9/14/2017    Procedure: ARTHROPLASTY KNEE;  RIGHT TOTAL KNEE ARTHROPLASTY ;  Surgeon: Darnell Allen MD;  Location: [facility] OR     C APPENDECTOMY       C NONSPECIFIC PROCEDURE  4/01    nasal polyp excision     C NONSPECIFIC PROCEDURE  1/03    RIH repair     C NONSPECIFIC PROCEDURE  April 2011     right knee arthroscopy     Current Outpatient Prescriptions   Medication Sig Dispense Refill     ACE/ARB/ARNI NOT PRESCRIBED, INTENTIONAL, Please choose reason not prescribed, below       acetaminophen (TYLENOL) 500 MG tablet Take 500-1,000 mg by mouth every 6 hours as needed for mild pain       ascorbic acid (VITAMIN C) 1000 MG TABS Take 1,000 mg by mouth daily.       aspirin 325 MG tablet Take 1 tablet (325 mg) by mouth 2 times daily 90 tablet 3     atorvastatin (LIPITOR) 80 MG tablet Take 1 tablet (80 mg) by mouth daily 90 tablet 3     blood glucose monitoring (ONE TOUCH ULTRASOFT) lancets USE 3 TIMES DAILY 200 each 3     blood glucose monitoring (ONETOUCH ULTRA) test strip Use to test 3 times daily 200 each 3     Blood Glucose Monitoring Suppl (ONE TOUCH ULTRA SYSTEM KIT) W/DEVICE KIT One touch ultra 2 if covered by insurance 1 kit 0     camphor-menthol (DERMASARRA) 0.5-0.5 % LOTN Apply topically 2 times daily Apply to back, legs topically twice daily for itching        celecoxib (CELEBREX) 200 MG capsule Take 1 capsule (200 mg) by mouth daily With food for arthritis pain 90 capsule 1     DIPHENHYDRAMINE HCL EX (BENADRYL CREAM 1-0.1%) Apply to affected areas topically as needed for itch TID. Thighs, buttocks, and groin) per on call Dr. Salcedo.       insulin aspart (NOVOLOG FLEXPEN) 100 UNIT/ML injection Inject 10-15 Units Subcutaneous At Bedtime 15 mL 11     Insulin Pen Needle (PEN NEEDLES) 31G X 6 MM MISC 1 Device 3 times daily 100 each 11     NOVOLOG MIX 70/30 FLEXPEN (70-30) 100 UNIT/ML susp INJECT 30 UNITS DAILY BEFORE BREAKFAST AND 20 UNITS BEFORE DINNER. 45 mL 1     senna-docusate (SENOKOT-S;PERICOLACE) 8.6-50 MG per tablet Take 2 tablets by mouth 2 times daily       tamsulosin (FLOMAX) 0.4 MG capsule TAKE ONE CAPSULE BY MOUTH ONE TIME DAILY  90 capsule 3     TAMSULOSIN HCL PO Take 0.4 mg by mouth daily       ULTICARE MICRO 32G X 4 MM insulin pen needle TEST 3 TIMES DAILY 100 each 0     OTC products: None, except as noted above    Allergies   Allergen Reactions     No Known Drug Allergies       Latex Allergy: NO    Social History   Substance Use Topics     Smoking status: Former Smoker     Packs/day: 0.50     Years: 42.00     Quit date: 1/17/2001     Smokeless tobacco: Never Used     Alcohol use No     History   Drug Use No       REVIEW OF SYSTEMS:   CONSTITUTIONAL: NEGATIVE for fever, chills,  Weight up 5 pounds  INTEGUMENTARY/SKIN: NEGATIVE for worrisome rashes, moles or lesions  EYES: NEGATIVE for vision changes or irritation  ENT/MOUTH: NEGATIVE for ear pain , mouth and throat problems. 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 Rare minimal peripheral edema. No PND, orthopnea  GI: NEGATIVE for nausea, abdominal pain, heartburn, or change in bowel habits with current meds  : NEGATIVE for dysuria, or hematuria.  U On Flomax but still some slowing if urine flow.  Nocturia x2 but goes back to sleep  MUSCULOSKELETAL:  See HPI.  Mild stiffness in general joints  NEURO: NEGATIVE for weakness,   paresthesias. No recent falls.    ENDOCRINE:  POSITIVE for diabetes mellitus. 140s in AM.  160s in the PM  HEME: NEGATIVE for bleeding problems.  PSYCHIATRIC: NEGATIVE for changes in mood or affect    EXAM:   /70  Pulse 82  Temp 97.6  F (36.4  C) (Oral)  Resp 16  Wt 179 lb (81.2 kg)  SpO2 92%  BMI 29.79 kg/m2  Eye: PERRL, EOMI  HENT: ear canals and TM's normal and nose and mouth without ulcers or lesions   Neck: no adenopathy. Thyroid normal to palpation. No bruits  Pulm: Lungs clear to auscultation   CV: Regular rates and rhythm  GI: Soft, nontender, Normal active bowel sounds, No hepatosplenomegaly or masses palpable  Ext: Peripheral pulses intact. No edema. RTKA scar  Neuro: Normal strength and tone, sensory exam grossly normal. Stable antalgic gait with cane      DIAGNOSTICS:   EKG:  NSR with rate 85. No acute ST/T changes    Component      Latest Ref Rng & Units 1/20/2018 10/2/2018   Sodium      133 - 144 mmol/L  140   Potassium      3.4 - 5.3 mmol/L  3.9   Chloride      94 - 109 mmol/L  106   Carbon Dioxide      20 - 32 mmol/L  31   Anion Gap      3 - 14 mmol/L  3   Glucose      70 - 99 mg/dL  234 (H)   Urea Nitrogen      7 - 30 mg/dL  25   Creatinine      0.66 - 1.25 mg/dL  1.01   GFR Estimate      >60 mL/min/1.7m2  71   GFR Estimate If Black      >60 mL/min/1.7m2  86   Calcium      8.5 - 10.1 mg/dL  8.2 (L)   Bilirubin Total      0.2 - 1.3 mg/dL  0.5   Albumin      3.4 - 5.0 g/dL  3.4   Protein Total      6.8 - 8.8 g/dL  7.1   Alkaline Phosphatase      40 - 150 U/L  147   ALT      0 - 70 U/L  41   AST      0 - 45 U/L  28   WBC      4.0 - 11.0 10e9/L  6.6   RBC Count      4.4 - 5.9 10e12/L  4.05 (L)   Hemoglobin      13.3 - 17.7 g/dL  13.5   Hematocrit      40.0 - 53.0 %  42.1   MCV      78 - 100 fl  104 (H)   MCH      26.5 - 33.0 pg  33.3 (H)   MCHC      31.5 - 36.5  g/dL  32.1   RDW      10.0 - 15.0 %  11.9   Platelet Count      150 - 450 10e9/L  167   Specimen Description        Nares   Methicillin Resist/Sens S. aureus PCR      NEG:Negative  Negative   Hemoglobin A1C      0 - 5.6 % 8.7 (H) 8.6 (H)   TSH      0.40 - 4.00 mU/L  1.16       IMPRESSION:   Reason for surgery/procedure:  Left knee DJD  Diagnosis/reason for consult:   1. Type 2 IDDM. A1C 8.6. See HPI. Pt chronically has run A1Cs elevated  above what is seen on blood sugar monitoring throughout the day for years. Therefore do not feel surgery needs to be delayed to improve blood sugar control further. Blood sugar elevated with this one lab test but pt had eaten right before the lab draw. Blood sugars have been running in the 140-160 range  Per review of recent blood sugars brought to appt today.  2.  BPH. Overall stable with Flomax, Finasteride  3. Hx right vestibular schwannoma. Pt has declined surgical intervention.  No recent falls. Has chronic right hearing loss  4. Hyperlipidemia. On statin    The proposed surgical procedure is considered INTERMEDIATE risk.    REVISED CARDIAC RISK INDEX  The patient has the following serious cardiovascular risks for perioperative complications such as (MI, PE, VFib and 3  AV Block):  Diabetes Mellitus (on Insulin)  INTERPRETATION: 1 risks: Class II (low risk - 0.9% complication rate)    The patient has the following additional risks for perioperative complications:  No identified additional risks         RECOMMENDATIONS:       Cardiovascular Risk  Performs 4 METs exercise without symptoms (Bicycling at 8.5 minutes per mile (7 miles/hour)) .      APPROVAL GIVEN to proceed with proposed procedure, without further diagnostic evaluation     PLAN:   Stay off of Aspirin and Celebrex between now and surgery (were stopped 1 week prior to surgery)   Take 70/30 insulin 14 units  At supper/dinner the night before surgery  Take Novolog 6 units at bedtime the nioght before surgery   Nothing  to eat/drink after midnight prior to surgery. No insulin the morning of surgery    Signed Electronically by: Alfredo Aragon MD    Copy of this evaluation report is provided to requesting physician.    [facility] Preop Guidelines    Revised Cardiac Risk Index

## 2018-10-05 ENCOUNTER — APPOINTMENT (OUTPATIENT)
Dept: GENERAL RADIOLOGY | Facility: CLINIC | Age: 78
DRG: 470 | End: 2018-10-05
Attending: ORTHOPAEDIC SURGERY
Payer: COMMERCIAL

## 2018-10-05 ENCOUNTER — ANESTHESIA (OUTPATIENT)
Dept: SURGERY | Facility: CLINIC | Age: 78
DRG: 470 | End: 2018-10-05
Payer: COMMERCIAL

## 2018-10-05 ENCOUNTER — ANESTHESIA EVENT (OUTPATIENT)
Dept: SURGERY | Facility: CLINIC | Age: 78
DRG: 470 | End: 2018-10-05
Payer: COMMERCIAL

## 2018-10-05 ENCOUNTER — HOSPITAL ENCOUNTER (INPATIENT)
Facility: CLINIC | Age: 78
LOS: 3 days | Discharge: NURSING, REHAB OR LONG-TERM CARE | DRG: 470 | End: 2018-10-08
Attending: ORTHOPAEDIC SURGERY | Admitting: ORTHOPAEDIC SURGERY
Payer: COMMERCIAL

## 2018-10-05 ENCOUNTER — APPOINTMENT (OUTPATIENT)
Dept: PHYSICAL THERAPY | Facility: CLINIC | Age: 78
DRG: 470 | End: 2018-10-05
Attending: ORTHOPAEDIC SURGERY
Payer: COMMERCIAL

## 2018-10-05 DIAGNOSIS — Z96.652 STATUS POST TOTAL LEFT KNEE REPLACEMENT: Primary | ICD-10-CM

## 2018-10-05 LAB
ABO + RH BLD: NORMAL
ABO + RH BLD: NORMAL
BLD GP AB SCN SERPL QL: NORMAL
BLOOD BANK CMNT PATIENT-IMP: NORMAL
CREAT SERPL-MCNC: 0.89 MG/DL (ref 0.66–1.25)
GFR SERPL CREATININE-BSD FRML MDRD: 82 ML/MIN/1.7M2
GLUCOSE BLDC GLUCOMTR-MCNC: 133 MG/DL (ref 70–99)
GLUCOSE BLDC GLUCOMTR-MCNC: 173 MG/DL (ref 70–99)
GLUCOSE BLDC GLUCOMTR-MCNC: 274 MG/DL (ref 70–99)
GLUCOSE BLDC GLUCOMTR-MCNC: 286 MG/DL (ref 70–99)
GLUCOSE SERPL-MCNC: 64 MG/DL (ref 70–99)
PLATELET # BLD AUTO: 163 10E9/L (ref 150–450)
POTASSIUM SERPL-SCNC: 3.4 MMOL/L (ref 3.4–5.3)
SPECIMEN EXP DATE BLD: NORMAL

## 2018-10-05 PROCEDURE — 82947 ASSAY GLUCOSE BLOOD QUANT: CPT | Performed by: ANESTHESIOLOGY

## 2018-10-05 PROCEDURE — 00000146 ZZHCL STATISTIC GLUCOSE BY METER IP

## 2018-10-05 PROCEDURE — 12000007 ZZH R&B INTERMEDIATE

## 2018-10-05 PROCEDURE — 37000009 ZZH ANESTHESIA TECHNICAL FEE, EACH ADDTL 15 MIN: Performed by: ORTHOPAEDIC SURGERY

## 2018-10-05 PROCEDURE — 25000131 ZZH RX MED GY IP 250 OP 636 PS 637: Performed by: PHYSICIAN ASSISTANT

## 2018-10-05 PROCEDURE — 36415 COLL VENOUS BLD VENIPUNCTURE: CPT | Performed by: ORTHOPAEDIC SURGERY

## 2018-10-05 PROCEDURE — 85049 AUTOMATED PLATELET COUNT: CPT | Performed by: ORTHOPAEDIC SURGERY

## 2018-10-05 PROCEDURE — 36000093 ZZH SURGERY LEVEL 4 1ST 30 MIN: Performed by: ORTHOPAEDIC SURGERY

## 2018-10-05 PROCEDURE — 25800025 ZZH RX 258: Performed by: ORTHOPAEDIC SURGERY

## 2018-10-05 PROCEDURE — 86901 BLOOD TYPING SEROLOGIC RH(D): CPT | Performed by: ORTHOPAEDIC SURGERY

## 2018-10-05 PROCEDURE — 71000012 ZZH RECOVERY PHASE 1 LEVEL 1 FIRST HR: Performed by: ORTHOPAEDIC SURGERY

## 2018-10-05 PROCEDURE — 25000128 H RX IP 250 OP 636: Performed by: ANESTHESIOLOGY

## 2018-10-05 PROCEDURE — 25000128 H RX IP 250 OP 636: Performed by: NURSE ANESTHETIST, CERTIFIED REGISTERED

## 2018-10-05 PROCEDURE — 25000125 ZZHC RX 250: Performed by: ORTHOPAEDIC SURGERY

## 2018-10-05 PROCEDURE — 71000013 ZZH RECOVERY PHASE 1 LEVEL 1 EA ADDTL HR: Performed by: ORTHOPAEDIC SURGERY

## 2018-10-05 PROCEDURE — 40000986 XR KNEE PORT LT 1/2 VW: Mod: LT

## 2018-10-05 PROCEDURE — 25000132 ZZH RX MED GY IP 250 OP 250 PS 637: Performed by: ORTHOPAEDIC SURGERY

## 2018-10-05 PROCEDURE — 82565 ASSAY OF CREATININE: CPT | Performed by: ORTHOPAEDIC SURGERY

## 2018-10-05 PROCEDURE — 25000125 ZZHC RX 250: Performed by: ANESTHESIOLOGY

## 2018-10-05 PROCEDURE — 27210794 ZZH OR GENERAL SUPPLY STERILE: Performed by: ORTHOPAEDIC SURGERY

## 2018-10-05 PROCEDURE — 25000125 ZZHC RX 250: Performed by: NURSE ANESTHETIST, CERTIFIED REGISTERED

## 2018-10-05 PROCEDURE — 37000008 ZZH ANESTHESIA TECHNICAL FEE, 1ST 30 MIN: Performed by: ORTHOPAEDIC SURGERY

## 2018-10-05 PROCEDURE — C1776 JOINT DEVICE (IMPLANTABLE): HCPCS | Performed by: ORTHOPAEDIC SURGERY

## 2018-10-05 PROCEDURE — 0SRD0J9 REPLACEMENT OF LEFT KNEE JOINT WITH SYNTHETIC SUBSTITUTE, CEMENTED, OPEN APPROACH: ICD-10-PCS | Performed by: ORTHOPAEDIC SURGERY

## 2018-10-05 PROCEDURE — 99222 1ST HOSP IP/OBS MODERATE 55: CPT | Performed by: PHYSICIAN ASSISTANT

## 2018-10-05 PROCEDURE — 40000169 ZZH STATISTIC PRE-PROCEDURE ASSESSMENT I: Performed by: ORTHOPAEDIC SURGERY

## 2018-10-05 PROCEDURE — 97161 PT EVAL LOW COMPLEX 20 MIN: CPT | Mod: GP

## 2018-10-05 PROCEDURE — 97110 THERAPEUTIC EXERCISES: CPT | Mod: GP

## 2018-10-05 PROCEDURE — 25000128 H RX IP 250 OP 636: Performed by: ORTHOPAEDIC SURGERY

## 2018-10-05 PROCEDURE — 36000063 ZZH SURGERY LEVEL 4 EA 15 ADDTL MIN: Performed by: ORTHOPAEDIC SURGERY

## 2018-10-05 PROCEDURE — 36415 COLL VENOUS BLD VENIPUNCTURE: CPT | Performed by: ANESTHESIOLOGY

## 2018-10-05 PROCEDURE — 25800025 ZZH RX 258: Performed by: ANESTHESIOLOGY

## 2018-10-05 PROCEDURE — 84132 ASSAY OF SERUM POTASSIUM: CPT | Performed by: ANESTHESIOLOGY

## 2018-10-05 PROCEDURE — 27110028 ZZH OR GENERAL SUPPLY NON-STERILE: Performed by: ORTHOPAEDIC SURGERY

## 2018-10-05 PROCEDURE — 25000566 ZZH SEVOFLURANE, EA 15 MIN: Performed by: ORTHOPAEDIC SURGERY

## 2018-10-05 PROCEDURE — 99207 ZZC CONSULT E&M CHANGED TO INITIAL LEVEL: CPT | Performed by: PHYSICIAN ASSISTANT

## 2018-10-05 PROCEDURE — 86850 RBC ANTIBODY SCREEN: CPT | Performed by: ORTHOPAEDIC SURGERY

## 2018-10-05 PROCEDURE — 27810169 ZZH OR IMPLANT GENERAL: Performed by: ORTHOPAEDIC SURGERY

## 2018-10-05 PROCEDURE — 40000193 ZZH STATISTIC PT WARD VISIT

## 2018-10-05 PROCEDURE — 86900 BLOOD TYPING SEROLOGIC ABO: CPT | Performed by: ORTHOPAEDIC SURGERY

## 2018-10-05 DEVICE — BONE CEMENT RADIOPAQUE SIMPLEX HV FULL DOSE 6194-1-001: Type: IMPLANTABLE DEVICE | Site: KNEE | Status: FUNCTIONAL

## 2018-10-05 DEVICE — IMPLANTABLE DEVICE: Type: IMPLANTABLE DEVICE | Site: KNEE | Status: FUNCTIONAL

## 2018-10-05 RX ORDER — NALOXONE HYDROCHLORIDE 0.4 MG/ML
.1-.4 INJECTION, SOLUTION INTRAMUSCULAR; INTRAVENOUS; SUBCUTANEOUS
Status: DISCONTINUED | OUTPATIENT
Start: 2018-10-05 | End: 2018-10-08 | Stop reason: HOSPADM

## 2018-10-05 RX ORDER — ONDANSETRON 2 MG/ML
4 INJECTION INTRAMUSCULAR; INTRAVENOUS EVERY 6 HOURS PRN
Status: DISCONTINUED | OUTPATIENT
Start: 2018-10-05 | End: 2018-10-08 | Stop reason: HOSPADM

## 2018-10-05 RX ORDER — PROPOFOL 10 MG/ML
INJECTION, EMULSION INTRAVENOUS PRN
Status: DISCONTINUED | OUTPATIENT
Start: 2018-10-05 | End: 2018-10-05

## 2018-10-05 RX ORDER — AMOXICILLIN 250 MG
1 CAPSULE ORAL 2 TIMES DAILY
Status: DISCONTINUED | OUTPATIENT
Start: 2018-10-05 | End: 2018-10-08 | Stop reason: HOSPADM

## 2018-10-05 RX ORDER — DEXAMETHASONE SODIUM PHOSPHATE 4 MG/ML
INJECTION, SOLUTION INTRA-ARTICULAR; INTRALESIONAL; INTRAMUSCULAR; INTRAVENOUS; SOFT TISSUE PRN
Status: DISCONTINUED | OUTPATIENT
Start: 2018-10-05 | End: 2018-10-05

## 2018-10-05 RX ORDER — DEXTROSE MONOHYDRATE 25 G/50ML
25 INJECTION, SOLUTION INTRAVENOUS ONCE
Status: COMPLETED | OUTPATIENT
Start: 2018-10-05 | End: 2018-10-05

## 2018-10-05 RX ORDER — EPHEDRINE SULFATE 50 MG/ML
INJECTION, SOLUTION INTRAMUSCULAR; INTRAVENOUS; SUBCUTANEOUS PRN
Status: DISCONTINUED | OUTPATIENT
Start: 2018-10-05 | End: 2018-10-05

## 2018-10-05 RX ORDER — ACETAMINOPHEN 325 MG/1
650 TABLET ORAL EVERY 4 HOURS PRN
Status: DISCONTINUED | OUTPATIENT
Start: 2018-10-08 | End: 2018-10-08 | Stop reason: HOSPADM

## 2018-10-05 RX ORDER — ONDANSETRON 4 MG/1
4 TABLET, ORALLY DISINTEGRATING ORAL EVERY 6 HOURS PRN
Status: DISCONTINUED | OUTPATIENT
Start: 2018-10-05 | End: 2018-10-08 | Stop reason: HOSPADM

## 2018-10-05 RX ORDER — CELECOXIB 200 MG/1
200 CAPSULE ORAL 2 TIMES DAILY
Status: COMPLETED | OUTPATIENT
Start: 2018-10-05 | End: 2018-10-07

## 2018-10-05 RX ORDER — FENTANYL CITRATE 50 UG/ML
50-100 INJECTION, SOLUTION INTRAMUSCULAR; INTRAVENOUS
Status: DISCONTINUED | OUTPATIENT
Start: 2018-10-05 | End: 2018-10-05 | Stop reason: HOSPADM

## 2018-10-05 RX ORDER — CEFAZOLIN SODIUM 2 G/100ML
2 INJECTION, SOLUTION INTRAVENOUS
Status: COMPLETED | OUTPATIENT
Start: 2018-10-05 | End: 2018-10-05

## 2018-10-05 RX ORDER — CEFAZOLIN SODIUM 1 G/3ML
1 INJECTION, POWDER, FOR SOLUTION INTRAMUSCULAR; INTRAVENOUS SEE ADMIN INSTRUCTIONS
Status: DISCONTINUED | OUTPATIENT
Start: 2018-10-05 | End: 2018-10-05 | Stop reason: HOSPADM

## 2018-10-05 RX ORDER — DEXTROSE MONOHYDRATE 25 G/50ML
25-50 INJECTION, SOLUTION INTRAVENOUS
Status: DISCONTINUED | OUTPATIENT
Start: 2018-10-05 | End: 2018-10-08 | Stop reason: HOSPADM

## 2018-10-05 RX ORDER — ONDANSETRON 2 MG/ML
4 INJECTION INTRAMUSCULAR; INTRAVENOUS EVERY 30 MIN PRN
Status: DISCONTINUED | OUTPATIENT
Start: 2018-10-05 | End: 2018-10-05 | Stop reason: HOSPADM

## 2018-10-05 RX ORDER — FENTANYL CITRATE 50 UG/ML
INJECTION, SOLUTION INTRAMUSCULAR; INTRAVENOUS PRN
Status: DISCONTINUED | OUTPATIENT
Start: 2018-10-05 | End: 2018-10-05

## 2018-10-05 RX ORDER — FINASTERIDE 5 MG/1
5 TABLET, FILM COATED ORAL EVERY EVENING
Status: DISCONTINUED | OUTPATIENT
Start: 2018-10-05 | End: 2018-10-08 | Stop reason: HOSPADM

## 2018-10-05 RX ORDER — LIDOCAINE 40 MG/G
CREAM TOPICAL
Status: DISCONTINUED | OUTPATIENT
Start: 2018-10-05 | End: 2018-10-08 | Stop reason: HOSPADM

## 2018-10-05 RX ORDER — OXYCODONE HYDROCHLORIDE 5 MG/1
5-10 TABLET ORAL
Status: DISCONTINUED | OUTPATIENT
Start: 2018-10-05 | End: 2018-10-08 | Stop reason: HOSPADM

## 2018-10-05 RX ORDER — MORPHINE SULFATE 15 MG/1
15 TABLET, FILM COATED, EXTENDED RELEASE ORAL EVERY 12 HOURS SCHEDULED
Status: DISCONTINUED | OUTPATIENT
Start: 2018-10-05 | End: 2018-10-06

## 2018-10-05 RX ORDER — SODIUM CHLORIDE, SODIUM LACTATE, POTASSIUM CHLORIDE, CALCIUM CHLORIDE 600; 310; 30; 20 MG/100ML; MG/100ML; MG/100ML; MG/100ML
INJECTION, SOLUTION INTRAVENOUS CONTINUOUS
Status: DISCONTINUED | OUTPATIENT
Start: 2018-10-05 | End: 2018-10-05 | Stop reason: HOSPADM

## 2018-10-05 RX ORDER — NICOTINE POLACRILEX 4 MG
15-30 LOZENGE BUCCAL
Status: DISCONTINUED | OUTPATIENT
Start: 2018-10-05 | End: 2018-10-08 | Stop reason: HOSPADM

## 2018-10-05 RX ORDER — NALOXONE HYDROCHLORIDE 0.4 MG/ML
.1-.4 INJECTION, SOLUTION INTRAMUSCULAR; INTRAVENOUS; SUBCUTANEOUS
Status: DISCONTINUED | OUTPATIENT
Start: 2018-10-05 | End: 2018-10-05

## 2018-10-05 RX ORDER — CEFAZOLIN SODIUM 1 G/3ML
INJECTION, POWDER, FOR SOLUTION INTRAMUSCULAR; INTRAVENOUS PRN
Status: DISCONTINUED | OUTPATIENT
Start: 2018-10-05 | End: 2018-10-05

## 2018-10-05 RX ORDER — METHOCARBAMOL 500 MG/1
500 TABLET, FILM COATED ORAL 4 TIMES DAILY PRN
Status: DISCONTINUED | OUTPATIENT
Start: 2018-10-05 | End: 2018-10-08 | Stop reason: HOSPADM

## 2018-10-05 RX ORDER — ONDANSETRON 2 MG/ML
INJECTION INTRAMUSCULAR; INTRAVENOUS PRN
Status: DISCONTINUED | OUTPATIENT
Start: 2018-10-05 | End: 2018-10-05

## 2018-10-05 RX ORDER — FENTANYL CITRATE 50 UG/ML
25-50 INJECTION, SOLUTION INTRAMUSCULAR; INTRAVENOUS
Status: DISCONTINUED | OUTPATIENT
Start: 2018-10-05 | End: 2018-10-05 | Stop reason: HOSPADM

## 2018-10-05 RX ORDER — SODIUM CHLORIDE 9 MG/ML
INJECTION, SOLUTION INTRAVENOUS CONTINUOUS
Status: DISCONTINUED | OUTPATIENT
Start: 2018-10-05 | End: 2018-10-06

## 2018-10-05 RX ORDER — AMOXICILLIN 250 MG
2 CAPSULE ORAL 2 TIMES DAILY
Status: DISCONTINUED | OUTPATIENT
Start: 2018-10-05 | End: 2018-10-08 | Stop reason: HOSPADM

## 2018-10-05 RX ORDER — CEFAZOLIN SODIUM 1 G/3ML
1 INJECTION, POWDER, FOR SOLUTION INTRAMUSCULAR; INTRAVENOUS EVERY 8 HOURS
Status: COMPLETED | OUTPATIENT
Start: 2018-10-05 | End: 2018-10-06

## 2018-10-05 RX ORDER — DIPHENHYDRAMINE HCL 12.5MG/5ML
12.5 LIQUID (ML) ORAL EVERY 6 HOURS PRN
Status: DISCONTINUED | OUTPATIENT
Start: 2018-10-05 | End: 2018-10-08 | Stop reason: HOSPADM

## 2018-10-05 RX ORDER — LIDOCAINE HYDROCHLORIDE 20 MG/ML
INJECTION, SOLUTION INFILTRATION; PERINEURAL PRN
Status: DISCONTINUED | OUTPATIENT
Start: 2018-10-05 | End: 2018-10-05

## 2018-10-05 RX ORDER — LANOLIN ALCOHOL/MO/W.PET/CERES
3 CREAM (GRAM) TOPICAL
Status: DISCONTINUED | OUTPATIENT
Start: 2018-10-06 | End: 2018-10-08 | Stop reason: HOSPADM

## 2018-10-05 RX ORDER — GABAPENTIN 100 MG/1
100 CAPSULE ORAL 3 TIMES DAILY
Status: COMPLETED | OUTPATIENT
Start: 2018-10-05 | End: 2018-10-08

## 2018-10-05 RX ORDER — ACETAMINOPHEN 325 MG/1
975 TABLET ORAL EVERY 8 HOURS
Status: COMPLETED | OUTPATIENT
Start: 2018-10-05 | End: 2018-10-08

## 2018-10-05 RX ORDER — DIPHENHYDRAMINE HYDROCHLORIDE 50 MG/ML
12.5 INJECTION INTRAMUSCULAR; INTRAVENOUS EVERY 6 HOURS PRN
Status: DISCONTINUED | OUTPATIENT
Start: 2018-10-05 | End: 2018-10-08 | Stop reason: HOSPADM

## 2018-10-05 RX ORDER — PROCHLORPERAZINE MALEATE 5 MG
5 TABLET ORAL EVERY 6 HOURS PRN
Status: DISCONTINUED | OUTPATIENT
Start: 2018-10-05 | End: 2018-10-08 | Stop reason: HOSPADM

## 2018-10-05 RX ORDER — ATORVASTATIN CALCIUM 80 MG/1
80 TABLET, FILM COATED ORAL DAILY
Status: DISCONTINUED | OUTPATIENT
Start: 2018-10-05 | End: 2018-10-08 | Stop reason: HOSPADM

## 2018-10-05 RX ORDER — HYDROMORPHONE HYDROCHLORIDE 1 MG/ML
.3-.5 INJECTION, SOLUTION INTRAMUSCULAR; INTRAVENOUS; SUBCUTANEOUS EVERY 30 MIN PRN
Status: DISCONTINUED | OUTPATIENT
Start: 2018-10-05 | End: 2018-10-08 | Stop reason: HOSPADM

## 2018-10-05 RX ORDER — TAMSULOSIN HYDROCHLORIDE 0.4 MG/1
0.4 CAPSULE ORAL EVERY EVENING
Status: DISCONTINUED | OUTPATIENT
Start: 2018-10-05 | End: 2018-10-08 | Stop reason: HOSPADM

## 2018-10-05 RX ORDER — ONDANSETRON 4 MG/1
4 TABLET, ORALLY DISINTEGRATING ORAL EVERY 30 MIN PRN
Status: DISCONTINUED | OUTPATIENT
Start: 2018-10-05 | End: 2018-10-05 | Stop reason: HOSPADM

## 2018-10-05 RX ORDER — MAGNESIUM HYDROXIDE 1200 MG/15ML
LIQUID ORAL PRN
Status: DISCONTINUED | OUTPATIENT
Start: 2018-10-05 | End: 2018-10-05 | Stop reason: HOSPADM

## 2018-10-05 RX ORDER — SODIUM CHLORIDE, SODIUM LACTATE, POTASSIUM CHLORIDE, CALCIUM CHLORIDE 600; 310; 30; 20 MG/100ML; MG/100ML; MG/100ML; MG/100ML
INJECTION, SOLUTION INTRAVENOUS CONTINUOUS PRN
Status: DISCONTINUED | OUTPATIENT
Start: 2018-10-05 | End: 2018-10-05

## 2018-10-05 RX ORDER — HYDROMORPHONE HYDROCHLORIDE 1 MG/ML
.3-.5 INJECTION, SOLUTION INTRAMUSCULAR; INTRAVENOUS; SUBCUTANEOUS EVERY 5 MIN PRN
Status: DISCONTINUED | OUTPATIENT
Start: 2018-10-05 | End: 2018-10-05 | Stop reason: HOSPADM

## 2018-10-05 RX ADMIN — SENNOSIDES AND DOCUSATE SODIUM 1 TABLET: 8.6; 5 TABLET ORAL at 20:02

## 2018-10-05 RX ADMIN — GABAPENTIN 100 MG: 100 CAPSULE ORAL at 22:15

## 2018-10-05 RX ADMIN — FENTANYL CITRATE 25 MCG: 50 INJECTION, SOLUTION INTRAMUSCULAR; INTRAVENOUS at 09:33

## 2018-10-05 RX ADMIN — FENTANYL CITRATE 25 MCG: 50 INJECTION, SOLUTION INTRAMUSCULAR; INTRAVENOUS at 08:26

## 2018-10-05 RX ADMIN — FENTANYL CITRATE 25 MCG: 50 INJECTION, SOLUTION INTRAMUSCULAR; INTRAVENOUS at 09:42

## 2018-10-05 RX ADMIN — GABAPENTIN 100 MG: 100 CAPSULE ORAL at 15:50

## 2018-10-05 RX ADMIN — ATORVASTATIN CALCIUM 80 MG: 80 TABLET, FILM COATED ORAL at 17:45

## 2018-10-05 RX ADMIN — Medication 0.5 MG: at 13:38

## 2018-10-05 RX ADMIN — TAMSULOSIN HYDROCHLORIDE 0.4 MG: 0.4 CAPSULE ORAL at 20:02

## 2018-10-05 RX ADMIN — FENTANYL CITRATE 25 MCG: 50 INJECTION, SOLUTION INTRAMUSCULAR; INTRAVENOUS at 08:11

## 2018-10-05 RX ADMIN — Medication 5 MG: at 08:04

## 2018-10-05 RX ADMIN — FENTANYL CITRATE 50 MCG: 50 INJECTION, SOLUTION INTRAMUSCULAR; INTRAVENOUS at 08:43

## 2018-10-05 RX ADMIN — SODIUM CHLORIDE 1 G: 9 INJECTION, SOLUTION INTRAVENOUS at 07:47

## 2018-10-05 RX ADMIN — DEXAMETHASONE SODIUM PHOSPHATE 4 MG: 4 INJECTION, SOLUTION INTRA-ARTICULAR; INTRALESIONAL; INTRAMUSCULAR; INTRAVENOUS; SOFT TISSUE at 07:46

## 2018-10-05 RX ADMIN — OXYCODONE HYDROCHLORIDE 5 MG: 5 TABLET ORAL at 17:45

## 2018-10-05 RX ADMIN — CEFAZOLIN 1 G: 1 INJECTION, POWDER, FOR SOLUTION INTRAMUSCULAR; INTRAVENOUS at 09:44

## 2018-10-05 RX ADMIN — CEFAZOLIN SODIUM 1 G: 1 INJECTION, POWDER, FOR SOLUTION INTRAMUSCULAR; INTRAVENOUS at 17:53

## 2018-10-05 RX ADMIN — FENTANYL CITRATE 25 MCG: 50 INJECTION, SOLUTION INTRAMUSCULAR; INTRAVENOUS at 08:32

## 2018-10-05 RX ADMIN — FENTANYL CITRATE 50 MCG: 50 INJECTION, SOLUTION INTRAMUSCULAR; INTRAVENOUS at 07:37

## 2018-10-05 RX ADMIN — FENTANYL CITRATE 25 MCG: 50 INJECTION, SOLUTION INTRAMUSCULAR; INTRAVENOUS at 10:03

## 2018-10-05 RX ADMIN — FENTANYL CITRATE 50 MCG: 50 INJECTION, SOLUTION INTRAMUSCULAR; INTRAVENOUS at 07:10

## 2018-10-05 RX ADMIN — VITAMIN D, TAB 1000IU (100/BT) 2000 UNITS: 25 TAB at 15:49

## 2018-10-05 RX ADMIN — PROPOFOL 90 MG: 10 INJECTION, EMULSION INTRAVENOUS at 07:38

## 2018-10-05 RX ADMIN — SODIUM CHLORIDE, POTASSIUM CHLORIDE, SODIUM LACTATE AND CALCIUM CHLORIDE: 600; 310; 30; 20 INJECTION, SOLUTION INTRAVENOUS at 09:39

## 2018-10-05 RX ADMIN — MORPHINE SULFATE 15 MG: 15 TABLET, EXTENDED RELEASE ORAL at 20:02

## 2018-10-05 RX ADMIN — ONDANSETRON 4 MG: 2 INJECTION INTRAMUSCULAR; INTRAVENOUS at 09:52

## 2018-10-05 RX ADMIN — ACETAMINOPHEN 975 MG: 325 TABLET, FILM COATED ORAL at 22:14

## 2018-10-05 RX ADMIN — DEXTROSE MONOHYDRATE 25 ML: 25 INJECTION, SOLUTION INTRAVENOUS at 07:10

## 2018-10-05 RX ADMIN — LIDOCAINE HYDROCHLORIDE 50 MG: 20 INJECTION, SOLUTION INFILTRATION; PERINEURAL at 07:38

## 2018-10-05 RX ADMIN — SODIUM CHLORIDE: 9 INJECTION, SOLUTION INTRAVENOUS at 15:49

## 2018-10-05 RX ADMIN — Medication 5 MG: at 07:50

## 2018-10-05 RX ADMIN — INSULIN ASPART 7 UNITS: 100 INJECTION, SUSPENSION SUBCUTANEOUS at 17:46

## 2018-10-05 RX ADMIN — Medication 0.5 MG: at 10:58

## 2018-10-05 RX ADMIN — LIDOCAINE HYDROCHLORIDE 1 ML: 10 INJECTION, SOLUTION INFILTRATION; PERINEURAL at 06:39

## 2018-10-05 RX ADMIN — SODIUM CHLORIDE, POTASSIUM CHLORIDE, SODIUM LACTATE AND CALCIUM CHLORIDE: 600; 310; 30; 20 INJECTION, SOLUTION INTRAVENOUS at 06:39

## 2018-10-05 RX ADMIN — CELECOXIB 200 MG: 200 CAPSULE ORAL at 20:03

## 2018-10-05 RX ADMIN — FINASTERIDE 5 MG: 5 TABLET, FILM COATED ORAL at 20:03

## 2018-10-05 RX ADMIN — MIDAZOLAM HYDROCHLORIDE 1 MG: 1 INJECTION, SOLUTION INTRAMUSCULAR; INTRAVENOUS at 07:10

## 2018-10-05 RX ADMIN — ROPIVACAINE HYDROCHLORIDE 20 ML GIVEN: 5 INJECTION, SOLUTION EPIDURAL; INFILTRATION; PERINEURAL at 07:05

## 2018-10-05 RX ADMIN — FENTANYL CITRATE 25 MCG: 50 INJECTION, SOLUTION INTRAMUSCULAR; INTRAVENOUS at 08:09

## 2018-10-05 RX ADMIN — MIDAZOLAM 0.5 MG: 1 INJECTION INTRAMUSCULAR; INTRAVENOUS at 07:28

## 2018-10-05 RX ADMIN — ACETAMINOPHEN 975 MG: 325 TABLET, FILM COATED ORAL at 15:49

## 2018-10-05 RX ADMIN — SODIUM CHLORIDE, POTASSIUM CHLORIDE, SODIUM LACTATE AND CALCIUM CHLORIDE: 600; 310; 30; 20 INJECTION, SOLUTION INTRAVENOUS at 07:20

## 2018-10-05 RX ADMIN — SODIUM CHLORIDE 1 G: 9 INJECTION, SOLUTION INTRAVENOUS at 10:02

## 2018-10-05 RX ADMIN — FENTANYL CITRATE 25 MCG: 50 INJECTION, SOLUTION INTRAMUSCULAR; INTRAVENOUS at 10:16

## 2018-10-05 RX ADMIN — CEFAZOLIN SODIUM 2 G: 2 INJECTION, SOLUTION INTRAVENOUS at 07:44

## 2018-10-05 ASSESSMENT — LIFESTYLE VARIABLES: TOBACCO_USE: 1

## 2018-10-05 ASSESSMENT — PAIN DESCRIPTION - DESCRIPTORS: DESCRIPTORS: CONSTANT

## 2018-10-05 ASSESSMENT — ACTIVITIES OF DAILY LIVING (ADL)
ADLS_ACUITY_SCORE: 18
ADLS_ACUITY_SCORE: 19

## 2018-10-05 NOTE — ANESTHESIA PREPROCEDURE EVALUATION
Anesthesia Evaluation     . Pt has had prior anesthetic.     No history of anesthetic complications          ROS/MED HX    ENT/Pulmonary:     (+)tobacco use, Past use , . .   (-) sleep apnea   Neurologic: Comment: Acoustic neuroma      Cardiovascular:     (+) Dyslipidemia, hypertension----. : . . . :. .       METS/Exercise Tolerance:  >4 METS   Hematologic:         Musculoskeletal:   (+) arthritis, , , -       GI/Hepatic:        (-) GERD   Renal/Genitourinary:     (+) Nephrolithiasis , BPH,       Endo:     (+) type II DM .      Psychiatric:         Infectious Disease:         Malignancy:         Other:                     Physical Exam  Normal systems: dental    Airway   Mallampati: II  TM distance: >3 FB  Neck ROM: full    Dental     Cardiovascular   Rhythm and rate: regular and normal      Pulmonary    breath sounds clear to auscultation                    Anesthesia Plan      History & Physical Review  History and physical reviewed and following examination; no interval change.    ASA Status:  3 .    NPO Status:  > 8 hours    Plan for General, LMA and Periph. Nerve Block for postop pain   PONV prophylaxis:  Ondansetron (or other 5HT-3) and Dexamethasone or Solumedrol  Full interview and block performed with  present.      Postoperative Care  Postoperative pain management:  IV analgesics, Oral pain medications and Peripheral nerve block (Single Shot).      Consents  Anesthetic plan, risks, benefits and alternatives discussed with:  Patient.  Use of blood products discussed: Yes.   Use of blood products discussed with Patient.  Consented to blood products.  .                          .

## 2018-10-05 NOTE — PROGRESS NOTES
10/05/18 1500   Quick Adds   Type of Visit Initial PT Evaluation       Present yes   Language Nepali   Living Environment   Lives With spouse   Living Arrangements house   Home Accessibility bed and bath are not on the first floor   Number of Stairs to Enter Home 2   Number of Stairs Within Home 0   Stair Railings at Home none   Transportation Available family or friend will provide   Living Environment Comment Pt lives in a 1Sh with 2STE and no rails. Pt's spouse is elderly and is unable to assist in any way.   Self-Care   Dominant Hand right   Usual Activity Tolerance moderate   Current Activity Tolerance fair   Regular Exercise no   Equipment Currently Used at Home cane, quad;walker, rolling   Activity/Exercise/Self-Care Comment Per son pt required assistance with ADL's occasionally which his daughter provides   Functional Level Prior   Ambulation 1-->assistive equipment   Transferring 1-->assistive equipment   Toileting 1-->assistive equipment   Bathing 2-->assistive person   Dressing 2-->assistive person   Eating 0-->independent   Communication 0-->understands/communicates without difficulty   Swallowing 0-->swallows foods/liquids without difficulty   Cognition 0 - no cognition issues reported   Fall history within last six months yes   Number of times patient has fallen within last six months 1   Which of the above functional risks had a recent onset or change? none   Prior Functional Level Comment Pt was mod I with ambualtion using a quad cane or RW. Pt's son reproted that occasionally family assist pt during gait for safety, when weak.   General Information   Onset of Illness/Injury or Date of Surgery - Date 10/05/18   Referring Physician Darnell Allen MD   Patient/Family Goals Statement Be able to walk   Pertinent History of Current Problem (include personal factors and/or comorbidities that impact the POC) Pt is s/p L TKA, POD 0. PMH includes: insulin dependent T2DM, BPH,  hyperlipidemia, and osteoarthritis    Precautions/Limitations fall precautions   Weight-Bearing Status - LLE weight-bearing as tolerated   Weight-Bearing Status - RLE full weight-bearing   General Observations Pleasant and cooperative    General Info Comments Activity: Up in chair, Knee immobilizer at night, WBAT   Cognitive Status Examination   Orientation orientation to person, place and time   Level of Consciousness alert   Follows Commands and Answers Questions 100% of the time   Personal Safety and Judgment intact   Memory intact   Pain Assessment   Patient Currently in Pain (pt c/o 6/10 at rest and 8/10 on standing. RN aware.)   Range of Motion (ROM)   ROM Comment L Knee: NT due to pain, otherwise WFL   Strength   Strength Comments L knee: NT, otherwise: 4/5   Bed Mobility   Bed Mobility Comments supine>sit: min assist, sit>supine:mod assist    Transfer Skills   Transfer Comments STS with min assist    Gait   Gait Comments Pt took 5 side steps to the HOB using RW for support and min assist x 1 for safety.    Balance   Balance Comments Dynamic standing balance: impaired    Coordination   Coordination no deficits were identified   Muscle Tone   Muscle Tone no deficits were identified   General Therapy Interventions   Planned Therapy Interventions balance training;bed mobility training;gait training;ROM;stretching;transfer training;risk factor education;home program guidelines;progressive activity/exercise   Clinical Impression   Criteria for Skilled Therapeutic Intervention yes, treatment indicated   PT Diagnosis Impaired gait   Influenced by the following impairments Decreased strength and decreased activity tolerance; Pain   Functional limitations due to impairments Level of assistance needed with transfers and gait   Clinical Presentation Stable/Uncomplicated   Clinical Presentation Rationale Pt is s/p L TKA, POD 0.    Clinical Decision Making (Complexity) Low complexity   Therapy Frequency` 2 times/day  "  Predicted Duration of Therapy Intervention (days/wks) 3   Anticipated Discharge Disposition Home with Assist;Transitional Care Facility   Risk & Benefits of therapy have been explained Yes   Patient, Family & other staff in agreement with plan of care Yes   Clinical Impression Comments Pt presents with decreased strength, activity tolerance and pain limiting fucntional mobility and independence. Pt will benefit from continued skilled PT intervnetions to achieve PLOF    [facility] AM-PAC  \"6 Clicks\" V.2 Basic Mobility Inpatient Short Form   1. Turning from your back to your side while in a flat bed without using bedrails? 2 - A Lot   2. Moving from lying on your back to sitting on the side of a flat bed without using bedrails? 2 - A Lot   3. Moving to and from a bed to a chair (including a wheelchair)? 3 - A Little   4. Standing up from a chair using your arms (e.g., wheelchair, or bedside chair)? 3 - A Little   5. To walk in hospital room? 3 - A Little   6. Climbing 3-5 steps with a railing? 2 - A Lot   Basic Mobility Raw Score (Score out of 24.Lower scores equate to lower levels of function) 15   Total Evaluation Time   Total Evaluation Time (Minutes) 15     "

## 2018-10-05 NOTE — IP AVS SNAPSHOT
[address] [facility]    [address] [place] [address]    Phone:  [phone]                                       After Visit Summary   10/5/2018    Rabia Reed    MRN: [id number]           After Visit Summary Signature Page     I have received my discharge instructions, and my questions have been answered. I have discussed any challenges I see with this plan with the nurse or doctor.    ..........................................................................................................................................  Patient/Patient Representative Signature      ..........................................................................................................................................  Patient Representative Print Name and Relationship to Patient    ..................................................               ................................................  Date                                   Time    ..........................................................................................................................................  Reviewed by Signature/Title    ...................................................              ..............................................  Date                                               Time          22EPIC Rev 08/18

## 2018-10-05 NOTE — ANESTHESIA CARE TRANSFER NOTE
Patient: Rabia Reed    Procedure(s):  LEFT TOTAL KNEE ARTHROPLASTY  - Wound Class: I-Clean    Diagnosis: LEFT KNEE DJD   Diagnosis Additional Information: No value filed.    Anesthesia Type:   General, LMA, Periph. Nerve Block for postop pain     Note:  Airway :Face Mask  Patient transferred to:PACU  Comments: Pt exhibits spontaneous respirations, follows commands, suctioned, LMA removed, exchanging well, transferred to pacu with O2 @ 10L via mask, all monitors and alarms on, report to RN, VSS.Handoff Report: Identifed the Patient, Identified the Reponsible Provider, Reviewed the pertinent medical history, Discussed the surgical course, Reviewed Intra-OP anesthesia mangement and issues during anesthesia, Set expectations for post-procedure period and Allowed opportunity for questions and acknowledgement of understanding      Vitals: (Last set prior to Anesthesia Care Transfer)    CRNA VITALS  10/5/2018 1005 - 10/5/2018 1044      10/5/2018             Pulse: 107    SpO2: 99 %    Resp Rate (observed): (!)  3    Resp Rate (set): 10                Electronically Signed By: JERMAIN Giang CRNA  October 5, 2018  10:44 AM

## 2018-10-05 NOTE — IP AVS SNAPSHOT
MRN:[id number]                      After Visit Summary   10/5/2018    Rabia Reed    MRN: [id number]           Thank you!     Thank you for choosing [facility] for your care. Our goal is always to provide you with excellent care. Hearing back from our patients is one way we can continue to improve our services. Please take a few minutes to complete the written survey that you may receive in the mail after you visit with us. Thank you!        Patient Information     Date Of Birth          1940        Designated Caregiver       Most Recent Value    Caregiver    Will someone help with your care after discharge? no      About your hospital stay     You were admitted on:  October 5, 2018 You last received care in the:  [facility] [address] [facility]    You were discharged on:  October 8, 2018       Who to Call     For medical emergencies, please call 911.  For non-urgent questions about your medical care, please call your primary care provider or clinic, [phone]  For questions related to your surgery, please call your surgery clinic        Attending Provider     Provider Specialty    Darnell Allen MD Orthopedics       Primary Care Provider Office Phone # Fax #    Alfredo Aragon [phone]      After Care Instructions     Activity - Up ad vera           Additional Discharge Instructions       Knee immobilizer at bedtime for 7 days after discharge            General info for SNF       Length of Stay Estimate: Short Term Care: Estimated # of Days <30  Condition at Discharge: Improving  Level of care:skilled   Rehabilitation Potential: Good  Admission H&P remains valid and up-to-date: Yes  Recent Chemotherapy: N/A  Use Nursing Home Standing Orders: Yes            Mantoux instructions       Give two-step Mantoux (PPD) Per Facility Policy Yes            Weight bearing status       WBAT            Wound care (specify)       Site:   Left knee  Instructions:  Daily dressing  "changes until wound dry then leave open to air                  Follow-up Appointments     Follow Up ([facility])       Follow up with Dr. Wise in 2-3wks for further evaluation and management of urinary retention.   [facility])  [address] [place] [address], [address]  [place]. [address]  You may call [phone] with any questions or concerns.   Central Appointment #: [phone]            Follow Up and recommended labs and tests       Follow up Dr. Allen 2 weeks post op            Follow Up and recommended labs and tests       Dr. Allen 2 weeks post op                  Additional Services     Occupational Therapy Adult Consult       Evaluate and treat as clinically indicated.    Reason:  TKA            Physical Therapy Adult Consult       Evaluate and treat as clinically indicated.    Reason:  TKA                  Future tests that were ordered for you     AntiEmbolism Stockings       Bilateral below knee length.On in the morning, off at night                  Pending Results     No orders found from 10/3/2018 to 10/6/2018.            Statement of Approval     Ordered          10/08/18 0629  I have reviewed and agree with all the recommendations and orders detailed in this document.  EFFECTIVE NOW     Approved and electronically signed by:  Darnell Allen MD             Admission Information     Date & Time Provider Department Dept. Phone    10/5/2018 Darnell Allen MD [place] [address] [facility] [phone]      Your Vitals Were     Blood Pressure Pulse Temperature Respirations Height Weight    135/73 (BP Location: Right arm) 90 98.3  F (36.8  C) (Oral) 16 1.676 m (5' 6\") 81.9 kg (180 lb 8 oz)    Pulse Oximetry BMI (Body Mass Index)                96% 29.13 kg/m2          [employer] Information     [employer] gives you secure access to your electronic health record. If you see a primary care provider, you can also send messages to your care " team and make appointments. If you have questions, please call your primary care clinic.  If you do not have a primary care provider, please call [phone] and they will assist you.        Care EveryWhere ID     This is your Care EveryWhere ID. This could be used by other organizations to access your [facility] medical records  [id number]        Equal Access to Services     KALEIGH BRAND AH: Hadii keanu ku hadasho Soomaali, waaxda luqadaha, qaybta kaalmada yeni, alfonzo alcarazjayrenea oconnor . So [facility] [phone].    ATENCIÓN: Si habla español, tiene a sylvester disposición servicios gratuitos de asistencia lingüística. Carmela al [phone].    We comply with applicable federal civil rights laws and [place] laws. We do not discriminate on the basis of race, color, national origin, age, disability, sex, sexual orientation, or gender identity.               Review of your medicines      START taking        Dose / Directions    * oxyCODONE 10 MG 12 hr tablet   Commonly known as:  OxyCONTIN   Used for:  Status post total left knee replacement        Dose:  10 mg   Take 1 tablet (10 mg) by mouth every 12 hours   Quantity:  10 tablet   Refills:  0       * oxyCODONE IR 5 MG tablet   Commonly known as:  ROXICODONE   Used for:  Status post total left knee replacement        Dose:  5-10 mg   Take 1-2 tablets (5-10 mg) by mouth every 3 hours as needed   Quantity:  40 tablet   Refills:  0       oxymetazoline 0.05 % spray   Commonly known as:  AFRIN NASAL SPRAY   Used for:  Status post total left knee replacement        Dose:  2-3 spray   Spray 2-3 sprays into both nostrils 2 times daily as needed for congestion   Quantity:  1 Bottle   Refills:  0       senna-docusate 8.6-50 MG per tablet   Commonly known as:  SENOKOT-S;PERICOLACE   Used for:  Status post total left knee replacement        Dose:  1 tablet   Take 1 tablet by mouth 2 times daily   Quantity:  100 tablet   Refills:  0       * Notice:  This list has 2  medication(s) that are the same as other medications prescribed for you. Read the directions carefully, and ask your doctor or other care provider to review them with you.      CONTINUE these medicines which may have CHANGED, or have new prescriptions. If we are uncertain of the size of tablets/capsules you have at home, strength may be listed as something that might have changed.        Dose / Directions    aspirin 325 MG tablet   This may have changed:    - medication strength  - how much to take  - when to take this   Used for:  Status post total left knee replacement        Dose:  325 mg   Take 1 tablet (325 mg) by mouth 2 times daily   Quantity:  90 tablet   Refills:  0       insulin aspart 100 UNIT/ML injection   Commonly known as:  NovoLOG FLEXPEN   This may have changed:    - how much to take  - how to take this  - when to take this  - additional instructions   Used for:  Type 2 diabetes mellitus without complication, with long-term current use of insulin (H)        Inject 10-15 Units Subcutaneous At Bedtime   Quantity:  15 mL   Refills:  11         CONTINUE these medicines which have NOT CHANGED        Dose / Directions    ACE/ARB/ARNI NOT PRESCRIBED (INTENTIONAL)   Used for:  Type 2 diabetes mellitus without complication, with long-term current use of insulin (H)        Please choose reason not prescribed, below   Refills:  0       atorvastatin 80 MG tablet   Commonly known as:  LIPITOR   Used for:  Hyperlipidemia LDL goal <100        Dose:  80 mg   Take 1 tablet (80 mg) by mouth daily   Quantity:  90 tablet   Refills:  3       blood glucose monitoring lancets   Used for:  Type 2 diabetes mellitus without complication, with long-term current use of insulin (H)        USE 3 TIMES DAILY   Quantity:  200 each   Refills:  3       blood glucose monitoring test strip   Commonly known as:  ONETOUCH ULTRA   Used for:  Type 2 diabetes mellitus without complication, with long-term current use of insulin (H)         Use to test 3 times daily   Quantity:  200 each   Refills:  3       celecoxib 200 MG capsule   Commonly known as:  celeBREX   Used for:  Primary osteoarthritis, unspecified site        Take 1 capsule (200 mg) by mouth daily With food for arthritis pain   Quantity:  90 capsule   Refills:  1       FINASTERIDE PO        Dose:  5 mg   Take 5 mg by mouth daily   Refills:  0       * NovoLOG MIX 70/30 FLEXPEN injection   Generic drug:  insulin aspart prot & aspart        Dose:  30 Units   Inject 30 Units Subcutaneous every morning (before breakfast)   Refills:  0       * NovoLOG MIX 70/30 FLEXPEN injection   Generic drug:  insulin aspart prot & aspart        Dose:  20 Units   Inject 20 Units Subcutaneous daily (with dinner)   Refills:  0       [employer] ULTRA SYSTEM KIT w/Device Kit   Used for:  Type II or unspecified type diabetes mellitus without mention of complication, not stated as uncontrolled        One touch ultra 2 if covered by insurance   Quantity:  1 kit   Refills:  0       tamsulosin 0.4 MG capsule   Commonly known as:  FLOMAX   Used for:  Urine frequency        TAKE ONE CAPSULE BY MOUTH ONE TIME DAILY   Quantity:  90 capsule   Refills:  3       TYLENOL 500 MG tablet   Generic drug:  acetaminophen        Dose:  500-1000 mg   Take 500-1,000 mg by mouth every 6 hours as needed for mild pain   Refills:  0       * ULTICARE MICRO 32G X 4 MM   Used for:  Type 2 diabetes, HbA1c goal < 7% (H)   Generic drug:  insulin pen needle        TEST 3 TIMES DAILY   Quantity:  100 each   Refills:  0       * pen needles 31G X 6 MM Misc   Used for:  Type 2 diabetes mellitus without complication, with long-term current use of insulin (H)        Dose:  1 Device   1 Device 3 times daily   Quantity:  100 each   Refills:  11       VITAMIN C PO        Dose:  500 mg   Take 500 mg by mouth daily   Refills:  0       VITAMIN D (CHOLECALCIFEROL) PO        Dose:  2000 Units   Take 2,000 Units by mouth daily   Refills:  0       * Notice:  This  list has 4 medication(s) that are the same as other medications prescribed for you. Read the directions carefully, and ask your doctor or other care provider to review them with you.      STOP taking     DOCUSATE SODIUM PO                Where to get your medicines      Some of these will need a paper prescription and others can be bought over the counter. Ask your nurse if you have questions.     You don't need a prescription for these medications     aspirin 325 MG tablet    oxymetazoline 0.05 % spray    senna-docusate 8.6-50 MG per tablet         Information about where to get these medications is not yet available     ! Ask your nurse or doctor about these medications     oxyCODONE 10 MG 12 hr tablet    oxyCODONE IR 5 MG tablet                Protect others around you: Learn how to safely use, store and throw away your medicines at www.disposemymeds.org.        Information about OPIOIDS     PRESCRIPTION OPIOIDS: WHAT YOU NEED TO KNOW   We gave you an opioid (narcotic) pain medicine. It is important to manage your pain, but opioids are not always the best choice. You should first try all the other options your care team gave you. Take this medicine for as short a time (and as few doses) as possible.    Some activities can increase your pain, such as bandage changes or therapy sessions. It may help to take your pain medicine 30 to 60 minutes before these activities. Reduce your stress by getting enough sleep, working on hobbies you enjoy and practicing relaxation or meditation. Talk to your care team about ways to manage your pain beyond prescription opioids.    These medicines have risks:    DO NOT drive when on new or higher doses of pain medicine. These medicines can affect your alertness and reaction times, and you could be arrested for driving under the influence (DUI). If you need to use opioids long-term, talk to your care team about driving.    DO NOT operate heavy machinery    DO NOT do any other  dangerous activities while taking these medicines.    DO NOT drink any alcohol while taking these medicines.     If the opioid prescribed includes acetaminophen, DO NOT take with any other medicines that contain acetaminophen. Read all labels carefully. Look for the word  acetaminophen  or  Tylenol.  Ask your pharmacist if you have questions or are unsure.    You can get addicted to pain medicines, especially if you have a history of addiction (chemical, alcohol or substance dependence). Talk to your care team about ways to reduce this risk.    All opioids tend to cause constipation. Drink plenty of water and eat foods that have a lot of fiber, such as fruits, vegetables, prune juice, apple juice and high-fiber cereal. Take a laxative (Miralax, milk of magnesia, Colace, Senna) if you don t move your bowels at least every other day. Other side effects include upset stomach, sleepiness, dizziness, throwing up, tolerance (needing more of the medicine to have the same effect), physical dependence and slowed breathing.    Store your pills in a secure place, locked if possible. We will not replace any lost or stolen medicine. If you don t finish your medicine, please throw away (dispose) as directed by your pharmacist. The [place] [employer]ution [employer] has more information about safe disposal: https://www.pca.[facility].us/living-green/managing-unwanted-medications             Medication List: This is a list of all your medications and when to take them. Check marks below indicate your daily home schedule. Keep this list as a reference.      Medications           Morning Afternoon Evening Bedtime As Needed    ACE/ARB/ARNI NOT PRESCRIBED (INTENTIONAL)   Please choose reason not prescribed, below                                aspirin 325 MG tablet   Take 1 tablet (325 mg) by mouth 2 times daily                                atorvastatin 80 MG tablet   Commonly known as:  LIPITOR   Take 1 tablet (80 mg) by mouth daily    Last time this was given:  80 mg on 10/8/2018  8:14 AM                                blood glucose monitoring lancets   USE 3 TIMES DAILY                                blood glucose monitoring test strip   Commonly known as:  ONETOUCH ULTRA   Use to test 3 times daily                                celecoxib 200 MG capsule   Commonly known as:  celeBREX   Take 1 capsule (200 mg) by mouth daily With food for arthritis pain   Last time this was given:  200 mg on 10/7/2018  8:45 AM                                FINASTERIDE PO   Take 5 mg by mouth daily   Last time this was given:  5 mg on 10/7/2018  9:22 PM                                insulin aspart 100 UNIT/ML injection   Commonly known as:  NovoLOG FLEXPEN   Inject 10-15 Units Subcutaneous At Bedtime   Last time this was given:  3 Units on 10/8/2018 12:36 PM                                * NovoLOG MIX 70/30 FLEXPEN injection   Inject 30 Units Subcutaneous every morning (before breakfast)   Last time this was given:  10 Units on 10/8/2018  8:54 AM   Generic drug:  insulin aspart prot & aspart                                * NovoLOG MIX 70/30 FLEXPEN injection   Inject 20 Units Subcutaneous daily (with dinner)   Last time this was given:  10 Units on 10/8/2018  8:54 AM   Generic drug:  insulin aspart prot & aspart                                ONETOUCH ULTRA SYSTEM KIT w/Device Kit   One touch ultra 2 if covered by insurance                                * oxyCODONE 10 MG 12 hr tablet   Commonly known as:  OxyCONTIN   Take 1 tablet (10 mg) by mouth every 12 hours   Last time this was given:  10 mg on 10/8/2018  8:14 AM                                * oxyCODONE IR 5 MG tablet   Commonly known as:  ROXICODONE   Take 1-2 tablets (5-10 mg) by mouth every 3 hours as needed   Last time this was given:  10 mg on 10/8/2018 12:52 PM                                oxymetazoline 0.05 % spray   Commonly known as:  AFRIN NASAL SPRAY   Spray 2-3 sprays into both  nostrils 2 times daily as needed for congestion                                senna-docusate 8.6-50 MG per tablet   Commonly known as:  SENOKOT-S;PERICOLACE   Take 1 tablet by mouth 2 times daily   Last time this was given:  2 tablets on 10/8/2018  8:13 AM                                tamsulosin 0.4 MG capsule   Commonly known as:  FLOMAX   TAKE ONE CAPSULE BY MOUTH ONE TIME DAILY   Last time this was given:  0.4 mg on 10/7/2018  9:21 PM                                TYLENOL 500 MG tablet   Take 500-1,000 mg by mouth every 6 hours as needed for mild pain   Last time this was given:  975 mg on 10/8/2018  6:34 AM   Generic drug:  acetaminophen                                * ULTICARE MICRO 32G X 4 MM   TEST 3 TIMES DAILY   Generic drug:  insulin pen needle                                * pen needles 31G X 6 MM Misc   1 Device 3 times daily                                VITAMIN C PO   Take 500 mg by mouth daily                                VITAMIN D (CHOLECALCIFEROL) PO   Take 2,000 Units by mouth daily   Last time this was given:  2,000 Units on 10/8/2018  8:13 AM                                * Notice:  This list has 6 medication(s) that are the same as other medications prescribed for you. Read the directions carefully, and ask your doctor or other care provider to review them with you.

## 2018-10-05 NOTE — CONSULTS
[facility]    Hospitalist Consultation    Date of Admission:  10/5/2018  Date of Consult (When I saw the patient): 10/05/18    Assessment & Plan   Rabia Reed is a 78 year old male with PMHx of insulin dependent T2DM, BPH, hyperlipidemia, and osteoarthritis who was admitted on 10/5/2018 and is s/p left TKA. Surgery performed by Dr. Allen. I was asked to see the patient for medical comanagement. Pt slightly hypertensive and tachycardic, all other vitals WNL. Pt currently stable.     S/P left TKA: Surgery performed by Dr. Allen for osteoarthritis. EBL 10 ccs.   -- Defer routine post-operative cares, IVF, DVT prophylaxis and pain control to primary service   -- Bowel regimen in place while on narcotics   -- Encourage pulmonary toilet; incentive spirometer at bedside   -- PT and OT in the AM   -- CBC and BMP in AM     T2DM, insulin dependent, uncontrolled: A1C 8.6 on 10/2. Maintained on Insulin 70/30 with 30 U qam and 20 U qpm, Novolog 10 U at bedtime. Took 14 U of 70/30 the evening prior to surgery and 10 U of Novolog the night prior to surgery.   -- Insulin 70/30 reordered at reduced dose at pt advances diet; 7 U this evening and 10 U in the AM   -- High dose sliding scale insulin ordered   -- BS per protocol   -- Monitor for hypoglycemia    Recent Labs  Lab 10/05/18  1021 10/05/18  0755 10/05/18  0553 10/02/18  1644   GLC  --   --  64* 234*   * 133*  --   --      BPH: Ledesma in place.   -- Continue PTA Finasteride and Flomax     Hyperlipidemia: Continue statin    DVT Prophylaxis: Enoxaparin (Lovenox) SQ  Code Status: Full Code    Disposition: Expected discharge per Ortho    Mercedez Smith PA-C    This patient was discussed with Dr. Mejias of the Hospitalist Service who independently interviewed and examined the patient and agrees with current plans as outlined above.     Reason for Consult   Reason for consult: I was asked by Dr. Allen to evaluate this patient for medical  co-management.    Primary Care Physician   Alfredo Aragon    Chief Complaint   S/P left TKA    History is obtained from the patient through [place]  and son.     History of Present Illness   Rabia Reed is a 78 year old male with PMHx of insulin dependent T2DM, BPH, hyperlipidemia, and osteoarthritis who was admitted on 10/5/2018 and is s/p left TKA. Surgery performed by Dr. Allen. I was asked to see the patient for medical comanagement. Pt slightly hypertensive and tachycardic, all other vitals WNL. Pt currently stable.     Surgery performed by Dr. Allen for osteoarthritis. EBL 10 ccs. Pt resting comfortably in bed. No acute distress. Sleepy, but easily aroused. No N/V, chest pain or SOB. Ledesma catheter in place, noted retention with Urology involvement for Ledesma catheter placement. Hx of BPH. No recent medication changes.     In regards to DM. A1C 8.6 on 10/2. Maintained on Insulin 70/30 with 30 U qam and 20 U qpm, Novolog 10 U at bedtime. Took 14 U of 70/30 the evening prior to surgery and 10 U of Novolog the night prior to surgery.     Pt is without acute concerns he would like addressed.     Past Medical History    1. BPH   2. Acoustic neuroma, right side   3. Hyperlipidemia  4. T2DM, insulin dependent, uncontrolled with A1C 8.6   5. Prior tobacco use     Past Surgical History   I have reviewed this patient's surgical history and updated it with pertinent information if needed.  Past Surgical History:   Procedure Laterality Date     ARTHROPLASTY KNEE Right 9/14/2017    Procedure: ARTHROPLASTY KNEE;  RIGHT TOTAL KNEE ARTHROPLASTY ;  Surgeon: Darnell Allen MD;  Location: [facility] OR     C APPENDECTOMY       C NONSPECIFIC PROCEDURE  4/01    nasal polyp excision     C NONSPECIFIC PROCEDURE  1/03    [facility] repair     C NONSPECIFIC PROCEDURE  April 2011     right knee arthroscopy     Prior to Admission Medications   Prior to Admission Medications   Prescriptions Last Dose Informant Patient Reported? Taking?    ACE/ARB/ARNI NOT PRESCRIBED, INTENTIONAL,  Son No No   Sig: Please choose reason not prescribed, below   ASPIRIN PO 2018 Son Yes Yes   Sig: Take 81 mg by mouth daily   Ascorbic Acid (VITAMIN C PO) 10/4/2018 at am Son Yes Yes   Sig: Take 500 mg by mouth daily   Blood Glucose Monitoring Suppl (ONE TOUCH ULTRA SYSTEM KIT) W/DEVICE KIT  Son No No   Sig: One touch ultra 2 if covered by insurance   DOCUSATE SODIUM PO 10/4/2018 at am Son Yes Yes   Sig: Take 100 mg by mouth daily   FINASTERIDE PO 10/4/2018 at pm Son Yes Yes   Sig: Take 5 mg by mouth daily   Insulin Pen Needle (PEN NEEDLES) 31G X 6 MM MISC  Son No No   Si Device 3 times daily   ULTICARE MICRO 32G X 4 MM insulin pen needle  Son No No   Sig: TEST 3 TIMES DAILY   VITAMIN D, CHOLECALCIFEROL, PO 10/4/2018 at am Son Yes Yes   Sig: Take 2,000 Units by mouth daily   acetaminophen (TYLENOL) 500 MG tablet 10/4/2018 at 2300 Son Yes Yes   Sig: Take 500-1,000 mg by mouth every 6 hours as needed for mild pain   atorvastatin (LIPITOR) 80 MG tablet 10/3/2018 Son No Yes   Sig: Take 1 tablet (80 mg) by mouth daily   blood glucose monitoring (ONE TOUCH ULTRASOFT) lancets  Son No No   Sig: USE 3 TIMES DAILY   blood glucose monitoring (ONETOUCH ULTRA) test strip  Son No No   Sig: Use to test 3 times daily   celecoxib (CELEBREX) 200 MG capsule 2018 Son No Yes   Sig: Take 1 capsule (200 mg) by mouth daily With food for arthritis pain   insulin aspart (NOVOLOG FLEXPEN) 100 UNIT/ML injection 6 units 10/4/2018 at hs Son No Yes   Sig: Inject 10-15 Units Subcutaneous At Bedtime   Patient taking differently: Inject 10 Units Subcutaneous At Bedtime    insulin aspart prot & aspart (NOVOLOG MIX 70/30 FLEXPEN) injection 10/4/2018 at am Son Yes Yes   Sig: Inject 30 Units Subcutaneous every morning (before breakfast)   insulin aspart prot & aspart (NOVOLOG MIX 70/30 FLEXPEN) injection 14 units 10/4/2018 at pm Son Yes Yes   Sig: Inject 20 Units Subcutaneous daily (with dinner)    tamsulosin (FLOMAX) 0.4 MG capsule 10/4/2018 at pm Son No Yes   Sig: TAKE ONE CAPSULE BY MOUTH ONE TIME DAILY       Facility-Administered Medications: None     Allergies   Allergies   Allergen Reactions     No Known Drug Allergies      Social History   I have reviewed this patient's social history and updated it with pertinent information if needed. Rabia Reed  reports that he quit smoking about 17 years ago. He has a 21.00 pack-year smoking history. He has never used smokeless tobacco. He reports that he does not drink alcohol or use illicit drugs.    Family History   I have reviewed this patient's family history and updated it with pertinent information if needed.   Family History   Problem Relation Age of Onset     Diabetes Mother       age 85     Family History Negative Father       age 38,  in war     Diabetes Sister      Diabetes Brother      borderline diabetic       Review of Systems   The 10 point Review of Systems is negative other than noted in the HPI.    Physical Exam   Temp: 97.9  F (36.6  C) Temp src: Oral BP: (!) 154/92 Pulse: 67 Heart Rate: 100 Resp: 13 SpO2: 93 % O2 Device: None (Room air) Oxygen Delivery: 2 LPM  Vital Signs with Ranges  Temp:  [96.8  F (36  C)-98.1  F (36.7  C)] 97.9  F (36.6  C)  Pulse:  [67-70] 67  Heart Rate:  [] 100  Resp:  [11-17] 13  BP: (154-170)/() 154/92  SpO2:  [93 %-100 %] 93 %  174 lbs 0 oz    CONSTITUTIONAL: Pt laying in bed, dressed in hospital garb. Appears tired, arouses easily, asking for ice. Cooperative with interview. Accompanied by [place]  and son.   HEENT: Normocephalic, atraumatic. Negative for conjunctival redness or scleral icterus.   CARDIOVASCULAR: RRR, no murmurs, rubs, or extra heart sounds appreciated. Pulses +2/4 and regular in upper and lower extremities, bilaterally.   RESPIRATORY: No increased work of breathing.  Supplemental oxygenation via NC at 2LPM. CTA, bilat; no wheezes, rales, or rhonchi  appreciated.  GASTROINTESTINAL:  Abdomen soft, non-distended. BS auscultated in all four quadrants. Negative for tenderness to palpation.  No masses or organomegaly noted.  MUSCULOSKELETAL: LLE wrapped. No gross deformities noted. Normal muscle tone.   HEMATOLOGIC/LYMPHATIC/IMMUNOLOGIC:  Negative for lower extremity edema, bilaterally.  NEUROLOGIC: Alert and oriented to person, place, and time.  strength intact.  SKIN: Warm, dry, intact. No jaundice noted. Negative for suspicious lesions, rashes, bruising, open sores or abrasions.     Data   -Data reviewed today: See below     Recent Labs  Lab 10/05/18  1447 10/05/18  0553 10/02/18  1644   WBC  --   --  6.6   HGB  --   --  13.5   MCV  --   --  104*     --  167   NA  --   --  140   POTASSIUM  --  3.4 3.9   CHLORIDE  --   --  106   CO2  --   --  31   BUN  --   --  25   CR 0.89  --  1.01   ANIONGAP  --   --  3   MABEL  --   --  8.2*   GLC  --  64* 234*   ALBUMIN  --   --  3.4   PROTTOTAL  --   --  7.1   BILITOTAL  --   --  0.5   ALKPHOS  --   --  147   ALT  --   --  41   AST  --   --  28       Recent Results (from the past 24 hour(s))   XR Knee Port Left 1/2 Views    Narrative    XR KNEE PORT LT 1/2 VW 10/5/2018 11:51 AM    COMPARISON: None.    HISTORY: Arthroplasty.      Impression    IMPRESSION: LEFT total knee arthroplasty. Hardware appears intact. No  fractures are seen. Surgical drain in place.    KVNG PATEL MD

## 2018-10-05 NOTE — IP AVS SNAPSHOT
"[place] [address] [facility]: [phone]                                              INTERAGENCY TRANSFER FORM - PHYSICIAN ORDERS   10/5/2018                    Hospital Admission Date: 10/5/2018  PIYUSH BOND   : 1940  Sex: Male        Attending Provider: Darnell Allen MD     Allergies:  No Known Drug Allergies    Infection:  None   Service:  SURGERY    Ht:  1.676 m (5' 6\")   Wt:  81.9 kg (180 lb 8 oz)   Admission Wt:  78.9 kg (174 lb)    BMI:  29.13 kg/m 2   BSA:  1.95 m 2            Patient PCP Information     Provider PCP Type    Alfredo Aragon MD General      ED Clinical Impression     Diagnosis Description Comment Added By Time Added    Status post total left knee replacement [Z96.652] Status post total left knee replacement [Z96.652]  Darnell Allen MD 10/8/2018  6:18 AM      Hospital Problems as of 10/8/2018              Priority Class Noted POA    Status post total left knee replacement Medium  2017 Yes      Non-Hospital Problems as of 10/8/2018              Priority Class Noted    Chondromalacia of patella Medium  2003    Memory loss Medium  Unknown    Disorder of acoustic nerve Medium  Unknown    HYPERLIPIDEMIA LDL GOAL <100 Medium  10/31/2010    BPH (benign prostatic hypertrophy) Medium  2010    Health Care Home Medium  2011    Elevated prostate specific antigen (PSA) Medium  2013    Erectile dysfunction, unspecified erectile dysfunction type Medium  2016    Type 2 diabetes mellitus without complication, with long-term current use of insulin (H) Medium  2017    Anemia, unspecified type Medium  2018      Code Status History     Date Active Date Inactive Code Status Order ID Comments User Context    2017 12:19 PM 2017  3:15 PM Full Code [id number]  Darnell Allen MD Inpatient         Medication Review      START taking        Dose / Directions Comments    * oxyCODONE 10 MG 12 hr tablet   Commonly known as:  OxyCONTIN   Used for: "  Status post total left knee replacement        Dose:  10 mg   Take 1 tablet (10 mg) by mouth every 12 hours   Quantity:  10 tablet   Refills:  0    1 tab po bid x 3 days then at bedtime x 4 days       * oxyCODONE IR 5 MG tablet   Commonly known as:  ROXICODONE   Used for:  Status post total left knee replacement        Dose:  5-10 mg   Take 1-2 tablets (5-10 mg) by mouth every 3 hours as needed   Quantity:  40 tablet   Refills:  0        oxymetazoline 0.05 % spray   Commonly known as:  AFRIN NASAL SPRAY   Used for:  Status post total left knee replacement        Dose:  2-3 spray   Spray 2-3 sprays into both nostrils 2 times daily as needed for congestion   Quantity:  1 Bottle   Refills:  0        senna-docusate 8.6-50 MG per tablet   Commonly known as:  SENOKOT-S;PERICOLACE   Used for:  Status post total left knee replacement        Dose:  1 tablet   Take 1 tablet by mouth 2 times daily   Quantity:  100 tablet   Refills:  0        * Notice:  This list has 2 medication(s) that are the same as other medications prescribed for you. Read the directions carefully, and ask your doctor or other care provider to review them with you.      CONTINUE these medications which may have CHANGED, or have new prescriptions. If we are uncertain of the size of tablets/capsules you have at home, strength may be listed as something that might have changed.        Dose / Directions Comments    aspirin 325 MG tablet   This may have changed:    - medication strength  - how much to take  - when to take this   Used for:  Status post total left knee replacement        Dose:  325 mg   Take 1 tablet (325 mg) by mouth 2 times daily   Quantity:  90 tablet   Refills:  0        insulin aspart 100 UNIT/ML injection   Commonly known as:  NovoLOG FLEXPEN   This may have changed:    - how much to take  - how to take this  - when to take this  - additional instructions   Used for:  Type 2 diabetes mellitus without complication, with long-term current  use of insulin (H)        Inject 10-15 Units Subcutaneous At Bedtime   Quantity:  15 mL   Refills:  11          CONTINUE these medications which have NOT CHANGED        Dose / Directions Comments    ACE/ARB/ARNI NOT PRESCRIBED (INTENTIONAL)   Used for:  Type 2 diabetes mellitus without complication, with long-term current use of insulin (H)        Please choose reason not prescribed, below   Refills:  0        atorvastatin 80 MG tablet   Commonly known as:  LIPITOR   Used for:  Hyperlipidemia LDL goal <100        Dose:  80 mg   Take 1 tablet (80 mg) by mouth daily   Quantity:  90 tablet   Refills:  3        blood glucose monitoring lancets   Used for:  Type 2 diabetes mellitus without complication, with long-term current use of insulin (H)        USE 3 TIMES DAILY   Quantity:  200 each   Refills:  3        blood glucose monitoring test strip   Commonly known as:  ONETOUCH ULTRA   Used for:  Type 2 diabetes mellitus without complication, with long-term current use of insulin (H)        Use to test 3 times daily   Quantity:  200 each   Refills:  3        celecoxib 200 MG capsule   Commonly known as:  celeBREX   Used for:  Primary osteoarthritis, unspecified site        Take 1 capsule (200 mg) by mouth daily With food for arthritis pain   Quantity:  90 capsule   Refills:  1        FINASTERIDE PO        Dose:  5 mg   Take 5 mg by mouth daily   Refills:  0        * NovoLOG MIX 70/30 FLEXPEN injection   Generic drug:  insulin aspart prot & aspart        Dose:  30 Units   Inject 30 Units Subcutaneous every morning (before breakfast)   Refills:  0        * NovoLOG MIX 70/30 FLEXPEN injection   Generic drug:  insulin aspart prot & aspart        Dose:  20 Units   Inject 20 Units Subcutaneous daily (with dinner)   Refills:  0        ONETOUCH ULTRA SYSTEM KIT w/Device Kit   Used for:  Type II or unspecified type diabetes mellitus without mention of complication, not stated as uncontrolled        One touch ultra 2 if covered by  insurance   Quantity:  1 kit   Refills:  0        tamsulosin 0.4 MG capsule   Commonly known as:  FLOMAX   Used for:  Urine frequency        TAKE ONE CAPSULE BY MOUTH ONE TIME DAILY   Quantity:  90 capsule   Refills:  3        TYLENOL 500 MG tablet   Generic drug:  acetaminophen        Dose:  500-1000 mg   Take 500-1,000 mg by mouth every 6 hours as needed for mild pain   Refills:  0        * ULTICARE MICRO 32G X 4 MM   Used for:  Type 2 diabetes, HbA1c goal < 7% (H)   Generic drug:  insulin pen needle        TEST 3 TIMES DAILY   Quantity:  100 each   Refills:  0    Medication is being filled for 1 time refill only due to:  Patient needs to be seen.       * pen needles 31G X 6 MM Misc   Used for:  Type 2 diabetes mellitus without complication, with long-term current use of insulin (H)        Dose:  1 Device   1 Device 3 times daily   Quantity:  100 each   Refills:  11        VITAMIN C PO        Dose:  500 mg   Take 500 mg by mouth daily   Refills:  0        VITAMIN D (CHOLECALCIFEROL) PO        Dose:  2000 Units   Take 2,000 Units by mouth daily   Refills:  0        * Notice:  This list has 4 medication(s) that are the same as other medications prescribed for you. Read the directions carefully, and ask your doctor or other care provider to review them with you.      STOP taking     DOCUSATE SODIUM PO                   After Care     Activity - Up ad vera           Additional Discharge Instructions       Knee immobilizer at bedtime for 7 days after discharge       General info for SNF       Length of Stay Estimate: Short Term Care: Estimated # of Days <30  Condition at Discharge: Improving  Level of care:skilled   Rehabilitation Potential: Good  Admission H&P remains valid and up-to-date: Yes  Recent Chemotherapy: N/A  Use Nursing Home Standing Orders: Yes       Mantoux instructions       Give two-step Mantoux (PPD) Per Facility Policy Yes       Weight bearing status       WBAT       Wound care (specify)       Site:    Left knee  Instructions:  Daily dressing changes until wound dry then leave open to air             Referrals     Occupational Therapy Adult Consult       Evaluate and treat as clinically indicated.    Reason:  TKA       Physical Therapy Adult Consult       Evaluate and treat as clinically indicated.    Reason:  TKA             Supplies     AntiEmbolism Stockings       Bilateral below knee length.On in the morning, off at night             Follow-Up Appointment Instructions     Future Labs/Procedures    Follow Up ([facility])     Comments:    Follow up with Dr. Wise in 2-3wks for further evaluation and management of urinary retention.   Urology [facility])  [address] [place] [address], [address]  [place]. [address]  You may call [phone] with any questions or concerns.   Central Appointment #: [phone]    Follow Up and recommended labs and tests     Comments:    Follow up Dr. Allen 2 weeks post op    Follow Up and recommended labs and tests     Comments:    Dr. Allen 2 weeks post op      Follow-Up Appointment Instructions     Follow Up ([facility])       Follow up with Dr. Wise in 2-3wks for further evaluation and management of urinary retention.   [facility]  [address] [place] [address], [address]  [place]. [address]  You may call [phone] with any questions or concerns.   Central Appointment #: [phone]       Follow Up and recommended labs and tests       Follow up Dr. Allen 2 weeks post op       Follow Up and recommended labs and tests       Dr. Allen 2 weeks post op             Statement of Approval     Ordered          10/08/18 0629  I have reviewed and agree with all the recommendations and orders detailed in this document.  EFFECTIVE NOW     Approved and electronically signed by:  Darnell Allen MD

## 2018-10-05 NOTE — OR NURSING
Dr. Mcrae, urologist here. Patient has had urinal placed for lst 30 minutes.  Keeps saying he has to pee.  Able to produce 150cc.  Dr. Mcrae placed 18 [place] mayes catheter with immediate urine output returned of 600 ml.  Patient states he feels much better per .

## 2018-10-05 NOTE — IP AVS SNAPSHOT
"` `     [place] [address] [facility]: [phone]                                              INTERAGENCY TRANSFER FORM - NURSING   10/5/2018                    Hospital Admission Date: 10/5/2018  PIYUSH BOND   : 1940  Sex: Male        Attending Provider: Darnell Allen MD     Allergies:  No Known Drug Allergies    Infection:  None   Service:  SURGERY    Ht:  1.676 m (5' 6\")   Wt:  81.9 kg (180 lb 8 oz)   Admission Wt:  78.9 kg (174 lb)    BMI:  29.13 kg/m 2   BSA:  1.95 m 2            Patient PCP Information     Provider PCP Type    Alfredo Aragon MD General      Current Code Status     Date Active Code Status Order ID Comments User Context       10/5/2018  1:31 PM Full Code [id number]  Darnell Allen MD Inpatient       Questions for Current Code Status     Question Answer Comment    Code status determined by: Discussion with patient/legal decision maker       Code Status History     Date Active Date Inactive Code Status Order ID Comments User Context    2017 12:19 PM 2017  3:15 PM Full Code [id number]  Darnell Allen MD Inpatient      Advance Directives        Scanned docmt in ACP Activity?           Yes, scanned ACP docmt        Hospital Problems as of 10/8/2018              Priority Class Noted POA    Status post total left knee replacement Medium  2017 Yes      Non-Hospital Problems as of 10/8/2018              Priority Class Noted    Chondromalacia of patella Medium  2003    Memory loss Medium  Unknown    Disorder of acoustic nerve Medium  Unknown    HYPERLIPIDEMIA LDL GOAL <100 Medium  10/31/2010    BPH (benign prostatic hypertrophy) Medium  2010    Health Care Home Medium  2011    Elevated prostate specific antigen (PSA) Medium  2013    Erectile dysfunction, unspecified erectile dysfunction type Medium  2016    Type 2 diabetes mellitus without complication, with long-term current use of insulin (H) Medium  2017    Anemia, unspecified type " Medium  1/4/2018      Immunizations     Name Date      Influenza (H1N1) 01/14/10     Influenza (High Dose) 3 valent vaccine 10/01/18     Influenza (High Dose) 3 valent vaccine 09/11/17     Influenza (High Dose) 3 valent vaccine 09/20/16     Influenza (High Dose) 3 valent vaccine 09/21/15     Influenza (High Dose) 3 valent vaccine 09/14/14     Influenza (High Dose) 3 valent vaccine 09/12/13     Influenza (High Dose) 3 valent vaccine 09/10/12     Influenza (IIV3) PF 09/09/11     Influenza (IIV3) PF 09/09/11     Influenza (IIV3) PF 10/05/09     Influenza (IIV3) PF 11/04/08     Influenza (IIV3) PF 11/09/07     Influenza (IIV3) PF 11/04/05     Influenza (IIV3) PF 11/16/04     Influenza (IIV3) PF 12/01/01     Pneumo Conj 13-V (2010&after) 07/28/15     Pneumococcal (PCV 7) 01/24/12     Pneumococcal 23 valent 01/26/18     Pneumococcal 23 valent 01/18/02     TD (ADULT, 7+) 04/27/01     TDAP Vaccine (Adacel) 08/24/12     Td (Adult), Adsorbed 04/27/01     Td (Adult), Adsorbed 03/19/94     Td (Adult), Adsorbed 01/19/94     Zoster vaccine, live 01/14/10          END      ASSESSMENT     Discharge Profile Flowsheet     DISCHARGE NEEDS ASSESSMENT     Transitional Care  [facility] [phone] 10/08/18 1142    Equipment Currently Used at Home  cane, quad;walker, rolling 10/05/18 1601   PAS Number  [id number] 10/08/18 1142    Transportation Available  family or friend will provide 10/05/18 1601   SKIN      # of Referrals Placed by CTS  Post Acute Facilities 10/07/18 1510   Inspection of bony prominences  Full 10/08/18 0138    GASTROINTESTINAL (ADULT,PEDIATRIC,OB)     Procedural focused assessment (identify areas inspected)   Abdomen;Hip, left;Hip, right;Knee, left;Knee, right;Occiput 10/07/18 1750    GI WDL  WDL 10/08/18 1427   Inspection under devices  Full 10/08/18 0138    All Quadrants Bowel Sounds  audible and normoactive 10/08/18 1427   Not Inspected under devices  Knee immobilizer 10/07/18 1750    Last Bowel Movement  " 10/04/18 10/07/18 1750   Skin WDL  ex 10/08/18 1014    Passing flatus  yes 10/08/18 1427   Skin Temperature  warm 10/08/18 1014    COMMUNICATION ASSESSMENT     Skin Moisture  dry;flaky 10/08/18 0138    Patient's communication style  spoken language (non-English) 10/05/18 0616   Skin Elasticity  quick return to original state 10/08/18 0138    Patient's primary language  M[place]ese 10/05/18 0616   Skin Integrity  incision(s) 10/08/18 1014     services offered to the patient? (Install  services phone or TTY, if applicable)  yes 10/05/18 0616   SAFETY      Did the patient decline [place]  and sign paper waiver form? (Place signed waiver form on chart)  no 10/05/18 0616   Safety WDL  WDL 10/08/18 1426    FINAL RESOURCES     All Alarms  alarm(s) activated and audible 10/08/18 1426    Resources List  Transitional Care 10/08/18 1142                      Assessment WDL (Within Defined Limits) Definitions           Safety WDL     Effective: 09/28/15    Row Information: <b>WDL Definition:</b> Bed in low position, wheels locked; call light in reach; upper side rails up x 2; ID band on<br> <font color=\"gray\"><i>Item=AS safety wdl>>List=AS safety wdl>>Version=F14</i></font>      Skin WDL     Effective: 09/28/15    Row Information: <b>WDL Definition:</b> Warm; dry; intact; elastic; without discoloration; pressure points without redness<br> <font color=\"gray\"><i>Item=AS skin wdl>>List=AS skin wdl>>Version=F14</i></font>      Vitals     Vital Signs Flowsheet     VITAL SIGNS     Side Effects Monitoring: Respiratory Depth  N 10/08/18 1423    Temp  98.3  F (36.8  C) 10/08/18 1547   Side Effects Monitoring: Sedation Level  1 10/08/18 1423    Temp src  Oral 10/08/18 1547   MILLICENT COMA SCALE      Resp  16 10/08/18 1547   Best Eye Response  4-->(E4) spontaneous 10/06/18 2135    Pulse  90 10/07/18 0503   Best Motor Response  6-->(M6) obeys commands 10/06/18 2135    Heart Rate  75 10/08/18 1547   Best " "Verbal Response  5-->(V5) oriented 10/06/18 2135    Pulse/Heart Rate Source  Monitor 10/08/18 1547   Otilio Coma Scale Score  15 10/06/18 2135    BP  135/73 10/08/18 1547   HEIGHT AND WEIGHT      BP Location  Right arm 10/08/18 1547   Height  1.676 m (5' 6\") 10/05/18 0615    Patient Position  Lying 10/05/18 0615   Weight  81.9 kg (180 lb 8 oz) 10/07/18 0614    OXYGEN THERAPY     Weight Method  Bed scale 10/07/18 0614    SpO2  96 % 10/08/18 1547   Bed Scale  Standard (fitted sheet, draw sheet/ pad, cover/flat sheet, blanket, two pillows);Pillow (add comment for number);Cover/flat sheet (add comment for number);[place] (add comment for number) 10/07/18 0614    O2 Device  None (Room air) 10/08/18 1547   BSA (Calculated - sq m)  1.92 10/05/18 0615    Oxygen Delivery  2 LPM 10/05/18 1231   BMI (Calculated)  28.14 10/05/18 0615    RESPIRATORY MONITORING     ECG      Respiratory Monitoring (EtCO2)  24 mmHg 10/06/18 0805   ECG Rhythm  Normal sinus rhythm 10/05/18 0727    Integrated Pulmonary Index (IPI)  8-9 10/06/18 0805   Ectopy  None 10/05/18 0727    PAIN/COMFORT     POSITIONING      Patient Currently in Pain  yes 10/08/18 1423   Head of Bed (HOB)  HOB at 30 degrees 10/08/18 0940    Preferred Pain Scale  number (Numeric Rating Pain Scale) 10/08/18 1423   Body Position  independently positioning 10/08/18 1014    Patient's Stated Pain Goal  No pain 10/07/18 1845   Positioning/Transfer Devices  immobilization device;in use 10/08/18 0138    0-10 Pain Scale  7 10/08/18 1255   Chair  Upright in chair 10/08/18 1230    Word Pain Scale  2 10/07/18 0858   POINT OF CARE TESTING      Pain Location  Knee 10/08/18 1423   Puncture Site  fingertip 10/05/18 1211    Pain Orientation  Left 10/08/18 1423   Bedside Glucose (mg/dl )   176 mg/dl 10/05/18 1211    Pain Descriptors  Aching 10/08/18 0636   DAILY CARE      Pain Intervention(s)  Cold applied 10/08/18 1423   Activity Management  activity adjusted per tolerance;activity " encouraged 10/08/18 1014    Response to Interventions  Decrease in pain 10/08/18 1423   Activity Assistance Provided  assistance, 1 person 10/08/18 1014    ANALGESIA SIDE EFFECTS MONITORING     Assistive Device Utilized  walker;gait belt 10/08/18 1014    Side Effects Monitoring: Respiratory Quality  R 10/08/18 1423                 Patient Lines/Drains/Airways Status    Active LINES/DRAINS/AIRWAYS     Name: Placement date: Placement time: Site: Days: Last dressing change:    Incision/Surgical Site 10/05/18 Left Knee 10/05/18   0825    3             Patient Lines/Drains/Airways Status    Active PICC/CVC     None            Intake/Output Detail Report     Date Intake     Output     Net    Shift P.O. I.V. IV Piggyback Total Urine Drains Blood Total       Day 10/07/18 0700 - 10/07/18 1459 -- -- -- -- 950 5 -- 955 -955    Lavinia 10/07/18 1500 - 10/07/18 2259 -- -- -- -- 800 10 -- 810 -810    Noc 10/07/18 2300 - 10/08/18 0659 240 -- -- 240 750 -- -- 750 -510    Day 10/08/18 0700 - 10/08/18 1459 -- -- -- -- 50 -- -- 50 -50    Lavinia 10/08/18 1500 - 10/08/18 2259 -- -- -- -- -- -- -- -- 0      Case Management/Discharge Planning     Case Management/Discharge Planning Flowsheet     REFERRAL INFORMATION     Description of Support System  Involved;Supportive 10/07/18 1510    Admission Type  inpatient 10/07/18 1510   Support Assessment  Adequate family and caregiver support 10/07/18 1510    Arrived From  home or self-care 09/15/17 1630   COPING/STRESS      Referral Source  physician 10/07/18 1510   Major Change/Loss/Stressor  surgery/procedure;illness 10/05/18 0617    # of Referrals Placed by CTS  Post Acute Facilities 10/07/18 1510   DISCHARGE PLANNING      Post Acute Facilities  TCU 10/07/18 1510   Transportation Available  family or friend will provide 10/05/18 1601    Reason For Consult  discharge planning;facility placement 10/07/18 1510   FINAL RESOURCES      Record Reviewed  clinical discipline documentation;history and  physical;medical record;patient profile 10/07/18 1510   Equipment Currently Used at Home  cane, quad;walker, rolling 10/05/18 1601    CTS Assigned to Case  Linette Grant 10/07/18 1510   Resources List  Transitional Care 10/08/18 1142    LIVING ENVIRONMENT     Transitional Care  [facility] [phone] 10/08/18 1142    Lives With  spouse 10/07/18 1510   PAS Number  [id number] 10/08/18 1142    Living Arrangements  house 10/07/18 1510   ABUSE RISK SCREEN      Quality Of Family Relationships  involved;supportive 10/07/18 1510   QUESTION TO PATIENT:  Has a member of your family or a partner(now or in the past) intimidated, hurt, manipulated, or controlled you in any way?  no 10/05/18 0612    Able to Return to Prior Living Arrangements  no 10/07/18 1510   QUESTION TO PATIENT: Do you feel safe going back to the place where you are living?  yes 10/05/18 0612    ASSESSMENT OF FAMILY/SOCIAL SUPPORT     OBSERVATION: Is there reason to believe there has been maltreatment of a vulnerable adult (ie. Physical/Sexual/Emotional abuse, self neglect, lack of adequate food, shelter, medical care, or financial exploitation)?  no 10/05/18 0612    Marital Status   10/07/18 1510   OTHER      Who is your support system?  Children 10/07/18 1510   Are you depressed or being treated for depression?  No 10/05/18 0617

## 2018-10-05 NOTE — OR NURSING
Dr. Darnell Allen notified of full bladder.  Unable to catheterize.  Order for stat Urology consult obtained and implemented.

## 2018-10-05 NOTE — ANESTHESIA PROCEDURE NOTES
Peripheral nerve/Neuraxial procedure note : femoral and Adductor canal  Pre-Procedure  Performed by KVNG CAMARILLO  Location: pre-op      Pre-Anesthestic Checklist: patient identified, IV checked, site marked, risks and benefits discussed, informed consent, monitors and equipment checked, at physician/surgeon's request and post-op pain management    Timeout  Correct Patient: Yes   Correct Procedure: Yes   Correct Site: Yes   Correct Laterality: Yes   Correct Position: Yes   Site Marked: Yes   .   Procedure Documentation    .    Procedure:  left  Femoral and Adductor canal.  Local skin infiltrated with 3 mL of 1% lidocaine.     Ultrasound used to identify targeted nerve, plexus, or vascular marker and placed a needle adjacent to it., Ultrasound was used to visualize the spread of the anesthetic in close proximity to the above stated nerve. A permanent image is entered into the patient's record.  Patient Prep;mask, sterile gloves, chlorhexidine gluconate and isopropyl alcohol, patient draped.  .  Needle: insulated, short bevel Needle Gauge: 21.  Needle Length (millimeters) 90    Needle Length (Inches) 2  Insertion Method: Single Shot.       Assessment/Narrative  Paresthesias: No.  Injection made incrementally with aspirations every 5 mL..  The placement was negative for: blood aspirated, painful injection and site bleeding.  Bolus given via needle..   Secured via.   Complications: none. Comments:  Single shot femoral nerve block  20 ml 0.5% Ropivacaine with 1:400,000 Epinephrine

## 2018-10-05 NOTE — IP AVS SNAPSHOT
"          [place] [address] [facility]: [phone]                                              INTERAGENCY TRANSFER FORM - LAB / IMAGING / EKG / EMG RESULTS   10/5/2018                    Hospital Admission Date: 10/5/2018  PIYUSH BOND   : 1940  Sex: Male        Attending Provider: Darnell Allen MD     Allergies:  No Known Drug Allergies    Infection:  None   Service:  SURGERY    Ht:  1.676 m (5' 6\")   Wt:  81.9 kg (180 lb 8 oz)   Admission Wt:  78.9 kg (174 lb)    BMI:  29.13 kg/m 2   BSA:  1.95 m 2            Patient PCP Information     Provider PCP Type    Alfredo Aragon MD General         Lab Results - 3 Days      Glucose by meter [[id number]] (Abnormal)  Resulted: 10/08/18 1152, Result status: Final result    Ordering provider: Darnell Allen MD  10/08/18 1137 Resulting lab: POINT OF CARE TEST, GLUCOSE    Specimen Information    Type Source Collected On     10/08/18 1137          Components       Value Reference Range Flag Lab   Glucose 200 70 - 99 mg/dL H 170            Glucose by meter [[id number]] (Abnormal)  Resulted: 10/08/18 0840, Result status: Final result    Ordering provider: Darnell Allen MD  10/08/18 0814 Resulting lab: POINT OF CARE TEST, GLUCOSE    Specimen Information    Type Source Collected On     10/08/18 0814          Components       Value Reference Range Flag Lab   Glucose 163 70 - 99 mg/dL H 170            Lipase [[id number]] (Abnormal)  Resulted: 10/08/18 0723, Result status: Final result    Ordering provider: Mae Fields MD  10/08/18 0000 Resulting lab: [facility]    Specimen Information    Type Source Collected On   Blood  10/08/18 0645          Components       Value Reference Range Flag Lab   Lipase 47 73 - 393 U/L L FrStHsLb            Amylase [[id number]] (Abnormal)  Resulted: 10/08/18 07, Result status: Final result    Ordering provider: Mae Fields MD  10/08/18 0000 Resulting lab: [facility]    Specimen " Information    Type Source Collected On   Blood  10/08/18 0645          Components       Value Reference Range Flag Lab   Amylase 23 30 - 110 U/L L FrStHsLb            Hepatic panel [[id number]] (Abnormal)  Resulted: 10/08/18 0723, Result status: Final result    Ordering provider: Mae Fields MD  10/08/18 0000 Resulting lab: [facility]    Specimen Information    Type Source Collected On   Blood  10/08/18 0645          Components       Value Reference Range Flag Lab   Bilirubin Direct 0.2 0.0 - 0.2 mg/dL  FrStHsLb   Bilirubin Total 0.6 0.2 - 1.3 mg/dL  FrStHsLb   Albumin 2.7 3.4 - 5.0 g/dL L FrStHsLb   Protein Total 6.4 6.8 - 8.8 g/dL L FrStHsLb   Alkaline Phosphatase 90 40 - 150 U/L  FrStHsLb   ALT 20 0 - 70 U/L  FrStHsLb   AST 22 0 - 45 U/L  FrStHsLb            Basic metabolic panel [[id number]] (Abnormal)  Resulted: 10/08/18 0723, Result status: Final result    Ordering provider: Mae Fields MD  10/08/18 0000 Resulting lab: [facility]    Specimen Information    Type Source Collected On   Blood  10/08/18 0645          Components       Value Reference Range Flag Lab   Sodium 139 133 - 144 mmol/L  FrStHsLb   Potassium 3.9 3.4 - 5.3 mmol/L  FrStHsLb   Chloride 106 94 - 109 mmol/L  FrStHsLb   Carbon Dioxide 29 20 - 32 mmol/L  FrStHsLb   Anion Gap 4 3 - 14 mmol/L  FrStHsLb   Glucose 180 70 - 99 mg/dL H FrStHsLb   Urea Nitrogen 14 7 - 30 mg/dL  FrStHsLb   Creatinine 0.91 0.66 - 1.25 mg/dL  FrStHsLb   GFR Estimate 80 >60 mL/min/1.7m2  FrStHsLb   Comment:  Non  GFR Calc   GFR Estimate If Black >90 >60 mL/min/1.7m2  FrStHsLb   Comment:  African American GFR Calc   Calcium 8.2 8.5 - 10.1 mg/dL L FrStHsLb            Platelet count [[id number]]  Resulted: 10/08/18 0702, Result status: Final result    Ordering provider: Darnell Allen MD  10/08/18 0000 Resulting lab: [facility]    Specimen Information    Type Source Collected On   Blood  10/08/18 0645           Components       Value Reference Range Flag Lab   Platelet Count 151 150 - 450 10e9/L  FrStLb            Glucose by meter [[id number]] (Abnormal)  Resulted: 10/08/18 0224, Result status: Final result    Ordering provider: Darnell Allen MD  10/08/18 0209 Resulting lab: POINT OF CARE TEST, GLUCOSE    Specimen Information    Type Source Collected On     10/08/18 0209          Components       Value Reference Range Flag Lab   Glucose 221 70 - 99 mg/dL H 170            Glucose by meter [[id number]] (Abnormal)  Resulted: 10/07/18 2210, Result status: Final result    Ordering provider: Darnell Allen MD  10/07/18 2155 Resulting lab: POINT OF CARE TEST, GLUCOSE    Specimen Information    Type Source Collected On     10/07/18 2155          Components       Value Reference Range Flag Lab   Glucose 204 70 - 99 mg/dL H 170            Glucose by meter [[id number]] (Abnormal)  Resulted: 10/07/18 1904, Result status: Final result    Ordering provider: Darnell Allen MD  10/07/18 1849 Resulting lab: POINT OF CARE TEST, GLUCOSE    Specimen Information    Type Source Collected On     10/07/18 1849          Components       Value Reference Range Flag Lab   Glucose 255 70 - 99 mg/dL H 170            Glucose by meter [[id number]] (Abnormal)  Resulted: 10/07/18 1230, Result status: Final result    Ordering provider: Darnell Allen MD  10/07/18 1218 Resulting lab: POINT OF CARE TEST, GLUCOSE    Specimen Information    Type Source Collected On     10/07/18 1218          Components       Value Reference Range Flag Lab   Glucose 222 70 - 99 mg/dL H 170            Hemoglobin [[id number]] (Abnormal)  Resulted: 10/07/18 0724, Result status: Final result    Ordering provider: Darnell Allen MD  10/07/18 0000 Resulting lab: [facility]    Specimen Information    Type Source Collected On   Blood  10/07/18 0706          Components       Value Reference Range Flag Lab   Hemoglobin 11.0 13.3 - 17.7 g/dL L FrStHsLb             Glucose by meter [[id number]] (Abnormal)  Resulted: 10/07/18 0635, Result status: Final result    Ordering provider: Darnell Allen MD  10/07/18 0617 Resulting lab: POINT OF CARE TEST, GLUCOSE    Specimen Information    Type Source Collected On     10/07/18 0617          Components       Value Reference Range Flag Lab   Glucose 265 70 - 99 mg/dL H 170            Glucose by meter [[id number]] (Abnormal)  Resulted: 10/07/18 0228, Result status: Final result    Ordering provider: Darnell Allen MD  10/07/18 0215 Resulting lab: POINT OF CARE TEST, GLUCOSE    Specimen Information    Type Source Collected On     10/07/18 0215          Components       Value Reference Range Flag Lab   Glucose 285 70 - 99 mg/dL H 170            Glucose by meter [[id number]] (Abnormal)  Resulted: 10/06/18 2114, Result status: Final result    Ordering provider: Darnell Allen MD  10/06/18 2102 Resulting lab: POINT OF CARE TEST, GLUCOSE    Specimen Information    Type Source Collected On     10/06/18 2102          Components       Value Reference Range Flag Lab   Glucose 248 70 - 99 mg/dL H 170            Glucose by meter [[id number]] (Abnormal)  Resulted: 10/06/18 1620, Result status: Final result    Ordering provider: Darnell Allen MD  10/06/18 1607 Resulting lab: POINT OF CARE TEST, GLUCOSE    Specimen Information    Type Source Collected On     10/06/18 1607          Components       Value Reference Range Flag Lab   Glucose 257 70 - 99 mg/dL H 170            Glucose by meter [[id number]] (Abnormal)  Resulted: 10/06/18 1223, Result status: Final result    Ordering provider: Darnell Allen MD  10/06/18 1212 Resulting lab: POINT OF CARE TEST, GLUCOSE    Specimen Information    Type Source Collected On     10/06/18 1212          Components       Value Reference Range Flag Lab   Glucose 166 70 - 99 mg/dL H 170            Glucose by meter [[id number]] (Abnormal)  Resulted: 10/06/18 1209, Result status: Final result    Ordering provider: Tiffany  Darnell GUSTAFSON MD  10/05/18 1210 Resulting lab: POINT OF CARE TEST, GLUCOSE    Specimen Information    Type Source Collected On     10/05/18 1210          Components       Value Reference Range Flag Lab   Glucose 176 70 - 99 mg/dL H 170            Basic metabolic panel [[id number]] (Abnormal)  Resulted: 10/06/18 0823, Result status: Final result    Ordering provider: Mercedez Smith PA-C  10/06/18 0001 Resulting lab: [facility]    Specimen Information    Type Source Collected On   Blood  10/06/18 0637          Components       Value Reference Range Flag Lab   Sodium 141 133 - 144 mmol/L  FrStHsLb   Potassium 3.9 3.4 - 5.3 mmol/L  FrStHsLb   Chloride 107 94 - 109 mmol/L  FrStHsLb   Carbon Dioxide 27 20 - 32 mmol/L  FrStHsLb   Anion Gap 7 3 - 14 mmol/L  FrStHsLb   Glucose 242 70 - 99 mg/dL H FrStHsLb   Urea Nitrogen 19 7 - 30 mg/dL  FrStHsLb   Creatinine 0.91 0.66 - 1.25 mg/dL  FrStHsLb   GFR Estimate 81 >60 mL/min/1.7m2  FrStHsLb   Comment:  Non  GFR Calc   GFR Estimate If Black >90 >60 mL/min/1.7m2  FrStHsLb   Comment:  African American GFR Calc   Calcium 7.9 8.5 - 10.1 mg/dL L FrStHsLb            Glucose by meter [[id number]] (Abnormal)  Resulted: 10/06/18 0822, Result status: Final result    Ordering provider: Darnell Allen MD  10/06/18 0803 Resulting lab: POINT OF CARE TEST, GLUCOSE    Specimen Information    Type Source Collected On     10/06/18 0803          Components       Value Reference Range Flag Lab   Glucose 235 70 - 99 mg/dL H 170            CBC with platelets differential [[id number]] (Abnormal)  Resulted: 10/06/18 0652, Result status: Final result    Ordering provider: Mercedez Smith PA-C  10/06/18 0001 Resulting lab: [facility]    Specimen Information    Type Source Collected On   Blood  10/06/18 0637          Components       Value Reference Range Flag Lab   WBC 9.2 4.0 - 11.0 10e9/L  FrStHsLb   RBC Count 3.56 4.4 -  5.9 10e12/L L FrStHsLb   Hemoglobin 11.8 13.3 - 17.7 g/dL L FrStHsLb   Hematocrit 35.4 40.0 - 53.0 % L FrStHsLb   MCV 99 78 - 100 fl  FrStHsLb   MCH 33.1 26.5 - 33.0 pg H FrStHsLb   MCHC 33.3 31.5 - 36.5 g/dL  FrStHsLb   RDW 12.3 10.0 - 15.0 %  FrStHsLb   Platelet Count 144 150 - 450 10e9/L L FrStHsLb   Diff Method Automated Method   FrStHsLb   % Neutrophils 72.1 %  FrStHsLb   % Lymphocytes 19.8 %  FrStHsLb   % Monocytes 7.6 %  FrStHsLb   % Eosinophils 0.1 %  FrStHsLb   % Basophils 0.1 %  FrStHsLb   % Immature Granulocytes 0.3 %  FrStHsLb   Nucleated RBCs 0 0 /100  FrStHsLb   Absolute Neutrophil 6.7 1.6 - 8.3 10e9/L  FrStHsLb   Absolute Lymphocytes 1.8 0.8 - 5.3 10e9/L  FrStHsLb   Absolute Monocytes 0.7 0.0 - 1.3 10e9/L  FrStHsLb   Absolute Eosinophils 0.0 0.0 - 0.7 10e9/L  FrStHsLb   Absolute Basophils 0.0 0.0 - 0.2 10e9/L  FrStHsLb   Abs Immature Granulocytes 0.0 0 - 0.4 10e9/L  FrStHsLb   Absolute Nucleated RBC 0.0   FrStHsLb            Glucose by meter [[id number]] (Abnormal)  Resulted: 10/06/18 0159, Result status: Final result    Ordering provider: Darnell Allen MD  10/06/18 0148 Resulting lab: POINT OF CARE TEST, GLUCOSE    Specimen Information    Type Source Collected On     10/06/18 0148          Components       Value Reference Range Flag Lab   Glucose 244 70 - 99 mg/dL H 170            Glucose by meter [[id number]] (Abnormal)  Resulted: 10/05/18 2213, Result status: Final result    Ordering provider: Darnell Allen MD  10/05/18 2200 Resulting lab: POINT OF CARE TEST, GLUCOSE    Specimen Information    Type Source Collected On     10/05/18 2200          Components       Value Reference Range Flag Lab   Glucose 274 70 - 99 mg/dL H 170            Glucose by meter [[id number]] (Abnormal)  Resulted: 10/05/18 1752, Result status: Final result    Ordering provider: Darnell Allen MD  10/05/18 1731 Resulting lab: POINT OF CARE TEST, GLUCOSE    Specimen Information    Type Source Collected On     10/05/18 4700           Components       Value Reference Range Flag Lab   Glucose 286 70 - 99 mg/dL H 170            Creatinine [[id number]]  Resulted: 10/05/18 1521, Result status: Final result    Ordering provider: Darnell Allen MD  10/05/18 1331 Resulting lab: [facility]    Specimen Information    Type Source Collected On   Blood  10/05/18 1447          Components       Value Reference Range Flag Lab   Creatinine 0.89 0.66 - 1.25 mg/dL  FrStHsLb   GFR Estimate 82 >60 mL/min/1.7m2  FrStHsLb   Comment:  Non  GFR Calc   GFR Estimate If Black >90 >60 mL/min/1.7m2  FrStHsLb   Comment:   GFR Calc            Platelet count [[id number]]  Resulted: 10/05/18 1503, Result status: Final result    Ordering provider: Darnell Allen MD  10/05/18 1331 Resulting lab: [facility]    Specimen Information    Type Source Collected On   Blood  10/05/18 1447          Components       Value Reference Range Flag Lab   Platelet Count 163 150 - 450 10e9/L  FrStHsLb            Glucose by meter [[id number]] (Abnormal)  Resulted: 10/05/18 1038, Result status: Final result    Ordering provider: Darnell Allen MD  10/05/18 0755 Resulting lab: POINT OF CARE TEST, GLUCOSE    Specimen Information    Type Source Collected On     10/05/18 0755          Components       Value Reference Range Flag Lab   Glucose 133 70 - 99 mg/dL H 170            Glucose by meter [[id number]] (Abnormal)  Resulted: 10/05/18 1038, Result status: Final result    Ordering provider: Darnell Allen MD  10/05/18 1021 Resulting lab: POINT OF CARE TEST, GLUCOSE    Specimen Information    Type Source Collected On     10/05/18 1021          Components       Value Reference Range Flag Lab   Glucose 173 70 - 99 mg/dL H 170            Potassium [[id number]]  Resulted: 10/05/18 0628, Result status: Final result    Ordering provider: Brandan Monsalve MD  10/05/18 0556 Resulting lab: [facility]    Specimen Information     Type Source Collected On   Blood  10/05/18 0553          Components       Value Reference Range Flag Lab   Potassium 3.4 3.4 - 5.3 mmol/L  FrStHsLb            Glucose [[id number]] (Abnormal)  Resulted: 10/05/18 0628, Result status: Final result    Ordering provider: Brandan Monsalve MD  10/05/18 0549 Resulting lab: [facility]    Specimen Information    Type Source Collected On   Blood  10/05/18 0553          Components       Value Reference Range Flag Lab   Glucose 64 70 - 99 mg/dL L FrStHsLb            Testing Performed By     Lab - Abbreviation Name Director Address Valid Date Range    14 - FrStHsLb [facility] Unknown [address] [place] [address] 05/08/15 1057 - Present    170 - Unknown POINT OF CARE TEST, GLUCOSE Unknown Unknown 10/31/11 1114 - Present            Unresulted Labs (24h ago through future)    Start       Ordered    10/08/18 0600  Platelet count  (enoxaparin (LOVENOX) (Weight  kg with CrCl greater than 30 mL/min is prechecked))  EVERY THREE DAYS,   Routine     Comments:  Repeat every 3 days while on VTE prophylaxis. If no result is listed, this lab has not been done the past 365 days. LATEST LAB RESULT: Platelet Count (10e9/L)       Date                     Value                 10/02/2018               167              ----------      10/05/18 1331    10/08/18 0600  Basic metabolic panel  AM DRAW,   Routine      10/07/18 1647    Unscheduled  Hemoglobin  CONDITIONAL X 2,   Routine     Comments:  IF morning Hemoglobin result is LESS than 8.0 on POD 1 and/or POD 2, release order and recheck Hemoglobin in the evening at 1700.  Notify Provider if second Hemoglobin result is LESS than 7.5.    10/05/18 1331         Imaging Results - 3 Days      XR Knee Port Left 1/2 Views [[id number]]  Resulted: 10/05/18 1311, Result status: Final result    Ordering provider: Darnell Allen MD  10/05/18 1043 Resulted by: Brett Mejia MD    Performed: 10/05/18  1100 - 10/05/18 1151 Resulting lab: RADIOLOGY RESULTS    Narrative:       XR KNEE PORT LT 1/2 VW 10/5/2018 11:51 AM    COMPARISON: None.    HISTORY: Arthroplasty.      Impression:       IMPRESSION: LEFT total knee arthroplasty. Hardware appears intact. No  fractures are seen. Surgical drain in place.    KVNG PATEL MD      Testing Performed By     Lab - Abbreviation Name Director Address Valid Date Range    104 - Rad Rslts RADIOLOGY RESULTS Unknown Unknown 02/16/05 1553 - Present            Encounter-Level Documents:     There are no encounter-level documents.      Order-Level Documents:     There are no order-level documents.

## 2018-10-05 NOTE — IP AVS SNAPSHOT
` `     [place] [address] [facility]: [phone]                 INTERAGENCY TRANSFER FORM - NOTES (H&P, Discharge Summary, Consults, Procedures, Therapies)   10/5/2018                    Hospital Admission Date: 10/5/2018  RABIA REED   : 1940  Sex: Male        Patient PCP Information     Provider PCP Type    Alfredo Aragon MD General         History & Physicals      Interval H&P Note by Savannah Jones at 10/4/2018  6:35 AM     Author:  Savannah Jones Service:  (none) Author Type:  HUC    Filed:  10/4/2018  6:35 AM Date of Service:  10/4/2018  6:35 AM Creation Time:  10/4/2018  6:35 AM    Status:  Signed :  Savannah Jones ([facility])         This note is for the purpose of making the H&P performed in the clinic within the last 30 days available in the hospital surgical encounter.[BS1.1]     Revision History        User Key Date/Time User Provider Type Action    > BS1.1 10/4/2018  6:35 AM Savannah Jones [facility] Sign          Source Note     Author:  Alfredo Aragon MD Service:  (none) Author Type:  Physician    Filed:  10/3/2018 10:44 PM Date of Service:  10/2/2018  3:15 PM Creation Time:  10/4/2018  6:35 AM    Status:  Signed :  Alfredo Aragon MD (Physician)                [address] [id number] [phone]  Dept: [phone]    PRE-OP EVALUATION:  Today's date: 10/2/2018    Rbaia Reed (: 1940) presents for pre-operative evaluation assessment as requested by [MW1.1] Tiffany[MW1.2].  He requires evaluation and anesthesia risk assessment prior to undergoing surgery/procedure for treatment of[MW1.1] Knee Pain[MW1.2] .[MW1.1]    Proposed Surgery/ Procedure: Arthroplasty Knee, Left  Date of Surgery/ Procedure: 10/05/2018  Time of Surgery/ Procedure: 7:30am  Hospital/Surgical Facility: [facility]   Primary Physician: Alfredo Aragon  Type of Anesthesia Anticipated: General    Patient has a Health Care Directive or Living  Will:  YES     1. NO - Do you have a history of heart attack, stroke, stent, bypass or surgery on an artery in the head, neck, heart or legs?  2. NO - Do you ever have any pain or discomfort in your chest?  3. NO - Do you have a history of  Heart Failure?  4. NO - Are you troubled by shortness of breath when: walking on the level, up a slight hill or at night?  5. NO - Do you currently have a cold, bronchitis or other respiratory infection?  6. NO - Do you have a cough, shortness of breath or wheezing?  7. YES  - Do you sometimes get pains in the calves of your legs when you walk?[MW1.2] Only at the knee. Not actual claudication-like sx[EV1.1]  8. NO - Do you or anyone in your family have previous history of blood clots?  9. NO - Do you or does anyone in your family have a serious bleeding problem such as prolonged bleeding following surgeries or cuts?  10. NO - Have you ever had problems with anemia or been told to take iron pills?  11. NO - Have you had any abnormal blood loss such as black, tarry or bloody stools, or abnormal vaginal bleeding?  12. NO - Have you ever had a blood transfusion?  13. NO - Have you or any of your relatives ever had problems with anesthesia?  14. NO - Do you have sleep apnea, excessive snoring or daytime drowsiness?  15. NO - Do you have any prosthetic heart valves?  16. YES - Do you have prosthetic joints?[MW1.2] RTKA[EV1.1]  17. NO - Is there any chance that you may be pregnant?      HPI:     HPI re[MW1.2]lated to upcoming procedure:[MW1.1]  Hx chronic left knee pain with DJD. Has failed conservative treatment. Now presents for clearance to undergo surgical correction with TKA. See below for other medical issues. Hx type 2 IDDM. Has chronic hx in which A1C results have reflected higher average blood sugars readings than what has been seen on  either 4x/day blood sugar monitoring or prior  CGM studies. This has been also confirmed with evaluations  by endocrinology.  A1C has ranged  from 8.5-8.7 past 1 year. Pt has good exercise tolerance. Riding stationary bicycle for 20 min daily without chest pain or shortness of breath. Also does other weight resistance toning exercises of UEs and LEs. See below for other medical issues    Due to language barrier, an  was present during the history-taking and subsequent discussion (and for part of the physical exam) with this patient.[EV1.1]         MEDICAL HISTORY:[MW1.1]     Patient Active Problem List    Diagnosis Date Noted     Anemia, unspecified type 01/04/2018     Priority: Medium     Status post total left knee replacement 09/14/2017     Priority: Medium     Type 2 diabetes mellitus without complication, with long-term current use of insulin (H) 01/29/2017     Priority: Medium     Erectile dysfunction, unspecified erectile dysfunction type 01/25/2016     Priority: Medium     Elevated prostate specific antigen (PSA) 11/06/2013     Priority: Medium     ACP (advance care planning) 08/24/2012     Priority: Medium     Advance Care Planning 1/11/2017: ACP Review of Chart / Resources Provided:  Reviewed chart for advance care plan.  Rabia Reed has no plan or code status on file. Discussed available resources and provided with information. Confirmed/documented legally designated decision makers.  Added by Charis Garcia  Advance Care Planning 2/25/2016: ACP Review of Chart / Resources Provided:  Reviewed chart for advance care plan.  Rabia Reed has no plan or code status on file. Discussed available resources and provided with information. Confirmed/documented legally designated decision makers.  Added by Charis Garcia  Discussed advance care planning with patient; information given to patient to review. 8/24/2012        Health Care Home 02/18/2011     Priority: Medium     [facility] CARE COORDINATOR  ANASTACIA THOMAS  [phone]  [facility] CARE PLAN SUMMARY    Client Name:  Rabia Reed  Address:  [address] [place] [address]  "[facility] [address] Phone: [phone] (home)    :  1940 Date of Assessment:  2017   Health Plan:  [facility]  Health Plan Number: [id number] Medical Assistance Number: [id number]  Financial Worker:     BRENDA :  ANASTACIA Velázquez CM Phone:  [phone]  CM Fax:  [phone]   [facility] Enrollment Date: 2008 Case Mix:  D  Rate Cell:  B  Waiver Type:  EW   Primary Emergency Contact:  Brandan Reed  Home Phone: [phone]  Work Phone: [phone]  Relation: Son Language:  Yi  :  Yes:    Health Care Agent/POA: Brandan Reed (Son)  Home Phone: [phone] Advanced Directives/Living Will:  None   Primary Care Clinic:  [facility] Primary Dx;  Diabetes [EF11.9]  Secondary Dx:  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 Irizarry- [facility] ENT  Audiologist:    Dr. Aj  - [facility] Otolaryngology (ENT)   Eye Care Provider:   Dr. Dixon, yearly appt.  Wears corrective lenses Dental Care Provider:    Dr. Edenilson Anna, [facility] (made dentures)   Other:          [facility] CURRENT SERVICES SUMMARY  Equipment owned/DME history: bath bench w/o back, standard walker, built up silverware,  SERVICE TYPE/PROVIDER NAME/PHONE AUTH DATE FREQUENCY Units OR $ Amt DESCRIPTION   Medical Transportation: [id number] [phone]  Fax:  2018-18 as needed     DME: [employer] [phone]  Fax: [phone] 2018-18 Monthly $2.86 per can 2 cans of glucerna per day (Per [employer], glucerna is EW only)   DME: [employer] [phone]  Fax: [phone] 2018-18 as needed  Pull ups (Med) Family to order as needed   PCA: [employer] [facility] [phone]  Fax: [phone] 17-1/10/18      12/14/17: DTR issued daily  16 units per day and PCA supervision " every other month    12/6/17 Assessment: 14 units per day and supervision every other month. DTR submitted.     12/6/17: Client declined ADC at the time of the assessment but will continue to consider this.              Allergies:    Allergies   Allergen Reactions     No Known Drug Allergies      Diagnoses:   Patient Active Problem List   Diagnosis     IMPOTENCE, ORGANIC ORIGN     CHONDROMALACIA PATELLAE     MEMORY LOSS     ACOUSTIC NERVE DISORDERS     TYPE 2 DIABETES, HBA1C GOAL < 7%     HYPERLIPIDEMIA LDL GOAL <100     BPH (benign prostatic hypertrophy)     Health Care Home     Pain in joint, lower leg     ACP (advance care planning)     Elevated prostate specific antigen (PSA)                            BPH (benign prostatic hypertrophy) 12/23/2010     Priority: Medium     HYPERLIPIDEMIA LDL GOAL <100 10/31/2010     Priority: Medium     Disorder of acoustic nerve      Priority: Medium     acoustical neuroma right side  Problem list name updated by automated process. 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     Priority: Medium[MW1.3]      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     Elevated prostate specific antigen (PSA) 11/6/2013     Essential hypertension, benign      Hyperlipidemia LDL goal <100       Impotence of organic origin      Inguinal hernia without mention of obstruction or gangrene, unilateral or unspecified, (not specified as recurrent) 1/03    right     Memory loss      Osteoarthritis of both hands, unspecified osteoarthritis type 3/19/2017     Tobacco use disorder      Type 2 diabetes mellitus without complication  (goal A1C<7) 10/24/2015     Unspecified nasal polyp      Urinary frequency     nocturia x 5     Past Surgical History:   Procedure  Laterality Date     ARTHROPLASTY KNEE Right 9/14/2017    Procedure: ARTHROPLASTY KNEE;  RIGHT TOTAL KNEE ARTHROPLASTY ;  Surgeon: Darnell Allen MD;  Location: [facility] OR     C APPENDECTOMY       C NONSPECIFIC PROCEDURE  4/01    nasal polyp excision     C NONSPECIFIC PROCEDURE  1/03    [facility] repair     C NONSPECIFIC PROCEDURE  April 2011     right knee arthroscopy     Current Outpatient Prescriptions   Medication Sig Dispense Refill     ACE/ARB/ARNI NOT PRESCRIBED, INTENTIONAL, Please choose reason not prescribed, below       acetaminophen (TYLENOL) 500 MG tablet Take 500-1,000 mg by mouth every 6 hours as needed for mild pain       ascorbic acid (VITAMIN C) 1000 MG TABS Take 1,000 mg by mouth daily.       aspirin 325 MG tablet Take 1 tablet (325 mg) by mouth 2 times daily 90 tablet 3     atorvastatin (LIPITOR) 80 MG tablet Take 1 tablet (80 mg) by mouth daily 90 tablet 3     blood glucose monitoring (ONE TOUCH ULTRASOFT) lancets USE 3 TIMES DAILY 200 each 3     blood glucose monitoring (ONETOUCH ULTRA) test strip Use to test 3 times daily 200 each 3     Blood Glucose Monitoring Suppl (ONE TOUCH ULTRA SYSTEM KIT) W/DEVICE KIT One touch ultra 2 if covered by insurance 1 kit 0     camphor-menthol (DERMASARRA) 0.5-0.5 % LOTN Apply topically 2 times daily Apply to back, legs topically twice daily for itching       celecoxib (CELEBREX) 200 MG capsule Take 1 capsule (200 mg) by mouth daily With food for arthritis pain 90 capsule 1     DIPHENHYDRAMINE HCL EX (BENADRYL CREAM 1-0.1%) Apply to affected areas topically as needed for itch TID. Thighs, buttocks, and groin) per on call Dr. Salcedo.       insulin aspart (NOVOLOG FLEXPEN) 100 UNIT/ML injection Inject 10-15 Units Subcutaneous At Bedtime 15 mL 11     Insulin Pen Needle (PEN NEEDLES) 31G X 6 MM MISC 1 Device 3 times daily 100 each 11     NOVOLOG MIX 70/30 FLEXPEN (70-30) 100 UNIT/ML susp INJECT 30 UNITS DAILY BEFORE BREAKFAST AND 20 UNITS BEFORE DINNER. 45 mL 1      senna-docusate (SENOKOT-S;PERICOLACE) 8.6-50 MG per tablet Take 2 tablets by mouth 2 times daily       tamsulosin (FLOMAX) 0.4 MG capsule TAKE ONE CAPSULE BY MOUTH ONE TIME DAILY  90 capsule 3     TAMSULOSIN HCL PO Take 0.4 mg by mouth daily       ULTICARE MICRO 32G X 4 MM insulin pen needle TEST 3 TIMES DAILY 100 each 0     OTC products:[MW1.1] None, except as noted above[EV1.1]    Allergies   Allergen Reactions     No Known Drug Allergies       Latex Allergy:[MW1.1] NO[EV1.1]    Social History   Substance Use Topics     Smoking status: Former Smoker     Packs/day: 0.50     Years: 42.00     Quit date: 1/17/2001     Smokeless tobacco: Never Used     Alcohol use No     History   Drug Use No       REVIEW OF SYSTEMS:[MW1.1]   CONSTITUTIONAL: NEGATIVE for fever, chills,[EV1.2]  We[EV1.1]ight up 5 pounds  INTEGUMENTARY/SKIN: NEGATIVE for worrisome rashes, moles or lesions  EYES: NEGATIVE for vision changes or irritation  ENT/MOUTH: NEGATIVE for ear pain , mouth and throat problems.[EV1.2] H[EV1.1]earing loss right chronic[EV1.2] with known vestibular schwannoma[EV1.1]  RESP: NEGATIVE for significant cough or SOB  CV: NEGATIVE for chest pain, palpitations. Rare minimal peripheral edema. No PND, orthopnea  GI: NEGATIVE for nausea, abdominal pain, heartburn, or change in bowel habits with current meds  : NEGATIVE for dysuria, or hematuria.  U[EV1.2] On Flomax but still some slowing if urine flow. Nocturia x2 but goes back to sleep[EV1.1]  MUSCULOSKELETAL:  See HPI.  Mild stiffness in general joints  NEURO: NEGATIVE for weakness,[EV1.2]   p[EV1.1]aresthesias. No recent falls.    ENDOCRINE:  POSITIVE for diabetes mellitus. 140s in AM.  1[EV1.2]6[EV1.1]0s in the PM  HEME: NEGATIVE for bleeding problems.  PSYCHIATRIC: NEGATIVE for changes in mood or affect    EXAM:[EV1.2]   /70  Pulse 82  Temp 97.6  F (36.4  C) (Oral)  Resp 16  Wt 179 lb (81.2 kg)  SpO2 92%  BMI 29.79 kg/m2[EV1.3]  Eye: PERRL, EOMI  HENT: ear  canals and TM's normal and nose and mouth without ulcers or lesions   Neck: no adenopathy. Thyroid normal to palpation. No bruits  Pulm: Lungs clear to auscultation   CV: Regular rates and rhythm  GI: Soft, nontender, Normal active bowel sounds, No hepatosplenomegaly or masses palpable  Ext: Peripheral pulses intact. No edema. RTKA scar  Neuro: Normal strength and tone, sensory exam grossly normal. Stable antalgic gait with cane[EV1.1]      DIAGNOSTICS:[MW1.1]   EKG:  NSR with rate 85. No acute ST/T changes    Component      Latest Ref Rng & Units 1/20/2018 10/2/2018   Sodium      133 - 144 mmol/L  140   Potassium      3.4 - 5.3 mmol/L  3.9   Chloride      94 - 109 mmol/L  106   Carbon Dioxide      20 - 32 mmol/L  31   Anion Gap      3 - 14 mmol/L  3   Glucose      70 - 99 mg/dL  234 (H)   Urea Nitrogen      7 - 30 mg/dL  25   Creatinine      0.66 - 1.25 mg/dL  1.01   GFR Estimate      >60 mL/min/1.7m2  71   GFR Estimate If Black      >60 mL/min/1.7m2  86   Calcium      8.5 - 10.1 mg/dL  8.2 (L)   Bilirubin Total      0.2 - 1.3 mg/dL  0.5   Albumin      3.4 - 5.0 g/dL  3.4   Protein Total      6.8 - 8.8 g/dL  7.1   Alkaline Phosphatase      40 - 150 U/L  147   ALT      0 - 70 U/L  41   AST      0 - 45 U/L  28   WBC      4.0 - 11.0 10e9/L  6.6   RBC Count      4.4 - 5.9 10e12/L  4.05 (L)   Hemoglobin      13.3 - 17.7 g/dL  13.5   Hematocrit      40.0 - 53.0 %  42.1   MCV      78 - 100 fl  104 (H)   MCH      26.5 - 33.0 pg  33.3 (H)   MCHC      31.5 - 36.5 g/dL  32.1   RDW      10.0 - 15.0 %  11.9   Platelet Count      150 - 450 10e9/L  167   Specimen Description        Nares   Methicillin Resist/Sens S. aureus PCR      NEG:Negative  Negative   Hemoglobin A1C      0 - 5.6 % 8.7 (H) 8.6 (H)   TSH      0.40 - 4.00 mU/L  1.16[EV1.1]       IMPRESSION:[MW1.1]   Reason for surgery/procedure:  Left knee DJD  Diagnosis/reason for consult:   1. Type 2 IDDM. A1C 8.6. See HPI. Pt chronically has run A1Cs elevated  above what is  seen on blood sugar monitoring throughout the day for years. Therefore do not feel surgery needs to be delayed to improve blood sugar control further. Blood sugar elevated with this one lab test but pt had eaten right before the lab draw. Blood sugars have been running in the 140-160 range  Per review of recent blood sugars brought to appt today.  2.  BPH. Overall stable with Flomax, Finasteride  3. Hx right vestibular schwannoma. Pt has declined surgical intervention.  No recent falls. Has chronic right hearing loss  4. Hyperlipidemia. On statin[EV1.1]    The proposed surgical procedure is considered[MW1.1] INTERMEDIATE[EV1.1] risk.    REVISED CARDIAC RISK INDEX  The patient has the following serious cardiovascular risks for perioperative complications such as (MI, PE, VFib and 3  AV Block):[MW1.1]  Diabetes Mellitus (on Insulin)[EV1.1]  INTERPRETATION:[MW1.1] 1 risks: Class II (low risk - 0.9% complication rate)[EV1.1]    The patient has the following additional risks for perioperative complications:[MW1.1]  No identified additional risks[EV1.1]         RECOMMENDATIONS:[MW1.1]       Cardiovascular Risk  Performs 4 METs exercise without symptoms (Bicycling at 8.5 minutes per mile (7 miles/hour)) .      APPROVAL GIVEN to proceed with proposed procedure, without further diagnostic evaluation[EV1.1]     PLAN:   Stay off of Aspirin and Celebrex between now and surgery[EV1.2] (were stopped 1 week prior to surgery)[EV1.1]   Take 70/30 insulin 14 units  At supper/dinner the night before surgery  Take Novolog 6 units at bedtime the nioght before surgery   Nothing to eat/drink after midnight prior to surgery. No insulin the morning of surgery[EV1.2]    Signed Electronically by: Alfredo Aragon MD    Copy of this evaluation report is provided to requesting physician.    [facility] Preop Guidelines    Revised Cardiac Risk Index[MW1.1]      Revision History        User Key Date/Time User Provider Type Action    > EV1.1 10/4/2018   6:35 AM Alfredo Aragon MD Physician Sign     EV1.3 10/2/2018  4:06 PM Alfredo Aragon MD Physician      EV1.2 10/2/2018  4:02 PM Alfredo Aragon MD Physician      MW1.2 10/2/2018  3:37 PM Dolores Ellington CMA Medical Assistant      MW1.3 10/2/2018 10:01 AM Dolores Ellington CMA Medical Assistant      MW1.1 10/2/2018 10:00 AM Dolores Ellington CMA Medical Assistant                   H&P (View-Only) by Alfredo Aragon MD at 10/2/2018  3:15 PM     Author:  Alfredo Aragon MD Service:  (none) Author Type:  Physician    Filed:  10/3/2018 10:44 PM Date of Service:  10/2/2018  3:15 PM Creation Time:  10/4/2018  6:35 AM    Status:  Signed :  Alfredo Aragon MD (Physician)             [address] [address]  [phone]  Dept: [phone]    PRE-OP EVALUATION:  Today's date: 10/2/2018    Rabia Reed (: 1940) presents for pre-operative evaluation assessment as requested by [MW1.1] Tiffany[MW1.2].  He requires evaluation and anesthesia risk assessment prior to undergoing surgery/procedure for treatment of[MW1.1] Knee Pain[MW1.2] .[MW1.1]    Proposed Surgery/ Procedure: Arthroplasty Knee, Left  Date of Surgery/ Procedure: 10/05/2018  Time of Surgery/ Procedure: 7:30am  Hospital[facility]urgical [facility]   Primary Physician: Alfredo Aragon  Type of Anesthesia Anticipated: General    Patient has a Health Care Directive or Living Will:  YES     1. NO - Do you have a history of heart attack, stroke, stent, bypass or surgery on an artery in the head, neck, heart or legs?  2. NO - Do you ever have any pain or discomfort in your chest?  3. NO - Do you have a history of  Heart Failure?  4. NO - Are you troubled by shortness of breath when: walking on the level, up a slight hill or at night?  5. NO - Do you currently have a cold, bronchitis or other respiratory infection?  6. NO - Do you have a cough, shortness of breath or wheezing?  7. YES  - Do you sometimes get pains  in the calves of your legs when you walk?[MW1.2] Only at the knee. Not actual claudication-like sx[EV1.1]  8. NO - Do you or anyone in your family have previous history of blood clots?  9. NO - Do you or does anyone in your family have a serious bleeding problem such as prolonged bleeding following surgeries or cuts?  10. NO - Have you ever had problems with anemia or been told to take iron pills?  11. NO - Have you had any abnormal blood loss such as black, tarry or bloody stools, or abnormal vaginal bleeding?  12. NO - Have you ever had a blood transfusion?  13. NO - Have you or any of your relatives ever had problems with anesthesia?  14. NO - Do you have sleep apnea, excessive snoring or daytime drowsiness?  15. NO - Do you have any prosthetic heart valves?  16. YES - Do you have prosthetic joints?[MW1.2] RTKA[EV1.1]  17. NO - Is there any chance that you may be pregnant?      HPI:     HPI re[MW1.2]lated to upcoming procedure:[MW1.1]  Hx chronic left knee pain with DJD. Has failed conservative treatment. Now presents for clearance to undergo surgical correction with TKA. See below for other medical issues. Hx type 2 IDDM. Has chronic hx in which A1C results have reflected higher average blood sugars readings than what has been seen on  either 4x/day blood sugar monitoring or prior  CGM studies. This has been also confirmed with evaluations  by endocrinology.  A1C has ranged from 8.5-8.7 past 1 year. Pt has good exercise tolerance. Riding stationary bicycle for 20 min daily without chest pain or shortness of breath. Also does other weight resistance toning exercises of UEs and LEs. See below for other medical issues    Due to language barrier, an  was present during the history-taking and subsequent discussion (and for part of the physical exam) with this patient.[EV1.1]         MEDICAL HISTORY:[MW1.1]     Patient Active Problem List    Diagnosis Date Noted     Anemia, unspecified type 01/04/2018      Priority: Medium     Status post total left knee replacement 2017     Priority: Medium     Type 2 diabetes mellitus without complication, with long-term current use of insulin (H) 2017     Priority: Medium     Erectile dysfunction, unspecified erectile dysfunction type 2016     Priority: Medium     Elevated prostate specific antigen (PSA) 2013     Priority: Medium     ACP (advance care planning) 2012     Priority: Medium     Advance Care Planning 2017: ACP Review of Chart / Resources Provided:  Reviewed chart for advance care plan.  Rabia Reed has no plan or code status on file. Discussed available resources and provided with information. Confirmed/documented legally designated decision makers.  Added by Charis Garcia  Advance Care Planning 2016: ACP Review of Chart / Resources Provided:  Reviewed chart for advance care plan.  Rabia Reed has no plan or code status on file. Discussed available resources and provided with information. Confirmed/documented legally designated decision makers.  Added by Charis Garcia  Discussed advance care planning with patient; information given to patient to review. 2012        Health Care Home 2011     Priority: Medium     [facility] CARE COORDINATOR  ANASTACIA THOMAS  [phone]  [facility] CARE PLAN SUMMARY    Client Name:  Rabia Reed  Address:  [address] [place] [address] Phone: [phone] (home)    :  1940 Date of Assessment:  2017   Health Plan:  [facility]  Health Plan Number: [id number] Medical Assistance Number: [id number]  Financial Worker:     [facility] :  ANASTACIA Thomas  Phone:  [phone]   Fax:  [phone]   [employer] Enrollment Date: 2008 Case Mix:  D  Rate Cell:  B  Waiver Type:  EW   Primary Emergency Contact:  Brandan Reed  [place] Phone: [phone]  Work Phone: [phone]  Relation: Son Language:  Martiniquais  :  Yes:    Health  "Care Agent/POA: Brandan Reed (Son)  Home Phone: [phone] Advanced Directives/Living Will:  None   Primary Care Clinic:  [facility] Primary Dx;  Diabetes [EF11.9]  Secondary Dx:  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 Irizarry- [facility] ENT  Audiologist:    Dr. Aj  - [facility] Otolaryngology (ENT)   Eye Care Provider:   Dr. Dixon, yearly appt.  Wears corrective lenses Dental Care Provider:    Dr. Edenilson Anna, [facility] (made dentures)   Other:          [facility] CURRENT SERVICES SUMMARY  Equipment owned/DME history: bath bench w/o back, standard walker, built up silverware,  SERVICE TYPE/PROVIDER NAME/PHONE AUTH DATE FREQUENCY Units OR $ Amt DESCRIPTION   Medical Transportation: [employer] [id number] [phone]  Fax:  1/1/2018-12/31/18 as needed     DME: [employer] [phone]  Fax: [phone] 1/1/2018-12/31/18 Monthly $2.86 per can 2 cans of glucerna per day (Per [employer], glucerna is [facility] only)   DME: [employer] [phone]  Fax: [phone] 1/1/2018-12/31/18 as needed  Pull ups (Med) Family to order as needed   PCA: [employer] [phone]  Fax: [phone] 1/12/17-1/10/18      12/14/17: DTR issued daily  16 units per day and PCA supervision every other month    12/6/17 Assessment: 14 units per day and supervision every other month. DTR submitted.     12/6/17: Client declined ADC at the time of the assessment but will continue to consider this.              Allergies:    Allergies   Allergen Reactions     No Known Drug Allergies      Diagnoses:   Patient Active Problem List   Diagnosis     IMPOTENCE, ORGANIC ORIGN     CHONDROMALACIA PATELLAE     MEMORY LOSS     ACOUSTIC NERVE DISORDERS     TYPE 2 DIABETES, HBA1C GOAL < 7%     HYPERLIPIDEMIA LDL GOAL <100     BPH (benign prostatic hypertrophy) "     Health Care Home     Pain in joint, lower leg     ACP (advance care planning)     Elevated prostate specific antigen (PSA)                            BPH (benign prostatic hypertrophy) 12/23/2010     Priority: Medium     HYPERLIPIDEMIA LDL GOAL <100 10/31/2010     Priority: Medium     Disorder of acoustic nerve      Priority: Medium     acoustical neuroma right side  Problem list name updated by automated process. 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     Priority: Medium[MW1.3]      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     Elevated prostate specific antigen (PSA) 11/6/2013     Essential hypertension, benign      Hyperlipidemia LDL goal <100       Impotence of organic origin      Inguinal hernia without mention of obstruction or gangrene, unilateral or unspecified, (not specified as recurrent) 1/03    right     Memory loss      Osteoarthritis of both hands, unspecified osteoarthritis type 3/19/2017     Tobacco use disorder      Type 2 diabetes mellitus without complication  (goal A1C<7) 10/24/2015     Unspecified nasal polyp      Urinary frequency     nocturia x 5     Past Surgical History:   Procedure Laterality Date     ARTHROPLASTY KNEE Right 9/14/2017    Procedure: ARTHROPLASTY KNEE;  RIGHT TOTAL KNEE ARTHROPLASTY ;  Surgeon: Darnell Allen MD;  Location: [facility] OR     C APPENDECTOMY       C NONSPECIFIC PROCEDURE  4/01    nasal polyp excision     C NONSPECIFIC PROCEDURE  1/03    RIH repair     C NONSPECIFIC PROCEDURE  April 2011     right knee arthroscopy     Current Outpatient Prescriptions   Medication Sig Dispense Refill     ACE/ARB/ARNI NOT PRESCRIBED, INTENTIONAL, Please choose reason not prescribed, below       acetaminophen (TYLENOL) 500 MG tablet Take  500-1,000 mg by mouth every 6 hours as needed for mild pain       ascorbic acid (VITAMIN C) 1000 MG TABS Take 1,000 mg by mouth daily.       aspirin 325 MG tablet Take 1 tablet (325 mg) by mouth 2 times daily 90 tablet 3     atorvastatin (LIPITOR) 80 MG tablet Take 1 tablet (80 mg) by mouth daily 90 tablet 3     blood glucose monitoring (ONE TOUCH ULTRASOFT) lancets USE 3 TIMES DAILY 200 each 3     blood glucose monitoring (ONETOUCH ULTRA) test strip Use to test 3 times daily 200 each 3     Blood Glucose Monitoring Suppl (ONE TOUCH ULTRA SYSTEM KIT) W/DEVICE KIT One touch ultra 2 if covered by insurance 1 kit 0     camphor-menthol (DERMASARRA) 0.5-0.5 % LOTN Apply topically 2 times daily Apply to back, legs topically twice daily for itching       celecoxib (CELEBREX) 200 MG capsule Take 1 capsule (200 mg) by mouth daily With food for arthritis pain 90 capsule 1     DIPHENHYDRAMINE HCL EX (BENADRYL CREAM 1-0.1%) Apply to affected areas topically as needed for itch TID. Thighs, buttocks, and groin) per on call Dr. Salcedo.       insulin aspart (NOVOLOG FLEXPEN) 100 UNIT/ML injection Inject 10-15 Units Subcutaneous At Bedtime 15 mL 11     Insulin Pen Needle (PEN NEEDLES) 31G X 6 MM MISC 1 Device 3 times daily 100 each 11     NOVOLOG MIX 70/30 FLEXPEN (70-30) 100 UNIT/ML susp INJECT 30 UNITS DAILY BEFORE BREAKFAST AND 20 UNITS BEFORE DINNER. 45 mL 1     senna-docusate (SENOKOT-S;PERICOLACE) 8.6-50 MG per tablet Take 2 tablets by mouth 2 times daily       tamsulosin (FLOMAX) 0.4 MG capsule TAKE ONE CAPSULE BY MOUTH ONE TIME DAILY  90 capsule 3     TAMSULOSIN HCL PO Take 0.4 mg by mouth daily       ULTICARE MICRO 32G X 4 MM insulin pen needle TEST 3 TIMES DAILY 100 each 0     OTC products:[MW1.1] None, except as noted above[EV1.1]    Allergies   Allergen Reactions     No Known Drug Allergies       Latex Allergy:[MW1.1] NO[EV1.1]    Social History   Substance Use Topics     Smoking status: Former Smoker     Packs/day: 0.50      Years: 42.00     Quit date: 1/17/2001     Smokeless tobacco: Never Used     Alcohol use No     History   Drug Use No       REVIEW OF SYSTEMS:[MW1.1]   CONSTITUTIONAL: NEGATIVE for fever, chills,[EV1.2]  We[EV1.1]ight up 5 pounds  INTEGUMENTARY/SKIN: NEGATIVE for worrisome rashes, moles or lesions  EYES: NEGATIVE for vision changes or irritation  ENT/MOUTH: NEGATIVE for ear pain , mouth and throat problems.[EV1.2] H[EV1.1]earing loss right chronic[EV1.2] with known vestibular schwannoma[EV1.1]  RESP: NEGATIVE for significant cough or SOB  CV: NEGATIVE for chest pain, palpitations. Rare minimal peripheral edema. No PND, orthopnea  GI: NEGATIVE for nausea, abdominal pain, heartburn, or change in bowel habits with current meds  : NEGATIVE for dysuria, or hematuria.  U[EV1.2] On Flomax but still some slowing if urine flow. Nocturia x2 but goes back to sleep[EV1.1]  MUSCULOSKELETAL:  See HPI.  Mild stiffness in general joints  NEURO: NEGATIVE for weakness,[EV1.2]   p[EV1.1]aresthesias. No recent falls.    ENDOCRINE:  POSITIVE for diabetes mellitus. 140s in AM.  1[EV1.2]6[EV1.1]0s in the PM  HEME: NEGATIVE for bleeding problems.  PSYCHIATRIC: NEGATIVE for changes in mood or affect    EXAM:[EV1.2]   /70  Pulse 82  Temp 97.6  F (36.4  C) (Oral)  Resp 16  Wt 179 lb (81.2 kg)  SpO2 92%  BMI 29.79 kg/m2[EV1.3]  Eye: PERRL, EOMI  HENT: ear canals and TM's normal and nose and mouth without ulcers or lesions   Neck: no adenopathy. Thyroid normal to palpation. No bruits  Pulm: Lungs clear to auscultation   CV: Regular rates and rhythm  GI: Soft, nontender, Normal active bowel sounds, No hepatosplenomegaly or masses palpable  Ext: Peripheral pulses intact. No edema. RTKA scar  Neuro: Normal strength and tone, sensory exam grossly normal. Stable antalgic gait with cane[EV1.1]      DIAGNOSTICS:[MW1.1]   EKG:  NSR with rate 85. No acute ST/T changes    Component      Latest Ref Rng & Units 1/20/2018 10/2/2018    Sodium      133 - 144 mmol/L  140   Potassium      3.4 - 5.3 mmol/L  3.9   Chloride      94 - 109 mmol/L  106   Carbon Dioxide      20 - 32 mmol/L  31   Anion Gap      3 - 14 mmol/L  3   Glucose      70 - 99 mg/dL  234 (H)   Urea Nitrogen      7 - 30 mg/dL  25   Creatinine      0.66 - 1.25 mg/dL  1.01   GFR Estimate      >60 mL/min/1.7m2  71   GFR Estimate If Black      >60 mL/min/1.7m2  86   Calcium      8.5 - 10.1 mg/dL  8.2 (L)   Bilirubin Total      0.2 - 1.3 mg/dL  0.5   Albumin      3.4 - 5.0 g/dL  3.4   Protein Total      6.8 - 8.8 g/dL  7.1   Alkaline Phosphatase      40 - 150 U/L  147   ALT      0 - 70 U/L  41   AST      0 - 45 U/L  28   WBC      4.0 - 11.0 10e9/L  6.6   RBC Count      4.4 - 5.9 10e12/L  4.05 (L)   Hemoglobin      13.3 - 17.7 g/dL  13.5   Hematocrit      40.0 - 53.0 %  42.1   MCV      78 - 100 fl  104 (H)   MCH      26.5 - 33.0 pg  33.3 (H)   MCHC      31.5 - 36.5 g/dL  32.1   RDW      10.0 - 15.0 %  11.9   Platelet Count      150 - 450 10e9/L  167   Specimen Description        Nares   Methicillin Resist/Sens S. aureus PCR      NEG:Negative  Negative   Hemoglobin A1C      0 - 5.6 % 8.7 (H) 8.6 (H)   TSH      0.40 - 4.00 mU/L  1.16[EV1.1]       IMPRESSION:[MW1.1]   Reason for surgery/procedure:  Left knee DJD  Diagnosis/reason for consult:   1. Type 2 IDDM. A1C 8.6. See HPI. Pt chronically has run A1Cs elevated  above what is seen on blood sugar monitoring throughout the day for years. Therefore do not feel surgery needs to be delayed to improve blood sugar control further. Blood sugar elevated with this one lab test but pt had eaten right before the lab draw. Blood sugars have been running in the 140-160 range  Per review of recent blood sugars brought to appt today.  2.  BPH. Overall stable with Flomax, Finasteride  3. Hx right vestibular schwannoma. Pt has declined surgical intervention.  No recent falls. Has chronic right hearing loss  4. Hyperlipidemia. On statin[EV1.1]    The  proposed surgical procedure is considered[MW1.1] INTERMEDIATE[EV1.1] risk.    REVISED CARDIAC RISK INDEX  The patient has the following serious cardiovascular risks for perioperative complications such as (MI, PE, VFib and 3  AV Block):[MW1.1]  Diabetes Mellitus (on Insulin)[EV1.1]  INTERPRETATION:[MW1.1] 1 risks: Class II (low risk - 0.9% complication rate)[EV1.1]    The patient has the following additional risks for perioperative complications:[MW1.1]  No identified additional risks[EV1.1]         RECOMMENDATIONS:[MW1.1]       Cardiovascular Risk  Performs 4 METs exercise without symptoms (Bicycling at 8.5 minutes per mile (7 miles/hour)) .      APPROVAL GIVEN to proceed with proposed procedure, without further diagnostic evaluation[EV1.1]     PLAN:   Stay off of Aspirin and Celebrex between now and surgery[EV1.2] (were stopped 1 week prior to surgery)[EV1.1]   Take 70/30 insulin 14 units  At supper/dinner the night before surgery  Take Novolog 6 units at bedtime the nioght before surgery   Nothing to eat/drink after midnight prior to surgery. No insulin the morning of surgery[EV1.2]    Signed Electronically by: Alfredo Aragon MD    Copy of this evaluation report is provided to requesting physician.    [facility] Preop Guidelines    Revised Cardiac Risk Index[MW1.1]     Revision History        User Key Date/Time User Provider Type Action    > EV1.1 10/4/2018  6:35 AM Alfredo Aragon MD Physician Sign     EV1.3 10/2/2018  4:06 PM Alfredo Aragon MD Physician      EV1.2 10/2/2018  4:02 PM Alfredo Aragon MD Physician      MW1.2 10/2/2018  3:37 PM Dolores Ellington CMA Medical Assistant      MW1.3 10/2/2018 10:01 AM Dolores Ellington CMA Medical Assistant      MW1.1 10/2/2018 10:00 AM Dolores Ellington CMA Medical Assistant                      Discharge Summaries      Discharge Summaries signed by Darnell Allen MD at 10/8/2018 11:30 AM      Author:  Darnell Allen MD Service:  Orthopedics Author Type:  Physician    Filed:   10/8/2018 11:30 AM Date of Service:  10/8/2018  6:32 AM Creation Time:  10/8/2018  6:47 AM    Status:  Signed :  Darnell Amezcua MD (Physician)         Admit Date:     10/05/2018   Discharge Date:           DATE OF ADMISSION: 10/05/2018.      DATE OF DISCHARGE: 10/08/2018.      ADMITTING DIAGNOSIS:  Left knee osteoarthritis.      PROCEDURE THIS ADMISSION:  Left total knee arthroplasty.      HOSPITAL COURSE:  Mr. Bond was admitted.  He underwent the above-noted procedure.  He tolerated it well.  Postoperatively, he mobilized, stabilized and was ready for discharge to the nursing home setting.      DISCHARGE MEDICATIONS:  Those on admission plus Oxycodone 5-10 mg p.o. every 3 hours. p.r.n. pain.  OxyContin 10 mg p.o. twice a day  for 3 days and each day at bedtime for 4 days.  Senokot-S for stool softening.  Aspirin 325 mg p.o. twice a day for DVT prophylaxis.      DISCHARGE ACTIVITY:  Weightbear as tolerated left lower extremity.  Participate in routine physical therapy and occupational therapy.      DISCHARGE FOLLOWUP:  With Dr. Amezcua 2 weeks postop for staple removal.  He should stay in his knee immobilizer at bedtime for 7 days after discharge.  He should have a daily dry dressing change until his wound is dry and leave open to air.  He should wear ISAIAS hose until seen in followup.  May be off at night and to shower.         DARNELL AMEZCUA MD             D: 10/08/2018   T: 10/08/2018   MT: [id number]      Name:     PIYUSH BOND   MRN:      [id number]        Account:        [id number]   :      1940           Admit Date:     10/05/2018                                  Discharge Date:       Document: [id number]    [SM1.1]   Revision History        User Key Date/Time User Provider Type Action    > SM1.1 10/8/2018 11:30 AM Darnell Amezcua MD Physician Sign                     Consult Notes      Consults by Linette Grant LSW at 10/7/2018  3:13 PM     Author:  Linette Grant  ANASTACIA Service:  Social Work Author Type:      Filed:  10/7/2018  3:13 PM Date of Service:  10/7/2018  3:13 PM Creation Time:  10/7/2018  3:10 PM    Status:  Signed :  Linette Grant LSW ()     Consult Orders:    1. Social Work IP Consult [[id number]] ordered by Darnell Allen MD at 10/07/18 0752                Care Transition Initial Assessment -   Reason For Consult: discharge planning, facility placement  Met with: Spoke with sonBrandan.[JJ1.1]    Active Problems:    Status post total left knee replacement[JJ1.2]       DATA  Lives With: spouse  Living Arrangements: house  Description of Support System: Involved, Supportive  Who is your support system?: Children  Support Assessment: Adequate family and caregiver support.   Identified issues/concerns regarding health management: PT recommends TCU. Family in agreement. Pt has been to [facility] in the past and they would like him to return. Brandan requests a private room and reports he will pay the additional daily fee.  informed him it is $45 per day at the [facility]. Pt will need transportation arranged.      Quality Of Family Relationships: involved, supportive    ASSESSMENT  Cognitive Status: Alert and oriented per nursing. Family appears involved and supportive.  Concerns to be addressed: On-going discharge planning.     PLAN  Financial costs for the patient includes: Private room fee.  Patient given options and choices for discharge: Yes.  Patient/family is agreeable to the plan? YES  Patient Goals and Preferences: TCU.  Patient anticipates discharging to: TCU.    JANN Griffiths, [facility]  [id number][JJ1.1]             Revision History        User Key Date/Time User Provider Type Action    > JJ1.2 10/7/2018  3:13 PM Linette Grant LSW  Sign     JJ1.1 10/7/2018  3:10 PM Linette Grant LSW              Consults by Brett Mcrae MD at 10/5/2018  11:38 AM     Author:  Brett Mcrae MD Service:  Urology Author Type:  Physician    Filed:  10/6/2018 12:45 PM Date of Service:  10/5/2018 11:38 AM Creation Time:  10/6/2018 12:38 PM    Status:  Signed :  Brett Mcrae MD (Physician)     Consult Orders:    1. Urology IP Consult: full bladder, cannot catheterize, patiet in pacu; Consultant may enter orders: Yes; Patient to be seen: STAT - within 1 hour; Call back #: [phone] [[id number]] ordered by Darnell Allen MD at 10/05/18 1052                Urology Consult History and Physical    Name: Rabia Reed    MRN: [id number]   YOB: 1940       We were asked to see Rabia Reed at the request of Dr. Allen for evaluation and treatment of Urinary retention.          Chief Complaint:   Urinary retention     History is obtained from the patient using an            History of Present Illness:   Rabia Reed is a 78 year old male who underwent a LEFT total knee arthroplasty. Urology called urgently to the PACU due to urinary retention and an abnormal urethral meatus. RN notes that mayes placement in the OR was unsuccessful. He voided 150cc, however bladder scanned for >700 and he is uncomfortable.           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     Elevated prostate specific antigen (PSA) 11/6/2013     Essential hypertension, benign      Hyperlipidemia LDL goal <100       Impotence of organic origin      Inguinal hernia without mention of obstruction or gangrene, unilateral or unspecified, (not specified as recurrent) 1/03    right     Memory loss      Osteoarthritis of both hands, unspecified osteoarthritis type 3/19/2017     Tobacco use disorder      Type 2 diabetes mellitus without complication  (goal A1C<7) 10/24/2015      Unspecified nasal polyp      Urinary frequency     nocturia x 5            Past Surgical History:     Past Surgical History:   Procedure Laterality Date     ARTHROPLASTY KNEE Right 2017    Procedure: ARTHROPLASTY KNEE;  RIGHT TOTAL KNEE ARTHROPLASTY ;  Surgeon: Darnell Allen MD;  Location: [facility] OR     C APPENDECTOMY       C NONSPECIFIC PROCEDURE      nasal polyp excision     C NONSPECIFIC PROCEDURE      [facility] repair     C NONSPECIFIC PROCEDURE  2011     right knee arthroscopy            Social History:     Social History   Substance Use Topics     Smoking status: Former Smoker     Packs/day: 0.50     Years: 42.00     Quit date: 2001     Smokeless tobacco: Never Used     Alcohol use No            Family History:     Family History   Problem Relation Age of Onset     Diabetes Mother       age 85     Family History Negative Father       age 38,  in war     Diabetes Sister      Diabetes Brother      borderline diabetic              Allergies:     Allergies   Allergen Reactions     No Known Drug Allergies             Medications:     Current Facility-Administered Medications   Medication     [START ON 10/8/2018] acetaminophen (TYLENOL) tablet 650 mg     acetaminophen (TYLENOL) tablet 975 mg     atorvastatin (LIPITOR) tablet 80 mg     benzocaine-menthol (CHLORASEPTIC) 6-10 MG lozenge 1-2 lozenge     celecoxib (celeBREX) capsule 200 mg     cholecalciferol (vitamin D3) tablet 2,000 Units     glucose gel 15-30 g    Or     dextrose 50 % injection 25-50 mL    Or     glucagon injection 1 mg     diphenhydrAMINE (BENADRYL) solution 12.5 mg    Or     diphenhydrAMINE (BENADRYL) injection 12.5 mg     enoxaparin (LOVENOX) injection 40 mg     finasteride (PROSCAR) tablet 5 mg     gabapentin (NEURONTIN) capsule 100 mg     HYDROmorphone (PF) (DILAUDID) injection 0.3-0.5 mg     insulin aspart (NovoLOG) inj (RAPID ACTING)     insulin aspart (NovoLOG) inj (RAPID ACTING)     insulin aspart prot &  aspart (NovoLOG MIX 70/30 PEN) injection 10 Units     insulin aspart prot & aspart (NovoLOG MIX 70/30 PEN) injection 7 Units     lidocaine (LMX4) cream     lidocaine 1 % 1 mL     melatonin tablet 3 mg     methocarbamol (ROBAXIN) tablet 500 mg     morphine (MS CONTIN) 12 hr tablet 15 mg     naloxone (NARCAN) injection 0.1-0.4 mg     ondansetron (ZOFRAN-ODT) ODT tab 4 mg    Or     ondansetron (ZOFRAN) injection 4 mg     oxyCODONE IR (ROXICODONE) tablet 5-10 mg     prochlorperazine (COMPAZINE) injection 5 mg    Or     prochlorperazine (COMPAZINE) tablet 5 mg     senna-docusate (SENOKOT-S;PERICOLACE) 8.6-50 MG per tablet 1 tablet    Or     senna-docusate (SENOKOT-S;PERICOLACE) 8.6-50 MG per tablet 2 tablet     sodium chloride (PF) 0.9% PF flush 3 mL     sodium chloride (PF) 0.9% PF flush 3 mL     tamsulosin (FLOMAX) capsule 0.4 mg             Review of Systems:    ROS: 10 point ROS neg other than the symptoms noted above in the HPI.          Physical Exam:   Patient Vitals for the past 24 hrs:   BP Temp Temp src Pulse Heart Rate Resp SpO2   10/06/18 1205 120/71 98.1  F (36.7  C) Oral - 87 16 97 %   10/06/18 0945 - - - - - 16 -   10/06/18 0901 - - - - - 16 -   10/06/18 0804 106/59 97.9  F (36.6  C) Oral - 77 14 98 %   10/06/18 0623 - - - - - 16 -   10/06/18 0538 - - - - - 18 -   10/06/18 0400 117/61 98  F (36.7  C) Oral 75 84 19 95 %   10/06/18 0225 - - - - - 18 -   10/06/18 0140 - - - - - 19 -   10/06/18 0000 107/61 97.5  F (36.4  C) Oral - 92 18 94 %   10/05/18 2100 - - - - - 16 -   10/05/18 2002 - - - - - 15 -   10/05/18 1500 (!) 154/92 - - - 100 13 93 %   10/05/18 1400 (!) 155/92 - - - 101 14 94 %   10/05/18 1330 (!) 161/95 - - - 98 12 94 %   10/05/18 1259 (!) 158/94 97.9  F (36.6  C) Oral - 98 15 96 %     General: age-appropriate appearing male in mild distress  HEENT: Head AT/NC, EOMI, CN Grossly intact  Lungs: no respiratory distress, or pursed lip breathing  Heart: No obvious jugular venous distension  present  Back: no bony midline tenderness, no CVAT bilaterally.  Abdomen: soft, non-distended, mildly-tender. No organomegaly  : Uncircumcised phallus with evidence of coronal hypospadias with a distal pit and tight true meatus  LE: LEFT left in surgical dressing  Skin: no suspicious lesions or rashes  Neuro: Alert, oriented  Psych: affect and mood normal          Data:   All laboratory data reviewed:      Recent Labs  Lab 10/06/18  0637 10/05/18  1447 10/02/18  1644   WBC 9.2  --  6.6   HGB 11.8*  --  13.5   * 163 167       Recent Labs  Lab 10/06/18  0637 10/05/18  1447 10/05/18  0553 10/02/18  1644     --   --  140   POTASSIUM 3.9  --  3.4 3.9   CHLORIDE 107  --   --  106   CO2 27  --   --  31   BUN 19  --   --  25   CR 0.91 0.89  --  1.01   *  --  64* 234*   MABEL 7.9*  --   --  8.2*     No lab results found in last 7 days.    Invalid input(s): URINEBLOOD           Impression and Plan:   Impression:     79 y/o man s/p LEFT total knee arthroplasty in acute urinary retention with evidence of distal hypospadias      Plan:   Urinary retention  - 18fr coude catheter placed with sterile technique and the use of 5cc Lidocaine jelly. Mayes placed without issue, mildly tight urethral meatus. Clear UOP  - Maintain mayes at this time  - Will continue to follow     Brett Mcrae   Urology  [facility]  Clinic Phone [phone][BH1.1]           Revision History        User Key Date/Time User Provider Type Action    > BH1.1 10/6/2018 12:45 PM Brett Mcrae MD Physician Sign            Consults by Mercedez Smith PA-C at 10/5/2018  3:55 PM     Author:  Mercedez Smith PA-C Service:  Hospitalist Author Type:  Physician Assistant - C    Filed:  10/5/2018  4:00 PM Date of Service:  10/5/2018  3:55 PM Creation Time:  10/5/2018  3:46 PM    Status:  Cosign Needed :  Mercedez Smith PA-C (Physician Assistant - C)    Cosign  Required:  Yes         Consult Orders:    1. Hospitalist IP Consult: Patient to be seen: Routine - within 24 hours; Hospitalist Consult, post op  medical management including diabetes; Consultant may enter orders: Yes [[id number]] ordered by Darnell Allen MD at 10/05/18 1013                [facility]    Hospitalist Consultation    Date of Admission:  10/5/2018  Date of Consult (When I saw the patient):[KG1.1] 10/05/18[KG1.2]    Assessment & Plan   Rabia Reed is a 78 year old male with PMHx of insulin dependent T2DM, BPH, hyperlipidemia, and osteoarthritis who was admitted on 10/5/2018 and is s/p left TKA. Surgery performed by Dr. Allen. I was asked to see the patient for medical comanagement. Pt slightly hypertensive and tachycardic, all other vitals WNL. Pt currently stable.     S/P left TKA: Surgery performed by Dr. Allen for osteoarthritis. EBL 10 ccs.   -- Defer routine post-operative cares, IVF, DVT prophylaxis and pain control to primary service   -- Bowel regimen in place while on narcotics   -- Encourage pulmonary toilet; incentive spirometer at bedside   -- PT and OT in the AM   -- CBC and BMP in AM     T2DM, insulin dependent, uncontrolled: A1C 8.6 on 10/2. Maintained on Insulin 70/30 with 30 U qam and 20 U qpm, Novolog 10 U at bedtime. Took 14 U of 70/30 the evening prior to surgery and 10 U of Novolog the night prior to surgery.[KG1.1]   -- Insulin 70/30 reordered at reduced dose at pt advances diet; 7 U this evening and 10 U in the AM[KG1.3]   --[KG1.1] High[KG1.3] dose sliding scale insulin ordered[KG1.1]   -- BS per protocol   -- Monitor for hypoglycemia[KG1.3]    Recent Labs  Lab 10/05/18  1021 10/05/18  0755 10/05/18  0553 10/02/18  1644   GLC  --   --  64* 234*   * 133*  --   --[KG1.4]      BPH: Ledesma in place.   -- Continue PTA Finasteride and Flomax     Hyperlipidemia: Continue statin    DVT Prophylaxis: Enoxaparin (Lovenox) SQ  Code Status: Full Code    Disposition:  Expected discharge per Ortho    Mercedez Smith PA-C    This patient was discussed with [KG1.1] Magalie[KG1.3] of the Hospitalist Service who independently interviewed and examined the patient and agrees with current plans as outlined above.     Reason for Consult   Reason for consult: I was asked by Dr. Allen to evaluate this patient for medical co-management.    Primary Care Physician   Alfredo Aragon    Chief Complaint   S/P left TKA    History is obtained from the patient through Kinyarwanda  and son.     History of Present Illness   Rabia Reed is a 78 year old male with PMHx of insulin dependent T2DM, BPH, hyperlipidemia, and osteoarthritis who was admitted on 10/5/2018 and is s/p left TKA. Surgery performed by Dr. Allen. I was asked to see the patient for medical comanagement. Pt slightly hypertensive and tachycardic, all other vitals WNL. Pt currently stable.     Surgery performed by Dr. Allen for osteoarthritis. EBL 10 ccs. Pt resting comfortably in bed. No acute distress. Sleepy, but easily aroused. No N/V, chest pain or SOB. Ledesma catheter in place, noted retention with Urology involvement for Ledesma catheter placement. Hx of BPH. No recent medication changes.     In regards to DM. A1C 8.6 on 10/2. Maintained on Insulin 70/30 with 30 U qam and 20 U qpm, Novolog 10 U at bedtime. Took 14 U of 70/30 the evening prior to surgery and 10 U of Novolog the night prior to surgery.     Pt is without acute concerns he would like addressed.     Past Medical History    1. BPH   2. Acoustic neuroma, right side   3. Hyperlipidemia  4. T2DM, insulin dependent, uncontrolled with A1C 8.6   5. Prior tobacco use     Past Surgical History   I have reviewed this patient's surgical history and updated it with pertinent information if needed.[KG1.1]  Past Surgical History:   Procedure Laterality Date     ARTHROPLASTY KNEE Right 9/14/2017    Procedure: ARTHROPLASTY KNEE;  RIGHT TOTAL KNEE ARTHROPLASTY ;   Surgeon: Darnell Allen MD;  Location: [facility] OR     C APPENDECTOMY       C NONSPECIFIC PROCEDURE      nasal polyp excision     C NONSPECIFIC PROCEDURE      [facility] repair     C NONSPECIFIC PROCEDURE  2011     right knee arthroscopy[KG1.5]     Prior to Admission Medications   Prior to Admission Medications   Prescriptions Last Dose Informant Patient Reported? Taking?   ACE/ARB/ARNI NOT PRESCRIBED, INTENTIONAL,  Son No No   Sig: Please choose reason not prescribed, below   ASPIRIN PO 2018 Son Yes Yes   Sig: Take 81 mg by mouth daily   Ascorbic Acid (VITAMIN C PO) 10/4/2018 at am Son Yes Yes   Sig: Take 500 mg by mouth daily   Blood Glucose Monitoring Suppl (ONE TOUCH ULTRA SYSTEM KIT) W/DEVICE KIT  Son No No   Sig: One touch ultra 2 if covered by insurance   DOCUSATE SODIUM PO 10/4/2018 at am Son Yes Yes   Sig: Take 100 mg by mouth daily   FINASTERIDE PO 10/4/2018 at pm Son Yes Yes   Sig: Take 5 mg by mouth daily   Insulin Pen Needle (PEN NEEDLES) 31G X 6 MM MISC  Son No No   Si Device 3 times daily   ULTICARE MICRO 32G X 4 MM insulin pen needle  Son No No   Sig: TEST 3 TIMES DAILY   VITAMIN D, CHOLECALCIFEROL, PO 10/4/2018 at am Son Yes Yes   Sig: Take 2,000 Units by mouth daily   acetaminophen (TYLENOL) 500 MG tablet 10/4/2018 at 2300 Son Yes Yes   Sig: Take 500-1,000 mg by mouth every 6 hours as needed for mild pain   atorvastatin (LIPITOR) 80 MG tablet 10/3/2018 Son No Yes   Sig: Take 1 tablet (80 mg) by mouth daily   blood glucose monitoring (ONE TOUCH ULTRASOFT) lancets  Son No No   Sig: USE 3 TIMES DAILY   blood glucose monitoring (ONETOUCH ULTRA) test strip  Son No No   Sig: Use to test 3 times daily   celecoxib (CELEBREX) 200 MG capsule 2018 Son No Yes   Sig: Take 1 capsule (200 mg) by mouth daily With food for arthritis pain   insulin aspart (NOVOLOG FLEXPEN) 100 UNIT/ML injection 6 units 10/4/2018 at hs Son No Yes   Sig: Inject 10-15 Units Subcutaneous At Bedtime   Patient taking  differently: Inject 10 Units Subcutaneous At Bedtime    insulin aspart prot & aspart (NOVOLOG MIX 70/30 FLEXPEN) injection 10/4/2018 at am Son Yes Yes   Sig: Inject 30 Units Subcutaneous every morning (before breakfast)   insulin aspart prot & aspart (NOVOLOG MIX 70/30 FLEXPEN) injection 14 units 10/4/2018 at pm Son Yes Yes   Sig: Inject 20 Units Subcutaneous daily (with dinner)   tamsulosin (FLOMAX) 0.4 MG capsule 10/4/2018 at pm Son No Yes   Sig: TAKE ONE CAPSULE BY MOUTH ONE TIME DAILY       Facility-Administered Medications: None     Allergies   Allergies   Allergen Reactions     No Known Drug Allergies      Social History   I have reviewed this patient's social history and updated it with pertinent information if needed. Rabia Reed[KG1.1]  reports that he quit smoking about 17 years ago. He has a 21.00 pack-year smoking history. He has never used smokeless tobacco. He reports that he does not drink alcohol or use illicit drugs.[KG1.5]    Family History   I have reviewed this patient's family history and updated it with pertinent information if needed.[KG1.1]   Family History   Problem Relation Age of Onset     Diabetes Mother       age 85     Family History Negative Father       age 38,  in war     Diabetes Sister      Diabetes Brother      borderline diabetic[KG1.5]       Review of Systems   The 10 point Review of Systems is negative other than noted in the HPI.    Physical Exam   Temp: 97.9  F (36.6  C) Temp src: Oral BP: (!) 154/92 Pulse: 67 Heart Rate: 100 Resp: 13 SpO2: 93 % O2 Device: None (Room air) Oxygen Delivery: 2 LPM  Vital Signs with Ranges  Temp:  [96.8  F (36  C)-98.1  F (36.7  C)] 97.9  F (36.6  C)  Pulse:  [67-70] 67  Heart Rate:  [] 100  Resp:  [11-17] 13  BP: (154-170)/() 154/92  SpO2:  [93 %-100 %] 93 %  174 lbs 0 oz    CONSTITUTIONAL: Pt laying in bed, dressed in hospital garb. Appears tired, arouses easily, asking for ice. Cooperative with interview.  Accompanied by Pashto  and son.   HEENT: Normocephalic, atraumatic. Negative for conjunctival redness or scleral icterus.   CARDIOVASCULAR: RRR, no murmurs, rubs, or extra heart sounds appreciated. Pulses +2/4 and regular in upper and lower extremities, bilaterally.   RESPIRATORY: No increased work of breathing.  Supplemental oxygenation via NC at 2LPM. CTA, bilat; no wheezes, rales, or rhonchi appreciated.  GASTROINTESTINAL:  Abdomen soft, non-distended. BS auscultated in all four quadrants. Negative for tenderness to palpation.  No masses or organomegaly noted.  MUSCULOSKELETAL: LLE wrapped. No gross deformities noted. Normal muscle tone.   HEMATOLOGIC/LYMPHATIC/IMMUNOLOGIC:  Negative for lower extremity edema, bilaterally.  NEUROLOGIC: Alert and oriented to person, place, and time.  strength intact.  SKIN: Warm, dry, intact. No jaundice noted. Negative for suspicious lesions, rashes, bruising, open sores or abrasions.     Data   -Data reviewed today: See below     Recent Labs  Lab 10/05/18  1447 10/05/18  0553 10/02/18  1644   WBC  --   --  6.6   HGB  --   --  13.5   MCV  --   --  104*     --  167   NA  --   --  140   POTASSIUM  --  3.4 3.9   CHLORIDE  --   --  106   CO2  --   --  31   BUN  --   --  25   CR 0.89  --  1.01   ANIONGAP  --   --  3   MABEL  --   --  8.2*   GLC  --  64* 234*   ALBUMIN  --   --  3.4   PROTTOTAL  --   --  7.1   BILITOTAL  --   --  0.5   ALKPHOS  --   --  147   ALT  --   --  41   AST  --   --  28       Recent Results (from the past 24 hour(s))   XR Knee Port Left 1/2 Views    Narrative    XR KNEE PORT LT 1/2 VW 10/5/2018 11:51 AM    COMPARISON: None.    HISTORY: Arthroplasty.      Impression    IMPRESSION: LEFT total knee arthroplasty. Hardware appears intact. No  fractures are seen. Surgical drain in place.    KVNG PATEL MD[KG1.1]                  Revision History        User Key Date/Time User Provider Type Action    > KG1.3 10/5/2018  4:00 PM Mercedez Smith  Blu Mays PA-C Physician Assistant - LAZ Sign     KG1.4 10/5/2018  3:52 PM Mercedez Smith Tabatha, CARI Physician Assistant - C      KG1.5 10/5/2018  3:48 PM Mercedez Smith Tabatha, CARI Physician Assistant - LAZ      KG1.2 10/5/2018  3:47 PM Mercedez Smith Tabatha, CARI Physician Assistant - LAZ      KG1.1 10/5/2018  3:46 PM Mercedez Smith Jonahbrichip Mays, CARI Physician Assistant - LAZ                      Progress Notes - Physician (Notes from 10/05/18 through 10/08/18)      Progress Notes by Symone Olivas BSW at 10/8/2018 11:39 AM     Author:  Symone Olivas BSW Service:  Social Work Author Type:      Filed:  10/8/2018 11:41 AM Date of Service:  10/8/2018 11:39 AM Creation Time:  10/8/2018 11:39 AM    Status:  Signed :  Symone Olivas BSW ()         DESHAWN  D: Received discharge orders for pt. Spoke with pt and pt's dtr to discuss transportation. They are in agreement of cost of HE transport. Dtr does not feel safe getting pt in her car. SW called and set-up transportation via [employer] w/c at 1645. Faxed orders, scripts, PAS to [email or web address].    PAS-RR    D: Per DHS regulation, DESHAWN completed and submitted PAS-RR to [place] [employer] Direct Connect via the Senior LinkAge Line.  PAS-RR confirmation # is : [id number]    I: SW spoke with pt and they are aware a PAS-RR has been submitted.  SW reviewed with pt that they may be contacted for a follow up appointment within 10 days of hospital discharge if their SNF stay is < 30 days.  Contact information for Senior LinkAge Line was also provided.    A: Pt verbalized understanding.    P: Further questions may be directed to [facility] LinkAge Line at #[phone], option #4 for PAS-RR staff.    Symone Olivas MSW, LGSW[HH1.1]     Revision History        User Key Date/Time User Provider Type Action    > HH1.1 10/8/2018 11:41 AM Symone Olivas, ALEMW  Sign            Progress Notes by Darnell Allen  "MD SJ at 10/8/2018  9:25 AM     Author:  Darnell Allen MD Service:  Orthopedics Author Type:  Physician    Filed:  10/8/2018  9:30 AM Date of Service:  10/8/2018  9:25 AM Creation Time:  10/8/2018  9:25 AM    Status:  Signed :  Darnell Allen MD (Physician)         Rabia Reed  10/8/2018  POD # 3    Doing well.  No immediate surgical complications identified.  No excessive bleeding  Pain well-controlled.  Tolerating physical therapy and rehabilitation well.  Objective:  Blood pressure 138/67, pulse 90, temperature 98  F (36.7  C), temperature source Oral, resp. rate 16, height 1.676 m (5' 6\"), weight 81.9 kg (180 lb 8 oz), SpO2 95 %.    Temperatures:  Current - Temp: 98  F (36.7  C); Max - Temp  Av.9  F (36.6  C)  Min: 97.4  F (36.3  C)  Max: 98.4  F (36.9  C)  Pulse range: No Data Recorded  Blood pressure range: Systolic (24hrs), Av , Min:117 , Max:147   ; Diastolic (24hrs), Av, Min:61, Max:76    Exam:  CMS: intact  alert, stable, dressing dry      Labs:  Recent Labs   Lab Test  10/08/18   0645  10/06/18   0637  10/05/18   0553   POTASSIUM  3.9  3.9  3.4     Recent Labs   Lab Test  10/07/18   0706  10/06/18   0637  10/02/18   1644   HGB  11.0*  11.8*  13.5     Recent Labs   Lab Test  17   0649   INR  0.95     Recent Labs   Lab Test  10/08/18   0645  10/06/18   0637  10/05/18   1447   PLT  151  144*  163       PLAN:  Continue physical therapy  Pain control measures  Discharge today[SM1.1]         Revision History        User Key Date/Time User Provider Type Action    > SM1.1 10/8/2018  9:30 AM Darnell Allne MD Physician Sign            Progress Notes by Charis Garcia LSW at 10/8/2018  8:29 AM     Author:  Charis Garcia LSW Service:  (none) Author Type:      Filed:  10/8/2018  8:34 AM Encounter Date:  10/8/2018 Status:  Signed    :  Charis Garcia LSW ()           [facility] Care Coordination Contact  CC received notification of Hospital " admission.  Hospital admission occurred on 10/5/18 at [facility] with Dx of left arthroplasty knee replacement.  CC contacted Hospital /discharge planner  and reviewed community POC. CC requested to be notified of concerns, care conference dates and discharge planning.   CC reached out to adult son (Brandan Reed) regarding transition and left a message requesting a return call.  Reviewed and update care plan as needed.  Notified community service providers and placed services PCA on hold as needed.  Transition log initiated.   PCP notified of hospitalization via EMR.  ANASTACIA Velázquez  [facility]  [phone][SO1.1]         Revision History        User Key Date/Time User Provider Type Action    > SO1.1 10/8/2018  8:34 AM Charis Garcia LSW  Sign            Progress Notes by Mae Fields MD at 10/7/2018  8:03 AM     Author:  Mae Fields MD Service:  Hospitalist Author Type:  Physician    Filed:  10/7/2018  4:52 PM Date of Service:  10/7/2018  8:03 AM Creation Time:  10/7/2018  8:03 AM    Status:  Signed :  Mae Fields MD (Physician)         [facility]    Hospitalist Progress Note    Assessment & Plan   Rabia Reed is a 78 year old male with PMHx of insulin dependent T2DM, BPH, hyperlipidemia, and osteoarthritis who was admitted on 10/5/2018 for left TKA with Dr. Allen.    # Left TKA for OA (10/5/18)   - Performed by Dr. Allen for osteoarthritis  - Management deferred to Ortho and Dr. Allen  - Bowel regimen in place while on narcotics   - Encourage pulmonary toilet; incentive spirometer at bedside   - PT and OT consulted, SNF recommended    Urinary retention with BPH  Followed by urology (Dr. Winchester)  - Ledesma in place, considering trial of void prior to discharge  - Continue finasteride, tamsulosin[DP1.1]    Puritis, whole body[DP1.2]- new[DP1.3]  - Lipase, Amylase and LFTs[DP1.2] in AM[DP1.3]  - Benadryl +Zinc  cream[DP1.2]     T2DM, insulin dependent, uncontrolled (A1C 8.6 on 10/2)  - Maintained on Insulin 70/30 with 30 U qam and 20 U qpm, Novolog 10 U at bedtime.   - Insulin 70/30 reordered at reduced dose at pt advances diet  -- 7 U this evening and 10 U in the AM   -- High dose sliding scale insulin ordered   -- BS per protocol   -- Monitor for hypoglycemia     Hyperlipidemia: Continue statin    DVT Prophylaxis: Enoxaparin (Lovenox) SQ  Code Status: Full Code    Disposition: Post op pathway.    Mae Fields MD[DP1.1], PhD[DP1.2]    Interval History[DP1.1]    Whole body puritis, no other complaints[DP1.3]    -Data reviewed today: I reviewed all new labs and imaging results over the last 24 hours.     Physical Exam   Temp: 98.3  F (36.8  C) Temp src: Oral BP: 124/70 Pulse: 90 Heart Rate: 78 Resp: 16 SpO2: 100 % O2 Device: None (Room air)    Vitals:    10/05/18 0612 10/07/18 0614   Weight: 78.9 kg (174 lb) 81.9 kg (180 lb 8 oz)     Vital Signs with Ranges  Temp:  [97  F (36.1  C)-98.3  F (36.8  C)] 98.3  F (36.8  C)  Pulse:  [85-96] 90  Heart Rate:  [77-90] 78  Resp:  [14-16] 16  BP: (106-139)/(54-71) 124/70  SpO2:  [93 %-100 %] 100 %  I/O last 3 completed shifts:  In: 600 [P.O.:600]  Out: 1700 [Urine:1600; Drains:100]    Constitutional: Awake, alert, cooperative, no apparent distress  Respiratory: Clear to auscultation bilaterally,   Cardiovascular: Regular rate and rhythm, normal S1 and S2,[DP1.1] no murmurs[DP1.3]  GI: soft, non-distended, non-tender  Skin/Integumen:[DP1.1] excoriations all over, no overt[DP1.3] rashes, no cyanosis, no edema    Medications       acetaminophen  975 mg Oral Q8H     atorvastatin  80 mg Oral Daily     celecoxib  200 mg Oral BID     cholecalciferol  2,000 Units Oral Daily     enoxaparin  40 mg Subcutaneous Q24H     finasteride (PROSCAR) tablet 5 mg  5 mg Oral QPM     gabapentin  100 mg Oral TID     hydrocortisone   Topical BID     insulin aspart  1-10 Units Subcutaneous TID AC      insulin aspart  1-7 Units Subcutaneous At Bedtime     insulin aspart prot & aspart  10 Units Subcutaneous QAM AC     insulin aspart prot & aspart  7 Units Subcutaneous Daily with supper     oxyCODONE  10 mg Oral Q12H     senna-docusate  1 tablet Oral BID    Or     senna-docusate  2 tablet Oral BID     sodium chloride (PF)  3 mL Intracatheter Q8H     tamsulosin  0.4 mg Oral QPM       Data     Recent Labs  Lab 10/07/18  0706 10/06/18  0637 10/05/18  1447 10/05/18  0553 10/02/18  1644   WBC  --  9.2  --   --  6.6   HGB 11.0* 11.8*  --   --  13.5   MCV  --  99  --   --  104*   PLT  --  144* 163  --  167   NA  --  141  --   --  140   POTASSIUM  --  3.9  --  3.4 3.9   CHLORIDE  --  107  --   --  106   CO2  --  27  --   --  31   BUN  --  19  --   --  25   CR  --  0.91 0.89  --  1.01   ANIONGAP  --  7  --   --  3   MABEL  --  7.9*  --   --  8.2*   GLC  --  242*  --  64* 234*   ALBUMIN  --   --   --   --  3.4   PROTTOTAL  --   --   --   --  7.1   BILITOTAL  --   --   --   --  0.5   ALKPHOS  --   --   --   --  147   ALT  --   --   --   --  41   AST  --   --   --   --  28       Imaging:   No results found for this or any previous visit (from the past 24 hour(s)).[DP1.1]       Revision History        User Key Date/Time User Provider Type Action    > DP1.3 10/7/2018  4:52 PM Mae Fields MD Physician Sign     DP1.2 10/7/2018  4:40 PM Mae Fields MD Physician      DP1.1 10/7/2018  8:03 AM Mae Fields MD Physician             Progress Notes by Lane Mayberry MD at 10/7/2018 10:09 AM     Author:  Lane Mayberry MD Service:  Urology Author Type:  Physician    Filed:  10/7/2018 10:10 AM Date of Service:  10/7/2018 10:09 AM Creation Time:  10/7/2018 10:09 AM    Status:  Signed :  Lane Mayberry MD (Physician)         Urology     Trial of voiding tomorrow. Leave mayes indwelling until then.     Jefe[WU1.1]     Revision History        User Key Date/Time User Provider Type Action    > WU1.1 10/7/2018 10:10 AM Jefe,  Lane SOOD MD Physician Sign            Progress Notes by Monica Narvaez PA-C at 10/7/2018  8:28 AM     Author:  Monica Narvaez PA-C Service:  Orthopedics Author Type:  Physician Assistant - C    Filed:  10/7/2018  9:39 AM Date of Service:  10/7/2018  8:28 AM Creation Time:  10/7/2018  8:28 AM    Status:  Signed :  Monica Narvaez PA-C (Physician Assistant - C)         Orthopedic Surgery  Rabia Reed  10/7/2018  Admit Date:  10/5/2018  POD # 2  S/P Left TKA[EC1.1]    Patient resting comfortably in bed.    Pain controlled.  Tolerating oral intake.    Denies nausea or vomiting  Denies chest pain or shortness of breath  No events overnight.   Patient speaks Swedish - no  present at time of visit[EC1.2]    Alert and orient to person, place, and time.  Vital Sign Ranges  Temperature Temp  Av.9  F (36.6  C)  Min: 97  F (36.1  C)  Max: 98.3  F (36.8  C)   Blood pressure Systolic (24hrs), Av , Min:115 , Max:139        Diastolic (24hrs), Av, Min:54, Max:71      Pulse Pulse  Av.3  Min: 85  Max: 96   Respirations Resp  Avg: 15.9  Min: 14  Max: 16   Pulse oximetry SpO2  Av.4 %  Min: 93 %  Max: 100 %[EC1.1]       Dressing is clean, dry, and intact.   Minimal erythema of the surrounding skin.   Bilateral calves are soft, non-tender.  Bilateral lower extremity is NVI.  Sensation intact bilateral lower extremities  Active dorsi and plantar flexion bilaterally  +Dp pulse    Ledesma patent  Drain intact and patent[EC1.2]    Labs:  Recent Labs   Lab Test  10/06/18   0637  10/05/18   0553  10/02/18   1644   POTASSIUM  3.9  3.4  3.9     Recent Labs   Lab Test  10/07/18   0706  10/06/18   0637  10/02/18   1644   HGB  11.0*  11.8*  13.5     Recent Labs   Lab Test  17   0649   INR  0.95     Recent Labs   Lab Test  10/06/18   0637  10/05/18   1447  10/02/18   1644   PLT  144*  163  167       A/P  1.[EC1.1] Plan[EC1.2]   Continue[EC1.1] Lovenox[EC1.2] for DVT prophylaxis.      Mobilize with PT/OT    WB[EC1.1]AT[EC1.2].[EC1.1]   Change dressing   Remove drain today[EC1.2]     Continue current pain regiment.[EC1.1]   Leave mayes per Urology[EC1.2]    2. Disposition   Anticipate d/c to[EC1.1] TCU based[EC1.2] on[EC1.1] bed placement[EC1.2] .    Monica Narvaez PA-C[EC1.1]     Revision History        User Key Date/Time User Provider Type Action    > EC1.2 10/7/2018  9:39 AM Monica Narvaez PA-C Physician Assistant - LAZ Sign     EC1.1 10/7/2018  8:28 AM Monica Narvaez PA-C Physician Assistant - LAZ             Progress Notes by Darnell Allen MD at 10/7/2018  7:52 AM     Author:  Darnell Allen MD Service:  Orthopedics Author Type:  Physician    Filed:  10/7/2018  7:53 AM Date of Service:  10/7/2018  7:52 AM Creation Time:  10/7/2018  7:52 AM    Status:  Signed :  Darnell Allen MD (Physician)         Ortho  Social service to see for tcu placement[SM1.1]     Revision History        User Key Date/Time User Provider Type Action    > SM1.1 10/7/2018  7:53 AM Darnell Allen MD Physician Sign            Progress Notes by Katia Patton MD at 10/6/2018  2:09 PM     Author:  Katia Patton MD Service:  Hospitalist Author Type:  Physician    Filed:  10/6/2018  2:11 PM Date of Service:  10/6/2018  2:09 PM Creation Time:  10/6/2018  2:09 PM    Status:  Signed :  Katia Patton MD (Physician)         [facility]    Hospitalist Progress Note    Assessment & Plan   Rabia Reed is a 78 year old male with PMHx of insulin dependent T2DM, BPH, hyperlipidemia, and osteoarthritis who was admitted on 10/5/2018 and is s/p left TKA. Surgery performed by Dr. Allen.  Following for medical comanagement.      S/P left TKA: Surgery performed by Dr. Allen for osteoarthritis. EBL 10 ccs.      -- Bowel regimen in place while on narcotics   -- Encourage pulmonary toilet; incentive spirometer at bedside   -- PT and OT    Urinary retention:  Seen by urology,  considering trial of void prior to discharge, f/u with Dr. Winchester  --finasteride, tamsulosin       T2DM, insulin dependent, uncontrolled: A1C 8.6 on 10/2. Maintained on Insulin 70/30 with 30 U qam and 20 U qpm, Novolog 10 U at bedtime. Took 14 U of 70/30 the evening prior to surgery and 10 U of Novolog the night prior to surgery.   -- Insulin 70/30 reordered at reduced dose at pt advances diet; 7 U this evening and 10 U in the AM   -- High dose sliding scale insulin ordered   -- BS per protocol   -- Monitor for hypoglycemia     Recent Labs  Lab 10/05/18  1021 10/05/18  0755 10/05/18  0553 10/02/18  1644   GLC  --   --  64* 234*   * 133*  --   --       BPH: Ledesma in place.   -- Continue PTA Finasteride and Flomax      Hyperlipidemia: Continue statin      DVT Prophylaxis: Enoxaparin (Lovenox) SQ  Code Status: Full Code    Disposition: Post op pathway*.    Katia Patton MD  Text Page (7am to 6pm)    Interval History   Uneventful night.  Pain seems to be controlled.  No SOB, n/v, f/c.    -Data reviewed today: I reviewed all new labs and imaging results over the last 24 hours. I personally reviewed no images or EKG's today.    Physical Exam   Temp: 98.1  F (36.7  C) Temp src: Oral BP: 120/71 Pulse: 75 Heart Rate: 87 Resp: 16 SpO2: 97 % O2 Device: None (Room air)    Vitals:    10/05/18 0612   Weight: 78.9 kg (174 lb)     Vital Signs with Ranges  Temp:  [97.5  F (36.4  C)-98.1  F (36.7  C)] 98.1  F (36.7  C)  Pulse:  [75] 75  Heart Rate:  [] 87  Resp:  [13-19] 16  BP: (106-154)/(59-92) 120/71  SpO2:  [93 %-98 %] 97 %  I/O last 3 completed shifts:  In: 2038.75 [P.O.:550; I.V.:1488.75]  Out: 2520 [Urine:2400; Drains:110; Blood:10]    Constitutional: Awake, alert, cooperative, no apparent distress  Respiratory: Clear to auscultation bilaterally,   Cardiovascular: Regular rate and rhythm, normal S1 and S2,   GI: soft, non-distended, non-tender  Skin/Integumen: No rashes, no cyanosis, no edema  Other:       Medications       acetaminophen  975 mg Oral Q8H     atorvastatin  80 mg Oral Daily     celecoxib  200 mg Oral BID     cholecalciferol  2,000 Units Oral Daily     enoxaparin  40 mg Subcutaneous Q24H     finasteride (PROSCAR) tablet 5 mg  5 mg Oral QPM     gabapentin  100 mg Oral TID     hydrocortisone   Topical BID     insulin aspart  1-10 Units Subcutaneous TID AC     insulin aspart  1-7 Units Subcutaneous At Bedtime     insulin aspart prot & aspart  10 Units Subcutaneous QAM AC     insulin aspart prot & aspart  7 Units Subcutaneous Daily with supper     oxyCODONE  10 mg Oral Q12H     senna-docusate  1 tablet Oral BID    Or     senna-docusate  2 tablet Oral BID     sodium chloride (PF)  3 mL Intracatheter Q8H     tamsulosin  0.4 mg Oral QPM       Data     Recent Labs  Lab 10/06/18  0637 10/05/18  1447 10/05/18  0553 10/02/18  1644   WBC 9.2  --   --  6.6   HGB 11.8*  --   --  13.5   MCV 99  --   --  104*   * 163  --  167     --   --  140   POTASSIUM 3.9  --  3.4 3.9   CHLORIDE 107  --   --  106   CO2 27  --   --  31   BUN 19  --   --  25   CR 0.91 0.89  --  1.01   ANIONGAP 7  --   --  3   MABEL 7.9*  --   --  8.2*   *  --  64* 234*   ALBUMIN  --   --   --  3.4   PROTTOTAL  --   --   --  7.1   BILITOTAL  --   --   --  0.5   ALKPHOS  --   --   --  147   ALT  --   --   --  41   AST  --   --   --  28       Imaging:   No results found for this or any previous visit (from the past 24 hour(s)).[JH1.1]       Revision History        User Key Date/Time User Provider Type Action    > JH1.1 10/6/2018  2:11 PM Katia Patton MD Physician Sign            Progress Notes by Darnell Allen MD at 10/6/2018  1:02 PM     Author:  Darnell lAlen MD Service:  Orthopedics Author Type:  Physician    Filed:  10/6/2018  1:06 PM Date of Service:  10/6/2018  1:02 PM Creation Time:  10/6/2018  1:02 PM    Status:  Signed :  Darnell Allen MD (Physician)         Rabia Reed  10/6/2018  POD # 1    Doing  "well.  No immediate surgical complications identified.  No excessive bleeding  Pain well-controlled.  C/O pruritis  Objective:  Blood pressure 120/71, pulse 75, temperature 98.1  F (36.7  C), temperature source Oral, resp. rate 16, height 1.676 m (5' 6\"), weight 78.9 kg (174 lb), SpO2 97 %.    Temperatures:  Current - Temp: 98.1  F (36.7  C); Max - Temp  Av.9  F (36.6  C)  Min: 97.5  F (36.4  C)  Max: 98.1  F (36.7  C)  Pulse range: Pulse  Av  Min: 75  Max: 75  Blood pressure range: Systolic (24hrs), Av , Min:106 , Max:161   ; Diastolic (24hrs), Av, Min:59, Max:95    Exam:  CMS: intact  alert, stable, dressing dry      Labs:  Recent Labs   Lab Test  10/06/18   0637  10/05/18   0553  10/02/18   1644   POTASSIUM  3.9  3.4  3.9     Recent Labs   Lab Test  10/06/18   0637  10/02/18   1644  18   0937   HGB  11.8*  13.5  15.6     Recent Labs   Lab Test  17   0649   INR  0.95     Recent Labs   Lab Test  10/06/18   0637  10/05/18   1447  10/02/18   1644   PLT  144*  163  167       PLAN:  Continue physical therapy  Pain control measures  Additional problems to be followed by medicine physician  Change to oxycontin  Add vistaril for pruritis[SM1.1]         Revision History        User Key Date/Time User Provider Type Action    > SM1.1 10/6/2018  1:06 PM Darnell Allen MD Physician Sign            Progress Notes by Brett Mcrae MD at 10/6/2018 12:46 PM     Author:  Brett Mcrae MD Service:  Urology Author Type:  Physician    Filed:  10/6/2018 12:50 PM Date of Service:  10/6/2018 12:46 PM Creation Time:  10/6/2018 12:46 PM    Status:  Signed :  Brett Mcrae MD (Physician)         UROLOGY CONSULT PROGRESS NOTE    Ledesma placed in PACU yesterday due to acute urinary retention with evidence of coronal hypospadias.   Seen and examined this AM. Further history obtained from the patient and his son.  Urine draining clear yellow  Has history of BPH, long standing on dual agent medical " management. Son described history of office based procedure (possible Rezume) ~5 years ago.    A/P:  Acute urinary retention   - On review of records has seen Dr. Winchester with [facility] in the past - last note from 2016  - I will discuss with the on-call for UA for further follow up - likely Trial of void prior to discharge   - Will need out-patient follow up with Dr. Winchester to discuss further treatment options  - Continue finaseride and tamsulosin    Brett Mcrae M.D.   [facility] - Urology   [phone][BH1.1]     Revision History        User Key Date/Time User Provider Type Action    > BH1.1 10/6/2018 12:50 PM Brett Mcrae MD Physician Sign            Progress Notes by Arturo Harmon PT at 10/5/2018  4:41 PM     Author:  Arturo Harmon PT Service:  Acute IP Rehab Author Type:  Physical Therapist    Filed:  10/5/2018  4:41 PM Date of Service:  10/5/2018  4:41 PM Creation Time:  10/5/2018  4:41 PM    Status:  Signed :  Arturo Harmon PT (Physical Therapist)          10/05/18 1500   Quick Adds   Type of Visit Initial PT Evaluation       Present yes   Language Botswanan   Living Environment   Lives With spouse   Living Arrangements house   Home Accessibility bed and bath are not on the first floor   Number of Stairs to Enter Home 2   Number of Stairs Within Home 0   Stair Railings at Home none   Transportation Available family or friend will provide   Living Environment Comment Pt lives in a 1Sh with 2STE and no rails. Pt's spouse is elderly and is unable to assist in any way.   Self-Care   Dominant Hand right   Usual Activity Tolerance moderate   Current Activity Tolerance fair   Regular Exercise no   Equipment Currently Used at Home cane, quad;walker, rolling   Activity/Exercise/Self-Care Comment Per son pt required assistance with ADL's occasionally which his daughter provides   Functional Level Prior   Ambulation 1-->assistive equipment   Transferring 1-->assistive  equipment   Toileting 1-->assistive equipment   Bathing 2-->assistive person   Dressing 2-->assistive person   Eating 0-->independent   Communication 0-->understands/communicates without difficulty   Swallowing 0-->swallows foods/liquids without difficulty   Cognition 0 - no cognition issues reported   Fall history within last six months yes   Number of times patient has fallen within last six months 1   Which of the above functional risks had a recent onset or change? none   Prior Functional Level Comment Pt was mod I with ambualtion using a quad cane or RW. Pt's son reproted that occasionally family assist pt during gait for safety, when weak.   General Information   Onset of Illness/Injury or Date of Surgery - Date 10/05/18   Referring Physician Darnell Allen MD   Patient/Family Goals Statement Be able to walk   Pertinent History of Current Problem (include personal factors and/or comorbidities that impact the POC) Pt is s/p L TKA, POD 0. PMH includes: insulin dependent T2DM, BPH, hyperlipidemia, and osteoarthritis    Precautions/Limitations fall precautions   Weight-Bearing Status - LLE weight-bearing as tolerated   Weight-Bearing Status - RLE full weight-bearing   General Observations Pleasant and cooperative    General Info Comments Activity: Up in chair, Knee immobilizer at night, WBAT   Cognitive Status Examination   Orientation orientation to person, place and time   Level of Consciousness alert   Follows Commands and Answers Questions 100% of the time   Personal Safety and Judgment intact   Memory intact   Pain Assessment   Patient Currently in Pain (pt c/o 6/10 at rest and 8/10 on standing. RN aware.)   Range of Motion (ROM)   ROM Comment L Knee: NT due to pain, otherwise WFL   Strength   Strength Comments L knee: NT, otherwise: 4/5   Bed Mobility   Bed Mobility Comments supine>sit: min assist, sit>supine:mod assist    Transfer Skills   Transfer Comments STS with min assist    Gait   Gait Comments Pt  "took 5 side steps to the HOB using RW for support and min assist x 1 for safety.    Balance   Balance Comments Dynamic standing balance: impaired    Coordination   Coordination no deficits were identified   Muscle Tone   Muscle Tone no deficits were identified   General Therapy Interventions   Planned Therapy Interventions balance training;bed mobility training;gait training;ROM;stretching;transfer training;risk factor education;home program guidelines;progressive activity/exercise   Clinical Impression   Criteria for Skilled Therapeutic Intervention yes, treatment indicated   PT Diagnosis Impaired gait   Influenced by the following impairments Decreased strength and decreased activity tolerance; Pain   Functional limitations due to impairments Level of assistance needed with transfers and gait   Clinical Presentation Stable/Uncomplicated   Clinical Presentation Rationale Pt is s/p L TKA, POD 0.    Clinical Decision Making (Complexity) Low complexity   Therapy Frequency` 2 times/day   Predicted Duration of Therapy Intervention (days/wks) 3   Anticipated Discharge Disposition Home with Assist;Transitional Care Facility   Risk & Benefits of therapy have been explained Yes   Patient, Family & other staff in agreement with plan of care Yes   Clinical Impression Comments Pt presents with decreased strength, activity tolerance and pain limiting fucntional mobility and independence. Pt will benefit from continued skilled PT intervnetions to achieve PLOF    [facility] AM-PAC  \"6 Clicks\" V.2 Basic Mobility Inpatient Short Form   1. Turning from your back to your side while in a flat bed without using bedrails? 2 - A Lot   2. Moving from lying on your back to sitting on the side of a flat bed without using bedrails? 2 - A Lot   3. Moving to and from a bed to a chair (including a wheelchair)? 3 - A Little   4. Standing up from a chair using your arms (e.g., wheelchair, or bedside chair)? 3 - A Little   5. To walk in " hospital room? 3 - A Little   6. Climbing 3-5 steps with a railing? 2 - A Lot   Basic Mobility Raw Score (Score out of 24.Lower scores equate to lower levels of function) 15   Total Evaluation Time   Total Evaluation Time (Minutes) 15[AT1.1]        Revision History        User Key Date/Time User Provider Type Action    > AT1.1 10/5/2018  4:41 PM Arturo Harmon PT Physical Therapist Sign                  Procedure Notes     No notes of this type exist for this encounter.         Progress Notes - Therapies (Notes from 10/05/18 through 10/08/18)      Progress Notes by Arturo Harmon PT at 10/5/2018  4:41 PM     Author:  Arturo Harmon PT Service:  Acute IP Rehab Author Type:  Physical Therapist    Filed:  10/5/2018  4:41 PM Date of Service:  10/5/2018  4:41 PM Creation Time:  10/5/2018  4:41 PM    Status:  Signed :  Arturo Harmon PT (Physical Therapist)          10/05/18 1500   Quick Adds   Type of Visit Initial PT Evaluation       Present yes   Language Mongolian   Living Environment   Lives With spouse   Living Arrangements house   Home Accessibility bed and bath are not on the first floor   Number of Stairs to Enter Home 2   Number of Stairs Within Home 0   Stair Railings at Home none   Transportation Available family or friend will provide   Living Environment Comment Pt lives in a 1Sh with 2STE and no rails. Pt's spouse is elderly and is unable to assist in any way.   Self-Care   Dominant Hand right   Usual Activity Tolerance moderate   Current Activity Tolerance fair   Regular Exercise no   Equipment Currently Used at Home cane, quad;walker, rolling   Activity/Exercise/Self-Care Comment Per son pt required assistance with ADL's occasionally which his daughter provides   Functional Level Prior   Ambulation 1-->assistive equipment   Transferring 1-->assistive equipment   Toileting 1-->assistive equipment   Bathing 2-->assistive person   Dressing 2-->assistive person   Eating  0-->independent   Communication 0-->understands/communicates without difficulty   Swallowing 0-->swallows foods/liquids without difficulty   Cognition 0 - no cognition issues reported   Fall history within last six months yes   Number of times patient has fallen within last six months 1   Which of the above functional risks had a recent onset or change? none   Prior Functional Level Comment Pt was mod I with ambualtion using a quad cane or RW. Pt's son reproted that occasionally family assist pt during gait for safety, when weak.   General Information   Onset of Illness/Injury or Date of Surgery - Date 10/05/18   Referring Physician Darnell Allen MD   Patient/Family Goals Statement Be able to walk   Pertinent History of Current Problem (include personal factors and/or comorbidities that impact the POC) Pt is s/p L TKA, POD 0. PMH includes: insulin dependent T2DM, BPH, hyperlipidemia, and osteoarthritis    Precautions/Limitations fall precautions   Weight-Bearing Status - LLE weight-bearing as tolerated   Weight-Bearing Status - RLE full weight-bearing   General Observations Pleasant and cooperative    General Info Comments Activity: Up in chair, Knee immobilizer at night, WBAT   Cognitive Status Examination   Orientation orientation to person, place and time   Level of Consciousness alert   Follows Commands and Answers Questions 100% of the time   Personal Safety and Judgment intact   Memory intact   Pain Assessment   Patient Currently in Pain (pt c/o 6/10 at rest and 8/10 on standing. RN aware.)   Range of Motion (ROM)   ROM Comment L Knee: NT due to pain, otherwise WFL   Strength   Strength Comments L knee: NT, otherwise: 4/5   Bed Mobility   Bed Mobility Comments supine>sit: min assist, sit>supine:mod assist    Transfer Skills   Transfer Comments STS with min assist    Gait   Gait Comments Pt took 5 side steps to the HOB using RW for support and min assist x 1 for safety.    Balance   Balance Comments Dynamic  "standing balance: impaired    Coordination   Coordination no deficits were identified   Muscle Tone   Muscle Tone no deficits were identified   General Therapy Interventions   Planned Therapy Interventions balance training;bed mobility training;gait training;ROM;stretching;transfer training;risk factor education;home program guidelines;progressive activity/exercise   Clinical Impression   Criteria for Skilled Therapeutic Intervention yes, treatment indicated   PT Diagnosis Impaired gait   Influenced by the following impairments Decreased strength and decreased activity tolerance; Pain   Functional limitations due to impairments Level of assistance needed with transfers and gait   Clinical Presentation Stable/Uncomplicated   Clinical Presentation Rationale Pt is s/p L TKA, POD 0.    Clinical Decision Making (Complexity) Low complexity   Therapy Frequency` 2 times/day   Predicted Duration of Therapy Intervention (days/wks) 3   Anticipated Discharge Disposition Home with Assist;Transitional Care Facility   Risk & Benefits of therapy have been explained Yes   Patient, Family & other staff in agreement with plan of care Yes   Clinical Impression Comments Pt presents with decreased strength, activity tolerance and pain limiting fucntional mobility and independence. Pt will benefit from continued skilled PT intervnetions to achieve PLOF    [facility] AM-PAC  \"6 Clicks\" V.2 Basic Mobility Inpatient Short Form   1. Turning from your back to your side while in a flat bed without using bedrails? 2 - A Lot   2. Moving from lying on your back to sitting on the side of a flat bed without using bedrails? 2 - A Lot   3. Moving to and from a bed to a chair (including a wheelchair)? 3 - A Little   4. Standing up from a chair using your arms (e.g., wheelchair, or bedside chair)? 3 - A Little   5. To walk in hospital room? 3 - A Little   6. Climbing 3-5 steps with a railing? 2 - A Lot   Basic Mobility Raw Score (Score out of " 24.Lower scores equate to lower levels of function) 15   Total Evaluation Time   Total Evaluation Time (Minutes) 15[AT1.1]        Revision History        User Key Date/Time User Provider Type Action    > AT1.1 10/5/2018  4:41 PM Arturo Harmon, PT Physical Therapist Sign

## 2018-10-05 NOTE — PLAN OF CARE
Problem: Patient Care Overview  Goal: Plan of Care/Patient Progress Review  Discharge Planner PT   Patient plan for discharge: Home with family support  Current status: PT orders received,chart reviewed. PT eval completed, PT treatment initiated. Pt lives with his elderly spouse who is unable to assist at home. Pt house is 1SH with 2STE and no rails, was mod I with ambulation using quad cane and RW and needed occasional assistance from daughter for ADL's.Currently pt is at min assist x 1 with supine>sit, mod assist x 1 with sit>supine, min assist x 1 with STS transfers and min assist x 1 with taking side steps to the HOB using RW for support. Pt found supine in bed with pillow under the L knee and is educated pt and family on the importance of not placing any pillows or rolled towels to prevent flexion contracture.   Barriers to return to prior living situation: Fall risk, level of assistance needed.   Recommendations for discharge: Home with family support/assistance and  PT if pt meets all PT goals, otherwise TCU.  Rationale for recommendations: Pt will need continued skilled PT interventions to achieve PLOF and independence.        Entered by: Arturo Harmon 10/05/2018 4:25 PM

## 2018-10-05 NOTE — OP NOTE
Procedure Date: 10/05/2018      PREOPERATIVE DIAGNOSIS:  Left knee osteoarthritis.      POSTOPERATIVE DIAGNOSIS:  Left knee osteoarthritis.      PROCEDURE:  Left total knee arthroplasty.        SURGEON:  Darnell Allen MD      ASSISTANT:  Nayeli Rosen PA-C      ANESTHESIA:  General.      COMPLICATIONS:  None.      OPERATIVE COURSE:  Mr. Reed was brought to the operating room.  An anesthetic was administered.  He received prophylactic antibiotics.  These will be discontinued within 24 hours of surgery.  He will be on Lovenox for DVT prophylaxis and switched over to aspirin.  His left lower extremity was prepped and draped in the usual sterile fashion and timeout was called.  Exsanguinated the limb and inflated the tourniquet.  I made an incision from just medial to the tibial tubercle to just superior to the superior pole of thepatella.  Incised the skin and subq.  Gained the extensor mechanism.  Made a medial arthrotomy.  There was a straw colored  effusion which was evacuated.  There was full thickness chondral loss in the medial compartment with large medial osteophytes.  Patella measured a 25 cut to a 15 and prepared it for a 35 button.  Placed intramedullary guide in the femur.  He was a 5-degree valgus bushing.  Set it for a 9 mm distal cut.  Placed a femoral sizing guide.  Elected a size 4 tibia from [facility].  I went ahead and made my 5 in 1 cuts, made my box cut.        I then set the tibial extramedullary cutting guide in the long axis of the tibia, ended up taking 4 mm from the medial tibial plateau.  Sized it for a size 4 tibia.  Cemented in place with a 12.  It would come to full extension, but was quite tight and ended up using a 10 as my final spacer.  During cementation, I copiously irrigated and soaked for a total of 3 minutes with a dilute Betadine solution.  I then copiously irrigated with antibiotic-containing saline solution.  Placed my final tibial spacer.  Closed over a drain.   Ethibond in the extensor mechanism, 2-0 in the subq, staples in the skin.  Bulky sterile dressing was applied.  He emerged from anesthesia without difficulty.  Returned to the recovery room in stable condition.  All sponge and needle counts were correct at the end of the procedure.  There were no known complications.         ROSALES AMEZCUA MD             D: 10/05/2018   T: 10/05/2018   MT: [email or web address]      Name:     PIYUSH BOND   MRN:      [id number]        Account:        [id number]   :      1940           Procedure Date: 10/05/2018      Document: [id number]

## 2018-10-05 NOTE — BRIEF OP NOTE
[facility] Brief Operative Note    Pre-operative diagnosis: LEFT KNEE DJD    Post-operative diagnosis * No post-op diagnosis entered *  same   Procedure: Procedure(s):  LEFT TOTAL KNEE ARTHROPLASTY  - Wound Class: I-Clean   Surgeon(s): Surgeon(s) and Role:     * Darnell Allen MD - Primary     * Nayeli Rosen PA-C - Assisting   Estimated blood loss: 10 mL    Specimens: * No specimens in log *   Findings: Medial oa

## 2018-10-05 NOTE — PLAN OF CARE
Problem: Patient Care Overview  Goal: Plan of Care/Patient Progress Review  Outcome: No Change  POD 0 for L TKA. Up at 1300. Pt is Cuban speaking. interpretor and son present. VSS on RA. Capno on. Gave 0.5 dilaudid for pain. Denies N/T. CMS+. BS hypoactive. LS clear. Clear liquid diet. Knee immobilizer on dressing CDI. Hemovac in place and to bulb suction. Ledesma in place and draining clear yellow urine, (do not remove, placed in PACU by urology).

## 2018-10-05 NOTE — IP AVS SNAPSHOT
` [place] [address] [facility]: [phone]            Medication Administration Report for Rabia Reed as of 10/08/18 1616   Legend:    Given Hold Not Given Due Canceled Entry Other Actions    Time Time (Time) Time  Time-Action       Inactive    Active    Linked        Medications 10/02/18 10/03/18 10/04/18 10/05/18 10/06/18 10/07/18 10/08/18    acetaminophen (TYLENOL) tablet 650 mg  Dose: 650 mg  Freq: EVERY 4 HOURS PRN Route: PO  PRN Reason: other  PRN Comment: multimodal surgical pain management along with NSAIDS and opioid medication as indicated based on pain control and physical function.  Start: 10/08/18 0000   Admin Instructions: May give first dose 4 hours after last scheduled dose of acetaminophen.  Maximum acetaminophen dose from all sources = 75 mg/kg/day not to exceed 4 grams/day.    Admin. Amount: 2 tablet (2 × 325 mg tablet)  Dispense Loc: [address]  POC: Post-procedure               atorvastatin (LIPITOR) tablet 80 mg  Dose: 80 mg  Freq: DAILY Route: PO  Start: 10/05/18 1345   Admin. Amount: 1 tablet (1 × 80 mg tablet)  Last Admin: 10/08/18 0814  Dispense Loc: [address]  POC: Post-procedure        1745 (80 mg)-Given        1000 (80 mg)-Given        0844 (80 mg)-Given        0814 (80 mg)-Given           benzocaine-menthol (CHLORASEPTIC) 6-10 MG lozenge 1-2 lozenge  Dose: 1-2 lozenge  Freq: EVERY 1 HOUR PRN Route: BU  PRN Reason: sore throat  PRN Comment: sore throat without fever  Start: 10/05/18 1331   Admin. Amount: 1-2 lozenge  Dispense Loc: [address]  POC: Post-procedure               bisacodyl (DULCOLAX) Suppository 10 mg  Dose: 10 mg  Freq: DAILY PRN Route: RE  PRN Reason: constipation  Start: 10/08/18 1357   Admin. Amount: 1 suppository (1 × 10 mg suppository)  Last Admin: 10/08/18 1402  Dispense Loc: [address]           1402 (10 mg)-Given           cholecalciferol (vitamin D3) tablet 2,000 Units  Dose: 2,000 Units  Freq: DAILY Route: PO  Start: 10/05/18 1345   Admin.  Amount: 2 tablet (2 × 1,000 Units tablet)  Last Admin: 10/08/18 0813  Dispense Loc: [facility]  POC: Post-procedure        1549 (2,000 Units)-Given        1000 (2,000 Units)-Given        0844 (2,000 Units)-Given        0813 (2,000 Units)-Given           diphenhydrAMINE (BENADRYL) solution 12.5 mg  Dose: 12.5 mg  Freq: EVERY 6 HOURS PRN Route: PO  PRN Reason: itching  Start: 10/05/18 1331   Admin Instructions: Caution to be used when administering multiple CNS depressing meds within a short time frame.    Admin. Amount: 12.5 mg = 5 mL Conc: 2.5 mg/mL  Last Admin: 10/06/18 1256  Dispense Loc: [facility]  Volume: 10 mL  POC: Post-procedure         0550 (12.5 mg)-Given       1256 (12.5 mg)-Given            Or  diphenhydrAMINE (BENADRYL) injection 12.5 mg  Dose: 12.5 mg  Freq: EVERY 6 HOURS PRN Route: IV  PRN Reason: itching  PRN Comment: Only give if patient unable to take PO.  Start: 10/05/18 1331   Admin Instructions: Caution to be used when administering multiple CNS depressing meds within a short time frame.  For ordered IV doses 1-50 mg, give IV Push undiluted. Give each 25mg over a minimum of 1 minute. Extend in non-emergency    Admin. Amount: 12.5 mg = 0.25 mL Conc: 50 mg/mL  Dispense Loc: [facility]  Volume: 1 mL  POC: Post-procedure                             diphenhydrAMINE-zinc acetate (BENADRYL) cream  Freq: DAILY PRN Route: Top  PRN Reason: itching  Start: 10/07/18 1642   Admin Instructions: Apply to whole body (where he is itchy)    Dispense Loc:  Main Pharmacy               enoxaparin (LOVENOX) injection 40 mg  Dose: 40 mg  Freq: EVERY 24 HOURS Route: SC  Start: 10/06/18 0900   Admin Instructions: Check to make sure start date/time is 12-24 hours post op unless documented complication, AND no sooner than 22 hours post op if spinal anesthesia used.   Continue until discharge to home. HOLD if platelet count falls below 50% of baseline or less than 100,000/ L and notify provider.    Admin. Amount: 40 mg =  0.4 mL Conc: 40 mg/0.4 mL  Last Admin: 10/08/18 0814  Dispense Loc: [facility]  Volume: 0.4 mL  POC: Post-procedure         1000 (40 mg)-Given        0846 (40 mg)-Given        0814 (40 mg)-Given           finasteride (PROSCAR) tablet 5 mg  Dose: 5 mg  Freq: EVERY EVENING Route: PO  Start: 10/05/18 2000   Admin Instructions: *Do not handle tablets if you are pregnant*    Admin. Amount: 1 tablet (1 × 5 mg tablet)  Last Admin: 10/07/18 2122  Dispense Loc: [facility]  POC: Post-procedure        2003 (5 mg)-Given        2050 (5 mg)-Given        2122 (5 mg)-Given        [ ] 2000           glucose gel 15-30 g  Dose: 15-30 g  Freq: EVERY 15 MIN PRN Route: PO  PRN Reason: low blood sugar  Start: 10/05/18 1529   Admin Instructions: Give 15 g for BG 51 to 69 mg/dL IF patient is conscious and able to swallow. Give 30 g for BG less than or equal to 50 mg/dL IF patient is conscious and able to swallow. Do NOT give glucose gel via enteral tube.  IF patient has enteral tube: give apple juice 120 mL (4 oz or 15 g of CHO) via enteral tube for BG 51 to 69 mg/dL.  Give apple juice 240 mL (8 oz or 30 g of CHO) via enteral tube for BG less than or equal to 50 mg/dL.    ~Oral gel is preferable for conscious and able to swallow patient.   ~IF gel unavailable or patient refuses may provide apple juice 120 mL (4 oz or 15 g of CHO). Document juice on I and O flowsheet.    Admin. Amount: 15-30 g  Dispense Loc:  [facility]  Volume: 93.75 mL              Or  dextrose 50 % injection 25-50 mL  Dose: 25-50 mL  Freq: EVERY 15 MIN PRN Route: IV  PRN Reason: low blood sugar  Start: 10/05/18 1529   Admin Instructions: Use if have IV access, BG less than 70 mg/dL and meet dose criteria below:  Dose if conscious and alert (or disorientated) and NPO = 25 mL  Dose if unconscious / not alert = 50 mL  Vesicant. For ordered doses up to 25 g, give IV Push undiluted. Give each 5g over 1 minute.    Admin. Amount: 25-50 mL  Dispense Loc:  [facility]  Infused Over:  1-5 Minutes  Volume: 50 mL              Or  glucagon injection 1 mg  Dose: 1 mg  Freq: EVERY 15 MIN PRN Route: SC  PRN Reason: low blood sugar  PRN Comment: May repeat x 1 only  Start: 10/05/18 1529   Admin Instructions: May give SQ or IM. ONLY use glucagon IF patient has NO IV access AND is UNABLE to swallow AND blood glucose is LESS than or EQUAL to 50 mg/dL.  If ordered IV, give IV Push over 1 minute. Reconstitute with 1mL sterile water.    Admin. Amount: 1 mg  Dispense Loc: [facility]               hydrocortisone (CORTAID) 1 % cream  Freq: 2 TIMES DAILY Route: Top  Start: 10/06/18 2100   Admin Instructions: Apply to skin where itching present    Last Admin: 10/08/18 0820  Dispense Loc:  Main Pharmacy         1431 ( )-Given       2054 ( )-Given        0854 ( )-Given       (2123)-Not Given        0820 ( )-Given       [ ] 2100           HYDROmorphone (PF) (DILAUDID) injection 0.3-0.5 mg  Dose: 0.3-0.5 mg  Freq: EVERY 30 MIN PRN Route: IV  PRN Reason: other  PRN Comment: pain control or improvement in physical function. Hold dose for analgesic side effects.  Start: 10/05/18 1331   Admin Instructions: Start at the lowest dose.  May adjust dose by 0.1 mg every 2 hours as needed.   Notify provider to assess for uncontrolled pain or analgesic side effects.  Hold while on IV PCA or with regular IV opioid dosing.  For ordered IV doses 0.1-4 mg give IV Push undiluted. Administer each 2mg over 2-5 minutes.    Admin. Amount: 0.3-0.5 mg  Last Admin: 10/05/18 1338  Dispense Loc: [facility]  POC: Post-procedure        1338 (0.5 mg)-Given              hydrOXYzine (ATARAX) tablet 10 mg  Dose: 10 mg  Freq: 3 TIMES DAILY PRN Route: PO  PRN Reason: itching  Start: 10/06/18 1310   Admin. Amount: 1 tablet (1 × 10 mg tablet)  Last Admin: 10/07/18 0459  Dispense Loc: [facility]          0459 (10 mg)-Given            insulin aspart (NovoLOG) inj (RAPID ACTING)  Dose: 1-7 Units  Freq: AT BEDTIME Route: SC  Start: 10/05/18 2200   Admin  Instructions: HIGH INSULIN RESISTANCE DOSING    Do Not give Bedtime Correction Insulin if BG less than 200.   For  - 224 give 1 units.   For  - 249 give 2 units.   For  - 274 give 3 units.   For  - 299 give 4 units.   For  - 324 give 5 units.   For  - 349 give 6 units.   For BG greater than or equal to 350 give 7 units.   Notify provider if glucose greater than or equal to 350 mg/dL after administration of correction dose.  If given at mealtime, administer within 30 minutes of start of meal    Admin. Amount: 1-7 Units  Last Admin: 10/07/18 2202  Dispense Loc: Contact Rx for dose  Volume: 3 mL        2214 (3 Units)-Given        2127 (2 Units)-Given        2202 (1 Units)-Given        [ ] 2200           insulin aspart (NovoLOG) inj (RAPID ACTING)  Dose: 1-10 Units  Freq: 3 TIMES DAILY BEFORE MEALS Route: SC  Start: 10/06/18 0730   Admin Instructions: Correction Scale - HIGH INSULIN RESISTANCE DOSING     Do Not give Correction Insulin if Pre-Meal BG less than 140.   For Pre-Meal  - 164 give 1 unit.   For Pre-Meal  - 189 give 2 units.   For Pre-Meal  - 214 give 3 units.   For Pre-Meal  - 239 give 4 units.   For Pre-Meal  - 264 give 5 units.   For Pre-Meal  - 289 give 6 units.   For Pre-Meal  - 314 give 7 units.   For Pre-Meal  - 339 give 8 units.   For Pre-Meal  - 364 give 9 units.   For Pre-Meal BG greater than or equal to 365 give 10 units  To be given with prandial insulin, and based on pre-meal blood glucose.   Notify provider if glucose greater than or equal to 350 mg/dL after administration of correction dose.  If given at mealtime, administer within 30 minutes of start of meal    Admin. Amount: 1-10 Units  Last Admin: 10/08/18 1236  Dispense Loc: Contact Rx for dose  Volume: 3 mL         0910 (4 Units)-Given       1257 (2 Units)-Given       1615 (1 Units)-Given        0850 (4 Units)-Given [C]       1254 (4 Units)-Given      "  1855 (5 Units)-Given        0854 (1 Units)-Given       1236 (3 Units)-Given [C]       [ ] 1700           insulin aspart prot & aspart (NovoLOG MIX 70/30 PEN) injection 10 Units  Dose: 10 Units  Freq: EVERY MORNING BEFORE BREAKFAST Route: SC  Start: 10/06/18 0730   Admin Instructions: Must be administered 5 min before meal or immediately after.    Admin. Amount: 10 Units  Last Admin: 10/08/18 0854  Dispense Loc: Contact Rx for dose  Volume: 3 mL         0911 (10 Units)-Given        0847 (10 Units)-Given        0854 (10 Units)-Given           insulin aspart prot & aspart (NovoLOG MIX 70/30 PEN) injection 7 Units  Dose: 7 Units  Freq: DAILY WITH SUPPER Route: SC  Start: 10/05/18 1700   Admin Instructions: Must be administered 5 min before meal or immediately after.    Admin. Amount: 7 Units  Last Admin: 10/07/18 1855  Dispense Loc:  Main Pharmacy  Volume: 3 mL        1746 (7 Units)-Given        1635 (7 Units)-Given [C]        1855 (7 Units)-Given        [ ] 1700           lidocaine (LMX4) cream  Freq: EVERY 1 HOUR PRN Route: Top  PRN Reason: pain  PRN Comment: with VAD insertion or accessing implanted port.  Start: 10/05/18 1331   Admin Instructions: Do NOT give if patient has a history of allergy to any local anesthetic or any \"fareed\" product.   Apply 30 minutes prior to VAD insertion or port access.  MAX Dose:  2.5 g (  of 5 g tube)    Dispense Loc: Contact Rx for dose  POC: Post-procedure               lidocaine 1 % 1 mL  Dose: 1 mL  Freq: EVERY 1 HOUR PRN Route: OTHER  PRN Comment: mild pain with VAD insertion or accessing implanted port  Start: 10/05/18 1331   Admin Instructions: Do NOT give if patient has a history of allergy to any local anesthetic or any \"fareed\" product. MAX dose 1 mL subcutaneous OR intradermal in divided doses.    Admin. Amount: 1 mL  Dispense Loc: [address]  Volume: 20 mL  POC: Post-procedure               melatonin tablet 3 mg  Dose: 3 mg  Freq: AT BEDTIME PRN Route: PO  PRN Reason: " sleep  Start: 10/06/18 2000   Admin Instructions: POD 1.  Do not give unless at least 6 hours of uninterrupted sleep is expected.    Admin. Amount: 1 tablet (1 × 3 mg tablet)  Dispense Loc: [facility]  POC: Post-procedure               methocarbamol (ROBAXIN) tablet 500 mg  Dose: 500 mg  Freq: 4 TIMES DAILY PRN Route: PO  PRN Reason: muscle spasms  Start: 10/05/18 1331   Admin Instructions: Hold for sedation.    Admin. Amount: 1 tablet (1 × 500 mg tablet)  Dispense Loc: [facility] 55  POC: Post-procedure               naloxone (NARCAN) injection 0.1-0.4 mg  Dose: 0.1-0.4 mg  Freq: EVERY 2 MIN PRN Route: IV  PRN Reason: opioid reversal  Start: 10/05/18 1331   Admin Instructions: For respiratory rate LESS than or EQUAL to 8.  Partial reversal dose:  0.1 mg titrated q 2 minutes for Analgesia Side Effects Monitoring Sedation Level of 3 (frequently drowsy, arousable, drifts to sleep during conversation).Full reversal dose:  0.4 mg bolus for Analgesia Side Effects Monitoring Sedation Level of 4 (somnolent, minimal or no response to stimulation).  For ordered IV doses 0.1-2mg give IVP. Give each 0.4mg over 15 seconds in emergency situations. For non-emergent situations further dilute in 9mL of NS to facilitate titration of response.    Admin. Amount: 0.1-0.4 mg = 0.25-1 mL Conc: 0.4 mg/mL  Dispense Loc: [facility] 55  Volume: 1 mL  POC: Post-procedure               ondansetron (ZOFRAN-ODT) ODT tab 4 mg  Dose: 4 mg  Freq: EVERY 6 HOURS PRN Route: PO  PRN Reasons: nausea,vomiting  Start: 10/05/18 1331   Admin Instructions: This is Step 1 of nausea and vomiting management.  If nausea not resolved in 15 minutes, go to Step 2 prochlorperazine (COMPAZINE). Do not push through foil backing. Peel back foil and gently remove. Place on tongue immediately. Administration with liquid unnecessary  With dry hands, peel back foil backing and gently remove tablet; do not push oral disintegrating tablet through foil backing; administer  immediately on tongue and oral disintegrating tablet dissolves in seconds; then swallow with saliva; liquid not required.    Admin. Amount: 1 tablet (1 × 4 mg tablet)  Dispense Loc: [facility]  POC: Post-procedure              Or  ondansetron (ZOFRAN) injection 4 mg  Dose: 4 mg  Freq: EVERY 6 HOURS PRN Route: IV  PRN Reasons: nausea,vomiting  Start: 10/05/18 1331   Admin Instructions: This is Step 1 of nausea and vomiting management.  If nausea not resolved in 15 minutes, go to Step 2 prochlorperazine (COMPAZINE).  Irritant. For ordered IV doses 0.1-4 mg, give IV Push undiluted over 2-5 minutes.    Admin. Amount: 4 mg = 2 mL Conc: 4 mg/2 mL  Dispense Loc: [facility]  Infused Over: 2-5 Minutes  Volume: 2 mL  POC: Post-procedure               oxyCODONE (OxyCONTIN) 12 hr tablet 10 mg  Dose: 10 mg  Freq: EVERY 12 HOURS Route: PO  Start: 10/06/18 2000   Admin Instructions: DO NOT CRUSH.    Admin. Amount: 1 tablet (1 × 10 mg tablet)  Last Admin: 10/08/18 0814  Dispense Loc: [place] [id number]         2051 (10 mg)-Given        0845 (10 mg)-Given       2122 (10 mg)-Given        0814 (10 mg)-Given       [ ] 2000           oxyCODONE IR (ROXICODONE) tablet 5-10 mg  Dose: 5-10 mg  Freq: EVERY 3 HOURS PRN Route: PO  PRN Reason: other  PRN Comment: pain control or improvement in physical function. Hold dose for analgesic side effects.  Start: 10/05/18 1331   Admin Instructions: Start with the lowest dose. May adjust dose by 5 mg every 4 hours as needed. Notify provider to assess for uncontrolled pain or analgesic side effects. Hold while on PCA or with regular IV opioid dosing. Maximum total is 60 mg in 24 hours.    Admin. Amount: 1-2 tablet (1-2 × 5 mg tablet)  Last Admin: 10/08/18 1613  Dispense Loc: [facility]  POC: Post-procedure        1745 (5 mg)-Given        0140 (10 mg)-Given       0538 (10 mg)-Given       0901 (10 mg)-Given       1256 (10 mg)-Given       1615 (10 mg)-Given        0107 (10 mg)-Given       0904 (10 mg)-Given        1302 (10 mg)-Given       1843 (10 mg)-Given       2138 (10 mg)-Given        0029 (10 mg)-Given       0324 (10 mg)-Given       0634 (10 mg)-Given       0937 (10 mg)-Given       1252 (10 mg)-Given       1613 (10 mg)-Given           oxymetazoline (AFRIN) 0.05 % spray 2 spray  Dose: 2 spray  Freq: 2 TIMES DAILY Route: BOTH NOSTRIL  Start: 10/08/18 0945   Admin Instructions: Use for more than 3 consecutive days may cause rebound vasodilation.    Admin. Amount: 2 spray  Dispense Loc:  Main Pharmacy  Volume: 15 mL           (1548)-Not Given       [ ] 2100           prochlorperazine (COMPAZINE) injection 5 mg  Dose: 5 mg  Freq: EVERY 6 HOURS PRN Route: IV  PRN Reasons: nausea,vomiting  Start: 10/05/18 1331   Admin Instructions: This is Step 2 of nausea and vomiting management.   If nausea not resolved in 15 minutes, give metoclopramide (REGLAN) if ordered (step 3 of nausea and vomiting management)  For ordered IV doses 0.1-10 mg, give IV Push undiluted. Each 5mg over 1 minute.    Admin. Amount: 5 mg = 1 mL Conc: 5 mg/mL  Dispense Loc:  [facility]  Infused Over: 1-2 Minutes  Volume: 1 mL  POC: Post-procedure              Or  prochlorperazine (COMPAZINE) tablet 5 mg  Dose: 5 mg  Freq: EVERY 6 HOURS PRN Route: PO  PRN Reasons: nausea,vomiting  Start: 10/05/18 1331   Admin Instructions: This is Step 2 of nausea and vomiting management.   If nausea not resolved in 15 minutes, give metoclopramide (REGLAN) if ordered (step 3 of nausea and vomiting management)    Admin. Amount: 1 tablet (1 × 5 mg tablet)  Dispense Loc:  [facility]  POC: Post-procedure               senna-docusate (SENOKOT-S;PERICOLACE) 8.6-50 MG per tablet 1 tablet  Dose: 1 tablet  Freq: 2 TIMES DAILY Route: PO  Start: 10/05/18 1331   Admin Instructions: If no bowel movement in 24 hours, increase to 2 tablets PO.  Hold for loose stools.    Admin. Amount: 1 tablet  Last Admin: 10/06/18 1000  Dispense Loc:  [facility]  POC: Post-procedure        2002 (1  tablet)-Given        1000 (1 tablet)-Given                                     [ ] 2100          Or  senna-docusate (SENOKOT-S;PERICOLACE) 8.6-50 MG per tablet 2 tablet  Dose: 2 tablet  Freq: 2 TIMES DAILY Route: PO  Start: 10/05/18 1331   Admin Instructions: Hold for loose stools.    Admin. Amount: 2 tablet  Last Admin: 10/08/18 0813  Dispense Loc: [facility]  POC: Post-procedure                       2050 (2 tablet)-Given        0844 (2 tablet)-Given       2121 (2 tablet)-Given        0813 (2 tablet)-Given       [ ] 2100           sodium chloride (PF) 0.9% PF flush 3 mL  Dose: 3 mL  Freq: EVERY 8 HOURS Route: IK  Start: 10/05/18 1400   Admin Instructions: And Q1H PRN, to lock peripheral IV dormant line.    Admin. Amount: 3 mL  Last Admin: 10/07/18 1754  Dispense Loc: [facility] Floor Stock  Volume: 3 mL  POC: Post-procedure        (1550)-Not Given       (2215)-Not Given        (0540)-Not Given       1616 (3 mL)-Given       2051 (3 mL)-Given               0501 (3 mL)-Given       0908 (3 mL)-Given       1754 (3 mL)-Given        (0344)-Not Given       (1118)-Not Given       [ ] 1700           sodium chloride (PF) 0.9% PF flush 3 mL  Dose: 3 mL  Freq: EVERY 1 HOUR PRN Route: IK  PRN Reason: line flush  PRN Comment: for peripheral IV flush post IV meds  Start: 10/05/18 1331   Admin. Amount: 3 mL  Dispense Loc: [facility] Floor Stock  Volume: 3 mL  POC: Post-procedure               tamsulosin (FLOMAX) capsule 0.4 mg  Dose: 0.4 mg  Freq: EVERY EVENING Route: PO  Start: 10/05/18 2000   Admin Instructions: Administer 30 minutes after the same meal each day.  Capsules should be swallowed whole; do not crush chew or open.    Admin. Amount: 1 capsule (1 × 0.4 mg capsule)  Last Admin: 10/07/18 2121  Dispense Loc: [facility]  POC: Post-procedure        2002 (0.4 mg)-Given        2050 (0.4 mg)-Given        2121 (0.4 mg)-Given        [ ] 2000          Completed Medications  Medications 10/02/18 10/03/18 10/04/18 10/05/18 10/06/18 10/07/18  10/08/18         Dose: 975 mg  Freq: EVERY 8 HOURS Route: PO  Start: 10/05/18 1400   End: 10/08/18 0634   Admin Instructions: Do not use if patient has an active opioid/acetaminophen combined analgesic product ordered for pain.  Maximum acetaminophen dose from all sources = 75 mg/kg/day not to exceed 4 grams/day.    Admin. Amount: 3 tablet (3 × 325 mg tablet)  Last Admin: 10/08/18 0634  Dispense Loc:  [facility]  Administrations Remainin  POC: Post-procedure        1549 (975 mg)-Given       2214 (975 mg)-Given        0538 (975 mg)-Given       1443 (975 mg)-Given       2050 (975 mg)-Given               0500 (975 mg)-Given       1302 (975 mg)-Given       2122 (975 mg)-Given        0634 (975 mg)-Given             Dose: 1 g  Freq: EVERY 8 HOURS Route: IV  Indications of Use: PERIOPERATIVE PHARMACOPROPHYLAXIS  Last Dose: 1 g (10/06/18 0140)  Start: 10/05/18 1800   End: 10/06/18 0210   Admin Instructions: First post-op dose due 8 hours after intra-op dose, see eMAR.    Admin. Amount: 1 g  Last Admin: 10/06/18 0140  Dispense Loc:  [facility]  Infused Over: 30 Minutes  Administrations Remainin  POC: Post-procedure        1753 (1 g)-New Bag        0140 (1 g)-New Bag               Dose: 200 mg  Freq: 2 TIMES DAILY Route: PO  Start: 10/05/18 2100   End: 10/07/18 0845   Admin Instructions: Age greater than or equal to 65 years. IF celecoxib (CELEBREX) was given pre-operatively, start celecoxib (CELEBREX) 12 hours after given    Admin. Amount: 1 capsule (1 × 200 mg capsule)  Last Admin: 10/07/18 0845  Dispense Loc:  [facility]  Administrations Remainin  POC: Post-procedure        2003 (200 mg)-Given        1000 (200 mg)-Given       205 (200 mg)-Given        0845 (200 mg)-Given              Dose: 100 mg  Freq: 3 TIMES DAILY Route: PO  Start: 10/05/18 1600   End: 10/08/18 0813   Admin. Amount: 1 capsule (1 × 100 mg capsule)  Last Admin: 10/08/18 0813  Dispense Loc:  [facility]  Administrations Remainin  POC:  Post-procedure        1550 (100 mg)-Given       2215 (100 mg)-Given        1000 (100 mg)-Given       1615 (100 mg)-Given       2050 (100 mg)-Given               0844 (100 mg)-Given       1754 (100 mg)-Given       2121 (100 mg)-Given        0813 (100 mg)-Given          Discontinued Medications  Medications 10/02/18 10/03/18 10/04/18 10/05/18 10/06/18 10/07/18 10/08/18         Dose: 1-12 Units  Freq: EVERY 4 HOURS Route: SC  Start: 10/05/18 1600   End: 10/05/18 2033   Admin Instructions: Correction Scale - HIGH INSULIN RESISTANCE DOSING     Do Not give Correction Insulin if BG less than 140  For  - 164 give 1 unit.  For  - 189 give 2 units.  For  - 214 give 3 units.  For  - 239 give 4 units.  For  - 264 give 5 units.  For  - 289 give 6 units.  For  - 314 give 7 units.  For  - 339 give 8 units  For  - 364 give 9 units  For  - 389 give 10 units  For  - 414 give 11 units  For BG greater than or equal to 415 give 12 units  Check blood glucose Q4H and administer based on blood glucose.  Notify provider if glucose greater than or equal to 350 mg/dL after administration of correction dose.  If given at mealtime, administer within 30 minutes of start of meal    Admin. Amount: 1-12 Units  Last Admin: 10/05/18 1745  Dispense Loc: Contact Rx for dose  Volume: 3 mL        1745 (6 Units)-Given [C]       2033-Med Discontinued  (2040)-Not Given                Dose: 15 mg  Freq: EVERY 12 HOURS SCHEDULED Route: PO  Start: 10/05/18 2000   End: 10/06/18 1307   Admin Instructions: DO NOT CRUSH.    Admin. Amount: 1 tablet (1 × 15 mg tablet)  Last Admin: 10/06/18 1000  Dispense Loc:  [facility]  POC: Post-procedure        2002 (15 mg)-Given        1000 (15 mg)-Given       1307-Med Discontinued           Rate: 75 mL/hr   Freq: CONTINUOUS Route: IV  Start: 10/05/18 1345   End: 10/06/18 1131   Admin Instructions: Change to saline lock when PO well tolerated.    Last Admin:  10/06/18 0540  Dispense Loc: [facility] Floor Stock  Volume: 1,000 mL  POC: Post-procedure        1549 ( )-New Bag        0540 ( )-New Bag       1131-Med Discontinued

## 2018-10-06 ENCOUNTER — APPOINTMENT (OUTPATIENT)
Dept: PHYSICAL THERAPY | Facility: CLINIC | Age: 78
DRG: 470 | End: 2018-10-06
Attending: ORTHOPAEDIC SURGERY
Payer: COMMERCIAL

## 2018-10-06 LAB
ANION GAP SERPL CALCULATED.3IONS-SCNC: 7 MMOL/L (ref 3–14)
BASOPHILS # BLD AUTO: 0 10E9/L (ref 0–0.2)
BASOPHILS NFR BLD AUTO: 0.1 %
BUN SERPL-MCNC: 19 MG/DL (ref 7–30)
CALCIUM SERPL-MCNC: 7.9 MG/DL (ref 8.5–10.1)
CHLORIDE SERPL-SCNC: 107 MMOL/L (ref 94–109)
CO2 SERPL-SCNC: 27 MMOL/L (ref 20–32)
CREAT SERPL-MCNC: 0.91 MG/DL (ref 0.66–1.25)
DIFFERENTIAL METHOD BLD: ABNORMAL
EOSINOPHIL # BLD AUTO: 0 10E9/L (ref 0–0.7)
EOSINOPHIL NFR BLD AUTO: 0.1 %
ERYTHROCYTE [DISTWIDTH] IN BLOOD BY AUTOMATED COUNT: 12.3 % (ref 10–15)
GFR SERPL CREATININE-BSD FRML MDRD: 81 ML/MIN/1.7M2
GLUCOSE BLDC GLUCOMTR-MCNC: 166 MG/DL (ref 70–99)
GLUCOSE BLDC GLUCOMTR-MCNC: 176 MG/DL (ref 70–99)
GLUCOSE BLDC GLUCOMTR-MCNC: 235 MG/DL (ref 70–99)
GLUCOSE BLDC GLUCOMTR-MCNC: 244 MG/DL (ref 70–99)
GLUCOSE BLDC GLUCOMTR-MCNC: 248 MG/DL (ref 70–99)
GLUCOSE BLDC GLUCOMTR-MCNC: 257 MG/DL (ref 70–99)
GLUCOSE SERPL-MCNC: 242 MG/DL (ref 70–99)
HCT VFR BLD AUTO: 35.4 % (ref 40–53)
HGB BLD-MCNC: 11.8 G/DL (ref 13.3–17.7)
IMM GRANULOCYTES # BLD: 0 10E9/L (ref 0–0.4)
IMM GRANULOCYTES NFR BLD: 0.3 %
LYMPHOCYTES # BLD AUTO: 1.8 10E9/L (ref 0.8–5.3)
LYMPHOCYTES NFR BLD AUTO: 19.8 %
MCH RBC QN AUTO: 33.1 PG (ref 26.5–33)
MCHC RBC AUTO-ENTMCNC: 33.3 G/DL (ref 31.5–36.5)
MCV RBC AUTO: 99 FL (ref 78–100)
MONOCYTES # BLD AUTO: 0.7 10E9/L (ref 0–1.3)
MONOCYTES NFR BLD AUTO: 7.6 %
NEUTROPHILS # BLD AUTO: 6.7 10E9/L (ref 1.6–8.3)
NEUTROPHILS NFR BLD AUTO: 72.1 %
NRBC # BLD AUTO: 0 10*3/UL
NRBC BLD AUTO-RTO: 0 /100
PLATELET # BLD AUTO: 144 10E9/L (ref 150–450)
POTASSIUM SERPL-SCNC: 3.9 MMOL/L (ref 3.4–5.3)
RBC # BLD AUTO: 3.56 10E12/L (ref 4.4–5.9)
SODIUM SERPL-SCNC: 141 MMOL/L (ref 133–144)
WBC # BLD AUTO: 9.2 10E9/L (ref 4–11)

## 2018-10-06 PROCEDURE — 25000132 ZZH RX MED GY IP 250 OP 250 PS 637: Performed by: ORTHOPAEDIC SURGERY

## 2018-10-06 PROCEDURE — 85025 COMPLETE CBC W/AUTO DIFF WBC: CPT | Performed by: PHYSICIAN ASSISTANT

## 2018-10-06 PROCEDURE — 99232 SBSQ HOSP IP/OBS MODERATE 35: CPT | Performed by: INTERNAL MEDICINE

## 2018-10-06 PROCEDURE — 36415 COLL VENOUS BLD VENIPUNCTURE: CPT | Performed by: PHYSICIAN ASSISTANT

## 2018-10-06 PROCEDURE — 97530 THERAPEUTIC ACTIVITIES: CPT | Mod: GP

## 2018-10-06 PROCEDURE — 80048 BASIC METABOLIC PNL TOTAL CA: CPT | Performed by: PHYSICIAN ASSISTANT

## 2018-10-06 PROCEDURE — 97110 THERAPEUTIC EXERCISES: CPT | Mod: GP

## 2018-10-06 PROCEDURE — 12000007 ZZH R&B INTERMEDIATE

## 2018-10-06 PROCEDURE — 97116 GAIT TRAINING THERAPY: CPT | Mod: GP

## 2018-10-06 PROCEDURE — 99222 1ST HOSP IP/OBS MODERATE 55: CPT | Mod: 57 | Performed by: UROLOGY

## 2018-10-06 PROCEDURE — 25000131 ZZH RX MED GY IP 250 OP 636 PS 637: Performed by: PHYSICIAN ASSISTANT

## 2018-10-06 PROCEDURE — 40000193 ZZH STATISTIC PT WARD VISIT

## 2018-10-06 PROCEDURE — 00000146 ZZHCL STATISTIC GLUCOSE BY METER IP

## 2018-10-06 PROCEDURE — 25000128 H RX IP 250 OP 636: Performed by: ORTHOPAEDIC SURGERY

## 2018-10-06 PROCEDURE — 51702 INSERT TEMP BLADDER CATH: CPT | Performed by: UROLOGY

## 2018-10-06 RX ORDER — OXYCODONE HCL 10 MG/1
10 TABLET, FILM COATED, EXTENDED RELEASE ORAL EVERY 12 HOURS
Status: DISCONTINUED | OUTPATIENT
Start: 2018-10-06 | End: 2018-10-08 | Stop reason: HOSPADM

## 2018-10-06 RX ORDER — HYDROXYZINE HYDROCHLORIDE 10 MG/1
10 TABLET, FILM COATED ORAL 3 TIMES DAILY PRN
Status: DISCONTINUED | OUTPATIENT
Start: 2018-10-06 | End: 2018-10-08 | Stop reason: HOSPADM

## 2018-10-06 RX ORDER — BENZOCAINE/MENTHOL 6 MG-10 MG
LOZENGE MUCOUS MEMBRANE 2 TIMES DAILY
Status: DISCONTINUED | OUTPATIENT
Start: 2018-10-06 | End: 2018-10-08 | Stop reason: HOSPADM

## 2018-10-06 RX ADMIN — MORPHINE SULFATE 15 MG: 15 TABLET, EXTENDED RELEASE ORAL at 10:00

## 2018-10-06 RX ADMIN — OXYCODONE HYDROCHLORIDE 10 MG: 5 TABLET ORAL at 05:38

## 2018-10-06 RX ADMIN — DIPHENHYDRAMINE HYDROCHLORIDE 12.5 MG: 25 SOLUTION ORAL at 12:56

## 2018-10-06 RX ADMIN — CELECOXIB 200 MG: 200 CAPSULE ORAL at 20:50

## 2018-10-06 RX ADMIN — INSULIN ASPART 7 UNITS: 100 INJECTION, SUSPENSION SUBCUTANEOUS at 16:35

## 2018-10-06 RX ADMIN — SODIUM CHLORIDE: 9 INJECTION, SOLUTION INTRAVENOUS at 05:40

## 2018-10-06 RX ADMIN — SENNOSIDES AND DOCUSATE SODIUM 1 TABLET: 8.6; 5 TABLET ORAL at 10:00

## 2018-10-06 RX ADMIN — INSULIN ASPART 10 UNITS: 100 INJECTION, SUSPENSION SUBCUTANEOUS at 09:11

## 2018-10-06 RX ADMIN — GABAPENTIN 100 MG: 100 CAPSULE ORAL at 10:00

## 2018-10-06 RX ADMIN — OXYCODONE HYDROCHLORIDE 10 MG: 5 TABLET ORAL at 09:01

## 2018-10-06 RX ADMIN — ENOXAPARIN SODIUM 40 MG: 40 INJECTION SUBCUTANEOUS at 10:00

## 2018-10-06 RX ADMIN — OXYCODONE HYDROCHLORIDE 10 MG: 10 TABLET, FILM COATED, EXTENDED RELEASE ORAL at 20:51

## 2018-10-06 RX ADMIN — HYDROCORTISONE: 1 CREAM TOPICAL at 14:31

## 2018-10-06 RX ADMIN — SENNOSIDES AND DOCUSATE SODIUM 2 TABLET: 8.6; 5 TABLET ORAL at 20:50

## 2018-10-06 RX ADMIN — CELECOXIB 200 MG: 200 CAPSULE ORAL at 10:00

## 2018-10-06 RX ADMIN — OXYCODONE HYDROCHLORIDE 10 MG: 5 TABLET ORAL at 12:56

## 2018-10-06 RX ADMIN — DIPHENHYDRAMINE HYDROCHLORIDE 12.5 MG: 25 SOLUTION ORAL at 05:50

## 2018-10-06 RX ADMIN — OXYCODONE HYDROCHLORIDE 10 MG: 5 TABLET ORAL at 16:15

## 2018-10-06 RX ADMIN — VITAMIN D, TAB 1000IU (100/BT) 2000 UNITS: 25 TAB at 10:00

## 2018-10-06 RX ADMIN — CEFAZOLIN SODIUM 1 G: 1 INJECTION, POWDER, FOR SOLUTION INTRAMUSCULAR; INTRAVENOUS at 01:40

## 2018-10-06 RX ADMIN — HYDROCORTISONE: 1 CREAM TOPICAL at 20:54

## 2018-10-06 RX ADMIN — FINASTERIDE 5 MG: 5 TABLET, FILM COATED ORAL at 20:50

## 2018-10-06 RX ADMIN — GABAPENTIN 100 MG: 100 CAPSULE ORAL at 20:50

## 2018-10-06 RX ADMIN — ATORVASTATIN CALCIUM 80 MG: 80 TABLET, FILM COATED ORAL at 10:00

## 2018-10-06 RX ADMIN — GABAPENTIN 100 MG: 100 CAPSULE ORAL at 16:15

## 2018-10-06 RX ADMIN — OXYCODONE HYDROCHLORIDE 10 MG: 5 TABLET ORAL at 01:40

## 2018-10-06 RX ADMIN — ACETAMINOPHEN 975 MG: 325 TABLET, FILM COATED ORAL at 20:50

## 2018-10-06 RX ADMIN — TAMSULOSIN HYDROCHLORIDE 0.4 MG: 0.4 CAPSULE ORAL at 20:50

## 2018-10-06 RX ADMIN — ACETAMINOPHEN 975 MG: 325 TABLET, FILM COATED ORAL at 14:43

## 2018-10-06 RX ADMIN — ACETAMINOPHEN 975 MG: 325 TABLET, FILM COATED ORAL at 05:38

## 2018-10-06 ASSESSMENT — ACTIVITIES OF DAILY LIVING (ADL)
ADLS_ACUITY_SCORE: 18

## 2018-10-06 NOTE — PROGRESS NOTES
"Rabia Reed  10/6/2018  POD # 1    Doing well.  No immediate surgical complications identified.  No excessive bleeding  Pain well-controlled.  C/O pruritis  Objective:  Blood pressure 120/71, pulse 75, temperature 98.1  F (36.7  C), temperature source Oral, resp. rate 16, height 1.676 m (5' 6\"), weight 78.9 kg (174 lb), SpO2 97 %.    Temperatures:  Current - Temp: 98.1  F (36.7  C); Max - Temp  Av.9  F (36.6  C)  Min: 97.5  F (36.4  C)  Max: 98.1  F (36.7  C)  Pulse range: Pulse  Av  Min: 75  Max: 75  Blood pressure range: Systolic (24hrs), Av , Min:106 , Max:161   ; Diastolic (24hrs), Av, Min:59, Max:95    Exam:  CMS: intact  alert, stable, dressing dry      Labs:  Recent Labs   Lab Test  10/06/18   0637  10/05/18   0553  10/02/18   1644   POTASSIUM  3.9  3.4  3.9     Recent Labs   Lab Test  10/06/18   0637  10/02/18   1644  18   0937   HGB  11.8*  13.5  15.6     Recent Labs   Lab Test  17   0649   INR  0.95     Recent Labs   Lab Test  10/06/18   0637  10/05/18   1447  10/02/18   1644   PLT  144*  163  167       PLAN:  Continue physical therapy  Pain control measures  Additional problems to be followed by medicine physician  Change to oxycontin  Add vistaril for pruritis      "

## 2018-10-06 NOTE — PLAN OF CARE
Problem: Patient Care Overview  Goal: Plan of Care/Patient Progress Review  Outcome: Improving  Up with assist of 1 and walker. Taking oxycodone for pain. Urdu speaking,  here throughout the day and family at bedside to interpret if needed. Given hydrocortisone cream and benadryl for itching, pt does not feel the itching is from the pain medication as he has taken it in the past without issues. Ledesma present for retention and was placed by urology in PACU. Trial void planned for Monday am before pt discharges.

## 2018-10-06 NOTE — PROGRESS NOTES
[facility]    Hospitalist Progress Note    Assessment & Plan   Rabia Reed is a 78 year old male with PMHx of insulin dependent T2DM, BPH, hyperlipidemia, and osteoarthritis who was admitted on 10/5/2018 and is s/p left TKA. Surgery performed by Dr. Allen.  Following for medical comanagement.      S/P left TKA: Surgery performed by Dr. Allen for osteoarthritis. EBL 10 ccs.      -- Bowel regimen in place while on narcotics   -- Encourage pulmonary toilet; incentive spirometer at bedside   -- PT and OT    Urinary retention:  Seen by urology, considering trial of void prior to discharge, f/u with Dr. Winchester  --finasteride, tamsulosin       T2DM, insulin dependent, uncontrolled: A1C 8.6 on 10/2. Maintained on Insulin 70/30 with 30 U qam and 20 U qpm, Novolog 10 U at bedtime. Took 14 U of 70/30 the evening prior to surgery and 10 U of Novolog the night prior to surgery.   -- Insulin 70/30 reordered at reduced dose at pt advances diet; 7 U this evening and 10 U in the AM   -- High dose sliding scale insulin ordered   -- BS per protocol   -- Monitor for hypoglycemia     Recent Labs  Lab 10/05/18  1021 10/05/18  0755 10/05/18  0553 10/02/18  1644   GLC  --   --  64* 234*   * 133*  --   --       BPH: Ledesma in place.   -- Continue PTA Finasteride and Flomax      Hyperlipidemia: Continue statin      DVT Prophylaxis: Enoxaparin (Lovenox) SQ  Code Status: Full Code    Disposition: Post op pathway*.    Katia Patton MD  Text Page (7am to 6pm)    Interval History   Uneventful night.  Pain seems to be controlled.  No SOB, n/v, f/c.    -Data reviewed today: I reviewed all new labs and imaging results over the last 24 hours. I personally reviewed no images or EKG's today.    Physical Exam   Temp: 98.1  F (36.7  C) Temp src: Oral BP: 120/71 Pulse: 75 Heart Rate: 87 Resp: 16 SpO2: 97 % O2 Device: None (Room air)    Vitals:    10/05/18 0612   Weight: 78.9 kg (174 lb)     Vital Signs with Ranges  Temp:  [97.5  F  (36.4  C)-98.1  F (36.7  C)] 98.1  F (36.7  C)  Pulse:  [75] 75  Heart Rate:  [] 87  Resp:  [13-19] 16  BP: (106-154)/(59-92) 120/71  SpO2:  [93 %-98 %] 97 %  I/O last 3 completed shifts:  In: 2038.75 [P.O.:550; I.V.:1488.75]  Out: 2520 [Urine:2400; Drains:110; Blood:10]    Constitutional: Awake, alert, cooperative, no apparent distress  Respiratory: Clear to auscultation bilaterally,   Cardiovascular: Regular rate and rhythm, normal S1 and S2,   GI: soft, non-distended, non-tender  Skin/Integumen: No rashes, no cyanosis, no edema  Other:      Medications       acetaminophen  975 mg Oral Q8H     atorvastatin  80 mg Oral Daily     celecoxib  200 mg Oral BID     cholecalciferol  2,000 Units Oral Daily     enoxaparin  40 mg Subcutaneous Q24H     finasteride (PROSCAR) tablet 5 mg  5 mg Oral QPM     gabapentin  100 mg Oral TID     hydrocortisone   Topical BID     insulin aspart  1-10 Units Subcutaneous TID AC     insulin aspart  1-7 Units Subcutaneous At Bedtime     insulin aspart prot & aspart  10 Units Subcutaneous QAM AC     insulin aspart prot & aspart  7 Units Subcutaneous Daily with supper     oxyCODONE  10 mg Oral Q12H     senna-docusate  1 tablet Oral BID    Or     senna-docusate  2 tablet Oral BID     sodium chloride (PF)  3 mL Intracatheter Q8H     tamsulosin  0.4 mg Oral QPM       Data     Recent Labs  Lab 10/06/18  0637 10/05/18  1447 10/05/18  0553 10/02/18  1644   WBC 9.2  --   --  6.6   HGB 11.8*  --   --  13.5   MCV 99  --   --  104*   * 163  --  167     --   --  140   POTASSIUM 3.9  --  3.4 3.9   CHLORIDE 107  --   --  106   CO2 27  --   --  31   BUN 19  --   --  25   CR 0.91 0.89  --  1.01   ANIONGAP 7  --   --  3   MABEL 7.9*  --   --  8.2*   *  --  64* 234*   ALBUMIN  --   --   --  3.4   PROTTOTAL  --   --   --  7.1   BILITOTAL  --   --   --  0.5   ALKPHOS  --   --   --  147   ALT  --   --   --  41   AST  --   --   --  28       Imaging:   No results found for this or any  previous visit (from the past 24 hour(s)).

## 2018-10-06 NOTE — PLAN OF CARE
Problem: Patient Care Overview  Goal: Plan of Care/Patient Progress Review  Discharge Planner PT   Patient plan for discharge: TCU  Current status: Supine>sit: mod assist x 1 using bed rail, sitting at EOb x 5 min with SBA, STS with min assist x 1, Gait x 120 ft with RW and CGA for safety.   Barriers to return to prior living situation: Fall risk, Pain, Level of assist needed, spouse unable to assist at home  Recommendations for discharge: TCU  Rationale for recommendations: Pt will benefit from continued skilled PT interventions in TCU to achieve PLOF.        Entered by: Arturo Harmon 10/06/2018 12:44 PM

## 2018-10-06 NOTE — PROVIDER NOTIFICATION
Dr. Mayberry called stating that he had spoke with Dr. Mcrae about taking over care for this patient since the pt had seen his group in the past. Stated someone will be in to see pt on Monday and to continue with the plan of removing the mayes on Monday for a trial void.

## 2018-10-06 NOTE — PROGRESS NOTES
UROLOGY CONSULT PROGRESS NOTE    Ledesma placed in PACU yesterday due to acute urinary retention with evidence of coronal hypospadias.   Seen and examined this AM. Further history obtained from the patient and his son.  Urine draining clear yellow  Has history of BPH, long standing on dual agent medical management. Son described history of office based procedure (possible Rezume) ~5 years ago.    A/P:  Acute urinary retention   - On review of records has seen Dr. Winchester with [facility] in the past - last note from 2016  - I will discuss with the on-call for UA for further follow up - likely Trial of void prior to discharge   - Will need out-patient follow up with Dr. Winchester to discuss further treatment options  - Continue finaseride and tamsulosin    Brett Mcrae M.D.   [facility] - Urology   [phone]

## 2018-10-06 NOTE — ANESTHESIA POSTPROCEDURE EVALUATION
Patient: Rabia Reed    Procedure(s):  LEFT TOTAL KNEE ARTHROPLASTY  - Wound Class: I-Clean    Diagnosis:LEFT KNEE DJD   Diagnosis Additional Information: No value filed.    Anesthesia Type:  General, LMA, Periph. Nerve Block for postop pain    Note:  Anesthesia Post Evaluation    Patient location during evaluation: PACU  Patient participation: Able to fully participate in evaluation  Level of consciousness: awake and alert  Pain management: adequate  Airway patency: patent  Cardiovascular status: acceptable  Respiratory status: acceptable  Hydration status: acceptable  PONV: none     Anesthetic complications: None          Last vitals:  Vitals:    10/05/18 1330 10/05/18 1400 10/05/18 1500   BP: (!) 161/95 (!) 155/92 (!) 154/92   Pulse:      Resp: 12 14 13   Temp:      SpO2: 94% 94% 93%         Electronically Signed By: Brandan Monsalve MD  October 5, 2018  7:22 PM

## 2018-10-06 NOTE — CONSULTS
Urology Consult History and Physical    Name: Rabia Reed    MRN: [id number]   YOB: 1940       We were asked to see Rabia Reed at the request of Dr. Allen for evaluation and treatment of Urinary retention.          Chief Complaint:   Urinary retention     History is obtained from the patient using an            History of Present Illness:   Rabia Reed is a 78 year old male who underwent a LEFT total knee arthroplasty. Urology called urgently to the PACU due to urinary retention and an abnormal urethral meatus. RN notes that mayes placement in the OR was unsuccessful. He voided 150cc, however bladder scanned for >700 and he is uncomfortable.           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     Elevated prostate specific antigen (PSA) 11/6/2013     Essential hypertension, benign      Hyperlipidemia LDL goal <100       Impotence of organic origin      Inguinal hernia without mention of obstruction or gangrene, unilateral or unspecified, (not specified as recurrent) 1/03    right     Memory loss      Osteoarthritis of both hands, unspecified osteoarthritis type 3/19/2017     Tobacco use disorder      Type 2 diabetes mellitus without complication  (goal A1C<7) 10/24/2015     Unspecified nasal polyp      Urinary frequency     nocturia x 5            Past Surgical History:     Past Surgical History:   Procedure Laterality Date     ARTHROPLASTY KNEE Right 9/14/2017    Procedure: ARTHROPLASTY KNEE;  RIGHT TOTAL KNEE ARTHROPLASTY ;  Surgeon: Darnell Allen MD;  Location: [facility] OR     C APPENDECTOMY       C NONSPECIFIC PROCEDURE  4/01    nasal polyp excision     C NONSPECIFIC PROCEDURE  1/03    RIH repair     C NONSPECIFIC PROCEDURE  April 2011     right knee arthroscopy            Social  History:     Social History   Substance Use Topics     Smoking status: Former Smoker     Packs/day: 0.50     Years: 42.00     Quit date: 2001     Smokeless tobacco: Never Used     Alcohol use No            Family History:     Family History   Problem Relation Age of Onset     Diabetes Mother       age 85     Family History Negative Father       age 38,  in war     Diabetes Sister      Diabetes Brother      borderline diabetic              Allergies:     Allergies   Allergen Reactions     No Known Drug Allergies             Medications:     Current Facility-Administered Medications   Medication     [START ON 10/8/2018] acetaminophen (TYLENOL) tablet 650 mg     acetaminophen (TYLENOL) tablet 975 mg     atorvastatin (LIPITOR) tablet 80 mg     benzocaine-menthol (CHLORASEPTIC) 6-10 MG lozenge 1-2 lozenge     celecoxib (celeBREX) capsule 200 mg     cholecalciferol (vitamin D3) tablet 2,000 Units     glucose gel 15-30 g    Or     dextrose 50 % injection 25-50 mL    Or     glucagon injection 1 mg     diphenhydrAMINE (BENADRYL) solution 12.5 mg    Or     diphenhydrAMINE (BENADRYL) injection 12.5 mg     enoxaparin (LOVENOX) injection 40 mg     finasteride (PROSCAR) tablet 5 mg     gabapentin (NEURONTIN) capsule 100 mg     HYDROmorphone (PF) (DILAUDID) injection 0.3-0.5 mg     insulin aspart (NovoLOG) inj (RAPID ACTING)     insulin aspart (NovoLOG) inj (RAPID ACTING)     insulin aspart prot & aspart (NovoLOG MIX 70/30 PEN) injection 10 Units     insulin aspart prot & aspart (NovoLOG MIX 70/30 PEN) injection 7 Units     lidocaine (LMX4) cream     lidocaine 1 % 1 mL     melatonin tablet 3 mg     methocarbamol (ROBAXIN) tablet 500 mg     morphine (MS CONTIN) 12 hr tablet 15 mg     naloxone (NARCAN) injection 0.1-0.4 mg     ondansetron (ZOFRAN-ODT) ODT tab 4 mg    Or     ondansetron (ZOFRAN) injection 4 mg     oxyCODONE IR (ROXICODONE) tablet 5-10 mg     prochlorperazine (COMPAZINE) injection 5 mg    Or      prochlorperazine (COMPAZINE) tablet 5 mg     senna-docusate (SENOKOT-S;PERICOLACE) 8.6-50 MG per tablet 1 tablet    Or     senna-docusate (SENOKOT-S;PERICOLACE) 8.6-50 MG per tablet 2 tablet     sodium chloride (PF) 0.9% PF flush 3 mL     sodium chloride (PF) 0.9% PF flush 3 mL     tamsulosin (FLOMAX) capsule 0.4 mg             Review of Systems:    ROS: 10 point ROS neg other than the symptoms noted above in the HPI.          Physical Exam:   Patient Vitals for the past 24 hrs:   BP Temp Temp src Pulse Heart Rate Resp SpO2   10/06/18 1205 120/71 98.1  F (36.7  C) Oral - 87 16 97 %   10/06/18 0945 - - - - - 16 -   10/06/18 0901 - - - - - 16 -   10/06/18 0804 106/59 97.9  F (36.6  C) Oral - 77 14 98 %   10/06/18 0623 - - - - - 16 -   10/06/18 0538 - - - - - 18 -   10/06/18 0400 117/61 98  F (36.7  C) Oral 75 84 19 95 %   10/06/18 0225 - - - - - 18 -   10/06/18 0140 - - - - - 19 -   10/06/18 0000 107/61 97.5  F (36.4  C) Oral - 92 18 94 %   10/05/18 2100 - - - - - 16 -   10/05/18 2002 - - - - - 15 -   10/05/18 1500 (!) 154/92 - - - 100 13 93 %   10/05/18 1400 (!) 155/92 - - - 101 14 94 %   10/05/18 1330 (!) 161/95 - - - 98 12 94 %   10/05/18 1259 (!) 158/94 97.9  F (36.6  C) Oral - 98 15 96 %     General: age-appropriate appearing male in mild distress  HEENT: Head AT/NC, EOMI, CN Grossly intact  Lungs: no respiratory distress, or pursed lip breathing  Heart: No obvious jugular venous distension present  Back: no bony midline tenderness, no CVAT bilaterally.  Abdomen: soft, non-distended, mildly-tender. No organomegaly  : Uncircumcised phallus with evidence of coronal hypospadias with a distal pit and tight true meatus  LE: LEFT left in surgical dressing  Skin: no suspicious lesions or rashes  Neuro: Alert, oriented  Psych: affect and mood normal          Data:   All laboratory data reviewed:      Recent Labs  Lab 10/06/18  0637 10/05/18  1447 10/02/18  1644   WBC 9.2  --  6.6   HGB 11.8*  --  13.5   *  163 167       Recent Labs  Lab 10/06/18  0637 10/05/18  1447 10/05/18  0553 10/02/18  1644     --   --  140   POTASSIUM 3.9  --  3.4 3.9   CHLORIDE 107  --   --  106   CO2 27  --   --  31   BUN 19  --   --  25   CR 0.91 0.89  --  1.01   *  --  64* 234*   MABEL 7.9*  --   --  8.2*     No lab results found in last 7 days.    Invalid input(s): URINEBLOOD           Impression and Plan:   Impression:     79 y/o man s/p LEFT total knee arthroplasty in acute urinary retention with evidence of distal hypospadias      Plan:   Urinary retention  - 18fr coude catheter placed with sterile technique and the use of 5cc Lidocaine jelly. Mayes placed without issue, mildly tight urethral meatus. Clear UOP  - Maintain mayes at this time  - Will continue to follow     Brett Mcrae   Urology  [facility]  Clinic Phone [phone]

## 2018-10-06 NOTE — PROVIDER NOTIFICATION
0900-text paged Dr. Chandra stating pt was itching and would like some hydrocortisone cream. Awaiting new orders.    Pt does not feel the itching is from the oxycodone as he states he took oxycodone at home and did not have any itching.

## 2018-10-06 NOTE — PROVIDER NOTIFICATION
"Dr. Mcrae, urology rounded on pt this am after having to place the mayes in PACU yesterday. MD stated to remove the mayes on Monday for a trial void before discharge and that pt may need to discharge with mayes if he was not able to urinate on his own. Pt stated he had a surgery about 5 years ago \"to open up his prostate\" and help with urinating. Md was going to check into old charts to see what procedure was done.   "

## 2018-10-06 NOTE — PLAN OF CARE
Problem: Patient Care Overview  Goal: Plan of Care/Patient Progress Review  OT: When therapist entered the room pt was returning to bed, pt reports his plan for discharge is TCU which was recommended by PT; will reschedule for tomorrow, in the event that pt is discharging to TCU will defer to next level of care.  present.

## 2018-10-06 NOTE — PLAN OF CARE
Problem: Patient Care Overview  Goal: Plan of Care/Patient Progress Review  Outcome: Improving  Pt is AAOx4, VSS, CMS intact, dressing CDI, pain managed with oxy, some itching relieved with benadryl, dangled, assist 2, tolerating regular diet, sugar wnl, Ledesma patent, adequate output, Hemovac. Progressing per plan of care.

## 2018-10-07 ENCOUNTER — APPOINTMENT (OUTPATIENT)
Dept: PHYSICAL THERAPY | Facility: CLINIC | Age: 78
DRG: 470 | End: 2018-10-07
Attending: ORTHOPAEDIC SURGERY
Payer: COMMERCIAL

## 2018-10-07 LAB
GLUCOSE BLDC GLUCOMTR-MCNC: 204 MG/DL (ref 70–99)
GLUCOSE BLDC GLUCOMTR-MCNC: 222 MG/DL (ref 70–99)
GLUCOSE BLDC GLUCOMTR-MCNC: 255 MG/DL (ref 70–99)
GLUCOSE BLDC GLUCOMTR-MCNC: 265 MG/DL (ref 70–99)
GLUCOSE BLDC GLUCOMTR-MCNC: 285 MG/DL (ref 70–99)
HGB BLD-MCNC: 11 G/DL (ref 13.3–17.7)

## 2018-10-07 PROCEDURE — 85018 HEMOGLOBIN: CPT | Performed by: ORTHOPAEDIC SURGERY

## 2018-10-07 PROCEDURE — 36415 COLL VENOUS BLD VENIPUNCTURE: CPT | Performed by: ORTHOPAEDIC SURGERY

## 2018-10-07 PROCEDURE — 97530 THERAPEUTIC ACTIVITIES: CPT | Mod: GP

## 2018-10-07 PROCEDURE — 12000007 ZZH R&B INTERMEDIATE

## 2018-10-07 PROCEDURE — 99233 SBSQ HOSP IP/OBS HIGH 50: CPT | Performed by: INTERNAL MEDICINE

## 2018-10-07 PROCEDURE — 25000132 ZZH RX MED GY IP 250 OP 250 PS 637: Performed by: ORTHOPAEDIC SURGERY

## 2018-10-07 PROCEDURE — 40000193 ZZH STATISTIC PT WARD VISIT

## 2018-10-07 PROCEDURE — 97116 GAIT TRAINING THERAPY: CPT | Mod: GP

## 2018-10-07 PROCEDURE — 25000128 H RX IP 250 OP 636: Performed by: ORTHOPAEDIC SURGERY

## 2018-10-07 PROCEDURE — 00000146 ZZHCL STATISTIC GLUCOSE BY METER IP

## 2018-10-07 RX ADMIN — ACETAMINOPHEN 975 MG: 325 TABLET, FILM COATED ORAL at 13:02

## 2018-10-07 RX ADMIN — ACETAMINOPHEN 975 MG: 325 TABLET, FILM COATED ORAL at 05:00

## 2018-10-07 RX ADMIN — INSULIN ASPART 10 UNITS: 100 INJECTION, SUSPENSION SUBCUTANEOUS at 08:47

## 2018-10-07 RX ADMIN — CELECOXIB 200 MG: 200 CAPSULE ORAL at 08:45

## 2018-10-07 RX ADMIN — OXYCODONE HYDROCHLORIDE 10 MG: 5 TABLET ORAL at 18:43

## 2018-10-07 RX ADMIN — INSULIN ASPART 7 UNITS: 100 INJECTION, SUSPENSION SUBCUTANEOUS at 18:55

## 2018-10-07 RX ADMIN — OXYCODONE HYDROCHLORIDE 10 MG: 10 TABLET, FILM COATED, EXTENDED RELEASE ORAL at 08:45

## 2018-10-07 RX ADMIN — GABAPENTIN 100 MG: 100 CAPSULE ORAL at 21:21

## 2018-10-07 RX ADMIN — TAMSULOSIN HYDROCHLORIDE 0.4 MG: 0.4 CAPSULE ORAL at 21:21

## 2018-10-07 RX ADMIN — OXYCODONE HYDROCHLORIDE 10 MG: 5 TABLET ORAL at 21:38

## 2018-10-07 RX ADMIN — GABAPENTIN 100 MG: 100 CAPSULE ORAL at 08:44

## 2018-10-07 RX ADMIN — ENOXAPARIN SODIUM 40 MG: 40 INJECTION SUBCUTANEOUS at 08:46

## 2018-10-07 RX ADMIN — VITAMIN D, TAB 1000IU (100/BT) 2000 UNITS: 25 TAB at 08:44

## 2018-10-07 RX ADMIN — ATORVASTATIN CALCIUM 80 MG: 80 TABLET, FILM COATED ORAL at 08:44

## 2018-10-07 RX ADMIN — HYDROCORTISONE: 1 CREAM TOPICAL at 08:54

## 2018-10-07 RX ADMIN — FINASTERIDE 5 MG: 5 TABLET, FILM COATED ORAL at 21:22

## 2018-10-07 RX ADMIN — OXYCODONE HYDROCHLORIDE 10 MG: 5 TABLET ORAL at 09:04

## 2018-10-07 RX ADMIN — OXYCODONE HYDROCHLORIDE 10 MG: 5 TABLET ORAL at 01:07

## 2018-10-07 RX ADMIN — OXYCODONE HYDROCHLORIDE 10 MG: 5 TABLET ORAL at 13:02

## 2018-10-07 RX ADMIN — SENNOSIDES AND DOCUSATE SODIUM 2 TABLET: 8.6; 5 TABLET ORAL at 21:21

## 2018-10-07 RX ADMIN — GABAPENTIN 100 MG: 100 CAPSULE ORAL at 17:54

## 2018-10-07 RX ADMIN — HYDROXYZINE HYDROCHLORIDE 10 MG: 10 TABLET ORAL at 04:59

## 2018-10-07 RX ADMIN — SENNOSIDES AND DOCUSATE SODIUM 2 TABLET: 8.6; 5 TABLET ORAL at 08:44

## 2018-10-07 RX ADMIN — ACETAMINOPHEN 975 MG: 325 TABLET, FILM COATED ORAL at 21:22

## 2018-10-07 RX ADMIN — OXYCODONE HYDROCHLORIDE 10 MG: 10 TABLET, FILM COATED, EXTENDED RELEASE ORAL at 21:22

## 2018-10-07 ASSESSMENT — ACTIVITIES OF DAILY LIVING (ADL)
ADLS_ACUITY_SCORE: 17

## 2018-10-07 ASSESSMENT — PAIN DESCRIPTION - DESCRIPTORS: DESCRIPTORS: ACHING

## 2018-10-07 NOTE — CONSULTS
Care Transition Initial Assessment -   Reason For Consult: discharge planning, facility placement  Met with: Spoke with son, Brandan.    Active Problems:    Status post total left knee replacement       DATA  Lives With: spouse  Living Arrangements: house  Description of Support System: Involved, Supportive  Who is your support system?: Children  Support Assessment: Adequate family and caregiver support.   Identified issues/concerns regarding health management: PT recommends TCU. Family in agreement. Pt has been to [facility] in the past and they would like him to return. Brandan requests a private room and reports he will pay the additional daily fee.  informed him it is $45 per day at the [facility]. Pt will need transportation arranged.      Quality Of Family Relationships: involved, supportive    ASSESSMENT  Cognitive Status: Alert and oriented per nursing. Family appears involved and supportive.  Concerns to be addressed: On-going discharge planning.     PLAN  Financial costs for the patient includes: Private room fee.  Patient given options and choices for discharge: Yes.  Patient/family is agreeable to the plan? YES  Patient Goals and Preferences: TCU.  Patient anticipates discharging to: TCU.    JANN Griffiths, LGSW  [id number]

## 2018-10-07 NOTE — PROGRESS NOTES
Orthopedic Surgery  Rabia Reed  10/7/2018  Admit Date:  10/5/2018  POD # 2  S/P Left TKA    Patient resting comfortably in bed.    Pain controlled.  Tolerating oral intake.    Denies nausea or vomiting  Denies chest pain or shortness of breath  No events overnight.   Patient speaks Ukrainian - no  present at time of visit    Alert and orient to person, place, and time.  Vital Sign Ranges  Temperature Temp  Av.9  F (36.6  C)  Min: 97  F (36.1  C)  Max: 98.3  F (36.8  C)   Blood pressure Systolic (24hrs), Av , Min:115 , Max:139        Diastolic (24hrs), Av, Min:54, Max:71      Pulse Pulse  Av.3  Min: 85  Max: 96   Respirations Resp  Avg: 15.9  Min: 14  Max: 16   Pulse oximetry SpO2  Av.4 %  Min: 93 %  Max: 100 %       Dressing is clean, dry, and intact.   Minimal erythema of the surrounding skin.   Bilateral calves are soft, non-tender.  Bilateral lower extremity is NVI.  Sensation intact bilateral lower extremities  Active dorsi and plantar flexion bilaterally  +Dp pulse    Mayes patent  Drain intact and patent    Labs:  Recent Labs   Lab Test  10/06/18   0637  10/05/18   0553  10/02/18   1644   POTASSIUM  3.9  3.4  3.9     Recent Labs   Lab Test  10/07/18   0706  10/06/18   0637  10/02/18   1644   HGB  11.0*  11.8*  13.5     Recent Labs   Lab Test  17   0649   INR  0.95     Recent Labs   Lab Test  10/06/18   0637  10/05/18   1447  10/02/18   1644   PLT  144*  163  167       A/P  1. Plan   Continue Lovenox for DVT prophylaxis.     Mobilize with PT/OT    WBAT.   Change dressing   Remove drain today     Continue current pain regiment.   Leave mayes per Urology    2. Disposition   Anticipate d/c to TCU based on bed placement .    Monica Narvaez PA-C

## 2018-10-07 NOTE — PLAN OF CARE
Problem: Patient Care Overview  Goal: Plan of Care/Patient Progress Review  Discharge Planner PT   Patient plan for discharge: TCU  Current status: Supine>sit: mod assist x 1 with max verbal cues, sat at EOB x 10 min with supervision for safety, encourage to perform heel slides sitting at EOB, due to pain pt was only able to tolerate x 2, Stood with min assist x 1 and verbal cues, gait x 100 ft with RW and CGA for safety. Pt received pain meds 15 min prior to PT session. Pt c/o 10/10 pain with mobility. Discussed with pt and family about taking pain meds at least 30 min piror to PT session for better tolerance. Pt and family verbalized understanding. Pt was left sitting up in WC with family at bs and all needs within reach.   Barriers to return to prior living situation: Fall risk, Level of assistance needed, lives with elderly spouse who is unable assist in any way.   Recommendations for discharge: TCU  Rationale for recommendations: Pt will benefit from continued skilled PT at a TCU to achieve PLOF and independence.        Entered by: Arturo Harmon 10/07/2018 10:48 AM

## 2018-10-07 NOTE — PLAN OF CARE
Problem: Patient Care Overview  Goal: Plan of Care/Patient Progress Review  Outcome: Improving  Sami speaking,  present during the day, family also translates. Can understand and communicate semi well without assistance. A&Ox4. VSS on RA. SBA w/walker and GB.  Knee immobilizer in place, CMS intact.   covered per sliding scale. Ledesma patent with good urine output- to be removed tomorrow AM per orders.  Pain is well controlled with oxycodone 10mg given x1 with good relief. Plan is to discharge to TCU vs home on Monday after a trial void. Nursing will continue to monitor.

## 2018-10-07 NOTE — PLAN OF CARE
Problem: Patient Care Overview  Goal: Plan of Care/Patient Progress Review  Outcome: Improving  PT A&O Vietnams speaking,family at bedside assist with interpreting  till 0000. VSS on RA CMS intact  Up with assist of 1 and walker. Taking oxycodone for pain. Tolerated mod carb diet Mayes patent. Urology follow, keep the mayes Plan to keep discharge on Monday after Trial void nursing will continue to monitor

## 2018-10-07 NOTE — PROGRESS NOTES
[facility]    Hospitalist Progress Note    Assessment & Plan   Rabia Reed is a 78 year old male with PMHx of insulin dependent T2DM, BPH, hyperlipidemia, and osteoarthritis who was admitted on 10/5/2018 for left TKA with Dr. Allen.    # Left TKA for OA (10/5/18)   - Performed by Dr. Allen for osteoarthritis  - Management deferred to Ortho and Dr. Allen  - Bowel regimen in place while on narcotics   - Encourage pulmonary toilet; incentive spirometer at bedside   - PT and OT consulted, SNF recommended    Urinary retention with BPH  Followed by urology (Dr. Winchester)  - Ledesma in place, considering trial of void prior to discharge  - Continue finasteride, tamsulosin    Puritis, whole body- new  - Lipase, Amylase and LFTs in AM  - Benadryl +Zinc cream     T2DM, insulin dependent, uncontrolled (A1C 8.6 on 10/2)  - Maintained on Insulin 70/30 with 30 U qam and 20 U qpm, Novolog 10 U at bedtime.   - Insulin 70/30 reordered at reduced dose at pt advances diet  -- 7 U this evening and 10 U in the AM   -- High dose sliding scale insulin ordered   -- BS per protocol   -- Monitor for hypoglycemia     Hyperlipidemia: Continue statin    DVT Prophylaxis: Enoxaparin (Lovenox) SQ  Code Status: Full Code    Disposition: Post op pathway.    Mae Fields MD, PhD    Interval History    Whole body puritis, no other complaints    -Data reviewed today: I reviewed all new labs and imaging results over the last 24 hours.     Physical Exam   Temp: 98.3  F (36.8  C) Temp src: Oral BP: 124/70 Pulse: 90 Heart Rate: 78 Resp: 16 SpO2: 100 % O2 Device: None (Room air)    Vitals:    10/05/18 0612 10/07/18 0614   Weight: 78.9 kg (174 lb) 81.9 kg (180 lb 8 oz)     Vital Signs with Ranges  Temp:  [97  F (36.1  C)-98.3  F (36.8  C)] 98.3  F (36.8  C)  Pulse:  [85-96] 90  Heart Rate:  [77-90] 78  Resp:  [14-16] 16  BP: (106-139)/(54-71) 124/70  SpO2:  [93 %-100 %] 100 %  I/O last 3 completed shifts:  In: 600 [P.O.:600]  Out: 1700  [Urine:1600; Drains:100]    Constitutional: Awake, alert, cooperative, no apparent distress  Respiratory: Clear to auscultation bilaterally,   Cardiovascular: Regular rate and rhythm, normal S1 and S2, no murmurs  GI: soft, non-distended, non-tender  Skin/Integumen: excoriations all over, no overt rashes, no cyanosis, no edema    Medications       acetaminophen  975 mg Oral Q8H     atorvastatin  80 mg Oral Daily     celecoxib  200 mg Oral BID     cholecalciferol  2,000 Units Oral Daily     enoxaparin  40 mg Subcutaneous Q24H     finasteride (PROSCAR) tablet 5 mg  5 mg Oral QPM     gabapentin  100 mg Oral TID     hydrocortisone   Topical BID     insulin aspart  1-10 Units Subcutaneous TID AC     insulin aspart  1-7 Units Subcutaneous At Bedtime     insulin aspart prot & aspart  10 Units Subcutaneous QAM AC     insulin aspart prot & aspart  7 Units Subcutaneous Daily with supper     oxyCODONE  10 mg Oral Q12H     senna-docusate  1 tablet Oral BID    Or     senna-docusate  2 tablet Oral BID     sodium chloride (PF)  3 mL Intracatheter Q8H     tamsulosin  0.4 mg Oral QPM       Data     Recent Labs  Lab 10/07/18  0706 10/06/18  0637 10/05/18  1447 10/05/18  0553 10/02/18  1644   WBC  --  9.2  --   --  6.6   HGB 11.0* 11.8*  --   --  13.5   MCV  --  99  --   --  104*   PLT  --  144* 163  --  167   NA  --  141  --   --  140   POTASSIUM  --  3.9  --  3.4 3.9   CHLORIDE  --  107  --   --  106   CO2  --  27  --   --  31   BUN  --  19  --   --  25   CR  --  0.91 0.89  --  1.01   ANIONGAP  --  7  --   --  3   MABEL  --  7.9*  --   --  8.2*   GLC  --  242*  --  64* 234*   ALBUMIN  --   --   --   --  3.4   PROTTOTAL  --   --   --   --  7.1   BILITOTAL  --   --   --   --  0.5   ALKPHOS  --   --   --   --  147   ALT  --   --   --   --  41   AST  --   --   --   --  28       Imaging:   No results found for this or any previous visit (from the past 24 hour(s)).

## 2018-10-08 ENCOUNTER — PATIENT OUTREACH (OUTPATIENT)
Dept: GERIATRIC MEDICINE | Facility: CLINIC | Age: 78
End: 2018-10-08

## 2018-10-08 ENCOUNTER — APPOINTMENT (OUTPATIENT)
Dept: PHYSICAL THERAPY | Facility: CLINIC | Age: 78
DRG: 470 | End: 2018-10-08
Attending: ORTHOPAEDIC SURGERY
Payer: COMMERCIAL

## 2018-10-08 VITALS
OXYGEN SATURATION: 96 % | TEMPERATURE: 98.3 F | HEIGHT: 66 IN | RESPIRATION RATE: 18 BRPM | BODY MASS INDEX: 29.01 KG/M2 | HEART RATE: 90 BPM | WEIGHT: 180.5 LBS | SYSTOLIC BLOOD PRESSURE: 135 MMHG | DIASTOLIC BLOOD PRESSURE: 73 MMHG

## 2018-10-08 LAB
ALBUMIN SERPL-MCNC: 2.7 G/DL (ref 3.4–5)
ALP SERPL-CCNC: 90 U/L (ref 40–150)
ALT SERPL W P-5'-P-CCNC: 20 U/L (ref 0–70)
AMYLASE SERPL-CCNC: 23 U/L (ref 30–110)
ANION GAP SERPL CALCULATED.3IONS-SCNC: 4 MMOL/L (ref 3–14)
AST SERPL W P-5'-P-CCNC: 22 U/L (ref 0–45)
BILIRUB DIRECT SERPL-MCNC: 0.2 MG/DL (ref 0–0.2)
BILIRUB SERPL-MCNC: 0.6 MG/DL (ref 0.2–1.3)
BUN SERPL-MCNC: 14 MG/DL (ref 7–30)
CALCIUM SERPL-MCNC: 8.2 MG/DL (ref 8.5–10.1)
CHLORIDE SERPL-SCNC: 106 MMOL/L (ref 94–109)
CO2 SERPL-SCNC: 29 MMOL/L (ref 20–32)
CREAT SERPL-MCNC: 0.91 MG/DL (ref 0.66–1.25)
GFR SERPL CREATININE-BSD FRML MDRD: 80 ML/MIN/1.7M2
GLUCOSE BLDC GLUCOMTR-MCNC: 163 MG/DL (ref 70–99)
GLUCOSE BLDC GLUCOMTR-MCNC: 200 MG/DL (ref 70–99)
GLUCOSE BLDC GLUCOMTR-MCNC: 221 MG/DL (ref 70–99)
GLUCOSE SERPL-MCNC: 180 MG/DL (ref 70–99)
LIPASE SERPL-CCNC: 47 U/L (ref 73–393)
PLATELET # BLD AUTO: 151 10E9/L (ref 150–450)
POTASSIUM SERPL-SCNC: 3.9 MMOL/L (ref 3.4–5.3)
PROT SERPL-MCNC: 6.4 G/DL (ref 6.8–8.8)
SODIUM SERPL-SCNC: 139 MMOL/L (ref 133–144)

## 2018-10-08 PROCEDURE — 99232 SBSQ HOSP IP/OBS MODERATE 35: CPT | Performed by: HOSPITALIST

## 2018-10-08 PROCEDURE — 40000894 ZZH STATISTIC OT IP EVAL DEFER: Performed by: OCCUPATIONAL THERAPIST

## 2018-10-08 PROCEDURE — 97116 GAIT TRAINING THERAPY: CPT | Mod: GP | Performed by: PHYSICAL THERAPY ASSISTANT

## 2018-10-08 PROCEDURE — 40000193 ZZH STATISTIC PT WARD VISIT: Performed by: PHYSICAL THERAPY ASSISTANT

## 2018-10-08 PROCEDURE — 00000146 ZZHCL STATISTIC GLUCOSE BY METER IP

## 2018-10-08 PROCEDURE — 97110 THERAPEUTIC EXERCISES: CPT | Mod: GP | Performed by: PHYSICAL THERAPY ASSISTANT

## 2018-10-08 PROCEDURE — 36415 COLL VENOUS BLD VENIPUNCTURE: CPT | Performed by: ORTHOPAEDIC SURGERY

## 2018-10-08 PROCEDURE — 83690 ASSAY OF LIPASE: CPT | Performed by: ORTHOPAEDIC SURGERY

## 2018-10-08 PROCEDURE — 97530 THERAPEUTIC ACTIVITIES: CPT | Mod: GP | Performed by: PHYSICAL THERAPY ASSISTANT

## 2018-10-08 PROCEDURE — 25000128 H RX IP 250 OP 636: Performed by: ORTHOPAEDIC SURGERY

## 2018-10-08 PROCEDURE — 80048 BASIC METABOLIC PNL TOTAL CA: CPT | Performed by: ORTHOPAEDIC SURGERY

## 2018-10-08 PROCEDURE — 85049 AUTOMATED PLATELET COUNT: CPT | Performed by: ORTHOPAEDIC SURGERY

## 2018-10-08 PROCEDURE — 82150 ASSAY OF AMYLASE: CPT | Performed by: ORTHOPAEDIC SURGERY

## 2018-10-08 PROCEDURE — 25000132 ZZH RX MED GY IP 250 OP 250 PS 637: Performed by: ORTHOPAEDIC SURGERY

## 2018-10-08 PROCEDURE — 25000132 ZZH RX MED GY IP 250 OP 250 PS 637: Performed by: HOSPITALIST

## 2018-10-08 PROCEDURE — 80076 HEPATIC FUNCTION PANEL: CPT | Performed by: ORTHOPAEDIC SURGERY

## 2018-10-08 RX ORDER — OXYCODONE HYDROCHLORIDE 5 MG/1
5-10 TABLET ORAL
Qty: 40 TABLET | Refills: 0 | Status: SHIPPED | DISCHARGE
Start: 2018-10-08 | End: 2018-10-17

## 2018-10-08 RX ORDER — OXYMETAZOLINE HYDROCHLORIDE 0.05 G/100ML
2 SPRAY NASAL 2 TIMES DAILY
Status: DISCONTINUED | OUTPATIENT
Start: 2018-10-08 | End: 2018-10-08 | Stop reason: HOSPADM

## 2018-10-08 RX ORDER — ASPIRIN 325 MG
325 TABLET ORAL 2 TIMES DAILY
Qty: 90 TABLET | Refills: 0 | DISCHARGE
Start: 2018-10-08 | End: 2018-12-30

## 2018-10-08 RX ORDER — AMOXICILLIN 250 MG
1 CAPSULE ORAL 2 TIMES DAILY
Qty: 100 TABLET | DISCHARGE
Start: 2018-10-08 | End: 2018-10-17

## 2018-10-08 RX ORDER — OXYMETAZOLINE HYDROCHLORIDE 0.05 G/100ML
2-3 SPRAY NASAL 2 TIMES DAILY PRN
Qty: 1 BOTTLE | Refills: 0 | DISCHARGE
Start: 2018-10-08 | End: 2018-10-08

## 2018-10-08 RX ORDER — OXYCODONE HCL 10 MG/1
10 TABLET, FILM COATED, EXTENDED RELEASE ORAL EVERY 12 HOURS
Qty: 10 TABLET | Refills: 0 | Status: SHIPPED | DISCHARGE
Start: 2018-10-08 | End: 2018-12-30

## 2018-10-08 RX ORDER — BISACODYL 10 MG
10 SUPPOSITORY, RECTAL RECTAL DAILY PRN
Status: DISCONTINUED | OUTPATIENT
Start: 2018-10-08 | End: 2018-10-08 | Stop reason: HOSPADM

## 2018-10-08 RX ORDER — OXYMETAZOLINE HYDROCHLORIDE 0.05 G/100ML
2 SPRAY NASAL 2 TIMES DAILY PRN
COMMUNITY
End: 2018-12-30

## 2018-10-08 RX ADMIN — OXYCODONE HYDROCHLORIDE 10 MG: 5 TABLET ORAL at 09:37

## 2018-10-08 RX ADMIN — INSULIN ASPART 10 UNITS: 100 INJECTION, SUSPENSION SUBCUTANEOUS at 08:54

## 2018-10-08 RX ADMIN — ENOXAPARIN SODIUM 40 MG: 40 INJECTION SUBCUTANEOUS at 08:14

## 2018-10-08 RX ADMIN — OXYCODONE HYDROCHLORIDE 10 MG: 5 TABLET ORAL at 16:13

## 2018-10-08 RX ADMIN — ATORVASTATIN CALCIUM 80 MG: 80 TABLET, FILM COATED ORAL at 08:14

## 2018-10-08 RX ADMIN — SENNOSIDES AND DOCUSATE SODIUM 2 TABLET: 8.6; 5 TABLET ORAL at 08:13

## 2018-10-08 RX ADMIN — OXYCODONE HYDROCHLORIDE 10 MG: 5 TABLET ORAL at 12:52

## 2018-10-08 RX ADMIN — GABAPENTIN 100 MG: 100 CAPSULE ORAL at 08:13

## 2018-10-08 RX ADMIN — BISACODYL 10 MG: 10 SUPPOSITORY RECTAL at 14:02

## 2018-10-08 RX ADMIN — VITAMIN D, TAB 1000IU (100/BT) 2000 UNITS: 25 TAB at 08:13

## 2018-10-08 RX ADMIN — ACETAMINOPHEN 975 MG: 325 TABLET, FILM COATED ORAL at 06:34

## 2018-10-08 RX ADMIN — HYDROCORTISONE: 1 CREAM TOPICAL at 08:20

## 2018-10-08 RX ADMIN — OXYCODONE HYDROCHLORIDE 10 MG: 5 TABLET ORAL at 03:24

## 2018-10-08 RX ADMIN — OXYCODONE HYDROCHLORIDE 10 MG: 10 TABLET, FILM COATED, EXTENDED RELEASE ORAL at 08:14

## 2018-10-08 RX ADMIN — OXYCODONE HYDROCHLORIDE 10 MG: 5 TABLET ORAL at 00:29

## 2018-10-08 RX ADMIN — OXYCODONE HYDROCHLORIDE 10 MG: 5 TABLET ORAL at 06:34

## 2018-10-08 ASSESSMENT — PAIN DESCRIPTION - DESCRIPTORS
DESCRIPTORS: ACHING

## 2018-10-08 ASSESSMENT — ACTIVITIES OF DAILY LIVING (ADL)
ADLS_ACUITY_SCORE: 17

## 2018-10-08 NOTE — PROGRESS NOTES
UROLOGY BRIEF NOTE    Discussed patient with RN several times throughout the day. Mayes removed this AM, unmeasured urine output early afternoon with PVR of 200cc.     Ok to discharge from urology standpoint without mayes if PVR <400cc. Continue flomax.   Follow up with Dr. Wise in 2-3wks.     Mago Caballero PA-C  [facility]  [email or web address]  Text Page (7am to 5pm)

## 2018-10-08 NOTE — PLAN OF CARE
Problem: Patient Care Overview  Goal: Plan of Care/Patient Progress Review  Outcome: Improving  1746-4303: pt alert and oriented. CMS intact. Dressing CDI.  Hemovac discharge. Dressing changed. Pain well control with po analgesics. Will continue to monitor.

## 2018-10-08 NOTE — PROGRESS NOTES
DESHAWN  D: Received discharge orders for pt. Spoke with pt and pt's dtr to discuss transportation. They are in agreement of cost of HE transport. Dtr does not feel safe getting pt in her car. SW called and set-up transportation via HE w/c at 1645. Faxed orders, scripts, PAS to [facility].    PAS-RR    D: Per DHS regulation, SW completed and submitted PAS-RR to [place] [employer] via the [employer] Line.  PAS-RR confirmation # is : [id number]    I: SW spoke with pt and they are aware a PAS-RR has been submitted.  DESHAWN reviewed with pt that they may be contacted for a follow up appointment within 10 days of hospital discharge if their SNF stay is < 30 days.  Contact information for [employer] was also provided.    A: Pt verbalized understanding.    P: Further questions may be directed to [employer] Line at #[phone], option #4 for PAS-RR staff.    Symone Olivas MSW, LGSW

## 2018-10-08 NOTE — DISCHARGE SUMMARY
Admit Date:     10/05/2018   Discharge Date:           DATE OF ADMISSION: 10/05/2018.      DATE OF DISCHARGE: 10/08/2018.      ADMITTING DIAGNOSIS:  Left knee osteoarthritis.      PROCEDURE THIS ADMISSION:  Left total knee arthroplasty.      HOSPITAL COURSE:  Mr. Reed was admitted.  He underwent the above-noted procedure.  He tolerated it well.  Postoperatively, he mobilized, stabilized and was ready for discharge to the nursing home setting.      DISCHARGE MEDICATIONS:  Those on admission plus Oxycodone 5-10 mg p.o. every 3 hours. p.r.n. pain.  OxyContin 10 mg p.o. twice a day  for 3 days and each day at bedtime for 4 days.  Senokot-S for stool softening.  Aspirin 325 mg p.o. twice a day for DVT prophylaxis.      DISCHARGE ACTIVITY:  Weightbear as tolerated left lower extremity.  Participate in routine physical therapy and occupational therapy.      DISCHARGE FOLLOWUP:  With Dr. Amezcua 2 weeks postop for staple removal.  He should stay in his knee immobilizer at bedtime for 7 days after discharge.  He should have a daily dry dressing change until his wound is dry and leave open to air.  He should wear ISAIAS hose until seen in followup.  May be off at night and to shower.         ROSALES AMEZCUA MD             D: 10/08/2018   T: 10/08/2018   MT: JENNYFER      Name:     PIYUSH REED   MRN:      [id number]        Account:        [id number]   :      1940           Admit Date:     10/05/2018                                  Discharge Date:       Document: [id number]

## 2018-10-08 NOTE — PROGRESS NOTES
[facility] Care Coordination Contact  CC received notification of Hospital admission.  Hospital admission occurred on 10/5/18 at [facility] with Dx of left arthroplasty knee replacement.  CC contacted Hospital /discharge planner  and reviewed community POC. CC requested to be notified of concerns, care conference dates and discharge planning.   CC reached out to adult son (Brandan Reed) regarding transition and left a message requesting a return call.  Reviewed and update care plan as needed.  Notified community service providers and placed services PCA on hold as needed.  Transition log initiated.   PCP notified of hospitalization via EMR.  ANASTACIA Velázquez  [facility]  [phone]

## 2018-10-08 NOTE — PROGRESS NOTES
[facility]    Hospitalist Progress Note  Rabia Reed is a 78 year old male with PMHx of insulin dependent T2DM, BPH, hyperlipidemia, and osteoarthritis who was admitted on 10/5/2018 for left TKA with Dr. Allen. His diabetic control has been appropriate. He is having some constipation and vomiting x 1 - bowel regimen has been administered.      # Left TKA for OA (10/5/18)   - Performed by Dr. Allen for osteoarthritis  - Management deferred to Ortho and Dr. Allen  - Bowel regimen in place while on narcotics   - Encourage pulmonary toilet; incentive spirometer at bedside   - PT and OT consulted, SNF recommended     Urinary retention with BPH  Followed by urology (Dr. Winchester)  - Ledesma in place, considering trial of void prior to discharge - Urology following and awaiting voiding  - Continue finasteride, tamsulosin    Constipation  - Bowel regimen increased per primary team - awaiting BM and plan is to discharge     Puritis, whole body- new  - Lipase, Amylase and LFTs low or normal - outpatient follow up on discharge  - Benadryl +Zinc cream      T2DM, insulin dependent, uncontrolled (A1C 8.6 on 10/2)  - Maintained on Insulin 70/30 with 30 U qam and 20 U qpm, Novolog 10 U at bedtime.   - Insulin 70/30 reordered at reduced dose at pt advances diet  -- 7 U this evening and 10 U in the AM   -- High dose sliding scale insulin ordered   -- BS per protocol   -- Monitor for hypoglycemia  -- Blood sugars reasonably controlled on current regimen  -- Continue home regimen upon discharge      Hyperlipidemia: Continue statin     DVT Prophylaxis: Enoxaparin (Lovenox) SQ  Code Status: Full Code     Disposition: Post op pathway.       Entered: Reed Grant MD 10/08/2018, 2:02 PM   Information in the above section will display in the discharge planner report.      Reed Grant MD    Text Page (7am to 6pm, M-F)    Interval History   Some nausea and constipation. Increasing bowel regimen and adding suppository -  discussed with RN. Planning on discharge later today pending voiding. He denies any other complaints.     -Data reviewed today: I reviewed all new labs and imaging results over the last 24 hours. I personally reviewed no images or EKG's today.    Physical Exam   Temp: 98  F (36.7  C) Temp src: Oral BP: 137/73   Heart Rate: 81 Resp: 16 SpO2: 97 % O2 Device: None (Room air)    Vitals:    10/05/18 0612 10/07/18 0614   Weight: 78.9 kg (174 lb) 81.9 kg (180 lb 8 oz)     Vital Signs with Ranges  Temp:  [97.4  F (36.3  C)-98.4  F (36.9  C)] 98  F (36.7  C)  Heart Rate:  [71-84] 81  Resp:  [16] 16  BP: (117-147)/(61-76) 137/73  SpO2:  [93 %-98 %] 97 %  I/O last 3 completed shifts:  In: 240 [P.O.:240]  Out: 2515 [Urine:2500; Drains:15]    Constitutional: Awake, alert, cooperative, no apparent distress  Respiratory: Clear to auscultation bilaterally, no crackles or wheezing  Cardiovascular: Regular rate and rhythm, normal S1 and S2, and no murmur noted  GI: Normal bowel sounds, soft, non-distended, full, tender on moderate palpation, no rebound      Medications       atorvastatin  80 mg Oral Daily     cholecalciferol  2,000 Units Oral Daily     enoxaparin  40 mg Subcutaneous Q24H     finasteride (PROSCAR) tablet 5 mg  5 mg Oral QPM     hydrocortisone   Topical BID     insulin aspart  1-10 Units Subcutaneous TID AC     insulin aspart  1-7 Units Subcutaneous At Bedtime     insulin aspart prot & aspart  10 Units Subcutaneous QAM AC     insulin aspart prot & aspart  7 Units Subcutaneous Daily with supper     oxyCODONE  10 mg Oral Q12H     oxymetazoline  2 spray Both Nostrils BID     senna-docusate  1 tablet Oral BID    Or     senna-docusate  2 tablet Oral BID     sodium chloride (PF)  3 mL Intracatheter Q8H     tamsulosin  0.4 mg Oral QPM       Data     Recent Labs  Lab 10/08/18  0645 10/07/18  0706 10/06/18  0637 10/05/18  1447 10/05/18  0553 10/02/18  1644   WBC  --   --  9.2  --   --  6.6   HGB  --  11.0* 11.8*  --   --  13.5    MCV  --   --  99  --   --  104*     --  144* 163  --  167     --  141  --   --  140   POTASSIUM 3.9  --  3.9  --  3.4 3.9   CHLORIDE 106  --  107  --   --  106   CO2 29  --  27  --   --  31   BUN 14  --  19  --   --  25   CR 0.91  --  0.91 0.89  --  1.01   ANIONGAP 4  --  7  --   --  3   MABEL 8.2*  --  7.9*  --   --  8.2*   *  --  242*  --  64* 234*   ALBUMIN 2.7*  --   --   --   --  3.4   PROTTOTAL 6.4*  --   --   --   --  7.1   BILITOTAL 0.6  --   --   --   --  0.5   ALKPHOS 90  --   --   --   --  147   ALT 20  --   --   --   --  41   AST 22  --   --   --   --  28   LIPASE 47*  --   --   --   --   --        No results found for this or any previous visit (from the past 24 hour(s)).

## 2018-10-08 NOTE — PLAN OF CARE
Problem: Patient Care Overview  Goal: Plan of Care/Patient Progress Review  Outcome: Improving  Pt is primarily Kiswahili speaking, unable to assess orientation this shift due to no  being present. VSS on RA, CMS intact. Taking oxycodone 10mg q3h. Ledesma removed this morning, patient is due to void. Diabetic diet. 0200 . Plan for possible discharge to TCU today pending voiding trial.

## 2018-10-08 NOTE — PLAN OF CARE
Problem: Patient Care Overview  Goal: Plan of Care/Patient Progress Review  Discharge Planner PT   Patient plan for discharge: TCU  Current status: Pt performed supine to sit transfer with min assist and increased time.  Sit to/from stand transfers with CGA.  Gait training x 150 ft using wheeled walker and CGA.  Slow pace.  C/o increased pain with activity.  Knee AAROM is 11-65 degrees.   Barriers to return to prior living situation: Fall risk, Level of assistance needed, lives with elderly spouse who is unable assist in any way.   Recommendations for discharge: TCU per plan established by the PT.   Rationale for recommendations: Pt will benefit from continued skilled PT at a TCU to achieve PLOF and independence.        Entered by: Linette Cox 10/08/2018 12:45 PM       Pt is discharging to TCU today.  PT goals not met.

## 2018-10-08 NOTE — PLAN OF CARE
Problem: Patient Care Overview  Goal: Plan of Care/Patient Progress Review  Outcome: Adequate for Discharge Date Met: 10/08/18  Patient up with assist of 1 and walker. Patient c/o abdominal discomfort and emesis after lunch, hospitalist notified, suppository ordered per pt request and pt had large results prior to discharge. Patient voided second time during shift, PRV of 36 so per urology patient was okay to discharge. Patients children present at discharge, no further questions and all belongings sent with family.

## 2018-10-08 NOTE — PROGRESS NOTES
"Rabia Reed  10/8/2018  POD # 3    Doing well.  No immediate surgical complications identified.  No excessive bleeding  Pain well-controlled.  Tolerating physical therapy and rehabilitation well.  Objective:  Blood pressure 138/67, pulse 90, temperature 98  F (36.7  C), temperature source Oral, resp. rate 16, height 1.676 m (5' 6\"), weight 81.9 kg (180 lb 8 oz), SpO2 95 %.    Temperatures:  Current - Temp: 98  F (36.7  C); Max - Temp  Av.9  F (36.6  C)  Min: 97.4  F (36.3  C)  Max: 98.4  F (36.9  C)  Pulse range: No Data Recorded  Blood pressure range: Systolic (24hrs), Av , Min:117 , Max:147   ; Diastolic (24hrs), Av, Min:61, Max:76    Exam:  CMS: intact  alert, stable, dressing dry      Labs:  Recent Labs   Lab Test  10/08/18   0645  10/06/18   0637  10/05/18   0553   POTASSIUM  3.9  3.9  3.4     Recent Labs   Lab Test  10/07/18   0706  10/06/18   0637  10/02/18   1644   HGB  11.0*  11.8*  13.5     Recent Labs   Lab Test  17   0649   INR  0.95     Recent Labs   Lab Test  10/08/18   0645  10/06/18   0637  10/05/18   1447   PLT  151  144*  163       PLAN:  Continue physical therapy  Pain control measures  Discharge today      "

## 2018-10-08 NOTE — PLAN OF CARE
Problem: Patient Care Overview  Goal: Plan of Care/Patient Progress Review  Discharge Planner OT   Patient plan for discharge: TCU  Current status: Following chart review and consult with PT and staff, plan is for pt to discharge to TCU, will defer OT eval to next level of care.   Barriers to return to prior living situation: See PT note  Recommendations for discharge: See PT note  Rationale for recommendations: IP needs met with PT at this time, defer OT eval to next level of care at TCU. Orders completed.        Entered by: Monica Laguna 10/08/2018 9:58 AM

## 2018-10-09 ENCOUNTER — PATIENT OUTREACH (OUTPATIENT)
Dept: GERIATRIC MEDICINE | Facility: CLINIC | Age: 78
End: 2018-10-09

## 2018-10-09 ENCOUNTER — NURSING HOME VISIT (OUTPATIENT)
Dept: GERIATRICS | Facility: CLINIC | Age: 78
End: 2018-10-09
Payer: COMMERCIAL

## 2018-10-09 VITALS
OXYGEN SATURATION: 95 % | SYSTOLIC BLOOD PRESSURE: 126 MMHG | WEIGHT: 173.3 LBS | BODY MASS INDEX: 27.97 KG/M2 | HEART RATE: 80 BPM | TEMPERATURE: 97.2 F | DIASTOLIC BLOOD PRESSURE: 66 MMHG | RESPIRATION RATE: 18 BRPM

## 2018-10-09 DIAGNOSIS — G47.00 INSOMNIA, UNSPECIFIED TYPE: ICD-10-CM

## 2018-10-09 DIAGNOSIS — L29.9 LOCALIZED PRURITUS: ICD-10-CM

## 2018-10-09 DIAGNOSIS — Z47.1 AFTERCARE FOLLOWING LEFT KNEE JOINT REPLACEMENT SURGERY: Primary | ICD-10-CM

## 2018-10-09 DIAGNOSIS — N40.0 BENIGN PROSTATIC HYPERPLASIA WITHOUT LOWER URINARY TRACT SYMPTOMS: ICD-10-CM

## 2018-10-09 DIAGNOSIS — E11.9 DIABETES MELLITUS TYPE 2, INSULIN DEPENDENT (H): ICD-10-CM

## 2018-10-09 DIAGNOSIS — Z96.652 AFTERCARE FOLLOWING LEFT KNEE JOINT REPLACEMENT SURGERY: Primary | ICD-10-CM

## 2018-10-09 DIAGNOSIS — E78.5 HYPERLIPIDEMIA, UNSPECIFIED HYPERLIPIDEMIA TYPE: ICD-10-CM

## 2018-10-09 DIAGNOSIS — R53.81 PHYSICAL DECONDITIONING: ICD-10-CM

## 2018-10-09 DIAGNOSIS — K59.01 SLOW TRANSIT CONSTIPATION: ICD-10-CM

## 2018-10-09 DIAGNOSIS — D62 ACUTE POSTHEMORRHAGIC ANEMIA: ICD-10-CM

## 2018-10-09 DIAGNOSIS — Z79.4 DIABETES MELLITUS TYPE 2, INSULIN DEPENDENT (H): ICD-10-CM

## 2018-10-09 PROCEDURE — 99310 SBSQ NF CARE HIGH MDM 45: CPT | Performed by: NURSE PRACTITIONER

## 2018-10-09 NOTE — PROGRESS NOTES
[facility]  PRIMARY CARE PROVIDER AND CLINIC:  Alfredo Aragon [address]  / [facility] [address]  Chief Complaint   Patient presents with     Hospital F/U     [facility] Record Number:  [id number]  Place of Service where encounter took place:  [facility] [[id number]]    HPI:    Rabia Reed is a 78 year old  (1940),admitted to the above facility from  [facility].  Hospital stay 10/5/18 through 10/8/18.  Admitted to this facility for  rehab, medical management and nursing care.  HPI information obtained from: facility chart records, facility staff, patient report, [facility] chart review and family/first contact daughter report.        Hospital Course:  Rabia Reed is a 78 year old male with PMHx of insulin dependent T2DM, BPH, hyperlipidemia, and osteoarthritis who was admitted on 10/5/2018 and is s/p left TKA. Surgery performed by Dr. Allen. I was asked to see the patient for medical comanagement. Pt slightly hypertensive and tachycardic, all other vitals WNL. Pt currently stable.   S/P left TKA: Surgery performed by Dr. Allen for osteoarthritis. EBL 10 ccs.   -- Defer routine post-operative cares, IVF, DVT prophylaxis and pain control to primary service   -- Bowel regimen in place while on narcotics   -- Encourage pulmonary toilet; incentive spirometer at bedside   -- PT and OT in the AM   -- CBC and BMP in AM   T2DM, insulin dependent, uncontrolled: A1C 8.6 on 10/2. Maintained on Insulin 70/30 with 30 U qam and 20 U qpm, Novolog 10 U at bedtime. Took 14 U of 70/30 the evening prior to surgery and 10 U of Novolog the night prior to surgery.   -- Insulin 70/30 reordered at reduced dose at pt advances diet; 7 U this evening and 10 U in the AM   -- High dose sliding scale insulin ordered   -- BS per protocol   -- Monitor for hypoglycemia  BPH: Ledesma in place.   -- Continue PTA Finasteride and Flomax   Hyperlipidemia: Continue statin  DVT Prophylaxis: Enoxaparin  (Lovenox) SQ    Current issues are:  Aftercare following left knee joint replacement surgery  Physical deconditioning  Patient with moderate to severe pain left knee. Already on scheduled Oxycontin short term and has PRN Oxycodone every 3 hrs. Will add scheduled Tylenol and PRN vistaril and monitor. Admission Weight: 10/8/18: 175.9 lbs and Current Weight: 10/9/18: 173.3 lbs. Since admission BP: 126-140/66-69 bpm    Diabetes mellitus type 2, insulin dependent (H)  Since admission patient's -286. He is on 70/30 insulin twice daily and has an order for short acting insulin at  that is a range order.   Lab Results   Component Value Date    A1C 8.6 10/02/2018       Benign prostatic hyperplasia without lower urinary tract symptoms  Patient's mayes was removed at hospital, voiding without difficulty.     Hyperlipidemia, unspecified hyperlipidemia type  Managed with ASA and Lipitor.   Lab Results   Component Value Date    LDL 49 01/20/2018       Slow transit constipation  Patient reports no stool x 5 days, then hard stool yesterday. No abdominal pain. Does complain of nausea. Will add PRN Zofran and increase laxatives.     Localized pruritus  Itchy back - no rash but skin dry.     Acute posthemorrhagic anemia  Noted   Lab Results   Component Value Date    HGB 11.0 10/07/2018    HGB 11.8 10/06/2018     Insomnia, unspecified type  Some trouble sleeping staff ask for PRN med.     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 He also has no past medical history of Basal cell carcinoma; Malignant melanoma (H); PONV (postoperative nausea and vomiting); Skin cancer; or Squamous cell carcinoma.  PAST SURGICAL HISTORY:  has a past surgical history that includes NONSPECIFIC PROCEDURE (4/01); APPENDECTOMY; NONSPECIFIC PROCEDURE (1/03); NONSPECIFIC PROCEDURE (April 2011); Arthroplasty knee (Right, 9/14/2017); and Arthroplasty knee (Left, 10/5/2018).  FAMILY HISTORY: family history includes Diabetes in his brother, mother, and sister; Family History Negative in his father.  SOCIAL HISTORY:  reports that he quit smoking about 17 years ago. He has a 21.00 pack-year smoking history. He has never used smokeless tobacco. He reports that he does not drink alcohol or use illicit drugs.    Post Discharge Medication Reconciliation Status: discharge medications reconciled and changed, per note/orders (see AVS).  Current Outpatient Prescriptions   Medication Sig Dispense Refill     ACE/ARB/ARNI NOT PRESCRIBED, INTENTIONAL, Please choose reason not prescribed, below       acetaminophen (TYLENOL) 500 MG tablet Take 1,000 mg by mouth 3 times daily        Ascorbic Acid (VITAMIN C PO) Take 500 mg by mouth daily       aspirin 325 MG tablet Take 1 tablet (325 mg) by mouth 2 times daily 90 tablet 0     atorvastatin (LIPITOR) 80 MG tablet Take 1 tablet (80 mg) by mouth daily 90 tablet 3     blood glucose monitoring (ONE TOUCH ULTRASOFT) lancets USE 3 TIMES DAILY 200 each 3     blood glucose monitoring (ONETOUCH ULTRA) test strip Use to test 3 times daily 200 each 3     Blood Glucose Monitoring Suppl (ONE TOUCH ULTRA SYSTEM KIT) W/DEVICE KIT One touch ultra 2 if covered by insurance 1 kit 0      celecoxib (CELEBREX) 200 MG capsule Take 1 capsule (200 mg) by mouth daily With food for arthritis pain 90 capsule 1     FINASTERIDE PO Take 5 mg by mouth daily       HydrOXYzine Pamoate (VISTARIL PO) Take 25 mg by mouth 3 times daily as needed for itching       insulin aspart prot & aspart (NOVOLOG MIX 70/30 FLEXPEN) injection Inject 30 Units Subcutaneous every morning (before breakfast)       insulin aspart prot & aspart (NOVOLOG MIX 70/30 FLEXPEN) injection Inject 20 Units Subcutaneous daily (with dinner)       Insulin Pen Needle (PEN NEEDLES) 31G X 6 MM MISC 1 Device 3 times daily 100 each 11     Ondansetron HCl (ZOFRAN PO) Take 8 mg by mouth 2 times daily as needed for nausea or vomiting       oxyCODONE (OXYCONTIN) 10 MG 12 hr tablet Take 1 tablet (10 mg) by mouth every 12 hours 10 tablet 0     oxyCODONE IR (ROXICODONE) 5 MG tablet Take 1-2 tablets (5-10 mg) by mouth every 3 hours as needed 40 tablet 0     oxymetazoline (AFRIN) 0.05 % spray Spray 2 sprays into both nostrils 2 times daily as needed for congestion       senna-docusate (SENOKOT-S;PERICOLACE) 8.6-50 MG per tablet Take 1 tablet by mouth 2 times daily (Patient taking differently: Take 2 tablets by mouth 2 times daily ) 100 tablet      tamsulosin (FLOMAX) 0.4 MG capsule TAKE ONE CAPSULE BY MOUTH ONE TIME DAILY  90 capsule 3     ULTICARE MICRO 32G X 4 MM insulin pen needle TEST 3 TIMES DAILY 100 each 0     VITAMIN D, CHOLECALCIFEROL, PO Take 2,000 Units by mouth daily         ROS:  10 point ROS of systems including Constitutional, Eyes, Respiratory, Cardiovascular, Gastroenterology, Genitourinary, Integumentary, Musculoskeletal, Psychiatric were all negative except for pertinent positives noted in my HPI.    Exam:  /66  Pulse 80  Temp 97.2  F (36.2  C)  Resp 18  Wt 173 lb 4.8 oz (78.6 kg)  SpO2 95%  BMI 27.97 kg/m2  GENERAL APPEARANCE:  Alert, in no distress, pleasant, cooperative, oriented x self, place, family and recent events.    EYES:  EOM, lids, pupils and irises normal, sclera clear and conjunctiva normal, no discharge or mattering on lids or lashes noted  ENT:  Mouth normal, moist mucous membranes, nose normal without drainage or crusting, external ears without lesions, hearing acuity impaired both ears R>L   NECK: supple, symmetrical  RESP:  respiratory effort and palpation of chest normal, no chest wall tenderness, no respiratory distress, Lung sounds clear, patient is on room air  CV:  Palpation and auscultation of heart done, rate and rhythm controlled and regular, no murmur, no rub or gallop. Edema trace left leg  ABDOMEN:  normal bowel sounds, soft, nontender.  M/S:   Gait and station walks with assist , left knee pain ROM  SKIN:  Inspection and palpation of skin and subcutaneous tissue: skin on back dry, no rash. Left knee incision distal end drains minimal bloody drainage.   NEURO: cranial nerves 2-12 grossly intact, no facial asymmetry, no speech deficits and able to follow directions, moves all extremities symmetrically  PSYCH:  insight and judgement appears intact, memory appears intact per family, affect and mood normal     Lab/Diagnostic data:  CBC RESULTS:   Recent Labs   Lab Test  10/08/18   0645  10/07/18   0706  10/06/18   0637   10/02/18   1644   WBC   --    --   9.2   --   6.6   RBC   --    --   3.56*   --   4.05*   HGB   --   11.0*  11.8*   --   13.5   HCT   --    --   35.4*   --   42.1   MCV   --    --   99   --   104*   MCH   --    --   33.1*   --   33.3*   MCHC   --    --   33.3   --   32.1   RDW   --    --   12.3   --   11.9   PLT  151   --   144*   < >  167    < > = values in this interval not displayed.       Last Basic Metabolic Panel:  Recent Labs   Lab Test  10/08/18   0645  10/06/18   0637   NA  139  141   POTASSIUM  3.9  3.9   CHLORIDE  106  107   MABEL  8.2*  7.9*   CO2  29  27   BUN  14  19   CR  0.91  0.91   GLC  180*  242*       Liver Function Studies -   Recent Labs   Lab Test  10/08/18   0645   10/02/18   1644   PROTTOTAL  6.4*  7.1   ALBUMIN  2.7*  3.4   BILITOTAL  0.6  0.5   ALKPHOS  90  147   AST  22  28   ALT  20  41       TSH   Date Value Ref Range Status   10/02/2018 1.16 0.40 - 4.00 mU/L Final   09/07/2017 1.81 0.40 - 4.00 mU/L Final       Lab Results   Component Value Date    A1C 8.6 10/02/2018    A1C 8.7 01/20/2018       ASSESSMENT/PLAN:  Aftercare following left knee joint replacement surgery  Physical deconditioning  Recent surgery, recovering well. Pain meds as scheduled and add tylenol and PRN vistaril. F/U surgeon as ordered. Therapies - f/u with progress next week.     Diabetes mellitus type 2, insulin dependent (H)  Chronic; will hold short acting HS insulin at U. F/U with BG within the next week or sooner if elevated.     Benign prostatic hyperplasia without lower urinary tract symptoms  By history. Urinating well at this time. Monitor.     Hyperlipidemia, unspecified hyperlipidemia type  Meds as PTA.    Slow transit constipation  Ongoing issue, increase laxatives. Staff to update provider if not effective. Add PRN Zofran.     Localized pruritus  C/o itchy back since admission. Start Eucerin to back, PRN vistaril. Staff to update provider if not effective.     Acute posthemorrhagic anemia  Ongoing issue - monitor Hgb, f/u with next check.     Insomnia, unspecified type  New complaint - add prn melatonin. Let      Orders:  1. Full Code  2. Diet: bite sized pieces cut up r/t dentures  3. Eucerin to back BID dry skin  4. Vistaril 25 mg PO TID PRN pruritis, muscle spasms  5. Check BG QID diagnosis DM  6. Increase Senna S to 2 tabs PO BID diagnosis constipation  7. Change Tylenol to 1000 mg PO TID diagnosis pain  8. Zofran 8 mg PO BID PRN nausea  9. Hold insulin aspart while at U  10. BMP, CBC on 10/11  11. Melatonin 3 mg PO HS PRN insomnia    Total time spent with patient visit at the skilled nursing facility was 35 min including patient visit and review of past records. Greater than 50%  of total time spent with counseling and coordinating care due to review of history, current status and concern and POC to address as noted above    Electronically signed by:  JERMAIN Alcantara CNP

## 2018-10-09 NOTE — LETTER
10/9/2018        RE: Rabia Reed  [address]  [facility] [address]        [place] [facility]  PRIMARY CARE PROVIDER AND CLINIC:  Alfredo Aragon [address] / [facility] [address]  Chief Complaint   Patient presents with     Hospital F/U     [facility] Medical Record Number:  [id number]  Place of Service where encounter took place:  [facility] [place] ([facility]) [[id number]]    HPI:    Rabia Reed is a 78 year old  (1940),admitted to the above facility from  [facility].  Hospital stay 10/5/18 through 10/8/18.  Admitted to this facility for  rehab, medical management and nursing care.  HPI information obtained from: facility chart records, facility staff, patient report, [facility] chart review and family/first contact daughter report.        Hospital Course:  Rabia Reed is a 78 year old male with PMHx of insulin dependent T2DM, BPH, hyperlipidemia, and osteoarthritis who was admitted on 10/5/2018 and is s/p left TKA. Surgery performed by Dr. Allen. I was asked to see the patient for medical comanagement. Pt slightly hypertensive and tachycardic, all other vitals WNL. Pt currently stable.   S/P left TKA: Surgery performed by Dr. Allen for osteoarthritis. EBL 10 ccs.   -- Defer routine post-operative cares, IVF, DVT prophylaxis and pain control to primary service   -- Bowel regimen in place while on narcotics   -- Encourage pulmonary toilet; incentive spirometer at bedside   -- PT and OT in the AM   -- CBC and BMP in AM   T2DM, insulin dependent, uncontrolled: A1C 8.6 on 10/2. Maintained on Insulin 70/30 with 30 U qam and 20 U qpm, Novolog 10 U at bedtime. Took 14 U of 70/30 the evening prior to surgery and 10 U of Novolog the night prior to surgery.   -- Insulin 70/30 reordered at reduced dose at pt advances diet; 7 U this evening and 10 U in the AM   -- High dose sliding scale insulin ordered   -- BS per protocol   -- Monitor for hypoglycemia  BPH: Ledesma in place.   -- Continue  PTA Finasteride and Flomax   Hyperlipidemia: Continue statin  DVT Prophylaxis: Enoxaparin (Lovenox) SQ    Current issues are:  Aftercare following left knee joint replacement surgery  Physical deconditioning  Patient with moderate to severe pain left knee. Already on scheduled Oxycontin short term and has PRN Oxycodone every 3 hrs. Will add scheduled Tylenol and PRN vistaril and monitor. Admission Weight: 10/8/18: 175.9 lbs and Current Weight: 10/9/18: 173.3 lbs. Since admission BP: 126-140/66-69 bpm    Diabetes mellitus type 2, insulin dependent (H)  Since admission patient's -286. He is on 70/30 insulin twice daily and has an order for short acting insulin at  that is a range order.   Lab Results   Component Value Date    A1C 8.6 10/02/2018       Benign prostatic hyperplasia without lower urinary tract symptoms  Patient's mayes was removed at hospital, voiding without difficulty.     Hyperlipidemia, unspecified hyperlipidemia type  Managed with ASA and Lipitor.   Lab Results   Component Value Date    LDL 49 01/20/2018       Slow transit constipation  Patient reports no stool x 5 days, then hard stool yesterday. No abdominal pain. Does complain of nausea. Will add PRN Zofran and increase laxatives.     Localized pruritus  Itchy back - no rash but skin dry.     Acute posthemorrhagic anemia  Noted   Lab Results   Component Value Date    HGB 11.0 10/07/2018    HGB 11.8 10/06/2018     Insomnia, unspecified type  Some trouble sleeping staff ask for PRN med.     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 He also has no past medical history of Basal cell carcinoma; Malignant melanoma (H); PONV (postoperative nausea and vomiting); Skin cancer; or Squamous cell carcinoma.  PAST SURGICAL HISTORY:  has a past surgical history that includes NONSPECIFIC PROCEDURE (4/01); APPENDECTOMY; NONSPECIFIC PROCEDURE (1/03); NONSPECIFIC PROCEDURE (April 2011); Arthroplasty knee (Right, 9/14/2017); and Arthroplasty knee (Left, 10/5/2018).  FAMILY HISTORY: family history includes Diabetes in his brother, mother, and sister; Family History Negative in his father.  SOCIAL HISTORY:  reports that he quit smoking about 17 years ago. He has a 21.00 pack-year smoking history. He has never used smokeless tobacco. He reports that he does not drink alcohol or use illicit drugs.    Post Discharge Medication Reconciliation Status: discharge medications reconciled and changed, per note/orders (see AVS).  Current Outpatient Prescriptions   Medication Sig Dispense Refill     ACE/ARB/ARNI NOT PRESCRIBED, INTENTIONAL, Please choose reason not prescribed, below       acetaminophen (TYLENOL) 500 MG tablet Take 1,000 mg by mouth 3 times daily        Ascorbic Acid (VITAMIN C PO) Take 500 mg by mouth daily       aspirin 325 MG tablet Take 1 tablet (325 mg) by mouth 2 times daily 90 tablet 0     atorvastatin (LIPITOR) 80 MG tablet Take 1 tablet (80 mg) by mouth daily 90 tablet 3     blood glucose monitoring (ONE TOUCH ULTRASOFT) lancets USE 3 TIMES DAILY 200 each 3     blood glucose monitoring (ONETOUCH ULTRA) test strip Use to test 3 times daily 200 each 3     Blood Glucose Monitoring Suppl  (ONE TOUCH ULTRA SYSTEM KIT) W/DEVICE KIT One touch ultra 2 if covered by insurance 1 kit 0     celecoxib (CELEBREX) 200 MG capsule Take 1 capsule (200 mg) by mouth daily With food for arthritis pain 90 capsule 1     FINASTERIDE PO Take 5 mg by mouth daily       HydrOXYzine Pamoate (VISTARIL PO) Take 25 mg by mouth 3 times daily as needed for itching       insulin aspart prot & aspart (NOVOLOG MIX 70/30 FLEXPEN) injection Inject 30 Units Subcutaneous every morning (before breakfast)       insulin aspart prot & aspart (NOVOLOG MIX 70/30 FLEXPEN) injection Inject 20 Units Subcutaneous daily (with dinner)       Insulin Pen Needle (PEN NEEDLES) 31G X 6 MM MISC 1 Device 3 times daily 100 each 11     Ondansetron HCl (ZOFRAN PO) Take 8 mg by mouth 2 times daily as needed for nausea or vomiting       oxyCODONE (OXYCONTIN) 10 MG 12 hr tablet Take 1 tablet (10 mg) by mouth every 12 hours 10 tablet 0     oxyCODONE IR (ROXICODONE) 5 MG tablet Take 1-2 tablets (5-10 mg) by mouth every 3 hours as needed 40 tablet 0     oxymetazoline (AFRIN) 0.05 % spray Spray 2 sprays into both nostrils 2 times daily as needed for congestion       senna-docusate (SENOKOT-S;PERICOLACE) 8.6-50 MG per tablet Take 1 tablet by mouth 2 times daily (Patient taking differently: Take 2 tablets by mouth 2 times daily ) 100 tablet      tamsulosin (FLOMAX) 0.4 MG capsule TAKE ONE CAPSULE BY MOUTH ONE TIME DAILY  90 capsule 3     ULTICARE MICRO 32G X 4 MM insulin pen needle TEST 3 TIMES DAILY 100 each 0     VITAMIN D, CHOLECALCIFEROL, PO Take 2,000 Units by mouth daily         ROS:  10 point ROS of systems including Constitutional, Eyes, Respiratory, Cardiovascular, Gastroenterology, Genitourinary, Integumentary, Musculoskeletal, Psychiatric were all negative except for pertinent positives noted in my HPI.    Exam:  /66  Pulse 80  Temp 97.2  F (36.2  C)  Resp 18  Wt 173 lb 4.8 oz (78.6 kg)  SpO2 95%  BMI 27.97 kg/m2  GENERAL APPEARANCE:  Alert, in  no distress, pleasant, cooperative, oriented x self, place, family and recent events.   EYES:  EOM, lids, pupils and irises normal, sclera clear and conjunctiva normal, no discharge or mattering on lids or lashes noted  ENT:  Mouth normal, moist mucous membranes, nose normal without drainage or crusting, external ears without lesions, hearing acuity impaired both ears R>L   NECK: supple, symmetrical  RESP:  respiratory effort and palpation of chest normal, no chest wall tenderness, no respiratory distress, Lung sounds clear, patient is on room air  CV:  Palpation and auscultation of heart done, rate and rhythm controlled and regular, no murmur, no rub or gallop. Edema trace left leg  ABDOMEN:  normal bowel sounds, soft, nontender.  M/S:   Gait and station walks with assist , left knee pain ROM  SKIN:  Inspection and palpation of skin and subcutaneous tissue: skin on back dry, no rash. Left knee incision distal end drains minimal bloody drainage.   NEURO: cranial nerves 2-12 grossly intact, no facial asymmetry, no speech deficits and able to follow directions, moves all extremities symmetrically  PSYCH:  insight and judgement appears intact, memory appears intact per family, affect and mood normal     Lab/Diagnostic data:  CBC RESULTS:   Recent Labs   Lab Test  10/08/18   0645  10/07/18   0706  10/06/18   0637   10/02/18   1644   WBC   --    --   9.2   --   6.6   RBC   --    --   3.56*   --   4.05*   HGB   --   11.0*  11.8*   --   13.5   HCT   --    --   35.4*   --   42.1   MCV   --    --   99   --   104*   MCH   --    --   33.1*   --   33.3*   MCHC   --    --   33.3   --   32.1   RDW   --    --   12.3   --   11.9   PLT  151   --   144*   < >  167    < > = values in this interval not displayed.       Last Basic Metabolic Panel:  Recent Labs   Lab Test  10/08/18   0645  10/06/18   0637   NA  139  141   POTASSIUM  3.9  3.9   CHLORIDE  106  107   MABEL  8.2*  7.9*   CO2  29  27   BUN  14  19   CR  0.91  0.91   GLC  180*   242*       Liver Function Studies -   Recent Labs   Lab Test  10/08/18   0645  10/02/18   1644   PROTTOTAL  6.4*  7.1   ALBUMIN  2.7*  3.4   BILITOTAL  0.6  0.5   ALKPHOS  90  147   AST  22  28   ALT  20  41       TSH   Date Value Ref Range Status   10/02/2018 1.16 0.40 - 4.00 mU/L Final   09/07/2017 1.81 0.40 - 4.00 mU/L Final       Lab Results   Component Value Date    A1C 8.6 10/02/2018    A1C 8.7 01/20/2018       ASSESSMENT/PLAN:  Aftercare following left knee joint replacement surgery  Physical deconditioning  Recent surgery, recovering well. Pain meds as scheduled and add tylenol and PRN vistaril. F/U surgeon as ordered. Therapies - f/u with progress next week.     Diabetes mellitus type 2, insulin dependent (H)  Chronic; will hold short acting HS insulin at TCU. F/U with BG within the next week or sooner if elevated.     Benign prostatic hyperplasia without lower urinary tract symptoms  By history. Urinating well at this time. Monitor.     Hyperlipidemia, unspecified hyperlipidemia type  Meds as PTA.    Slow transit constipation  Ongoing issue, increase laxatives. Staff to update provider if not effective. Add PRN Zofran.     Localized pruritus  C/o itchy back since admission. Start Eucerin to back, PRN vistaril. Staff to update provider if not effective.     Acute posthemorrhagic anemia  Ongoing issue - monitor Hgb, f/u with next check.     Insomnia, unspecified type  New complaint - add prn melatonin. Let      Orders:  1. Full Code  2. Diet: bite sized pieces cut up r/t dentures  3. Eucerin to back BID dry skin  4. Vistaril 25 mg PO TID PRN pruritis, muscle spasms  5. Check BG QID diagnosis DM  6. Increase Senna S to 2 tabs PO BID diagnosis constipation  7. Change Tylenol to 1000 mg PO TID diagnosis pain  8. Zofran 8 mg PO BID PRN nausea  9. Hold insulin aspart while at TCU  10. BMP, CBC on 10/11  11. Melatonin 3 mg PO HS PRN insomnia    Total time spent with patient visit at the skilled nursing facility  was 35 min including patient visit and review of past records. Greater than 50% of total time spent with counseling and coordinating care due to review of history, current status and concern and POC to address as noted above    Electronically signed by:  JERMAIN Alcantara CNP                    Sincerely,        JERMAIN Alcantara CNP

## 2018-10-09 NOTE — MR AVS SNAPSHOT
After Visit Summary   10/9/2018    Rabia Reed    MRN: [id number]           Patient Information     Date Of Birth          1940        Visit Information        Provider Department      10/9/2018 10:00 AM Angie Sinclair APRN CNP Geriatrics Transitional Care        Today's Diagnoses     Aftercare following left knee joint replacement surgery    -  1    Diabetes mellitus type 2, insulin dependent (H)        Benign prostatic hyperplasia without lower urinary tract symptoms        Hyperlipidemia, unspecified hyperlipidemia type        Physical deconditioning        Slow transit constipation        Localized pruritus        Acute posthemorrhagic anemia        Insomnia, unspecified type           Follow-ups after your visit        Who to contact     If you have questions or need follow up information about today's clinic visit or your schedule please contact G[facility] directly at [phone].  Normal or non-critical lab and imaging results will be communicated to you by [employer], letter or phone within 4 business days after the clinic has received the results. If you do not hear from us within 7 days, please contact the clinic through [employer] or phone. If you have a critical or abnormal lab result, we will notify you by phone as soon as possible.  Submit refill requests through [employer] or call your pharmacy and they will forward the refill request to us. Please allow 3 business days for your refill to be completed.          Additional Information About Your Visit        [employer] Information     [employer] gives you secure access to your electronic health record. If you see a primary care provider, you can also send messages to your care team and make appointments. If you have questions, please call your primary care clinic.  If you do not have a primary care provider, please call [phone] and they will assist you.        Care EveryWhere ID     This is your Care EveryWhere ID. This  could be used by other organizations to access your [facility] medical records  [id number]        Your Vitals Were     Pulse Temperature Respirations Pulse Oximetry BMI (Body Mass Index)       80 97.2  F (36.2  C) 18 95% 27.97 kg/m2        Blood Pressure from Last 3 Encounters:   10/09/18 126/66   10/08/18 135/73   10/02/18 120/70    Weight from Last 3 Encounters:   10/09/18 173 lb 4.8 oz (78.6 kg)   10/07/18 180 lb 8 oz (81.9 kg)   10/02/18 179 lb (81.2 kg)              Today, you had the following     No orders found for display         Today's Medication Changes          These changes are accurate as of 10/9/18 11:59 PM.  If you have any questions, ask your nurse or doctor.               These medicines have changed or have updated prescriptions.        Dose/Directions    senna-docusate 8.6-50 MG per tablet   Commonly known as:  SENOKOT-S;PERICOLACE   This may have changed:  how much to take   Used for:  Status post total left knee replacement        Dose:  1 tablet   Take 1 tablet by mouth 2 times daily   Quantity:  100 tablet   Refills:  0                Primary Care Provider Office Phone # Fax #    Alfredo Aragon [phone]       [address] [place] [address]        Equal Access to Services     KALEIGH BRAND AH: Hadii keanu denton hadasho Soomaali, waaxda luqadaha, qaybta kaalmada adeegyada, alfonzo gutierrez. So [facility] [phone].    ATENCIÓN: Si habla español, tiene a sylvester disposición servicios gratuitos de asistencia lingüística. Llame al [phone].    We comply with applicable federal civil rights laws and [place] laws. We do not discriminate on the basis of race, color, national origin, age, disability, sex, sexual orientation, or gender identity.            Thank you!     Thank you for choosing [facility]  for your care. Our goal is always to provide you with excellent care. Hearing back from our patients is one way we can continue to improve our  services. Please take a few minutes to complete the written survey that you may receive in the mail after your visit with us. Thank you!             Your Updated Medication List - Protect others around you: Learn how to safely use, store and throw away your medicines at www.disposemymeds.org.          This list is accurate as of 10/9/18 11:59 PM.  Always use your most recent med list.                   Brand Name Dispense Instructions for use Diagnosis    ACE/ARB/ARNI NOT PRESCRIBED (INTENTIONAL)      Please choose reason not prescribed, below    Type 2 diabetes mellitus without complication, with long-term current use of insulin (H)       aspirin 325 MG tablet     90 tablet    Take 1 tablet (325 mg) by mouth 2 times daily    Status post total left knee replacement       atorvastatin 80 MG tablet    LIPITOR    90 tablet    Take 1 tablet (80 mg) by mouth daily    Hyperlipidemia LDL goal <100       blood glucose monitoring lancets     200 each    USE 3 TIMES DAILY    Type 2 diabetes mellitus without complication, with long-term current use of insulin (H)       blood glucose monitoring test strip    ONETOUCH ULTRA    200 each    Use to test 3 times daily    Type 2 diabetes mellitus without complication, with long-term current use of insulin (H)       celecoxib 200 MG capsule    celeBREX    90 capsule    Take 1 capsule (200 mg) by mouth daily With food for arthritis pain    Primary osteoarthritis, unspecified site       FINASTERIDE PO      Take 5 mg by mouth daily        * NovoLOG MIX 70/30 FLEXPEN injection   Generic drug:  insulin aspart prot & aspart      Inject 30 Units Subcutaneous every morning (before breakfast)        * NovoLOG MIX 70/30 FLEXPEN injection   Generic drug:  insulin aspart prot & aspart      Inject 20 Units Subcutaneous daily (with dinner)        ONETOUCH ULTRA SYSTEM KIT w/Device Kit     1 kit    One touch ultra 2 if covered by insurance    Type II or unspecified type diabetes mellitus without  mention of complication, not stated as uncontrolled       * oxyCODONE 10 MG 12 hr tablet    OxyCONTIN    10 tablet    Take 1 tablet (10 mg) by mouth every 12 hours    Status post total left knee replacement       * oxyCODONE IR 5 MG tablet    ROXICODONE    40 tablet    Take 1-2 tablets (5-10 mg) by mouth every 3 hours as needed    Status post total left knee replacement       oxymetazoline 0.05 % spray    AFRIN     Spray 2 sprays into both nostrils 2 times daily as needed for congestion        senna-docusate 8.6-50 MG per tablet    SENOKOT-S;PERICOLACE    100 tablet    Take 1 tablet by mouth 2 times daily    Status post total left knee replacement       tamsulosin 0.4 MG capsule    FLOMAX    90 capsule    TAKE ONE CAPSULE BY MOUTH ONE TIME DAILY    Urine frequency       TYLENOL 500 MG tablet   Generic drug:  acetaminophen      Take 1,000 mg by mouth 3 times daily        * ULTICARE MICRO 32G X 4 MM   Generic drug:  insulin pen needle     100 each    TEST 3 TIMES DAILY    Type 2 diabetes, HbA1c goal < 7% (H)       * pen needles 31G X 6 MM Misc     100 each    1 Device 3 times daily    Type 2 diabetes mellitus without complication, with long-term current use of insulin (H)       VISTARIL PO      Take 25 mg by mouth 3 times daily as needed for itching        VITAMIN C PO      Take 500 mg by mouth daily        VITAMIN D (CHOLECALCIFEROL) PO      Take 2,000 Units by mouth daily        ZOFRAN PO      Take 8 mg by mouth 2 times daily as needed for nausea or vomiting        * Notice:  This list has 6 medication(s) that are the same as other medications prescribed for you. Read the directions carefully, and ask your doctor or other care provider to review them with you.

## 2018-10-09 NOTE — PROGRESS NOTES
[facility] Care Coordination Contact  CC received notification of admission to [facility]  on 10/8/2018, from [facility].    OBRA 1 right faxed to care home admissions office.   CC contacted Nursing Home LSW (Mary) and left a message with this CC contact information, reviewed community POC as well requested to be notified of concerns, care conferences and discharge planning.  CC reached out to adult son (Brandan Reed) regarding transition and left a message requesting a return call.    Transition log updated.   PCP notified of transition to TCU via EMR.  ANASTACIA Velázquez  [facility]  [phone]

## 2018-10-11 ENCOUNTER — TRANSFERRED RECORDS (OUTPATIENT)
Dept: HEALTH INFORMATION MANAGEMENT | Facility: CLINIC | Age: 78
End: 2018-10-11

## 2018-10-11 ENCOUNTER — PATIENT OUTREACH (OUTPATIENT)
Dept: GERIATRIC MEDICINE | Facility: CLINIC | Age: 78
End: 2018-10-11

## 2018-10-11 LAB
BUN SERPL-MCNC: 14 MG/DL (ref 9–26)
CALCIUM SERPL-MCNC: 8.8 MG/DL (ref 8.4–10.4)
CHLORIDE SERPLBLD-SCNC: 102 MMOL/L (ref 98–109)
CO2 SERPL-SCNC: 28 MMOL/L (ref 22–31)
CREAT SERPL-MCNC: 0.92 MG/DL (ref 0.73–1.18)
ERYTHROCYTE [DISTWIDTH] IN BLOOD BY AUTOMATED COUNT: 12.2 % (ref 11–15)
GFR SERPL CREATININE-BSD FRML MDRD: >60 ML/MIN/1.73M2
GLUCOSE SERPL-MCNC: 177 MG/DL (ref 70–100)
HCT VFR BLD AUTO: 33.5 % (ref 39–51)
HEMOGLOBIN: 11.1 G/DL (ref 13.4–17.5)
MCV RBC AUTO: 99.7 FL (ref 80–100)
PLATELET # BLD AUTO: 233 K/CMM (ref 140–450)
POTASSIUM SERPL-SCNC: 3.7 MMOL/L (ref 3.5–5.2)
RBC # BLD AUTO: 3.36 M/CMM (ref 4.2–5.9)
SODIUM SERPL-SCNC: 138 MMOL/L (ref 136–145)
WBC # BLD AUTO: 7.3 K/CMM (ref 3.8–11)

## 2018-10-11 NOTE — PROGRESS NOTES
[facility] Care Coordination Contact  Received a voice mail message from member's son, Brandan. He shared that member has a care conference on 10/15/18 at 1:30pm.  ANASTACIA Velázquez  [facility]  [phone]

## 2018-10-12 DIAGNOSIS — Z53.9 DIAGNOSIS NOT YET DEFINED: Primary | ICD-10-CM

## 2018-10-12 PROCEDURE — G0179 MD RECERTIFICATION HHA PT: HCPCS | Performed by: INTERNAL MEDICINE

## 2018-10-14 DIAGNOSIS — E11.9 TYPE 2 DIABETES MELLITUS WITHOUT COMPLICATION, WITH LONG-TERM CURRENT USE OF INSULIN (H): ICD-10-CM

## 2018-10-14 DIAGNOSIS — Z79.4 TYPE 2 DIABETES MELLITUS WITHOUT COMPLICATION, WITH LONG-TERM CURRENT USE OF INSULIN (H): ICD-10-CM

## 2018-10-14 NOTE — TELEPHONE ENCOUNTER
"Requested Prescriptions   Pending Prescriptions Disp Refills     ONETOUCH ULTRA test strip [Pharmacy Med Name: OneTouch Ultra Blue In Vitro Strip] 200 each 2    Last Written Prescription Date:  1/26/2018  Last Fill Quantity: 200,  # refills: 3   Last office visit: 10/2/2018 with prescribing provider:  10/2/2018   Future Office Visit:     Sig: Use to test 3 times daily    Diabetic Supplies Protocol Passed    10/14/2018  7:01 AM       Passed - Patient is 18 years of age or older       Passed - Recent (6 mo) or future (30 days) visit within the authorizing provider's specialty    Patient had office visit in the last 6 months or has a visit in the next 30 days with authorizing provider.  See \"Patient Info\" tab in inbasket, or \"Choose Columns\" in Meds & Orders section of the refill encounter.              "

## 2018-10-15 ENCOUNTER — PATIENT OUTREACH (OUTPATIENT)
Dept: GERIATRIC MEDICINE | Facility: CLINIC | Age: 78
End: 2018-10-15

## 2018-10-15 NOTE — PROGRESS NOTES
[facility] Care Coordination Contact  CC attended care conference for member on 10/15/18 at [facility]  TCU.   Present at care conference member, this care coordinator, adult son (Brandan), NH  (Melia), NH RN (Jaret SAMANIEGO) and NH Therapy (Brett). Son Brandan did the interpreting.  OT Report: Needs more assist with dressing, and bathing. During therapy today noted that he has shoulder pain, from old arthritis.  Needs assist with bathing/showering at this time. Reviewed shower/tub option at home. Family assists with medication set up.  Cognitive testing results: Scored 13/15 on BIMS.   PT Report:  The knee has been painful and this has been limiting therapy. The medications have not been scheduled as well as they could be. Able to get in/out of bed independently.  Will manually lift his left leg at times. Able to ambulate 200 feet with walker. The quality of the walking is not great due to the pain. Bending to 85-86 degrees, discharge would ideally be in the 90 degree range. Has gone up and down a couple of steps, 4-8 steps with assist.  Nursing Report: Has follow up with surgeon (Dr. Darnell Allen) on 10/19/18. Family reviewed that member does better with pain medication 1 hour prior therapy. Reviewed the POC. Reviewed the medication mishap where the NH ran out of his pain medication. On average rates his pain a 6 out of 10. They have been icing PRN as well. Reviewed the constipation issue with the pain medication. Had a BM last evening. Wound dressing change done today and the incision looks good.   Social Work Report: NHSW will place referral for [facility] for OT and PT.  TCU Recommendations: 1 more week of therapy.  Family prefers a weekend discharge. Plan is to discharge on [place] 10/21/18 with [facility] for PT and OT.   CC Report:  Reviewed that member has PCA services. Reviewed that member will be discharging prior to the 30 day notice.  ANASTACIA Velázquez  [facility]  [phone]

## 2018-10-15 NOTE — TELEPHONE ENCOUNTER
Prescription approved per [facility] Refill Protocol.    Rosangela HOGUE RN, BSN, PHN

## 2018-10-17 VITALS
RESPIRATION RATE: 16 BRPM | TEMPERATURE: 98.5 F | WEIGHT: 166.4 LBS | SYSTOLIC BLOOD PRESSURE: 154 MMHG | HEART RATE: 71 BPM | BODY MASS INDEX: 26.86 KG/M2 | OXYGEN SATURATION: 98 % | DIASTOLIC BLOOD PRESSURE: 71 MMHG

## 2018-10-17 RX ORDER — SENNOSIDES 8.6 MG
2 TABLET ORAL 2 TIMES DAILY
COMMUNITY

## 2018-10-17 RX ORDER — POLYETHYLENE GLYCOL 3350 17 G/17G
1 POWDER, FOR SOLUTION ORAL DAILY PRN
COMMUNITY
End: 2018-12-30

## 2018-10-17 NOTE — PROGRESS NOTES
[facility] DISCHARGE SUMMARY    PATIENT'S NAME: Rabia Reed  YOB: 1940  MEDICAL RECORD NUMBER:  [id number]  Place of Service where encounter took place:  [facility] ([facility]) [[id number]]    PRIMARY CARE PROVIDER AND CLINIC RESPONSIBLE AFTER TRANSFER: Alfredo Aragon [address] [place] [address] / [facility] [id number]     TRANSFERRING PROVIDERS: Tonya Lynn Haase, APRN CNP, Dr. Eddie MD  DATE OF SNF ADMISSION:  October / 08 / 2018  DATE OF SNF (anticipated) DISCHARGE: October / 21 / 2018  DISCHARGE DISPOSITION: [facility] Provider   RECENT HOSPITALIZATION/ED:  [facility] stay 10/5/18 to 10/8/18.     CODE STATUS/ADVANCE DIRECTIVES DISCUSSION:   CPR/Full code      Allergies   Allergen Reactions     No Known Drug Allergies      Condition on Discharge:  Stable.  Function:  Walking up to 150 feet using a RW indep, some cues for safety  Cognitive Scores: BIMS: 13/15 (Daughter translated. SBT not completed as it was too difficult to translate). Pt's daughter reports pt has    Equipment: RW    DISCHARGE DIAGNOSIS:   1. Aftercare following left knee joint replacement surgery    2. Physical deconditioning    3. Diabetes mellitus type 2, insulin dependent (H)    4. Benign prostatic hyperplasia without lower urinary tract symptoms    5. Hyperlipidemia, unspecified hyperlipidemia type    6. Slow transit constipation    7. 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 He also has no past medical history of Basal cell carcinoma; Malignant melanoma (H); PONV (postoperative nausea and vomiting); Skin cancer; or Squamous cell carcinoma.    DISCHARGE MEDICATIONS:  Current Outpatient Prescriptions   Medication Sig Dispense Refill     ACE/ARB/ARNI NOT PRESCRIBED, INTENTIONAL, Please choose reason not prescribed, below       acetaminophen (TYLENOL) 500 MG tablet Take 1,000 mg by mouth 3 times daily        Ascorbic Acid (VITAMIN C PO) Take 500 mg by mouth daily       aspirin 325 MG tablet Take 1 tablet (325 mg) by mouth 2 times daily 90 tablet 0     atorvastatin (LIPITOR) 80 MG tablet Take 1 tablet (80 mg) by mouth daily 90 tablet 3     blood glucose monitoring (ONE TOUCH ULTRASOFT) lancets USE 3 TIMES DAILY 200 each 3     Blood Glucose Monitoring Suppl (ONE TOUCH ULTRA SYSTEM KIT) W/DEVICE KIT One touch ultra 2 if covered by insurance 1 kit 0     celecoxib (CELEBREX) 200 MG capsule Take 1 capsule (200 mg) by mouth daily With food for arthritis pain 90 capsule 1     FINASTERIDE PO Take 5 mg by mouth daily       HydrOXYzine Pamoate (VISTARIL PO) Take 25 mg by mouth 3 times daily as needed for itching       insulin aspart prot & aspart (NOVOLOG MIX 70/30 FLEXPEN) injection Inject 30 Units Subcutaneous every morning (before breakfast)       insulin aspart prot & aspart (NOVOLOG MIX 70/30 FLEXPEN) injection Inject 20 Units Subcutaneous daily (with dinner)       Insulin Pen Needle (PEN NEEDLES) 31G X 6 MM MISC 1 Device 3 times daily 100 each 11     melatonin (MELATONIN) 1 MG/ML LIQD liquid Take 3 mg by mouth nightly as needed for sleep       Ondansetron HCl (ZOFRAN PO) Take 8 mg by mouth 2 times daily as needed for nausea or vomiting       ONETOUCH ULTRA test strip Use to test 3 times daily 200 each 2     oxyCODONE (OXYCONTIN) 10 MG 12 hr  tablet Take 1 tablet (10 mg) by mouth every 12 hours 10 tablet 0     OXYCODONE HCL PO Take 5-10 mg by mouth every 4 hours as needed       oxymetazoline (AFRIN) 0.05 % spray Spray 2 sprays into both nostrils 2 times daily as needed for congestion       polyethylene glycol (MIRALAX/GLYCOLAX) Packet Take 1 packet by mouth daily as needed for constipation       sennosides (SENOKOT) 8.6 MG tablet Take 2 tablets by mouth 2 times daily       Skin Protectants, Misc. (EUCERIN) cream Apply topically 2 times daily       tamsulosin (FLOMAX) 0.4 MG capsule TAKE ONE CAPSULE BY MOUTH ONE TIME DAILY  90 capsule 3     ULTICARE MICRO 32G X 4 MM insulin pen needle TEST 3 TIMES DAILY 100 each 0     VITAMIN D, CHOLECALCIFEROL, PO Take 2,000 Units by mouth daily         MEDICATION CHANGES/RATIONALE:   Weaned off oxycontin  Started on zofran 8mg BID prn for nausea will DC at discharge,   Vistaril 25mg TID prn for puritis will DC at discharge  Last 3 BPs: 154/71, 154/72, 119/59 mmHg  HR Ranges; 70-83 bpm  Admission Weight: 10/8/18: 175.9 lbs  Current Weight: 10/9/18: 173.3 lbs - 10/16/18: 166.4 lbs  Controlled medications sent with patient:   not applicable/none     ROS:    10 point ROS of systems including Constitutional, Eyes, Respiratory, Cardiovascular, Gastroenterology, Genitourinary, Integumentary, Musculoskeletal, Psychiatric were all negative except for pertinent positives noted in my HPI.    Physical Exam:   Vitals: /71  Pulse 71  Temp 98.5  F (36.9  C)  Resp 16  Wt 166 lb 6.4 oz (75.5 kg)  SpO2 98%  BMI 26.86 kg/m2  BMI= Body mass index is 26.86 kg/(m^2).    GENERAL APPEARANCE:  Alert, in no distress  ENT:  Mouth and posterior oropharynx normal, moist mucous membranes, normal hearing acuity  EYES:  EOM, conjunctivae, lids, pupils and irises normal, PERRL  RESP:  respiratory effort and palpation of chest normal, lungs clear to auscultation , no respiratory distress  CV:  Palpation and auscultation of heart done ,  regular rate and rhythm, no murmur, rub, or gallop, no edema  ABDOMEN:  normal bowel sounds, soft, nontender, no hepatosplenomegaly or other masses  M/S:   Examination of:   right upper extremity, left upper extremity, right lower extremity and left lower extremity  Inspection, ROM, stability and muscle strength normal  SKIN:  Inspection of skin and subcutaneous tissue baseline, left knee incision not visualized, dressing clean dry and intact  NEURO:   Cranial nerves 2-12 are normal tested and grossly at patient's baseline, speech WNL  PSYCH:  affect and mood normal     HPI Nursing Facility Course: during TCU stay patient progressed in therapy to walking up to 150 feet using a RW SBA with cues for safety, indep in room, indep with ADL's, did do discharge exam with the assistance of an . Patient will DC home with home PT and OT through [facility]>   HPI information obtained from: facility chart records, facility staff, patient report and [facility] chart review.        Aftercare following left knee joint replacement surgery  Physical deconditioning  Acute: patient has ortho appt with Dr. Tiffany adam, encouraged patient to get a prescription for pain medications from ortho today.   DC vistaril and zofran at discharge,   Tylenol 1000mg TID for pain, patient is taking oxycodone 10mg q 4 hours prn for pain has not weaned self down yet.      Diabetes mellitus type 2, insulin dependent (H)  Chronic; resume pTA insulin and blood sugar monitoring routine.       Benign prostatic hyperplasia without lower urinary tract symptoms  By history. Urinating well at this time patient has appt with urology Dr. Wise on 10/30/18     Slow transit constipation  Ongoing issue, continue senna s 2 PO BID, miralax 17gm QD prn while on narcotics.       Acute posthemorrhagic anemia  Ongoing issue -stable, f/u with PCP      DISCHARGE PLAN:  Occupational Therapy, Physical Therapy and From:  [facility]  Patient instructed to  follow-up with:  PCP in 5-7 days       [facility] scheduled appointments:  Future Appointments  Date Time Provider Department Center   10/26/2018 1:20 PM Yue Charles PA-C [facility]       MTM referral needed and placed by this provider: No    Pending labs: none  SNF labs   CBC RESULTS:   Recent Labs   Lab Test 10/11/18  10/08/18   0645  10/07/18   0706  10/06/18   0637   10/02/18   1644   WBC  7.3   --    --   9.2   --   6.6   RBC  3.36*   --    --   3.56*   --   4.05*   HGB  11.1*   --   11.0*  11.8*   --   13.5   HCT  33.5*   --    --   35.4*   --   42.1   MCV  99.7   --    --   99   --   104*   MCH   --    --    --   33.1*   --   33.3*   MCHC   --    --    --   33.3   --   32.1   RDW  12.2   --    --   12.3   --   11.9   PLT  233  151   --   144*   < >  167    < > = values in this interval not displayed.       Last Basic Metabolic Panel:  Recent Labs   Lab Test 10/11/18  10/08/18   0645   NA  138  139   POTASSIUM  3.7  3.9   CHLORIDE  102  106   MABEL  8.8  8.2*   CO2  28  29   BUN  14  14   CR  0.92  0.91   GLC  177*  180*       Liver Function Studies -   Recent Labs   Lab Test  10/08/18   0645  10/02/18   1644   PROTTOTAL  6.4*  7.1   ALBUMIN  2.7*  3.4   BILITOTAL  0.6  0.5   ALKPHOS  90  147   AST  22  28   ALT  20  41       TSH   Date Value Ref Range Status   10/02/2018 1.16 0.40 - 4.00 mU/L Final   09/07/2017 1.81 0.40 - 4.00 mU/L Final       Lab Results   Component Value Date    A1C 8.6 10/02/2018    A1C 8.7 01/20/2018           Discharge Treatments:none    TOTAL DISCHARGE TIME:   Greater than 30 minutes  Electronically signed by:  Tonya Lynn Haase, APRN CNP

## 2018-10-18 ENCOUNTER — DISCHARGE SUMMARY NURSING HOME (OUTPATIENT)
Dept: GERIATRICS | Facility: CLINIC | Age: 78
End: 2018-10-18
Payer: COMMERCIAL

## 2018-10-18 DIAGNOSIS — D62 ACUTE POSTHEMORRHAGIC ANEMIA: ICD-10-CM

## 2018-10-18 DIAGNOSIS — E11.9 DIABETES MELLITUS TYPE 2, INSULIN DEPENDENT (H): ICD-10-CM

## 2018-10-18 DIAGNOSIS — K59.01 SLOW TRANSIT CONSTIPATION: ICD-10-CM

## 2018-10-18 DIAGNOSIS — Z47.1 AFTERCARE FOLLOWING LEFT KNEE JOINT REPLACEMENT SURGERY: Primary | ICD-10-CM

## 2018-10-18 DIAGNOSIS — Z96.652 AFTERCARE FOLLOWING LEFT KNEE JOINT REPLACEMENT SURGERY: Primary | ICD-10-CM

## 2018-10-18 DIAGNOSIS — E78.5 HYPERLIPIDEMIA, UNSPECIFIED HYPERLIPIDEMIA TYPE: ICD-10-CM

## 2018-10-18 DIAGNOSIS — R53.81 PHYSICAL DECONDITIONING: ICD-10-CM

## 2018-10-18 DIAGNOSIS — Z79.4 DIABETES MELLITUS TYPE 2, INSULIN DEPENDENT (H): ICD-10-CM

## 2018-10-18 DIAGNOSIS — N40.0 BENIGN PROSTATIC HYPERPLASIA WITHOUT LOWER URINARY TRACT SYMPTOMS: ICD-10-CM

## 2018-10-18 PROCEDURE — 99316 NF DSCHRG MGMT 30 MIN+: CPT | Performed by: NURSE PRACTITIONER

## 2018-10-18 NOTE — LETTER
10/18/2018        RE: Rabia Reed  [address]  [place] [facility] [address]          [facility] DISCHARGE SUMMARY    PATIENT'S NAME: Rabia Reed  YOB: 1940  MEDICAL RECORD NUMBER:  [id number]  Place of Service where encounter took place:  [facility] ([facility]) [[id number]]    PRIMARY CARE PROVIDER AND CLINIC RESPONSIBLE AFTER TRANSFER: Alfredo Aragon [address] / [facility] [address]     TRANSFERRING PROVIDERS: Tonya Lynn Haase, APRN CNP, Dr. Eddie MD  DATE OF SNF ADMISSION:  October / 08 / 2018  DATE OF SNF (anticipated) DISCHARGE: October / 21 / 2018  DISCHARGE DISPOSITION: [facility] Provider   RECENT HOSPITALIZATION/ED:  [facility] stay 10/5/18 to 10/8/18.     CODE STATUS/ADVANCE DIRECTIVES DISCUSSION:   CPR/Full code      Allergies   Allergen Reactions     No Known Drug Allergies      Condition on Discharge:  Stable.  Function:  Walking up to 150 feet using a RW indep, some cues for safety  Cognitive Scores: BIMS: 13/15 (Daughter translated. SBT not completed as it was too difficult to translate). Pt's daughter reports pt has    Equipment: RW    DISCHARGE DIAGNOSIS:   1. Aftercare following left knee joint replacement surgery    2. Physical deconditioning    3. Diabetes mellitus type 2, insulin dependent (H)    4. Benign prostatic hyperplasia without lower urinary tract symptoms    5. Hyperlipidemia, unspecified hyperlipidemia type    6. Slow transit constipation    7. 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 He also has no past medical history of Basal cell carcinoma; Malignant melanoma (H); PONV (postoperative nausea and vomiting); Skin cancer; or Squamous cell carcinoma.    DISCHARGE MEDICATIONS:  Current Outpatient Prescriptions   Medication Sig Dispense Refill     ACE/ARB/ARNI NOT PRESCRIBED, INTENTIONAL, Please choose reason not prescribed, below       acetaminophen (TYLENOL) 500 MG tablet Take 1,000 mg by mouth 3 times daily        Ascorbic Acid (VITAMIN C PO) Take 500 mg by mouth daily       aspirin 325 MG tablet Take 1 tablet (325 mg) by mouth 2 times daily 90 tablet 0     atorvastatin (LIPITOR) 80 MG tablet Take 1 tablet (80 mg) by mouth daily 90 tablet 3     blood glucose monitoring (ONE TOUCH ULTRASOFT) lancets USE 3 TIMES DAILY 200 each 3     Blood Glucose Monitoring Suppl (ONE TOUCH ULTRA SYSTEM KIT) W/DEVICE KIT One touch ultra 2 if covered by insurance 1 kit 0     celecoxib (CELEBREX) 200 MG capsule Take 1 capsule (200 mg) by mouth daily With food for arthritis pain 90 capsule 1     FINASTERIDE PO Take 5 mg by mouth daily       HydrOXYzine Pamoate (VISTARIL PO) Take 25 mg by mouth 3 times daily as needed for itching       insulin aspart prot & aspart (NOVOLOG MIX 70/30 FLEXPEN) injection Inject 30 Units Subcutaneous every morning (before breakfast)       insulin aspart prot & aspart (NOVOLOG MIX 70/30 FLEXPEN) injection Inject 20 Units Subcutaneous daily (with dinner)       Insulin Pen Needle (PEN NEEDLES) 31G X 6 MM MISC 1 Device 3 times daily 100 each 11     melatonin (MELATONIN) 1 MG/ML LIQD liquid Take 3 mg by mouth nightly as needed for sleep       Ondansetron HCl (ZOFRAN PO) Take 8 mg by mouth 2 times daily as needed for nausea or vomiting       ONETOUCH ULTRA  test strip Use to test 3 times daily 200 each 2     oxyCODONE (OXYCONTIN) 10 MG 12 hr tablet Take 1 tablet (10 mg) by mouth every 12 hours 10 tablet 0     OXYCODONE HCL PO Take 5-10 mg by mouth every 4 hours as needed       oxymetazoline (AFRIN) 0.05 % spray Spray 2 sprays into both nostrils 2 times daily as needed for congestion       polyethylene glycol (MIRALAX/GLYCOLAX) Packet Take 1 packet by mouth daily as needed for constipation       sennosides (SENOKOT) 8.6 MG tablet Take 2 tablets by mouth 2 times daily       Skin Protectants, Misc. (EUCERIN) cream Apply topically 2 times daily       tamsulosin (FLOMAX) 0.4 MG capsule TAKE ONE CAPSULE BY MOUTH ONE TIME DAILY  90 capsule 3     ULTICARE MICRO 32G X 4 MM insulin pen needle TEST 3 TIMES DAILY 100 each 0     VITAMIN D, CHOLECALCIFEROL, PO Take 2,000 Units by mouth daily         MEDICATION CHANGES/RATIONALE:   Weaned off oxycontin  Started on zofran 8mg BID prn for nausea will DC at discharge,   Vistaril 25mg TID prn for puritis will DC at discharge  Last 3 BPs: 154/71, 154/72, 119/59 mmHg  HR Ranges; 70-83 bpm  Admission Weight: 10/8/18: 175.9 lbs  Current Weight: 10/9/18: 173.3 lbs - 10/16/18: 166.4 lbs  Controlled medications sent with patient:   not applicable/none     ROS:    10 point ROS of systems including Constitutional, Eyes, Respiratory, Cardiovascular, Gastroenterology, Genitourinary, Integumentary, Musculoskeletal, Psychiatric were all negative except for pertinent positives noted in my HPI.    Physical Exam:   Vitals: /71  Pulse 71  Temp 98.5  F (36.9  C)  Resp 16  Wt 166 lb 6.4 oz (75.5 kg)  SpO2 98%  BMI 26.86 kg/m2  BMI= Body mass index is 26.86 kg/(m^2).    GENERAL APPEARANCE:  Alert, in no distress  ENT:  Mouth and posterior oropharynx normal, moist mucous membranes, normal hearing acuity  EYES:  EOM, conjunctivae, lids, pupils and irises normal, PERRL  RESP:  respiratory effort and palpation of chest normal, lungs clear to  auscultation , no respiratory distress  CV:  Palpation and auscultation of heart done , regular rate and rhythm, no murmur, rub, or gallop, no edema  ABDOMEN:  normal bowel sounds, soft, nontender, no hepatosplenomegaly or other masses  M/S:   Examination of:   right upper extremity, left upper extremity, right lower extremity and left lower extremity  Inspection, ROM, stability and muscle strength normal  SKIN:  Inspection of skin and subcutaneous tissue baseline, left knee incision not visualized, dressing clean dry and intact  NEURO:   Cranial nerves 2-12 are normal tested and grossly at patient's baseline, speech WNL  PSYCH:  affect and mood normal     HPI Nursing Facility Course: during TCU stay patient progressed in therapy to walking up to 150 feet using a RW SBA with cues for safety, indep in room, indep with ADL's, did do discharge exam with the assistance of an . Patient will DC home with home PT and OT through [facility]>   HPI information obtained from: facility chart records, facility staff, patient report and [facility] chart review.        Aftercare following left knee joint replacement surgery  Physical deconditioning  Acute: patient has ortho appt with Dr. Allen today, encouraged patient to get a prescription for pain medications from ortho today.   DC vistaril and zofran at discharge,   Tylenol 1000mg TID for pain, patient is taking oxycodone 10mg q 4 hours prn for pain has not weaned self down yet.      Diabetes mellitus type 2, insulin dependent (H)  Chronic; resume pTA insulin and blood sugar monitoring routine.       Benign prostatic hyperplasia without lower urinary tract symptoms  By history. Urinating well at this time patient has appt with urology Dr. Wise on 10/30/18     Slow transit constipation  Ongoing issue, continue senna s 2 PO BID, miralax 17gm QD prn while on narcotics.       Acute posthemorrhagic anemia  Ongoing issue -stable, f/u with PCP      DISCHARGE PLAN:   Occupational Therapy, Physical Therapy and From:  [facility]  Patient instructed to follow-up with:  PCP in 5-7 days       Current [place] scheduled appointments:  Future Appointments  Date Time Provider Department Center   10/26/2018 1:20 PM Yue Charles PA-C [facility]       MTM referral needed and placed by this provider: No    Pending labs: none  SNF labs   CBC RESULTS:   Recent Labs   Lab Test 10/11/18  10/08/18   0645  10/07/18   0706  10/06/18   0637   10/02/18   1644   WBC  7.3   --    --   9.2   --   6.6   RBC  3.36*   --    --   3.56*   --   4.05*   HGB  11.1*   --   11.0*  11.8*   --   13.5   HCT  33.5*   --    --   35.4*   --   42.1   MCV  99.7   --    --   99   --   104*   MCH   --    --    --   33.1*   --   33.3*   MCHC   --    --    --   33.3   --   32.1   RDW  12.2   --    --   12.3   --   11.9   PLT  233  151   --   144*   < >  167    < > = values in this interval not displayed.       Last Basic Metabolic Panel:  Recent Labs   Lab Test 10/11/18  10/08/18   0645   NA  138  139   POTASSIUM  3.7  3.9   CHLORIDE  102  106   MABEL  8.8  8.2*   CO2  28  29   BUN  14  14   CR  0.92  0.91   GLC  177*  180*       Liver Function Studies -   Recent Labs   Lab Test  10/08/18   0645  10/02/18   1644   PROTTOTAL  6.4*  7.1   ALBUMIN  2.7*  3.4   BILITOTAL  0.6  0.5   ALKPHOS  90  147   AST  22  28   ALT  20  41       TSH   Date Value Ref Range Status   10/02/2018 1.16 0.40 - 4.00 mU/L Final   09/07/2017 1.81 0.40 - 4.00 mU/L Final       Lab Results   Component Value Date    A1C 8.6 10/02/2018    A1C 8.7 01/20/2018           Discharge Treatments:none    TOTAL DISCHARGE TIME:   Greater than 30 minutes  Electronically signed by:  Tonya Lynn Haase, APRN CNP      Sincerely,        Tonya Lynn Haase, APRN CNP

## 2018-10-22 ENCOUNTER — TELEPHONE (OUTPATIENT)
Dept: INTERNAL MEDICINE | Facility: CLINIC | Age: 78
End: 2018-10-22

## 2018-10-22 NOTE — TELEPHONE ENCOUNTER
Patient is followed by [facility] Care Coordination.  Clinic Care Coordinators to defer to [facility] at this time. No outreach necessary.      ANASTACIA Araya  Clinic Care Coordinator  [facility]  [phone]  [email or web address]

## 2018-10-23 ENCOUNTER — PATIENT OUTREACH (OUTPATIENT)
Dept: GERIATRIC MEDICINE | Facility: CLINIC | Age: 78
End: 2018-10-23

## 2018-10-23 NOTE — PROGRESS NOTES
[facility] Care Coordination Contact    CC received notification of discharge to home. Discharge occurred on 10/21/18 from [facility].   CC contacted adult son (Brandan Reed) and left a message requesting a return call.  Member has a follow-up appointment with PCP in 7 days: Yes: scheduled on 10/25/18   Member has had a change in condition: No  Home visit needed: No  Care plan reviewed and updated.  The following home based services PCA were resumed.  New referrals placed: No  Transition log completed.   PCP notified of transition back to home via EMR.  ANASTACIA Velázquez  [facility]  [phone]

## 2018-10-25 ENCOUNTER — OFFICE VISIT (OUTPATIENT)
Dept: INTERNAL MEDICINE | Facility: CLINIC | Age: 78
End: 2018-10-25
Payer: COMMERCIAL

## 2018-10-25 VITALS
DIASTOLIC BLOOD PRESSURE: 70 MMHG | OXYGEN SATURATION: 97 % | HEART RATE: 75 BPM | SYSTOLIC BLOOD PRESSURE: 112 MMHG | TEMPERATURE: 98 F

## 2018-10-25 DIAGNOSIS — K59.00 CONSTIPATION, UNSPECIFIED CONSTIPATION TYPE: ICD-10-CM

## 2018-10-25 DIAGNOSIS — Z09 HOSPITAL DISCHARGE FOLLOW-UP: Primary | ICD-10-CM

## 2018-10-25 PROCEDURE — 99213 OFFICE O/P EST LOW 20 MIN: CPT | Performed by: PHYSICIAN ASSISTANT

## 2018-10-25 RX ORDER — POLYETHYLENE GLYCOL 3350 17 G/17G
1 POWDER, FOR SOLUTION ORAL DAILY
Qty: 510 G | Refills: 1 | Status: SHIPPED | OUTPATIENT
Start: 2018-10-25 | End: 2018-12-30

## 2018-10-25 NOTE — PROGRESS NOTES
SUBJECTIVE:   Rabia Reed is a 78 year old male who presents to clinic today for the following health issues:    Hospital Follow-up Visit:    Hospital/Nursing Home/IP Rehab Facility: [facility]  Date of Admission: 10/05/2018  Date of Discharge: 10/08/2018  Reason(s) for Admission: Left knee osteoarthritis            Problems taking medications regularly:  None       Medication changes since discharge: None       Problems adhering to non-medication therapy:  None    Summary of hospitalization:  [place] hospital discharge and TCU discharge summary reviewed  Diagnostic Tests/Treatments reviewed.  Follow up needed: none  Other Healthcare Providers Involved in Patient s Care:         orthopedics   Update since discharge: improved.     Post Discharge Medication Reconciliation: discharge medications reconciled and changed, per note/orders (see AVS).  Plan of care communicated with patient and family      Coding guidelines for this visit:  Type of Medical   Decision Making Face-to-Face Visit       within 7 Days of discharge Face-to-Face Visit        within 14 days of discharge   Moderate Complexity [id number]   High Complexity [id number]          Pt is following up s/p knee replacement. He stayed in TCU for several weeks following surgery. Pt is doing well since being at home. He notes he has been taking his pain medicine and completing therapy at home. He notes no side effects or concerns. His knee is healing well. He still has swelling and pain, but no changes. Pt has been following with orthopedics without concerns. Pt has had constipation. He is taking senokot daily without relief. Pt notes his blood sugars have been stable.     Problem list and histories reviewed & adjusted, as indicated.  Additional history: as documented      Reviewed and updated as needed this visit by clinical staff  Tobacco  Allergies  Meds  Problems       Reviewed and updated as needed this visit by Provider  Meds   Problems         OBJECTIVE:     /70  Pulse 75  Temp 98  F (36.7  C) (Oral)  SpO2 97%  There is no height or weight on file to calculate BMI.  GENERAL: healthy, alert and no distress  RESP: lungs clear to auscultation - no rales, rhonchi or wheezes  CV: regular rates and rhythm, normal S1 S2, no S3 or S4 and no murmur, click or rub  SKIN: wound is intact, dry, with no discontinue and no erythema or warmth    ASSESSMENT/PLAN:         1. Hospital discharge follow-up  - pt is continuing to improve s/p knee replacement   - no concerns outside of constipation for today's visit     2. Constipation, unspecified constipation type  - discontinue Senokot trial of miralax as below   - polyethylene glycol (MIRALAX) powder; Take 17 g (1 capful) by mouth daily  Dispense: 510 g; Refill: 1    Reviewed follow up for diabetes follow up in 2 months with pcp, sooner if additional concerns.     uYe Viera PA-C  [facility]

## 2018-10-26 NOTE — PROGRESS NOTES
[facility] Care Coordination Contact    Received a voice mail message from member's son, Brandan. He shared that member is doing well at home and the  Home care RN already came out and that the PT will be doing 2 times per week. Shared that the pain medications have been very helpful. Shared that there are no concerns at this time.  ANASTACIA Velázquez  [facility]  [phone]

## 2018-10-31 ENCOUNTER — PATIENT OUTREACH (OUTPATIENT)
Dept: GERIATRIC MEDICINE | Facility: CLINIC | Age: 78
End: 2018-10-31

## 2018-10-31 NOTE — PROGRESS NOTES
[facility] Care Coordination Contact    Called David ([phone]) to schedule annual HRA home visit. Reviewed available dates that this Care Coordinator and David are available.  David will reach out to the member's son to scheduled the assessment and will let this Care Coordinator know when the appointment has been scheduled.  ANASTACIA Velázquez  [facility]  [phone]

## 2018-11-05 ENCOUNTER — PATIENT OUTREACH (OUTPATIENT)
Dept: GERIATRIC MEDICINE | Facility: CLINIC | Age: 78
End: 2018-11-05

## 2018-11-05 NOTE — PROGRESS NOTES
[facility] Care Coordination Contact    Called David Ambrocio to schedule annual HRA home visit. HRA has been scheduled for 11/15/2018.  Called Zee James and scheduled an  for the home visit.   ANASTACIA Velázquez  [facility]  [phone]

## 2018-11-10 DIAGNOSIS — Z96.652 STATUS POST TOTAL LEFT KNEE REPLACEMENT: ICD-10-CM

## 2018-11-10 DIAGNOSIS — M19.91 PRIMARY OSTEOARTHRITIS, UNSPECIFIED SITE: ICD-10-CM

## 2018-11-10 NOTE — TELEPHONE ENCOUNTER
"Requested Prescriptions   Pending Prescriptions Disp Refills     NOVOLOG MIX 70/30 FLEXPEN (70-30) 100 UNIT/ML susp [Pharmacy Med Name: NovoLOG Mix 70/30 FlexPen Subcutaneous Suspension Pen-injector (70-30) 100 UNIT/ML] 45 mL 0    Historical Sig: INJECT 30 UNITS DAILY BEFORE BREAKFAST AND 20 UNITS BEFORE DINNER.    Insulin Mixes Protocol Passed    11/10/2018 12:25 PM       Passed - Blood pressure less than 140/90 in past 6 months    BP Readings from Last 3 Encounters:   10/25/18 112/70   10/17/18 154/71   10/09/18 126/66                Passed - LDL on file in past 12 months    Recent Labs   Lab Test  01/20/18   0937   LDL  49            Passed - Microalbumin on file in past 12 months    Recent Labs   Lab Test  10/02/18   1655   MICROL  10   UMALCR  13.77            Passed - Serum creatinine on file in past 12 months    Recent Labs   Lab Test 10/11/18   CR  0.92            Passed - HgbA1C in past 3 or 6 months    If HgbA1C is 8 or greater, it needs to be on file within the past 3 months.  If less than 8, must be on file within the past 6 months.     Recent Labs   Lab Test  10/02/18   1644   A1C  8.6*            Passed - Patient is age 18 or older       Passed - Recent (6 mo) or future (30 days) visit within the authorizing provider's specialty    Patient had office visit in the last 6 months or has a visit in the next 30 days with authorizing provider or within the authorizing provider's specialty.  See \"Patient Info\" tab in inbasket, or \"Choose Columns\" in Meds & Orders section of the refill encounter.            celecoxib (CELEBREX) 200 MG capsule [Pharmacy Med Name: Celecoxib Oral Capsule 200 MG] 90 capsule 0    Last Written Prescription Date:  5/15/2018  Last Fill Quantity: 90,  # refills: 1   Last office visit: 10/25/2018 with prescribing provider:  10/25/2018   Future Office Visit:     Sig: TAKE 1 CAPSULE BY MOUTH DAILY WITH FOOD FOR ARTHRITIS PAIN    NSAID Medications Failed    11/10/2018 12:25 PM       Failed " "- Patient is age 6-64 years       Passed - Blood pressure under 140/90 in past 12 months    BP Readings from Last 3 Encounters:   10/25/18 112/70   10/17/18 154/71   10/09/18 126/66                Passed - Normal ALT on file in past 12 months    Recent Labs   Lab Test  10/08/18   0645   ALT  20            Passed - Normal AST on file in past 12 months    Recent Labs   Lab Test  10/08/18   0645   AST  22            Passed - Recent (12 mo) or future (30 days) visit within the authorizing provider's specialty    Patient had office visit in the last 12 months or has a visit in the next 30 days with authorizing provider or within the authorizing provider's specialty.  See \"Patient Info\" tab in inbasket, or \"Choose Columns\" in Meds & Orders section of the refill encounter.             Passed - Normal CBC on file in past 12 months    Recent Labs   Lab Test 10/11/18   WBC  7.3   RBC  3.36*   HGB  11.1*   HCT  33.5*   PLT  233                Passed - Normal serum creatinine on file in past 12 months    Recent Labs   Lab Test 10/11/18   CR  0.92               "

## 2018-11-12 NOTE — TELEPHONE ENCOUNTER
Novolog  Routing refill request to provider for review/approval because:  Medication is reported/historical    Celecoxib  Routing refill request to provider for review/approval because:  Failed protocol d/t carlo HOGUE RN, BSN, PHN

## 2018-11-13 RX ORDER — CELECOXIB 200 MG/1
CAPSULE ORAL
Qty: 90 CAPSULE | Refills: 1 | Status: SHIPPED | OUTPATIENT
Start: 2018-11-13 | End: 2019-07-15

## 2018-11-13 RX ORDER — INSULIN ASPART 100 [IU]/ML
INJECTION, SUSPENSION SUBCUTANEOUS
Qty: 45 ML | Refills: 1 | Status: ON HOLD | OUTPATIENT
Start: 2018-11-13 | End: 2019-01-05

## 2018-11-15 ENCOUNTER — PATIENT OUTREACH (OUTPATIENT)
Dept: GERIATRIC MEDICINE | Facility: CLINIC | Age: 78
End: 2018-11-15

## 2018-11-15 DIAGNOSIS — Z79.4 TYPE 2 DIABETES MELLITUS WITHOUT COMPLICATION, WITH LONG-TERM CURRENT USE OF INSULIN (H): Primary | ICD-10-CM

## 2018-11-15 DIAGNOSIS — E11.9 TYPE 2 DIABETES MELLITUS WITHOUT COMPLICATION, WITH LONG-TERM CURRENT USE OF INSULIN (H): Primary | ICD-10-CM

## 2018-11-15 ASSESSMENT — PATIENT HEALTH QUESTIONNAIRE - PHQ9: SUM OF ALL RESPONSES TO PHQ QUESTIONS 1-9: 5

## 2018-11-15 NOTE — TELEPHONE ENCOUNTER
"Requested Prescriptions   Pending Prescriptions Disp Refills     NOVOLOG FLEXPEN 100 UNIT/ML soln [Pharmacy Med Name: NovoLOG FlexPen Subcutaneous Solution Pen-injector 100 UNIT/ML] 6 mL 0     Sig: INJECT 10 UNITS SUB-Q DAILY AT BEDTIME    Short Acting Insulin Protocol Passed    11/15/2018  2:53 PM       Passed - Blood pressure less than 140/90 in past 6 months    BP Readings from Last 3 Encounters:   10/25/18 112/70   10/17/18 154/71   10/09/18 126/66                Passed - LDL on file in past 12 months    Recent Labs   Lab Test  01/20/18   0937   LDL  49            Passed - Microalbumin on file in past 12 months    Recent Labs   Lab Test  10/02/18   1655   MICROL  10   UMALCR  13.77            Passed - Serum creatinine on file in past 12 months    Recent Labs   Lab Test 10/11/18   CR  0.92            Passed - HgbA1C in past 3 or 6 months    If HgbA1C is 8 or greater, it needs to be on file within the past 3 months.  If less than 8, must be on file within the past 6 months.     Recent Labs   Lab Test  10/02/18   1644   A1C  8.6*            Passed - Patient is age 18 or older       Passed - Recent (6 mo) or future (30 days) visit within the authorizing provider's specialty    Patient had office visit in the last 6 months or has a visit in the next 30 days with authorizing provider or within the authorizing provider's specialty.  See \"Patient Info\" tab in inbasket, or \"Choose Columns\" in Meds & Orders section of the refill encounter.            "

## 2018-11-15 NOTE — TELEPHONE ENCOUNTER
Last Written Prescription Date:  6/17/2010  Last Fill Quantity: 5,  # refills: 11   Last office visit: 10/25/2018 with prescribing provider:  Alfredo Aragon     Future Office Visit:        Routing refill request to provider for review/approval because:  Drug not active on patient's medication list    Per chart review patient is on 70/30 mix, please advise    Rosangela HOGUE RN, BSN, PHN

## 2018-11-20 ENCOUNTER — PATIENT OUTREACH (OUTPATIENT)
Dept: GERIATRIC MEDICINE | Facility: CLINIC | Age: 78
End: 2018-11-20

## 2018-11-20 NOTE — PROGRESS NOTES
[facility] Care Coordination Contact     [facility] Home Visit Assessment      Home visit for Health Risk Assessment with Rabia Reed completed on November 15, 2018         Current Care Plan  Member currently receiving the following home care services:     Member currently receiving the following community resources:  PCA services, incontinent products PRN and nutritional supplements.     Health Issues: Member is 6 weeks post left total knee replacement. Shared that he continues to have pain and that the pain has been getting better, however he is no longer on the stronger pain medication and reports that the headaches have returned. Again encouraged member to follow up with the pain clinic regarding the chronic pain.     Medication Review  Medication reconciliation completed in Epic: Yes  Medication set-up & administration: Family/informal caregiver sets up weekly.  Family caregiver administers medications.  Medication Risk Assessment Medication (1 or more, place referral to MTM): N/A: No risk factors identified  MTM Referral Placed: No: No risk factors idenified     Mental/Behavioral Health   Depression Screening: See PHQ assessment flowsheet.   Mental Health Diagnosis: No  Mental Health Services: None: No further intervention needed at this time.     Falls Assessment: Reports that he had 1 fall out of bed in the past year.     ADL/IADL Dependencies:  Is dependent with Ambulation-walker, Bathing, Dressing, Grooming, Transfers, Toileting, Cleaning, Cooking, Laundry, Shopping, Meal prep, Medication Management, Money Management and Transportation     [facility] Plan sponsored benefits: Shared information re: Silver Sneakers/gym memberships, ASA, Calcium +D.     PCA Assessment completed at visit: Yes.  Assessment indicated 14 units per day.  This is the same as previous assessment.     Elderly Waiver Eligibility: Yes-will continue on EW     Care Plan & Recommendations: Will continue with daily PCA cares.  Has access to incontinent products as needed when he is ill.  Shared that when he has really bad headaches the only thing he is able to eat is his nutritional supplements, and reports that this happens a couple of times per week.  Due to diabetes uses glucerna, which is only allowed under EW.  Offered ADC to increase socialization and member declines.     See [facility] for detailed assessment information.     Follow-Up Plan: Member informed of future contact, plan to f/u with member with a 6 month telephone assessment.  Contact information shared with member and family, encouraged member to call with any questions or concerns at any time.     [facility] care continuum providers: Please refer to Health Care Home on the Epic Problem List to view this patient's [facility] Care Plan Summary.  ANASTACIA Velázquez  [facility]  [phone]

## 2018-11-20 NOTE — PROGRESS NOTES
[facility] Care Coordination Contact    Received after visit chart from care coordinator.  Completed following tasks: Mailed copy of care plan to client, Updated services in access and Submitted referrals/auths for PCA  Chart was returned to CC.     [facility]:  Faxed completed PCA assessment to PCA Agency and mailed copies to member.  Faxed MD Communication to PCP.  Emailed referral form for auth to Rachel.    Wendi Carrington  Care Management Specialist Supervisor  [facility]  [phone]

## 2018-11-20 NOTE — LETTER
November 20, 2018    Important Plan Information    PIYUSH BOND  [address] [facility] [address]  Your Care Plan  Dear Piyush,  When we spoke recently, I promised to send you a Care Plan. The plan enclosed is a summary of our discussion. It includes the steps we agreed would help you meet your health goals. In addition, I can help you with:  Zjjenya-I-GvzbYS  This program is available to members who need a ride to medical and dental visits. To schedule a ride, call [phone] or [phone] (toll free). TTY/TTD: 711. You can call Monday - Thursday 8 a.m. to 5 p.m. and Fridays 9 a.m. to 5 p.m.   BakedCode   The BakedCode program empowers you to improve your health through education and exercise. To learn more, visit [employer] PTY, or call [facility] at [phone] (toll free) (TTY:711) from 7 a.m. - 7 p.m. Central Time, Monday-Friday.  Health Care Directive   This form helps you outline your health care wishes. You can request a form from me and I will answer any questions you have before you discuss it with your doctor.   Annual Physical  Take a key step on your path to good health and set up an annual physical at your clinic.  Questions?  Call me at [phone] Monday-Friday between 8am and 5pm.  TTY/TTD: 711. As we discussed, I plan to be in touch with you again in 6 months to follow up via phone.  Sincerely,      Charis Garcia, [facility]  [phone]    cc: member records            American Indians can continue to use [facility] and [facility] ([facility] clinics. We will not require prior approval or impose any conditions for you to get services at these clinics. For elders age 65 years and older this includes Elderly Waiver (EW) services accessed through the [facility]. If a doctor or other provider in a [facility] or [facility] clinic refers you to a provider in our network, we will not require you to see your primary care provider prior to the  referral.    For accessible formats of this publication or assistance with equal or access to our services, visit [email or web address], or call [phone] (toll free) or use your preferred relay service.    Auxiliary Aids and Services.   [facility] provides auxiliary aids and services, like qualified interpreters or information in accessible formats, free of charge and in a timely manner, to ensure an equal opportunity to participate in our health care programs. Contact [facility] Customer Service at [email or web address] or call [phone] (toll free) or use your preferred relay service.    Language Assistance Services.   [facility] provides translated documents and spoken language interpreting, free of charge and in a timely manner, when language assistance services are necessary to ensure limited English speakers have meaningful access to our information and services. Contact [employer] at [email or web address] or call [phone] (toll free) or use your preferred relay service.     Civil Rights Notice  Discrimination is against the law. [facility] does not discriminate on the basis of any of the following:    Race    Color    National Origin    Creed    Jew    Age    Public Assistance Status    Receipt of Health Care Services    Disability (including physical or mental impairment)    Sex (including sex stereotypes and gender identity)    Marital Status    Political Beliefs    Medical Condition    Genetic Information    Sexual Orientation    Claims Experience    Medical History    Health Status    Civil Rights Complaints.   You have the right to file a discrimination complaint if you believe you were treated in a discriminatory way by [facility]. You may contact any of the following four agencies directly to file a discrimination complaint.    U.S. Department of Health and Human Services  Office for Civil Rights (OCR)  You have the right to file a complaint with the OCR, a federal agency,  if you believe you have been discriminated against because of any of the following:    Race    Disability    Color    Sex (including sex stereotypes and gender identity)    National Origin    Age    Contact the OCR directly to file a complaint:         Director         U.S. Department of Health and Human Services  Office for Civil Rights         [address] [place] [address]         [address] [address]         [phone] (Voice)         [phone] (TDD)         Complaint Portal - [email or web address]     [place] Department of Human Rights (MDHR)  In [place], you have the right to file a complaint with the MDHR if you believe you have been discriminated against because of any of the following:      Race    Color    National Origin    Methodist    Creed    Sex    Sexual Orientation    Marital Status    Public Assistance Status    Contact the MD directly to file a complaint:         [place] Department of Human Rights         [address] [address]         [phone] (voice)          [phone] (toll free)         711 or [phone] ([place] Relay)         [phone] (Fax)         [email or web address] (Email)     [place] Department of Human Services (DHS)  You have the right to file a complaint with [facility] if you believe you have been discriminated against in our health care programs because of any of the following:    Race    Color    National Origin    Creed    Methodist    Age    Public Assistance Status    Receipt of Health Care Services    Disability (including physical or mental impairment)    Sex (including sex stereotypes and gender identity)    Marital Status    Political Beliefs    Medical Condition    Genetic Information    Sexual Orientation    Claims Experience    Medical History    Health Status    Complaints must be in writing and filed within 180 days of the date you discovered the  alleged discrimination. The complaint must contain your name and address and describe the discrimination you are complaining about. After we get your complaint, we will review it and notify you in writing about whether we have authority to investigate. If we do, we will investigate the complaint.      [facility] will notify you in writing of the investigation s outcome. You have a right to appeal the outcome if you disagree with the decision. To appeal, you must send a written request to have [facility] review the investigation outcome period. Be brief and state why you disagree with the decision. Include additional information you think is important.      If you file a complaint in this way, the people who work for the agency named in the complaint cannot retaliate against you. This means they cannot punish you in any way for filing a complaint. Filing a complaint in this way does not stop you from seeking out other legal or administration actions.     Contact [facility] directly to file a discrimination complaint:        ATTN: Civil Rights Coordinator        [place] Department of Human [facility]        Equal Opportunity and Access Division        [address] [address]        [place] [address] [phone] (voice) or use your preferred relay service     [facility] Complaint Notice   Contact [facility] directly to file a discrimination complaint:  [facility] Civil Rights Coordinator  Mail Route   [address]  [place] [address] [phone] (voice) or use your preferred relay service  [email or web address]

## 2018-11-28 RX ORDER — INSULIN ASPART 100 [IU]/ML
INJECTION, SOLUTION INTRAVENOUS; SUBCUTANEOUS
Qty: 15 ML | Refills: 3 | Status: ON HOLD | OUTPATIENT
Start: 2018-11-28 | End: 2019-01-05

## 2018-11-30 ENCOUNTER — TRANSFERRED RECORDS (OUTPATIENT)
Dept: HEALTH INFORMATION MANAGEMENT | Facility: CLINIC | Age: 78
End: 2018-11-30

## 2018-12-17 NOTE — PROGRESS NOTES
Per PCA agency - [facility] had not auth on file.  This CMS resent PCA auth request to Ester at [facility].  CC notified.  Mariza Parr  Case Management Specialist  [facility]  [phone]

## 2018-12-30 ENCOUNTER — APPOINTMENT (OUTPATIENT)
Dept: CT IMAGING | Facility: CLINIC | Age: 78
DRG: 418 | End: 2018-12-30
Attending: EMERGENCY MEDICINE
Payer: COMMERCIAL

## 2018-12-30 ENCOUNTER — HOSPITAL ENCOUNTER (INPATIENT)
Facility: CLINIC | Age: 78
LOS: 7 days | Discharge: HOME | DRG: 418 | End: 2019-01-06
Attending: EMERGENCY MEDICINE | Admitting: HOSPITALIST
Payer: COMMERCIAL

## 2018-12-30 DIAGNOSIS — Z79.4 TYPE 2 DIABETES MELLITUS WITHOUT COMPLICATION, WITH LONG-TERM CURRENT USE OF INSULIN (H): ICD-10-CM

## 2018-12-30 DIAGNOSIS — Z96.652 STATUS POST TOTAL LEFT KNEE REPLACEMENT: ICD-10-CM

## 2018-12-30 DIAGNOSIS — K81.0 ACUTE CHOLECYSTITIS: ICD-10-CM

## 2018-12-30 DIAGNOSIS — K81.0 CHOLECYSTITIS, ACUTE: Primary | ICD-10-CM

## 2018-12-30 DIAGNOSIS — E11.9 TYPE 2 DIABETES MELLITUS WITHOUT COMPLICATION, WITH LONG-TERM CURRENT USE OF INSULIN (H): ICD-10-CM

## 2018-12-30 LAB
ALBUMIN SERPL-MCNC: 3.7 G/DL (ref 3.4–5)
ALBUMIN UR-MCNC: 30 MG/DL
ALP SERPL-CCNC: 163 U/L (ref 40–150)
ALT SERPL W P-5'-P-CCNC: 388 U/L (ref 0–70)
AMORPH CRY #/AREA URNS HPF: ABNORMAL /HPF
ANION GAP SERPL CALCULATED.3IONS-SCNC: 9 MMOL/L (ref 3–14)
APPEARANCE UR: ABNORMAL
AST SERPL W P-5'-P-CCNC: 687 U/L (ref 0–45)
BACTERIA #/AREA URNS HPF: ABNORMAL /HPF
BASOPHILS # BLD AUTO: 0 10E9/L (ref 0–0.2)
BASOPHILS NFR BLD AUTO: 0.1 %
BILIRUB SERPL-MCNC: 1.9 MG/DL (ref 0.2–1.3)
BILIRUB UR QL STRIP: NEGATIVE
BUN SERPL-MCNC: 19 MG/DL (ref 7–30)
CALCIUM SERPL-MCNC: 8.8 MG/DL (ref 8.5–10.1)
CHLORIDE SERPL-SCNC: 100 MMOL/L (ref 94–109)
CO2 SERPL-SCNC: 29 MMOL/L (ref 20–32)
COLOR UR AUTO: YELLOW
CREAT SERPL-MCNC: 0.79 MG/DL (ref 0.66–1.25)
DIFFERENTIAL METHOD BLD: ABNORMAL
EOSINOPHIL # BLD AUTO: 0 10E9/L (ref 0–0.7)
EOSINOPHIL NFR BLD AUTO: 0 %
ERYTHROCYTE [DISTWIDTH] IN BLOOD BY AUTOMATED COUNT: 12.6 % (ref 10–15)
GFR SERPL CREATININE-BSD FRML MDRD: 85 ML/MIN/{1.73_M2}
GLUCOSE SERPL-MCNC: 219 MG/DL (ref 70–99)
GLUCOSE UR STRIP-MCNC: 150 MG/DL
HCT VFR BLD AUTO: 43.8 % (ref 40–53)
HGB BLD-MCNC: 14.7 G/DL (ref 13.3–17.7)
HGB UR QL STRIP: ABNORMAL
IMM GRANULOCYTES # BLD: 0 10E9/L (ref 0–0.4)
IMM GRANULOCYTES NFR BLD: 0.2 %
KETONES UR STRIP-MCNC: 40 MG/DL
LEUKOCYTE ESTERASE UR QL STRIP: NEGATIVE
LIPASE SERPL-CCNC: 99 U/L (ref 73–393)
LYMPHOCYTES # BLD AUTO: 0.4 10E9/L (ref 0.8–5.3)
LYMPHOCYTES NFR BLD AUTO: 3 %
MCH RBC QN AUTO: 33.1 PG (ref 26.5–33)
MCHC RBC AUTO-ENTMCNC: 33.6 G/DL (ref 31.5–36.5)
MCV RBC AUTO: 99 FL (ref 78–100)
MONOCYTES # BLD AUTO: 0.3 10E9/L (ref 0–1.3)
MONOCYTES NFR BLD AUTO: 2 %
MUCOUS THREADS #/AREA URNS LPF: PRESENT /LPF
NEUTROPHILS # BLD AUTO: 12.1 10E9/L (ref 1.6–8.3)
NEUTROPHILS NFR BLD AUTO: 94.7 %
NITRATE UR QL: NEGATIVE
NRBC # BLD AUTO: 0 10*3/UL
NRBC BLD AUTO-RTO: 0 /100
PH UR STRIP: 7.5 PH (ref 5–7)
PLATELET # BLD AUTO: 192 10E9/L (ref 150–450)
POTASSIUM SERPL-SCNC: 3.9 MMOL/L (ref 3.4–5.3)
PROT SERPL-MCNC: 7.7 G/DL (ref 6.8–8.8)
RBC # BLD AUTO: 4.44 10E12/L (ref 4.4–5.9)
RBC #/AREA URNS AUTO: 25 /HPF (ref 0–2)
SODIUM SERPL-SCNC: 138 MMOL/L (ref 133–144)
SOURCE: ABNORMAL
SP GR UR STRIP: 1.01 (ref 1–1.03)
SQUAMOUS #/AREA URNS AUTO: <1 /HPF (ref 0–1)
UROBILINOGEN UR STRIP-MCNC: 2 MG/DL (ref 0–2)
WBC # BLD AUTO: 12.8 10E9/L (ref 4–11)
WBC #/AREA URNS AUTO: 0 /HPF (ref 0–5)

## 2018-12-30 PROCEDURE — 74177 CT ABD & PELVIS W/CONTRAST: CPT

## 2018-12-30 PROCEDURE — 36415 COLL VENOUS BLD VENIPUNCTURE: CPT | Performed by: INTERNAL MEDICINE

## 2018-12-30 PROCEDURE — 99223 1ST HOSP IP/OBS HIGH 75: CPT | Mod: AI | Performed by: HOSPITALIST

## 2018-12-30 PROCEDURE — 25000125 ZZHC RX 250: Performed by: EMERGENCY MEDICINE

## 2018-12-30 PROCEDURE — 81001 URINALYSIS AUTO W/SCOPE: CPT | Performed by: EMERGENCY MEDICINE

## 2018-12-30 PROCEDURE — 85025 COMPLETE CBC W/AUTO DIFF WBC: CPT | Performed by: EMERGENCY MEDICINE

## 2018-12-30 PROCEDURE — 12000000 ZZH R&B MED SURG/OB

## 2018-12-30 PROCEDURE — 96365 THER/PROPH/DIAG IV INF INIT: CPT | Mod: 59

## 2018-12-30 PROCEDURE — 83605 ASSAY OF LACTIC ACID: CPT | Performed by: INTERNAL MEDICINE

## 2018-12-30 PROCEDURE — 83690 ASSAY OF LIPASE: CPT | Performed by: EMERGENCY MEDICINE

## 2018-12-30 PROCEDURE — 25000128 H RX IP 250 OP 636: Performed by: HOSPITALIST

## 2018-12-30 PROCEDURE — 00000146 ZZHCL STATISTIC GLUCOSE BY METER IP

## 2018-12-30 PROCEDURE — 99285 EMERGENCY DEPT VISIT HI MDM: CPT | Mod: 25

## 2018-12-30 PROCEDURE — 96361 HYDRATE IV INFUSION ADD-ON: CPT

## 2018-12-30 PROCEDURE — 25000128 H RX IP 250 OP 636: Performed by: EMERGENCY MEDICINE

## 2018-12-30 PROCEDURE — 80053 COMPREHEN METABOLIC PANEL: CPT | Performed by: EMERGENCY MEDICINE

## 2018-12-30 PROCEDURE — 96375 TX/PRO/DX INJ NEW DRUG ADDON: CPT

## 2018-12-30 RX ORDER — ONDANSETRON 2 MG/ML
4 INJECTION INTRAMUSCULAR; INTRAVENOUS ONCE
Status: COMPLETED | OUTPATIENT
Start: 2018-12-30 | End: 2018-12-30

## 2018-12-30 RX ORDER — IOPAMIDOL 755 MG/ML
88 INJECTION, SOLUTION INTRAVASCULAR ONCE
Status: COMPLETED | OUTPATIENT
Start: 2018-12-30 | End: 2018-12-30

## 2018-12-30 RX ORDER — HYDROMORPHONE HYDROCHLORIDE 1 MG/ML
.2-.4 INJECTION, SOLUTION INTRAMUSCULAR; INTRAVENOUS; SUBCUTANEOUS EVERY 4 HOURS PRN
Status: DISCONTINUED | OUTPATIENT
Start: 2018-12-30 | End: 2019-01-02

## 2018-12-30 RX ORDER — ONDANSETRON 2 MG/ML
4 INJECTION INTRAMUSCULAR; INTRAVENOUS EVERY 6 HOURS PRN
Status: DISCONTINUED | OUTPATIENT
Start: 2018-12-30 | End: 2019-01-06 | Stop reason: HOSPADM

## 2018-12-30 RX ORDER — POTASSIUM CL/LIDO/0.9 % NACL 10MEQ/0.1L
10 INTRAVENOUS SOLUTION, PIGGYBACK (ML) INTRAVENOUS
Status: DISCONTINUED | OUTPATIENT
Start: 2018-12-30 | End: 2019-01-03

## 2018-12-30 RX ORDER — TAMSULOSIN HYDROCHLORIDE 0.4 MG/1
0.4 CAPSULE ORAL DAILY
COMMUNITY
End: 2019-04-17

## 2018-12-30 RX ORDER — POTASSIUM CHLORIDE 1.5 G/1.58G
20-40 POWDER, FOR SOLUTION ORAL
Status: DISCONTINUED | OUTPATIENT
Start: 2018-12-30 | End: 2019-01-03

## 2018-12-30 RX ORDER — SODIUM CHLORIDE 9 MG/ML
INJECTION, SOLUTION INTRAVENOUS CONTINUOUS
Status: DISCONTINUED | OUTPATIENT
Start: 2018-12-30 | End: 2019-01-03

## 2018-12-30 RX ORDER — ONDANSETRON 4 MG/1
4 TABLET, ORALLY DISINTEGRATING ORAL ONCE
Status: DISCONTINUED | OUTPATIENT
Start: 2018-12-30 | End: 2018-12-30 | Stop reason: CLARIF

## 2018-12-30 RX ORDER — POTASSIUM CHLORIDE 29.8 MG/ML
20 INJECTION INTRAVENOUS
Status: DISCONTINUED | OUTPATIENT
Start: 2018-12-30 | End: 2019-01-03

## 2018-12-30 RX ORDER — DEXTROSE MONOHYDRATE 25 G/50ML
25-50 INJECTION, SOLUTION INTRAVENOUS
Status: DISCONTINUED | OUTPATIENT
Start: 2018-12-30 | End: 2019-01-06 | Stop reason: HOSPADM

## 2018-12-30 RX ORDER — POTASSIUM CHLORIDE 7.45 MG/ML
10 INJECTION INTRAVENOUS
Status: DISCONTINUED | OUTPATIENT
Start: 2018-12-30 | End: 2019-01-03

## 2018-12-30 RX ORDER — ONDANSETRON 4 MG/1
4 TABLET, ORALLY DISINTEGRATING ORAL EVERY 6 HOURS PRN
Status: DISCONTINUED | OUTPATIENT
Start: 2018-12-30 | End: 2019-01-06 | Stop reason: HOSPADM

## 2018-12-30 RX ORDER — HYDROMORPHONE HYDROCHLORIDE 1 MG/ML
0.5 INJECTION, SOLUTION INTRAMUSCULAR; INTRAVENOUS; SUBCUTANEOUS
Status: DISCONTINUED | OUTPATIENT
Start: 2018-12-30 | End: 2018-12-30

## 2018-12-30 RX ORDER — ACETAMINOPHEN 325 MG/1
650 TABLET ORAL EVERY 4 HOURS PRN
Status: DISCONTINUED | OUTPATIENT
Start: 2018-12-30 | End: 2019-01-06 | Stop reason: HOSPADM

## 2018-12-30 RX ORDER — NALOXONE HYDROCHLORIDE 0.4 MG/ML
.1-.4 INJECTION, SOLUTION INTRAMUSCULAR; INTRAVENOUS; SUBCUTANEOUS
Status: DISCONTINUED | OUTPATIENT
Start: 2018-12-30 | End: 2019-01-06 | Stop reason: HOSPADM

## 2018-12-30 RX ORDER — NICOTINE POLACRILEX 4 MG
15-30 LOZENGE BUCCAL
Status: DISCONTINUED | OUTPATIENT
Start: 2018-12-30 | End: 2019-01-06 | Stop reason: HOSPADM

## 2018-12-30 RX ORDER — SODIUM CHLORIDE 9 MG/ML
1000 INJECTION, SOLUTION INTRAVENOUS CONTINUOUS
Status: DISCONTINUED | OUTPATIENT
Start: 2018-12-30 | End: 2018-12-30

## 2018-12-30 RX ORDER — PIPERACILLIN SODIUM, TAZOBACTAM SODIUM 3; .375 G/15ML; G/15ML
3.38 INJECTION, POWDER, LYOPHILIZED, FOR SOLUTION INTRAVENOUS ONCE
Status: COMPLETED | OUTPATIENT
Start: 2018-12-30 | End: 2018-12-30

## 2018-12-30 RX ORDER — GABAPENTIN 300 MG/1
CAPSULE ORAL
Status: ON HOLD | COMMUNITY
End: 2018-12-31

## 2018-12-30 RX ORDER — POTASSIUM CHLORIDE 1500 MG/1
20-40 TABLET, EXTENDED RELEASE ORAL
Status: DISCONTINUED | OUTPATIENT
Start: 2018-12-30 | End: 2019-01-03

## 2018-12-30 RX ORDER — ASPIRIN 81 MG/1
81 TABLET ORAL DAILY
COMMUNITY
End: 2023-10-06 | Stop reason: DRUGHIGH

## 2018-12-30 RX ADMIN — ONDANSETRON 4 MG: 2 INJECTION INTRAMUSCULAR; INTRAVENOUS at 19:21

## 2018-12-30 RX ADMIN — SODIUM CHLORIDE 67 ML: 9 INJECTION, SOLUTION INTRAVENOUS at 21:12

## 2018-12-30 RX ADMIN — SODIUM CHLORIDE 1000 ML: 9 INJECTION, SOLUTION INTRAVENOUS at 22:55

## 2018-12-30 RX ADMIN — PIPERACILLIN SODIUM,TAZOBACTAM SODIUM 3.38 G: 3; .375 INJECTION, POWDER, FOR SOLUTION INTRAVENOUS at 22:14

## 2018-12-30 RX ADMIN — IOPAMIDOL 88 ML: 755 INJECTION, SOLUTION INTRAVENOUS at 21:10

## 2018-12-30 RX ADMIN — SODIUM CHLORIDE: 9 INJECTION, SOLUTION INTRAVENOUS at 23:41

## 2018-12-30 RX ADMIN — SODIUM CHLORIDE 1000 ML: 9 INJECTION, SOLUTION INTRAVENOUS at 19:21

## 2018-12-30 RX ADMIN — SODIUM CHLORIDE 500 ML: 9 INJECTION, SOLUTION INTRAVENOUS at 23:58

## 2018-12-30 ASSESSMENT — ENCOUNTER SYMPTOMS
NAUSEA: 1
ABDOMINAL PAIN: 1
CONSTIPATION: 0
VOMITING: 1
DIFFICULTY URINATING: 0
FREQUENCY: 0
HEMATURIA: 0
DIARRHEA: 0
ABDOMINAL DISTENTION: 0
DYSURIA: 0

## 2018-12-30 ASSESSMENT — MIFFLIN-ST. JEOR
SCORE: 1457.89
SCORE: 1457.89

## 2018-12-31 ENCOUNTER — APPOINTMENT (OUTPATIENT)
Dept: ULTRASOUND IMAGING | Facility: CLINIC | Age: 78
DRG: 418 | End: 2018-12-31
Attending: EMERGENCY MEDICINE
Payer: COMMERCIAL

## 2018-12-31 ENCOUNTER — ANESTHESIA EVENT (OUTPATIENT)
Dept: SURGERY | Facility: CLINIC | Age: 78
DRG: 418 | End: 2018-12-31
Payer: COMMERCIAL

## 2018-12-31 ENCOUNTER — PATIENT OUTREACH (OUTPATIENT)
Dept: GERIATRIC MEDICINE | Facility: CLINIC | Age: 78
End: 2018-12-31

## 2018-12-31 ENCOUNTER — ANESTHESIA (OUTPATIENT)
Dept: SURGERY | Facility: CLINIC | Age: 78
DRG: 418 | End: 2018-12-31
Payer: COMMERCIAL

## 2018-12-31 ENCOUNTER — APPOINTMENT (OUTPATIENT)
Dept: GENERAL RADIOLOGY | Facility: CLINIC | Age: 78
DRG: 418 | End: 2018-12-31
Attending: HOSPITALIST
Payer: COMMERCIAL

## 2018-12-31 ENCOUNTER — APPOINTMENT (OUTPATIENT)
Dept: GENERAL RADIOLOGY | Facility: CLINIC | Age: 78
DRG: 418 | End: 2018-12-31
Attending: INTERNAL MEDICINE
Payer: COMMERCIAL

## 2018-12-31 LAB
ALBUMIN SERPL-MCNC: 3 G/DL (ref 3.4–5)
ALP SERPL-CCNC: 181 U/L (ref 40–150)
ALT SERPL W P-5'-P-CCNC: 2057 U/L (ref 0–70)
ANION GAP SERPL CALCULATED.3IONS-SCNC: 9 MMOL/L (ref 3–14)
AST SERPL W P-5'-P-CCNC: 2420 U/L (ref 0–45)
BASOPHILS # BLD AUTO: 0 10E9/L (ref 0–0.2)
BASOPHILS NFR BLD AUTO: 0.1 %
BILIRUB SERPL-MCNC: 2.9 MG/DL (ref 0.2–1.3)
BUN SERPL-MCNC: 19 MG/DL (ref 7–30)
CALCIUM SERPL-MCNC: 8 MG/DL (ref 8.5–10.1)
CHLORIDE SERPL-SCNC: 106 MMOL/L (ref 94–109)
CO2 SERPL-SCNC: 25 MMOL/L (ref 20–32)
CREAT SERPL-MCNC: 1.03 MG/DL (ref 0.66–1.25)
DIFFERENTIAL METHOD BLD: ABNORMAL
EOSINOPHIL # BLD AUTO: 0 10E9/L (ref 0–0.7)
EOSINOPHIL NFR BLD AUTO: 0 %
ERYTHROCYTE [DISTWIDTH] IN BLOOD BY AUTOMATED COUNT: 13 % (ref 10–15)
GFR SERPL CREATININE-BSD FRML MDRD: 68 ML/MIN/{1.73_M2}
GLUCOSE BLDC GLUCOMTR-MCNC: 197 MG/DL (ref 70–99)
GLUCOSE BLDC GLUCOMTR-MCNC: 205 MG/DL (ref 70–99)
GLUCOSE BLDC GLUCOMTR-MCNC: 206 MG/DL (ref 70–99)
GLUCOSE BLDC GLUCOMTR-MCNC: 215 MG/DL (ref 70–99)
GLUCOSE BLDC GLUCOMTR-MCNC: 230 MG/DL (ref 70–99)
GLUCOSE BLDC GLUCOMTR-MCNC: 233 MG/DL (ref 70–99)
GLUCOSE BLDC GLUCOMTR-MCNC: 250 MG/DL (ref 70–99)
GLUCOSE SERPL-MCNC: 221 MG/DL (ref 70–99)
HCT VFR BLD AUTO: 40.5 % (ref 40–53)
HGB BLD-MCNC: 13.5 G/DL (ref 13.3–17.7)
IMM GRANULOCYTES # BLD: 0 10E9/L (ref 0–0.4)
IMM GRANULOCYTES NFR BLD: 0.2 %
LACTATE BLD-SCNC: 1.8 MMOL/L (ref 0.7–2)
LYMPHOCYTES # BLD AUTO: 0.3 10E9/L (ref 0.8–5.3)
LYMPHOCYTES NFR BLD AUTO: 2.9 %
MCH RBC QN AUTO: 33 PG (ref 26.5–33)
MCHC RBC AUTO-ENTMCNC: 33.3 G/DL (ref 31.5–36.5)
MCV RBC AUTO: 99 FL (ref 78–100)
MONOCYTES # BLD AUTO: 0.3 10E9/L (ref 0–1.3)
MONOCYTES NFR BLD AUTO: 2.1 %
NEUTROPHILS # BLD AUTO: 11.3 10E9/L (ref 1.6–8.3)
NEUTROPHILS NFR BLD AUTO: 94.7 %
NRBC # BLD AUTO: 0 10*3/UL
NRBC BLD AUTO-RTO: 0 /100
PLATELET # BLD AUTO: 158 10E9/L (ref 150–450)
POTASSIUM SERPL-SCNC: 4.2 MMOL/L (ref 3.4–5.3)
PROCALCITONIN SERPL-MCNC: 6.18 NG/ML
PROT SERPL-MCNC: 6.5 G/DL (ref 6.8–8.8)
RBC # BLD AUTO: 4.09 10E12/L (ref 4.4–5.9)
SODIUM SERPL-SCNC: 140 MMOL/L (ref 133–144)
WBC # BLD AUTO: 11.9 10E9/L (ref 4–11)

## 2018-12-31 PROCEDURE — 25000132 ZZH RX MED GY IP 250 OP 250 PS 637: Performed by: HOSPITALIST

## 2018-12-31 PROCEDURE — 99232 SBSQ HOSP IP/OBS MODERATE 35: CPT | Performed by: INTERNAL MEDICINE

## 2018-12-31 PROCEDURE — 25000128 H RX IP 250 OP 636: Performed by: NURSE ANESTHETIST, CERTIFIED REGISTERED

## 2018-12-31 PROCEDURE — 84145 PROCALCITONIN (PCT): CPT | Performed by: HOSPITALIST

## 2018-12-31 PROCEDURE — 76705 ECHO EXAM OF ABDOMEN: CPT

## 2018-12-31 PROCEDURE — 37000008 ZZH ANESTHESIA TECHNICAL FEE, 1ST 30 MIN: Performed by: INTERNAL MEDICINE

## 2018-12-31 PROCEDURE — 36000056 ZZH SURGERY LEVEL 3 1ST 30 MIN: Performed by: INTERNAL MEDICINE

## 2018-12-31 PROCEDURE — 40000170 ZZH STATISTIC PRE-PROCEDURE ASSESSMENT II: Performed by: INTERNAL MEDICINE

## 2018-12-31 PROCEDURE — 25000125 ZZHC RX 250: Performed by: HOSPITALIST

## 2018-12-31 PROCEDURE — 25000125 ZZHC RX 250: Performed by: INTERNAL MEDICINE

## 2018-12-31 PROCEDURE — 37000009 ZZH ANESTHESIA TECHNICAL FEE, EACH ADDTL 15 MIN: Performed by: INTERNAL MEDICINE

## 2018-12-31 PROCEDURE — 12000000 ZZH R&B MED SURG/OB

## 2018-12-31 PROCEDURE — 0FC98ZZ EXTIRPATION OF MATTER FROM COMMON BILE DUCT, VIA NATURAL OR ARTIFICIAL OPENING ENDOSCOPIC: ICD-10-PCS | Performed by: INTERNAL MEDICINE

## 2018-12-31 PROCEDURE — 99222 1ST HOSP IP/OBS MODERATE 55: CPT | Mod: 57 | Performed by: SURGERY

## 2018-12-31 PROCEDURE — 71000012 ZZH RECOVERY PHASE 1 LEVEL 1 FIRST HR: Performed by: INTERNAL MEDICINE

## 2018-12-31 PROCEDURE — 85025 COMPLETE CBC W/AUTO DIFF WBC: CPT | Performed by: HOSPITALIST

## 2018-12-31 PROCEDURE — 93005 ELECTROCARDIOGRAM TRACING: CPT

## 2018-12-31 PROCEDURE — 25000128 H RX IP 250 OP 636: Performed by: HOSPITALIST

## 2018-12-31 PROCEDURE — 25000125 ZZHC RX 250: Performed by: NURSE ANESTHETIST, CERTIFIED REGISTERED

## 2018-12-31 PROCEDURE — 80053 COMPREHEN METABOLIC PANEL: CPT | Performed by: HOSPITALIST

## 2018-12-31 PROCEDURE — 99207 ZZC CDG-MDM COMPONENT: MEETS LOW - DOWN CODED: CPT | Performed by: INTERNAL MEDICINE

## 2018-12-31 PROCEDURE — 00000146 ZZHCL STATISTIC GLUCOSE BY METER IP

## 2018-12-31 PROCEDURE — 36000058 ZZH SURGERY LEVEL 3 EA 15 ADDTL MIN: Performed by: INTERNAL MEDICINE

## 2018-12-31 PROCEDURE — 36415 COLL VENOUS BLD VENIPUNCTURE: CPT | Performed by: HOSPITALIST

## 2018-12-31 PROCEDURE — 25000131 ZZH RX MED GY IP 250 OP 636 PS 637: Performed by: HOSPITALIST

## 2018-12-31 PROCEDURE — 93010 ELECTROCARDIOGRAM REPORT: CPT | Performed by: INTERNAL MEDICINE

## 2018-12-31 PROCEDURE — 74330 X-RAY BILE/PANC ENDOSCOPY: CPT

## 2018-12-31 PROCEDURE — 25000128 H RX IP 250 OP 636: Performed by: ANESTHESIOLOGY

## 2018-12-31 RX ORDER — NALOXONE HYDROCHLORIDE 0.4 MG/ML
.1-.4 INJECTION, SOLUTION INTRAMUSCULAR; INTRAVENOUS; SUBCUTANEOUS
Status: DISCONTINUED | OUTPATIENT
Start: 2018-12-31 | End: 2019-01-01

## 2018-12-31 RX ORDER — TAMSULOSIN HYDROCHLORIDE 0.4 MG/1
0.4 CAPSULE ORAL DAILY
Status: DISCONTINUED | OUTPATIENT
Start: 2018-12-31 | End: 2019-01-06 | Stop reason: HOSPADM

## 2018-12-31 RX ORDER — PROPOFOL 10 MG/ML
INJECTION, EMULSION INTRAVENOUS PRN
Status: DISCONTINUED | OUTPATIENT
Start: 2018-12-31 | End: 2018-12-31

## 2018-12-31 RX ORDER — ONDANSETRON 2 MG/ML
INJECTION INTRAMUSCULAR; INTRAVENOUS PRN
Status: DISCONTINUED | OUTPATIENT
Start: 2018-12-31 | End: 2018-12-31

## 2018-12-31 RX ORDER — PROPOFOL 10 MG/ML
INJECTION, EMULSION INTRAVENOUS CONTINUOUS PRN
Status: DISCONTINUED | OUTPATIENT
Start: 2018-12-31 | End: 2018-12-31

## 2018-12-31 RX ORDER — SODIUM CHLORIDE, SODIUM LACTATE, POTASSIUM CHLORIDE, CALCIUM CHLORIDE 600; 310; 30; 20 MG/100ML; MG/100ML; MG/100ML; MG/100ML
INJECTION, SOLUTION INTRAVENOUS CONTINUOUS
Status: DISCONTINUED | OUTPATIENT
Start: 2018-12-31 | End: 2018-12-31 | Stop reason: HOSPADM

## 2018-12-31 RX ORDER — FENTANYL CITRATE 50 UG/ML
INJECTION, SOLUTION INTRAMUSCULAR; INTRAVENOUS PRN
Status: DISCONTINUED | OUTPATIENT
Start: 2018-12-31 | End: 2018-12-31

## 2018-12-31 RX ORDER — INDOMETHACIN 50 MG/1
100 SUPPOSITORY RECTAL
Status: COMPLETED | OUTPATIENT
Start: 2018-12-31 | End: 2018-12-31

## 2018-12-31 RX ORDER — LIDOCAINE 40 MG/G
CREAM TOPICAL
Status: DISCONTINUED | OUTPATIENT
Start: 2018-12-31 | End: 2018-12-31 | Stop reason: HOSPADM

## 2018-12-31 RX ORDER — LIDOCAINE HYDROCHLORIDE 20 MG/ML
INJECTION, SOLUTION INFILTRATION; PERINEURAL PRN
Status: DISCONTINUED | OUTPATIENT
Start: 2018-12-31 | End: 2018-12-31

## 2018-12-31 RX ORDER — PIPERACILLIN SODIUM, TAZOBACTAM SODIUM 3; .375 G/15ML; G/15ML
3.38 INJECTION, POWDER, LYOPHILIZED, FOR SOLUTION INTRAVENOUS EVERY 6 HOURS
Status: DISCONTINUED | OUTPATIENT
Start: 2018-12-31 | End: 2019-01-04

## 2018-12-31 RX ORDER — LIDOCAINE 40 MG/G
CREAM TOPICAL
Status: DISCONTINUED | OUTPATIENT
Start: 2018-12-31 | End: 2018-12-31

## 2018-12-31 RX ORDER — FINASTERIDE 5 MG/1
5 TABLET, FILM COATED ORAL EVERY EVENING
Status: DISCONTINUED | OUTPATIENT
Start: 2018-12-31 | End: 2019-01-06 | Stop reason: HOSPADM

## 2018-12-31 RX ORDER — SODIUM CHLORIDE, SODIUM LACTATE, POTASSIUM CHLORIDE, CALCIUM CHLORIDE 600; 310; 30; 20 MG/100ML; MG/100ML; MG/100ML; MG/100ML
INJECTION, SOLUTION INTRAVENOUS CONTINUOUS PRN
Status: DISCONTINUED | OUTPATIENT
Start: 2018-12-31 | End: 2018-12-31

## 2018-12-31 RX ORDER — IBUPROFEN 200 MG
200-400 TABLET ORAL EVERY 6 HOURS PRN
Status: DISCONTINUED | OUTPATIENT
Start: 2018-12-31 | End: 2019-01-06 | Stop reason: HOSPADM

## 2018-12-31 RX ORDER — LANOLIN ALCOHOL/MO/W.PET/CERES
3 CREAM (GRAM) TOPICAL
Status: DISCONTINUED | OUTPATIENT
Start: 2018-12-31 | End: 2019-01-06 | Stop reason: HOSPADM

## 2018-12-31 RX ORDER — FLUMAZENIL 0.1 MG/ML
0.2 INJECTION, SOLUTION INTRAVENOUS
Status: DISCONTINUED | OUTPATIENT
Start: 2018-12-31 | End: 2019-01-01

## 2018-12-31 RX ADMIN — PIPERACILLIN SODIUM,TAZOBACTAM SODIUM 3.38 G: 3; .375 INJECTION, POWDER, FOR SOLUTION INTRAVENOUS at 03:55

## 2018-12-31 RX ADMIN — INSULIN ASPART 3 UNITS: 100 INJECTION, SOLUTION INTRAVENOUS; SUBCUTANEOUS at 04:00

## 2018-12-31 RX ADMIN — SODIUM CHLORIDE, POTASSIUM CHLORIDE, SODIUM LACTATE AND CALCIUM CHLORIDE: 600; 310; 30; 20 INJECTION, SOLUTION INTRAVENOUS at 13:54

## 2018-12-31 RX ADMIN — INSULIN ASPART 4 UNITS: 100 INJECTION, SOLUTION INTRAVENOUS; SUBCUTANEOUS at 08:33

## 2018-12-31 RX ADMIN — INSULIN ASPART 4 UNITS: 100 INJECTION, SOLUTION INTRAVENOUS; SUBCUTANEOUS at 00:02

## 2018-12-31 RX ADMIN — INSULIN ASPART 4 UNITS: 100 INJECTION, SOLUTION INTRAVENOUS; SUBCUTANEOUS at 12:31

## 2018-12-31 RX ADMIN — FINASTERIDE 5 MG: 5 TABLET, FILM COATED ORAL at 20:19

## 2018-12-31 RX ADMIN — PIPERACILLIN SODIUM,TAZOBACTAM SODIUM 3.38 G: 3; .375 INJECTION, POWDER, FOR SOLUTION INTRAVENOUS at 22:42

## 2018-12-31 RX ADMIN — DEXMEDETOMIDINE HYDROCHLORIDE 20 MCG: 100 INJECTION, SOLUTION INTRAVENOUS at 13:56

## 2018-12-31 RX ADMIN — ONDANSETRON 4 MG: 2 INJECTION INTRAMUSCULAR; INTRAVENOUS at 14:24

## 2018-12-31 RX ADMIN — FAMOTIDINE 20 MG: 10 INJECTION, SOLUTION INTRAVENOUS at 09:25

## 2018-12-31 RX ADMIN — FENTANYL CITRATE 50 MCG: 50 INJECTION, SOLUTION INTRAMUSCULAR; INTRAVENOUS at 14:19

## 2018-12-31 RX ADMIN — LIDOCAINE HYDROCHLORIDE 60 MG: 20 INJECTION, SOLUTION INFILTRATION; PERINEURAL at 14:07

## 2018-12-31 RX ADMIN — INSULIN ASPART 4 UNITS: 100 INJECTION, SOLUTION INTRAVENOUS; SUBCUTANEOUS at 23:39

## 2018-12-31 RX ADMIN — INSULIN ASPART 5 UNITS: 100 INJECTION, SOLUTION INTRAVENOUS; SUBCUTANEOUS at 20:43

## 2018-12-31 RX ADMIN — FAMOTIDINE 20 MG: 10 INJECTION, SOLUTION INTRAVENOUS at 22:42

## 2018-12-31 RX ADMIN — INSULIN GLARGINE 10 UNITS: 100 INJECTION, SOLUTION SUBCUTANEOUS at 08:32

## 2018-12-31 RX ADMIN — PIPERACILLIN SODIUM,TAZOBACTAM SODIUM 3.38 G: 3; .375 INJECTION, POWDER, FOR SOLUTION INTRAVENOUS at 09:25

## 2018-12-31 RX ADMIN — PROPOFOL 30 MG: 10 INJECTION, EMULSION INTRAVENOUS at 14:07

## 2018-12-31 RX ADMIN — SODIUM CHLORIDE: 9 INJECTION, SOLUTION INTRAVENOUS at 18:32

## 2018-12-31 RX ADMIN — FAMOTIDINE 20 MG: 10 INJECTION, SOLUTION INTRAVENOUS at 00:02

## 2018-12-31 RX ADMIN — PROPOFOL 100 MCG/KG/MIN: 10 INJECTION, EMULSION INTRAVENOUS at 14:11

## 2018-12-31 RX ADMIN — TAMSULOSIN HYDROCHLORIDE 0.4 MG: 0.4 CAPSULE ORAL at 08:29

## 2018-12-31 RX ADMIN — FENTANYL CITRATE 50 MCG: 50 INJECTION, SOLUTION INTRAMUSCULAR; INTRAVENOUS at 14:06

## 2018-12-31 RX ADMIN — DEXMEDETOMIDINE HYDROCHLORIDE 20 MCG: 100 INJECTION, SOLUTION INTRAVENOUS at 14:40

## 2018-12-31 RX ADMIN — Medication: at 14:05

## 2018-12-31 RX ADMIN — SODIUM CHLORIDE: 9 INJECTION, SOLUTION INTRAVENOUS at 01:12

## 2018-12-31 ASSESSMENT — ACTIVITIES OF DAILY LIVING (ADL)
ADLS_ACUITY_SCORE: 16
ADLS_ACUITY_SCORE: 21

## 2018-12-31 ASSESSMENT — MIFFLIN-ST. JEOR: SCORE: 1460.16

## 2018-12-31 NOTE — PHARMACY-ADMISSION MEDICATION HISTORY
"Admission medication history interview status for the 12/30/2018  admission is complete. See EPIC admission navigator for prior to admission medications     Medication history source reliability:Moderate    Actions taken by pharmacist (provider contacted, etc): Pharmacy intern interviewed patient and his wife on 12/30 and I  interviewed patient with other family members present on 12/31.    Additional medication history information not noted on PTA med list : Language barrier made med rec difficult. Unsure if patient takes gabapentin or tamsulosin. Patient said he takes something for pain but knows it is not oxycodone. Patients wife stated that usually their son is here to help with medications but he was not present today. Insulin regimen may need to be verified with son when able.    Updated on 12/31/18:  Family in room able to converse well with me to clarify med list.    Family states patient takes two prostate medications- tamsulosin and finasteride.     Confirmed that patient is not using gabapentin.     Insulin- pt does take Novolog 70/30 30 units before breakfast and 20 units before dinner. He also confirms that he takes \"regular\" Novolog 12 units (prescribed as 10 units) prior to bed.   Unsure if this regimen is appropriate for this patient at this time but this is how patient and family confirm the patient is using insulin at home.     Medication reconciliation/reorder completed by provider prior to medication history? No    Time spent in this activity: 25 minutes    Prior to Admission medications    Medication Sig Last Dose Taking? Auth Provider   acetaminophen (TYLENOL) 500 MG tablet Take 1,000 mg by mouth 3 times daily  12/30/2018 at 1200 Yes Reported, Patient   Ascorbic Acid (VITAMIN C PO) Take 500 mg by mouth daily 12/30/2018 at AM Yes Reported, Patient   aspirin 81 MG EC tablet Take 81 mg by mouth daily 12/29/2018 at PM Yes Unknown, Entered By History   atorvastatin (LIPITOR) 80 MG tablet Take 1 " tablet (80 mg) by mouth daily 12/29/2018 at PM Yes Alrfedo Aragon MD   celecoxib (CELEBREX) 200 MG capsule TAKE 1 CAPSULE BY MOUTH DAILY WITH FOOD FOR ARTHRITIS PAIN 12/29/2018 at PM Yes Alfredo Aragon MD   FINASTERIDE PO Take 5 mg by mouth daily 12/29/2018 at PM Yes Reported, Patient   NOVOLOG FLEXPEN 100 UNIT/ML soln INJECT 12 UNITS SUB-Q DAILY AT BEDTIME 12/29/2018 at PM Yes Alfredo Aragon MD   NOVOLOG MIX 70/30 FLEXPEN (70-30) 100 UNIT/ML susp INJECT 30 UNITS DAILY BEFORE BREAKFAST AND 20 UNITS BEFORE DINNER. 12/30/2018 at AM Yes Alfredo Aragon MD   sennosides (SENOKOT) 8.6 MG tablet Take 2 tablets by mouth 2 times daily 12/30/2018 at AM Yes Reported, Patient   Skin Protectants, Misc. (EUCERIN) cream Apply topically 2 times daily 12/30/2018 at AM Yes Reported, Patient   ACE/ARB/ARNI NOT PRESCRIBED, INTENTIONAL, Please choose reason not prescribed, below   Alfredo Aragon MD   blood glucose monitoring (ONE TOUCH ULTRASOFT) lancets USE 3 TIMES DAILY   Alfredo Aragon MD   Blood Glucose Monitoring Suppl (ONE TOUCH ULTRA SYSTEM KIT) W/DEVICE KIT One touch ultra 2 if covered by insurance   Alfredo Aragon MD     Unknown at Unknown time  Unknown, Entered By History   Insulin Pen Needle (PEN NEEDLES) 31G X 6 MM MISC 1 Device 3 times daily   Alfredo Aragon MD   melatonin (MELATONIN) 1 MG/ML LIQD liquid Take 3 mg by mouth nightly as needed for sleep prn  Reported, Patient   ONETOUCH ULTRA test strip Use to test 3 times daily   Alfredo Aragon MD   tamsulosin (FLOMAX) 0.4 MG capsule Take 0.4 mg by mouth daily Unknown at Unknown time  Unknown, Entered By History   ULTICARE MICRO 32G X 4 MM insulin pen needle TEST 3 TIMES DAILY   Alfredo Aragon MD     Updated on 12/31/18 by Viola Suarez, DangD

## 2018-12-31 NOTE — PROVIDER NOTIFICATION
MD Notification    Notified Person: MD    Notified Person Name: Alona    Notification Date/Time: 12/30 at 2350    Notification Interaction: Page    Purpose of Notification: Temp 100.4    Orders Received: 500 ml NS bolus. Continue to monitor. No tylenol due to elevated AST/ALT    Comments:

## 2018-12-31 NOTE — PLAN OF CARE
Pt denies abd pain this shift.  No nausea either, asking for something to drink. NPO all shift.  Offered oral swabs.  Voiding via urinal at edge of bed.  LFTs increased today.  Pt down for ERCP at 1330 with family and .  Plan for lap gwendolyn tomorrow as well.

## 2018-12-31 NOTE — CONSULTS
"[facility] General Surgery Consultation    Rabia Reed MRN# [id number]   YOB: 1940 Age: 78 year old      Date of Admission:  12/30/2018  Date of Consult: 12/31/2018         Assessment and Plan:   Patient is a 78 year old male with cholecystitis and concern for possible choledocholithiasis vs cholangitis as pt having fevers, jaundice and abdominal pain. Appreciate GI assistance and plan for ERCP this afternoon. I discussed with patient I would recommend laparoscopic cholecystectomy tomorrow as he does have evidence of cholecystitis on imaging with wall thickening and pericholecystic fluid.     PLAN:  ERCP likely this afternoon per GI   Laparoscopic cholecystectomy tomorrow  NPO midnight         Requesting Physician:      Dr. Sage        Chief Complaint:     Chief Complaint   Patient presents with     Abdominal Pain     Left upper quad abdominal pain started today after eating rice, N/V x 1 today, regular BM this am. Rates pain 7/10          History of Present Illness:   Rabia Reed is a 78 year old male who was seen in consultation at the request of Dr. Sage who presented with abdominal pain.  He was seen with a [place]e interpretor as well as his daughters who assisted in interpreting. He reports pain began yesterday after eating. He had nausea and several episodes of emesis. He presented to the ER last night for evaluation. He had a CT which revealed a distended gallbladder. Follow up ultrasound revealed evidence of cholecystitis. His bilirubin was mildly elevated on admission at 1.9, this increased to 2.9 today as well as significant increase in AST/ALT. Today he actually denies abdominal pain compared to prior. He feels bloated. He denies nausea. No history of similar symptoms, has had some acid reflux symptoms.           Physical Exam:   Blood pressure 145/58, pulse 104, temperature 100.4  F (38  C), temperature source Oral, resp. rate 16, height 1.702 m (5' 7\"), weight 79.2 " kg (174 lb 8 oz), SpO2 94 %.  174 lbs 8 oz  General: lying comfortably in bed  Psych: Alert and Oriented.  Normal affect  Neurological: grossly intact  Eyes: Sclera slightly icteric  Respiratory:  nonlabored breathing  Cardiovascular:  Regular Rate and Rhythm   GI: distended, nontender to palpation throughout, even deep palpation   Lymphatic/Hematologic/Immune:  No femoral or cervical lymphadenopathy.  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     Elevated prostate specific antigen (PSA) 11/6/2013     Essential hypertension, benign      Hyperlipidemia LDL goal <100       Impotence of organic origin      Inguinal hernia without mention of obstruction or gangrene, unilateral or unspecified, (not specified as recurrent) 1/03    right     Memory loss      Osteoarthritis of both hands, unspecified osteoarthritis type 3/19/2017     Tobacco use disorder      Type 2 diabetes mellitus without complication  (goal A1C<7) 10/24/2015     Unspecified nasal polyp      Urinary frequency     nocturia x 5            Past Surgical History:     Past Surgical History:   Procedure Laterality Date     ARTHROPLASTY KNEE Right 9/14/2017    Procedure: ARTHROPLASTY KNEE;  RIGHT TOTAL KNEE ARTHROPLASTY ;  Surgeon: Darnell Allen MD;  Location: [facility] OR     ARTHROPLASTY KNEE Left 10/5/2018    Procedure: ARTHROPLASTY KNEE;  LEFT TOTAL KNEE ARTHROPLASTY ;  Surgeon: Darnell Allen MD;  Location: [facility] OR     C APPENDECTOMY       C NONSPECIFIC PROCEDURE  4/01    nasal polyp excision     C NONSPECIFIC PROCEDURE  1/03    RIH repair     C NONSPECIFIC PROCEDURE  April 2011     right knee arthroscopy            Current Medications:           famotidine  20 mg Intravenous Q12H     finasteride  5 mg Oral QPM      insulin aspart  1-12 Units Subcutaneous Q4H     insulin glargine  10 Units Subcutaneous QAM AC     piperacillin-tazobactam  3.375 g Intravenous Q6H     tamsulosin  0.4 mg Oral Daily       acetaminophen, glucose **OR** dextrose **OR** glucagon, HYDROmorphone, ibuprofen, melatonin, naloxone, ondansetron **OR** ondansetron, potassium chloride, potassium chloride with lidocaine, potassium chloride, potassium chloride, potassium chloride         Home Medications:     Prior to Admission medications    Medication Sig Last Dose Taking? Auth Provider   acetaminophen (TYLENOL) 500 MG tablet Take 1,000 mg by mouth 3 times daily  12/30/2018 at 1200 Yes Reported, Patient   Ascorbic Acid (VITAMIN C PO) Take 500 mg by mouth daily 12/30/2018 at AM Yes Reported, Patient   aspirin 81 MG EC tablet Take 81 mg by mouth daily 12/29/2018 at PM Yes Unknown, Entered By History   atorvastatin (LIPITOR) 80 MG tablet Take 1 tablet (80 mg) by mouth daily 12/29/2018 at PM Yes Alfredo Aragon MD   celecoxib (CELEBREX) 200 MG capsule TAKE 1 CAPSULE BY MOUTH DAILY WITH FOOD FOR ARTHRITIS PAIN 12/29/2018 at PM Yes Alfredo Aragon MD   FINASTERIDE PO Take 5 mg by mouth daily 12/29/2018 at PM Yes Reported, Patient   NOVOLOG FLEXPEN 100 UNIT/ML soln INJECT 10 UNITS SUB-Q DAILY AT BEDTIME 12/29/2018 at PM Yes Alfredo Aragon MD   NOVOLOG MIX 70/30 FLEXPEN (70-30) 100 UNIT/ML susp INJECT 30 UNITS DAILY BEFORE BREAKFAST AND 20 UNITS BEFORE DINNER. 12/30/2018 at AM Yes Alfredo Aragon MD   sennosides (SENOKOT) 8.6 MG tablet Take 2 tablets by mouth 2 times daily 12/30/2018 at AM Yes Reported, Patient   Skin Protectants, Misc. (EUCERIN) cream Apply topically 2 times daily 12/30/2018 at AM Yes Reported, Patient   ACE/ARB/ARNI NOT PRESCRIBED, INTENTIONAL, Please choose reason not prescribed, below   Alfredo Aragon MD   blood glucose monitoring (ONE TOUCH ULTRASOFT) lancets USE 3 TIMES DAILY   Alfredo Aragon MD   Blood Glucose Monitoring Suppl (ONE TOUCH ULTRA SYSTEM  KIT) W/DEVICE KIT One touch ultra 2 if covered by insurance   Alfredo Aragon MD   gabapentin (NEURONTIN) 300 MG capsule  Unknown at Unknown time  Unknown, Entered By History   Insulin Pen Needle (PEN NEEDLES) 31G X 6 MM MISC 1 Device 3 times daily   Alfredo Aragon MD   melatonin (MELATONIN) 1 MG/ML LIQD liquid Take 3 mg by mouth nightly as needed for sleep prn  Reported, Patient   ONETOUCH ULTRA test strip Use to test 3 times daily   Alfredo Aragon MD   tamsulosin (FLOMAX) 0.4 MG capsule Take 0.4 mg by mouth daily Unknown at Unknown time  Unknown, Entered By History   ULTICARE MICRO 32G X 4 MM insulin pen needle TEST 3 TIMES DAILY   Alfredo Aragon MD            Allergies:     Allergies   Allergen Reactions     No Known Drug Allergies             Family History:     Family History   Problem Relation Age of Onset     Diabetes Mother          age 85     Family History Negative Father          age 38,  in war     Diabetes Sister      Diabetes Brother         borderline diabetic           Social History:   Rabia Reed  reports that he quit smoking about 17 years ago. He has a 21.00 pack-year smoking history. he has never used smokeless tobacco. He reports that he does not drink alcohol or use drugs.          Review of Systems:   The 10 point Review of Systems is negative other than noted in the HPI.         Labs/Imaging   All new lab and imaging data was reviewed.   I have personally reviewed the imaging studies including his CT and US.         Melia Bentley MD

## 2018-12-31 NOTE — PROGRESS NOTES
RECEIVING UNIT ED HANDOFF REVIEW    ED Nurse Handoff Report was reviewed by: Cathy Coffey on December 30, 2018 at 10:17 PM

## 2018-12-31 NOTE — PLAN OF CARE
Arrived to floor at 2345. AO, speaks minimal English. Deaf in right ear, [place] in left. T max 100.4 overnight, MD notified. NS bolus given and continue to monitor. No tylenol given per MD due to elevated LFT. Tachycardic, other VSS on RA. NPO. Denies nausea. IV infusing, int abx. Fired lactic, result of 1.8. Q4 BG checks, corrected per sliding scale insulin. A1, uses walker at home.  scheduled from 9-11:30. Continue to monitor.

## 2018-12-31 NOTE — PROGRESS NOTES
[facility] Care Coordination Contact    CC received notification of Hospital admission.  Hospital admission occurred on 12- at [facility] with Dx of abdominal pain, nausea and vomiting, and is being admitted to manage possible acute calculus cholecystitis.  CC contacted Hospital /discharge planner (Gt) and left a message with this CC contact information, reviewed community POC as well requested to be notified of concerns, care conferences and discharge planning.  CC reached out to adult son Brandan regarding transition and left a message requesting a return call.  Reviewed and update care plan as needed.  Notified community service providers and placed services PCA on hold as needed.  Transition log initiated.   PCP notified of hospitalization via EMR.    ANASTACIA Villasenor  [facility]  [phone]  Fax: [phone]

## 2018-12-31 NOTE — PROGRESS NOTES
[facility]    Hospitalist Progress Note    Date of Service (when I saw the patient): 12/31/2018    Jaret Zayas MD  [facility]  Pager [phone]    Assessment & Plan   78 year old male  with PMH significant for diabetes mellitus type 2, BPH, hyperlipidemia, anemia was brought to the ER for evaluation of Abdominal pain, nausea and vomiting, and is being admitted to manage possible acute calculus cholecystitis.     Cholelithiasis with possible acute calculus cholecystitis  Patient with nausea vomiting and abdominal pain intermittent, CT abdomen pelvis shows stone in the gallbladder neck and mild GB wall thickening, US RUQ confirms cholelithiasis with GB wall thickening suggestive of cholecystitis.  LFTs are elevated.  Lipase is normal.  Symptoms have resolved after antiemetic and IV pain medicine in ER.  - Gen Surg and GI consulted, ERCP this afternoon, likely for Lap Serina on 1/1/19  - for now continue on IV Zosyn, still has elevated temp of 100F this AM  - follow CMP and CBC in AM  - IV pain and anti-emetics  - NPO until we know what plan is     Diabetes mellitus type 2, insulin-dependent, HbA1c 8.6 on October 2018  From admission note: Patient takes 70/30 insulin and Humalog PTA.  He will be n.p.o. and so I have restarted him on small dose (10 units) Lantus daily along with Insulin sliding scale  Recent Labs   Lab 12/31/18  0349 12/30/18  2353 12/30/18  1920   GLC  --   --  219*   * 205*  --       - Glucose is stable on current regimen, no change for today  - if he has surgery today and resumes diet consider change back to home regimen at reduced dose  - continue PTA gabapentin     BPH  -Continue PTA finasteride and Flomax once med rec completed  -Monitor intake and output, monitor for urinary retention.     Hyperlipidemia  -Patient is on statin, holding given elevated LFTs.     Polycystic kidney disease and nonobstructing left renal stone  -Creatinine is normal.    -No CVA tenderness though noted left upper quadrant tenderness, ? Related to nephrolithiasis  -CT did not show obstruction or perinephric stranding.  -UA shows hematuria likely due to above.  -Urology evaluation at some point, if persistent left upper quadrant pain or right upper quadrant ultrasound without significant to be well thickening, will consult urology during this stay.  - There is heistancy to use acetaminophen for fever and pain given his elevated LFTs, I have added low dose ibuprofen which can be used for a limited time during his stay, I encourage conservative use of NSAIDs in light of his history     Chronic anemia  -By history, hemoglobin is normal, could be due to hemoconcentration, monitor.     Memory impairment  - per chart review  -Lives with his son     DVT Prophylaxis: Pneumatic Compression Devices     Code Status: Full Code     Disposition: Expected discharge: perhaps 1-2 days pending general surgery and possibly GI evaluation       Interval History   Patient reports he is thirsty but not hungry, his pain is adequately controlled, no nausea at present.  He has no new complaints.      -Data reviewed today: I reviewed all new labs and imaging results over the last 24 hours. I personally reviewed the RUQ US image(s) showing GB wall thickening and cholelithiasis.    Physical Exam   Temp: 100  F (37.8  C) Temp src: Oral BP: 117/46 Pulse: 115   Resp: 16 SpO2: 95 % O2 Device: Nasal cannula Oxygen Delivery: 1 LPM  Vitals:    12/30/18 1803 12/30/18 1807 12/31/18 0612   Weight: 78.9 kg (174 lb) 78.9 kg (174 lb) 79.2 kg (174 lb 8 oz)     Vital Signs with Ranges  Temp:  [97.4  F (36.3  C)-100.4  F (38  C)] 100  F (37.8  C)  Pulse:  [] 115  Resp:  [16-20] 16  BP: (117-163)/(46-90) 117/46  SpO2:  [91 %-98 %] 95 %  I/O last 3 completed shifts:  In: -   Out: 350 [Urine:350]    Constitutional: NAD, afebrile, A+Ox4  Respiratory: CTA B  Cardiovascular: RRR, no murmur  GI: S, mild tenderness bilateral  upper quadrants without rebound or guarding, ND, normal BS  Skin/Integumen: Dry, warm, no edema      Medications     sodium chloride 75 mL/hr at 12/31/18 0112       famotidine  20 mg Intravenous Q12H     finasteride  5 mg Oral Daily     insulin aspart  1-12 Units Subcutaneous Q4H     insulin glargine  10 Units Subcutaneous QAM AC     piperacillin-tazobactam  3.375 g Intravenous Q6H     tamsulosin  0.4 mg Oral Daily       Data   Recent Labs   Lab 12/31/18  0744 12/30/18  1920   WBC 11.9* 12.8*   HGB 13.5 14.7   MCV 99 99    192   NA  --  138   POTASSIUM  --  3.9   CHLORIDE  --  100   CO2  --  29   BUN  --  19   CR  --  0.79   ANIONGAP  --  9   MABEL  --  8.8   GLC  --  219*   ALBUMIN  --  3.7   PROTTOTAL  --  7.7   BILITOTAL  --  1.9*   ALKPHOS  --  163*   ALT  --  388*   AST  --  687*   LIPASE  --  99       Recent Results (from the past 24 hour(s))   CT Abdomen Pelvis w Contrast    Narrative    CT ABDOMEN PELVIS W CONTRAST 12/30/2018 9:13 PM    HISTORY: Nausea and vomiting. Left upper quadrant pain.    COMPARISON: None.    TECHNIQUE:  Abdomen and pelvis CT was performed following intravenous  administration of 88mL Isovue-370.  Radiation dose for this scan was  reduced using automated exposure control, adjustment of the mA and/or  kV according to patient size, or iterative reconstruction technique.    FINDINGS: Lung bases are clear.    The gallbladder is distended and there is a 0.8 cm stone in the region  of the gallbladder neck. There may be mild gallbladder wall  thickening. The liver, spleen, pancreas and adrenal glands appear  normal. There are numerous large renal cysts bilaterally. A  nonobstructive calculus in the left kidney measures 1.4 cm.    The bowel has normal caliber. No free air. No free or loculated fluid  collection.    Urinary bladder is fully distended with normal wall thickness.  Prostate is enlarged. No free fluid in the pelvis.      Impression    IMPRESSION:   1. Distended gallbladder  with stone in the region of the gallbladder  neck measuring 0.8 cm. Possible mild gallbladder wall thickening.  Further evaluation with ultrasound might be helpful.    2. Bilateral polycystic kidneys with numerous large cysts.  Nonobstructive stone in the left kidney measures 1.4 cm.    3. Bowel appears normal.    ROSALES WAITE MD   Abdomen US, limited (RUQ only)    Narrative    US ABDOMEN LIMITED  12/31/2018 1:12 AM      HISTORY: Abnormal CT, elevated liver function tests, vomiting, upper  abdominal pain.     COMPARISON: CT 12/30/2018.    FINDINGS: The liver is normal in size and texture without focal mass.  There is no intra or extrahepatic biliary dilatation. The common  hepatic duct measures 0.4 cm. There are multiple stones in the  gallbladder measuring up to 0.8 cm. The gallbladder is distended.  Gallbladder wall is thickened at 0.5 cm. There is a small amount  pericholecystic fluid.  The pancreas head and body appear normal. The  tail is obscured by bowel gas.  The right kidney measures 12.3 cm.  There are multiple right renal cysts measuring up to 8.5 cm. No  hydronephrosis.      Impression    IMPRESSION:  1. Cholelithiasis. The gallbladder is distended and thick-walled.  Small amount pericholecystic fluid. This may be acute cholecystitis.  2. No biliary dilatation.  3. Multiple right renal cysts.    BERNADETTE LOZANO MD

## 2018-12-31 NOTE — ED PROVIDER NOTES
"  History     Chief Complaint:  Abdominal pain      HPI   Rabia Reed is a 78 year old male who presents with abdominal pain. The patient ate rice this morning and then a couple hours later started experiencing abdominal pain. He then felt nauseated and vomited twice, and felt better after his second episode of vomiting, but then his abdominal pain returned. The pain is in the left side of his abdomen and feels different than the kidney stone he had in the past. He denies any diarrhea, constipation, urinary issues, or abdominal distension    Allergies:  No Known Drug Allergies     Medications:    Tylenol  Aspirin  Lipitor  Celebrex  Finasteride  Vistaril  Melatonin  Novolog  Zofran  Flomax     Past Medical History:    Anemia  Kidney stone  chondromalacia patella  Disorder of acoustic nerve  Elevated PSA  HTN  Hyperlipidemia  Inguinal hernia  T2DM    Past Surgical History:    Knee surgery  appendectomy  Ureteroscopy    Family History:    Diabetes    Social History:  Patient presents with daughter  Former smoker.   Negative for alcohol use.  Marital Status:        Review of Systems   Gastrointestinal: Positive for abdominal pain, nausea and vomiting. Negative for abdominal distention, constipation and diarrhea.   Genitourinary: Negative for decreased urine volume, difficulty urinating, dysuria, frequency and hematuria.   All other systems reviewed and are negative.      Physical Exam   First Vitals:  BP: 163/68  Pulse: 88  Temp: 97.4  F (36.3  C)  Resp: 18  Height: 170.2 cm (5' 7\")  Weight: 78.9 kg (174 lb)  SpO2: 97 %      Physical Exam  GENERAL: well developed, pleasant  HEAD: atraumatic  EYES: pupils reactive, extraocular muscles intact, conjunctivae normal  ENT:  mucus membranes moist  NECK:  trachea midline, normal range of motion  RESPIRATORY: no tachypnea, breath sounds clear to auscultation   CVS: normal S1/S2, no murmurs, intact distal pulses  ABDOMEN: soft, Left sided abdominal tenderness, " nondistention  MUSCULOSKELETAL: no deformities  SKIN: warm and dry, no acute rashes or ulceration  NEURO: GCS 15, cranial nerves intact, alert and oriented x3  PSYCH:  Mood/affect normal        Emergency Department Course   Imaging:  Radiographic findings were communicated with the patient who voiced understanding of the findings.  CT Abdomen/Pelvis with IV contrast:   1. Distended gallbladder with stone in the region of the gallbladder  neck measuring 0.8 cm. Possible mild gallbladder wall thickening.  Further evaluation with ultrasound might be helpful.    2. Bilateral polycystic kidneys with numerous large cysts.  Nonobstructive stone in the left kidney measures 1.4 cm.    3. Bowel appears normal.  per radiology.       Laboratory:  UA with micro: Glucose: 150, Ketone: 40, Blood: trace, pH: 7.5 (H), Protein albumin: 30, RBC: 25 (H), Bacteria: few, Mucous: present, Amorphous crystals; few o/w negative  CBC: WBC: 12.8 (H), HGB: 14.7, PLT: 192  CMP: Glucose 219 (H), Bilirubin total: 1.9 (H), ALKPHOS: 163 (H), ALT: 388 (H), AST: 687 (HH), o/w WNL (Creatinine: 0.79)  Lipase: 99    Interventions:  1921 - NS 1L IV  1921 - Zofran 4 mg IV  Pending - Zosyn 3.375 g IV    Emergency Department Course:  Nursing notes and vitals reviewed.  1834: I performed an exam of the patient as documented above.     2125: Findings and plan explained to the Patient who consents to admission.     2200: Discussed the patient with Dr. Sage, who will admit the patient to a medical bed for further monitoring, evaluation, and treatment.       Impression & Plan    Medical Decision Making:  Presents with upper abdominal pain and vomiting and notes that seems to be the worst in the left upper quadrant.  Patient has a history of kidney stones numerous years ago but feels that this is different.  Broad differential was considered.  CT shows distended gallbladder with mildly thickened wall stone concerns for cholecystitis.  Patient's CBC is elevated  as well as his LFTs and bilirubin.  Patient is feeling improved and would like to go home.  Discussed with him and his daughter that patient should be admitted for ongoing treatment and workup.  Spoke with the hospitalist regarding admission.  Ultrasound can be ordered as an inpatient.    Diagnosis:    ICD-10-CM    1. Acute cholecystitis K81.0      Kaden ZARATE, am serving as a scribe on 12/30/2018 at 6:34 PM to personally document services performed by Francis Drew MD based on my observations and the provider's statements to me.       Kaden Resendez  12/30/2018    EMERGENCY DEPARTMENT       Francis Drew MD  12/31/18 3370

## 2018-12-31 NOTE — H&P
[facility]    History and Physical  Hospitalist       Date of Admission:  12/30/2018  Date of Service (when I saw the patient): 12/30/18    Assessment & Plan   Rabia Reed is a 78 year old male  with PMH significant for diabetes mellitus type 2, BPH, hyperlipidemia, anemia was brought to the ER for evaluation of Abdominal pain, nausea and vomiting, and is being admitted to manage possible acute calculus cholecystitis.    Cholelithiasis with possible acute calculus cholecystitis  Patient with nausea vomiting and abdominal pain intermittent, CT abdomen pelvis shows stone in the gallbladder neck and mild GB wall thickening.  LFTs are elevated.  Lipase is normal.  Symptoms have resolved after antiemetic and IV pain medicine in ER.  -Admit to inpatient  -Continue IV Zosyn, received a dose in ER  -Getting right upper quadrant ultrasound for further evaluation  -General surgery consult for surgical evaluation  -GI consult if dilated CBD or stones in the CBD  -N.p.o., IV fluid, antiemetic and pain medications ordered  -A.m. labs including CBC and CMP    Diabetes mellitus type 2, insulin-dependent, HbA1c 8.6 on October 2018  Patient takes 70/30 insulin and Humalog PTA  -He will be n.p.o. and so I have restarted him on small dose (10 units) Lantus daily  -Insulin sliding scale  -Holding PTA baby aspirin   - Resume PTA gabapentin once med rec completed    BPH  -Continue PTA finasteride and Flomax once med rec completed  -Monitor intake and output, monitor for urinary retention.    Hyperlipidemia  -Patient is on statin, holding given elevated LFTs.    Polycystic kidney disease and nonobstructing left renal stone  -Creatinine is normal.   -No CVA tenderness though noted left upper quadrant tenderness, ? Related to nephrolithiasis  -CT did not show obstruction or perinephric stranding.  -UA shows hematuria likely due to above.  -Urology evaluation at some point, if persistent left upper quadrant pain or right  upper quadrant ultrasound without significant to be well thickening, will consult urology during this stay.    Chronic anemia  -By history, hemoglobin is normal, could be due to hemoconcentration, monitor.    Memory impairment  - per chart review  -Lives with his son     DVT Prophylaxis: Pneumatic Compression Devices    Code Status: Full Code    Disposition: Expected discharge: likely 2-3 days pending general surgery and possibly GI evaluation, inpatient orders placed.    Above plan was discussed with patient and his daughter present at bedside.    Shauna Sage MD  Hospitalist  Pager: [phone]    Primary Care Physician   Alfredo Aragon    Chief Complaint   Abdominal pain, nausea and vomiting- 1 day      History of Present Illness   Rabia Reed is a 78 year old male  with PMH significant for diabetes mellitus type 2, BPH, hyperlipidemia, anemia was brought to the ER for evaluation of above symptoms.  History obtained from patient and his daughter present in the room who is assisting his , discussion with the ED physician Dr Drew, and chart review..    Patient was in his usual state until this morning, had a bowl of sticky rice this morning and about an hour after that started having abdominal discomfort associated with nausea and vomiting. Pain was over the epigastric and left upper quadrant aand waxing and waning.  After an episode of vomiting, abdominal discomfort improved but reoccurred again so he was taken to the urgent care clinic. He had vomiting in the parking lot and symptoms subsided after vomiting and so family took him home without evaluation in the urgent care.  His symptoms reoccurred again at home and so he was brought to the ER for further evaluation.    Patient did not have fever, diarrhea, hematochezia, melena. He had normal bowel movement this morning.  Denies chest pain but he felt short of breath when he was vomiting which is not present now.  No hematemesis.  Patient used to  have episodes of abdominal discomfort but not this frequent and severe before.  He denies abdominal bloating.     In ER patient was evaluated by Dr Drew.  Patient was afebrile, vitals are stable, Lab work up showed elevated LFTs and mild leukocytosis, CT abdomen pelvis revealed distended gallbladder with stone in the region of the gallbladder neck with possible mild gallbladder wall thickening.  Also bilateral polycystic kidneys with numerous large cysts noted.  Also nonobstructing left renal stone was noted.  -After getting Zofran and hydromorphone, patient now feels like normal and he was wondering if he could be discharged from ER.  Given suspected acute calculus cholecystitis in the setting of diabetes mellitus, he is being admitted for further surgical evaluation and possible GI consult. US RUQ being done soon.      Review of Systems   All 10 point systems reviewed and is negative other than noted in the HPI or here.    Past Medical History    I have reviewed this patient's medical history and updated it with pertinent information as needed.  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     Elevated prostate specific antigen (PSA) 11/6/2013     Essential hypertension, benign      Hyperlipidemia LDL goal <100       Impotence of organic origin      Inguinal hernia without mention of obstruction or gangrene, unilateral or unspecified, (not specified as recurrent) 1/03    right     Memory loss      Osteoarthritis of both hands, unspecified osteoarthritis type 3/19/2017     Tobacco use disorder      Type 2 diabetes mellitus without complication  (goal A1C<7) 10/24/2015     Unspecified nasal polyp      Urinary frequency     nocturia x 5       Past Surgical History   I have reviewed this patient's surgical history and updated it with  pertinent information as needed.  Past Surgical History:   Procedure Laterality Date     ARTHROPLASTY KNEE Right 2017    Procedure: ARTHROPLASTY KNEE;  RIGHT TOTAL KNEE ARTHROPLASTY ;  Surgeon: Darnell Allen MD;  Location: [facility] OR     ARTHROPLASTY KNEE Left 10/5/2018    Procedure: ARTHROPLASTY KNEE;  LEFT TOTAL KNEE ARTHROPLASTY ;  Surgeon: Darnell Allen MD;  Location: [facility] OR     C APPENDECTOMY       C NONSPECIFIC PROCEDURE      nasal polyp excision     C NONSPECIFIC PROCEDURE      RIH repair     C NONSPECIFIC PROCEDURE  2011     right knee arthroscopy       Prior to Admission Medications   Prior to Admission Medications   Prescriptions Last Dose Informant Patient Reported? Taking?   ACE/ARB/ARNI NOT PRESCRIBED, INTENTIONAL,  Son No No   Sig: Please choose reason not prescribed, below   Ascorbic Acid (VITAMIN C PO)  Son Yes No   Sig: Take 500 mg by mouth daily   Blood Glucose Monitoring Suppl (ONE TOUCH ULTRA SYSTEM KIT) W/DEVICE KIT  Son No No   Sig: One touch ultra 2 if covered by insurance   FINASTERIDE PO  Son Yes No   Sig: Take 5 mg by mouth daily   Insulin Pen Needle (PEN NEEDLES) 31G X 6 MM MISC  Son No No   Si Device 3 times daily   NOVOLOG FLEXPEN 100 UNIT/ML soln   No No   Sig: INJECT 10 UNITS SUB-Q DAILY AT BEDTIME   NOVOLOG MIX 70/30 FLEXPEN (70-30) 100 UNIT/ML susp   No No   Sig: INJECT 30 UNITS DAILY BEFORE BREAKFAST AND 20 UNITS BEFORE DINNER.   ONETOUCH ULTRA test strip   No No   Sig: Use to test 3 times daily   Skin Protectants, Misc. (EUCERIN) cream   Yes No   Sig: Apply topically 2 times daily   ULTICARE MICRO 32G X 4 MM insulin pen needle  Son No No   Sig: TEST 3 TIMES DAILY   VITAMIN D, CHOLECALCIFEROL, PO  Son Yes No   Sig: Take 2,000 Units by mouth daily   acetaminophen (TYLENOL) 500 MG tablet  Son Yes No   Sig: Take 1,000 mg by mouth 3 times daily    atorvastatin (LIPITOR) 80 MG tablet  Son No No   Sig: Take 1 tablet (80 mg) by mouth daily   blood glucose monitoring  (ONE TOUCH ULTRASOFT) lancets  Son No No   Sig: USE 3 TIMES DAILY   celecoxib (CELEBREX) 200 MG capsule   No No   Sig: TAKE 1 CAPSULE BY MOUTH DAILY WITH FOOD FOR ARTHRITIS PAIN   melatonin (MELATONIN) 1 MG/ML LIQD liquid   Yes No   Sig: Take 3 mg by mouth nightly as needed for sleep   sennosides (SENOKOT) 8.6 MG tablet   Yes No   Sig: Take 2 tablets by mouth 2 times daily   tamsulosin (FLOMAX) 0.4 MG capsule  Son No No   Sig: TAKE ONE CAPSULE BY MOUTH ONE TIME DAILY       Facility-Administered Medications: None     Allergies   Allergies   Allergen Reactions     No Known Drug Allergies        Social History   I have reviewed this patient's social history and updated it with pertinent information as needed. Rabia Reed  reports that he quit smoking about 17 years ago. He has a 21.00 pack-year smoking history. he has never used smokeless tobacco. He reports that he does not drink alcohol or use drugs.    Family History   I have reviewed this patient's family history and updated it with pertinent information if needed.   Family History   Problem Relation Age of Onset     Diabetes Mother          age 85     Family History Negative Father          age 38,  in war     Diabetes Sister      Diabetes Brother         borderline diabetic         Physical Exam   Temp: 97.4  F (36.3  C) Temp src: Temporal BP: 163/68 Pulse: 89   Resp: 20 SpO2: 98 % O2 Device: None (Room air)    Vital Signs with Ranges  Temp:  [97.4  F (36.3  C)] 97.4  F (36.3  C)  Pulse:  [88-89] 89  Resp:  [18-20] 20  BP: (163)/(68) 163/68  SpO2:  [97 %-98 %] 98 %  174 lbs 0 oz    Constitutional: Alert, awake, and portable, does not seem to be in distress.  HEENT: PERRLA, EOMI. Oral mucosa is moist.  Respiratory: Bilateral equal air entry, no wheezing or crackles. Normal work of breathing. On room air.  Cardiovascular: S1S2 regular, No murmur, no tachycardia.  GI: Abdomen is soft, appears distended, mild epigastric and left upper quadrant  tenderness but no guarding or rigidity. Bowel tones active.  Skin: No rash.   Musculoskeletal: No edema  Neurologic: Alert and awake, follows verbal commands appropriately, CN 2-12 intact, motor strength symmetrical.   Psychiatric: Calm, co-operative, appropriate.    Data   Data reviewed today:  I personally reviewed report of CT abdomen pelvis as mentioned above, report attached.   Recent Labs   Lab 12/30/18  1920   WBC 12.8*   HGB 14.7   MCV 99         POTASSIUM 3.9   CHLORIDE 100   CO2 29   BUN 19   CR 0.79   ANIONGAP 9   MABEL 8.8   *   ALBUMIN 3.7   PROTTOTAL 7.7   BILITOTAL 1.9*   ALKPHOS 163*   *   *   LIPASE 99       Recent Results (from the past 24 hour(s))   CT Abdomen Pelvis w Contrast    Narrative    CT ABDOMEN PELVIS W CONTRAST 12/30/2018 9:13 PM    HISTORY: Nausea and vomiting. Left upper quadrant pain.    COMPARISON: None.    TECHNIQUE:  Abdomen and pelvis CT was performed following intravenous  administration of 88mL Isovue-370.  Radiation dose for this scan was  reduced using automated exposure control, adjustment of the mA and/or  kV according to patient size, or iterative reconstruction technique.    FINDINGS: Lung bases are clear.    The gallbladder is distended and there is a 0.8 cm stone in the region  of the gallbladder neck. There may be mild gallbladder wall  thickening. The liver, spleen, pancreas and adrenal glands appear  normal. There are numerous large renal cysts bilaterally. A  nonobstructive calculus in the left kidney measures 1.4 cm.    The bowel has normal caliber. No free air. No free or loculated fluid  collection.    Urinary bladder is fully distended with normal wall thickness.  Prostate is enlarged. No free fluid in the pelvis.      Impression    IMPRESSION:   1. Distended gallbladder with stone in the region of the gallbladder  neck measuring 0.8 cm. Possible mild gallbladder wall thickening.  Further evaluation with ultrasound might be  helpful.    2. Bilateral polycystic kidneys with numerous large cysts.  Nonobstructive stone in the left kidney measures 1.4 cm.    3. Bowel appears normal.    ROSALES WAITE MD

## 2018-12-31 NOTE — CONSULTS
GASTROENTEROLOGY CONSULTATION     Rabia Reed   [address] [facility] [address]   78 year old male   Admission Date/Time: 12/30/2018   Encounter Date: 12/31/2018  Primary Care Provider: Alfredo Aragon     Referring / Attending Physician: Jaret Zayas   We were asked to see the patient in consultation by Dr. Zayas for evaluation of elevated LFTs, possible cholangitis.     HPI: Rabia Reed is a 78 year old male with a past medical history significant for type 2 diabetes, BPH, hyperlipidemia and anemia who presented to the emergency department.  The patient had been in his usual state of health until yesterday when he developed sharp upper abdominal pain.  The pain occurred about an hour after eating a bowl of sticky rice.  There was some associated nausea and vomiting.  In the emergency department with normal vitals.  Labs were performed which revealed a mildly elevated total bilirubin at 1.9, alkaline phosphatase 163,  and AST 60.  Lipase was normal at 99.  His white count was mildly elevated at 12.8.  He was admitted to the floor and started on broad-spectrum antibiotics.  Overnight he did develop some low-grade fevers.  This morning his liver tests were repeated and showed that his bilirubin kasia to 2.9, alk phos 181, ALT 2057 and AST 2420.  His white count was marginally better at 11.9.  General surgery saw the patient and recommended GI evaluation for possible cholangitis.  The patient is seen in his room this morning with his daughter acting as better.  He denies any pain at this time.  He would like to drink some water.  He denies any family history of liver disease.  She does not know if he has ever been checked for hepatitis B or hepatitis C.  He denies any significant alcohol use or drug use.    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     Elevated prostate specific antigen (PSA) 2013     Essential hypertension, benign      Hyperlipidemia LDL goal <100       Impotence of organic origin      Inguinal hernia without mention of obstruction or gangrene, unilateral or unspecified, (not specified as recurrent)     right     Memory loss      Osteoarthritis of both hands, unspecified osteoarthritis type 3/19/2017     Tobacco use disorder      Type 2 diabetes mellitus without complication  (goal A1C<7) 10/24/2015     Unspecified nasal polyp      Urinary frequency     nocturia x 5       Past Surgical History  Past Surgical History:   Procedure Laterality Date     ARTHROPLASTY KNEE Right 2017    Procedure: ARTHROPLASTY KNEE;  RIGHT TOTAL KNEE ARTHROPLASTY ;  Surgeon: Darnell Allen MD;  Location: [facility] OR     ARTHROPLASTY KNEE Left 10/5/2018    Procedure: ARTHROPLASTY KNEE;  LEFT TOTAL KNEE ARTHROPLASTY ;  Surgeon: Darnell Allen MD;  Location: [facility] OR     C APPENDECTOMY       C NONSPECIFIC PROCEDURE      nasal polyp excision     C NONSPECIFIC PROCEDURE      RIH repair     C NONSPECIFIC PROCEDURE  2011     right knee arthroscopy       Family History  Family History   Problem Relation Age of Onset     Diabetes Mother          age 85     Family History Negative Father          age 38,  in war     Diabetes Sister      Diabetes Brother         borderline diabetic       Social History  Social History     Socioeconomic History     Marital status:      Spouse name: Not on file     Number of children: Not on file     Years of education: Not on file     Highest education level: Not on file   Social Needs     Financial resource strain: Not on file     Food insecurity - worry: Not on file     Food insecurity - inability: Not on file     Transportation needs - medical: Not on file     Transportation needs - non-medical: Not on file   Occupational History     Not on  file   Tobacco Use     Smoking status: Former Smoker     Packs/day: 0.50     Years: 42.00     Pack years: 21.00     Last attempt to quit: 2001     Years since quittin.9     Smokeless tobacco: Never Used   Substance and Sexual Activity     Alcohol use: No     Alcohol/week: 0.0 oz     Drug use: No     Sexual activity: Yes   Other Topics Concern     Parent/sibling w/ CABG, MI or angioplasty before 65F 55M? Not Asked   Social History Narrative    EYE: [facility]       Medications  Prior to Admission medications    Medication Sig Start Date End Date Taking? Authorizing Provider   acetaminophen (TYLENOL) 500 MG tablet Take 1,000 mg by mouth 3 times daily    Yes Reported, Patient   Ascorbic Acid (VITAMIN C PO) Take 500 mg by mouth daily   Yes Reported, Patient   aspirin 81 MG EC tablet Take 81 mg by mouth daily   Yes Unknown, Entered By History   atorvastatin (LIPITOR) 80 MG tablet Take 1 tablet (80 mg) by mouth daily 18  Yes Alfredo Aragon MD   celecoxib (CELEBREX) 200 MG capsule TAKE 1 CAPSULE BY MOUTH DAILY WITH FOOD FOR ARTHRITIS PAIN 18  Yes Alfredo Aragon MD   FINASTERIDE PO Take 5 mg by mouth daily   Yes Reported, Patient   NOVOLOG FLEXPEN 100 UNIT/ML soln INJECT 10 UNITS SUB-Q DAILY AT BEDTIME 18  Yes Alfredo Aragon MD   NOVOLOG MIX 70/30 FLEXPEN (70-30) 100 UNIT/ML susp INJECT 30 UNITS DAILY BEFORE BREAKFAST AND 20 UNITS BEFORE DINNER. 18  Yes Alfredo Aragon MD   sennosides (SENOKOT) 8.6 MG tablet Take 2 tablets by mouth 2 times daily   Yes Reported, Patient   Skin Protectants, Misc. (EUCERIN) cream Apply topically 2 times daily   Yes Reported, Patient   ACE/ARB/ARNI NOT PRESCRIBED, INTENTIONAL, Please choose reason not prescribed, below 18   Alfredo Aragon MD   blood glucose monitoring (ONE TOUCH ULTRASOFT) lancets USE 3 TIMES DAILY 18   Alfredo Aragon MD   Blood Glucose Monitoring Suppl (ONE TOUCH ULTRA SYSTEM KIT) W/DEVICE KIT One touch ultra 2 if covered by insurance  "8/5/14   Alfredo Aragon MD   gabapentin (NEURONTIN) 300 MG capsule     Unknown, Entered By History   Insulin Pen Needle (PEN NEEDLES) 31G X 6 MM MISC 1 Device 3 times daily 10/2/18   Alfredo Aragon MD   melatonin (MELATONIN) 1 MG/ML LIQD liquid Take 3 mg by mouth nightly as needed for sleep    Reported, Patient   ONETOUCH ULTRA test strip Use to test 3 times daily 10/15/18   Alfredo Aragon MD   tamsulosin (FLOMAX) 0.4 MG capsule Take 0.4 mg by mouth daily    Unknown, Entered By History   ULTICARE MICRO 32G X 4 MM insulin pen needle TEST 3 TIMES DAILY 8/2/18   Alfredo Aragon MD       Allergies:  No known drug allergies    ROS: A ten point review of systems was obtained and negative other than the symptoms noted above in the HPI.     Physical Exam:   /66 (BP Location: Right arm)   Pulse 107   Temp 98.4  F (36.9  C) (Oral)   Resp 16   Ht 1.702 m (5' 7\")   Wt 79.2 kg (174 lb 8 oz)   SpO2 94%   BMI 27.33 kg/m     Constitutional: elderly, alert, no acute distress  Cardiovascular: regular rate and rhythm, no murmurs,rubs or gallops  Respiratory: clear to auscultation bilaterally  Psychiatric: normal pleasant affect  Head: atraumatic, normocephalic  Neck: supple, no thyromegaly  ENT: mucous membranes are moist, no oral lesions are noted  Abdomen: soft, non-tender, non-distended, normally active bowel sound. No masses or hepatosplenomegaly is appreciated. No rebound tenderness or guarding  Neuro: Neurologically intact grossly  Skin: warm, dry, no rashes are noted    ADDITIONAL COMMENTS:   I reviewed the patient's new clinical lab test results.   Recent Labs   Lab Test 12/31/18  0744 12/30/18  1920 10/11/18  09/14/17  0649   WBC 11.9* 12.8* 7.3   < >  --    HGB 13.5 14.7 11.1*   < >  --    MCV 99 99 99.7   < >  --     192 233   < >  --    INR  --   --   --   --  0.95    < > = values in this interval not displayed.      Recent Labs   Lab Test 12/31/18  0744 12/30/18  1920 10/11/18 10/08/18  0645    138 " 138 139   POTASSIUM 4.2 3.9 3.7 3.9   CHLORIDE 106 100 102 106   CO2 25 29 28 29   BUN 19 19 14 14   CR 1.03 0.79 0.92 0.91   ANIONGAP 9 9  --  4   MABEL 8.0* 8.8 8.8 8.2*      Recent Labs   Lab Test 12/31/18  0744 12/30/18  1935 12/30/18  1920 10/08/18  0645  09/07/17  1730  09/29/14  1224   ALBUMIN 3.0*  --  3.7 2.7*   < >  --    < >  --    BILITOTAL 2.9*  --  1.9* 0.6   < >  --    < >  --    ALT 2,057*  --  388* 20   < >  --    < >  --    AST 2,420*  --  687* 22   < >  --    < >  --    ALKPHOS 181*  --  163* 90   < >  --    < >  --    PROTEIN  --  30*  --   --   --  Negative  --  Negative   LIPASE  --   --  99 47*  --   --   --   --    AMYLASE  --   --   --  23*  --   --   --   --     < > = values in this interval not displayed.      12/30/18 CT abdomen and pelvis  IMPRESSION:   1. Distended gallbladder with stone in the region of the gallbladder  neck measuring 0.8 cm. Possible mild gallbladder wall thickening.  Further evaluation with ultrasound might be helpful.  2. Bilateral polycystic kidneys with numerous large cysts.  Nonobstructive stone in the left kidney measures 1.4 cm.    12/31/18 Ultrasound abdomen  IMPRESSION:  1. Cholelithiasis. The gallbladder is distended and thick-walled.  Small amount pericholecystic fluid. This may be acute cholecystitis.  2. No biliary dilatation.  3. Multiple right renal cysts.    Assessment:  78 year old male presenting with abdominal pain and found to have leukocytosis and elevated LFTs. Imaging is concerning for cholelithiasis and possible cholecystitis. No biliary dilation was seen on his imaging however his LFTs this morning were markedly elevated which brought up the question of choledocholithiasis or possible cholangitis.     Plan:   -NPO  -Agree with zosyn for now  -Trying to schedule EUS and possible ERCP with Dr Canales this afternoon depending on OR availability   -If unable to get time in the OR may need to consider HIDA vs MRCP instead  -Continue supportive  care  -Further recommendations to follow    I discussed the patient's findings and plan with Dr. Jimenez Canales who will also independently see and examine the patient.     Reed Mello PA-C  [facility]  Cell:  [phone] Monday through Friday 2994-4464  Office: [phone]

## 2018-12-31 NOTE — ANESTHESIA CARE TRANSFER NOTE
Patient: Rabia Reed    Procedure(s):  COMBINED ENDOSCOPIC ULTRASOUND, ESOPHAGOSCOPY, GASTROSCOPY, DUODENOSCOPY (EGD)  ENDOSCOPIC RETROGRADE CHOLANGIOPANCREATOGRAPHY, EXCHANGE TUBE/STENT    Diagnosis: ACUTE CHOLECYSTITIS.  Diagnosis Additional Information: No value filed.    Anesthesia Type:   MAC     Note:  Airway :Nasal Cannula  Patient transferred to:PACU  Handoff Report: Identifed the Patient, Identified the Reponsible Provider, Reviewed the pertinent medical history, Discussed the surgical course, Reviewed Intra-OP anesthesia mangement and issues during anesthesia, Set expectations for post-procedure period and Allowed opportunity for questions and acknowledgement of understanding      Vitals: (Last set prior to Anesthesia Care Transfer)    CRNA VITALS  12/31/2018 1433 - 12/31/2018 1517      12/31/2018             Resp Rate (set):  10                Electronically Signed By: JERMAIN Chaney CRNA  December 31, 2018  3:17 PM

## 2018-12-31 NOTE — ED NOTES
DATE:  12/30/2018   TIME OF RECEIPT FROM LAB:  2027 (critical called to NATALYA Bridges on her pt in room 20. Yuliana reported to MD MASON stated she didn't do AST on that pt. RN called lab back and lab had accidentally gave critical on a different pt with the same last name).  MD of correct pt informed.  LAB TEST:  AST  LAB VALUE:  687  RESULTS GIVEN WITH READ-BACK TO (PROVIDER):  Francis Drew MD  TIME LAB VALUE REPORTED TO PROVIDER:   2042 (see note from above)

## 2018-12-31 NOTE — ANESTHESIA PREPROCEDURE EVALUATION
Anesthesia Pre-Procedure Evaluation    Patient: Rabia Reed   MRN: [id number] : 1940          Preoperative Diagnosis: ACUTE CHOLECYSTITIS.    Procedure(s):  COMBINED ENDOSCOPIC ULTRASOUND, ESOPHAGOSCOPY, GASTROSCOPY, DUODENOSCOPY (EGD)  ENDOSCOPIC RETROGRADE CHOLANGIOPANCREATOGRAPHY, EXCHANGE TUBE/STENT    Past Medical History:   Diagnosis Date     Anemia, unspecified type 2018     Calculus of kidney     renal calculi     Chondromalacia of patella     right     Disorders of acoustic nerve     acoustical neuroma right side     Elevated prostate specific antigen (PSA) 2013     Essential hypertension, benign      Hyperlipidemia LDL goal <100       Impotence of organic origin      Inguinal hernia without mention of obstruction or gangrene, unilateral or unspecified, (not specified as recurrent)     right     Memory loss      Osteoarthritis of both hands, unspecified osteoarthritis type 3/19/2017     Tobacco use disorder      Type 2 diabetes mellitus without complication  (goal A1C<7) 10/24/2015     Unspecified nasal polyp      Urinary frequency     nocturia x 5     Past Surgical History:   Procedure Laterality Date     ARTHROPLASTY KNEE Right 2017    Procedure: ARTHROPLASTY KNEE;  RIGHT TOTAL KNEE ARTHROPLASTY ;  Surgeon: Darnell Allen MD;  Location: [facility] OR     ARTHROPLASTY KNEE Left 10/5/2018    Procedure: ARTHROPLASTY KNEE;  LEFT TOTAL KNEE ARTHROPLASTY ;  Surgeon: Darnell Allen MD;  Location:  OR     C APPENDECTOMY       C NONSPECIFIC PROCEDURE      nasal polyp excision     C NONSPECIFIC PROCEDURE      RIH repair     C NONSPECIFIC PROCEDURE  2011     right knee arthroscopy       Anesthesia Evaluation     .             ROS/MED HX    ENT/Pulmonary:       Neurologic: Comment: Memory impairment       Cardiovascular:     (+) Dyslipidemia, hypertension----. : . . . :. .       METS/Exercise Tolerance:     Hematologic:     (+) Anemia, -      Musculoskeletal:        "  GI/Hepatic:     (+) GERD cholecystitis/cholelithiasis,       Renal/Genitourinary: Comment: Polycystic kidney disease    (+) chronic renal disease, Nephrolithiasis , BPH,       Endo:     (+) type II DM .      Psychiatric:         Infectious Disease:         Malignancy:         Other:                          Physical Exam      Airway   Mallampati: III  TM distance: >3 FB  Neck ROM: full    Dental   (+) upper dentures and lower dentures    Cardiovascular       Pulmonary             Lab Results   Component Value Date    WBC 11.9 (H) 12/31/2018    HGB 13.5 12/31/2018    HCT 40.5 12/31/2018     12/31/2018     12/31/2018    POTASSIUM 4.2 12/31/2018    CHLORIDE 106 12/31/2018    CO2 25 12/31/2018    BUN 19 12/31/2018    CR 1.03 12/31/2018     (H) 12/31/2018    MABEL 8.0 (L) 12/31/2018    ALBUMIN 3.0 (L) 12/31/2018    PROTTOTAL 6.5 (L) 12/31/2018    ALT 2,057 (HH) 12/31/2018    AST 2,420 (HH) 12/31/2018    ALKPHOS 181 (H) 12/31/2018    BILITOTAL 2.9 (H) 12/31/2018    LIPASE 99 12/30/2018    AMYLASE 23 (L) 10/08/2018    INR 0.95 09/14/2017    TSH 1.16 10/02/2018       Preop Vitals  BP Readings from Last 3 Encounters:   12/31/18 145/58   10/25/18 112/70   10/17/18 154/71    Pulse Readings from Last 3 Encounters:   12/31/18 104   10/25/18 75   10/17/18 71      Resp Readings from Last 3 Encounters:   12/31/18 16   10/17/18 16   10/09/18 18    SpO2 Readings from Last 3 Encounters:   12/31/18 94%   10/25/18 97%   10/17/18 98%      Temp Readings from Last 1 Encounters:   12/31/18 38  C (100.4  F) (Oral)    Ht Readings from Last 1 Encounters:   12/30/18 1.702 m (5' 7\")      Wt Readings from Last 1 Encounters:   12/31/18 79.2 kg (174 lb 8 oz)    Estimated body mass index is 27.33 kg/m  as calculated from the following:    Height as of this encounter: 1.702 m (5' 7\").    Weight as of this encounter: 79.2 kg (174 lb 8 oz).       Anesthesia Plan      History & Physical Review  History and physical reviewed and " following examination; no interval change.    ASA Status:  2 .    NPO Status:  > 8 hours    Plan for MAC     Discussed via        Postoperative Care      Consents  Anesthetic plan, risks, benefits and alternatives discussed with:  Patient..                 Carmen Clarke

## 2018-12-31 NOTE — ED NOTES
"[facility]  ED Nurse Handoff Report    ED Chief complaint: Abdominal Pain (Left upper quad abdominal pain started today after eating rice, N/V x 1 today, regular BM this am. Rates pain 7/10)      ED Diagnosis:   Final diagnoses:   Acute cholecystitis       Code Status: not discussed    Allergies:   Allergies   Allergen Reactions     No Known Drug Allergies        Activity level - Baseline/Home:  Independent    Activity Level - Current:   Stand with Assist     Needed?: Yes    Isolation: No  Infection: Not Applicable  Bariatric?: No    Vital Signs:   Vitals:    12/30/18 1803 12/30/18 1807   BP: 163/68 163/68   Pulse: 88 89   Resp: 18 20   Temp: 97.4  F (36.3  C)    TempSrc: Temporal    SpO2: 97% 98%   Weight: 78.9 kg (174 lb) 78.9 kg (174 lb)   Height: 1.702 m (5' 7\") 1.702 m (5' 7\")       Cardiac Rhythm: ,        Pain level: 0-10 Pain Scale: 7    Is this patient confused?: No   Does this patient have a guardian?  No         If yes, is there guardianship documents in the Epic \"Code/ACP\" activity?  N/A         Guardian Notified?  N/A  [place] - Suicide Severity Rating Scale Completed?  Yes  If yes, what color did the patient score?  White    Patient Report: Initial Complaint: abdominal pain. vomiting  Focused Assessment: pt having upper abdominal pain across whole chest, also nausea and vomiting.   Tests Performed: labs, CT, UA/UC  Abnormal Results:   Labs Ordered and Resulted from Time of ED Arrival Up to the Time of Departure from the ED   ROUTINE UA WITH MICROSCOPIC - Abnormal; Notable for the following components:       Result Value    Glucose Urine 150 (*)     Ketones Urine 40 (*)     Blood Urine Trace (*)     pH Urine 7.5 (*)     Protein Albumin Urine 30 (*)     RBC Urine 25 (*)     Bacteria Urine Few (*)     Mucous Urine Present (*)     Amorphous Crystals Few (*)     All other components within normal limits   CBC WITH PLATELETS DIFFERENTIAL - Abnormal; Notable for the following " components:    WBC 12.8 (*)     MCH 33.1 (*)     Absolute Neutrophil 12.1 (*)     Absolute Lymphocytes 0.4 (*)     All other components within normal limits   COMPREHENSIVE METABOLIC PANEL - Abnormal; Notable for the following components:    Glucose 219 (*)     Bilirubin Total 1.9 (*)     Alkaline Phosphatase 163 (*)      (*)      (*)     All other components within normal limits   LIPASE   PERIPHERAL IV CATHETER   FREE WATER       Treatments provided: fluids, nausea meds, antibiotics    Family Comments: daughter at bedside    OBS brochure/video discussed/provided to patient/family: N/A              Name of person given brochure if not patient:               Relationship to patient:     ED Medications:   Medications   HYDROmorphone (PF) (DILAUDID) injection 0.5 mg (not administered)   0.9% sodium chloride BOLUS (0 mLs Intravenous Stopped 12/30/18 2030)     Followed by   sodium chloride 0.9% infusion (not administered)   piperacillin-tazobactam (ZOSYN) 3.375 g vial to attach to  mL bag (not administered)   ondansetron (ZOFRAN) injection 4 mg (4 mg Intravenous Given 12/30/18 1921)   iopamidol (ISOVUE-370) solution 88 mL (88 mLs Intravenous Given 12/30/18 2110)   Saline Flush (67 mLs Intravenous Given 12/30/18 2112)       Drips infusing?:  No    For the majority of the shift this patient was Green.   Interventions performed were n/a.    Severe Sepsis OR Septic Shock Diagnosis Present: No    To be done/followed up on inpatient unit:  monitor    ED NURSE PHONE NUMBER: [phone]

## 2018-12-31 NOTE — PROGRESS NOTES
Surgery consult received. Full note to follow. LFTs including bilirubin and alk phos increasing, concern for cholangitis with fever, abdominal pain and elevated LFTs. Would favor GI evaluation for possible ERCP prior to surgery at this time.     Melia Bentley MD  [facility]  [phone]

## 2018-12-31 NOTE — ANESTHESIA POSTPROCEDURE EVALUATION
Patient: Rabia Reed    Procedure(s):  COMBINED ENDOSCOPIC ULTRASOUND, ESOPHAGOSCOPY, GASTROSCOPY, DUODENOSCOPY (EGD)  ENDOSCOPIC RETROGRADE CHOLANGIOPANCREATOGRAPHY, EXCHANGE TUBE/STENT    Diagnosis:ACUTE CHOLECYSTITIS.  Diagnosis Additional Information: No value filed.    Anesthesia Type:  MAC    Note:  Anesthesia Post Evaluation    Patient location during evaluation: PACU  Patient participation: Able to fully participate in evaluation  Level of consciousness: awake and awake and alert  Pain management: adequate  Airway patency: patent  Cardiovascular status: acceptable  Respiratory status: acceptable  Hydration status: acceptable  PONV: none     Anesthetic complications: None          Last vitals:  Vitals:    12/31/18 1149 12/31/18 1506 12/31/18 1510   BP: 145/58 99/57 119/66   Pulse: 104 86    Resp: 16  10   Temp: 38  C (100.4  F)  37.7  C (99.9  F)   SpO2: 94% 94% 96%         Electronically Signed By: Carmen Clarke  December 31, 2018  3:31 PM

## 2019-01-01 ENCOUNTER — ANESTHESIA EVENT (OUTPATIENT)
Dept: SURGERY | Facility: CLINIC | Age: 79
DRG: 418 | End: 2019-01-01
Payer: COMMERCIAL

## 2019-01-01 ENCOUNTER — ANESTHESIA (OUTPATIENT)
Dept: SURGERY | Facility: CLINIC | Age: 79
DRG: 418 | End: 2019-01-01
Payer: COMMERCIAL

## 2019-01-01 LAB
ALBUMIN SERPL-MCNC: 2.5 G/DL (ref 3.4–5)
ALP SERPL-CCNC: 165 U/L (ref 40–150)
ALT SERPL W P-5'-P-CCNC: 1026 U/L (ref 0–70)
ANION GAP SERPL CALCULATED.3IONS-SCNC: 6 MMOL/L (ref 3–14)
AST SERPL W P-5'-P-CCNC: 630 U/L (ref 0–45)
BASOPHILS # BLD AUTO: 0 10E9/L (ref 0–0.2)
BASOPHILS NFR BLD AUTO: 0.1 %
BILIRUB SERPL-MCNC: 2.7 MG/DL (ref 0.2–1.3)
BUN SERPL-MCNC: 18 MG/DL (ref 7–30)
CALCIUM SERPL-MCNC: 7.9 MG/DL (ref 8.5–10.1)
CHLORIDE SERPL-SCNC: 110 MMOL/L (ref 94–109)
CO2 SERPL-SCNC: 27 MMOL/L (ref 20–32)
CREAT SERPL-MCNC: 0.9 MG/DL (ref 0.66–1.25)
DIFFERENTIAL METHOD BLD: ABNORMAL
EOSINOPHIL # BLD AUTO: 0 10E9/L (ref 0–0.7)
EOSINOPHIL NFR BLD AUTO: 0 %
ERYTHROCYTE [DISTWIDTH] IN BLOOD BY AUTOMATED COUNT: 13.4 % (ref 10–15)
GFR SERPL CREATININE-BSD FRML MDRD: 80 ML/MIN/{1.73_M2}
GLUCOSE BLDC GLUCOMTR-MCNC: 145 MG/DL (ref 70–99)
GLUCOSE BLDC GLUCOMTR-MCNC: 161 MG/DL (ref 70–99)
GLUCOSE BLDC GLUCOMTR-MCNC: 178 MG/DL (ref 70–99)
GLUCOSE BLDC GLUCOMTR-MCNC: 189 MG/DL (ref 70–99)
GLUCOSE BLDC GLUCOMTR-MCNC: 189 MG/DL (ref 70–99)
GLUCOSE BLDC GLUCOMTR-MCNC: 206 MG/DL (ref 70–99)
GLUCOSE BLDC GLUCOMTR-MCNC: 217 MG/DL (ref 70–99)
GLUCOSE SERPL-MCNC: 177 MG/DL (ref 70–99)
HCT VFR BLD AUTO: 38.5 % (ref 40–53)
HGB BLD-MCNC: 12.7 G/DL (ref 13.3–17.7)
IMM GRANULOCYTES # BLD: 0 10E9/L (ref 0–0.4)
IMM GRANULOCYTES NFR BLD: 0.3 %
LIPASE SERPL-CCNC: 571 U/L (ref 73–393)
LYMPHOCYTES # BLD AUTO: 0.5 10E9/L (ref 0.8–5.3)
LYMPHOCYTES NFR BLD AUTO: 6.7 %
MCH RBC QN AUTO: 32.6 PG (ref 26.5–33)
MCHC RBC AUTO-ENTMCNC: 33 G/DL (ref 31.5–36.5)
MCV RBC AUTO: 99 FL (ref 78–100)
MONOCYTES # BLD AUTO: 0.2 10E9/L (ref 0–1.3)
MONOCYTES NFR BLD AUTO: 2.6 %
NEUTROPHILS # BLD AUTO: 6.6 10E9/L (ref 1.6–8.3)
NEUTROPHILS NFR BLD AUTO: 90.3 %
NRBC # BLD AUTO: 0 10*3/UL
NRBC BLD AUTO-RTO: 0 /100
PLATELET # BLD AUTO: 117 10E9/L (ref 150–450)
POTASSIUM SERPL-SCNC: 3.4 MMOL/L (ref 3.4–5.3)
PROT SERPL-MCNC: 6.2 G/DL (ref 6.8–8.8)
RBC # BLD AUTO: 3.89 10E12/L (ref 4.4–5.9)
SODIUM SERPL-SCNC: 143 MMOL/L (ref 133–144)
WBC # BLD AUTO: 7.3 10E9/L (ref 4–11)

## 2019-01-01 PROCEDURE — 00000146 ZZHCL STATISTIC GLUCOSE BY METER IP

## 2019-01-01 PROCEDURE — 25000128 H RX IP 250 OP 636: Performed by: HOSPITALIST

## 2019-01-01 PROCEDURE — 85025 COMPLETE CBC W/AUTO DIFF WBC: CPT | Performed by: INTERNAL MEDICINE

## 2019-01-01 PROCEDURE — 99207 ZZC CDG-MDM COMPONENT: MEETS LOW - DOWN CODED: CPT | Performed by: INTERNAL MEDICINE

## 2019-01-01 PROCEDURE — 80053 COMPREHEN METABOLIC PANEL: CPT | Performed by: INTERNAL MEDICINE

## 2019-01-01 PROCEDURE — 27210794 ZZH OR GENERAL SUPPLY STERILE: Performed by: SURGERY

## 2019-01-01 PROCEDURE — 25000566 ZZH SEVOFLURANE, EA 15 MIN: Performed by: SURGERY

## 2019-01-01 PROCEDURE — 37000009 ZZH ANESTHESIA TECHNICAL FEE, EACH ADDTL 15 MIN: Performed by: SURGERY

## 2019-01-01 PROCEDURE — 99207 ZZC MOONLIGHTING INDICATOR: CPT | Performed by: INTERNAL MEDICINE

## 2019-01-01 PROCEDURE — 25000128 H RX IP 250 OP 636: Performed by: NURSE ANESTHETIST, CERTIFIED REGISTERED

## 2019-01-01 PROCEDURE — 25000125 ZZHC RX 250: Performed by: NURSE ANESTHETIST, CERTIFIED REGISTERED

## 2019-01-01 PROCEDURE — 25000132 ZZH RX MED GY IP 250 OP 250 PS 637: Performed by: HOSPITALIST

## 2019-01-01 PROCEDURE — 25000128 H RX IP 250 OP 636: Performed by: ANESTHESIOLOGY

## 2019-01-01 PROCEDURE — 88304 TISSUE EXAM BY PATHOLOGIST: CPT | Performed by: SURGERY

## 2019-01-01 PROCEDURE — 83690 ASSAY OF LIPASE: CPT | Performed by: SURGERY

## 2019-01-01 PROCEDURE — 25000125 ZZHC RX 250: Performed by: SURGERY

## 2019-01-01 PROCEDURE — 36415 COLL VENOUS BLD VENIPUNCTURE: CPT | Performed by: INTERNAL MEDICINE

## 2019-01-01 PROCEDURE — 99232 SBSQ HOSP IP/OBS MODERATE 35: CPT | Performed by: INTERNAL MEDICINE

## 2019-01-01 PROCEDURE — 36000058 ZZH SURGERY LEVEL 3 EA 15 ADDTL MIN: Performed by: SURGERY

## 2019-01-01 PROCEDURE — 36415 COLL VENOUS BLD VENIPUNCTURE: CPT | Performed by: SURGERY

## 2019-01-01 PROCEDURE — 71000012 ZZH RECOVERY PHASE 1 LEVEL 1 FIRST HR: Performed by: SURGERY

## 2019-01-01 PROCEDURE — 88304 TISSUE EXAM BY PATHOLOGIST: CPT | Mod: 26 | Performed by: SURGERY

## 2019-01-01 PROCEDURE — 25800025 ZZH RX 258: Performed by: SURGERY

## 2019-01-01 PROCEDURE — 37000008 ZZH ANESTHESIA TECHNICAL FEE, 1ST 30 MIN: Performed by: SURGERY

## 2019-01-01 PROCEDURE — 80053 COMPREHEN METABOLIC PANEL: CPT | Performed by: SURGERY

## 2019-01-01 PROCEDURE — 47562 LAPAROSCOPIC CHOLECYSTECTOMY: CPT | Mod: AS | Performed by: PHYSICIAN ASSISTANT

## 2019-01-01 PROCEDURE — 25000131 ZZH RX MED GY IP 250 OP 636 PS 637: Performed by: HOSPITALIST

## 2019-01-01 PROCEDURE — 0FT44ZZ RESECTION OF GALLBLADDER, PERCUTANEOUS ENDOSCOPIC APPROACH: ICD-10-PCS | Performed by: SURGERY

## 2019-01-01 PROCEDURE — 25000128 H RX IP 250 OP 636: Performed by: SURGERY

## 2019-01-01 PROCEDURE — 40000170 ZZH STATISTIC PRE-PROCEDURE ASSESSMENT II: Performed by: SURGERY

## 2019-01-01 PROCEDURE — 47562 LAPAROSCOPIC CHOLECYSTECTOMY: CPT | Performed by: SURGERY

## 2019-01-01 PROCEDURE — 12000000 ZZH R&B MED SURG/OB

## 2019-01-01 PROCEDURE — 25000125 ZZHC RX 250: Performed by: HOSPITALIST

## 2019-01-01 PROCEDURE — 36000056 ZZH SURGERY LEVEL 3 1ST 30 MIN: Performed by: SURGERY

## 2019-01-01 RX ORDER — HYDROMORPHONE HYDROCHLORIDE 1 MG/ML
.3-.5 INJECTION, SOLUTION INTRAMUSCULAR; INTRAVENOUS; SUBCUTANEOUS EVERY 5 MIN PRN
Status: DISCONTINUED | OUTPATIENT
Start: 2019-01-01 | End: 2019-01-01 | Stop reason: HOSPADM

## 2019-01-01 RX ORDER — PROPOFOL 10 MG/ML
INJECTION, EMULSION INTRAVENOUS PRN
Status: DISCONTINUED | OUTPATIENT
Start: 2019-01-01 | End: 2019-01-01

## 2019-01-01 RX ORDER — ONDANSETRON 4 MG/1
4 TABLET, ORALLY DISINTEGRATING ORAL EVERY 30 MIN PRN
Status: DISCONTINUED | OUTPATIENT
Start: 2019-01-01 | End: 2019-01-01 | Stop reason: HOSPADM

## 2019-01-01 RX ORDER — SODIUM CHLORIDE, SODIUM LACTATE, POTASSIUM CHLORIDE, CALCIUM CHLORIDE 600; 310; 30; 20 MG/100ML; MG/100ML; MG/100ML; MG/100ML
INJECTION, SOLUTION INTRAVENOUS CONTINUOUS
Status: DISCONTINUED | OUTPATIENT
Start: 2019-01-01 | End: 2019-01-01 | Stop reason: HOSPADM

## 2019-01-01 RX ORDER — FENTANYL CITRATE 50 UG/ML
INJECTION, SOLUTION INTRAMUSCULAR; INTRAVENOUS PRN
Status: DISCONTINUED | OUTPATIENT
Start: 2019-01-01 | End: 2019-01-01

## 2019-01-01 RX ORDER — NALOXONE HYDROCHLORIDE 0.4 MG/ML
.1-.4 INJECTION, SOLUTION INTRAMUSCULAR; INTRAVENOUS; SUBCUTANEOUS
Status: DISCONTINUED | OUTPATIENT
Start: 2019-01-01 | End: 2019-01-01

## 2019-01-01 RX ORDER — MAGNESIUM HYDROXIDE 1200 MG/15ML
LIQUID ORAL PRN
Status: DISCONTINUED | OUTPATIENT
Start: 2019-01-01 | End: 2019-01-01 | Stop reason: HOSPADM

## 2019-01-01 RX ORDER — FENTANYL CITRATE 50 UG/ML
25-50 INJECTION, SOLUTION INTRAMUSCULAR; INTRAVENOUS
Status: DISCONTINUED | OUTPATIENT
Start: 2019-01-01 | End: 2019-01-01 | Stop reason: HOSPADM

## 2019-01-01 RX ORDER — NEOSTIGMINE METHYLSULFATE 1 MG/ML
VIAL (ML) INJECTION PRN
Status: DISCONTINUED | OUTPATIENT
Start: 2019-01-01 | End: 2019-01-01

## 2019-01-01 RX ORDER — ONDANSETRON 2 MG/ML
4 INJECTION INTRAMUSCULAR; INTRAVENOUS EVERY 30 MIN PRN
Status: DISCONTINUED | OUTPATIENT
Start: 2019-01-01 | End: 2019-01-01 | Stop reason: HOSPADM

## 2019-01-01 RX ORDER — DEXAMETHASONE SODIUM PHOSPHATE 4 MG/ML
INJECTION, SOLUTION INTRA-ARTICULAR; INTRALESIONAL; INTRAMUSCULAR; INTRAVENOUS; SOFT TISSUE PRN
Status: DISCONTINUED | OUTPATIENT
Start: 2019-01-01 | End: 2019-01-01

## 2019-01-01 RX ORDER — HYDRALAZINE HYDROCHLORIDE 20 MG/ML
10 INJECTION INTRAMUSCULAR; INTRAVENOUS EVERY 4 HOURS PRN
Status: DISCONTINUED | OUTPATIENT
Start: 2019-01-01 | End: 2019-01-06 | Stop reason: HOSPADM

## 2019-01-01 RX ORDER — LIDOCAINE HYDROCHLORIDE 20 MG/ML
INJECTION, SOLUTION INFILTRATION; PERINEURAL PRN
Status: DISCONTINUED | OUTPATIENT
Start: 2019-01-01 | End: 2019-01-01

## 2019-01-01 RX ORDER — GLYCOPYRROLATE 0.2 MG/ML
INJECTION, SOLUTION INTRAMUSCULAR; INTRAVENOUS PRN
Status: DISCONTINUED | OUTPATIENT
Start: 2019-01-01 | End: 2019-01-01

## 2019-01-01 RX ORDER — ONDANSETRON 2 MG/ML
INJECTION INTRAMUSCULAR; INTRAVENOUS PRN
Status: DISCONTINUED | OUTPATIENT
Start: 2019-01-01 | End: 2019-01-01

## 2019-01-01 RX ORDER — HYDROCODONE BITARTRATE AND ACETAMINOPHEN 5; 325 MG/1; MG/1
1-2 TABLET ORAL EVERY 6 HOURS PRN
Status: DISCONTINUED | OUTPATIENT
Start: 2019-01-01 | End: 2019-01-02

## 2019-01-01 RX ADMIN — FENTANYL CITRATE 50 MCG: 50 INJECTION, SOLUTION INTRAMUSCULAR; INTRAVENOUS at 16:28

## 2019-01-01 RX ADMIN — SODIUM CHLORIDE: 9 INJECTION, SOLUTION INTRAVENOUS at 22:52

## 2019-01-01 RX ADMIN — DEXAMETHASONE SODIUM PHOSPHATE 4 MG: 4 INJECTION, SOLUTION INTRA-ARTICULAR; INTRALESIONAL; INTRAMUSCULAR; INTRAVENOUS; SOFT TISSUE at 15:04

## 2019-01-01 RX ADMIN — ROCURONIUM BROMIDE 40 MG: 10 INJECTION INTRAVENOUS at 14:54

## 2019-01-01 RX ADMIN — ONDANSETRON 4 MG: 2 INJECTION INTRAMUSCULAR; INTRAVENOUS at 20:46

## 2019-01-01 RX ADMIN — GLYCOPYRROLATE 0.6 MG: 0.2 INJECTION, SOLUTION INTRAMUSCULAR; INTRAVENOUS at 16:05

## 2019-01-01 RX ADMIN — HYDROMORPHONE HYDROCHLORIDE 0.5 MG: 1 INJECTION, SOLUTION INTRAMUSCULAR; INTRAVENOUS; SUBCUTANEOUS at 16:28

## 2019-01-01 RX ADMIN — FENTANYL CITRATE 50 MCG: 50 INJECTION, SOLUTION INTRAMUSCULAR; INTRAVENOUS at 16:38

## 2019-01-01 RX ADMIN — PIPERACILLIN SODIUM,TAZOBACTAM SODIUM 3.38 G: 3; .375 INJECTION, POWDER, FOR SOLUTION INTRAVENOUS at 15:40

## 2019-01-01 RX ADMIN — Medication 0.4 MG: at 17:58

## 2019-01-01 RX ADMIN — DEXMEDETOMIDINE HYDROCHLORIDE 8 MCG: 100 INJECTION, SOLUTION INTRAVENOUS at 15:20

## 2019-01-01 RX ADMIN — INSULIN GLARGINE 10 UNITS: 100 INJECTION, SOLUTION SUBCUTANEOUS at 08:36

## 2019-01-01 RX ADMIN — FAMOTIDINE 20 MG: 10 INJECTION, SOLUTION INTRAVENOUS at 09:37

## 2019-01-01 RX ADMIN — PROPOFOL 70 MG: 10 INJECTION, EMULSION INTRAVENOUS at 14:54

## 2019-01-01 RX ADMIN — FENTANYL CITRATE 50 MCG: 50 INJECTION, SOLUTION INTRAMUSCULAR; INTRAVENOUS at 15:03

## 2019-01-01 RX ADMIN — INSULIN ASPART 2 UNITS: 100 INJECTION, SOLUTION INTRAVENOUS; SUBCUTANEOUS at 03:47

## 2019-01-01 RX ADMIN — DEXMEDETOMIDINE HYDROCHLORIDE 12 MCG: 100 INJECTION, SOLUTION INTRAVENOUS at 15:17

## 2019-01-01 RX ADMIN — PIPERACILLIN SODIUM,TAZOBACTAM SODIUM 3.38 G: 3; .375 INJECTION, POWDER, FOR SOLUTION INTRAVENOUS at 09:40

## 2019-01-01 RX ADMIN — LIDOCAINE HYDROCHLORIDE 100 MG: 20 INJECTION, SOLUTION INFILTRATION; PERINEURAL at 14:52

## 2019-01-01 RX ADMIN — PIPERACILLIN SODIUM,TAZOBACTAM SODIUM 3.38 G: 3; .375 INJECTION, POWDER, FOR SOLUTION INTRAVENOUS at 04:01

## 2019-01-01 RX ADMIN — Medication 0.2 MG: at 10:41

## 2019-01-01 RX ADMIN — HYDROMORPHONE HYDROCHLORIDE 0.3 MG: 1 INJECTION, SOLUTION INTRAMUSCULAR; INTRAVENOUS; SUBCUTANEOUS at 16:49

## 2019-01-01 RX ADMIN — SODIUM CHLORIDE, POTASSIUM CHLORIDE, SODIUM LACTATE AND CALCIUM CHLORIDE: 600; 310; 30; 20 INJECTION, SOLUTION INTRAVENOUS at 15:41

## 2019-01-01 RX ADMIN — HYDROMORPHONE HYDROCHLORIDE 0.5 MG: 1 INJECTION, SOLUTION INTRAMUSCULAR; INTRAVENOUS; SUBCUTANEOUS at 17:11

## 2019-01-01 RX ADMIN — FAMOTIDINE 20 MG: 10 INJECTION, SOLUTION INTRAVENOUS at 22:48

## 2019-01-01 RX ADMIN — INSULIN ASPART 4 UNITS: 100 INJECTION, SOLUTION INTRAVENOUS; SUBCUTANEOUS at 20:46

## 2019-01-01 RX ADMIN — Medication 0.4 MG: at 22:48

## 2019-01-01 RX ADMIN — Medication 0.2 MG: at 04:40

## 2019-01-01 RX ADMIN — PIPERACILLIN SODIUM,TAZOBACTAM SODIUM 3.38 G: 3; .375 INJECTION, POWDER, FOR SOLUTION INTRAVENOUS at 22:52

## 2019-01-01 RX ADMIN — FINASTERIDE 5 MG: 5 TABLET, FILM COATED ORAL at 20:44

## 2019-01-01 RX ADMIN — SODIUM CHLORIDE, POTASSIUM CHLORIDE, SODIUM LACTATE AND CALCIUM CHLORIDE: 600; 310; 30; 20 INJECTION, SOLUTION INTRAVENOUS at 14:46

## 2019-01-01 RX ADMIN — FENTANYL CITRATE 50 MCG: 50 INJECTION, SOLUTION INTRAMUSCULAR; INTRAVENOUS at 14:52

## 2019-01-01 RX ADMIN — ONDANSETRON 4 MG: 2 INJECTION INTRAMUSCULAR; INTRAVENOUS at 15:45

## 2019-01-01 RX ADMIN — NEOSTIGMINE METHYLSULFATE 4 MG: 1 INJECTION, SOLUTION INTRAVENOUS at 16:05

## 2019-01-01 RX ADMIN — PROPOFOL 30 MG: 10 INJECTION, EMULSION INTRAVENOUS at 15:19

## 2019-01-01 RX ADMIN — SODIUM CHLORIDE: 9 INJECTION, SOLUTION INTRAVENOUS at 08:25

## 2019-01-01 RX ADMIN — INSULIN ASPART 3 UNITS: 100 INJECTION, SOLUTION INTRAVENOUS; SUBCUTANEOUS at 23:57

## 2019-01-01 RX ADMIN — HYDRALAZINE HYDROCHLORIDE 10 MG: 20 INJECTION INTRAMUSCULAR; INTRAVENOUS at 22:48

## 2019-01-01 ASSESSMENT — ACTIVITIES OF DAILY LIVING (ADL)
ADLS_ACUITY_SCORE: 16

## 2019-01-01 ASSESSMENT — MIFFLIN-ST. JEOR: SCORE: 1459.7

## 2019-01-01 ASSESSMENT — LIFESTYLE VARIABLES: TOBACCO_USE: 1

## 2019-01-01 NOTE — BRIEF OP NOTE
[facility]    Brief Operative Note    Pre-operative diagnosis: ACUTE CHOLECYSTITIS.  Post-operative diagnosis Cholecystitis  Procedure: Procedure(s):  LAPAROSCOPIC CHOLECYSTECTOMY  Surgeon: Surgeon(s) and Role:     * Melia Bentley MD - Primary     * Olga Tirado PA-C - Assisting  Anesthesia: General   Estimated blood loss: 150 ml  Drains: None  Specimens:   ID Type Source Tests Collected by Time Destination   A : GALLBLADDER AND CONTENTS Tissue Gallbladder and Contents SURGICAL PATHOLOGY EXAM Melia Bentley MD 1/1/2019  3:45 PM      Findings:   same as above.  Complications: None.  Implants: None.

## 2019-01-01 NOTE — PLAN OF CARE
A/Ox4, Greek speaking - able to make needs known, daughters utilized. VSS on room air. Denies pain or nausea. Up SBA, using urinal at bedside. Adequate urine output. Loose BM x1 this shift. Tolerating clear liquids - plan for NPO at midnight. Scheduled cholecystectomy tomorrow. BG checks q4hrs. Continued IV antibiotics. Will continue to monitor.

## 2019-01-01 NOTE — ANESTHESIA CARE TRANSFER NOTE
Patient: Rabia Reed    Procedure(s):  LAPAROSCOPIC CHOLECYSTECTOMY    Diagnosis: ACUTE CHOLECYSTITIS.  Diagnosis Additional Information: No value filed.    Anesthesia Type:   General, ETT     Note:  Airway :Face Mask  Patient transferred to:PACU  Comments: Neuromuscular blockade reversed after TOF 4/4, spontaneous respirations, adequate tidal volumes, followed commands to voice, oropharynx suctioned with soft flexible catheter, extubated atraumatically, extubated with suction, airway patent after extubation.  Oxygen via facemask at 10 liters per minute to PACU. Oxygen tubing connected to wall O2 in PACU, SpO2, NiBP, and EKG monitors and alarms on and functioning, Giselle Hugger warmer connected to patient gown, report on patient's clinical status given to PACU RN. Handoff Report: Identifed the Patient, Identified the Reponsible Provider, Reviewed the pertinent medical history, Discussed the surgical course, Reviewed Intra-OP anesthesia mangement and issues during anesthesia, Set expectations for post-procedure period and Allowed opportunity for questions and acknowledgement of understanding      Vitals: (Last set prior to Anesthesia Care Transfer)    CRNA VITALS  1/1/2019 1550 - 1/1/2019 1627      1/1/2019             Resp Rate (observed):  16    EKG:  Sinus rhythm                Electronically Signed By: JERMAIN Nolen CRNA  January 1, 2019  4:27 PM

## 2019-01-01 NOTE — PROGRESS NOTES
GI Chart Check    Stable.    For chrissie issa today.    Fiona Archer MD  [facility]  Office: [phone]  Cell:  [phone]  Monday through Thursday 8am to 5pm, Friday 8am to 4pm

## 2019-01-01 NOTE — PROGRESS NOTES
"General Surgery Progress Note    Subjective: pt reports pain overnight. Son's at bedside interpreting. No nausea or emesis.     Objective: /75 (BP Location: Right arm)   Pulse 82   Temp 97.6  F (36.4  C) (Oral)   Resp 16   Ht 1.702 m (5' 7\")   Wt 79.1 kg (174 lb 6.4 oz)   SpO2 95%   BMI 27.31 kg/m    Gen: alert, no distress  CV: RRR  Pulm: nonlabored breathing  Abd: soft, mildly distended, tender RUQ  Ext: WWP    Assessment/Plan:   Rabia Reed  is a 78 year old male admitted with choledocholithiasis and cholecystitis. S/p ERCP with stone extraction.   - plan for laparoscopic cholecystectomy today, unsure of timing at this point  - NPO  - likely discharge home tomorrow    Melia Bentley MD  [facility]  [phone]              "

## 2019-01-01 NOTE — PROGRESS NOTES
[facility]    Hospitalist Progress Note    Assessment & Plan   78 year old male  with PMH significant for diabetes mellitus type 2, BPH, hyperlipidemia, anemia was brought to the ER for evaluation of Abdominal pain, nausea and vomiting, and is being admitted to manage possible acute calculus cholecystitis.     Cholelithiasis with possible acute calculus cholecystitis  Patient with nausea vomiting and abdominal pain intermittent, CT abdomen pelvis shows stone in the gallbladder neck and mild GB wall thickening, US RUQ confirms cholelithiasis with GB wall thickening suggestive of cholecystitis.  LFTs are elevated.  Lipase is normal.  Symptoms have resolved after antiemetic and IV pain medicine in ER.  - Gen Surg and GI consulted, likely for Lap Serina on 1/1/19. Patient npo TODAY.   - for now continue on IV Zosyn  - follow CMP and CBC in AM  - IV pain and anti-emetics     Diabetes mellitus type 2, insulin-dependent, HbA1c 8.6 on October 2018  From admission note: Patient takes 70/30 insulin and Humalog PTA.  He will be n.p.o. and so I have restarted him on small dose (10 units) Lantus daily along with Insulin sliding scale  - Glucose is stable on current regimen, no change for today  - if he has surgery today and resumes diet consider change back to home regimen at reduced dose  - continue PTA gabapentin      BPH  -Continue PTA finasteride and Flomax once med rec completed  -Monitor intake and output, monitor for urinary retention.     Hyperlipidemia  -Patient is on statin, holding given elevated LFTs.     Polycystic kidney disease and nonobstructing left renal stone  -Creatinine is normal.   -No CVA tenderness though noted left upper quadrant tenderness, ? Related to nephrolithiasis  -CT did not show obstruction or perinephric stranding.  -UA shows hematuria likely due to above.  -Urology evaluation at some point, if persistent left upper quadrant pain or right upper quadrant ultrasound without  significant to be well thickening, will consult urology during this stay.  - There is heistancy to use acetaminophen for fever and pain given his elevated LFTs, I have added low dose ibuprofen which can be used for a limited time during his stay, I encourage conservative use of NSAIDs in light of his history     Chronic anemia  -By history, hemoglobin is normal, could be due to hemoconcentration, monitor.     Memory impairment  - per chart review  -Lives with his son     DVT Prophylaxis: Pneumatic Compression Devices     Code Status: Full Code     Disposition: Expected discharge: perhaps 1-2 days pending CCK.     Andrew Jacob MD   Text Page (7am to 6pm)    Interval History   CCK today. Reports some nausea.     -Data reviewed today: I reviewed all new labs and imaging results over the last 24 hours. I personally reviewed no images or EKG's today.    Physical Exam   Temp: 96.9  F (36.1  C) Temp src: Oral BP: 135/65 Pulse: 84 Heart Rate: 80 Resp: 18 SpO2: 98 % O2 Device: None (Room air) Oxygen Delivery: 1 LPM  Vitals:    12/30/18 1803 12/30/18 1807 12/31/18 0612   Weight: 78.9 kg (174 lb) 78.9 kg (174 lb) 79.2 kg (174 lb 8 oz)     Vital Signs with Ranges  Temp:  [96.5  F (35.8  C)-100.4  F (38  C)] 96.9  F (36.1  C)  Pulse:  [] 84  Heart Rate:  [73-92] 80  Resp:  [9-21] 18  BP: ()/(57-91) 135/65  SpO2:  [91 %-100 %] 98 %  I/O last 3 completed shifts:  In: 1732 [I.V.:1732]  Out: 925 [Urine:925]    Constitutional: NAD, afebrile, A+Ox4  Respiratory:     CTA B  Cardiovascular: RRR, no murmur  GI: S, mild tenderness bilateral upper quadrants without rebound or guarding, ND, normal BS  Skin/Integumen: Dry, warm, no edema       Medications     - MEDICATION INSTRUCTIONS -       sodium chloride 75 mL/hr at 12/31/18 1832       famotidine  20 mg Intravenous Q12H     finasteride  5 mg Oral QPM     insulin aspart  1-12 Units Subcutaneous Q4H     insulin glargine  10 Units Subcutaneous QAM AC     piperacillin-tazobactam   3.375 g Intravenous Q6H     tamsulosin  0.4 mg Oral Daily       Data   Recent Labs   Lab 12/31/18  0744 12/30/18  1920   WBC 11.9* 12.8*   HGB 13.5 14.7   MCV 99 99    192    138   POTASSIUM 4.2 3.9   CHLORIDE 106 100   CO2 25 29   BUN 19 19   CR 1.03 0.79   ANIONGAP 9 9   MABEL 8.0* 8.8   * 219*   ALBUMIN 3.0* 3.7   PROTTOTAL 6.5* 7.7   BILITOTAL 2.9* 1.9*   ALKPHOS 181* 163*   ALT 2,057* 388*   AST 2,420* 687*   LIPASE  --  99       Imaging:   Recent Results (from the past 24 hour(s))   XR ERCP    Narrative    XR ERCP  12/31/2018 2:55 PM     HISTORY: Ascending cholangitis.    COMPARISON: None.      Impression    IMPRESSION: Two spot fluoroscopic images obtained during ERCP. Biliary  system appears nondilated. Irregularity in the distal common bile duct  and image provided. Total fluoroscopic time 0.6 minutes.    JOVANNY ABRAHAM MD

## 2019-01-01 NOTE — PLAN OF CARE
Pt is A&Ox4. Faroese speaking. VSS on room air. C/o increasing sharp abdominal and head pain - PRN Dilaudid given x1. Voids in urinal at bedside. Assist of 1. NPO since midnight for surgery today. Q4h BG checks, insulin administered per orders. PIV infusing NS @ 75mL/hr. Rested well overnight.

## 2019-01-01 NOTE — ANESTHESIA PREPROCEDURE EVALUATION
Anesthesia Pre-Procedure Evaluation    Patient: Rabia Reed   MRN: [id number] : 1940          Preoperative Diagnosis: ACUTE CHOLECYSTITIS.    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     Elevated prostate specific antigen (PSA) 2013     Essential hypertension, benign      Hyperlipidemia LDL goal <100       Impotence of organic origin      Inguinal hernia without mention of obstruction or gangrene, unilateral or unspecified, (not specified as recurrent)     right     Memory loss      Osteoarthritis of both hands, unspecified osteoarthritis type 3/19/2017     Tobacco use disorder      Type 2 diabetes mellitus without complication  (goal A1C<7) 10/24/2015     Unspecified nasal polyp      Urinary frequency     nocturia x 5     Past Surgical History:   Procedure Laterality Date     ARTHROPLASTY KNEE Right 2017    Procedure: ARTHROPLASTY KNEE;  RIGHT TOTAL KNEE ARTHROPLASTY ;  Surgeon: Darnell Allen MD;  Location: [facility] OR     ARTHROPLASTY KNEE Left 10/5/2018    Procedure: ARTHROPLASTY KNEE;  LEFT TOTAL KNEE ARTHROPLASTY ;  Surgeon: Darnell Allen MD;  Location: [facility] OR     C APPENDECTOMY       C NONSPECIFIC PROCEDURE      nasal polyp excision     C NONSPECIFIC PROCEDURE      RIH repair     C NONSPECIFIC PROCEDURE  2011     right knee arthroscopy       Anesthesia Evaluation     . Pt has had prior anesthetic.     No history of anesthetic complications          ROS/MED HX    ENT/Pulmonary:     (+)JAG risk factors hypertension, tobacco use, Past use , . .    Neurologic: Comment: Memory impairment    (+)dementia,     Cardiovascular:     (+) Dyslipidemia, hypertension----. : . . . :. .      (-) CAD   METS/Exercise Tolerance:  1 - Eating, dressing   Hematologic:  "    (+) Anemia, -      Musculoskeletal:   (+) arthritis, , , -       GI/Hepatic:     (+) GERD cholecystitis/cholelithiasis,       Renal/Genitourinary: Comment: Polycystic kidney disease    (+) chronic renal disease, Nephrolithiasis , BPH,       Endo:     (+) type II DM .   (-) Type I DM   Psychiatric:         Infectious Disease:         Malignancy:         Other:                          Physical Exam  Normal systems: cardiovascular    Airway   Mallampati: III  TM distance: >3 FB  Neck ROM: full    Dental   (+) upper dentures and lower dentures    Cardiovascular       Pulmonary (+) wheezes (expiratory)               Lab Results   Component Value Date    WBC 7.3 01/01/2019    HGB 12.7 (L) 01/01/2019    HCT 38.5 (L) 01/01/2019     (L) 01/01/2019     01/01/2019    POTASSIUM 3.4 01/01/2019    CHLORIDE 110 (H) 01/01/2019    CO2 27 01/01/2019    BUN 18 01/01/2019    CR 0.90 01/01/2019     (H) 01/01/2019    MABEL 7.9 (L) 01/01/2019    ALBUMIN 2.5 (L) 01/01/2019    PROTTOTAL 6.2 (L) 01/01/2019    ALT 1,026 (HH) 01/01/2019     (HH) 01/01/2019    ALKPHOS 165 (H) 01/01/2019    BILITOTAL 2.7 (H) 01/01/2019    LIPASE 571 (H) 01/01/2019    AMYLASE 23 (L) 10/08/2018    INR 0.95 09/14/2017    TSH 1.16 10/02/2018       Preop Vitals  BP Readings from Last 3 Encounters:   01/01/19 157/72   10/25/18 112/70   10/17/18 154/71    Pulse Readings from Last 3 Encounters:   01/01/19 77   10/25/18 75   10/17/18 71      Resp Readings from Last 3 Encounters:   01/01/19 20   10/17/18 16   10/09/18 18    SpO2 Readings from Last 3 Encounters:   01/01/19 95%   10/25/18 97%   10/17/18 98%      Temp Readings from Last 1 Encounters:   01/01/19 35.9  C (96.7  F) (Oral)    Ht Readings from Last 1 Encounters:   12/30/18 1.702 m (5' 7\")      Wt Readings from Last 1 Encounters:   01/01/19 79.1 kg (174 lb 6.4 oz)    Estimated body mass index is 27.31 kg/m  as calculated from the following:    Height as of this encounter: 1.702 m (5' " "7\").    Weight as of this encounter: 79.1 kg (174 lb 6.4 oz).       Anesthesia Plan      History & Physical Review  History and physical reviewed and following examination; no interval change.    ASA Status:  2 .    NPO Status:  > 8 hours    Plan for General and ETT with Intravenous and Propofol induction. Maintenance will be Balanced.    PONV prophylaxis:  Ondansetron (or other 5HT-3) and Dexamethasone or Solumedrol       Postoperative Care  Postoperative pain management:  IV analgesics, Oral pain medications and Multi-modal analgesia.      Consents  Anesthetic plan, risks, benefits and alternatives discussed with:  Patient (via )..                 Angel Sandhu MD  "

## 2019-01-02 LAB
ALBUMIN SERPL-MCNC: 2.4 G/DL (ref 3.4–5)
ALBUMIN SERPL-MCNC: 2.5 G/DL (ref 3.4–5)
ALP SERPL-CCNC: 218 U/L (ref 40–150)
ALP SERPL-CCNC: 226 U/L (ref 40–150)
ALT SERPL W P-5'-P-CCNC: 589 U/L (ref 0–70)
ALT SERPL W P-5'-P-CCNC: 664 U/L (ref 0–70)
ANION GAP SERPL CALCULATED.3IONS-SCNC: 7 MMOL/L (ref 3–14)
AST SERPL W P-5'-P-CCNC: 190 U/L (ref 0–45)
AST SERPL W P-5'-P-CCNC: 273 U/L (ref 0–45)
BILIRUB DIRECT SERPL-MCNC: 1.1 MG/DL (ref 0–0.2)
BILIRUB SERPL-MCNC: 1.6 MG/DL (ref 0.2–1.3)
BILIRUB SERPL-MCNC: 1.9 MG/DL (ref 0.2–1.3)
BUN SERPL-MCNC: 18 MG/DL (ref 7–30)
CALCIUM SERPL-MCNC: 7.8 MG/DL (ref 8.5–10.1)
CHLORIDE SERPL-SCNC: 107 MMOL/L (ref 94–109)
CO2 SERPL-SCNC: 26 MMOL/L (ref 20–32)
CREAT SERPL-MCNC: 0.91 MG/DL (ref 0.66–1.25)
ERYTHROCYTE [DISTWIDTH] IN BLOOD BY AUTOMATED COUNT: 13.6 % (ref 10–15)
GFR SERPL CREATININE-BSD FRML MDRD: 79 ML/MIN/{1.73_M2}
GLUCOSE BLDC GLUCOMTR-MCNC: 169 MG/DL (ref 70–99)
GLUCOSE BLDC GLUCOMTR-MCNC: 177 MG/DL (ref 70–99)
GLUCOSE BLDC GLUCOMTR-MCNC: 179 MG/DL (ref 70–99)
GLUCOSE BLDC GLUCOMTR-MCNC: 187 MG/DL (ref 70–99)
GLUCOSE BLDC GLUCOMTR-MCNC: 197 MG/DL (ref 70–99)
GLUCOSE SERPL-MCNC: 193 MG/DL (ref 70–99)
HCT VFR BLD AUTO: 37.5 % (ref 40–53)
HGB BLD-MCNC: 12.2 G/DL (ref 13.3–17.7)
LIPASE SERPL-CCNC: 56 U/L (ref 73–393)
MCH RBC QN AUTO: 32.6 PG (ref 26.5–33)
MCHC RBC AUTO-ENTMCNC: 32.5 G/DL (ref 31.5–36.5)
MCV RBC AUTO: 100 FL (ref 78–100)
PLATELET # BLD AUTO: 117 10E9/L (ref 150–450)
POTASSIUM SERPL-SCNC: 3.4 MMOL/L (ref 3.4–5.3)
PROT SERPL-MCNC: 6.3 G/DL (ref 6.8–8.8)
PROT SERPL-MCNC: 6.7 G/DL (ref 6.8–8.8)
RBC # BLD AUTO: 3.74 10E12/L (ref 4.4–5.9)
SODIUM SERPL-SCNC: 140 MMOL/L (ref 133–144)
WBC # BLD AUTO: 8.2 10E9/L (ref 4–11)

## 2019-01-02 PROCEDURE — 25000128 H RX IP 250 OP 636: Performed by: SURGERY

## 2019-01-02 PROCEDURE — 12000000 ZZH R&B MED SURG/OB

## 2019-01-02 PROCEDURE — 25000125 ZZHC RX 250: Performed by: HOSPITALIST

## 2019-01-02 PROCEDURE — 83690 ASSAY OF LIPASE: CPT | Performed by: PHYSICIAN ASSISTANT

## 2019-01-02 PROCEDURE — 00000146 ZZHCL STATISTIC GLUCOSE BY METER IP

## 2019-01-02 PROCEDURE — 83690 ASSAY OF LIPASE: CPT | Performed by: SURGERY

## 2019-01-02 PROCEDURE — 80076 HEPATIC FUNCTION PANEL: CPT | Performed by: HOSPITALIST

## 2019-01-02 PROCEDURE — 25000132 ZZH RX MED GY IP 250 OP 250 PS 637: Performed by: HOSPITALIST

## 2019-01-02 PROCEDURE — 36415 COLL VENOUS BLD VENIPUNCTURE: CPT | Performed by: SURGERY

## 2019-01-02 PROCEDURE — 36415 COLL VENOUS BLD VENIPUNCTURE: CPT | Performed by: PHYSICIAN ASSISTANT

## 2019-01-02 PROCEDURE — 85027 COMPLETE CBC AUTOMATED: CPT | Performed by: SURGERY

## 2019-01-02 PROCEDURE — 25000132 ZZH RX MED GY IP 250 OP 250 PS 637: Performed by: PHYSICIAN ASSISTANT

## 2019-01-02 PROCEDURE — 80076 HEPATIC FUNCTION PANEL: CPT | Performed by: INTERNAL MEDICINE

## 2019-01-02 PROCEDURE — 80053 COMPREHEN METABOLIC PANEL: CPT | Performed by: PHYSICIAN ASSISTANT

## 2019-01-02 PROCEDURE — 25000132 ZZH RX MED GY IP 250 OP 250 PS 637: Performed by: SURGERY

## 2019-01-02 PROCEDURE — 25000128 H RX IP 250 OP 636: Performed by: HOSPITALIST

## 2019-01-02 PROCEDURE — 36415 COLL VENOUS BLD VENIPUNCTURE: CPT | Performed by: HOSPITALIST

## 2019-01-02 PROCEDURE — 99232 SBSQ HOSP IP/OBS MODERATE 35: CPT | Performed by: INTERNAL MEDICINE

## 2019-01-02 RX ORDER — BISACODYL 10 MG
10 SUPPOSITORY, RECTAL RECTAL ONCE
Status: COMPLETED | OUTPATIENT
Start: 2019-01-02 | End: 2019-01-02

## 2019-01-02 RX ORDER — HYDROMORPHONE HYDROCHLORIDE 1 MG/ML
.3-.5 INJECTION, SOLUTION INTRAMUSCULAR; INTRAVENOUS; SUBCUTANEOUS
Status: DISCONTINUED | OUTPATIENT
Start: 2019-01-02 | End: 2019-01-06 | Stop reason: HOSPADM

## 2019-01-02 RX ADMIN — HYDROMORPHONE HYDROCHLORIDE 0.5 MG: 1 INJECTION, SOLUTION INTRAMUSCULAR; INTRAVENOUS; SUBCUTANEOUS at 21:53

## 2019-01-02 RX ADMIN — HYDROMORPHONE HYDROCHLORIDE 0.5 MG: 1 INJECTION, SOLUTION INTRAMUSCULAR; INTRAVENOUS; SUBCUTANEOUS at 09:42

## 2019-01-02 RX ADMIN — PIPERACILLIN SODIUM,TAZOBACTAM SODIUM 3.38 G: 3; .375 INJECTION, POWDER, FOR SOLUTION INTRAVENOUS at 09:27

## 2019-01-02 RX ADMIN — SODIUM CHLORIDE: 9 INJECTION, SOLUTION INTRAVENOUS at 06:00

## 2019-01-02 RX ADMIN — FAMOTIDINE 20 MG: 10 INJECTION, SOLUTION INTRAVENOUS at 21:53

## 2019-01-02 RX ADMIN — INSULIN ASPART 2 UNITS: 100 INJECTION, SOLUTION INTRAVENOUS; SUBCUTANEOUS at 04:04

## 2019-01-02 RX ADMIN — HYDROMORPHONE HYDROCHLORIDE 0.5 MG: 1 INJECTION, SOLUTION INTRAMUSCULAR; INTRAVENOUS; SUBCUTANEOUS at 23:49

## 2019-01-02 RX ADMIN — HYDROCODONE BITARTRATE AND ACETAMINOPHEN 1 TABLET: 5; 325 TABLET ORAL at 01:05

## 2019-01-02 RX ADMIN — TAMSULOSIN HYDROCHLORIDE 0.4 MG: 0.4 CAPSULE ORAL at 09:27

## 2019-01-02 RX ADMIN — Medication 0.4 MG: at 02:52

## 2019-01-02 RX ADMIN — PIPERACILLIN SODIUM,TAZOBACTAM SODIUM 3.38 G: 3; .375 INJECTION, POWDER, FOR SOLUTION INTRAVENOUS at 04:04

## 2019-01-02 RX ADMIN — ONDANSETRON 4 MG: 2 INJECTION INTRAMUSCULAR; INTRAVENOUS at 05:58

## 2019-01-02 RX ADMIN — PIPERACILLIN SODIUM,TAZOBACTAM SODIUM 3.38 G: 3; .375 INJECTION, POWDER, FOR SOLUTION INTRAVENOUS at 21:53

## 2019-01-02 RX ADMIN — Medication 10 MG: at 09:27

## 2019-01-02 RX ADMIN — HYDROCODONE BITARTRATE AND ACETAMINOPHEN 1 TABLET: 5; 325 TABLET ORAL at 07:08

## 2019-01-02 RX ADMIN — INSULIN ASPART 2 UNITS: 100 INJECTION, SOLUTION INTRAVENOUS; SUBCUTANEOUS at 20:05

## 2019-01-02 RX ADMIN — FAMOTIDINE 20 MG: 10 INJECTION, SOLUTION INTRAVENOUS at 09:28

## 2019-01-02 RX ADMIN — HYDROMORPHONE HYDROCHLORIDE 0.5 MG: 1 INJECTION, SOLUTION INTRAMUSCULAR; INTRAVENOUS; SUBCUTANEOUS at 12:49

## 2019-01-02 RX ADMIN — FINASTERIDE 5 MG: 5 TABLET, FILM COATED ORAL at 20:06

## 2019-01-02 RX ADMIN — HYDROMORPHONE HYDROCHLORIDE 0.5 MG: 1 INJECTION, SOLUTION INTRAMUSCULAR; INTRAVENOUS; SUBCUTANEOUS at 18:47

## 2019-01-02 RX ADMIN — SODIUM CHLORIDE: 9 INJECTION, SOLUTION INTRAVENOUS at 14:47

## 2019-01-02 RX ADMIN — PIPERACILLIN SODIUM,TAZOBACTAM SODIUM 3.38 G: 3; .375 INJECTION, POWDER, FOR SOLUTION INTRAVENOUS at 15:14

## 2019-01-02 ASSESSMENT — ACTIVITIES OF DAILY LIVING (ADL)
ADLS_ACUITY_SCORE: 16

## 2019-01-02 ASSESSMENT — MIFFLIN-ST. JEOR: SCORE: 1472.63

## 2019-01-02 NOTE — ANESTHESIA POSTPROCEDURE EVALUATION
Patient: Rabia Reed    Procedure(s):  LAPAROSCOPIC CHOLECYSTECTOMY    Diagnosis:ACUTE CHOLECYSTITIS.  Diagnosis Additional Information: No value filed.    Anesthesia Type:  General, ETT    Note:  Anesthesia Post Evaluation    Patient location during evaluation: PACU  Patient participation: Able to fully participate in evaluation  Level of consciousness: awake, awake and alert and responsive to verbal stimuli  Pain management: adequate  Airway patency: patent  Cardiovascular status: acceptable  Respiratory status: acceptable  Hydration status: acceptable  PONV: none     Anesthetic complications: None          Last vitals:  Vitals:    01/01/19 1900 01/01/19 2003 01/01/19 2200   BP: 180/83 (!) 176/92 184/86   Pulse: 87     Resp: 18 20 30   Temp:  37.4  C (99.4  F) 38.1  C (100.6  F)   SpO2: 100% 99% 94%         Electronically Signed By: Carmen Clarke  January 1, 2019  10:58 PM

## 2019-01-02 NOTE — OP NOTE
General Surgery Operative Note    PREOPERATIVE DIAGNOSIS:  cholecystitis.    POSTOPERATIVE DIAGNOSIS:  Gangrenous cholecystitis.    PROCEDURE:  Laparoscopic Cholecystectomy, modifier 22 for difficulty due to inflammation    SURGEON:  Melia Bentley MD    ASSISTANT:  Olga Tirado PA-C  The physician s assistant was medically necessary for their expertise in camera management, suctioning and retraction.  ANESTHESIA:  General.    BLOOD LOSS: 150ml    FINDINGS: significantly inflamed gallbladder with omental adhesions, gangrenous necrosis of posterior wall of gallbladder. This sigmoid colon was mildly dilated but appeared normal.     INDICATIONS:   Rabia is an 78yom who presented with abdominal pain. His LFTs were elevated and he underwent ERCP on 12/31/2018 which revealed choledocholithiasis. He had an US which was concerning for cholecystitis. He is now presenting for cholecystectomy.  I explained the risks, benefits, complications for laparoscopic cholecystectomy including but not limited to bleeding, infection, possible need to open, possible postop hematoma, seroma, bowel, bladder or bile duct injury, MI, PE, and if any of these occurred the patient would require additional procedures. The patient agreed and did sign consent.    DETAILS OF PROCEDURE:   A small skin incision was made in the left upper quadrant. A 5mm 0degree laparoscope was used with a visiport to obtain access to the abdomen. Insufflation was connected and flowed freely. Insufflation pressure was sent to 15mmhg. The abdomen was surveyed and no acute findings were seen. There was no injury from abdominal entrance noted. A 12mm port was placed in the midline just beneath the umbilicus. A 5mm 30 degree laparoscope was inserted and revealed no trocar injuries. Local was injected to the upper abdomen and two 5s were placed in the right upper quadrant under direct visualization.  There were omental adhesions present to the undersurface of the  gallbladder as well as to the free edge of the liver obscuring the view of the gallbladder. These were carefully taken down.  The gallbladder was then retracted superiorly and laterally. With this there was tearing of the liver capsule on the lateral aspect of the gallbladder. This was controlled with cautery.  Cautery was then used to take down the medial and lateral peritoneal attachments. The duodenum was traveling near the infundibulum and there were filmy adhesions which were carefully divided with laparoscopic scissors and the duodenum was swept away.  I was able to delineate the artery and duct and got under the undersurface of the gallbladder to help further declinate the duct and artery. The duct was enlarged and short. The artery was traveling medial to the duct.  This dissection was taken up high along the gallbladder to confirm the critical view on multiple views. This dissection was tedious and difficult due to the degree of inflammation.  Three clips were placed proximally on the cystic duct and one distally. Two clips were placed proximally on the artery and one clip distally. Both the cystic artery and cystic duct were then completely transected with laparoscopic scissors. There was spillage of bile from the gallbladder as with grasping to retract the gallbladder superiorly the clip was dislogded. This was controlled with a new clip on the distal aspect of the gallbladder/cystic duct.     The gallbladder was taken off the liver bed with cautery. The posterior wall of the gallbladder had areas of necrosis and was partially intrahepatic. The plan was difficult to develop the plane. There was a moderate amount of bleeding with this dissection which was controlled with cautery, direct pressure with a raytec gauze and placement of nu knit procoagulant gauze.  The gallbladder was then placed in an Endocatch bag and removed through the umbilical trocar site. The camera was reinserted and the right upper  quadrant was irrigated until clear. The wound bed was inspected multiple times showing that it was dry and that the clips were in place. The omentum was packed into the perihepatic fossa. The 12mm port fascia closed with 0 Vicryl in figure-of-eight fashion using the paige cleveland device. The sigmoid colon was noted to be enlarged in the left lower quadrant. This was evaluated and anatomy appeared otherwise normal.  All gas was expelled and the trocars were taken out under direct visualization. Marcaine 0.5% were injected to all the wounds. The skin was closed with a 4-0 Monocryl in a subcuticular fashion. Steri-strips and sterile dressings were applied. The patient tolerated the procedure well. There were no complications. They were awoken from anesthesia and transferred to PACU in stable condition. All sponge, instrument and needle counts were correct.       ALAN VALE MD    Please route or send letter to:  Primary Care Provider (PCP) and Referring Provider

## 2019-01-02 NOTE — PROGRESS NOTES
"GASTROENTEROLOGY PROGRESS NOTE     SUBJECTIVE: Having some abdominal distention. Minimal flatus. No  or family available at this time.      OBJECTIVE:   /69 (BP Location: Right arm)   Pulse 82   Temp 97.6  F (36.4  C) (Oral)   Resp 23   Ht 1.702 m (5' 7\")   Wt 80.4 kg (177 lb 4 oz)   SpO2 98%   BMI 27.76 kg/m     Temp (24hrs), Av.2  F (36.8  C), Min:96  F (35.6  C), Max:100.6  F (38.1  C)     Patient Vitals for the past 72 hrs:   Weight   19 0307 80.4 kg (177 lb 4 oz)   19 0600 79.1 kg (174 lb 6.4 oz)   18 0612 79.2 kg (174 lb 8 oz)   18 1807 78.9 kg (174 lb)   18 1803 78.9 kg (174 lb)      Intake/Output Summary (Last 24 hours) at 2019 0907  Last data filed at 2019 0832  Gross per 24 hour   Intake 2675 ml   Output 1100 ml   Net 1575 ml      PHYSICAL EXAM   Constitutional: Elderly, in bed, no acute distress  Cardiovascular: Regular rate and rhythm. No murmurs rubs or gallops  Respiratory: Clear to auscultation bilaterally  Abdomen: Soft, non-tender, moderately distended, hypoactive bowel sounds. No masses or hepatosplenomegaly appreciated. No guarding or rebound tenderness.    Additional Comments:   ROS, FH, SH: See initial GI consult for details.     I have reviewed the patient's new clinical lab results:   Recent Labs   Lab Test 19  0704 18  0744 18  1920  17  0649   WBC 7.3 11.9* 12.8*   < >  --    HGB 12.7* 13.5 14.7   < >  --    MCV 99 99 99   < >  --    * 158 192   < >  --    INR  --   --   --   --  0.95    < > = values in this interval not displayed.      Recent Labs   Lab Test 19  0739 19  0704 18  0744    143 140   POTASSIUM 3.4 3.4 4.2   CHLORIDE 107 110* 106   CO2 26 27 25   BUN 18 18 19   CR 0.91 0.90 1.03   ANIONGAP 7 6 9   MABEL 7.8* 7.9* 8.0*      Recent Labs   Lab Test 19  0739 19  0704 18  0744 18  1935 18  1920 10/08/18  0645  17  1730  " 09/29/14  1224   ALBUMIN 2.4* 2.5* 3.0*  --  3.7 2.7*   < >  --    < >  --    BILITOTAL 1.9* 2.7* 2.9*  --  1.9* 0.6   < >  --    < >  --    * 1,026* 2,057*  --  388* 20   < >  --    < >  --    * 630* 2,420*  --  687* 22   < >  --    < >  --    ALKPHOS 226* 165* 181*  --  163* 90   < >  --    < >  --    PROTEIN  --   --   --  30*  --   --   --  Negative  --  Negative   LIPASE  --  571*  --   --  99 47*  --   --   --   --    AMYLASE  --   --   --   --   --  23*  --   --   --   --     < > = values in this interval not displayed.     12/31/18 EUS ([facility])  Impression:               - CBD stone.                             - GB stone with gallbladder distension and wall                             thickening.     12/31/18 ERCP ([facility])  Impression:               - The major papilla was located entirely within a                             diverticulum.                             - Choledocholithiasis was found. Complete removal                             was accomplished by biliary sphincterotomy and                             balloon extraction.      Assessment:  78 year old male presenting with abdominal pain and elevated LFTs. EUS/ERCP as above. POD #1 s/p lap gwendolyn. LFTs trending down. Pt having some abdominal distention this morning. Likely post op ileus, surgery following.    Plan:    -Follow LFTs, would expect them to continue to gradually decline  -Current post op cares per surgery  -Encourage ambulation   -No further GI recommendations at this time. Will sign off. Please call with any questions.    Reed Mello PA-C  [facility]  Cell:  [phone] Monday through Friday 6268-7431  Office: [phone]

## 2019-01-02 NOTE — PROVIDER NOTIFICATION
MD Notification    Notified Person: MD    Notified Person Name: Dr. Sage    Notification Date/Time: 1/1/19 5945    Notification Interaction: Paged regarding temp and elevated BP    Orders Received: MD placed orders for PRN Hydralazine.

## 2019-01-02 NOTE — DISCHARGE INSTRUCTIONS
[facility]  Discharge Instructions: Post-Operative Laparoscopic Cholecystectomy    ACTIVITY    Expect to feel tired after your surgery.  This will gradually resolve.      Take frequent, short walks and increase your activity gradually.      Avoid strenuous physical activity or heavy lifting greater than 20 lbs. for 2 weeks.  You may climb stairs.    You may drive without restrictions when you are not using any prescription pain medication and feel comfortable in a car.    You may return to work/school when you are comfortable without any prescription pain medication.    WOUND CARE    You may remove your outer dressing or Band-Aids and shower 48 hours after the surgery.  Pat your incisions dry and leave them open to air.  Re-apply dressing (Band-Aids or gauze/tape) as needed for comfort or drainage.    You may have steri-strips (looks like white tape) on your incision.  You may peel off the steri-strips 1 week after your surgery if they have not peeled off on their own.     Do not soak your incisions in a tub or pool for 2 weeks.     Do not apply any lotions, creams, or ointments to your incisions.    A ridge under your incisions is normal and will gradually resolve.    DIET    Start with liquids, then gradually resume your regular diet as tolerated.  Avoid heavy, spicy, and greasy meals for 2-3 days.    Drink plenty of fluids to stay hydrated.    It is not uncommon to experience some loose stools or diarrhea after surgery.  This is your body s way of adapting to the bile which will slowly drain into your intestine.  A low fat diet may help with this.  This should improve over 1-2 months.    PAIN    Expect some tenderness and discomfort at the incision sites.  Use the prescribed pain medication at your discretion.  Expect gradual resolution of your pain over several days.    You may take ibuprofen with food (unless you have been told not to) instead of or in addition to your  prescribed pain medication.  If you are taking Norco or Percocet, do not take any additional acetaminophen/APAP/Tylenol.    Do not drink alcohol or drive while you are taking pain medications.    You may apply ice to your incisions in 20 minute intervals as needed for the next 48 hours.  After that time, consider switching to heat if you prefer.    EXPECTATIONS    Pain medications can cause constipation.  Limit use when possible.  Take over the counter stool softener/stimulant, such as Colace or Senna, 1-2 times a day with plenty of water.  You may take a mild over the counter laxative, such as Miralax or a suppository, as needed.  You may discontinue these medications once you are having regular bowel movements and/or are no longer taking your narcotic pain medication.      You may have shoulder or upper back discomfort due to the gas used in surgery.  This is temporary and should resolve in 48-72 hours.  Short, frequent walks may help with this.    FOLLOW UP    Follow up in 2 weeks, call office for appointment at [phone]. If you have any questions please call us at [phone] and ask to speak with our nurse.  We are located at [address] [place] [address].    CALL OUR OFFICE -[phone] IF YOU HAVE:     Chills or fever above 101 F.    Increased redness, warmth, or drainage at your incisions.    Significant bleeding.    Pain not relieved by your pain medication or rest.    Increasing pain after the first 48 hours.    Any other concerns or questions.    Revised January 2018

## 2019-01-02 NOTE — PROGRESS NOTES
SW:    D: SW attempted to complete consult with pt, pt was sleeping and no  present. SW attempted to call son and [facility], Brandan. No answer, SW left voice mail requesting return call to discuss plan of on-going care at discharge for pt.    P: SW will continue to attempt to consult pt / family re: discharge plans.    ANASTACIA Stone

## 2019-01-02 NOTE — PROGRESS NOTES
[facility]    Hospitalist Progress Note    Assessment & Plan   Rabia Reed is a 78 year old male who was admitted on 12/30/2018.     78 year old male  with PMH significant for diabetes mellitus type 2, BPH, hyperlipidemia, anemia was brought to the ER for evaluation of Abdominal pain, nausea and vomiting, and is being admitted to manage possible acute calculus cholecystitis.     Cholelithiasis with possible acute calculus cholecystitis  Patient with nausea vomiting and abdominal pain intermittent, CT abdomen pelvis shows stone in the gallbladder neck and mild GB wall thickening, US RUQ confirms cholelithiasis with GB wall thickening suggestive of cholecystitis.  LFTs are elevated.  Lipase is normal.  Symptoms have resolved after antiemetic and IV pain medicine in ER.  -POD #1 lap gwendolyn for gangrenous GB  -S/P EUS/ERCP 12/31 for which he had choledocholithiasis and required biliary sphincterotomy and balloon extraction  -continue zosyn per surgery rec  -repeat LFT in am  -Tm 100.6     Diabetes mellitus type 2, insulin-dependent, HbA1c 8.6 on October 2018  From admission note: Patient takes 70/30 insulin and Humalog PTA.   - Glucose is stable on current regimen, no change for today  - currently on lantus 10 units  -glucose 193, 169, 177  -on chart review, the pt did not get any insulin this am  -will decrease tomorrow's am insulin to 6 units  He normal takes 30 unit of NPH in am and 20 in pm     BPH  -Continue PTA finasteride and Flomax once med rec completed  -Monitor intake and output, monitor for urinary retention.     Hyperlipidemia  -Patient is on statin, holding given elevated LFTs.     Polycystic kidney disease and nonobstructing left renal stone  -Creatinine is normal.   -No CVA tenderness though noted left upper quadrant tenderness, ? Related to nephrolithiasis  -CT did not show obstruction or perinephric stranding.  -UA shows hematuria likely due to above.  -Urology evaluation at some point,  if persistent left upper quadrant pain or right upper quadrant ultrasound without significant to be well thickening, will consult urology during this stay.  - There is heistancy to use acetaminophen for fever and pain given his elevated LFTs, I have added low dose ibuprofen which can be used for a limited time during his stay, I encourage conservative use of NSAIDs in light of his history     Chronic anemia  -By history, hemoglobin is normal, could be due to hemoconcentration, monitor.     Memory impairment  - per chart review  -Lives with his son     DVT Prophylaxis: Pneumatic Compression Devices     Code Status: Full Code     Disposition: Expected discharge: perhaps 2-4 days depending on return of bowel function                 # Pain Assessment:  Current Pain Score 1/2/2019   Patient currently in pain? -   Pain score (0-10) 6   Pain location -   Pain descriptors -   per surgical service    Text Page (7am - 6pm)  Mery Conteh MD    Interval History   No  present.  Left phone message with both family names from white board    -Data reviewed today: I reviewed all new labs and imaging results over the last 24 hours. I personally reviewed no images or EKG's today.    Physical Exam   Temp: 97.6  F (36.4  C) Temp src: Oral BP: 137/69 Pulse: 82 Heart Rate: 82 Resp: 23 SpO2: 98 % O2 Device: Nasal cannula Oxygen Delivery: 3 LPM  Vitals:    12/31/18 0612 01/01/19 0600 01/02/19 0307   Weight: 79.2 kg (174 lb 8 oz) 79.1 kg (174 lb 6.4 oz) 80.4 kg (177 lb 4 oz)     Vital Signs with Ranges  Temp:  [96  F (35.6  C)-100.6  F (38.1  C)] 97.6  F (36.4  C)  Pulse:  [82-98] 82  Heart Rate:  [82-98] 82  Resp:  [12-30] 23  BP: (137-184)/() 137/69  SpO2:  [94 %-100 %] 98 %  I/O last 3 completed shifts:  In: 2675 [P.O.:240; I.V.:2435]  Out: 1275 [Urine:1275]    Constitutional: alert   Respiratory: clear to auscultation, no wheezing or rhonchi   Cardiovascular: regular rate and rhythm without murmer or rub   GI:   occas bowel sounds, tender  Skin/Integumen: no rashes   Other:        Medications     sodium chloride 125 mL/hr at 01/02/19 1447       famotidine  20 mg Intravenous Q12H     finasteride  5 mg Oral QPM     insulin aspart  1-12 Units Subcutaneous Q4H     insulin glargine  10 Units Subcutaneous QAM AC     pink lady enema  286 mL Rectal Once     piperacillin-tazobactam  3.375 g Intravenous Q6H     tamsulosin  0.4 mg Oral Daily       Data   Recent Labs   Lab 01/02/19  0739 01/01/19  0704 12/31/18  0744   WBC 8.2 7.3 11.9*   HGB 12.2* 12.7* 13.5    99 99   * 117* 158    143 140   POTASSIUM 3.4 3.4 4.2   CHLORIDE 107 110* 106   CO2 26 27 25   BUN 18 18 19   CR 0.91 0.90 1.03   ANIONGAP 7 6 9   MABEL 7.8* 7.9* 8.0*   * 177* 221*   ALBUMIN 2.4* 2.5* 3.0*   PROTTOTAL 6.3* 6.2* 6.5*   BILITOTAL 1.9* 2.7* 2.9*   ALKPHOS 226* 165* 181*   * 1,026* 2,057*   * 630* 2,420*   LIPASE 56* 571*  --        No results found for this or any previous visit (from the past 24 hour(s)).

## 2019-01-02 NOTE — PROGRESS NOTES
"General Surgery Progress Note    Subjective: pt reports feeling abdominal pain and bloating. No flatus. Feels distended. Nauseated this morning. No emesis.     Objective: /69 (BP Location: Right arm)   Pulse 82   Temp 97.6  F (36.4  C) (Oral)   Resp 23   Ht 1.702 m (5' 7\")   Wt 80.4 kg (177 lb 4 oz)   SpO2 98%   BMI 27.76 kg/m    Gen: alert, no distress  CV: RRR  Pulm: nonlabored breathing  Abd: very distended throughout. Tender at incisions.   Ext: WWP    Assessment/Plan:   Rabia Reed  is a 78 year old male POD 1 s/p lap gwendolyn POD 2 s/p ERCP with likely post operative ileus given degree of inflammation surrounding gallbladder.   - NPO, ok for meds/ice chips  - IV dilaudid for pain control  - if has emesis - place NGT for decompression  - try dulcolax suppository, enema this afternoon if no results  - monitor closely for urinary retention with bladder scans.   - continue zosyn given degree of infection of gallbladder  - not ready to discharge    Melia Bentley MD  [facility]  [phone]              "

## 2019-01-02 NOTE — PLAN OF CARE
Pt is POD1 lap gwendolyn. A&Ox4. Greek speaking. VSS on 3L O2. Capno, WDL. C/o abdominal pain - PRN Dilaudid given x1 and Norco x2. 4 lap sites, CDI. Hypoactive BS, not passing gas. Voided in urinal overnight. Clear liquid diet, c/o nausea, Zofran given x1. Assist of 1- ambulated halls this AM. PIV infusing NS @ 125mL/hr.

## 2019-01-02 NOTE — PLAN OF CARE
POD#0. Pt returned from PACU at 1800. BPs elevated, Hydralazine given at end of shift. A&O, Slovenian speaking, does speak some english. Ambulated halls with 1 assist. C/o pain in abdomen, improved with Dilaudid x2. Abdomen distended/firm, lap sites x4 CDI. C/o nausea, improved after Zofran, taking in some clears. Retaining urine, straight cath for 500mL. Possible discharge tomorrow pending, will continue to monitor.

## 2019-01-03 ENCOUNTER — APPOINTMENT (OUTPATIENT)
Dept: GENERAL RADIOLOGY | Facility: CLINIC | Age: 79
DRG: 418 | End: 2019-01-03
Payer: COMMERCIAL

## 2019-01-03 LAB
ANION GAP SERPL CALCULATED.3IONS-SCNC: 9 MMOL/L (ref 3–14)
BUN SERPL-MCNC: 14 MG/DL (ref 7–30)
CALCIUM SERPL-MCNC: 7.9 MG/DL (ref 8.5–10.1)
CHLORIDE SERPL-SCNC: 107 MMOL/L (ref 94–109)
CO2 SERPL-SCNC: 26 MMOL/L (ref 20–32)
COPATH REPORT: NORMAL
CREAT SERPL-MCNC: 0.76 MG/DL (ref 0.66–1.25)
ERYTHROCYTE [DISTWIDTH] IN BLOOD BY AUTOMATED COUNT: 13.6 % (ref 10–15)
GFR SERPL CREATININE-BSD FRML MDRD: 86 ML/MIN/{1.73_M2}
GLUCOSE BLDC GLUCOMTR-MCNC: 161 MG/DL (ref 70–99)
GLUCOSE BLDC GLUCOMTR-MCNC: 171 MG/DL (ref 70–99)
GLUCOSE BLDC GLUCOMTR-MCNC: 171 MG/DL (ref 70–99)
GLUCOSE BLDC GLUCOMTR-MCNC: 174 MG/DL (ref 70–99)
GLUCOSE BLDC GLUCOMTR-MCNC: 193 MG/DL (ref 70–99)
GLUCOSE BLDC GLUCOMTR-MCNC: 198 MG/DL (ref 70–99)
GLUCOSE SERPL-MCNC: 166 MG/DL (ref 70–99)
HCT VFR BLD AUTO: 36.5 % (ref 40–53)
HGB BLD-MCNC: 11.9 G/DL (ref 13.3–17.7)
INTERPRETATION ECG - MUSE: NORMAL
LACTATE BLD-SCNC: 1.4 MMOL/L (ref 0.7–2)
MCH RBC QN AUTO: 32.3 PG (ref 26.5–33)
MCHC RBC AUTO-ENTMCNC: 32.6 G/DL (ref 31.5–36.5)
MCV RBC AUTO: 99 FL (ref 78–100)
PLATELET # BLD AUTO: 118 10E9/L (ref 150–450)
POTASSIUM SERPL-SCNC: 2.9 MMOL/L (ref 3.4–5.3)
POTASSIUM SERPL-SCNC: 3.1 MMOL/L (ref 3.4–5.3)
RBC # BLD AUTO: 3.68 10E12/L (ref 4.4–5.9)
SODIUM SERPL-SCNC: 142 MMOL/L (ref 133–144)
WBC # BLD AUTO: 7.2 10E9/L (ref 4–11)

## 2019-01-03 PROCEDURE — 85027 COMPLETE CBC AUTOMATED: CPT | Performed by: INTERNAL MEDICINE

## 2019-01-03 PROCEDURE — 00000146 ZZHCL STATISTIC GLUCOSE BY METER IP

## 2019-01-03 PROCEDURE — 36415 COLL VENOUS BLD VENIPUNCTURE: CPT | Performed by: INTERNAL MEDICINE

## 2019-01-03 PROCEDURE — 36415 COLL VENOUS BLD VENIPUNCTURE: CPT | Performed by: SURGERY

## 2019-01-03 PROCEDURE — 25000125 ZZHC RX 250: Performed by: HOSPITALIST

## 2019-01-03 PROCEDURE — 71045 X-RAY EXAM CHEST 1 VIEW: CPT

## 2019-01-03 PROCEDURE — 25000132 ZZH RX MED GY IP 250 OP 250 PS 637: Performed by: HOSPITALIST

## 2019-01-03 PROCEDURE — 84132 ASSAY OF SERUM POTASSIUM: CPT | Performed by: SURGERY

## 2019-01-03 PROCEDURE — 25000132 ZZH RX MED GY IP 250 OP 250 PS 637: Performed by: SURGERY

## 2019-01-03 PROCEDURE — 12000000 ZZH R&B MED SURG/OB

## 2019-01-03 PROCEDURE — 25000131 ZZH RX MED GY IP 250 OP 636 PS 637: Performed by: INTERNAL MEDICINE

## 2019-01-03 PROCEDURE — 80048 BASIC METABOLIC PNL TOTAL CA: CPT | Performed by: INTERNAL MEDICINE

## 2019-01-03 PROCEDURE — 25000128 H RX IP 250 OP 636: Performed by: HOSPITALIST

## 2019-01-03 PROCEDURE — 94640 AIRWAY INHALATION TREATMENT: CPT

## 2019-01-03 PROCEDURE — 40000275 ZZH STATISTIC RCP TIME EA 10 MIN

## 2019-01-03 PROCEDURE — 94640 AIRWAY INHALATION TREATMENT: CPT | Mod: 76

## 2019-01-03 PROCEDURE — 74018 RADEX ABDOMEN 1 VIEW: CPT

## 2019-01-03 PROCEDURE — 83605 ASSAY OF LACTIC ACID: CPT | Performed by: INTERNAL MEDICINE

## 2019-01-03 PROCEDURE — 25000128 H RX IP 250 OP 636: Performed by: SURGERY

## 2019-01-03 PROCEDURE — 99233 SBSQ HOSP IP/OBS HIGH 50: CPT | Performed by: INTERNAL MEDICINE

## 2019-01-03 PROCEDURE — 25000128 H RX IP 250 OP 636: Performed by: INTERNAL MEDICINE

## 2019-01-03 RX ORDER — POTASSIUM CHLORIDE 1.5 G/1.58G
20-40 POWDER, FOR SOLUTION ORAL
Status: DISCONTINUED | OUTPATIENT
Start: 2019-01-03 | End: 2019-01-06 | Stop reason: HOSPADM

## 2019-01-03 RX ORDER — ALBUTEROL SULFATE 0.83 MG/ML
2.5 SOLUTION RESPIRATORY (INHALATION)
Status: DISCONTINUED | OUTPATIENT
Start: 2019-01-03 | End: 2019-01-06 | Stop reason: HOSPADM

## 2019-01-03 RX ORDER — FUROSEMIDE 10 MG/ML
20 INJECTION INTRAMUSCULAR; INTRAVENOUS ONCE
Status: COMPLETED | OUTPATIENT
Start: 2019-01-03 | End: 2019-01-03

## 2019-01-03 RX ORDER — POTASSIUM CHLORIDE 1500 MG/1
20-40 TABLET, EXTENDED RELEASE ORAL
Status: DISCONTINUED | OUTPATIENT
Start: 2019-01-03 | End: 2019-01-06 | Stop reason: HOSPADM

## 2019-01-03 RX ORDER — POTASSIUM CL/LIDO/0.9 % NACL 10MEQ/0.1L
10 INTRAVENOUS SOLUTION, PIGGYBACK (ML) INTRAVENOUS
Status: DISCONTINUED | OUTPATIENT
Start: 2019-01-03 | End: 2019-01-06 | Stop reason: HOSPADM

## 2019-01-03 RX ORDER — BISACODYL 10 MG
10 SUPPOSITORY, RECTAL RECTAL ONCE
Status: COMPLETED | OUTPATIENT
Start: 2019-01-03 | End: 2019-01-03

## 2019-01-03 RX ORDER — POTASSIUM CHLORIDE 29.8 MG/ML
20 INJECTION INTRAVENOUS
Status: DISCONTINUED | OUTPATIENT
Start: 2019-01-03 | End: 2019-01-06 | Stop reason: HOSPADM

## 2019-01-03 RX ORDER — POTASSIUM CHLORIDE 7.45 MG/ML
10 INJECTION INTRAVENOUS
Status: DISCONTINUED | OUTPATIENT
Start: 2019-01-03 | End: 2019-01-06 | Stop reason: HOSPADM

## 2019-01-03 RX ORDER — FUROSEMIDE 10 MG/ML
40 INJECTION INTRAMUSCULAR; INTRAVENOUS ONCE
Status: DISCONTINUED | OUTPATIENT
Start: 2019-01-03 | End: 2019-01-03

## 2019-01-03 RX ADMIN — Medication 10 MEQ: at 23:42

## 2019-01-03 RX ADMIN — HYDROMORPHONE HYDROCHLORIDE 0.5 MG: 1 INJECTION, SOLUTION INTRAMUSCULAR; INTRAVENOUS; SUBCUTANEOUS at 15:21

## 2019-01-03 RX ADMIN — PIPERACILLIN SODIUM,TAZOBACTAM SODIUM 3.38 G: 3; .375 INJECTION, POWDER, FOR SOLUTION INTRAVENOUS at 22:26

## 2019-01-03 RX ADMIN — HYDRALAZINE HYDROCHLORIDE 10 MG: 20 INJECTION INTRAMUSCULAR; INTRAVENOUS at 23:00

## 2019-01-03 RX ADMIN — HYDROMORPHONE HYDROCHLORIDE 0.5 MG: 1 INJECTION, SOLUTION INTRAMUSCULAR; INTRAVENOUS; SUBCUTANEOUS at 04:01

## 2019-01-03 RX ADMIN — PIPERACILLIN SODIUM,TAZOBACTAM SODIUM 3.38 G: 3; .375 INJECTION, POWDER, FOR SOLUTION INTRAVENOUS at 17:38

## 2019-01-03 RX ADMIN — Medication 10 MEQ: at 19:50

## 2019-01-03 RX ADMIN — Medication 10 MEQ: at 14:41

## 2019-01-03 RX ADMIN — HYDROMORPHONE HYDROCHLORIDE 0.5 MG: 1 INJECTION, SOLUTION INTRAMUSCULAR; INTRAVENOUS; SUBCUTANEOUS at 22:52

## 2019-01-03 RX ADMIN — ALBUTEROL SULFATE 2.5 MG: 2.5 SOLUTION RESPIRATORY (INHALATION) at 07:13

## 2019-01-03 RX ADMIN — INSULIN GLARGINE 6 UNITS: 100 INJECTION, SOLUTION SUBCUTANEOUS at 10:16

## 2019-01-03 RX ADMIN — INSULIN ASPART 2 UNITS: 100 INJECTION, SOLUTION INTRAVENOUS; SUBCUTANEOUS at 10:00

## 2019-01-03 RX ADMIN — ALBUTEROL SULFATE 2.5 MG: 2.5 SOLUTION RESPIRATORY (INHALATION) at 00:26

## 2019-01-03 RX ADMIN — Medication 10 MEQ: at 22:26

## 2019-01-03 RX ADMIN — INSULIN ASPART 3 UNITS: 100 INJECTION, SOLUTION INTRAVENOUS; SUBCUTANEOUS at 12:29

## 2019-01-03 RX ADMIN — FAMOTIDINE 20 MG: 10 INJECTION, SOLUTION INTRAVENOUS at 09:59

## 2019-01-03 RX ADMIN — FUROSEMIDE 20 MG: 10 INJECTION, SOLUTION INTRAMUSCULAR; INTRAVENOUS at 09:59

## 2019-01-03 RX ADMIN — INSULIN ASPART 1 UNITS: 100 INJECTION, SOLUTION INTRAVENOUS; SUBCUTANEOUS at 20:29

## 2019-01-03 RX ADMIN — INSULIN ASPART 3 UNITS: 100 INJECTION, SOLUTION INTRAVENOUS; SUBCUTANEOUS at 00:16

## 2019-01-03 RX ADMIN — Medication 10 MEQ: at 12:28

## 2019-01-03 RX ADMIN — HYDROMORPHONE HYDROCHLORIDE 0.5 MG: 1 INJECTION, SOLUTION INTRAMUSCULAR; INTRAVENOUS; SUBCUTANEOUS at 20:46

## 2019-01-03 RX ADMIN — Medication 10 MEQ: at 11:17

## 2019-01-03 RX ADMIN — FINASTERIDE 5 MG: 5 TABLET, FILM COATED ORAL at 20:29

## 2019-01-03 RX ADMIN — HYDROMORPHONE HYDROCHLORIDE 0.5 MG: 1 INJECTION, SOLUTION INTRAMUSCULAR; INTRAVENOUS; SUBCUTANEOUS at 06:53

## 2019-01-03 RX ADMIN — PIPERACILLIN SODIUM,TAZOBACTAM SODIUM 3.38 G: 3; .375 INJECTION, POWDER, FOR SOLUTION INTRAVENOUS at 04:01

## 2019-01-03 RX ADMIN — Medication 10 MEQ: at 13:34

## 2019-01-03 RX ADMIN — HYDROMORPHONE HYDROCHLORIDE 0.5 MG: 1 INJECTION, SOLUTION INTRAMUSCULAR; INTRAVENOUS; SUBCUTANEOUS at 10:14

## 2019-01-03 RX ADMIN — INSULIN ASPART 2 UNITS: 100 INJECTION, SOLUTION INTRAVENOUS; SUBCUTANEOUS at 17:38

## 2019-01-03 RX ADMIN — PIPERACILLIN SODIUM,TAZOBACTAM SODIUM 3.38 G: 3; .375 INJECTION, POWDER, FOR SOLUTION INTRAVENOUS at 09:59

## 2019-01-03 RX ADMIN — HYDROMORPHONE HYDROCHLORIDE 0.5 MG: 1 INJECTION, SOLUTION INTRAMUSCULAR; INTRAVENOUS; SUBCUTANEOUS at 18:23

## 2019-01-03 RX ADMIN — Medication 10 MEQ: at 17:20

## 2019-01-03 RX ADMIN — INSULIN ASPART 2 UNITS: 100 INJECTION, SOLUTION INTRAVENOUS; SUBCUTANEOUS at 04:01

## 2019-01-03 RX ADMIN — TAMSULOSIN HYDROCHLORIDE 0.4 MG: 0.4 CAPSULE ORAL at 10:00

## 2019-01-03 RX ADMIN — FAMOTIDINE 20 MG: 10 INJECTION, SOLUTION INTRAVENOUS at 22:26

## 2019-01-03 RX ADMIN — Medication 10 MG: at 10:00

## 2019-01-03 ASSESSMENT — ACTIVITIES OF DAILY LIVING (ADL)
ADLS_ACUITY_SCORE: 18

## 2019-01-03 NOTE — PROGRESS NOTES
Patient is A&Ox4. Hungarian speaking, but does speak some English. Deaf in right ear, hearing loss in left ear. Family present this morning and helped with interpreting.  also present on and off throughout the day. VSS on 1L O2 via nasal cannula. C/o abd pain - PRN Dilaudid given x 2 with relief. Inspiratory wheezes. Four lap sites -  C/D/I. Hypoactive BS, passing gas and BM today. Voiding via urinal. IV infusing Normal Saline@125mL/hr, IV Zosyn Q6H. Blood sugar checks Q4H. Up with assist x 1, uses walker at home - ambulate halls this afternoon. Plan: Discharge may be possible in 2-4 days.

## 2019-01-03 NOTE — PROGRESS NOTES
"General Surgery Progress Note    Subjective: pt remains distended. Passing some gas. No emesis. Denies nausea. Feels SOB. Abdominal pain improved with pan meds.     Objective: /67 (BP Location: Right arm)   Pulse 82   Temp 99.3  F (37.4  C) (Axillary)   Resp 24   Ht 1.702 m (5' 7\")   Wt 80.4 kg (177 lb 4 oz)   SpO2 95%   BMI 27.76 kg/m    Gen: alert, no distress  CV: RRR  Pulm: nonlabored breathing  Abd: distended, softer than yesterday. No rebound or guarding. Incisions look good  Ext: WWP    Assessment/Plan:   Rabia Reed  is a 78 year old male POD 2 s/p lap gwendolyn for gangrenous cholecystitis. Volume overload likely.   - agree with lasix, TKO IVF  - dulcolax supp again today  - NPO until distention further improved, ok for ice chips  - potassium replacement  - continue zosyn  - abdominal XR this am.     Melia Bentley MD  [facility]  [phone]              "

## 2019-01-03 NOTE — PROVIDER NOTIFICATION
MD Notification    Notified Person: MD    Notified Person Name: Dr. Sage    Notification Date/Time: 1/3/18 at 0000    Notification Interaction: Page    Purpose of Notification: Increased wheezing    Orders Received:    Comments:

## 2019-01-03 NOTE — PROGRESS NOTES
SW:    D: DESHAWN spoke briefly with daughter Zee re: discharge plans. Zee confirming that pt lives with son Brandan. Zee unable to inform SW of hours of PCA day. Zee informing SW that son Brandan has been out of town in [place] but is on the way home now. Brandan will be present at the hospital tonight and tomorrow. Zee asking SW to connect with son Brandan at the hospital to discuss discharge planning.    P: SW will continue to attempt to consult pt / family re: discharge plans.        ADDENDUM:    D: DESHAWN attempted to meet with pt / son, son not present. DESHAWN left message for Care Coordinator Charisyobany Garcia @ [phone] and requested information on patient's home services ([facility]). DESHAWN requested return call.      P: DESHAWN will continue to attempt to consult pt / family re: discharge plans.    ANASTACIA Stone

## 2019-01-03 NOTE — PROGRESS NOTES
[facility]    Hospitalist Progress Note    Assessment & Plan   Rabia Reed is a 78 year old male who was admitted on 12/30/2018.     78 year old male  with PMH significant for diabetes mellitus type 2, BPH, hyperlipidemia, anemia was brought to the ER for evaluation of Abdominal pain, nausea and vomiting, and is being admitted to manage possible acute calculus cholecystitis.     Cholelithiasis with possible acute calculus cholecystitis  Patient with nausea vomiting and abdominal pain intermittent, CT abdomen pelvis shows stone in the gallbladder neck and mild GB wall thickening, US RUQ confirms cholelithiasis with GB wall thickening suggestive of cholecystitis.  LFTs are elevated.  Lipase is normal.  Symptoms have resolved after antiemetic and IV pain medicine in ER.  -POD #2 lap gwendolyn for gangrenous GB  -S/P EUS/ERCP 12/31 for which he had choledocholithiasis and required biliary sphincterotomy and balloon extraction  -continue zosyn per surgery rec  -repeat LFT in am  -Tm 100.6 on 1/1 at 2200  -some flatus this am     Diabetes mellitus type 2, insulin-dependent, HbA1c 8.6 on October 2018  From admission note: Patient takes 70/30 insulin and Humalog PTA.   - Glucose is stable on current regimen, no change for today  - currently on lantus 6 units  -glucose 171, yesterday 193, 169, 187  He normal takes 30 unit of NPH in am and 20 in pm    Wheeze this am  -had iv fluids stopped overnight  -one dose of 20 mg of lasix this am  -appears to be from upper airway as wheeze very pronounced at neck     BPH  -Continue PTA finasteride and Flomax once med rec completed  -Monitor intake and output, monitor for urinary retention.     Hyperlipidemia  -Patient is on statin, holding given elevated LFTs.     Polycystic kidney disease and nonobstructing left renal stone  -Creatinine is normal.   -No CVA tenderness though noted left upper quadrant tenderness, ? Related to nephrolithiasis  -CT did not show obstruction or  perinephric stranding.  -UA shows hematuria likely due to above.  -Urology evaluation at some point, if persistent left upper quadrant pain or right upper quadrant ultrasound without significant to be well thickening, will consult urology during this stay.  - There is heistancy to use acetaminophen for fever and pain given his elevated LFTs, I have added low dose ibuprofen which can be used for a limited time during his stay, I encourage conservative use of NSAIDs in light of his history     Chronic anemia  -By history, hemoglobin is normal, could be due to hemoconcentration, monitor.     Memory impairment  - per chart review  -Lives with his son     DVT Prophylaxis: Pneumatic Compression Devices     Code Status: Full Code     Disposition: Expected discharge: perhaps 2-4 days depending on return of bowel function           # Pain Assessment:  Current Pain Score 1/2/2019   Patient currently in pain? -   Pain score (0-10) 6   Pain location -   Pain descriptors -   per surgical service     Text Page (7am - 6pm)  Mery Conteh MD      Interval History   Had some flatus this am    -Data reviewed today: I reviewed all new labs and imaging results over the last 24 hours. I personally reviewed no images or EKG's today.    Physical Exam   Temp: 98.5  F (36.9  C) Temp src: Axillary BP: 160/60 Pulse: 82 Heart Rate: 93 Resp: 27 SpO2: 97 % O2 Device: Nasal cannula Oxygen Delivery: 1 LPM  Vitals:    12/31/18 0612 01/01/19 0600 01/02/19 0307   Weight: 79.2 kg (174 lb 8 oz) 79.1 kg (174 lb 6.4 oz) 80.4 kg (177 lb 4 oz)     Vital Signs with Ranges  Temp:  [97.6  F (36.4  C)-98.5  F (36.9  C)] 98.5  F (36.9  C)  Pulse:  [82] 82  Heart Rate:  [] 93  Resp:  [22-31] 27  BP: (137-168)/(60-81) 160/60  SpO2:  [93 %-98 %] 97 %  I/O last 3 completed shifts:  In: -   Out: 1175 [Urine:1175]    Constitutional: alert   Respiratory: wheezing radiating from neck  Cardiovascular: regular rate and rhythm without murmer or rub    GI:positive bowel sounds, tender, distended  Skin/Integumen: no rashes    Other:        Medications       famotidine  20 mg Intravenous Q12H     finasteride  5 mg Oral QPM     furosemide  40 mg Intravenous Once     insulin aspart  1-12 Units Subcutaneous Q4H     insulin glargine  6 Units Subcutaneous QAM AC     pink lady enema  286 mL Rectal Once     piperacillin-tazobactam  3.375 g Intravenous Q6H     tamsulosin  0.4 mg Oral Daily       Data   Recent Labs   Lab 01/02/19  1844 01/02/19  0739 01/01/19  0704 12/31/18  0744   WBC  --  8.2 7.3 11.9*   HGB  --  12.2* 12.7* 13.5   MCV  --  100 99 99   PLT  --  117* 117* 158   NA  --  140 143 140   POTASSIUM  --  3.4 3.4 4.2   CHLORIDE  --  107 110* 106   CO2  --  26 27 25   BUN  --  18 18 19   CR  --  0.91 0.90 1.03   ANIONGAP  --  7 6 9   MABEL  --  7.8* 7.9* 8.0*   GLC  --  193* 177* 221*   ALBUMIN 2.5* 2.4* 2.5* 3.0*   PROTTOTAL 6.7* 6.3* 6.2* 6.5*   BILITOTAL 1.6* 1.9* 2.7* 2.9*   ALKPHOS 218* 226* 165* 181*   * 664* 1,026* 2,057*   * 273* 630* 2,420*   LIPASE  --  56* 571*  --        Recent Results (from the past 24 hour(s))   XR Chest Port 1 View    Narrative    XR CHEST PORT 1 VW  1/3/2019 2:00 AM     HISTORY: Dyspnea.    COMPARISON: 2/25/2014.    FINDINGS: Upright portable chest. The heart size is normal. The  thoracic aorta is calcified and mildly tortuous. There is minimal  atelectasis or scar at the left lung base. The lungs are otherwise  clear. No pneumothorax.      Impression    IMPRESSION: No acute abnormality.    BERNADETTE LOZANO MD

## 2019-01-03 NOTE — PLAN OF CARE
AO. Hypertensive, tachycardic, and increased respirations. LS ex. Wheezes and in creased work of breathing, MD notified. PRN albuterol nebs ordered, minimal improvements.  Fluids discontinued and chest x ray ordered. C/o abd pain, PRN dilaudid given. 4 lap sites, CDI. Abd distended, hypoactive BS and + flatus. IV SL. Fired lactic, 1.8. Assist of 1.

## 2019-01-04 LAB
ALBUMIN SERPL-MCNC: 2.3 G/DL (ref 3.4–5)
ALP SERPL-CCNC: 218 U/L (ref 40–150)
ALT SERPL W P-5'-P-CCNC: 330 U/L (ref 0–70)
ANION GAP SERPL CALCULATED.3IONS-SCNC: 6 MMOL/L (ref 3–14)
AST SERPL W P-5'-P-CCNC: 57 U/L (ref 0–45)
BILIRUB DIRECT SERPL-MCNC: 0.7 MG/DL (ref 0–0.2)
BILIRUB SERPL-MCNC: 1.2 MG/DL (ref 0.2–1.3)
BUN SERPL-MCNC: 11 MG/DL (ref 7–30)
CALCIUM SERPL-MCNC: 8.3 MG/DL (ref 8.5–10.1)
CHLORIDE SERPL-SCNC: 106 MMOL/L (ref 94–109)
CO2 SERPL-SCNC: 30 MMOL/L (ref 20–32)
CREAT SERPL-MCNC: 0.71 MG/DL (ref 0.66–1.25)
GFR SERPL CREATININE-BSD FRML MDRD: 88 ML/MIN/{1.73_M2}
GLUCOSE BLDC GLUCOMTR-MCNC: 123 MG/DL (ref 70–99)
GLUCOSE BLDC GLUCOMTR-MCNC: 131 MG/DL (ref 70–99)
GLUCOSE BLDC GLUCOMTR-MCNC: 136 MG/DL (ref 70–99)
GLUCOSE BLDC GLUCOMTR-MCNC: 148 MG/DL (ref 70–99)
GLUCOSE BLDC GLUCOMTR-MCNC: 192 MG/DL (ref 70–99)
GLUCOSE BLDC GLUCOMTR-MCNC: 250 MG/DL (ref 70–99)
GLUCOSE SERPL-MCNC: 132 MG/DL (ref 70–99)
MAGNESIUM SERPL-MCNC: 2.5 MG/DL (ref 1.6–2.3)
PHOSPHATE SERPL-MCNC: 1.6 MG/DL (ref 2.5–4.5)
POTASSIUM SERPL-SCNC: 3.3 MMOL/L (ref 3.4–5.3)
POTASSIUM SERPL-SCNC: 3.4 MMOL/L (ref 3.4–5.3)
PROT SERPL-MCNC: 6.8 G/DL (ref 6.8–8.8)
SODIUM SERPL-SCNC: 142 MMOL/L (ref 133–144)

## 2019-01-04 PROCEDURE — 25000128 H RX IP 250 OP 636: Performed by: SURGERY

## 2019-01-04 PROCEDURE — 25000128 H RX IP 250 OP 636: Performed by: INTERNAL MEDICINE

## 2019-01-04 PROCEDURE — 84132 ASSAY OF SERUM POTASSIUM: CPT | Performed by: INTERNAL MEDICINE

## 2019-01-04 PROCEDURE — 25000132 ZZH RX MED GY IP 250 OP 250 PS 637: Performed by: HOSPITALIST

## 2019-01-04 PROCEDURE — 80076 HEPATIC FUNCTION PANEL: CPT | Performed by: INTERNAL MEDICINE

## 2019-01-04 PROCEDURE — 12000000 ZZH R&B MED SURG/OB

## 2019-01-04 PROCEDURE — 25000132 ZZH RX MED GY IP 250 OP 250 PS 637: Performed by: PHYSICIAN ASSISTANT

## 2019-01-04 PROCEDURE — 84100 ASSAY OF PHOSPHORUS: CPT | Performed by: INTERNAL MEDICINE

## 2019-01-04 PROCEDURE — 83735 ASSAY OF MAGNESIUM: CPT | Performed by: INTERNAL MEDICINE

## 2019-01-04 PROCEDURE — 25000128 H RX IP 250 OP 636: Performed by: HOSPITALIST

## 2019-01-04 PROCEDURE — 80048 BASIC METABOLIC PNL TOTAL CA: CPT | Performed by: INTERNAL MEDICINE

## 2019-01-04 PROCEDURE — 25000125 ZZHC RX 250: Performed by: HOSPITALIST

## 2019-01-04 PROCEDURE — 99232 SBSQ HOSP IP/OBS MODERATE 35: CPT | Performed by: INTERNAL MEDICINE

## 2019-01-04 PROCEDURE — 36415 COLL VENOUS BLD VENIPUNCTURE: CPT | Performed by: INTERNAL MEDICINE

## 2019-01-04 PROCEDURE — 25000131 ZZH RX MED GY IP 250 OP 636 PS 637: Performed by: INTERNAL MEDICINE

## 2019-01-04 PROCEDURE — 00000146 ZZHCL STATISTIC GLUCOSE BY METER IP

## 2019-01-04 PROCEDURE — 25000132 ZZH RX MED GY IP 250 OP 250 PS 637: Performed by: INTERNAL MEDICINE

## 2019-01-04 RX ORDER — BISACODYL 10 MG
10 SUPPOSITORY, RECTAL RECTAL ONCE
Status: COMPLETED | OUTPATIENT
Start: 2019-01-04 | End: 2019-01-04

## 2019-01-04 RX ORDER — OXYCODONE HYDROCHLORIDE 5 MG/1
5 TABLET ORAL EVERY 4 HOURS PRN
Status: DISCONTINUED | OUTPATIENT
Start: 2019-01-04 | End: 2019-01-06 | Stop reason: HOSPADM

## 2019-01-04 RX ADMIN — HYDRALAZINE HYDROCHLORIDE 10 MG: 20 INJECTION INTRAMUSCULAR; INTRAVENOUS at 17:33

## 2019-01-04 RX ADMIN — Medication 10 MEQ: at 14:43

## 2019-01-04 RX ADMIN — FAMOTIDINE 20 MG: 10 INJECTION, SOLUTION INTRAVENOUS at 21:23

## 2019-01-04 RX ADMIN — Medication 10 MEQ: at 01:05

## 2019-01-04 RX ADMIN — OXYCODONE HYDROCHLORIDE 5 MG: 5 TABLET ORAL at 14:49

## 2019-01-04 RX ADMIN — INSULIN ASPART 5 UNITS: 100 INJECTION, SOLUTION INTRAVENOUS; SUBCUTANEOUS at 21:22

## 2019-01-04 RX ADMIN — Medication 10 MG: at 12:03

## 2019-01-04 RX ADMIN — HYDROMORPHONE HYDROCHLORIDE 0.5 MG: 1 INJECTION, SOLUTION INTRAMUSCULAR; INTRAVENOUS; SUBCUTANEOUS at 04:14

## 2019-01-04 RX ADMIN — PIPERACILLIN SODIUM,TAZOBACTAM SODIUM 3.38 G: 3; .375 INJECTION, POWDER, FOR SOLUTION INTRAVENOUS at 16:27

## 2019-01-04 RX ADMIN — Medication 10 MEQ: at 13:06

## 2019-01-04 RX ADMIN — TAMSULOSIN HYDROCHLORIDE 0.4 MG: 0.4 CAPSULE ORAL at 09:10

## 2019-01-04 RX ADMIN — Medication 10 MEQ: at 12:03

## 2019-01-04 RX ADMIN — PIPERACILLIN SODIUM,TAZOBACTAM SODIUM 3.38 G: 3; .375 INJECTION, POWDER, FOR SOLUTION INTRAVENOUS at 09:10

## 2019-01-04 RX ADMIN — ACETAMINOPHEN 650 MG: 325 TABLET, FILM COATED ORAL at 16:36

## 2019-01-04 RX ADMIN — Medication 10 MEQ: at 02:22

## 2019-01-04 RX ADMIN — Medication 10 MEQ: at 09:10

## 2019-01-04 RX ADMIN — OXYCODONE HYDROCHLORIDE 5 MG: 5 TABLET ORAL at 23:58

## 2019-01-04 RX ADMIN — OXYCODONE HYDROCHLORIDE 5 MG: 5 TABLET ORAL at 18:59

## 2019-01-04 RX ADMIN — HYDROMORPHONE HYDROCHLORIDE 0.5 MG: 1 INJECTION, SOLUTION INTRAMUSCULAR; INTRAVENOUS; SUBCUTANEOUS at 01:03

## 2019-01-04 RX ADMIN — FINASTERIDE 5 MG: 5 TABLET, FILM COATED ORAL at 21:22

## 2019-01-04 RX ADMIN — Medication 10 MEQ: at 03:33

## 2019-01-04 RX ADMIN — FAMOTIDINE 20 MG: 10 INJECTION, SOLUTION INTRAVENOUS at 09:19

## 2019-01-04 RX ADMIN — INSULIN GLARGINE 6 UNITS: 100 INJECTION, SOLUTION SUBCUTANEOUS at 09:10

## 2019-01-04 RX ADMIN — PIPERACILLIN SODIUM,TAZOBACTAM SODIUM 3.38 G: 3; .375 INJECTION, POWDER, FOR SOLUTION INTRAVENOUS at 03:33

## 2019-01-04 RX ADMIN — ACETAMINOPHEN 650 MG: 325 TABLET, FILM COATED ORAL at 12:03

## 2019-01-04 ASSESSMENT — ACTIVITIES OF DAILY LIVING (ADL)
ADLS_ACUITY_SCORE: 18
ADLS_ACUITY_SCORE: 18
ADLS_ACUITY_SCORE: 19
ADLS_ACUITY_SCORE: 18

## 2019-01-04 NOTE — PLAN OF CARE
AO. Hypertensive, given PRN hydralazine x 1. Weaned to RA in evening, on 2 L overnight for comfort. C/o abd pain, given dilaudid x 3. 4 lap sites with bandaids. Abd distended, hypoactive BS, + flatus. Small BM on 1/3. Exp wheezes and MELO, PRN nebs available.  NPO ex ice chips. K replaced overnight, recheck at 0700. Q4 BG, corrected with sliding scale. Ambulated halls x 3 over shift with SBA. Encourage ambulation. Pt. Would like to shower on day shift.

## 2019-01-04 NOTE — CONSULTS
Care Transition Initial Assessment - SW     Met with: Patient and Family    Active Problems:    Cholecystitis, acute       DATA  Lives With: child(bernie), adult      Quality of Family Relationships: supportive, involved  Description of Support System: Supportive, Involved  Who is your support system?: Children  Identified issues/concerns regarding health management: Discharge planning      Quality of Family Relationships: supportive, involved  Transportation Anticipated: family or friend will provide    ASSESSMENT  Cognitive Status:  awake, alert and oriented    Concerns to be addressed: Per SW consult for discharge planning. Patient was admitted 12/30/18 with acute Cholecystitis. Patient discharge date TBD. SW spoke with pt and son Brandan in room. Patient lives with his son Brandan who is also his PCA and receives 3.5 hours of PCA services daily from son. Brandan indicating that his sister Zee also helps out with his fathers needs often and other family lives nearby. Patient uses briefs as needed for incontinence when he is ill and drinks 2 Gulcerna shakes per day. Brandan states that there are about 2 steps into pt home and pt has not had trouble navigating the steps in the past. SW informing that PT will likely see pt before discharge. SW asking if pt has had HC in the past, Brandan states that pt had [facility] for about 6 weeks after knee surgery. SW asking about TCU preference should this be recommended, Brandan states that [facility] would be 1st choice for TCU if rehab would be needed. Patient would like to go home. SW will await PT recommendations and continue to follow pt for discharge planning.      PLAN  Financial costs for the patient includes: N/A  Patient given options and choices for discharge: TBD (home?)  Patient/family is agreeable to the plan?  Yes  Patient Goals and Preferences: home.  Patient anticipates discharging to:  TBD (home?)    ANASTACIA Stone

## 2019-01-04 NOTE — PROGRESS NOTES
[facility]    Hospitalist Progress Note    Assessment & Plan   Rabia Reed is a 78 year old male who was admitted on 12/30/2018.     78 year old male  with PMH significant for diabetes mellitus type 2, BPH, hyperlipidemia, anemia was brought to the ER for evaluation of Abdominal pain, nausea and vomiting, and is being admitted to manage possible acute calculus cholecystitis.     Cholelithiasis with possible acute calculus cholecystitis  Patient with nausea vomiting and abdominal pain intermittent, CT abdomen pelvis shows stone in the gallbladder neck and mild GB wall thickening, US RUQ confirms cholelithiasis with GB wall thickening suggestive of cholecystitis.  LFTs are elevated.  Lipase is normal.  Symptoms have resolved after antiemetic and IV pain medicine in ER.  -POD #3 lap gwendolyn for gangrenous GB  -S/P EUS/ERCP 12/31 for which he had choledocholithiasis and required biliary sphincterotomy and balloon extraction  - was on zosyn but surgery has discontinued today 1/4  -some flatus and had a small bowel movement per nursing  -surgery has allowed some clear liquids  -slowly resolving ileus post surgery  -LFT today improving  <589  AST 57<190  Bili 1.2<1.6       Diabetes mellitus type 2, insulin-dependent, HbA1c 8.6 on October 2018  From admission note: Patient takes 70/30 insulin and Humalog PTA.   - Glucose is stable on current regimen, no change for today  - currently on lantus 6 units  -glucose 132,148,192  He normal takes 30 unit of NPH in am and 20 in pm  -probable inc in insulin tomorrow depending on oral intake     Wheeze 1/3  -one dose of 20 mg of lasix 1/3  -appears to be from upper airway as wheeze very pronounced at neck  -improved today, 1/4     BPH  -Continue PTA finasteride and Flomax once able to take po  -Monitor intake and output, monitor for urinary retention.     Hyperlipidemia  -Patient is on statin, holding given elevated LFTs.     Polycystic kidney disease and  nonobstructing left renal stone  -Creatinine is normal.   -No CVA tenderness though noted left upper quadrant tenderness, ? Related to nephrolithiasis  -CT did not show obstruction or perinephric stranding.  -UA shows hematuria likely due to above.  -Urology evaluation at some point, if persistent left upper quadrant pain or right upper quadrant ultrasound without significant to be well thickening, will consult urology during this stay.  - There is heistancy to use acetaminophen for fever and pain given his elevated LFTs, I have added low dose ibuprofen which can be used for a limited time during his stay, I encourage conservative use of NSAIDs in light of his history     Chronic anemia  -By history, hemoglobin is normal, could be due to hemoconcentration, monitor.     Memory impairment  - per chart review  -Lives with his son     DVT Prophylaxis: Pneumatic Compression Devices     Code Status: Full Code     Disposition: Expected discharge: perhaps 1-2 days depending resolution of ileus and surgery ok               # Pain Assessment:  Current Pain Score 1/4/2019   Patient currently in pain? yes   Pain score (0-10) 6   Pain location -   Pain descriptors -   rFLACC pain score -   pain management per surgeryt    Text Page (7am - 6pm)  Mery Conteh MD    Interval History   Hungry, wants to eat!  Abd pain improved today    -Data reviewed today: I reviewed all new labs and imaging results over the last 24 hours. I personally reviewed no images or EKG's today.    Physical Exam   Temp: 97.1  F (36.2  C) Temp src: Oral BP: 151/63   Heart Rate: 76 Resp: 20 SpO2: 97 % O2 Device: Nasal cannula Oxygen Delivery: 2 LPM  Vitals:    12/31/18 0612 01/01/19 0600 01/02/19 0307   Weight: 79.2 kg (174 lb 8 oz) 79.1 kg (174 lb 6.4 oz) 80.4 kg (177 lb 4 oz)     Vital Signs with Ranges  Temp:  [97.1  F (36.2  C)-99.3  F (37.4  C)] 97.1  F (36.2  C)  Heart Rate:  [76-98] 76  Resp:  [20-24] 20  BP: (151-179)/(62-80) 151/63  SpO2:  [91  %-98 %] 97 %  I/O last 3 completed shifts:  In: -   Out: 2850 [Urine:2850]    Constitutional: alert   Respiratory: clear to auscultation, no wheezing or rhonchi   Cardiovascular: regular rate and rhythm without murmer or rub   GI:positive bowel sounds, distended, mildly tender  Skin/Integumen: no rashes    Other:        Medications       famotidine  20 mg Intravenous Q12H     finasteride  5 mg Oral QPM     insulin aspart  1-12 Units Subcutaneous Q4H     insulin glargine  6 Units Subcutaneous QAM AC     pink lady enema  286 mL Rectal Once     piperacillin-tazobactam  3.375 g Intravenous Q6H     tamsulosin  0.4 mg Oral Daily       Data   Recent Labs   Lab 01/03/19  2135 01/03/19  0725 01/02/19  1844 01/02/19  0739 01/01/19  0704   WBC  --  7.2  --  8.2 7.3   HGB  --  11.9*  --  12.2* 12.7*   MCV  --  99  --  100 99   PLT  --  118*  --  117* 117*   NA  --  142  --  140 143   POTASSIUM 3.1* 2.9*  --  3.4 3.4   CHLORIDE  --  107  --  107 110*   CO2  --  26  --  26 27   BUN  --  14  --  18 18   CR  --  0.76  --  0.91 0.90   ANIONGAP  --  9  --  7 6   MABEL  --  7.9*  --  7.8* 7.9*   GLC  --  166*  --  193* 177*   ALBUMIN  --   --  2.5* 2.4* 2.5*   PROTTOTAL  --   --  6.7* 6.3* 6.2*   BILITOTAL  --   --  1.6* 1.9* 2.7*   ALKPHOS  --   --  218* 226* 165*   ALT  --   --  589* 664* 1,026*   AST  --   --  190* 273* 630*   LIPASE  --   --   --  56* 571*       Recent Results (from the past 24 hour(s))   XR Abdomen 1 View    Narrative    ABDOMEN ONE VIEW   1/3/2019 8:59 AM     HISTORY: Evaluate for ileus, patient status post lap gwendolyn POD2 and  ERCP POD 3 with abdominal distention.    COMPARISON: None.       Impression    IMPRESSION: Supine abdomen. There are multiple dilated air-filled  small bowel loops which may be an ileus. There is air in nondilated  colon. Large left renal stone.    BERNADETTE LOZANO MD

## 2019-01-04 NOTE — PROGRESS NOTES
Surgery    Pt lying in bed.  Interrupter and son present.  Still having abdominal pain, abdominal distention feels better.   Passing a little gas, had 1 small loose BM yesterday.  Feels hungry, denies nausea.    B/P: 148/67, T: 96.8, P: 82, R: 20  Abd: soft non tender.  Incision - band aids removed, steri strips in place - clean/dry/intact    A/p: s/p Lap gwendolyn, post operative ileus - slowly resolving  - start clears  - repeat suppository  - needs to ambulate 3-4x daily    Olga Tirado PA-C

## 2019-01-04 NOTE — PLAN OF CARE
Alert and oriented.  Vitals stable ex hypertensive.  Advanced to clear liquid diet today per surgery.  Small BM this afternoon.  Bowel sounds present and passing gas.  Lap sites CDI.  Pain managed with PRN Tylenol and Oxycodone.  K replaced via IV, redraw scheduled 1800.  Ambulated halls and showered today.

## 2019-01-04 NOTE — PLAN OF CARE
A/O. Hypertensive, tachycardic, and increased respirations. LS ex. Wheezes and MELO. PRN albuterol nebs ordered, minimal improvements.  IV lasix given pulled off 900 after.  Will request oxygen on a 1L occasional for comfort.  C/o abd pain, PRN dilaudid given x 3. 4 lap sites, CDI. Abd distended, hypoactive BS and + flatus. IV SL. Abdominal xray shows ileus.  K 2.9 replacing and scheduled recheck.  Ambulated in halls x 3 this shift with SBA.  Will continue to monitor.

## 2019-01-05 LAB
ANION GAP SERPL CALCULATED.3IONS-SCNC: 7 MMOL/L (ref 3–14)
BUN SERPL-MCNC: 11 MG/DL (ref 7–30)
CALCIUM SERPL-MCNC: 8.2 MG/DL (ref 8.5–10.1)
CHLORIDE SERPL-SCNC: 102 MMOL/L (ref 94–109)
CO2 SERPL-SCNC: 27 MMOL/L (ref 20–32)
CREAT SERPL-MCNC: 0.8 MG/DL (ref 0.66–1.25)
GFR SERPL CREATININE-BSD FRML MDRD: 84 ML/MIN/{1.73_M2}
GLUCOSE BLDC GLUCOMTR-MCNC: 143 MG/DL (ref 70–99)
GLUCOSE BLDC GLUCOMTR-MCNC: 169 MG/DL (ref 70–99)
GLUCOSE BLDC GLUCOMTR-MCNC: 185 MG/DL (ref 70–99)
GLUCOSE BLDC GLUCOMTR-MCNC: 213 MG/DL (ref 70–99)
GLUCOSE BLDC GLUCOMTR-MCNC: 228 MG/DL (ref 70–99)
GLUCOSE BLDC GLUCOMTR-MCNC: 231 MG/DL (ref 70–99)
GLUCOSE BLDC GLUCOMTR-MCNC: 353 MG/DL (ref 70–99)
GLUCOSE SERPL-MCNC: 159 MG/DL (ref 70–99)
POTASSIUM SERPL-SCNC: 2.8 MMOL/L (ref 3.4–5.3)
POTASSIUM SERPL-SCNC: 3.2 MMOL/L (ref 3.4–5.3)
SODIUM SERPL-SCNC: 136 MMOL/L (ref 133–144)

## 2019-01-05 PROCEDURE — 25000132 ZZH RX MED GY IP 250 OP 250 PS 637: Performed by: INTERNAL MEDICINE

## 2019-01-05 PROCEDURE — 25000132 ZZH RX MED GY IP 250 OP 250 PS 637: Performed by: HOSPITALIST

## 2019-01-05 PROCEDURE — 80048 BASIC METABOLIC PNL TOTAL CA: CPT | Performed by: INTERNAL MEDICINE

## 2019-01-05 PROCEDURE — 36415 COLL VENOUS BLD VENIPUNCTURE: CPT | Performed by: INTERNAL MEDICINE

## 2019-01-05 PROCEDURE — 99207 ZZC CDG-MDM COMPONENT: MEETS LOW - DOWN CODED: CPT | Performed by: INTERNAL MEDICINE

## 2019-01-05 PROCEDURE — 84132 ASSAY OF SERUM POTASSIUM: CPT | Performed by: INTERNAL MEDICINE

## 2019-01-05 PROCEDURE — 94640 AIRWAY INHALATION TREATMENT: CPT

## 2019-01-05 PROCEDURE — 99232 SBSQ HOSP IP/OBS MODERATE 35: CPT | Performed by: INTERNAL MEDICINE

## 2019-01-05 PROCEDURE — 25000131 ZZH RX MED GY IP 250 OP 636 PS 637: Performed by: INTERNAL MEDICINE

## 2019-01-05 PROCEDURE — 00000146 ZZHCL STATISTIC GLUCOSE BY METER IP

## 2019-01-05 PROCEDURE — 25000125 ZZHC RX 250: Performed by: HOSPITALIST

## 2019-01-05 PROCEDURE — 12000000 ZZH R&B MED SURG/OB

## 2019-01-05 RX ORDER — ACETAMINOPHEN 325 MG/1
650 TABLET ORAL EVERY 6 HOURS PRN
Qty: 30 TABLET | Refills: 0 | Status: SHIPPED | OUTPATIENT
Start: 2019-01-05 | End: 2019-01-05

## 2019-01-05 RX ORDER — DOCUSATE SODIUM 100 MG/1
100 CAPSULE, LIQUID FILLED ORAL 2 TIMES DAILY PRN
Status: DISCONTINUED | OUTPATIENT
Start: 2019-01-05 | End: 2019-01-06 | Stop reason: HOSPADM

## 2019-01-05 RX ORDER — ACETAMINOPHEN 325 MG/1
650 TABLET ORAL EVERY 6 HOURS PRN
Qty: 30 TABLET | Refills: 0 | Status: SHIPPED | OUTPATIENT
Start: 2019-01-05 | End: 2019-04-10

## 2019-01-05 RX ORDER — OXYCODONE HYDROCHLORIDE 5 MG/1
5 TABLET ORAL EVERY 4 HOURS PRN
Qty: 20 TABLET | Refills: 0 | Status: SHIPPED | OUTPATIENT
Start: 2019-01-05 | End: 2019-01-08

## 2019-01-05 RX ORDER — POLYETHYLENE GLYCOL 3350 17 G/17G
17 POWDER, FOR SOLUTION ORAL DAILY PRN
Status: DISCONTINUED | OUTPATIENT
Start: 2019-01-05 | End: 2019-01-06 | Stop reason: HOSPADM

## 2019-01-05 RX ADMIN — TAMSULOSIN HYDROCHLORIDE 0.4 MG: 0.4 CAPSULE ORAL at 08:30

## 2019-01-05 RX ADMIN — INSULIN ASPART 1 UNITS: 100 INJECTION, SOLUTION INTRAVENOUS; SUBCUTANEOUS at 08:29

## 2019-01-05 RX ADMIN — OXYCODONE HYDROCHLORIDE 5 MG: 5 TABLET ORAL at 04:17

## 2019-01-05 RX ADMIN — INSULIN ASPART 4 UNITS: 100 INJECTION, SOLUTION INTRAVENOUS; SUBCUTANEOUS at 21:01

## 2019-01-05 RX ADMIN — ACETAMINOPHEN 650 MG: 325 TABLET, FILM COATED ORAL at 10:26

## 2019-01-05 RX ADMIN — FINASTERIDE 5 MG: 5 TABLET, FILM COATED ORAL at 19:25

## 2019-01-05 RX ADMIN — INSULIN ASPART 9 UNITS: 100 INJECTION, SOLUTION INTRAVENOUS; SUBCUTANEOUS at 16:31

## 2019-01-05 RX ADMIN — INSULIN ASPART 4 UNITS: 100 INJECTION, SOLUTION INTRAVENOUS; SUBCUTANEOUS at 23:48

## 2019-01-05 RX ADMIN — POLYETHYLENE GLYCOL 3350 17 G: 17 POWDER, FOR SOLUTION ORAL at 16:40

## 2019-01-05 RX ADMIN — OXYCODONE HYDROCHLORIDE 5 MG: 5 TABLET ORAL at 16:31

## 2019-01-05 RX ADMIN — INSULIN ASPART 2 UNITS: 100 INJECTION, SOLUTION INTRAVENOUS; SUBCUTANEOUS at 00:10

## 2019-01-05 RX ADMIN — INSULIN ASPART 2 UNITS: 100 INJECTION, SOLUTION INTRAVENOUS; SUBCUTANEOUS at 04:18

## 2019-01-05 RX ADMIN — INSULIN ASPART 3 UNITS: 100 INJECTION, SOLUTION INTRAVENOUS; SUBCUTANEOUS at 13:23

## 2019-01-05 RX ADMIN — INSULIN GLARGINE 6 UNITS: 100 INJECTION, SOLUTION SUBCUTANEOUS at 08:29

## 2019-01-05 RX ADMIN — POTASSIUM CHLORIDE 40 MEQ: 1500 TABLET, EXTENDED RELEASE ORAL at 11:56

## 2019-01-05 RX ADMIN — POTASSIUM CHLORIDE 20 MEQ: 1500 TABLET, EXTENDED RELEASE ORAL at 21:00

## 2019-01-05 RX ADMIN — POTASSIUM CHLORIDE 40 MEQ: 1500 TABLET, EXTENDED RELEASE ORAL at 19:25

## 2019-01-05 RX ADMIN — DOCUSATE SODIUM 100 MG: 100 CAPSULE, LIQUID FILLED ORAL at 19:25

## 2019-01-05 RX ADMIN — FAMOTIDINE 20 MG: 10 INJECTION, SOLUTION INTRAVENOUS at 10:26

## 2019-01-05 RX ADMIN — POTASSIUM CHLORIDE 40 MEQ: 1500 TABLET, EXTENDED RELEASE ORAL at 10:26

## 2019-01-05 RX ADMIN — ALBUTEROL SULFATE 2.5 MG: 2.5 SOLUTION RESPIRATORY (INHALATION) at 13:37

## 2019-01-05 RX ADMIN — FAMOTIDINE 20 MG: 10 INJECTION, SOLUTION INTRAVENOUS at 21:00

## 2019-01-05 RX ADMIN — OXYCODONE HYDROCHLORIDE 5 MG: 5 TABLET ORAL at 23:22

## 2019-01-05 ASSESSMENT — ACTIVITIES OF DAILY LIVING (ADL)
ADLS_ACUITY_SCORE: 18
ADLS_ACUITY_SCORE: 18
ADLS_ACUITY_SCORE: 19
ADLS_ACUITY_SCORE: 19
ADLS_ACUITY_SCORE: 18
ADLS_ACUITY_SCORE: 18

## 2019-01-05 ASSESSMENT — MIFFLIN-ST. JEOR: SCORE: 1430.22

## 2019-01-05 NOTE — PROGRESS NOTES
Writer did not meet with the patient for discharge planning.  Patient is an elevated TARA and has discharge orders for home today.  Patient has been SBA and lives with family.  Writer scheduled PCP follow up for next week and patient will require repeat LFT's, CBC, BMP.  Writer also put down contact information for surgery follow up (per surgical consultants 1-2 week follow up).  Writer updated bedside RN with appointment information to go over with family at discharge with .

## 2019-01-05 NOTE — PROGRESS NOTES
Seems to be doing well  Tolerating diet  Denies nausea  Pain controlled  Passing gas    Af vss  abd soft, full    A/p  post lap gwendolyn  Increase diet and ambulation  Okay for discharge when medically cleared

## 2019-01-05 NOTE — PLAN OF CARE
A&O. Chinese speaking with minimal english, ipad  utilized. Abdomen distended but BS active and WDL, passing gas. Pt tried to have BM without success, c/o dizziness after - VSS, BG stable, possible vasovagal episode? MD aware. Tolerating low fiber diet. Lap sites CDI. K+ replaced, awaiting recheck. Possible discharge home pending dizziness and K+ level.

## 2019-01-05 NOTE — PROGRESS NOTES
MD Notification    Notified Person: MD    Notified Person Name: Dr. Waters    Notification Date/Time: 1/5 [id number]    Notification Interaction: Telephone w/ readback    Purpose of Notification: K 3.2, patient VSS. Blood sugar stable. Patient feeling a little weak but ready for discharge    Orders Received: Ok to discharge    Comments: re-texted at 0744 son is concerned about K+ levels while in hospital and today, son would like to discuss with MD before discharging

## 2019-01-05 NOTE — PLAN OF CARE
Alert and oriented,  present this morning for assessements and MD rounds.  Vitals stable.  Tylenol given for abdominal pain.  Advanced to low fiber diet today.  Denies nausea.  BS active and passing gas.  K low at 2.8, replaced PO at 1030 with second dose 1230.  Lap sites CDI.  Good urine output.  Plan to discharge later this afternoon.

## 2019-01-05 NOTE — DISCHARGE SUMMARY
[facility]  Discharge Summary        Rabia Reed MRN# [id number]   YOB: 1940 Age: 78 year old     Date of Admission: 12/30/2018  Date of Discharge: 1/6/2019  Admitting Physician: Shauna Sage MD  Discharge Physician: Isma Waters MD     Primary Provider: Alfredo Aragon  Primary Care Physician Phone Number: [phone]         Discharge Diagnoses:   1. Acute gangrenous cholecystitis - s/p lap cholecystectomy 1/1/2019.  2. Cholelithiasis with choledocholithiasis - s/p biliary sphincterotomy and balloon extraction 12/31/2018.  3. Elevated LFT's due to above.  4. Post-op ileus.  5. Wheezing, suspect upper reactive airways.  6. Polycystic kidney disease and nonobstructing left renal stone.         Other Chronic Medical Problems:      1. Diabetes mellitus type 2.  2. BPH.  3. Hyperlipidemia.       Allergies:         Allergies   Allergen Reactions     No Known Drug Allergies            Discharge Medications:        Current Discharge Medication List      START taking these medications    Details   oxyCODONE (ROXICODONE) 5 MG tablet Take 1 tablet (5 mg) by mouth every 4 hours as needed for moderate to severe pain  Qty: 20 tablet, Refills: 0    Associated Diagnoses: Cholecystitis, acute         CONTINUE these medications which have CHANGED    Details   acetaminophen (TYLENOL) 325 MG tablet Take 2 tablets (650 mg) by mouth every 6 hours as needed for mild pain  Qty: 30 tablet, Refills: 0    Associated Diagnoses: Cholecystitis, acute      insulin aspart (NOVOLOG FLEXPEN) 100 UNIT/ML pen Inject 6 Units Subcutaneous At Bedtime  Qty: 1.8 mL, Refills: 0    Associated Diagnoses: Type 2 diabetes mellitus without complication, with long-term current use of insulin (H)      insulin aspart prot & aspart (NOVOLOG MIX 70/30 FLEXPEN) (70-30) 100 UNIT/ML pen 20 units before breakfast and 13 units before supper.  Qty: 45 mL, Refills: 1    Associated Diagnoses: Status post total left knee replacement          CONTINUE these medications which have NOT CHANGED    Details   Ascorbic Acid (VITAMIN C PO) Take 500 mg by mouth daily      aspirin 81 MG EC tablet Take 81 mg by mouth daily      celecoxib (CELEBREX) 200 MG capsule TAKE 1 CAPSULE BY MOUTH DAILY WITH FOOD FOR ARTHRITIS PAIN  Qty: 90 capsule, Refills: 1    Associated Diagnoses: Primary osteoarthritis, unspecified site      FINASTERIDE PO Take 5 mg by mouth daily      melatonin (MELATONIN) 1 MG/ML LIQD liquid Take 3 mg by mouth nightly as needed for sleep      sennosides (SENOKOT) 8.6 MG tablet Take 2 tablets by mouth 2 times daily      Skin Protectants, Misc. (EUCERIN) cream Apply topically 2 times daily      tamsulosin (FLOMAX) 0.4 MG capsule Take 0.4 mg by mouth daily      blood glucose monitoring (ONE TOUCH ULTRASOFT) lancets USE 3 TIMES DAILY  Qty: 200 each, Refills: 3    Associated Diagnoses: Type 2 diabetes mellitus without complication, with long-term current use of insulin (H)      Blood Glucose Monitoring Suppl (ONE TOUCH ULTRA SYSTEM KIT) W/DEVICE KIT One touch ultra 2 if covered by insurance  Qty: 1 kit, Refills: 0    Associated Diagnoses: Type II or unspecified type diabetes mellitus without mention of complication, not stated as uncontrolled      !! Insulin Pen Needle (PEN NEEDLES) 31G X 6 MM MISC 1 Device 3 times daily  Qty: 100 each, Refills: 11    Associated Diagnoses: Type 2 diabetes mellitus without complication, with long-term current use of insulin (H)      ONETOUCH ULTRA test strip Use to test 3 times daily  Qty: 200 each, Refills: 2    Associated Diagnoses: Type 2 diabetes mellitus without complication, with long-term current use of insulin (H)      !! ULTICARE MICRO 32G X 4 MM insulin pen needle TEST 3 TIMES DAILY  Qty: 100 each, Refills: 0    Comments: Medication is being filled for 1 time refill only due to:  Patient needs to be seen.  Associated Diagnoses: Type 2 diabetes, HbA1c goal < 7% (H)       !! - Potential duplicate medications  found. Please discuss with provider.      STOP taking these medications       ACE/ARB/ARNI NOT PRESCRIBED, INTENTIONAL, Comments:   Reason for Stopping:         atorvastatin (LIPITOR) 80 MG tablet Comments:   Reason for Stopping:                   Discharge Instructions and Follow-Up:      Follow-up Appointments     Follow-up and recommended labs and tests      Follow up with primary care provider, Alfredo Aragon, within 7 days for   hospital follow- up.  The following labs/tests are recommended: BMP, CBC,   LFT's.    Follow up with Dr. Bentley (Surgery) 1-2 weeks. Call [facility] office on Monday 1/7 to confirm your surgical follow up.  [facility]   Address: [facility] [address] [facility], [place] [address]  Phone: [phone]                   Consultations This Hospital Stay:      1. Surgery.  2. Gastroenterology.        Admission History:      Please see the H&P by Shauna Sage MD on 12/30/2018 for complete details. Briefly, Mr. Rabia Reed is an 78 year old male with PMH significant for diabetes mellitus type 2, BPH and hyperlipidemia, who presented 12/30/2018 with abdominal pain and found with elevated LFT's and gallbladder neck stone with GB wall thickening on imaging.        Problem Oriented Hospital Course:      Acute gangrenous cholecystitis - s/p lap cholecystectomy 1/1/2019.  Cholelithiasis with choledocholithiasis - s/p biliary sphincterotomy and balloon extraction 12/31/2018.  Elevated LFT's due to above.  Post-op ileus.  Presented with abdominal pain with N/V. On initial evaluation 12/30/2018, WBC and LFT's elevated. CT abdomen and pelvis 12/30/2018 showed  stone in the gallbladder neck and mild GB wall thickening. RUQ US 12/31/2018 showed cholelithiasis with GB wall thickening suggestive of cholecystitis. Started on Zosyn on admit. Seen by GI and underwent EUS and ERCP 12/31/2018 which showed a CBD stone as well as GB stone with distension and wall thickening; stone removed  with biliary sphincterotomy and balloon extraction. Seen by Surgery and underwent lap cholecystectomy 1/1/2019. Advanced to regular diet 1/5.  Recent Labs   Lab Test 01/06/19  0801 01/04/19  0755 01/02/19  1844 01/02/19  0739 01/01/19  0704 12/31/18  0744 12/30/18  1935 12/30/18  1920 10/08/18  0645  09/14/17  0649 09/07/17  1730  09/29/14  1224  12/23/10  1119   ALBUMIN 2.3* 2.3* 2.5* 2.4* 2.5* 3.0*  --  3.7 2.7*   < >  --   --    < >  --    < >  --    BILITOTAL 1.1 1.2 1.6* 1.9* 2.7* 2.9*  --  1.9* 0.6   < >  --   --    < >  --    < >  --    * 330* 589* 664* 1,026* 2,057*  --  388* 20   < >  --   --    < >  --    < >  --    AST 25 57* 190* 273* 630* 2,420*  --  687* 22   < >  --   --    < >  --    < >  --    ALKPHOS 197* 218* 218* 226* 165* 181*  --  163* 90   < >  --   --    < >  --    < >  --    PROTEIN  --   --   --   --   --   --  30*  --   --   --   --  Negative  --  Negative  --  Negative   LIPASE  --   --   --  56* 571*  --   --  99 47*  --   --   --   --   --   --   --    AMYLASE  --   --   --   --   --   --   --   --  23*  --   --   --   --   --   --   --    INR  --   --   --   --   --   --   --   --   --   --  0.95  --   --   --   --   --     < > = values in this interval not displayed.   - Continue diet as tolerated.  - Monitor LFT's outpatient.  - Follow-up with Surgery.    Diabetes mellitus type 2.  [PTA: Novolog Mix 70/30 30U before breakfast and 20U before supper; aspart 12U at bedtime.]  Hgb A1C 8.6 10/2018. Started on Lantus.  Recent Labs   Lab 01/06/19  1412 01/06/19  0820 01/06/19  0801 01/06/19  0353 01/05/19  2349 01/05/19  2039 01/05/19  1621  01/05/19  0818  01/04/19  0755  01/03/19  0725  01/02/19  0739  01/01/19  0704   GLC  --   --  180*  --   --   --   --   --  159*  --  132*  --  166*  --  193*  --  177*   * 170*  --  229* 231* 228* 353*   < >  --    < >  --    < >  --    < >  --    < >  --     < > = values in this interval not displayed.   - Continue NPH Mix 20U qam and  13U qpm before supper with aspart 6U at bedtime.    Wheezing, suspect upper reactive airways.  Noted with wheezing am 1/3. CXR 1/3 showed no acute findings. Pt given nebs. One dose of IV Lasix given 1/3. Improved 1/4.    BPH.  - Continue PTA finasteride and Flomax.    Hyperlipidemia.  - Hold PTA atorvastatin given elevated LFT's.    Polycystic kidney disease and nonobstructing left renal stone.   CT 12/31/2018 also showed bilateral polycystic kidneys with numerous large cysts; nonobstructive stone in the left kidney measures 1.4 cm. Creatinine is normal. UA showed hematuria likely due to above.   - Follow-up with Urology outpatient.         Code Status:      Full Code        Pending Results:        Unresulted Labs Ordered in the Past 30 Days of this Admission     No orders found from 10/31/2018 to 12/31/2018.                Discharge Disposition:      Discharged to home.        Discharge Time:      Less than 30 minutes.        Key Imaging Studies, Lab Findings and Procedures/Surgeries:        Results for orders placed or performed during the hospital encounter of 12/30/18   CT Abdomen Pelvis w Contrast    Narrative    CT ABDOMEN PELVIS W CONTRAST 12/30/2018 9:13 PM    HISTORY: Nausea and vomiting. Left upper quadrant pain.    COMPARISON: None.    TECHNIQUE:  Abdomen and pelvis CT was performed following intravenous  administration of 88mL Isovue-370.  Radiation dose for this scan was  reduced using automated exposure control, adjustment of the mA and/or  kV according to patient size, or iterative reconstruction technique.    FINDINGS: Lung bases are clear.    The gallbladder is distended and there is a 0.8 cm stone in the region  of the gallbladder neck. There may be mild gallbladder wall  thickening. The liver, spleen, pancreas and adrenal glands appear  normal. There are numerous large renal cysts bilaterally. A  nonobstructive calculus in the left kidney measures 1.4 cm.    The bowel has normal caliber. No free  air. No free or loculated fluid  collection.    Urinary bladder is fully distended with normal wall thickness.  Prostate is enlarged. No free fluid in the pelvis.      Impression    IMPRESSION:   1. Distended gallbladder with stone in the region of the gallbladder  neck measuring 0.8 cm. Possible mild gallbladder wall thickening.  Further evaluation with ultrasound might be helpful.    2. Bilateral polycystic kidneys with numerous large cysts.  Nonobstructive stone in the left kidney measures 1.4 cm.    3. Bowel appears normal.    ROSALES WAITE MD   Abdomen US, limited (RUQ only)    Narrative    US ABDOMEN LIMITED  12/31/2018 1:12 AM      HISTORY: Abnormal CT, elevated liver function tests, vomiting, upper  abdominal pain.     COMPARISON: CT 12/30/2018.    FINDINGS: The liver is normal in size and texture without focal mass.  There is no intra or extrahepatic biliary dilatation. The common  hepatic duct measures 0.4 cm. There are multiple stones in the  gallbladder measuring up to 0.8 cm. The gallbladder is distended.  Gallbladder wall is thickened at 0.5 cm. There is a small amount  pericholecystic fluid.  The pancreas head and body appear normal. The  tail is obscured by bowel gas.  The right kidney measures 12.3 cm.  There are multiple right renal cysts measuring up to 8.5 cm. No  hydronephrosis.      Impression    IMPRESSION:  1. Cholelithiasis. The gallbladder is distended and thick-walled.  Small amount pericholecystic fluid. This may be acute cholecystitis.  2. No biliary dilatation.  3. Multiple right renal cysts.    BERNADETTE LOZANO MD       EUS 12/31/2018:  Findings:        Endoscopic Finding :        The examined esophagus was endoscopically normal.        The entire examined stomach was endoscopically normal.        A large diverticulum was found in the area of the papilla.        Endosonographic Finding :        There was no sign of significant endosonographic abnormality in the        ampulla.         One stone was visualized endosonographically in the distal common bile        duct. The stone measured 4 mm in greatest dimension. The stone was        round. It was characterized by shadowing.        One stone was visualized endosonographically in the gallbladder neck.        The stone measured 11 mm in greatest dimension. It was characterized by        shadowing. The gallbladder was distended and thick walled.        There was no sign of significant endosonographic abnormality in the        visualized portion of the liver.        There was no sign of significant endosonographic abnormality in the        entire pancreas. The pancreatic duct measured up to 2 mm in diameter.        The pancreas was well visualized, no masses, no cysts, no        calcifications, the pancreatic duct was well visualized from ampulla to        tail, the pancreatic duct was regular in contour.        No lymphadenopathy seen.                                                                                     Impression:               - CBD stone.                             - GB stone with gallbladder distension and wall                             thickening.                             - Periampullary diverticulum.   Recommendation:           - Perform an ERCP.       ERCP 12/31/2018:  Findings:        The  film was normal. The esophagus was successfully intubated        under direct vision without detailed examination of the pharynx, larynx,        and associated structures, and upper GI tract. The upper GI tract was        grossly normal. The major papilla was located entirely within a        diverticulum. A long 0.035 inch Soft Jagwire was passed into the        pancreatic duct x 1, then was reoriented into the biliary tree. The        short-nosed traction sphincterotome was passed over the guidewire and        the bile duct was then deeply cannulated. Contrast was injected. I        personally interpreted the bile duct images.  There was brisk flow of        contrast through the ducts. Image quality was excellent. Contrast        extended to the hepatic ducts. The lower third of the main bile duct        contained one stone, which was 3 mm in diameter. Biliary sphincterotomy        was made with a traction (standard) sphincterotome. There was no        post-sphincterotomy bleeding. The biliary tree was swept with an 8.5 mm        balloon starting at the bifurcation. One stone was removed. No stones        remained.        The scope was reduced, the stomach decompressed, and the procedure ended.                                                                                     Impression:               - The major papilla was located entirely within a                             diverticulum.                             - Choledocholithiasis was found. Complete removal                             was accomplished by biliary sphincterotomy and                             balloon extraction.   Recommendation:           - Return patient to hospital [facility] for ongoing care.                             - Clears until NPO at midnight.                             - Cholecystectomy, timing per surgery.       XR ERCP    Narrative    XR ERCP  12/31/2018 2:55 PM     HISTORY: Ascending cholangitis.    COMPARISON: None.      Impression    IMPRESSION: Two spot fluoroscopic images obtained during ERCP. Biliary  system appears nondilated. Irregularity in the distal common bile duct  and image provided. Total fluoroscopic time 0.6 minutes.    JOVANNY ABRAHAM MD     Surgery  1/1/2019:  Laparoscopic Cholecystectomy,      XR Chest Port 1 View    Narrative    XR CHEST PORT 1 VW  1/3/2019 2:00 AM     HISTORY: Dyspnea.    COMPARISON: 2/25/2014.    FINDINGS: Upright portable chest. The heart size is normal. The  thoracic aorta is calcified and mildly tortuous. There is minimal  atelectasis or scar at the left lung base. The lungs are otherwise  clear. No  pneumothorax.      Impression    IMPRESSION: No acute abnormality.    BERNADETTE LOZANO MD   XR Abdomen 1 View    Narrative    ABDOMEN ONE VIEW   1/3/2019 8:59 AM     HISTORY: Evaluate for ileus, patient status post lap gwendolyn POD2 and  ERCP POD 3 with abdominal distention.    COMPARISON: None.       Impression    IMPRESSION: Supine abdomen. There are multiple dilated air-filled  small bowel loops which may be an ileus. There is air in nondilated  colon. Large left renal stone.    BERNADETTE LOZANO MD

## 2019-01-05 NOTE — PLAN OF CARE
Patient is alert and oriented. Green[place]ic speaking, but patient is able to speak/understand some English. VSS on RA except hypertensive, PRN IV Hydralazine given x1 - effective. C/o abdominal pain, PRN Tylenol and Oxycodone given x1, effective. Advanced to full liquid diet this evening, poor appetite. Denied nausea/vomiting. BS active. Passing gas. 4 lapsites with steri-strips CDI.  Using urinal at bedside frequently. K+ recheck 3.4 at 1800. Ambulated hallway x1 with A1 and walker. R PIV SL.  Discharge pending. Nursing to continue to monitor.

## 2019-01-05 NOTE — PLAN OF CARE
Patient alert and orientedx4. VSS on RA except JP=[id number]. C/o abdominal pain, given Oxycodone x2, moderately effective. Full liquid diet, fair appetite. Denied nausea/vomiting. BS active, passing gas. 4 lapsites with steri-strips CDI. Using urinal at bedside frequently. Ambulated in hallway x1 with SBA + walker. [place]ERETT BUSH.

## 2019-01-05 NOTE — PROGRESS NOTES
[facility]    Internal Medicine Hospitalist Progress Note  01/05/2019  I evaluated patient on the above date.    Isma Waters Jr., MD  [phone] (p)  Text Page        Assessment & Plan New actions/orders today (01/05/2019) are underlined.    Mr. Rabia Reed is an 78 year old male with PMH significant for diabetes mellitus type 2, BPH and hyperlipidemia, who presented 12/30/2018 with abdominal pain and found with elevated LFT's and gallbladder neck stone with GB wall thickening on imaging.    Acute gangrenous cholecystitis - s/p lap cholecystectomy 1/1/2019.  Cholelithiasis with choledocholithiasis - s/p biliary sphincterotomy and balloon extraction 12/31/2018.  Elevated LFT's due to above.  Post-op ileus.  Presented with abdominal pain with N/V. On initial evaluation 12/30/2018, WBC and LFT's elevated. CT abdomen and pelvis 12/30/2018 showed  stone in the gallbladder neck and mild GB wall thickening. RUQ US 12/31/2018 showed cholelithiasis with GB wall thickening suggestive of cholecystitis. Started on Zosyn on admit. Seen by GI and underwent EUS and ERCP 12/31/2018 which showed a CBD stone as well as GB stone with distension and wall thickening; stone removed with biliary sphincterotomy and balloon extraction. Seen by Surgery and underwent lap cholecystectomy 1/1/2019.  Recent Labs   Lab Test 01/04/19  0755 01/02/19  1844 01/02/19  0739 01/01/19  0704 12/31/18  0744 12/30/18  1935 12/30/18  1920 10/08/18  0645  09/14/17  0649 09/07/17  1730  09/29/14  1224  12/23/10  1119   ALBUMIN 2.3* 2.5* 2.4* 2.5* 3.0*  --  3.7 2.7*   < >  --   --    < >  --    < >  --    BILITOTAL 1.2 1.6* 1.9* 2.7* 2.9*  --  1.9* 0.6   < >  --   --    < >  --    < >  --    * 589* 664* 1,026* 2,057*  --  388* 20   < >  --   --    < >  --    < >  --    AST 57* 190* 273* 630* 2,420*  --  687* 22   < >  --   --    < >  --    < >  --    ALKPHOS 218* 218* 226* 165* 181*  --  163* 90   < >  --   --    < >  --    < >  --     PROTEIN  --   --   --   --   --  30*  --   --   --   --  Negative  --  Negative  --  Negative   LIPASE  --   --  56* 571*  --   --  99 47*  --   --   --   --   --   --   --    AMYLASE  --   --   --   --   --   --   --  23*  --   --   --   --   --   --   --    INR  --   --   --   --   --   --   --   --   --  0.95  --   --   --   --   --     < > = values in this interval not displayed.   - Advance to low fiber diet per Surgery.  - Continue Zosyn (started 12/31/2018).  - Monitor LFT's.  - Post-op management per Surgery.    Diabetes mellitus type 2.  [PTA: Novolog Mix 70/30 30U before breakfast and 20U before supper; aspart 12U at bedtime.]  Hgb A1C 8.6 10/2018. Started on Lantus.  Recent Labs   Lab 01/05/19  0809 01/05/19  0411 01/05/19  0010 01/04/19  2112 01/04/19  1630 01/04/19  1120  01/04/19  0755  01/03/19  0725  01/02/19  0739  01/01/19  0704  12/31/18  0744  12/30/18  1920   GLC  --   --   --   --   --   --   --  132*  --  166*  --  193*  --  177*  --  221*  --  219*   * 185* 169* 250* 192* 148*   < >  --    < >  --    < >  --    < >  --    < >  --    < >  --     < > = values in this interval not displayed.   - Discontinue Lantus.  - Start NPH 10U qam and 7U qpm before supper 1/6.  - Continue ISS.    Wheezing, suspect upper reactive airways.  Noted with wheezing am 1/3. CXR 1/3 showed no acute findings. Pt given nebs. One dose of IV Lasix given 1/3. Improved 1/4.  - Continue PRN albuterol.    BPH.  - Continue PTA finasteride and Flomax.  - Monitor intake and output, monitor for urinary retention.    Hyperlipidemia.  - Holding PTA atorvastatin given elevated LFT's.    Polycystic kidney disease and nonobstructing left renal stone.   CT 12/31/2018 also showed bilateral polycystic kidneys with numerous large cysts; nonobstructive stone in the left kidney measures 1.4 cm. Creatinine is normal. UA showed hematuria likely due to above.   - Follow-up with Urology outpatient.     Diet: Full Liquid Diet   "  Prophylaxis: PCD's, ambulation. Famotidine.  Ledesma Catheter: not present  Code Status: Full Code      Disposition Plan   Expected discharge: Today, recommended to prior living arrangement.  Entered: Isma Waters MD 01/05/2019, 8:33 AM         Interval History   Seen with .  Doing better.  Has some abdominal pain; +flatus, +BM.  Tolerating diet.  Ambulating.    -Data reviewed today: I reviewed all new labs and imaging over the last 24 hours. I personally reviewed no images or EKG's today.    Physical Exam   Heart Rate: 83, Blood pressure 161/77, pulse 84, temperature 98.7  F (37.1  C), temperature source Oral, resp. rate 16, height 1.702 m (5' 7\"), weight 76.2 kg (167 lb 14.4 oz), SpO2 93 %.  Vitals:    01/01/19 0600 01/02/19 0307 01/05/19 0630   Weight: 79.1 kg (174 lb 6.4 oz) 80.4 kg (177 lb 4 oz) 76.2 kg (167 lb 14.4 oz)     Vital Signs with Ranges  Temp:  [96.4  F (35.8  C)-98.7  F (37.1  C)] 98.7  F (37.1  C)  Pulse:  [84] 84  Heart Rate:  [74-96] 83  Resp:  [16-20] 16  BP: (148-176)/(67-83) 161/77  SpO2:  [93 %-96 %] 93 %  Patient Vitals for the past 24 hrs:   BP Temp Temp src Pulse Heart Rate Resp SpO2 Weight   01/05/19 0807 161/77 98.7  F (37.1  C) Oral -- 83 16 93 % --   01/05/19 0630 -- -- -- -- -- -- -- 76.2 kg (167 lb 14.4 oz)   01/04/19 2356 156/73 98.4  F (36.9  C) Oral 84 -- 16 95 % --   01/04/19 1900 -- -- -- -- -- 16 -- --   01/04/19 1825 157/75 96.4  F (35.8  C) Axillary -- 84 18 96 % --   01/04/19 1643 176/83 97.6  F (36.4  C) Oral -- 74 18 95 % --   01/04/19 1449 -- -- -- -- -- 18 -- --   01/04/19 1108 148/67 96.8  F (36  C) Oral -- 96 20 95 % --     I/O's Last 24 hours  I/O last 3 completed shifts:  In: 300 [P.O.:300]  Out: 1500 [Urine:1500]    Constitutional: Alert, oriented, pleasant.  Respiratory: Diminished in bases. No crackles or wheezes.  Cardiovascular: RRR no m/r/g.  GI: Mild distension, no r/g tenderness, +BS.  Skin/Integumen:   Other:        Data   Recent Labs "   Lab 01/04/19  1849 01/04/19 0755 01/03/19 2135 01/03/19 0725 01/02/19 1844 01/02/19 0739 01/01/19 0704   WBC  --   --   --  7.2  --  8.2 7.3   HGB  --   --   --  11.9*  --  12.2* 12.7*   MCV  --   --   --  99  --  100 99   PLT  --   --   --  118*  --  117* 117*   NA  --  142  --  142  --  140 143   POTASSIUM 3.4 3.3* 3.1* 2.9*  --  3.4 3.4   CHLORIDE  --  106  --  107  --  107 110*   CO2  --  30  --  26  --  26 27   BUN  --  11  --  14  --  18 18   CR  --  0.71  --  0.76  --  0.91 0.90   ANIONGAP  --  6  --  9  --  7 6   MABEL  --  8.3*  --  7.9*  --  7.8* 7.9*   GLC  --  132*  --  166*  --  193* 177*   ALBUMIN  --  2.3*  --   --  2.5* 2.4* 2.5*   PROTTOTAL  --  6.8  --   --  6.7* 6.3* 6.2*   BILITOTAL  --  1.2  --   --  1.6* 1.9* 2.7*   ALKPHOS  --  218*  --   --  218* 226* 165*   ALT  --  330*  --   --  589* 664* 1,026*   AST  --  57*  --   --  190* 273* 630*   LIPASE  --   --   --   --   --  56* 571*     Recent Labs   Lab Test 01/05/19  0809 01/05/19  0411 01/05/19  0010 01/04/19 2112 01/04/19  1630  01/04/19 0755 01/03/19 0725 01/02/19  0739  01/01/19  0704  12/31/18  0744   GLC  --   --   --   --   --   --  132*  --  166*  --  193*  --  177*  --  221*   * 185* 169* 250* 192*   < >  --    < >  --    < >  --    < >  --    < >  --     < > = values in this interval not displayed.         No results found for this or any previous visit (from the past 24 hour(s)).    Medications   All medications were reviewed.      famotidine  20 mg Intravenous Q12H     finasteride  5 mg Oral QPM     insulin aspart  1-12 Units Subcutaneous Q4H     insulin glargine  6 Units Subcutaneous QAM AC     pink lady enema  286 mL Rectal Once     sodium chloride (PF)  3 mL Intracatheter Q8H     tamsulosin  0.4 mg Oral Daily

## 2019-01-06 VITALS
OXYGEN SATURATION: 95 % | HEART RATE: 94 BPM | WEIGHT: 164.7 LBS | RESPIRATION RATE: 16 BRPM | BODY MASS INDEX: 25.85 KG/M2 | HEIGHT: 67 IN | SYSTOLIC BLOOD PRESSURE: 152 MMHG | DIASTOLIC BLOOD PRESSURE: 77 MMHG | TEMPERATURE: 98.4 F

## 2019-01-06 LAB
ALBUMIN SERPL-MCNC: 2.3 G/DL (ref 3.4–5)
ALP SERPL-CCNC: 197 U/L (ref 40–150)
ALT SERPL W P-5'-P-CCNC: 174 U/L (ref 0–70)
ANION GAP SERPL CALCULATED.3IONS-SCNC: 6 MMOL/L (ref 3–14)
AST SERPL W P-5'-P-CCNC: 25 U/L (ref 0–45)
BASOPHILS # BLD AUTO: 0 10E9/L (ref 0–0.2)
BASOPHILS NFR BLD AUTO: 0.5 %
BILIRUB DIRECT SERPL-MCNC: 0.6 MG/DL (ref 0–0.2)
BILIRUB SERPL-MCNC: 1.1 MG/DL (ref 0.2–1.3)
BUN SERPL-MCNC: 12 MG/DL (ref 7–30)
CALCIUM SERPL-MCNC: 8.4 MG/DL (ref 8.5–10.1)
CHLORIDE SERPL-SCNC: 103 MMOL/L (ref 94–109)
CO2 SERPL-SCNC: 28 MMOL/L (ref 20–32)
CREAT SERPL-MCNC: 0.81 MG/DL (ref 0.66–1.25)
DIFFERENTIAL METHOD BLD: ABNORMAL
EOSINOPHIL # BLD AUTO: 0.2 10E9/L (ref 0–0.7)
EOSINOPHIL NFR BLD AUTO: 2 %
ERYTHROCYTE [DISTWIDTH] IN BLOOD BY AUTOMATED COUNT: 13.1 % (ref 10–15)
GFR SERPL CREATININE-BSD FRML MDRD: 84 ML/MIN/{1.73_M2}
GLUCOSE BLDC GLUCOMTR-MCNC: 170 MG/DL (ref 70–99)
GLUCOSE BLDC GLUCOMTR-MCNC: 229 MG/DL (ref 70–99)
GLUCOSE BLDC GLUCOMTR-MCNC: 231 MG/DL (ref 70–99)
GLUCOSE BLDC GLUCOMTR-MCNC: 303 MG/DL (ref 70–99)
GLUCOSE SERPL-MCNC: 180 MG/DL (ref 70–99)
HCT VFR BLD AUTO: 38.5 % (ref 40–53)
HGB BLD-MCNC: 13.3 G/DL (ref 13.3–17.7)
IMM GRANULOCYTES # BLD: 0.1 10E9/L (ref 0–0.4)
IMM GRANULOCYTES NFR BLD: 1 %
LYMPHOCYTES # BLD AUTO: 1.9 10E9/L (ref 0.8–5.3)
LYMPHOCYTES NFR BLD AUTO: 23.4 %
MCH RBC QN AUTO: 32.7 PG (ref 26.5–33)
MCHC RBC AUTO-ENTMCNC: 34.5 G/DL (ref 31.5–36.5)
MCV RBC AUTO: 95 FL (ref 78–100)
MONOCYTES # BLD AUTO: 0.4 10E9/L (ref 0–1.3)
MONOCYTES NFR BLD AUTO: 5.5 %
NEUTROPHILS # BLD AUTO: 5.4 10E9/L (ref 1.6–8.3)
NEUTROPHILS NFR BLD AUTO: 67.6 %
NRBC # BLD AUTO: 0 10*3/UL
NRBC BLD AUTO-RTO: 0 /100
PLATELET # BLD AUTO: 234 10E9/L (ref 150–450)
PLATELET # BLD EST: ABNORMAL 10*3/UL
POTASSIUM SERPL-SCNC: 3.7 MMOL/L (ref 3.4–5.3)
PROT SERPL-MCNC: 6.8 G/DL (ref 6.8–8.8)
RBC # BLD AUTO: 4.07 10E12/L (ref 4.4–5.9)
RBC MORPH BLD: ABNORMAL
SODIUM SERPL-SCNC: 137 MMOL/L (ref 133–144)
WBC # BLD AUTO: 7.9 10E9/L (ref 4–11)

## 2019-01-06 PROCEDURE — 36415 COLL VENOUS BLD VENIPUNCTURE: CPT | Performed by: INTERNAL MEDICINE

## 2019-01-06 PROCEDURE — 25000132 ZZH RX MED GY IP 250 OP 250 PS 637: Performed by: HOSPITALIST

## 2019-01-06 PROCEDURE — 80076 HEPATIC FUNCTION PANEL: CPT | Performed by: INTERNAL MEDICINE

## 2019-01-06 PROCEDURE — 25000132 ZZH RX MED GY IP 250 OP 250 PS 637: Performed by: INTERNAL MEDICINE

## 2019-01-06 PROCEDURE — 85025 COMPLETE CBC W/AUTO DIFF WBC: CPT | Performed by: INTERNAL MEDICINE

## 2019-01-06 PROCEDURE — 00000146 ZZHCL STATISTIC GLUCOSE BY METER IP

## 2019-01-06 PROCEDURE — 99238 HOSP IP/OBS DSCHRG MGMT 30/<: CPT | Performed by: INTERNAL MEDICINE

## 2019-01-06 PROCEDURE — 80048 BASIC METABOLIC PNL TOTAL CA: CPT | Performed by: INTERNAL MEDICINE

## 2019-01-06 PROCEDURE — 25000125 ZZHC RX 250: Performed by: HOSPITALIST

## 2019-01-06 PROCEDURE — 25000131 ZZH RX MED GY IP 250 OP 636 PS 637: Performed by: INTERNAL MEDICINE

## 2019-01-06 RX ADMIN — INSULIN ASPART 7 UNITS: 100 INJECTION, SOLUTION INTRAVENOUS; SUBCUTANEOUS at 14:27

## 2019-01-06 RX ADMIN — OXYCODONE HYDROCHLORIDE 5 MG: 5 TABLET ORAL at 03:57

## 2019-01-06 RX ADMIN — TAMSULOSIN HYDROCHLORIDE 0.4 MG: 0.4 CAPSULE ORAL at 08:14

## 2019-01-06 RX ADMIN — INSULIN ASPART 4 UNITS: 100 INJECTION, SOLUTION INTRAVENOUS; SUBCUTANEOUS at 03:57

## 2019-01-06 RX ADMIN — ACETAMINOPHEN 650 MG: 325 TABLET, FILM COATED ORAL at 08:14

## 2019-01-06 RX ADMIN — DOCUSATE SODIUM 100 MG: 100 CAPSULE, LIQUID FILLED ORAL at 08:14

## 2019-01-06 RX ADMIN — INSULIN ASPART 2 UNITS: 100 INJECTION, SOLUTION INTRAVENOUS; SUBCUTANEOUS at 09:42

## 2019-01-06 RX ADMIN — POLYETHYLENE GLYCOL 3350 17 G: 17 POWDER, FOR SOLUTION ORAL at 08:14

## 2019-01-06 RX ADMIN — FAMOTIDINE 20 MG: 10 INJECTION, SOLUTION INTRAVENOUS at 12:05

## 2019-01-06 RX ADMIN — INSULIN HUMAN 11 UNITS: 100 INJECTION, SUSPENSION SUBCUTANEOUS at 09:43

## 2019-01-06 ASSESSMENT — ACTIVITIES OF DAILY LIVING (ADL)
ADLS_ACUITY_SCORE: 19
ADLS_ACUITY_SCORE: 18

## 2019-01-06 ASSESSMENT — MIFFLIN-ST. JEOR: SCORE: 1415.7

## 2019-01-06 NOTE — PROGRESS NOTES
Patient discharged at 4:26 PM to home with sonBrandan.  IV was discontinued. Patient denied pain at time of discharge. Belongings returned to patient.  Discharge instructions, medications reviewed, and follow-up appointments with PCP and surgery with patient and son at bedside with  present.  Patient verbalized understanding and all questions were answered.  Prescriptions given to patient (oxycdone).  At time of discharge, patient condition was stable and left the unit by wheel chair escorted by Cee TAYLOR to door 6.

## 2019-01-06 NOTE — PLAN OF CARE
Alert and oriented.  Vitals stable.  Abdominal pain managed with PRN Tylenol.  Large BM today after Miralax and Colace.  Ambulated halls.  Plan to discharge this evening.

## 2019-01-06 NOTE — PLAN OF CARE
Patient is alert and orientated. Somali speaking patient,  present till 1630 this afternoon, otherwise son present for most of evening and helped with translating. Patient planned to discharge this evening, but patient hesitant due to feeling weak and son concerned about K+ levels - MD notified and patient ok to stay overnight. VSS on RA. C/o (5/10) dull abdominal pain, PRN Oxycodone given x1. C/o constipation, PRN Miralax and PRN Colace given x1. BS active. Abdomen distended but soft. Passing gas. 4 lapsites covered with steri strips - CDI. Ambulated hallway x2 with SBA walker and gb. Low fiber diet, patients son brought soup in for supper. K+ recheck this afternoon 3.2 - replaced again this evening and recheck at 0100 . R KATE BUSH. Plan for discharge tomorrow to home with son. Nursing to continue to monitor.

## 2019-01-06 NOTE — PROGRESS NOTES
"BRIEF NUTRITION ASSESSMENT      REASON FOR ASSESSMENT:  LOS    NUTRITION HISTORY:  Deferred - patient is planning to discharge today     CURRENT DIET AND INTAKE:  Diet:  Low fiber               Noted he had soup, ice cream, apple juice, and coffee this morning  Son brought in soup last night for dinner     ANTHROPOMETRICS:  Height: 5'7\"  Weight: 74.7 kg (165#)(1/6)  BMI: 25.8 kg/m2  IBW:  67.3 kg   Weight Status: Overweight BMI 25-29.9  %IBW: 111%  Weight History:   Wt Readings from Last 10 Encounters:   01/06/19 74.7 kg (164 lb 11.2 oz)   10/17/18 75.5 kg (166 lb 6.4 oz)   10/09/18 78.6 kg (173 lb 4.8 oz)   10/07/18 81.9 kg (180 lb 8 oz)   10/02/18 81.2 kg (179 lb)   01/26/18 78.9 kg (174 lb)   10/06/17 78 kg (172 lb)   09/18/17 102.3 kg (225 lb 9.6 oz)   09/07/17 80.7 kg (178 lb)   03/17/17 79.2 kg (174 lb 11.2 oz)   Admit weight was 174# -- down since admit due to diuresis       LABS:  Labs noted    MALNUTRITION:  Patient does not meet two of the following criteria necessary for diagnosing malnutrition: significant weight loss, reduced intake, subcutaneous fat loss, muscle loss or fluid retention    NUTRITION INTERVENTION:  Nutrition Diagnosis:  No nutrition diagnosis at this time.    Implementation:  Nutrition Education:  Per Provider order if indicated.    FOLLOW UP/MONITORING:   Will re-evaluate in 7 - 10 days, or sooner, if re-consulted.    Jeannine Lee RD, LD, [facility]   Clinical Dietitian - [facility]             "

## 2019-01-06 NOTE — PROGRESS NOTES
"[facility]    Internal Medicine Hospitalist Progress Note  01/06/2019  I evaluated patient on the above date.    Isma Waters Jr., MD  [phone] (p)  Text Page        Assessment & Plan New actions/orders today (01/06/2019) are underlined.  D/C home: see discharge summary for diagnoses.      Interval History   Seen with .  Doing better. Had a large BM today. Still some dizziness at times. Lives with son and has a walker and good family support at home. Tolerating diet. Pain better.    -Data reviewed today: I reviewed all new labs and imaging over the last 24 hours. I personally reviewed no images or EKG's today.    Physical Exam   Heart Rate: 86, Blood pressure 144/78, pulse 94, temperature 98.8  F (37.1  C), temperature source Oral, resp. rate 16, height 1.702 m (5' 7\"), weight 74.7 kg (164 lb 11.2 oz), SpO2 95 %.  Vitals:    01/02/19 0307 01/05/19 0630 01/06/19 0715   Weight: 80.4 kg (177 lb 4 oz) 76.2 kg (167 lb 14.4 oz) 74.7 kg (164 lb 11.2 oz)     Vital Signs with Ranges  Temp:  [98.4  F (36.9  C)-99.1  F (37.3  C)] 98.8  F (37.1  C)  Pulse:  [94] 94  Heart Rate:  [86-93] 86  Resp:  [14-16] 16  BP: (141-169)/(71-86) 144/78  SpO2:  [94 %-96 %] 95 %  Patient Vitals for the past 24 hrs:   BP Temp Temp src Pulse Heart Rate Resp SpO2 Weight   01/06/19 0814 144/78 98.8  F (37.1  C) Oral -- 86 16 95 % --   01/06/19 0715 -- -- -- -- -- -- -- 74.7 kg (164 lb 11.2 oz)   01/05/19 2343 141/75 98.4  F (36.9  C) Oral 94 -- 14 96 % --   01/05/19 2041 169/86 99.1  F (37.3  C) Oral -- 89 16 95 % --   01/05/19 1724 -- -- -- -- -- 16 -- --   01/05/19 1538 155/71 98.9  F (37.2  C) Oral -- 93 16 94 % --     I/O's Last 24 hours  I/O last 3 completed shifts:  In: -   Out: 1000 [Urine:1000]    Constitutional: Alert, oriented, pleasant.  Respiratory: Diminished in bases. No crackles or wheezes.  Cardiovascular: RRR no m/r/g.  GI: Some distension but softer, no r/g tenderness, +BS.  Skin/Integumen:   Other:  "       Data   Recent Labs   Lab 01/06/19  0801 01/05/19  1456 01/05/19  0818  01/04/19  0755  01/03/19  0725  01/02/19  0739 01/01/19  0704   WBC 7.9  --   --   --   --   --  7.2  --  8.2 7.3   HGB 13.3  --   --   --   --   --  11.9*  --  12.2* 12.7*   MCV 95  --   --   --   --   --  99  --  100 99     --   --   --   --   --  118*  --  117* 117*     --  136  --  142  --  142  --  140 143   POTASSIUM 3.7 3.2* 2.8*   < > 3.3*   < > 2.9*  --  3.4 3.4   CHLORIDE 103  --  102  --  106  --  107  --  107 110*   CO2 28  --  27  --  30  --  26  --  26 27   BUN 12  --  11  --  11  --  14  --  18 18   CR 0.81  --  0.80  --  0.71  --  0.76  --  0.91 0.90   ANIONGAP 6  --  7  --  6  --  9  --  7 6   MABEL 8.4*  --  8.2*  --  8.3*  --  7.9*  --  7.8* 7.9*   *  --  159*  --  132*  --  166*  --  193* 177*   ALBUMIN 2.3*  --   --   --  2.3*  --   --    < > 2.4* 2.5*   PROTTOTAL 6.8  --   --   --  6.8  --   --    < > 6.3* 6.2*   BILITOTAL 1.1  --   --   --  1.2  --   --    < > 1.9* 2.7*   ALKPHOS 197*  --   --   --  218*  --   --    < > 226* 165*   *  --   --   --  330*  --   --    < > 664* 1,026*   AST 25  --   --   --  57*  --   --    < > 273* 630*   LIPASE  --   --   --   --   --   --   --   --  56* 571*    < > = values in this interval not displayed.     Recent Labs   Lab Test 01/06/19  1412 01/06/19  0820 01/06/19  0801 01/06/19  0353 01/05/19  2349 01/05/19  2039  01/05/19  0818  01/04/19  0755  01/03/19  0725  01/02/19  0739   GLC  --   --  180*  --   --   --   --  159*  --  132*  --  166*  --  193*   * 170*  --  229* 231* 228*   < >  --    < >  --    < >  --    < >  --     < > = values in this interval not displayed.         No results found for this or any previous visit (from the past 24 hour(s)).    Medications   All medications were reviewed.      famotidine  20 mg Intravenous Q12H     finasteride  5 mg Oral QPM     insulin aspart  1-12 Units Subcutaneous Q4H     insulin isophane human  11  Units Subcutaneous QAM AC     insulin isophane human  7 Units Subcutaneous Daily with supper     pink lady enema  286 mL Rectal Once     sodium chloride (PF)  3 mL Intracatheter Q8H     tamsulosin  0.4 mg Oral Daily

## 2019-01-06 NOTE — PLAN OF CARE
Patient alert and oriented x4, [place]. VSS. C/o LUQ abdominal pain, given Oxycodone x2, moderately effective. Low fiber diet. Denies nausea/vomiting. BS active, passing gas. C/o constipation, no BM this shift. R PIV SL. 4 lapsites with steri-strips CDI. Up with SBA + walker, no further episodes of dizziness reported.

## 2019-01-07 ENCOUNTER — TELEPHONE (OUTPATIENT)
Dept: INTERNAL MEDICINE | Facility: CLINIC | Age: 79
End: 2019-01-07

## 2019-01-07 ENCOUNTER — PATIENT OUTREACH (OUTPATIENT)
Dept: GERIATRIC MEDICINE | Facility: CLINIC | Age: 79
End: 2019-01-07

## 2019-01-07 NOTE — TELEPHONE ENCOUNTER
ED / Discharge Outreach Protocol    Patient Contact    Attempt # 1    Was call answered?  No.  Left message on voicemail with information to call me back.Used  service Ti [phone].  Left a VM with home phone son Brandan and pt listed mobile phone.  Pt has a f/u appt 1/11/18

## 2019-01-07 NOTE — PROGRESS NOTES
[facility] Care Coordination Contact    CC received notification of discharge to home. Discharge occurred on (Date 1/6/19) from [facility].   CC contacted adult son (Brandan) and reviewed discharge summary. Inquired about home care. And Brandan shared that to his knowledge no home care referral was made. Offered home care and Brandan was accepting of this. Brandan shared that member is getting up to use the bathroom, but still seems weak and tired.   Member has a follow-up appointment with PCP in 7 days: Yes: scheduled on 1/11/19   Member has had a change in condition: No  Home visit needed: No  Care plan reviewed and updated.  The following home based services PCA were resumed.  New referrals placed: Yes: RN Therapies: PT and OT  Transition log completed.   PCP notified of transition back to home via EMR.  ANASTACIA Velázquez  [facility]  [phone]

## 2019-01-11 ENCOUNTER — OFFICE VISIT (OUTPATIENT)
Dept: INTERNAL MEDICINE | Facility: CLINIC | Age: 79
End: 2019-01-11
Payer: COMMERCIAL

## 2019-01-11 VITALS
RESPIRATION RATE: 16 BRPM | HEIGHT: 67 IN | HEART RATE: 76 BPM | WEIGHT: 167 LBS | BODY MASS INDEX: 26.21 KG/M2 | OXYGEN SATURATION: 98 % | SYSTOLIC BLOOD PRESSURE: 132 MMHG | TEMPERATURE: 97.7 F | DIASTOLIC BLOOD PRESSURE: 70 MMHG

## 2019-01-11 DIAGNOSIS — N20.0 CALCULUS OF KIDNEY: ICD-10-CM

## 2019-01-11 DIAGNOSIS — Z90.49 S/P CHOLECYSTECTOMY: ICD-10-CM

## 2019-01-11 DIAGNOSIS — K75.9 HEPATITIS: Primary | ICD-10-CM

## 2019-01-11 DIAGNOSIS — Z79.4 DIABETES MELLITUS TYPE 2, INSULIN DEPENDENT (H): ICD-10-CM

## 2019-01-11 DIAGNOSIS — E11.9 DIABETES MELLITUS TYPE 2, INSULIN DEPENDENT (H): ICD-10-CM

## 2019-01-11 PROCEDURE — 36415 COLL VENOUS BLD VENIPUNCTURE: CPT | Performed by: INTERNAL MEDICINE

## 2019-01-11 PROCEDURE — 80053 COMPREHEN METABOLIC PANEL: CPT | Performed by: INTERNAL MEDICINE

## 2019-01-11 PROCEDURE — 99495 TRANSJ CARE MGMT MOD F2F 14D: CPT | Performed by: INTERNAL MEDICINE

## 2019-01-11 RX ORDER — ATORVASTATIN CALCIUM 80 MG/1
TABLET, FILM COATED ORAL
Refills: 3 | COMMUNITY
Start: 2018-10-03 | End: 2019-10-07 | Stop reason: DRUGHIGH

## 2019-01-11 ASSESSMENT — MIFFLIN-ST. JEOR: SCORE: 1426.14

## 2019-01-11 NOTE — PROGRESS NOTES
SUBJECTIVE:   Rabia Reed is a 78 year old male who presents to clinic today for the following health issues:        Hospital Follow-up Visit:    Hospital/Nursing Home/IP Rehab Facility: [facility]  Date of Admission: 12/30/2018  Date of Discharge: 01/06/2019  Reason(s) for Admission: Cholecystitis  Tele contatct 1/7/19 by CC            Problems taking medications regularly:  None       Medication changes since discharge: None       Problems adhering to non-medication therapy:  None    Summary of hospitalization:  [facility] discharge summary reviewed  Diagnostic Tests/Treatments reviewed.  Follow up needed: labs  Other Healthcare Providers Involved in Patient s Care:         General surgery   Update since discharge: improved.     Post Discharge Medication Reconciliation: discharge medications reconciled and changed, per note/orders (see AVS).  Plan of care communicated with patient and son with      Coding guidelines for this visit:  Type of Medical   Decision Making Face-to-Face Visit       within 7 Days of discharge Face-to-Face Visit        within 14 days of discharge   Moderate Complexity [id number]   High Complexity [id number]          Pt's past medical history, family history, habits, medications and allergies were reviewed with the patient today.  See snap shot for  HCM status. Most recent lab results reviewed with pt. Problem list and histories reviewed & adjusted, as indicated.  Additional history as below:      Due to language barrier, an  was present during the history-taking and subsequent discussion (and for part of the physical exam) with this patient.    Discharge summary reviewed. Part of the summary as below:           Admission History:      Please see the H&P by Shauna Sage MD on 12/30/2018 for complete details. Briefly, Mr. Rabia Reed is an 78 year old male with PMH significant for diabetes mellitus type 2, BPH and hyperlipidemia, who  presented 12/30/2018 with abdominal pain and found with elevated LFT's and gallbladder neck stone with GB wall thickening on imaging.         Problem Oriented Hospital Course:      Acute gangrenous cholecystitis - s/p lap cholecystectomy 1/1/2019.  Cholelithiasis with choledocholithiasis - s/p biliary sphincterotomy and balloon extraction 12/31/2018.  Elevated LFT's due to above.  Post-op ileus.  Presented with abdominal pain with N/V. On initial evaluation 12/30/2018, WBC and LFT's elevated. CT abdomen and pelvis 12/30/2018 showed  stone in the gallbladder neck and mild GB wall thickening. RUQ US 12/31/2018 showed cholelithiasis with GB wall thickening suggestive of cholecystitis. Started on Zosyn on admit. Seen by GI and underwent EUS and ERCP 12/31/2018 which showed a CBD stone as well as GB stone with distension and wall thickening; stone removed with biliary sphincterotomy and balloon extraction. Seen by Surgery and underwent lap cholecystectomy 1/1/2019. Advanced to regular diet 1/5.                     Recent Labs   Lab Test 01/06/19  0801 01/04/19  0755 01/02/19  1844 01/02/19  0739 01/01/19  0704 12/31/18  0744 12/30/18  1935 12/30/18  1920 10/08/18  0645   09/14/17  0649 09/07/17  1730   09/29/14  1224   12/23/10  1119   ALBUMIN 2.3* 2.3* 2.5* 2.4* 2.5* 3.0*  --  3.7 2.7*   < >  --   --    < >  --    < >  --    BILITOTAL 1.1 1.2 1.6* 1.9* 2.7* 2.9*  --  1.9* 0.6   < >  --   --    < >  --    < >  --    * 330* 589* 664* 1,026* 2,057*  --  388* 20   < >  --   --    < >  --    < >  --    AST 25 57* 190* 273* 630* 2,420*  --  687* 22   < >  --   --    < >  --    < >  --    ALKPHOS 197* 218* 218* 226* 165* 181*  --  163* 90   < >  --   --    < >  --    < >  --    PROTEIN  --   --   --   --   --   --  30*  --   --   --   --  Negative  --  Negative  --  Negative   LIPASE  --   --   --  56* 571*  --   --  99 47*  --   --   --   --   --   --   --    AMYLASE  --   --   --   --   --   --   --   --  23*  --   --    --   --   --   --   --    INR  --   --   --   --   --   --   --   --   --   --  0.95  --   --   --   --   --     < > = values in this interval not displayed.   - Continue diet as tolerated.  - Monitor LFT's outpatient.  - Follow-up with Surgery.     Diabetes mellitus type 2.  [PTA: Novolog Mix 70/30 30U before breakfast and 20U before supper; aspart 12U at bedtime.]  Hgb A1C 8.6 10/2018. Started on Lantus.                      Recent Labs   Lab 01/06/19  1412 01/06/19  0820 01/06/19  0801 01/06/19  0353 01/05/19  2349 01/05/19  2039 01/05/19  1621   01/05/19  0818   01/04/19  0755   01/03/19  0725   01/02/19  0739   01/01/19  0704   GLC  --   --  180*  --   --   --   --   --  159*  --  132*  --  166*  --  193*  --  177*   * 170*  --  229* 231* 228* 353*   < >  --    < >  --    < >  --    < >  --    < >  --     < > = values in this interval not displayed.   - Continue NPH Mix 20U qam and 13U qpm before supper with aspart 6U at bedtime.     Wheezing, suspect upper reactive airways.  Noted with wheezing am 1/3. CXR 1/3 showed no acute findings. Pt given nebs. One dose of IV Lasix given 1/3. Improved 1/4.     BPH.  - Continue PTA finasteride and Flomax.     Hyperlipidemia.  - Hold PTA atorvastatin given elevated LFT's.     Polycystic kidney disease and nonobstructing left renal stone.   CT 12/31/2018 also showed bilateral polycystic kidneys with numerous large cysts; nonobstructive stone in the left kidney measures 1.4 cm. Creatinine is normal. UA showed hematuria likely due to above.   - Follow-up with Urology outpatient        Mild surgical site pain only now. Taking Oxycodone rarely.  Appetite near normal.  Blood sugar 120 in the morning and 170 later in the day.  Bowel movements have returned to normal after previous ileus.  Patient noted on scan to have a 1.4 cm kidney stone in the left.  Denies hematuria issues.  Not having pain in the kidney area and stone that size unlikely to pass.  Statin therapy is  "on hold with elevated liver function tests related to previous gallbladder stone.      Additional ROS:   Constitutional, HEENT, Cardiovascular, Pulmonary, GI and , Neuro, MSK and Psych review of systems/symptoms are otherwise negative or unchanged from previous, except as noted above.      OBJECTIVE:  /70   Pulse 76   Temp 97.7  F (36.5  C) (Oral)   Resp 16   Ht 1.702 m (5' 7\")   Wt 75.8 kg (167 lb)   SpO2 98%   BMI 26.16 kg/m     Estimated body mass index is 26.16 kg/m  as calculated from the following:    Height as of this encounter: 1.702 m (5' 7\").    Weight as of this encounter: 75.8 kg (167 lb).     Neck: no adenopathy. Thyroid normal to palpation. No bruits  Pulm: Lungs clear to auscultation   CV: Regular rates and rhythm  GI: Soft, nondistended.  Minimal tenderness to palpation over scabs from laparoscopic surgical sites.  No surrounding increased warmth or erythema at the incision sites., Normal active bowel sounds, No hepatosplenomegaly or masses palpable  Ext: Peripheral pulses intact. Trace BLE ankle  edema.  Neuro: Normal strength and tone, sensory exam grossly normal    Assessment/Plan: (See plan discussion below for further details)  1. S/P cholecystectomy  Improving.  Liver function tests had been coming down.  Pain control stable.  Will try stopping oxycodone and change to Tylenol as needed    2. Hepatitis  Improving.  Patient had  CBD stone removed as above.  Recheck LFTs.  Remain off of statin therapy for now until  LFTs normalize  - Comprehensive metabolic panel    3. Diabetes mellitus type 2, insulin dependent (H)  Stable.  Continue insulin therapy as prior to admission now that appetite has returned.  Repeat diabetic labs in 2 months  - Hemoglobin A1c; Future  - Comprehensive metabolic panel; Future  - Lipid panel reflex to direct LDL Fasting; Future    4. Calculus of kidney  Patient asymptomatic with this.  Will observe for now    Plan discussion:  Try stopping Oxycodone   If " loose stools off that pain med, then reduce Miralax use  May use Tylenol ES 1-2 tabs up to 3 times a day as needed for pain  Labs as ordered  Remain off of Atorvastatin for now  Follow-up with surgeon as scheduled  Repeat fasting labs in 1 month          Alfredo Aragon MD  Internal Medicine Department  [facility]

## 2019-01-11 NOTE — PATIENT INSTRUCTIONS
Received a Approval for the Griseofulvin 125 mg    Quantity/Days: 30/30  Duration: 2 Months  10/10/18-12/09/18      Patients mother notified by phone.     Try Stopping Oxycodone   If loose stools off that pain med, then reduce Miralax use  May use Tylenol ES 1-2 tabs up to 3 times a day as needed for pain   labs as ordered  Remain off of Atorvastatin for now  Follow-up with surgeon as scheduled  Repeat fasting labs in 1 month

## 2019-01-12 DIAGNOSIS — E11.9 TYPE 2 DIABETES MELLITUS WITHOUT COMPLICATION, WITH LONG-TERM CURRENT USE OF INSULIN (H): ICD-10-CM

## 2019-01-12 DIAGNOSIS — Z79.4 TYPE 2 DIABETES MELLITUS WITHOUT COMPLICATION, WITH LONG-TERM CURRENT USE OF INSULIN (H): ICD-10-CM

## 2019-01-12 DIAGNOSIS — K75.9 HEPATITIS: Primary | ICD-10-CM

## 2019-01-12 DIAGNOSIS — Z96.652 STATUS POST TOTAL LEFT KNEE REPLACEMENT: ICD-10-CM

## 2019-01-12 LAB
ALBUMIN SERPL-MCNC: 2.9 G/DL (ref 3.4–5)
ALP SERPL-CCNC: 182 U/L (ref 40–150)
ALT SERPL W P-5'-P-CCNC: 83 U/L (ref 0–70)
ANION GAP SERPL CALCULATED.3IONS-SCNC: 7 MMOL/L (ref 3–14)
AST SERPL W P-5'-P-CCNC: 43 U/L (ref 0–45)
BILIRUB SERPL-MCNC: 0.8 MG/DL (ref 0.2–1.3)
BUN SERPL-MCNC: 15 MG/DL (ref 7–30)
CALCIUM SERPL-MCNC: 8.9 MG/DL (ref 8.5–10.1)
CHLORIDE SERPL-SCNC: 103 MMOL/L (ref 94–109)
CO2 SERPL-SCNC: 27 MMOL/L (ref 20–32)
CREAT SERPL-MCNC: 0.84 MG/DL (ref 0.66–1.25)
GFR SERPL CREATININE-BSD FRML MDRD: 82 ML/MIN/{1.73_M2}
GLUCOSE SERPL-MCNC: 202 MG/DL (ref 70–99)
POTASSIUM SERPL-SCNC: 4.4 MMOL/L (ref 3.4–5.3)
PROT SERPL-MCNC: 7.3 G/DL (ref 6.8–8.8)
SODIUM SERPL-SCNC: 137 MMOL/L (ref 133–144)

## 2019-01-13 PROBLEM — N20.0 CALCULUS OF KIDNEY: Status: ACTIVE | Noted: 2019-01-01

## 2019-01-15 DIAGNOSIS — Z53.9 DIAGNOSIS NOT YET DEFINED: Primary | ICD-10-CM

## 2019-01-15 PROCEDURE — G0179 MD RECERTIFICATION HHA PT: HCPCS | Performed by: INTERNAL MEDICINE

## 2019-01-29 ENCOUNTER — OFFICE VISIT (OUTPATIENT)
Dept: SURGERY | Facility: CLINIC | Age: 79
End: 2019-01-29
Payer: COMMERCIAL

## 2019-01-29 ENCOUNTER — HOSPITAL ENCOUNTER (OUTPATIENT)
Dept: LAB | Facility: CLINIC | Age: 79
End: 2019-01-29
Attending: INTERNAL MEDICINE
Payer: COMMERCIAL

## 2019-01-29 DIAGNOSIS — Z09 SURGERY FOLLOW-UP EXAMINATION: Primary | ICD-10-CM

## 2019-01-29 LAB
ALBUMIN SERPL-MCNC: 3.1 G/DL (ref 3.4–5)
ALP SERPL-CCNC: 133 U/L (ref 40–150)
ALT SERPL W P-5'-P-CCNC: 22 U/L (ref 0–70)
AST SERPL W P-5'-P-CCNC: 21 U/L (ref 0–45)
BILIRUB DIRECT SERPL-MCNC: 0.2 MG/DL (ref 0–0.2)
BILIRUB SERPL-MCNC: 0.6 MG/DL (ref 0.2–1.3)
PROT SERPL-MCNC: 7.2 G/DL (ref 6.8–8.8)

## 2019-01-29 PROCEDURE — 36415 COLL VENOUS BLD VENIPUNCTURE: CPT | Performed by: SURGERY

## 2019-01-29 PROCEDURE — 80076 HEPATIC FUNCTION PANEL: CPT | Performed by: SURGERY

## 2019-01-29 PROCEDURE — 99024 POSTOP FOLLOW-UP VISIT: CPT | Performed by: SURGERY

## 2019-01-29 NOTE — PROGRESS NOTES
Surgery Postoperative Note    S:Rabia returns for follow up one month s/p difficult laparoscopic cholecystectomy for gangrenous cholecystitis and s/p ERCP. His hospital course was prolonged due to post operative ileus. He reports he is overall doing fine. He occasionally has a pain in his right mid abdomen at night. His abdomen will feel hard on this spot and hurt. During the day he has no pain. He is eating without nausea. He was seen with an interpretor and his son today. His son is wondering when he can resume is statin.      Abdomen: incisions well healed. No erythema or induration.      Pathology: acute and chronic cholecystitis    A/P  Rabia Reed is recovering from a difficult laparoscopic cholecystectomy. His LFTs were recently checked on 1/11/2019 and revealed an improving alk phos and ALT. I will re check these today and have him follow up with Dr. Aragon regarding resumption of his statin. We discussed possible etiologies of the right abdominal pain at night - may be related to constipation - I recommend he continue miralax with goal of a soft formed stool each day. If the pain persists we could obtain imaging with a CT but at this point I think this would be low yield.  I advised him to slowly return to regular activity. I expect he will make a complete recovery without issues. We reviewed his pathology today as well. He will follow up with me PRN going forward.     Thank you for the opportunity to help in his care.    Melia Bentley  [facility]  [phone]    Please route or send letter to:  Primary Care Provider (PCP) and Referring Provider

## 2019-03-13 DIAGNOSIS — Z53.9 DIAGNOSIS NOT YET DEFINED: Primary | ICD-10-CM

## 2019-03-13 PROCEDURE — G0179 MD RECERTIFICATION HHA PT: HCPCS | Performed by: INTERNAL MEDICINE

## 2019-04-10 DIAGNOSIS — M19.90 ARTHRITIS: Primary | ICD-10-CM

## 2019-04-10 PROBLEM — K81.0 CHOLECYSTITIS, ACUTE: Status: RESOLVED | Noted: 2018-12-30 | Resolved: 2019-04-10

## 2019-04-10 RX ORDER — ACETAMINOPHEN 325 MG/1
650 TABLET ORAL EVERY 6 HOURS PRN
Qty: 100 TABLET | Refills: 3 | Status: SHIPPED | OUTPATIENT
Start: 2019-04-10

## 2019-04-10 NOTE — TELEPHONE ENCOUNTER
Requested Prescriptions   Pending Prescriptions Disp Refills     acetaminophen (TYLENOL) 325 MG tablet 30 tablet 0     Sig: Take 2 tablets (650 mg) by mouth every 6 hours as needed for mild pain       There is no refill protocol information for this order        Last Written Prescription Date: 1/05/2019  Last Fill Quantity: 30,  # refills: 0   Last Office Visit: 1/11/2019   Future Office Visit:

## 2019-04-15 DIAGNOSIS — E11.9 DIABETES MELLITUS TYPE 2, INSULIN DEPENDENT (H): ICD-10-CM

## 2019-04-15 DIAGNOSIS — Z79.4 DIABETES MELLITUS TYPE 2, INSULIN DEPENDENT (H): ICD-10-CM

## 2019-04-15 LAB
ALBUMIN SERPL-MCNC: 3.4 G/DL (ref 3.4–5)
ALP SERPL-CCNC: 119 U/L (ref 40–150)
ALT SERPL W P-5'-P-CCNC: 20 U/L (ref 0–70)
ANION GAP SERPL CALCULATED.3IONS-SCNC: 5 MMOL/L (ref 3–14)
AST SERPL W P-5'-P-CCNC: 24 U/L (ref 0–45)
BILIRUB SERPL-MCNC: 0.4 MG/DL (ref 0.2–1.3)
BUN SERPL-MCNC: 21 MG/DL (ref 7–30)
CALCIUM SERPL-MCNC: 8.9 MG/DL (ref 8.5–10.1)
CHLORIDE SERPL-SCNC: 107 MMOL/L (ref 94–109)
CHOLEST SERPL-MCNC: 250 MG/DL
CO2 SERPL-SCNC: 31 MMOL/L (ref 20–32)
CREAT SERPL-MCNC: 0.97 MG/DL (ref 0.66–1.25)
GFR SERPL CREATININE-BSD FRML MDRD: 73 ML/MIN/{1.73_M2}
GLUCOSE SERPL-MCNC: 127 MG/DL (ref 70–99)
HBA1C MFR BLD: 7.9 % (ref 0–5.6)
HDLC SERPL-MCNC: 81 MG/DL
LDLC SERPL CALC-MCNC: 132 MG/DL
NONHDLC SERPL-MCNC: 169 MG/DL
POTASSIUM SERPL-SCNC: 4.8 MMOL/L (ref 3.4–5.3)
PROT SERPL-MCNC: 7.3 G/DL (ref 6.8–8.8)
SODIUM SERPL-SCNC: 143 MMOL/L (ref 133–144)
TRIGL SERPL-MCNC: 184 MG/DL

## 2019-04-15 PROCEDURE — 80061 LIPID PANEL: CPT | Performed by: INTERNAL MEDICINE

## 2019-04-15 PROCEDURE — 80053 COMPREHEN METABOLIC PANEL: CPT | Performed by: INTERNAL MEDICINE

## 2019-04-15 PROCEDURE — 83036 HEMOGLOBIN GLYCOSYLATED A1C: CPT | Performed by: INTERNAL MEDICINE

## 2019-04-15 PROCEDURE — 36415 COLL VENOUS BLD VENIPUNCTURE: CPT | Performed by: INTERNAL MEDICINE

## 2019-04-16 DIAGNOSIS — N40.1 BENIGN PROSTATIC HYPERPLASIA WITH LOWER URINARY TRACT SYMPTOMS, SYMPTOM DETAILS UNSPECIFIED: Primary | ICD-10-CM

## 2019-04-16 NOTE — TELEPHONE ENCOUNTER
"Requested Prescriptions   Pending Prescriptions Disp Refills     finasteride (PROSCAR) 5 MG tablet 90 tablet 3     Sig: Take 1 tablet (5 mg) by mouth daily       Alpha Blockers Failed - 4/16/2019  9:27 AM        Failed - Medication is active on med list        Passed - Blood pressure under 140/90 in past 12 months     BP Readings from Last 3 Encounters:   01/11/19 132/70   01/06/19 152/77   10/25/18 112/70                 Passed - Recent (12 mo) or future (30 days) visit within the authorizing provider's specialty     Patient had office visit in the last 12 months or has a visit in the next 30 days with authorizing provider or within the authorizing provider's specialty.  See \"Patient Info\" tab in inbasket, or \"Choose Columns\" in Meds & Orders section of the refill encounter.              Passed - Patient does not have Tadalafil, Vardenafil, or Sildenafil on their medication list        Passed - Patient is 18 years of age or older        "

## 2019-04-17 RX ORDER — FINASTERIDE 5 MG/1
5 TABLET, FILM COATED ORAL DAILY
Qty: 90 TABLET | Refills: 3 | Status: SHIPPED | OUTPATIENT
Start: 2019-04-17 | End: 2020-04-21

## 2019-05-22 DIAGNOSIS — Z79.4 TYPE 2 DIABETES MELLITUS WITHOUT COMPLICATION, WITH LONG-TERM CURRENT USE OF INSULIN (H): ICD-10-CM

## 2019-05-22 DIAGNOSIS — E11.9 TYPE 2 DIABETES MELLITUS WITHOUT COMPLICATION, WITH LONG-TERM CURRENT USE OF INSULIN (H): ICD-10-CM

## 2019-05-22 NOTE — TELEPHONE ENCOUNTER
"Requested Prescriptions   Pending Prescriptions Disp Refills     ONETOUCH ULTRA test strip [Pharmacy Med Name: OneTouch Ultra Blue In Vitro Strip] 200 each 1     Sig: Use to test 3 times daily       Diabetic Supplies Protocol Passed - 5/22/2019  3:05 PM        Passed - Medication is active on med list        Passed - Patient is 18 years of age or older        Passed - Recent (6 mo) or future (30 days) visit within the authorizing provider's specialty     Patient had office visit in the last 6 months or has a visit in the next 30 days with authorizing provider.  See \"Patient Info\" tab in inbasket, or \"Choose Columns\" in Meds & Orders section of the refill encounter.            .  "

## 2019-06-07 ENCOUNTER — PATIENT OUTREACH (OUTPATIENT)
Dept: GERIATRIC MEDICINE | Facility: CLINIC | Age: 79
End: 2019-06-07

## 2019-06-07 NOTE — PROGRESS NOTES
[facility] Care Coordination Contact      [facility] Six-Month Telephone Assessment    6 month telephone assessment completed on 6/7/19.  Called son to complete 6 month assessment.    ER visits: No  Hospitalizations: Yes -  [facility]  TCU stays: No  Significant health status changes: Son reports that member has been doing well. Reports pain has been well managed. Has been having eye injections every 5 weeks for macular degeneration and son reports that the injections have been helping and reports an improvement in his vision.  Falls/Injuries: No  ADL/IADL changes: No  Changes in services: No    Caregiver Assessment follow up:  NA    Goals: See POC in chart for goal progress documentation.      Will see member in 6 months for an annual health risk assessment.   Encouraged member to call CC with any questions or concerns in the meantime.     ANASTACIA Velázquez  [facility]  [phone]

## 2019-07-01 ENCOUNTER — TELEPHONE (OUTPATIENT)
Dept: INTERNAL MEDICINE | Facility: CLINIC | Age: 79
End: 2019-07-01

## 2019-07-01 ENCOUNTER — MEDICAL CORRESPONDENCE (OUTPATIENT)
Dept: HEALTH INFORMATION MANAGEMENT | Facility: CLINIC | Age: 79
End: 2019-07-01

## 2019-07-01 DIAGNOSIS — Z53.9 DIAGNOSIS NOT YET DEFINED: Primary | ICD-10-CM

## 2019-07-01 PROCEDURE — G0179 MD RECERTIFICATION HHA PT: HCPCS | Performed by: INTERNAL MEDICINE

## 2019-07-01 NOTE — TELEPHONE ENCOUNTER
Home Health Certification and Plan of Care    Form placed on 's desk to review, complete, and sign.  When through fax/mail/call back to  [phone]

## 2019-07-15 DIAGNOSIS — M19.91 PRIMARY OSTEOARTHRITIS, UNSPECIFIED SITE: ICD-10-CM

## 2019-07-17 RX ORDER — CELECOXIB 200 MG/1
CAPSULE ORAL
Qty: 90 CAPSULE | Refills: 1 | Status: SHIPPED | OUTPATIENT
Start: 2019-07-17 | End: 2020-03-21

## 2019-08-06 DIAGNOSIS — Z53.9 DIAGNOSIS NOT YET DEFINED: Primary | ICD-10-CM

## 2019-08-06 PROCEDURE — G0179 MD RECERTIFICATION HHA PT: HCPCS | Performed by: INTERNAL MEDICINE

## 2019-08-15 NOTE — TELEPHONE ENCOUNTER
Duration Of Freeze Thaw-Cycle (Seconds): 0 done   Render Post-Care Instructions In Note?: yes Post-Care Instructions: Patient was instructed to avoid picking at any of the treated lesions. Should any lesions treated today not be resolved within 8 weeks or if not certain, then return to clinic for re-evaluation. Consent: Discussed that these are a result of cumulative sun exposure. Verbal and written consent was obtained, and risks were reviewed prior to procedure today. Risks discussed include but are not limited to pain, crusting, scabbing, blistering, scarring, temporary or permanent darker or lighter pigmentary change, recurrence, incomplete resolution, and infection. Render Note In Bullet Format When Appropriate: No Detail Level: Detailed

## 2019-09-04 DIAGNOSIS — E11.9 TYPE 2 DIABETES MELLITUS WITHOUT COMPLICATION, WITH LONG-TERM CURRENT USE OF INSULIN (H): ICD-10-CM

## 2019-09-04 DIAGNOSIS — Z79.4 TYPE 2 DIABETES MELLITUS WITHOUT COMPLICATION, WITH LONG-TERM CURRENT USE OF INSULIN (H): ICD-10-CM

## 2019-09-04 NOTE — TELEPHONE ENCOUNTER
"Requested Prescriptions   Pending Prescriptions Disp Refills     ONETOUCH ULTRA test strip [Pharmacy Med Name: OneTouch Ultra Blue In Vitro Strip] 200 each 0     Sig: TEST 3 TIMES DAILY       Diabetic Supplies Protocol Failed - 9/4/2019  5:04 PM        Failed - Recent (6 mo) or future (30 days) visit within the authorizing provider's specialty     Patient had office visit in the last 6 months or has a visit in the next 30 days with authorizing provider.  See \"Patient Info\" tab in inbasket, or \"Choose Columns\" in Meds & Orders section of the refill encounter.            Passed - Medication is active on med list        Passed - Patient is 18 years of age or older          Last Written Prescription Date:  5/22/82019  Last Fill Quantity: 200,  # refills: 1   Last office visit: 1/11/2019 with prescribing provider:  Alfredo Aragon     Future Office Visit:        Prescription approved per [facility] Refill Protocol.    Rosangela HOGUE RN, BSN, PHN      "

## 2019-09-06 ENCOUNTER — TELEPHONE (OUTPATIENT)
Dept: DERMATOLOGY | Facility: CLINIC | Age: 79
End: 2019-09-06

## 2019-09-06 NOTE — TELEPHONE ENCOUNTER
triamcinolone (KENALOG) 0.1% oint refill  request via pharmacy.  Not found on current med list.  Last fill: 5/11/18    Pharmacy on file.

## 2019-09-06 NOTE — TELEPHONE ENCOUNTER
Snail mail letter sent:    Dear Mr. Reed,      Many medications require routine follow-up with your Doctor.      At this time we ask that: You schedule a routine office visit with Viola Alejandra PA-C in Dermatology.     Your prescription: Cannot be refilled until the above noted follow up has been completed.      Thank you,    [facility] / Catia Handy RN-BSN-PHN

## 2019-09-06 NOTE — TELEPHONE ENCOUNTER
Called and LM for patient- informed patient his refill request was denied. LOV 6/20/2017, f/u visit required at this time.    Magdalena RN-BSN-PHN  [facility]  [phone]

## 2019-09-06 NOTE — LETTER
[facility]  [address] [address]  Phone: [phone]  Fax: [phone]    September 6, 2019      Rabia Reed                                                                                                                         [address] [address]            Dear Mr. Reed,      Many medications require routine follow-up with your Doctor.      At this time we ask that: You schedule a routine office visit with Viola Alejandra PA-C in Dermatology.     Your prescription: Cannot be refilled until the above noted follow up has been completed.      Thank you,    [facility] / Catia Handy RN-BSN-PHN

## 2019-09-16 DIAGNOSIS — Z79.4 TYPE 2 DIABETES MELLITUS WITHOUT COMPLICATION, WITH LONG-TERM CURRENT USE OF INSULIN (H): ICD-10-CM

## 2019-09-16 DIAGNOSIS — E11.9 TYPE 2 DIABETES MELLITUS WITHOUT COMPLICATION, WITH LONG-TERM CURRENT USE OF INSULIN (H): ICD-10-CM

## 2019-09-17 NOTE — TELEPHONE ENCOUNTER
"ONETOUCH ULTRA test strip  DUPLICATE---  See TE from 9/4/19, rx was ordered:        Requested Prescriptions   Pending Prescriptions Disp Refills     ONETOUCH ULTRA test strip [Pharmacy Med Name: OneTouch Ultra Blue In Vitro Strip] 200 each 0     Sig: TEST 3 TIMES DAILY       Diabetic Supplies Protocol Failed - 9/16/2019  6:28 PM        Failed - Recent (6 mo) or future (30 days) visit within the authorizing provider's specialty     Patient had office visit in the last 6 months or has a visit in the next 30 days with authorizing provider.  See \"Patient Info\" tab in inbasket, or \"Choose Columns\" in Meds & Orders section of the refill encounter.            Passed - Medication is active on med list        Passed - Patient is 18 years of age or older          "

## 2019-09-23 ENCOUNTER — DOCUMENTATION ONLY (OUTPATIENT)
Dept: LAB | Facility: CLINIC | Age: 79
End: 2019-09-23

## 2019-09-23 DIAGNOSIS — Z79.4 TYPE 2 DIABETES MELLITUS WITHOUT COMPLICATION, WITH LONG-TERM CURRENT USE OF INSULIN (H): Primary | ICD-10-CM

## 2019-09-23 DIAGNOSIS — E11.9 TYPE 2 DIABETES MELLITUS WITHOUT COMPLICATION, WITH LONG-TERM CURRENT USE OF INSULIN (H): Primary | ICD-10-CM

## 2019-09-23 DIAGNOSIS — E78.5 HYPERLIPIDEMIA LDL GOAL <100: ICD-10-CM

## 2019-09-25 DIAGNOSIS — E11.9 TYPE 2 DIABETES MELLITUS WITHOUT COMPLICATION, WITH LONG-TERM CURRENT USE OF INSULIN (H): ICD-10-CM

## 2019-09-25 DIAGNOSIS — E78.5 HYPERLIPIDEMIA LDL GOAL <100: ICD-10-CM

## 2019-09-25 DIAGNOSIS — Z79.4 TYPE 2 DIABETES MELLITUS WITHOUT COMPLICATION, WITH LONG-TERM CURRENT USE OF INSULIN (H): ICD-10-CM

## 2019-09-25 LAB
ALBUMIN SERPL-MCNC: 3.2 G/DL (ref 3.4–5)
ALP SERPL-CCNC: 126 U/L (ref 40–150)
ALT SERPL W P-5'-P-CCNC: 21 U/L (ref 0–70)
ANION GAP SERPL CALCULATED.3IONS-SCNC: 3 MMOL/L (ref 3–14)
AST SERPL W P-5'-P-CCNC: 18 U/L (ref 0–45)
BILIRUB SERPL-MCNC: 0.4 MG/DL (ref 0.2–1.3)
BUN SERPL-MCNC: 22 MG/DL (ref 7–30)
CALCIUM SERPL-MCNC: 8.2 MG/DL (ref 8.5–10.1)
CHLORIDE SERPL-SCNC: 109 MMOL/L (ref 94–109)
CHOLEST SERPL-MCNC: 221 MG/DL
CO2 SERPL-SCNC: 28 MMOL/L (ref 20–32)
CREAT SERPL-MCNC: 0.84 MG/DL (ref 0.66–1.25)
CREAT UR-MCNC: 65 MG/DL
GFR SERPL CREATININE-BSD FRML MDRD: 82 ML/MIN/{1.73_M2}
GLUCOSE SERPL-MCNC: 90 MG/DL (ref 70–99)
HBA1C MFR BLD: 8.4 % (ref 0–5.6)
HDLC SERPL-MCNC: 73 MG/DL
LDLC SERPL CALC-MCNC: 132 MG/DL
MICROALBUMIN UR-MCNC: 26 MG/L
MICROALBUMIN/CREAT UR: 40.22 MG/G CR (ref 0–17)
NONHDLC SERPL-MCNC: 148 MG/DL
POTASSIUM SERPL-SCNC: 3.7 MMOL/L (ref 3.4–5.3)
PROT SERPL-MCNC: 7 G/DL (ref 6.8–8.8)
SODIUM SERPL-SCNC: 140 MMOL/L (ref 133–144)
TRIGL SERPL-MCNC: 80 MG/DL
TSH SERPL DL<=0.005 MIU/L-ACNC: 3.85 MU/L (ref 0.4–4)

## 2019-09-25 PROCEDURE — 84443 ASSAY THYROID STIM HORMONE: CPT | Performed by: INTERNAL MEDICINE

## 2019-09-25 PROCEDURE — 82043 UR ALBUMIN QUANTITATIVE: CPT | Performed by: INTERNAL MEDICINE

## 2019-09-25 PROCEDURE — 80061 LIPID PANEL: CPT | Performed by: INTERNAL MEDICINE

## 2019-09-25 PROCEDURE — 83036 HEMOGLOBIN GLYCOSYLATED A1C: CPT | Performed by: INTERNAL MEDICINE

## 2019-09-25 PROCEDURE — 80053 COMPREHEN METABOLIC PANEL: CPT | Performed by: INTERNAL MEDICINE

## 2019-09-25 PROCEDURE — 36415 COLL VENOUS BLD VENIPUNCTURE: CPT | Performed by: INTERNAL MEDICINE

## 2019-10-01 ENCOUNTER — PATIENT OUTREACH (OUTPATIENT)
Dept: GERIATRIC MEDICINE | Facility: CLINIC | Age: 79
End: 2019-10-01

## 2019-10-01 NOTE — PROGRESS NOTES
[facility] Care Coordination Contact    Called adult son Brandan, to schedule annual HRA home visit. Left a message requesting a return call to schedule HRA.   ANASTACIA Velázquez  [facility]  [phone]

## 2019-10-03 ENCOUNTER — HEALTH MAINTENANCE LETTER (OUTPATIENT)
Age: 79
End: 2019-10-03

## 2019-10-03 NOTE — PROGRESS NOTES
[facility] Care Coordination Contact    Received confirmation back from member's son, Brandan, stating 10am on the 15th works.  Called Zee Chaves and scheduled the .  ANASTACIA Velázquez  [facility]  [phone]

## 2019-10-03 NOTE — PROGRESS NOTES
[facility] Care Coordination Contact    Received a voice mail message from member's son, Brandan, stating offering this Care Coordinator 4 different dates for the assessment. This Care Coordinator selected the 10/15/19.  Called Brandan back and left a message inquiring if 10am on the 15th would work, and to let this Care Coordinator know so an  can be arranged.  ANASTACIA Velázquez  [facility]  [phone]

## 2019-10-07 ENCOUNTER — OFFICE VISIT (OUTPATIENT)
Dept: INTERNAL MEDICINE | Facility: CLINIC | Age: 79
End: 2019-10-07
Payer: COMMERCIAL

## 2019-10-07 VITALS
WEIGHT: 183 LBS | RESPIRATION RATE: 16 BRPM | BODY MASS INDEX: 28.66 KG/M2 | SYSTOLIC BLOOD PRESSURE: 130 MMHG | DIASTOLIC BLOOD PRESSURE: 72 MMHG | HEART RATE: 87 BPM | OXYGEN SATURATION: 95 % | TEMPERATURE: 98.3 F

## 2019-10-07 DIAGNOSIS — E78.5 HYPERLIPIDEMIA LDL GOAL <100: ICD-10-CM

## 2019-10-07 DIAGNOSIS — Z79.4 TYPE 2 DIABETES MELLITUS WITHOUT COMPLICATION, WITH LONG-TERM CURRENT USE OF INSULIN (H): ICD-10-CM

## 2019-10-07 DIAGNOSIS — E11.9 TYPE 2 DIABETES MELLITUS WITHOUT COMPLICATION, WITH LONG-TERM CURRENT USE OF INSULIN (H): ICD-10-CM

## 2019-10-07 PROCEDURE — 99207 C PAF COMPLETED  NO CHARGE: CPT | Mod: 25 | Performed by: INTERNAL MEDICINE

## 2019-10-07 PROCEDURE — 99207 C FOOT EXAM  NO CHARGE: CPT | Mod: 59 | Performed by: INTERNAL MEDICINE

## 2019-10-07 PROCEDURE — 99214 OFFICE O/P EST MOD 30 MIN: CPT | Performed by: INTERNAL MEDICINE

## 2019-10-07 RX ORDER — ATORVASTATIN CALCIUM 40 MG/1
40 TABLET, FILM COATED ORAL DAILY
Qty: 30 TABLET | Refills: 11 | Status: SHIPPED | OUTPATIENT
Start: 2019-10-07 | End: 2020-09-20

## 2019-10-07 NOTE — PATIENT INSTRUCTIONS
Reduce Bedtime Novolog insulin to 8 units (currently 10 units) to reduce risk of low blood sugars in middle of night  Restart Atorvastatin/ Lipitor for cholesterol but at a lower 40mg tab, 1 tab daily   Fasting lab test in 1 month for cholesterol, liver   Nonfasting A1C  Diabetes in mid January 2020  Continue other medications

## 2019-10-07 NOTE — PROGRESS NOTES
Subjective     Raiba Reed is a 79 year old male who presents to clinic today for the following health issues:    HPI   Diabetes Follow-up      How often are you checking your blood sugar? Three times daily    What time of day are you checking your blood sugars (select all that apply)?  Before meals and at bedtime    Have you had any blood sugars above 200?  Yes, some times during special occasions    Have you had any blood sugars below 70?  Yes, at bedtime 60's to 70's. None in the past 1 week. Occurred at 2 AM    What symptoms do you notice when your blood sugar is low?  no    What concerns do you have today about your diabetes? None and Blood sugar is often over 200, Below 70's at bedtime     Do you have any of these symptoms? (Select all that apply)  No numbness or tingling in feet.  No redness, sores or blisters on feet.  No complaints of excessive thirst.  No reports of blurry vision.  No significant changes to weight.     Have you had a diabetic eye exam in the last 12 months? Yes- Date of last eye exam: 11/30/2018    BP Readings from Last 2 Encounters:   01/11/19 132/70   01/06/19 152/77     Hemoglobin A1C (%)   Date Value   09/25/2019 8.4 (H)   04/15/2019 7.9 (H)     LDL Cholesterol Calculated (mg/dL)   Date Value   09/25/2019 132 (H)   04/15/2019 132 (H)       Diabetes Management Resources  Hyperlipidemia Follow-Up      Are you having any of the following symptoms? (Select all that apply)  No complaints of shortness of breath, chest pain or pressure.  No increased sweating or nausea with activity.  No left-sided neck or arm pain.  No complaints of pain in calves when walking 1-2 blocks.    Are you regularly taking any medication or supplement to lower your cholesterol?   No, Patient is not taking due to Provider's request    Are you having muscle aches or other side effects that you think could be caused by your cholesterol lowering medication?  N/A      How many servings of fruits and vegetables do you eat  daily?  4 or more    On average, how many sweetened beverages do you drink each day (soda, juice, sweet tea, etc)?   0    How many days per week do you miss taking your medication? 0      Due to language barrier, an  was present during the history-taking and subsequent discussion (and for part of the physical exam) with this patient.    Pt's past medical history, family history, habits, medications and allergies were reviewed with the patient today.  See snap shot for  HCM status. Most recent lab results reviewed with pt. Problem list and histories reviewed & adjusted, as indicated.  Additional history as below:    Walking outside or in house for 30 min daily and also some stretching exercises. No chest pain or shortness of breath with that.  Stable dizziness. Using a cane. No falls  Rare low sugar at 2 AM. Has been taking Novolog 10 units at bedtime instead of prescribed 6 units   Lipitor had been stopped when had GB stone issue last December but was never restarted by pt. No side effects with med prior to stoppage       Lab Results   Component Value Date    GLC 90 09/25/2019     Lab Results   Component Value Date    A1C 8.4 09/25/2019     Lab Results   Component Value Date    CHOL 221 09/25/2019     Lab Results   Component Value Date     09/25/2019     Lab Results   Component Value Date    HDL 73 09/25/2019     Lab Results   Component Value Date    TRIG 80 09/25/2019     Lab Results   Component Value Date    CR 0.84 09/25/2019     Lab Results   Component Value Date    ALT 21 09/25/2019     Lab Results   Component Value Date    AST 18 09/25/2019     Lab Results   Component Value Date    MICROL 26 09/25/2019     Lab Results   Component Value Date    TSH 3.85 09/25/2019       Additional ROS:   Constitutional, HEENT, Cardiovascular, Pulmonary, GI and , Neuro, MSK and Psych review of systems/symptoms are otherwise negative or unchanged from previous, except as noted above.      OBJECTIVE:  /72  "  Pulse 87   Temp 98.3  F (36.8  C) (Temporal)   Resp 16   Wt 83 kg (183 lb)   SpO2 95%   BMI 28.66 kg/m     Estimated body mass index is 28.66 kg/m  as calculated from the following:    Height as of 1/11/19: 1.702 m (5' 7\").    Weight as of this encounter: 83 kg (183 lb).     Neck: no adenopathy. Thyroid normal to palpation. No bruits  Pulm: Lungs clear to auscultation   CV: Regular rates and rhythm  GI: Soft, nontender, Normal active bowel sounds, No hepatosplenomegaly or masses palpable  Ext: Peripheral pulses intact. No edema.  Neuro: Normal strength and tone, sensory exam grossly normal. Normal DM foot exam    Assessment/Plan: (See plan discussion below for further details)    1. Type 2 diabetes mellitus without complication, with long-term current use of insulin (H)   Reasonable control for age. Will adjust Novolog to prevent late night low sugar. Future lab as ordered  - insulin aspart prot & aspart (NOVOLOG MIX 70/30 FLEXPEN) (70-30) 100 UNIT/ML pen; 30 units before breakfast and 20 units before supper.  Dispense: 45 mL; Refill: 3  - Hemoglobin A1c; Future  - insulin aspart (NOVOLOG FLEXPEN) 100 UNIT/ML pen; Inject 8 Units Subcutaneous At Bedtime  Dispense: 15 mL; Refill: 3  - FOOT EXAM    2. Hyperlipidemia LDL goal <100   Needs to restart statin therapy. Recheck labs 1 month  - atorvastatin (LIPITOR) 40 MG tablet; Take 1 tablet (40 mg) by mouth daily  Dispense: 30 tablet; Refill: 11  - Lipid panel reflex to direct LDL Fasting; Future  - Hepatic panel; Future  - CK total; Future      Plan discussion:    Reduce Bedtime Novolog insulin to 8 units (currently 10 units) to reduce risk of low blood sugars in middle of night  Restart Atorvastatin/ Lipitor for cholesterol but at a lower 40mg tab, 1 tab daily   Fasting lab test in 1 month for cholesterol, liver   Nonfasting A1C  Diabetes in mid January 2020  Continue other medications       Alfredo Aragon MD  Internal Medicine Department  [facility]    (Chart documentation was completed, in part, with [employer] voice-recognition software. Even though reviewed, some grammatical, spelling, and word errors may remain.)

## 2019-10-15 ENCOUNTER — PATIENT OUTREACH (OUTPATIENT)
Dept: GERIATRIC MEDICINE | Facility: CLINIC | Age: 79
End: 2019-10-15

## 2019-10-15 ASSESSMENT — PATIENT HEALTH QUESTIONNAIRE - PHQ9: SUM OF ALL RESPONSES TO PHQ QUESTIONS 1-9: 7

## 2019-10-15 NOTE — PROGRESS NOTES
[facility] Care Coordination Contact    [facility] Home Visit Assessment     Home visit for Health Risk Assessment with Rabia Reed completed on October 15, 2019    Type of residence:: Private home - stairs  Current living arrangement:: I live in a private home with family.    Assessment completed with:: Patient, Care Team Member, Children(: David Ambrocio and Son: Brandan Reed)    Current Care Plan  Member currently receiving the following home care services:  None   Member currently receiving the following community resources: PCA and nutritional supplements.    Health Issues: Member shared that he continues to be in daily pain in his low back, shoulders/neck, and chronic headaches. Member has also been to the pain clinic with out any improvement in his pain. 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  Member reports that this tumor caused hearing loss and dizziness. Member does have hearing aides and elects not to wear them as loud noises only cause more headaches. Member reports that he has very debilitating headaches, where he is not able to stay focused to do day to day tasks, will cause him to isolate due to noise and light, and makes it difficult to sleep. Reports that his sleeping is poor again, reporting that he will only sleep 2 hours at a time again. Inquired about the tremor and member reports that they are back, and were obvious to this .    Medication Review  Medication reconciliation completed in Epic: Yes  Medication set-up & administration: Family/informal caregiver sets up weekly.  Family caregiver administers medications.  Medication Risk Assessment Medication (1 or more, place referral to MTM): N/A: No risk factors identified  MTM Referral Placed: No: No risk factors idenified    Mental/Behavioral Health   Depression Screening: See PHQ assessment flowsheet.   Mental health DX:: No      Mental  Health Services: None: No further intervention needed at this time.    Falls Assessment:   Fallen 2 or more times in the past year?: No   Any fall with injury in the past year?: No    ADL/IADL Dependencies:   Dependent ADLs:: Ambulation-walker, Bathing, Dressing, Grooming, Transfers, Toileting  Dependent IADLs:: Cleaning, Cooking, Laundry, Shopping, Meal Preparation, Medication Management, Money Management, Transportation    [facility] sponsored benefits: Shared information re: Silver Sneakers/gym memberships, ASA, Calcium +D.    PCA Assessment completed at visit: Yes.  Assessment indicated no change. Assessment indicated 14 units per day.    Elderly Waiver Eligibility: Yes-will continue on EW    Care Plan & Recommendations: Member wants to continue to live independently with his family. Wants his son to continue to be his paid PCA, and only wants family as his care giver. She shared that he does continue to drink the glucerna and shared that he drinks a couple of cans per day. He shared that he doesn't want to attend adult day care, but is aware that this could help to increase his socialization.    See [facility] for detailed assessment information.    Follow-Up Plan: Member informed of future contact, plan to f/u with member with a 6 month telephone assessment.  Contact information shared with member and family, encouraged member to call with any questions or concerns at any time.    [place] care continuum providers: Please refer to Health Care Home on the Epic Problem List to view this patient's [facility] Care Plan Summary.    ANASTACIA Velázquez  [facility]  [phone]

## 2019-10-16 ENCOUNTER — PATIENT OUTREACH (OUTPATIENT)
Dept: GERIATRIC MEDICINE | Facility: CLINIC | Age: 79
End: 2019-10-16

## 2019-10-16 NOTE — LETTER
October 16, 2019    Important [employer] Information    PIYUSH BOND  [address] [address]  Your Care Plan  Dear Piyush,  When we spoke recently, I promised to send you a Care Plan. The plan enclosed is a summary of our discussion. It includes the steps we agreed would help you meet your health goals. In addition, I can help you with:  [id number]  This program is available to members who need a ride to medical and dental visits. To schedule a ride, call [phone] or [phone] (toll free). TTY/TTD: 711. You can call 8 a.m. to 8 p.m. Seven days a week. Access to a representative may be limited at times.    Trulia   The Trulia program empowers you to improve your health through education and exercise. To learn more, visit [employer], or call "[employer]"er Service at [phone] (toll free) (TTY:711) from 7 a.m. - 7 p.m. Central Time, Monday-Friday.  Health Care Directive   This form helps you outline your health care wishes. You can request a form from me and I will answer any questions you have before you discuss it with your doctor.   Annual Physical  Take a key step on your path to good health and set up an annual physical at your clinic.  Questions?  Call me at [phone] Monday-Friday between 8am and 5pm.  TTY/TTD: 711. As we discussed, I plan to be in touch with you again in 6 months to follow up via phone.  Sincerely,    ANASTACIA Velázquez    E-mail: [email or web address]  Phone: [phone]    Care Manager  [employer]          cc: member records                    Civil Rights Notice  Discrimination is against the law. [employer] does not discriminate on the basis of any of the following:    Race    Color    National Origin    Creed    Orthodoxy    Age    Public Assistance Status    Receipt of Health Care Services    Disability (including physical or mental impairment)    Sex (including sex stereotypes and gender identity)    Marital  Status    Political Beliefs    Medical Condition    Genetic Information    Sexual Orientation    Claims Experience    Medical History    Health Status    Auxiliary Aids and Services:  [employer] provides auxiliary aids and services, like qualified interpreters or information in accessible formats, free of charge and in a timely manner, to ensure an equal opportunity to participate in our health care programs. Contact [employer] Customer Service at [employer]/contact medicaid or call [phone] (toll free); TTY:711 or at [employer]/contactmedicaid.    Language Assistance Services:  [employer] provides translated documents and spoken language interpreting, free of charge and in a timely manner, when language assistance services are necessary to ensure limited English speakers have meaningful access to our information and services. Contact [employer] at [phone] (toll free); TTY: 598 or [email or web address]/contactmedicaid.     Civil Rights Complaints  You have the right to file a discrimination complaint if you believe you were treated in a discriminatory way by [employer]. You may contact any of the following four agencies directly to file a discrimination complaint.    U.S. Department of Health and Human Services  Office for Civil Rights (OCR)  You have the right to file a complaint with the OCR, a federal agency, if you believe you have been discriminated against because of any of the following:    Race    Disability    Color    Sex    National Origin    Age      Contact the OCR directly to file a complaint:         Director         U.S. Department of Health and Human Services  Office for Civil Rights         [address] [place] [address], [place] [address]         [phone] (Voice)         [phone] (TDD)         Complaint Portal - https://ocrportal.hhs.gov/ocr/portal/lobby.jsf     [place] Department of Human Rights ([facility])  In [place], you have the right to file a complaint with  the MDHR if you believe you have been discriminated against because of any of the following:      Race    Color    National Origin    Judaism    Creed    Sex    Sexual Orientation    Marital Status    Public Assistance Status    Disability    Contact the MDHR directly to file a complaint:         [place] Department of Human Rights         [facility], [address] [place] [address]         [phone] (voice)          [phone] (toll free)         711 or [phone] ([place] Relay)         [phone] (Fax)         [email or web address] (Email)     [place] Department of Human Services (DHS)  You have the right to file a complaint with [facility] if you believe you have been discriminated against in our health care programs because of any of the following:    Race    Color    National Origin    Creed    Judaism    Age    Public Assistance Status    Receipt of Health Care Services    Disability (including physical or mental impairment)    Sex (including sex stereotypes and gender identity)    Marital Status    Political Beliefs    Medical Condition    Genetic Information    Sexual Orientation    Claims Experience    Medical History    Health Status    Complaints must be in writing and filed within 180 days of the date you discovered the alleged discrimination. The complaint must contain your name and address and describe the discrimination you are complaining about. After we get your complaint, we will review it and notify you in writing about whether we have authority to investigate. If we do, we will investigate the complaint.      [facility] will notify you in writing of the investigation s outcome. You have a right to appeal the outcome if you disagree with the decision. To appeal, you must send a written request to have [facility] review the investigation outcome period. Be brief and state why you disagree with the decision. Include additional information you think is important.      If you file a  complaint in this way, the people who work for the agency named in the complaint cannot retaliate against you. This means they cannot punish you in any way for filing a complaint. Filing a complaint in this way does not stop you from seeking out other legal or administration actions.     Contact DHS directly to file a discrimination complaint:        Civil Rights Coordinator        [facility] Department of Human Services        Equal Opportunity and Access Division        [address] [address]        [place] [address] [phone] (voice) or use your preferred relay service     [employer] Complaint Notice   You have the right to file a complaint with [employer] if you believe you have been discriminated against because of any of the following:       Medical condition    Health status    Receipt of health care services    Claims experience    Medical history    Genetic information    Disability (including mental or physical impairment)    Marital status    Age    Sex (including sex stereotypes and gender identity)    Sexual orientation    National origin    Race    Color    Mandaeism    Creed    Public assistance status    Political beliefs    You can file a complaint and ask for help in filing a complaint in person or by mail, phone, fax, or email at:     [employer] Civil Rights Coordinator  [facility]  [address], [place] [address] [phone] (voice and fax) or TTA:525  Email: [email or web address]    American Indians can continue to use [facility] and [facility] ([facility]) clinics. We will not require prior approval or impose any conditions for you to get services at these clinics. For elders age 65 years and older this includes Elderly Waiver (EW) services accessed through the [place]. If a doctor or other provider in a [facility] or [facility] clinic refers you to a provider in our network, we will not require you to see your primary care provider prior to the referral.    For  accessible formats of this publication or assistance with equal or access to our services, visit [email or web address], or call [phone] (toll free) or use your preferred relay service.

## 2019-10-16 NOTE — PROGRESS NOTES
[facility] Care Coordination Contact    Received after visit chart from care coordinator.  Completed following tasks: Mailed copy of care plan to client, Updated services in access and Submitted referrals/auths for Glucerna and PCA  Chart was returned to CC.   , Provider Signature - Full Care Plan:  Member indicates that they would like their POC shared with the following EW providers:  [facility].  Letter and full POC faxed to providers for signature.    , [facility]:  Faxed completed PCA assessment to PCA Agency and mailed copies to member.  Faxed MD Communication to PCP.  Emailed referral form for auth to [employer].     and Order placed with [employer] (p: [phone]; f: [phone]) for 2 cans of glucerna/day.  Order placed on 10/16/19. Access updated.  As required, authorization submitted to health plan.    Rubina Chapa  Care Management Specialist  [facility]  [phone]

## 2019-10-29 DIAGNOSIS — Z79.4 TYPE 2 DIABETES MELLITUS WITHOUT COMPLICATION, WITH LONG-TERM CURRENT USE OF INSULIN (H): ICD-10-CM

## 2019-10-29 DIAGNOSIS — E11.9 TYPE 2 DIABETES MELLITUS WITHOUT COMPLICATION, WITH LONG-TERM CURRENT USE OF INSULIN (H): ICD-10-CM

## 2019-10-29 RX ORDER — PEN NEEDLE, DIABETIC 32GX 5/32"
NEEDLE, DISPOSABLE MISCELLANEOUS
Qty: 100 EACH | Refills: 5 | Status: SHIPPED | OUTPATIENT
Start: 2019-10-29 | End: 2020-04-29

## 2019-10-29 NOTE — TELEPHONE ENCOUNTER
"Requested Prescriptions   Pending Prescriptions Disp Refills     ULTICARE MICRO 32G X 4 MM insulin pen needle [Pharmacy Med Name: UltiCare Micro Pen Needles Miscellaneous 32G X 4 MM] 100 each 10     Sig: test 3 times a day       Diabetic Supplies Protocol Passed - 10/29/2019  7:01 AM        Passed - Medication is active on med list        Passed - Patient is 18 years of age or older        Passed - Recent (6 mo) or future (30 days) visit within the authorizing provider's specialty     Patient had office visit in the last 6 months or has a visit in the next 30 days with authorizing provider.  See \"Patient Info\" tab in inbasket, or \"Choose Columns\" in Meds & Orders section of the refill encounter.              "

## 2019-10-29 NOTE — PROGRESS NOTES
[facility] Care Coordination Contact    Per Kym at [facility], glucerna was shipped on 10/22/19. CC notified.     Rabia Reed 1940 2 cans of glucerna/day shipped 10/22     Rubina Chapa  Care Management Specialist  [facility]  [phone]

## 2019-11-07 DIAGNOSIS — Z53.9 DIAGNOSIS NOT YET DEFINED: Primary | ICD-10-CM

## 2019-11-07 PROCEDURE — G0179 MD RECERTIFICATION HHA PT: HCPCS | Performed by: INTERNAL MEDICINE

## 2019-11-11 DIAGNOSIS — E78.5 HYPERLIPIDEMIA LDL GOAL <100: ICD-10-CM

## 2019-11-11 LAB
ALBUMIN SERPL-MCNC: 3.5 G/DL (ref 3.4–5)
ALP SERPL-CCNC: 138 U/L (ref 40–150)
ALT SERPL W P-5'-P-CCNC: 36 U/L (ref 0–70)
AST SERPL W P-5'-P-CCNC: 29 U/L (ref 0–45)
BILIRUB DIRECT SERPL-MCNC: 0.2 MG/DL (ref 0–0.2)
BILIRUB SERPL-MCNC: 0.6 MG/DL (ref 0.2–1.3)
CHOLEST SERPL-MCNC: 165 MG/DL
CK SERPL-CCNC: 161 U/L (ref 30–300)
HDLC SERPL-MCNC: 85 MG/DL
LDLC SERPL CALC-MCNC: 60 MG/DL
NONHDLC SERPL-MCNC: 80 MG/DL
PROT SERPL-MCNC: 7.5 G/DL (ref 6.8–8.8)
TRIGL SERPL-MCNC: 100 MG/DL

## 2019-11-11 PROCEDURE — 82550 ASSAY OF CK (CPK): CPT | Performed by: INTERNAL MEDICINE

## 2019-11-11 PROCEDURE — 80076 HEPATIC FUNCTION PANEL: CPT | Performed by: INTERNAL MEDICINE

## 2019-11-11 PROCEDURE — 36415 COLL VENOUS BLD VENIPUNCTURE: CPT | Performed by: INTERNAL MEDICINE

## 2019-11-11 PROCEDURE — 80061 LIPID PANEL: CPT | Performed by: INTERNAL MEDICINE

## 2019-11-20 NOTE — PROGRESS NOTES
[facility] Care Coordination Contact    2nd Attempt: Signed Letter not received from [facility], resent per process.   Mariza Parr  Case Management Specialist  [facility]  [phone]

## 2019-12-04 DIAGNOSIS — Z79.4 TYPE 2 DIABETES MELLITUS WITHOUT COMPLICATION, WITH LONG-TERM CURRENT USE OF INSULIN (H): ICD-10-CM

## 2019-12-04 DIAGNOSIS — E11.9 TYPE 2 DIABETES MELLITUS WITHOUT COMPLICATION, WITH LONG-TERM CURRENT USE OF INSULIN (H): ICD-10-CM

## 2019-12-04 NOTE — TELEPHONE ENCOUNTER
"Requested Prescriptions   Pending Prescriptions Disp Refills     ONETOUCH ULTRA test strip [Pharmacy Med Name: OneTouch Ultra Blue In Vitro Strip] 200 each 0     Sig: TEST 3 TIMES DAILY       Diabetic Supplies Protocol Passed - 12/4/2019 12:26 PM        Passed - Medication is active on med list        Passed - Patient is 18 years of age or older        Passed - Recent (6 mo) or future (30 days) visit within the authorizing provider's specialty     Patient had office visit in the last 6 months or has a visit in the next 30 days with authorizing provider.  See \"Patient Info\" tab in inbasket, or \"Choose Columns\" in Meds & Orders section of the refill encounter.              "

## 2019-12-16 ENCOUNTER — HEALTH MAINTENANCE LETTER (OUTPATIENT)
Age: 79
End: 2019-12-16

## 2020-01-13 ENCOUNTER — OFFICE VISIT (OUTPATIENT)
Dept: INTERNAL MEDICINE | Facility: CLINIC | Age: 80
End: 2020-01-13
Payer: COMMERCIAL

## 2020-01-13 VITALS
DIASTOLIC BLOOD PRESSURE: 70 MMHG | BODY MASS INDEX: 28.09 KG/M2 | HEART RATE: 89 BPM | OXYGEN SATURATION: 97 % | HEIGHT: 67 IN | RESPIRATION RATE: 16 BRPM | TEMPERATURE: 97.8 F | WEIGHT: 179 LBS | SYSTOLIC BLOOD PRESSURE: 128 MMHG

## 2020-01-13 DIAGNOSIS — Z79.4 TYPE 2 DIABETES MELLITUS WITHOUT COMPLICATION, WITH LONG-TERM CURRENT USE OF INSULIN (H): ICD-10-CM

## 2020-01-13 DIAGNOSIS — Z53.9 DIAGNOSIS NOT YET DEFINED: Primary | ICD-10-CM

## 2020-01-13 DIAGNOSIS — L30.9 DERMATITIS: ICD-10-CM

## 2020-01-13 DIAGNOSIS — E11.9 TYPE 2 DIABETES MELLITUS WITHOUT COMPLICATION, WITH LONG-TERM CURRENT USE OF INSULIN (H): ICD-10-CM

## 2020-01-13 LAB
ANION GAP SERPL CALCULATED.3IONS-SCNC: 4 MMOL/L (ref 3–14)
BUN SERPL-MCNC: 20 MG/DL (ref 7–30)
CALCIUM SERPL-MCNC: 8.7 MG/DL (ref 8.5–10.1)
CHLORIDE SERPL-SCNC: 105 MMOL/L (ref 94–109)
CO2 SERPL-SCNC: 30 MMOL/L (ref 20–32)
CREAT SERPL-MCNC: 0.88 MG/DL (ref 0.66–1.25)
GFR SERPL CREATININE-BSD FRML MDRD: 80 ML/MIN/{1.73_M2}
GLUCOSE SERPL-MCNC: 189 MG/DL (ref 70–99)
HBA1C MFR BLD: 8.8 % (ref 0–5.6)
POTASSIUM SERPL-SCNC: 3.5 MMOL/L (ref 3.4–5.3)
SODIUM SERPL-SCNC: 139 MMOL/L (ref 133–144)

## 2020-01-13 PROCEDURE — 80048 BASIC METABOLIC PNL TOTAL CA: CPT | Performed by: INTERNAL MEDICINE

## 2020-01-13 PROCEDURE — 36415 COLL VENOUS BLD VENIPUNCTURE: CPT | Performed by: INTERNAL MEDICINE

## 2020-01-13 PROCEDURE — 99207 C FOOT EXAM  NO CHARGE: CPT | Mod: 25 | Performed by: INTERNAL MEDICINE

## 2020-01-13 PROCEDURE — G0179 MD RECERTIFICATION HHA PT: HCPCS | Performed by: INTERNAL MEDICINE

## 2020-01-13 PROCEDURE — 99214 OFFICE O/P EST MOD 30 MIN: CPT | Performed by: INTERNAL MEDICINE

## 2020-01-13 PROCEDURE — 83036 HEMOGLOBIN GLYCOSYLATED A1C: CPT | Performed by: INTERNAL MEDICINE

## 2020-01-13 RX ORDER — TRIAMCINOLONE ACETONIDE 1 MG/G
CREAM TOPICAL 2 TIMES DAILY
Qty: 60 G | Refills: 2 | Status: SHIPPED | OUTPATIENT
Start: 2020-01-13 | End: 2021-07-30

## 2020-01-13 ASSESSMENT — MIFFLIN-ST. JEOR: SCORE: 1475.57

## 2020-01-13 NOTE — PATIENT INSTRUCTIONS
Continue current meds  Labs today as ordered   See me in 6 months or earlier as needed   Triamcinolone  0.1% steroid cream twice a day to the affected skin until rash resolved

## 2020-01-13 NOTE — PROGRESS NOTES
Subjective     Rabia Reed is a 79 year old male who presents to clinic today for the following health issues:    HPI   Diabetes Follow-up    How often are you checking your blood sugar? Four or more times daily  Blood sugar testing frequency justification:  Elevated A1c  What time of day are you checking your blood sugars (select all that apply)?  Before and after meals and At bedtime  Have you had any blood sugars above 200?  No  Have you had any blood sugars below 70?  No    What symptoms do you notice when your blood sugar is low?  None    What concerns do you have today about your diabetes? None     Do you have any of these symptoms? (Select all that apply)  No numbness or tingling in feet.  No redness, sores or blisters on feet.  No complaints of excessive thirst.  No reports of blurry vision.  No significant changes to weight.     Have you had a diabetic eye exam in the last 12 months? No    BP Readings from Last 2 Encounters:   10/07/19 130/72   01/11/19 132/70     Hemoglobin A1C (%)   Date Value   09/25/2019 8.4 (H)   04/15/2019 7.9 (H)     LDL Cholesterol Calculated (mg/dL)   Date Value   11/11/2019 60   09/25/2019 132 (H)       Diabetes Management Resources    Hyperlipidemia Follow-Up      Are you regularly taking any medication or supplement to lower your cholesterol?   Yes- Atorvastatin    Are you having muscle aches or other side effects that you think could be caused by your cholesterol lowering medication?  No      How many servings of fruits and vegetables do you eat daily?  0-1    On average, how many sweetened beverages do you drink each day (Examples: soda, juice, sweet tea, etc.  Do NOT count diet or artificially sweetened beverages)?   0    How many days per week do you miss taking your medication? 0    Pt's past medical history, family history, habits, medications and allergies were reviewed with the patient today.  See snap shot for  HCM status. Most recent lab results reviewed with pt.  "Problem list and histories reviewed & adjusted, as indicated.  Additional history as below:    Due to language barrier, an  was present during the history-taking and subsequent discussion (and for part of the physical exam) with this patient.    Lab Results   Component Value Date    GLC 90 09/25/2019     Lab Results   Component Value Date    A1C 8.4 09/25/2019     Lab Results   Component Value Date    CHOL 165 11/11/2019     Lab Results   Component Value Date    LDL 60 11/11/2019     Lab Results   Component Value Date    HDL 85 11/11/2019     Lab Results   Component Value Date    TRIG 100 11/11/2019     Lab Results   Component Value Date    CR 0.84 09/25/2019     Lab Results   Component Value Date    ALT 36 11/11/2019     Lab Results   Component Value Date    AST 29 11/11/2019     Lab Results   Component Value Date    MICROL 26 09/25/2019     Lab Results   Component Value Date    TSH 3.85 09/25/2019     Sugars:  123,162,154,148  119,152,147,155  164,175,151,141  124,167,168,151     30 min exercise in home daily with bicycle    Denies CP, SOB, abdominal pain, polyuria, polydipsia,   extremity numbness/parasthesias.   Stable gait with cane. No falls   Has glasses. 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 Eye exam UTD. Stable vision status   Has some itching bilateral ant shins with mild rash. Not present other parts of the body         Additional ROS:   Constitutional, HEENT, Cardiovascular, Pulmonary, GI and , Neuro, MSK and Psych review of systems/symptoms are otherwise negative or unchanged from previous, except as noted above.      OBJECTIVE:  /70   Pulse 89   Temp 97.8  F (36.6  C) (Temporal)   Resp 16   Ht 1.702 m (5' 7\")   Wt 81.2 kg (179 lb)   SpO2 97%   BMI 28.04 kg/m     Estimated body mass index is 28.04 kg/m  as calculated from the following:    Height as of this encounter: 1.702 m (5' 7\").    Weight as of this encounter: 81.2 kg (179 lb).      Neck: no adenopathy. " Thyroid normal to palpation. No bruits  Pulm: Lungs clear to auscultation   CV: Regular rates and rhythm  GI: Soft, nontender, Normal active bowel sounds, No hepatosplenomegaly or masses palpable  Ext: Peripheral pulses intact. No edema.  Neuro: Normal strength and tone, sensory exam grossly normal. Stable gait with cane  Skin: Mild dry erythema patches with some excoriation marks bilateral ant tibial areas    Assessment/Plan: (See plan discussion below for further details)  1. Type 2 diabetes mellitus without complication, with long-term current use of insulin (H)   Blood sugars at goal. Due for recheck A1C and renal lab  - Hemoglobin A1c  - Basic metabolic panel  - FOOT EXAM    2. Dermatitis  Not present other parts of the body. Will treat topically  - triamcinolone (KENALOG) 0.1 % external cream; Apply topically 2 times daily As needed for skin rash  Dispense: 60 g; Refill: 2    Plan discussion:   Continue current meds  Labs today as ordered   See me in 6 months or earlier as needed   Triamcinolone  0.1% steroid cream twice a day to the affected skin until rash resolved       Alfredo Aragon MD  Internal Medicine Department  [facility]    (Chart documentation was completed, in part, with yeppt voice-recognition software. Even though reviewed, some grammatical, spelling, and word errors may remain.)

## 2020-01-23 DIAGNOSIS — Z79.4 TYPE 2 DIABETES MELLITUS WITHOUT COMPLICATION, WITH LONG-TERM CURRENT USE OF INSULIN (H): ICD-10-CM

## 2020-01-23 DIAGNOSIS — E11.9 TYPE 2 DIABETES MELLITUS WITHOUT COMPLICATION, WITH LONG-TERM CURRENT USE OF INSULIN (H): ICD-10-CM

## 2020-01-23 NOTE — PLAN OF CARE
HEARING AID CHECK    Name:  Mikki Joel  :  1956  Age:  64 y.o.  Date of Evaluation:  2020      HISTORY    Reason for visit:  Mikki Joel is seen today for a hearing aid check.  Patient reports her right  is broken, and she may need her hearing aid turned up.  She states she will be having surgery on her right ear within in the next several weeks.    Hearing aid history:  Patient is currently wearing a  in the Canal (ARMANI) hearing aid in both ear(s).  Her current hearing aids are Signia Pure 7PX ARMANI hearing aids.    OFFICE VISIT    During today's visit her right  was replaced using a #2 MP  and large closed dome.  She reported the sound was good.  She will try this repair and let me know if she need additional assistance.      It was a pleasure seeing Mikki Joel in Audiology today.  It is a pleasure helping Ms. Joel with her amplification needs.             This document has been electronically signed by Shanell Berrios MS CCC-ERICK on 2020 2:33 PM       Shanell Berrios MS CCC-A  Licensed Audiologist    For Billing and Codin  Hearing Aid Check, Monaural - no charge     Problem: Patient Care Overview  Goal: Plan of Care/Patient Progress Review  Outcome: Improving  VSS, A/O, ambulates SBA w/walker and GB.  Yi speaking,  present for much of the day, family also translates.  Knee immobilizer in place, CMS intact.  Mayes patent with good urine output.  Pain is well controlled with oxycodone 10mg given x2 with good relief.  Plan is to discharge to TCU vs home on Monday after a trial void, do not remove mayes prior to Monday voiding trial.  BG checks running in 200s.

## 2020-01-24 NOTE — TELEPHONE ENCOUNTER
"Requested Prescriptions   Pending Prescriptions Disp Refills     ONETOUCH ULTRA test strip [Pharmacy Med Name: OneTouch Ultra Blue In Vitro Strip] 200 each 0     Sig: TEST 3 TIMES DAILY       Diabetic Supplies Protocol Passed - 1/23/2020 12:47 PM        Passed - Medication is active on med list        Passed - Patient is 18 years of age or older        Passed - Recent (6 mo) or future (30 days) visit within the authorizing provider's specialty     Patient had office visit in the last 6 months or has a visit in the next 30 days with authorizing provider.  See \"Patient Info\" tab in inbasket, or \"Choose Columns\" in Meds & Orders section of the refill encounter.            Last Written Prescription Date: 12/04/2019  Last Fill Quantity: 200,  # refills: 0   Last Office Visit: 1/13/2020   Future Office Visit:       "

## 2020-02-10 DIAGNOSIS — E11.9 TYPE 2 DIABETES MELLITUS WITHOUT COMPLICATION, WITH LONG-TERM CURRENT USE OF INSULIN (H): ICD-10-CM

## 2020-02-10 DIAGNOSIS — Z79.4 TYPE 2 DIABETES MELLITUS WITHOUT COMPLICATION, WITH LONG-TERM CURRENT USE OF INSULIN (H): ICD-10-CM

## 2020-02-10 RX ORDER — LANCETS
EACH MISCELLANEOUS
Qty: 200 EACH | Refills: 3 | Status: SHIPPED | OUTPATIENT
Start: 2020-02-10 | End: 2020-12-18

## 2020-02-10 NOTE — TELEPHONE ENCOUNTER
"Requested Prescriptions   Pending Prescriptions Disp Refills     blood glucose monitoring (ONE TOUCH ULTRASOFT) lancets [Pharmacy Med Name: OneTouch UltraSoft Lancets Miscellaneous] 200 each 0     Sig: USE 3 TIMES DAILY       Diabetic Supplies Protocol Passed - 2/10/2020 11:19 AM        Passed - Medication is active on med list        Passed - Patient is 18 years of age or older        Passed - Recent (6 mo) or future (30 days) visit within the authorizing provider's specialty     Patient had office visit in the last 6 months or has a visit in the next 30 days with authorizing provider.  See \"Patient Info\" tab in inbasket, or \"Choose Columns\" in Meds & Orders section of the refill encounter.              "

## 2020-02-20 ENCOUNTER — PATIENT OUTREACH (OUTPATIENT)
Dept: GERIATRIC MEDICINE | Facility: CLINIC | Age: 80
End: 2020-02-20

## 2020-02-20 NOTE — PROGRESS NOTES
[facility] Care Coordination Contact    Received notification that member's MA is closed. [place]. Spoke with member's son, Brandan, and he shared that he completed the MA renewal and had gotten it in before the deadline. He shared that he called the county already and was told that the [facility] has all of the paperwork but the renewal has not been processed yet. Thanked Brandan for the update.  ANASTACIA Velázquez  [facility]  [phone]

## 2020-02-29 DIAGNOSIS — R35.0 URINE FREQUENCY: ICD-10-CM

## 2020-02-29 NOTE — TELEPHONE ENCOUNTER
"Requested Prescriptions   Pending Prescriptions Disp Refills     tamsulosin (FLOMAX) 0.4 MG capsule [Pharmacy Med Name: Tamsulosin HCl Oral Capsule 0.4 MG] 90 capsule 2     Sig: TAKE ONE CAPSULE BY MOUTH ONE TIME DAILY   Last Written Prescription Date:  4/3/2019  Last Fill Quantity: 90,  # refills: 3   Last Office Visit: 1/13/2020   Future Office Visit:         Alpha Blockers Passed - 2/29/2020  7:03 AM        Passed - Blood pressure under 140/90 in past 12 months     BP Readings from Last 3 Encounters:   01/13/20 128/70   10/07/19 130/72   01/11/19 132/70                 Passed - Recent (12 mo) or future (30 days) visit within the authorizing provider's specialty     Patient has had an office visit with the authorizing provider or a provider within the authorizing providers department within the previous 12 mos or has a future within next 30 days. See \"Patient Info\" tab in inbasket, or \"Choose Columns\" in Meds & Orders section of the refill encounter.              Passed - Patient does not have Tadalafil, Vardenafil, or Sildenafil on their medication list        Passed - Medication is active on med list        Passed - Patient is 18 years of age or older          "

## 2020-03-03 RX ORDER — TAMSULOSIN HYDROCHLORIDE 0.4 MG/1
CAPSULE ORAL
Qty: 90 CAPSULE | Refills: 2 | Status: SHIPPED | OUTPATIENT
Start: 2020-03-03 | End: 2020-10-14

## 2020-03-03 NOTE — TELEPHONE ENCOUNTER
Prescription approved per [facility] Refill Protocol.    Rosangela FERRARAN, RN, PHN

## 2020-03-09 ENCOUNTER — OFFICE VISIT (OUTPATIENT)
Dept: INTERNAL MEDICINE | Facility: CLINIC | Age: 80
End: 2020-03-09
Payer: COMMERCIAL

## 2020-03-09 VITALS
SYSTOLIC BLOOD PRESSURE: 152 MMHG | TEMPERATURE: 97.8 F | OXYGEN SATURATION: 97 % | HEART RATE: 82 BPM | RESPIRATION RATE: 16 BRPM | WEIGHT: 180 LBS | BODY MASS INDEX: 28.19 KG/M2 | DIASTOLIC BLOOD PRESSURE: 72 MMHG

## 2020-03-09 DIAGNOSIS — E11.9 TYPE 2 DIABETES MELLITUS WITHOUT COMPLICATION, WITH LONG-TERM CURRENT USE OF INSULIN (H): ICD-10-CM

## 2020-03-09 DIAGNOSIS — Z79.4 TYPE 2 DIABETES MELLITUS WITHOUT COMPLICATION, WITH LONG-TERM CURRENT USE OF INSULIN (H): ICD-10-CM

## 2020-03-09 DIAGNOSIS — E78.5 HYPERLIPIDEMIA LDL GOAL <100: ICD-10-CM

## 2020-03-09 DIAGNOSIS — H25.9 SENILE CATARACT OF LEFT EYE, UNSPECIFIED AGE-RELATED CATARACT TYPE: ICD-10-CM

## 2020-03-09 DIAGNOSIS — Z01.818 PREOP GENERAL PHYSICAL EXAM: Primary | ICD-10-CM

## 2020-03-09 PROCEDURE — 99214 OFFICE O/P EST MOD 30 MIN: CPT | Performed by: PHYSICIAN ASSISTANT

## 2020-03-09 NOTE — PROGRESS NOTES
[address] [id number] [phone]  Dept: [phone]    PRE-OP EVALUATION:  Today's date: 3/9/2020    Rabia Reed (: 1940) presents for pre-operative evaluation assessment as requested by Dr. Good.  He requires evaluation and anesthesia risk assessment prior to undergoing surgery/procedure for treatment of Left Cataracts .    Fax number for surgical facility: [phone]  Primary Physician: Alfredo Aragon  Type of Anesthesia Anticipated: Local with MAC    Patient has a Health Care Directive or Living Will:  NO    Preop Questions 3/9/2020   Who is doing your surgery? Yang Bates   What are you having done? cataract surgery on left eye   Date of Surgery/Procedure: 2020   Facility or Hospital where procedure/surgery will be performed: [facility]   1.  Do you have a history of Heart attack, stroke, stent, coronary bypass surgery, or other heart surgery? No   2.  Do you ever have any pain or discomfort in your chest? No   3.  Do you have a history of  Heart Failure? No   4.   Are you troubled by shortness of breath when:  walking on a level surface, or up a slight hill, or at night? No   5.  Do you currently have a cold, bronchitis or other respiratory infection? No   6.  Do you have a cough, shortness of breath, or wheezing? No   7.  Do you sometimes get pains in the calves of your legs when you walk? No   8. Do you or anyone in your family have previous history of blood clots? No   9.  Do you or does anyone in your family have a serious bleeding problem such as prolonged bleeding following surgeries or cuts? No   10. Have you ever had problems with anemia or been told to take iron pills? No   11. Have you had any abnormal blood loss such as black, tarry or bloody stools? No   12. Have you ever had a blood transfusion? No   13. Have you or any of your relatives ever had problems with anesthesia? No   14. Do you have  sleep apnea, excessive snoring or daytime drowsiness? No   15. Do you have any prosthetic heart valves? No   16. Do you have prosthetic joints? YES - Bilateral knees         HPI:     HPI related to upcoming procedure: left eye cataract      See problem list for active medical problems.  Problems all longstanding and stable, except as noted/documented.  See ROS for pertinent symptoms related to these conditions.    DIABETES - Patient has a longstanding history of DiabetesType Type II . Patient is being treated with insulin injections and denies significant side effects. Control has been fair. Complicating factors include but are not limited to: hyperlipidemia.     HYPERLIPIDEMIA - Patient has a long history of significant Hyperlipidemia requiring medication for treatment with recent good control. Patient reports no problems or side effects with the medication.       MEDICAL HISTORY:     Patient Active Problem List    Diagnosis Date Noted     Calculus of kidney 2019     Priority: Medium     renal calculi 1.4m left       Anemia, unspecified type 2018     Priority: Medium     Status post total left knee replacement 2017     Priority: Medium     Type 2 diabetes mellitus without complication, with long-term current use of insulin (H) 2017     Priority: Medium     Erectile dysfunction, unspecified erectile dysfunction type 2016     Priority: Medium     Elevated prostate specific antigen (PSA) 2013     Priority: Medium     Health Care Home 2011     Priority: Medium     [place] PARTNER CARE COORDINATOR  ANASTACIA THOMAS  [phone]  [facility] PLAN SUMMARY    Client Name:  Rabia Reed  Address:  [address] [place] [address] Phone: [phone] (home)    :  1940 Date of Assessment:  10/15/2019   Health [facility]:  [facility]  Health Plan Number: [id number] Medical Assistance Number: [id number]  Financial Worker:     BRENDA :   "ANASTACIA Velázquez CM Phone:  [phone]  CM Fax:  [phone]   [facility] Enrollment Date: 01/01/2008 Case Mix:  D  Rate Cell:  B  Waiver Type:  EW   Primary Emergency Contact:  Brandan Reed  Home Phone: [phone]  Work Phone: [phone]  Relation: Son Language:  Greenlandic  :  Yes:    Health Care Agent/POA: Brandan Reed (Son)  Home Phone: [phone] Advanced Directives/Living Will:  None   Primary Care Clinic:  [facility] Primary Dx;  Diabetes [EF11.9]  Secondary Dx:  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 Irizarry- [facility] ENT  Audiologist:    Dr. Aj  - [facility] Otolaryngology (ENT)   Eye Care Provider:   Dr. Dixon, yearly appt.  Wears corrective lenses Dental Care Provider:    Dr. Edenilson Anna, [address] (made dentures)   Other:          [facility] CURRENT SERVICES SUMMARY  Equipment owned/DME history: bath bench w/o back, standard walker, built up silverware,  SERVICE TYPE/PROVIDER NAME/PHONE AUTH DATE FREQUENCY Units OR $ Amt DESCRIPTION   Medical Transportation: [facility] [phone]  Fax:  11/1/19-10/31/20 as needed     DME: [employer] [phone]  Fax: [phone] 11/1/19-10/31/20 Monthly $2.00 per can 2 cans of glucerna per day (Per [employer], glucerna is EW only)   DME: [facility] [facility] [phone]  Fax: [phone] 11/1/19-10/31/20 as needed  Pull ups (Med) Family to order as needed   PCA: [employer] [facility] [phone]  Fax: [phone] 10/16/19-10/14/20       14 units daily   Also supervision every other month       10/15/19: Client declined ADC at the time of the assessment but will continue to consider this.                                   BPH (benign prostatic hypertrophy) 12/23/2010     Priority: Medium     HYPERLIPIDEMIA LDL GOAL <100 10/31/2010     Priority: Medium "     Disorder of acoustic nerve      Priority: Medium     acoustical neuroma right side  Problem list name updated by automated process. 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     Priority: Medium      Past Medical History:   Diagnosis Date     Anemia, unspecified type 1/4/2018     Calculus of kidney 01/2019    renal calculi 1.4m left     Chondromalacia of patella 5/03    right     Disorders of acoustic nerve 2/08    acoustical neuroma right side     Elevated prostate specific antigen (PSA) 11/6/2013     Essential hypertension, benign      Hyperlipidemia LDL goal <100       Impotence of organic origin      Inguinal hernia without mention of obstruction or gangrene, unilateral or unspecified, (not specified as recurrent) 1/03    right     Memory loss      Osteoarthritis of both hands, unspecified osteoarthritis type 3/19/2017     Tobacco use disorder      Type 2 diabetes mellitus without complication  (goal A1C<7) 10/24/2015     Unspecified nasal polyp      Urinary frequency     nocturia x 5     Past Surgical History:   Procedure Laterality Date     ARTHROPLASTY KNEE Right 9/14/2017    Procedure: ARTHROPLASTY KNEE;  RIGHT TOTAL KNEE ARTHROPLASTY ;  Surgeon: Darnell Allen MD;  Location:  OR     ARTHROPLASTY KNEE Left 10/5/2018    Procedure: ARTHROPLASTY KNEE;  LEFT TOTAL KNEE ARTHROPLASTY ;  Surgeon: Darnell Allen MD;  Location:  OR     C APPENDECTOMY       C NONSPECIFIC PROCEDURE  4/01    nasal polyp excision     C NONSPECIFIC PROCEDURE  1/03    RIH repair     C NONSPECIFIC PROCEDURE  April 2011     right knee arthroscopy     ENDOSCOPIC RETROGRADE CHOLANGIOPANCREATOGRAM N/A 12/31/2018    Procedure: COMBINED ENDOSCOPIC RETROGRADE CHOLANGIOPANCREATOGRAPHY, SPHINCTEROTOMY;  Surgeon: Jaime Canales MD;  Location:  OR     ENDOSCOPIC RETROGRADE CHOLANGIOPANCREATOGRAM N/A 12/31/2018    Procedure: Combined Endoscopic Retrograde  Cholangiopancreatography, Remove Stone/Balloon;  Surgeon: Jaime Canales MD;  Location:  OR     ESOPHAGOSCOPY, GASTROSCOPY, DUODENOSCOPY (EGD), COMBINED N/A 12/31/2018    Procedure: COMBINED ENDOSCOPIC ULTRASOUND, ESOPHAGOSCOPY, GASTROSCOPY, DUODENOSCOPY (EGD);  Surgeon: Jaime Canales MD;  Location:  OR     LAPAROSCOPIC CHOLECYSTECTOMY N/A 1/1/2019    Procedure: LAPAROSCOPIC CHOLECYSTECTOMY;  Surgeon: Melia Bentley MD;  Location:  OR     Current Outpatient Medications   Medication Sig Dispense Refill     acetaminophen (TYLENOL) 325 MG tablet Take 2 tablets (650 mg) by mouth every 6 hours as needed for mild pain 100 tablet 3     Ascorbic Acid (VITAMIN C PO) Take 500 mg by mouth daily       aspirin 81 MG EC tablet Take 81 mg by mouth daily       atorvastatin (LIPITOR) 40 MG tablet Take 1 tablet (40 mg) by mouth daily 30 tablet 11     blood glucose monitoring (ONE TOUCH ULTRASOFT) lancets USE 3 TIMES DAILY 200 each 3     Blood Glucose Monitoring Suppl (ONE TOUCH ULTRA SYSTEM KIT) W/DEVICE KIT One touch ultra 2 if covered by insurance 1 kit 0     celecoxib (CELEBREX) 200 MG capsule TAKE 1 CAPSULE BY MOUTH DAILY WITH FOOD FOR ARTHRITIS PAIN 90 capsule 1     finasteride (PROSCAR) 5 MG tablet Take 1 tablet (5 mg) by mouth daily 90 tablet 3     insulin aspart (NOVOLOG FLEXPEN) 100 UNIT/ML pen Inject 8 Units Subcutaneous At Bedtime 15 mL 3     insulin aspart prot & aspart (NOVOLOG MIX 70/30 FLEXPEN) (70-30) 100 UNIT/ML pen 30 units before breakfast and 20 units before supper. 45 mL 3     ONETOUCH ULTRA test strip TEST 3 TIMES DAILY 200 each 1     sennosides (SENOKOT) 8.6 MG tablet Take 2 tablets by mouth 2 times daily       Skin Protectants, Misc. (EUCERIN) cream Apply topically 2 times daily       tamsulosin (FLOMAX) 0.4 MG capsule TAKE ONE CAPSULE BY MOUTH ONE TIME DAILY 90 capsule 2     triamcinolone (KENALOG) 0.1 % external cream Apply topically 2 times daily As needed for skin rash 60  g 2     ULTICARE MICRO 32G X 4 MM insulin pen needle test 3 times a day 100 each 5     ULTICARE MICRO 32G X 4 MM insulin pen needle TEST 3 TIMES DAILY 100 each 0     OTC products: None, except as noted above    Allergies   Allergen Reactions     No Known Drug Allergies       Latex Allergy: NO    Social History     Tobacco Use     Smoking status: Former Smoker     Packs/day: 0.50     Years: 42.00     Pack years: 21.00     Last attempt to quit: 2001     Years since quittin.1     Smokeless tobacco: Never Used   Substance Use Topics     Alcohol use: No     Alcohol/week: 0.0 standard drinks     History   Drug Use No       REVIEW OF SYSTEMS:   CONSTITUTIONAL: NEGATIVE for fever, chills, change in weight  ENT/MOUTH: NEGATIVE for ear, mouth and throat problems  RESP: NEGATIVE for significant cough or SOB  CV: NEGATIVE for chest pain, palpitations or peripheral edema  GI: NEGATIVE for nausea, abdominal pain, heartburn, or change in bowel habits  HEME/ALLERGY/IMMUNE: NEGATIVE for bleeding problems  ROS otherwise negative    EXAM:   BP (!) 152/72 (BP Location: Left arm, Patient Position: Chair, Cuff Size: Adult Regular)   Pulse 82   Temp 97.8  F (36.6  C) (Temporal)   Resp 16   Wt 81.6 kg (180 lb)   SpO2 97%   BMI 28.19 kg/m    GENERAL APPEARANCE: healthy, alert and no distress  HENT: ear canals and TM's normal and nose and mouth without ulcers or lesions  RESP: lungs clear to auscultation - no rales, rhonchi or wheezes  CV: regular rate and rhythm, normal S1 S2, no S3 or S4 and no murmur, click or rub   ABDOMEN: soft, nontender, no HSM or masses and bowel sounds normal  MS: extremities normal- no gross deformities noted  SKIN: no suspicious lesions or rashes  PSYCH: mentation appears normal and affect normal/bright    DIAGNOSTICS:   No labs or EKG required for low risk surgery (cataract, skin procedure, breast biopsy, etc)    Recent Labs   Lab Test 20  0843 19  0941  19  0801  19  0725   09/14/17  0649   HGB  --   --   --  13.3  --  11.9*   < >  --    PLT  --   --   --  234  --  118*   < >  --    INR  --   --   --   --   --   --   --  0.95    140   < > 137   < > 142   < >  --    POTASSIUM 3.5 3.7   < > 3.7   < > 2.9*   < >  --    CR 0.88 0.84   < > 0.81   < > 0.76   < >  --    A1C 8.8* 8.4*   < >  --   --   --    < >  --     < > = values in this interval not displayed.        IMPRESSION:   Reason for surgery/procedure: left eye cataract  Diagnosis/reason for consult: Type 2 DM  Hyperlipidemia,     The proposed surgical procedure is considered LOW risk.    REVISED CARDIAC RISK INDEX  The patient has the following serious cardiovascular risks for perioperative complications such as (MI, PE, VFib and 3  AV Block):  Diabetes Mellitus (on Insulin)  INTERPRETATION: 1 risks: Class II (low risk - 0.9% complication rate)    The patient has the following additional risks for perioperative complications:  No identified additional risks      ICD-10-CM    1. Preop general physical exam  Z01.818    2. Senile cataract of left eye, unspecified age-related cataract type  H25.9    3. Type 2 diabetes mellitus without complication, with long-term current use of insulin (H)  E11.9     Z79.4    4. Hyperlipidemia LDL goal <100  E78.5        RECOMMENDATIONS:         --Patient is to take all scheduled medications on the day of surgery EXCEPT for modifications listed below.    Diabetes Medication Use  -----Hold mixed insulins (e.g. Insulin 70/30) while NPO (fasting)      APPROVAL GIVEN to proceed with proposed procedure, without further diagnostic evaluation       Signed Electronically by: Dolores De La Cruz PA-C    Copy of this evaluation report is provided to requesting physician.    [facility] Preop Guidelines    Revised Cardiac Risk Index

## 2020-03-09 NOTE — PATIENT INSTRUCTIONS
Do not take the morning dose of insulin    May take all other routine medications with a sip of water the morning of surgery.      Before Your Surgery      Call your surgeon if there is any change in your health. This includes signs of a cold or flu (such as a sore throat, runny nose, cough, rash or fever).    Do not smoke, drink alcohol or take over the counter medicine (unless your surgeon or primary care doctor tells you to) for the 24 hours before and after surgery.    If you take prescribed drugs: Follow your doctor s orders about which medicines to take and which to stop until after surgery.    Eating and drinking prior to surgery: follow the instructions from your surgeon    Take a shower or bath the night before surgery. Use the soap your surgeon gave you to gently clean your skin. If you do not have soap from your surgeon, use your regular soap. Do not shave or scrub the surgery site.  Wear clean pajamas and have clean sheets on your bed.

## 2020-03-12 DIAGNOSIS — Z53.9 DIAGNOSIS NOT YET DEFINED: Primary | ICD-10-CM

## 2020-03-12 PROCEDURE — G0179 MD RECERTIFICATION HHA PT: HCPCS | Performed by: INTERNAL MEDICINE

## 2020-03-20 DIAGNOSIS — M19.91 PRIMARY OSTEOARTHRITIS, UNSPECIFIED SITE: ICD-10-CM

## 2020-03-21 RX ORDER — CELECOXIB 200 MG/1
CAPSULE ORAL
Qty: 90 CAPSULE | Refills: 1 | Status: SHIPPED | OUTPATIENT
Start: 2020-03-21 | End: 2020-08-06

## 2020-03-21 NOTE — TELEPHONE ENCOUNTER
"Requested Prescriptions   Pending Prescriptions Disp Refills     celecoxib (CELEBREX) 200 MG capsule [Pharmacy Med Name: Celecoxib Oral Capsule 200 MG]  Last Written Prescription Date:  7/7/2019  Last Fill Quantity: 90 CAPSULE,  # refills: 1   Last office visit: 3/9/2020 with prescribing provider:  NAINA GARCIA   Future Office Visit:   90 capsule 0     Sig: TAKE 1 CAPSULE BY MOUTH DAILY WITH FOOD FOR ARTHRITIS PAIN       NSAID Medications Failed - 3/20/2020  5:28 PM        Failed - Blood pressure under 140/90 in past 12 months     BP Readings from Last 3 Encounters:   03/09/20 (!) 152/72   01/13/20 128/70   10/07/19 130/72                 Failed - Patient is age 6-64 years        Failed - Normal CBC on file in past 12 months     Recent Labs   Lab Test 01/06/19  0801   WBC 7.9   RBC 4.07*   HGB 13.3   HCT 38.5*                    Passed - Normal ALT on file in past 12 months     Recent Labs   Lab Test 11/11/19  0753   ALT 36             Passed - Normal AST on file in past 12 months     Recent Labs   Lab Test 11/11/19  0753   AST 29             Passed - Recent (12 mo) or future (30 days) visit within the authorizing provider's specialty     Patient has had an office visit with the authorizing provider or a provider within the authorizing providers department within the previous 12 mos or has a future within next 30 days. See \"Patient Info\" tab in inbasket, or \"Choose Columns\" in Meds & Orders section of the refill encounter.              Passed - Medication is active on med list        Passed - Normal serum creatinine on file in past 12 months     Recent Labs   Lab Test 01/13/20  0843   CR 0.88       Ok to refill medication if creatinine is low              "

## 2020-04-03 ENCOUNTER — PATIENT OUTREACH (OUTPATIENT)
Dept: GERIATRIC MEDICINE | Facility: CLINIC | Age: 80
End: 2020-04-03

## 2020-04-03 NOTE — PROGRESS NOTES
[facility] Care Coordination Contact    Called adult son Brandan, to complete six month assessment and left a message requesting a return call.  ANASTACIA Velázquez  [facility]  [phone]

## 2020-04-20 ENCOUNTER — TELEPHONE (OUTPATIENT)
Dept: INTERNAL MEDICINE | Facility: CLINIC | Age: 80
End: 2020-04-20

## 2020-04-20 DIAGNOSIS — N40.1 BENIGN PROSTATIC HYPERPLASIA WITH LOWER URINARY TRACT SYMPTOMS, SYMPTOM DETAILS UNSPECIFIED: ICD-10-CM

## 2020-04-20 DIAGNOSIS — E11.9 TYPE 2 DIABETES MELLITUS WITHOUT COMPLICATION, WITH LONG-TERM CURRENT USE OF INSULIN (H): Primary | ICD-10-CM

## 2020-04-20 DIAGNOSIS — Z79.4 TYPE 2 DIABETES MELLITUS WITHOUT COMPLICATION, WITH LONG-TERM CURRENT USE OF INSULIN (H): Primary | ICD-10-CM

## 2020-04-20 RX ORDER — INSULIN LISPRO 100 [IU]/ML
INJECTION, SUSPENSION SUBCUTANEOUS
Qty: 30 ML | Refills: 3 | Status: SHIPPED | OUTPATIENT
Start: 2020-04-20 | End: 2020-09-09

## 2020-04-20 NOTE — TELEPHONE ENCOUNTER
Received call from [facility]. They state Novolog Mix is not covered by insurance, needs PA. Alternative medication requested is Humalog. Routing to provider to advise.

## 2020-04-21 RX ORDER — FINASTERIDE 5 MG/1
TABLET, FILM COATED ORAL
Qty: 90 TABLET | Refills: 3 | Status: SHIPPED | OUTPATIENT
Start: 2020-04-21 | End: 2021-03-14

## 2020-04-21 NOTE — TELEPHONE ENCOUNTER
"Requested Prescriptions   Pending Prescriptions Disp Refills     finasteride (PROSCAR) 5 MG tablet [Pharmacy Med Name: Finasteride Oral Tablet 5 MG]  Last Written Prescription Date:  04/17/2019  Last Fill Quantity: 90,  # refills: 03   Last Office Visit: 3/9/2020   Future Office Visit:      90 tablet 0     Sig: TAKE ONE TABLET BY MOUTH ONE TIME DAILY       BPH Agents Passed - 4/20/2020  7:37 PM        Passed - Recent (12 mo) or future (30 days) visit within the authorizing provider's department     Patient has had an office visit with the authorizing provider or a provider within the authorizing providers department within the previous 12 mos or has a future within next 30 days. See \"Patient Info\" tab in inbasket, or \"Choose Columns\" in Meds & Orders section of the refill encounter.              Passed - Medication is active on med list        Passed - Patient is 18 years of age or older             "

## 2020-04-27 DIAGNOSIS — Z53.9 DIAGNOSIS NOT YET DEFINED: Primary | ICD-10-CM

## 2020-04-27 PROCEDURE — G0179 MD RECERTIFICATION HHA PT: HCPCS | Performed by: INTERNAL MEDICINE

## 2020-04-28 DIAGNOSIS — E11.9 TYPE 2 DIABETES MELLITUS WITHOUT COMPLICATION, WITH LONG-TERM CURRENT USE OF INSULIN (H): ICD-10-CM

## 2020-04-28 DIAGNOSIS — Z79.4 TYPE 2 DIABETES MELLITUS WITHOUT COMPLICATION, WITH LONG-TERM CURRENT USE OF INSULIN (H): ICD-10-CM

## 2020-04-29 RX ORDER — PEN NEEDLE, DIABETIC 32GX 5/32"
NEEDLE, DISPOSABLE MISCELLANEOUS
Qty: 100 EACH | Refills: 1 | Status: SHIPPED | OUTPATIENT
Start: 2020-04-29 | End: 2020-08-17

## 2020-04-30 NOTE — PROGRESS NOTES
[facility] Care Coordination Contact      [facility] Six-Month Telephone Assessment    6 month telephone assessment completed on 4/30/20. Completed with son.    ER visits: No  Hospitalizations: No  TCU stays: No  Significant health status changes: Son reports no changes.  Falls/Injuries: No  ADL/IADL changes: No  Changes in services: No    Caregiver Assessment follow up:  NA    Goals: See POC in chart for goal progress documentation.  Son shared that member has been practicing social distance and not going out.    Will see member in 6 months for an annual health risk assessment.   Encouraged member to call CC with any questions or concerns in the meantime.     ANASTACIA Velázquez  [facility]  [phone]

## 2020-05-04 ENCOUNTER — TELEPHONE (OUTPATIENT)
Dept: INTERNAL MEDICINE | Facility: CLINIC | Age: 80
End: 2020-05-04

## 2020-05-04 NOTE — TELEPHONE ENCOUNTER
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

## 2020-05-04 NOTE — TELEPHONE ENCOUNTER
Prior Authorization Approval    Authorization Effective Date: 4/4/2020  Authorization Expiration Date: 5/4/2021  Medication: insulin aspart (NOVOLOG FLEXPEN) 100 UNIT/ML pen--APPROVED  Approved Dose/Quantity:   Reference #:     Insurance Company: [employer] - Phone [phone] Fax [phone]  Expected CoPay:       CoPay Card Available:      Foundation Assistance Needed:    Which Pharmacy is filling the prescription (Not needed for infusion/clinic administered): [id number] - [place], [place] - [address] [facility]  Pharmacy Notified: Yes  Patient Notified: Yes **Instructed pharmacy to notify patient when script is ready to /ship.**

## 2020-05-04 NOTE — TELEPHONE ENCOUNTER
PA Initiation    Medication: insulin aspart (NOVOLOG FLEXPEN) 100 UNIT/ML pen   Insurance Company: [employer] - Phone [phone] Fax [phone]  Pharmacy Filling the Rx: [facility][id number] - [facility] [address] [facility]  Filling Pharmacy Phone: [phone]  Filling Pharmacy Fax: [phone]  Start Date: 5/4/2020

## 2020-06-22 DIAGNOSIS — E11.9 TYPE 2 DIABETES MELLITUS WITHOUT COMPLICATION, WITH LONG-TERM CURRENT USE OF INSULIN (H): ICD-10-CM

## 2020-06-22 DIAGNOSIS — E11.9 TYPE 2 DIABETES, HBA1C GOAL < 7% (H): ICD-10-CM

## 2020-06-22 DIAGNOSIS — Z79.4 TYPE 2 DIABETES MELLITUS WITHOUT COMPLICATION, WITH LONG-TERM CURRENT USE OF INSULIN (H): ICD-10-CM

## 2020-06-22 RX ORDER — PEN NEEDLE, DIABETIC 32GX 5/32"
NEEDLE, DISPOSABLE MISCELLANEOUS
Qty: 100 EACH | Refills: 0 | Status: SHIPPED | OUTPATIENT
Start: 2020-06-22 | End: 2020-07-22

## 2020-06-22 NOTE — TELEPHONE ENCOUNTER
Prescription approved per [facility] Refill Protocol.    Rosangela FERRARAN, RN, PHN

## 2020-07-06 DIAGNOSIS — Z53.9 DIAGNOSIS NOT YET DEFINED: Primary | ICD-10-CM

## 2020-07-06 PROCEDURE — G0179 MD RECERTIFICATION HHA PT: HCPCS | Performed by: INTERNAL MEDICINE

## 2020-07-21 ENCOUNTER — TELEPHONE (OUTPATIENT)
Dept: INTERNAL MEDICINE | Facility: CLINIC | Age: 80
End: 2020-07-21

## 2020-07-21 NOTE — TELEPHONE ENCOUNTER
Central Prior Authorization Team   Phone: [phone]      PA Initiation    Medication: insulin aspart prot & aspart (NOVOLOG MIX 70/30 FLEXPEN) (70-30) 100 UNIT/ML pen  Insurance Company: [employer] - Phone [phone] Fax [phone]  Pharmacy Filling the Rx: [id number] [address] [facility]  Filling Pharmacy Phone: [phone]  Filling Pharmacy Fax:    Start Date: 7/21/2020

## 2020-07-21 NOTE — TELEPHONE ENCOUNTER
Prior Authorization Retail Medication Request    Medication/Dose: insulin aspart prot & aspart (NOVOLOG MIX 70/30 FLEXPEN) (70-30) 100 UNIT/ML pen  ICD code (if different than what is on RX):  E11.9,Z79.4  Previously Tried and Failed:    Rationale:      Insurance Name:  [employer]  Insurance ID:  [id number]      Pharmacy Information (if different than what is on RX)  Name:  [facility] [place]  Phone:  [phone]

## 2020-07-21 NOTE — TELEPHONE ENCOUNTER
Prior Authorization Approval    Authorization Effective Date: 6/21/2020  Authorization Expiration Date: 7/21/2021  Medication: insulin aspart prot & aspart (NOVOLOG MIX 70/30 FLEXPEN) (70-30) 100 UNIT/ML pen  Approved Dose/Quantity:    Reference #: [id number]   Insurance Company: [employer] - Phone [phone] Fax [phone]  Expected CoPay:       CoPay Card Available:      Foundation Assistance Needed:    Which Pharmacy is filling the prescription (Not needed for infusion/clinic administered): [facility][id number] - [facility] [address] [facility]  Pharmacy Notified: Yes  Patient Notified: Yes  **Instructed pharmacy to notify patient when script is ready to /ship.**

## 2020-07-22 DIAGNOSIS — E11.9 TYPE 2 DIABETES, HBA1C GOAL < 7% (H): ICD-10-CM

## 2020-07-22 RX ORDER — PEN NEEDLE, DIABETIC 32GX 5/32"
NEEDLE, DISPOSABLE MISCELLANEOUS
Qty: 100 EACH | Refills: 0 | Status: SHIPPED | OUTPATIENT
Start: 2020-07-22

## 2020-08-05 DIAGNOSIS — M19.91 PRIMARY OSTEOARTHRITIS, UNSPECIFIED SITE: ICD-10-CM

## 2020-08-05 NOTE — TELEPHONE ENCOUNTER
Routing refill request to provider for review/approval because:  Labs not current:  CBC + B/P  Patient is not age 6-64 years

## 2020-08-06 RX ORDER — CELECOXIB 200 MG/1
CAPSULE ORAL
Qty: 90 CAPSULE | Refills: 1 | Status: SHIPPED | OUTPATIENT
Start: 2020-08-06 | End: 2021-02-24

## 2020-08-17 DIAGNOSIS — Z79.4 TYPE 2 DIABETES MELLITUS WITHOUT COMPLICATION, WITH LONG-TERM CURRENT USE OF INSULIN (H): ICD-10-CM

## 2020-08-17 DIAGNOSIS — Z53.9 DIAGNOSIS NOT YET DEFINED: Primary | ICD-10-CM

## 2020-08-17 DIAGNOSIS — E11.9 TYPE 2 DIABETES MELLITUS WITHOUT COMPLICATION, WITH LONG-TERM CURRENT USE OF INSULIN (H): ICD-10-CM

## 2020-08-17 PROCEDURE — G0179 MD RECERTIFICATION HHA PT: HCPCS | Performed by: INTERNAL MEDICINE

## 2020-08-18 RX ORDER — PEN NEEDLE, DIABETIC 32GX 5/32"
NEEDLE, DISPOSABLE MISCELLANEOUS
Qty: 100 EACH | Refills: 1 | Status: SHIPPED | OUTPATIENT
Start: 2020-08-18 | End: 2020-11-16

## 2020-09-03 ENCOUNTER — PATIENT OUTREACH (OUTPATIENT)
Dept: GERIATRIC MEDICINE | Facility: CLINIC | Age: 80
End: 2020-09-03

## 2020-09-03 NOTE — PROGRESS NOTES
[facility] Care Coordination Contact    Called adult son Brandan to schedule annual HRA home visit. Left a message requesting a return call to schedule HRA.   ANASTACIA Velázquez  [facility]  [phone]

## 2020-09-03 NOTE — PROGRESS NOTES
[facility] Care Coordination Contact    Called adult son Brandan to schedule annual HRA home visit. HRA has been scheduled for 9/9/2020 at 11am.  Will add  via phone for visit..   ANASTACIA Velázquez  [facility]  [phone]

## 2020-09-09 ENCOUNTER — PATIENT OUTREACH (OUTPATIENT)
Dept: GERIATRIC MEDICINE | Facility: CLINIC | Age: 80
End: 2020-09-09

## 2020-09-09 SDOH — HEALTH STABILITY: MENTAL HEALTH
STRESS IS WHEN SOMEONE FEELS TENSE, NERVOUS, ANXIOUS, OR CAN'T SLEEP AT NIGHT BECAUSE THEIR MIND IS TROUBLED. HOW STRESSED ARE YOU?: NOT AT ALL

## 2020-09-09 SDOH — SOCIAL STABILITY: SOCIAL NETWORK: HOW OFTEN DO YOU ATTENT MEETINGS OF THE CLUB OR ORGANIZATION YOU BELONG TO?: NOT ASKED

## 2020-09-09 SDOH — SOCIAL STABILITY: SOCIAL INSECURITY
WITHIN THE LAST YEAR, HAVE TO BEEN RAPED OR FORCED TO HAVE ANY KIND OF SEXUAL ACTIVITY BY YOUR PARTNER OR EX-PARTNER?: PATIENT DECLINED

## 2020-09-09 SDOH — HEALTH STABILITY: PHYSICAL HEALTH: ON AVERAGE, HOW MANY DAYS PER WEEK DO YOU ENGAGE IN MODERATE TO STRENUOUS EXERCISE (LIKE A BRISK WALK)?: 0 DAYS

## 2020-09-09 SDOH — SOCIAL STABILITY: SOCIAL INSECURITY
WITHIN THE LAST YEAR, HAVE YOU BEEN KICKED, HIT, SLAPPED, OR OTHERWISE PHYSICALLY HURT BY YOUR PARTNER OR EX-PARTNER?: PATIENT DECLINED

## 2020-09-09 SDOH — SOCIAL STABILITY: SOCIAL NETWORK: HOW OFTEN DO YOU ATTEND CHURCH OR RELIGIOUS SERVICES?: NEVER

## 2020-09-09 SDOH — SOCIAL STABILITY: SOCIAL INSECURITY
WITHIN THE LAST YEAR, HAVE YOU BEEN HUMILIATED OR EMOTIONALLY ABUSED IN OTHER WAYS BY YOUR PARTNER OR EX-PARTNER?: PATIENT DECLINED

## 2020-09-09 SDOH — ECONOMIC STABILITY: INCOME INSECURITY: HOW HARD IS IT FOR YOU TO PAY FOR THE VERY BASICS LIKE FOOD, HOUSING, MEDICAL CARE, AND HEATING?: NOT HARD AT ALL

## 2020-09-09 SDOH — ECONOMIC STABILITY: FOOD INSECURITY: WITHIN THE PAST 12 MONTHS, YOU WORRIED THAT YOUR FOOD WOULD RUN OUT BEFORE YOU GOT MONEY TO BUY MORE.: NEVER TRUE

## 2020-09-09 SDOH — SOCIAL STABILITY: SOCIAL NETWORK
IN A TYPICAL WEEK, HOW MANY TIMES DO YOU TALK ON THE PHONE WITH FAMILY, FRIENDS, OR NEIGHBORS?: MORE THAN THREE TIMES A WEEK

## 2020-09-09 SDOH — SOCIAL STABILITY: SOCIAL INSECURITY: WITHIN THE LAST YEAR, HAVE YOU BEEN AFRAID OF YOUR PARTNER OR EX-PARTNER?: PATIENT DECLINED

## 2020-09-09 SDOH — ECONOMIC STABILITY: TRANSPORTATION INSECURITY
IN THE PAST 12 MONTHS, HAS LACK OF TRANSPORTATION KEPT YOU FROM MEETINGS, WORK, OR FROM GETTING THINGS NEEDED FOR DAILY LIVING?: NO

## 2020-09-09 SDOH — SOCIAL STABILITY: SOCIAL NETWORK: HOW OFTEN DO YOU GET TOGETHER WITH FRIENDS OR RELATIVES?: MORE THAN THREE TIMES A WEEK

## 2020-09-09 SDOH — SOCIAL STABILITY: SOCIAL NETWORK: ARE YOU MARRIED, WIDOWED, DIVORCED, SEPARATED, NEVER MARRIED, OR LIVING WITH A PARTNER?: MARRIED

## 2020-09-09 SDOH — SOCIAL STABILITY: SOCIAL NETWORK
DO YOU BELONG TO ANY CLUBS OR ORGANIZATIONS SUCH AS CHURCH GROUPS UNIONS, FRATERNAL OR ATHLETIC GROUPS, OR SCHOOL GROUPS?: NO

## 2020-09-09 SDOH — ECONOMIC STABILITY: FOOD INSECURITY: WITHIN THE PAST 12 MONTHS, THE FOOD YOU BOUGHT JUST DIDN'T LAST AND YOU DIDN'T HAVE MONEY TO GET MORE.: NEVER TRUE

## 2020-09-09 SDOH — ECONOMIC STABILITY: TRANSPORTATION INSECURITY
IN THE PAST 12 MONTHS, HAS THE LACK OF TRANSPORTATION KEPT YOU FROM MEDICAL APPOINTMENTS OR FROM GETTING MEDICATIONS?: NO

## 2020-09-09 SDOH — HEALTH STABILITY: PHYSICAL HEALTH: ON AVERAGE, HOW MANY MINUTES DO YOU ENGAGE IN EXERCISE AT THIS LEVEL?: 0 MIN

## 2020-09-09 ASSESSMENT — PATIENT HEALTH QUESTIONNAIRE - PHQ9: SUM OF ALL RESPONSES TO PHQ QUESTIONS 1-9: 3

## 2020-09-10 ENCOUNTER — TELEPHONE (OUTPATIENT)
Dept: INTERNAL MEDICINE | Facility: CLINIC | Age: 80
End: 2020-09-10

## 2020-09-10 NOTE — TELEPHONE ENCOUNTER
Patient Quality Outreach      Summary:    Patient is due/failing the following:   A1C, Eye Exam and Microalbumin, Annual wellness, date due: 2003 and Immunizations    Type of outreach:    Sent Mogad message.    Questions for provider review:    None                                                                                   **Start Working phrase here:**       Patient has the following on his problem list/HM:     Diabetes    Last A1C:  Lab Results   Component Value Date    A1C 8.8 2020    A1C 8.4 2019       Last LDL:    Lab Results   Component Value Date    LDL 60 2019       Is the patient on a Statin? Yes          Is the patient on Aspirin? Yes    Medications     HMG CoA Reductase Inhibitors     atorvastatin (LIPITOR) 40 MG tablet       Salicylates     aspirin 81 MG EC tablet             Last three blood pressure readings:  BP Readings from Last 3 Encounters:   20 (!) 152/72   20 128/70   10/07/19 130/72            Tobacco Use      Smoking status: Former Smoker        Packs/day: 0.50        Years: 42.00        Pack years: 21        Quit date: 2001        Years since quittin.6      Smokeless tobacco: Never Used          Hypertension   Last three blood pressure readings:  BP Readings from Last 3 Encounters:   20 (!) 152/72   20 128/70   10/07/19 130/72     Blood pressure: monitored    HTN Guidelines:  ? 139/89                                                 Dolores Ellington CMA       Chart routed to none.

## 2020-09-14 ENCOUNTER — PATIENT OUTREACH (OUTPATIENT)
Dept: GERIATRIC MEDICINE | Facility: CLINIC | Age: 80
End: 2020-09-14

## 2020-09-14 NOTE — LETTER
September 14, 2020    Important [employer] Information    PIYUSH BOND  [address] [address]  Your Care Plan  Dear Piyush,  When we spoke recently, I promised to send you a Care Plan. The plan enclosed is a summary of our discussion. It includes the steps we agreed would help you meet your health goals. In addition, I can help you with:  Nxyykuz-U-KfzfLP  This program is available to members who need a ride to medical and dental visits. To schedule a ride, call [phone] or [phone] (toll free). TTY/TTD: 711. You can call 8 a.m. to 8 p.m. Seven days a week. Access to a representative may be limited at times.    Binary Fountain   The [employer] F[employer] program empowers you to improve your health through education and exercise. To learn more, visit [employer], or call [employer] at [phone] (toll free) (TTY:711) from 7 a.m. - 7 p.m. Central Time, Monday-Friday.  Health Care Directive   This form helps you outline your health care wishes. You can request a form from me and I will answer any questions you have before you discuss it with your doctor.   Annual Physical  Take a key step on your path to good health and set up an annual physical at your clinic.  Questions?  Call me at [phone] Monday-Friday between 8am and 5pm.  TTY/TTD: 711. As we discussed, I plan to be in touch with you again in 6 months to follow up via phone.  Sincerely,    ANASTACIA Velázquez    E-mail: [email or web address]  Phone: [phone]    Care Manager  [facility]          cc: member records                    Civil Rights Notice  Discrimination is against the law. [employer] does not discriminate on the basis of any of the following:    Race    Color    National Origin    Creed    Hoahaoism    Age    Public Assistance Status    Receipt of Health Care Services    Disability (including physical or mental impairment)    Sex (including sex stereotypes and gender identity)    Marital  Status    Political Beliefs    Medical Condition    Genetic Information    Sexual Orientation    Claims Experience    Medical History    Health Status    Auxiliary Aids and Services:  [employer] provides auxiliary aids and services, like qualified interpreters or information in accessible formats, free of charge and in a timely manner, to ensure an equal opportunity to participate in our health care programs. Contact [employer] Customer Service at [employer]/contact medicaid or call [phone] (toll free); TTY:711 or at [email or web address].    Language Assistance Services:  [employer] provides translated documents and spoken language interpreting, free of charge and in a timely manner, when language assistance services are necessary to ensure limited English speakers have meaningful access to our information and services. Contact [employer] at [phone] (toll free); TTY: 491 or [employer]/contactmedicaid.     Civil Rights Complaints  You have the right to file a discrimination complaint if you believe you were treated in a discriminatory way by [employer]. You may contact any of the following four agencies directly to file a discrimination complaint.    U.S. Department of Health and Human Services  Office for Civil Rights (OCR)  You have the right to file a complaint with the OCR, a federal agency, if you believe you have been discriminated against because of any of the following:    Race    Disability    Color    Sex    National Origin    Age      Contact the OCR directly to file a complaint:         Director         U.S. Department of Health and Human Services  Office for Civil Rights         [address] [place] [address], [place] [address]         [phone] (Voice)         [phone] (TDD)         Complaint Portal - https://ocrportal.hhs.gov/ocr/portal/lobby.jsf     [place] Department of Human Rights ([facility])  In [place], you have the right to file a complaint with  the MDHR if you believe you have been discriminated against because of any of the following:      Race    Color    National Origin    Scientologist    Creed    Sex    Sexual Orientation    Marital Status    Public Assistance Status    Disability    Contact the MDHR directly to file a complaint:         [place] Department of Human Rights         [facility], [address] [place] [address]         [phone] (voice)          [phone] (toll free)         711 or [phone] ([place] Relay)         [phone] (Fax)         [email or web address] (Email)     [place] Department of Human Services (DHS)  You have the right to file a complaint with [facility] if you believe you have been discriminated against in our health care programs because of any of the following:    Race    Color    National Origin    Creed    Scientologist    Age    Public Assistance Status    Receipt of Health Care Services    Disability (including physical or mental impairment)    Sex (including sex stereotypes and gender identity)    Marital Status    Political Beliefs    Medical Condition    Genetic Information    Sexual Orientation    Claims Experience    Medical History    Health Status    Complaints must be in writing and filed within 180 days of the date you discovered the alleged discrimination. The complaint must contain your name and address and describe the discrimination you are complaining about. After we get your complaint, we will review it and notify you in writing about whether we have authority to investigate. If we do, we will investigate the complaint.      [facility] will notify you in writing of the investigation s outcome. You have a right to appeal the outcome if you disagree with the decision. To appeal, you must send a written request to have [facility] review the investigation outcome period. Be brief and state why you disagree with the decision. Include additional information you think is important.      If you file a  complaint in this way, the people who work for the agency named in the complaint cannot retaliate against you. This means they cannot punish you in any way for filing a complaint. Filing a complaint in this way does not stop you from seeking out other legal or administration actions.     Contact DHS directly to file a discrimination complaint:        Civil Rights Coordinator        [facility] [place] of Human Services        Equal Opportunity and Access Division        [address] [address]        [place] [address] [phone] (voice) or use your preferred relay service     [employer] Complaint Notice   You have the right to file a complaint with [employer] if you believe you have been discriminated against because of any of the following:       Medical condition    Health status    Receipt of health care services    Claims experience    Medical history    Genetic information    Disability (including mental or physical impairment)    Marital status    Age    Sex (including sex stereotypes and gender identity)    Sexual orientation    National origin    Race    Color    Amish    Creed    Public assistance status    Political beliefs    You can file a complaint and ask for help in filing a complaint in person or by mail, phone, fax, or email at:     [employer] Civil Rights Coordinator  [employer] [facility]  [address], [place] [address] [phone] (voice and fax) or TTY:806  Email: [email or web address]    American Indians can continue to use [place] and [facility] ([facility]) clinics. We will not require prior approval or impose any conditions for you to get services at these clinics. For elders age 65 years and older this includes Elderly Waiver (EW) services accessed through the [place]. If a doctor or other provider in a [place] or [facility] clinic refers you to a provider in our network, we will not require you to see your primary care provider prior to the referral.    For  accessible formats of this publication or assistance with equal or access to our services, visit [email or web address], or call [phone] (toll free) or use your preferred relay service.

## 2020-09-14 NOTE — PROGRESS NOTES
[facility] Care Coordination Contact    Received after visit chart from care coordinator.  Completed following tasks: Mailed copy of care plan to client, Updated services in access and Submitted referrals/auths for PCA and Supplies  , Provider Signature - Full Care Plan:  Member indicates that they would like their POC shared with the following EW providers:  [facility].  Letter and full POC faxed to providers for signature.     and [employer]:  Faxed completed PCA assessment to PCA Agency and mailed copies to member.  Faxed MD Communication to PCP.  Emailed referral form for auth to [employer].    **THIS ASSESSMENT WAS DONE OVER THE PHONE. SENT POC/PCA SIG PAGES TO MEMBER ASKING THEM TO SIGN AND RETURN IN SASE**    Rubina Chapa  Care Management Specialist  [facility]  [phone]

## 2020-09-17 DIAGNOSIS — Z79.4 TYPE 2 DIABETES MELLITUS WITHOUT COMPLICATION, WITH LONG-TERM CURRENT USE OF INSULIN (H): ICD-10-CM

## 2020-09-17 DIAGNOSIS — E11.9 TYPE 2 DIABETES MELLITUS WITHOUT COMPLICATION, WITH LONG-TERM CURRENT USE OF INSULIN (H): ICD-10-CM

## 2020-09-17 NOTE — TELEPHONE ENCOUNTER
Routing refill request to provider for review/approval because:  Labs not current:  a1c  Due for dm check

## 2020-09-19 RX ORDER — INSULIN ASPART 100 [IU]/ML
INJECTION, SOLUTION INTRAVENOUS; SUBCUTANEOUS
Qty: 15 ML | Refills: 3 | Status: SHIPPED | OUTPATIENT
Start: 2020-09-19 | End: 2020-11-09

## 2020-09-20 DIAGNOSIS — E11.9 TYPE 2 DIABETES MELLITUS WITHOUT COMPLICATION, WITH LONG-TERM CURRENT USE OF INSULIN (H): ICD-10-CM

## 2020-09-20 DIAGNOSIS — E78.5 HYPERLIPIDEMIA LDL GOAL <100: ICD-10-CM

## 2020-09-20 DIAGNOSIS — Z79.4 TYPE 2 DIABETES MELLITUS WITHOUT COMPLICATION, WITH LONG-TERM CURRENT USE OF INSULIN (H): ICD-10-CM

## 2020-09-20 RX ORDER — ATORVASTATIN CALCIUM 40 MG/1
40 TABLET, FILM COATED ORAL DAILY
Qty: 90 TABLET | Refills: 0 | Status: SHIPPED | OUTPATIENT
Start: 2020-09-20 | End: 2020-12-18

## 2020-09-20 NOTE — TELEPHONE ENCOUNTER
Call patient sidney Gipson.  Patient due for fasting blood and urine lab for diabetes and lipids in early November.  Future labs ordered.  Assist with scheduling lab appointment medication refills approved.

## 2020-09-20 NOTE — LETTER
[facility]  [address] [address]  [phone]      9/22/2020       Rabia Reed  [address] [address]        Dear Rabia,  While refilling your prescription today, we noticed that you are due for a follow up appointment and fasting labs and urine in early November. We will refill your prescription for 90 days. Please call to schedule to a fasting lab.  Taking care of your health is important to us and we look forward to seeing you in the near future.  Please call us at [phone] or [phone] (or use [employer]) to schedule an appointment.     Please disregard this notice if you have already made an appointment.        Sincerely,      Alfredo Aragon MD/Dolores Ellington, [facility]  Internal Medicine

## 2020-10-13 DIAGNOSIS — R35.0 URINE FREQUENCY: ICD-10-CM

## 2020-10-14 RX ORDER — TAMSULOSIN HYDROCHLORIDE 0.4 MG/1
CAPSULE ORAL
Qty: 90 CAPSULE | Refills: 3 | Status: SHIPPED | OUTPATIENT
Start: 2020-10-14 | End: 2021-08-30

## 2020-11-02 DIAGNOSIS — E78.5 HYPERLIPIDEMIA LDL GOAL <100: ICD-10-CM

## 2020-11-02 DIAGNOSIS — E11.9 TYPE 2 DIABETES MELLITUS WITHOUT COMPLICATION, WITH LONG-TERM CURRENT USE OF INSULIN (H): ICD-10-CM

## 2020-11-02 DIAGNOSIS — Z79.4 TYPE 2 DIABETES MELLITUS WITHOUT COMPLICATION, WITH LONG-TERM CURRENT USE OF INSULIN (H): ICD-10-CM

## 2020-11-02 LAB
ALBUMIN SERPL-MCNC: 3.6 G/DL (ref 3.4–5)
ALP SERPL-CCNC: 123 U/L (ref 40–150)
ALT SERPL W P-5'-P-CCNC: 44 U/L (ref 0–70)
ANION GAP SERPL CALCULATED.3IONS-SCNC: <1 MMOL/L (ref 3–14)
AST SERPL W P-5'-P-CCNC: 30 U/L (ref 0–45)
BILIRUB SERPL-MCNC: 0.6 MG/DL (ref 0.2–1.3)
BUN SERPL-MCNC: 18 MG/DL (ref 7–30)
CALCIUM SERPL-MCNC: 9 MG/DL (ref 8.5–10.1)
CHLORIDE SERPL-SCNC: 107 MMOL/L (ref 94–109)
CHOLEST SERPL-MCNC: 172 MG/DL
CO2 SERPL-SCNC: 33 MMOL/L (ref 20–32)
CREAT SERPL-MCNC: 0.88 MG/DL (ref 0.66–1.25)
CREAT UR-MCNC: 116 MG/DL
GFR SERPL CREATININE-BSD FRML MDRD: 80 ML/MIN/{1.73_M2}
GLUCOSE SERPL-MCNC: 117 MG/DL (ref 70–99)
HBA1C MFR BLD: 8.8 % (ref 0–5.6)
HDLC SERPL-MCNC: 98 MG/DL
LDLC SERPL CALC-MCNC: 59 MG/DL
MICROALBUMIN UR-MCNC: 38 MG/L
MICROALBUMIN/CREAT UR: 32.76 MG/G CR (ref 0–17)
NONHDLC SERPL-MCNC: 74 MG/DL
POTASSIUM SERPL-SCNC: 4.1 MMOL/L (ref 3.4–5.3)
PROT SERPL-MCNC: 7.7 G/DL (ref 6.8–8.8)
SODIUM SERPL-SCNC: 138 MMOL/L (ref 133–144)
TRIGL SERPL-MCNC: 74 MG/DL

## 2020-11-02 PROCEDURE — 80053 COMPREHEN METABOLIC PANEL: CPT | Performed by: INTERNAL MEDICINE

## 2020-11-02 PROCEDURE — 82043 UR ALBUMIN QUANTITATIVE: CPT | Performed by: INTERNAL MEDICINE

## 2020-11-02 PROCEDURE — 36415 COLL VENOUS BLD VENIPUNCTURE: CPT | Performed by: INTERNAL MEDICINE

## 2020-11-02 PROCEDURE — 80061 LIPID PANEL: CPT | Performed by: INTERNAL MEDICINE

## 2020-11-02 PROCEDURE — 83036 HEMOGLOBIN GLYCOSYLATED A1C: CPT | Performed by: INTERNAL MEDICINE

## 2020-11-09 ENCOUNTER — OFFICE VISIT (OUTPATIENT)
Dept: INTERNAL MEDICINE | Facility: CLINIC | Age: 80
End: 2020-11-09
Payer: COMMERCIAL

## 2020-11-09 VITALS
HEART RATE: 87 BPM | TEMPERATURE: 98.2 F | DIASTOLIC BLOOD PRESSURE: 60 MMHG | BODY MASS INDEX: 28.19 KG/M2 | SYSTOLIC BLOOD PRESSURE: 126 MMHG | RESPIRATION RATE: 16 BRPM | WEIGHT: 180 LBS | OXYGEN SATURATION: 96 %

## 2020-11-09 DIAGNOSIS — H26.9 CATARACT OF LEFT EYE, UNSPECIFIED CATARACT TYPE: ICD-10-CM

## 2020-11-09 DIAGNOSIS — H93.3X1 DISORDER OF RIGHT ACOUSTIC NERVE: ICD-10-CM

## 2020-11-09 DIAGNOSIS — Z79.4 TYPE 2 DIABETES MELLITUS WITHOUT COMPLICATION, WITH LONG-TERM CURRENT USE OF INSULIN (H): ICD-10-CM

## 2020-11-09 DIAGNOSIS — E78.5 HYPERLIPIDEMIA LDL GOAL <100: ICD-10-CM

## 2020-11-09 DIAGNOSIS — E11.9 TYPE 2 DIABETES MELLITUS WITHOUT COMPLICATION, WITH LONG-TERM CURRENT USE OF INSULIN (H): ICD-10-CM

## 2020-11-09 DIAGNOSIS — Z01.818 PREOP GENERAL PHYSICAL EXAM: Primary | ICD-10-CM

## 2020-11-09 PROCEDURE — 99215 OFFICE O/P EST HI 40 MIN: CPT | Performed by: INTERNAL MEDICINE

## 2020-11-09 NOTE — PATIENT INSTRUCTIONS
Stop bedtime Novolog insulin  The night before surgery, reduce  70/30 insulin to 15 units  Nothing to eat/drink after midnight prior to surgery  Do not take 70/30 insulin the AM of surgery   Resume usual 70/30 insulinm doses with dinner/supper after surgery  Repeat nonfasting A1C lab in mid February. Will send letter with results   See me in 6 month, earlier as needed

## 2020-11-09 NOTE — PROGRESS NOTES
[address] [address]  Phone: [phone]  Primary Provider: Alfredo Garcia  Pre-op Performing Provider:    ALFREDO GARCIA  VIDEO,     PREOPERATIVE EVALUATION:  Today's date: 11/9/2020    Rabia Reed is a 80 year old male who presents for a preoperative evaluation.    Surgical Information:  Surgery/Procedure: Cataract Removal-Left Eye  Surgery Location: [facility] [place]   Surgeon: Dr. Younger  Surgery Date: 11/17/2020  Time of Surgery: TBD  Where patient plans to recover: At home with family  Fax number for surgical facility: [phone]    Type of Anesthesia Anticipated: Local    Subjective     HPI related to upcoming procedure:   Cloudiness of vision in the left eye with cataract present.  Presents for clearance undergo surgical correction.  See below for other medical issues.  Pt requested son Fred to assist with translation today rather than [facility]     Preop Questions 11/9/2020   1. Have you ever had a heart attack or stroke? No   2. Have you ever had surgery on your heart or blood vessels, such as a stent placement, a coronary artery bypass, or surgery on an artery in your head, neck, heart, or legs? No   3. Do you have chest pain with activity? No   4. Do you have a history of  heart failure? No   5. Do you currently have a cold, bronchitis or symptoms of other infection? No   6. Do you have a cough, shortness of breath, or wheezing? No   7. Do you or anyone in your family have previous history of blood clots? No   8. Do you or does anyone in your family have a serious bleeding problem such as prolonged bleeding following surgeries or cuts? No   9. Have you ever had problems with anemia or been told to take iron pills? No   10. Have you had any abnormal blood loss such as black, tarry or bloody stools? No   11. Have you ever had a blood transfusion? YES - after previous TKA   11a. Have you ever had a transfusion  reaction? No   12. Are you willing to have a blood transfusion if it is medically needed before, during, or after your surgery? Yes   13. Have you or any of your relatives ever had problems with anesthesia? No   14. Do you have sleep apnea, excessive snoring or daytime drowsiness? No   15. Do you have any artifical heart valves or other implanted medical devices like a pacemaker, defibrillator, or continuous glucose monitor? No   16. Do you have artificial joints? YES - Bilateral TKA   17. Are you allergic to latex? No       Health Care Directive:  Patient has a Health Care Directive on file      Preoperative Review of :   reviewed - no record of controlled substances prescribed.      Status of Chronic Conditions:  DIABETES - Patient has a longstanding history of DiabetesType Type II .   Patient is being treated with insulin injections and denies significant side effects. Control has been good despite A1C reading. Pt has chronically run A1Cs in the 8s despite having glucometer monitoring previous showing  Blood sugars in AM < 130 and daytime < 180. HAs seen similar when CGM used.  Complicating factors include but are not limited to: neuropathy and mild proteinuria.     HYPERLIPIDEMIA - Patient has a long history of significant Hyperlipidemia requiring medication for treatment with recent good control. Patient reports no problems or side effects with the medication.      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 NO recent falls. Pt has declined surgical treatment    Review of Systems  CONSTITUTIONAL: NEGATIVE for fever, chills, change in weight  INTEGUMENTARY/SKIN: NEGATIVE for worrisome rashes, moles or lesions  EYES:  See HPI  ENT/MOUTH: NEGATIVE for ear, mouth and throat problems  RESP: NEGATIVE for significant cough or SOB  CV: NEGATIVE for chest pain, palpitations. Minimal BLE  peripheral edema. No orthopnea, PND  GI: NEGATIVE for nausea, abdominal  pain, heartburn, or change in bowel habits  : NEGATIVE for frequency, dysuria, or hematuria with meds  MUSCULOSKELETAL: N POSITIVE foe stable arthrlagias with use of Celebrex. Still able to use stationary bike for 30 min daily  NEURO: NEGATIVE for weakness, or paresthesias. MIkld chronic imbalance with acoustical neuroma. No falls. Uses cane  ENDOCRINE: NEGATIVE for temperature intolerance. DM as above. And below  HEME: NEGATIVE for bleeding problems  PSYCHIATRIC: NEGATIVE for changes in mood or affect    Patient Active Problem List    Diagnosis Date Noted     Calculus of kidney 01/01/2019     Priority: Medium     renal calculi 1.4m left       Anemia, unspecified type 01/04/2018     Priority: Medium     Status post total left knee replacement 09/14/2017     Priority: Medium     Type 2 diabetes mellitus without complication, with long-term current use of insulin (H) 01/29/2017     Priority: Medium     Erectile dysfunction, unspecified erectile dysfunction type 01/25/2016     Priority: Medium     Elevated prostate specific antigen (PSA) 11/06/2013     Priority: Medium     BPH (benign prostatic hypertrophy) 12/23/2010     Priority: Medium     HYPERLIPIDEMIA LDL GOAL <100 10/31/2010     Priority: Medium     Disorder of acoustic nerve      Priority: Medium     acoustical neuroma right side  Problem list name updated by automated process. 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     Priority: Medium      Past Medical History:   Diagnosis Date     Anemia, unspecified type 1/4/2018     Calculus of kidney 01/2019    renal calculi 1.4m left     Chondromalacia of patella 5/03    right     Disorders of acoustic nerve 2/08    acoustical neuroma right side     Elevated prostate specific antigen (PSA) 11/6/2013     Essential hypertension, benign      Hyperlipidemia LDL goal <100       Impotence of organic origin      Inguinal hernia without mention of obstruction or  gangrene, unilateral or unspecified, (not specified as recurrent) 1/03    right     Memory loss      Osteoarthritis of both hands, unspecified osteoarthritis type 3/19/2017     Tobacco use disorder      Type 2 diabetes mellitus without complication  (goal A1C<7) 10/24/2015     Unspecified nasal polyp      Urinary frequency     nocturia x 5     Past Surgical History:   Procedure Laterality Date     ARTHROPLASTY KNEE Right 9/14/2017    Procedure: ARTHROPLASTY KNEE;  RIGHT TOTAL KNEE ARTHROPLASTY ;  Surgeon: Darnell Allen MD;  Location:  OR     ARTHROPLASTY KNEE Left 10/5/2018    Procedure: ARTHROPLASTY KNEE;  LEFT TOTAL KNEE ARTHROPLASTY ;  Surgeon: Darnell Allen MD;  Location:  OR     C APPENDECTOMY       ENDOSCOPIC RETROGRADE CHOLANGIOPANCREATOGRAM N/A 12/31/2018    Procedure: COMBINED ENDOSCOPIC RETROGRADE CHOLANGIOPANCREATOGRAPHY, SPHINCTEROTOMY;  Surgeon: Jaime Canales MD;  Location:  OR     ENDOSCOPIC RETROGRADE CHOLANGIOPANCREATOGRAM N/A 12/31/2018    Procedure: Combined Endoscopic Retrograde Cholangiopancreatography, Remove Stone/Balloon;  Surgeon: Jaime Canales MD;  Location:  OR     ESOPHAGOSCOPY, GASTROSCOPY, DUODENOSCOPY (EGD), COMBINED N/A 12/31/2018    Procedure: COMBINED ENDOSCOPIC ULTRASOUND, ESOPHAGOSCOPY, GASTROSCOPY, DUODENOSCOPY (EGD);  Surgeon: Jaime Canales MD;  Location:  OR     LAPAROSCOPIC CHOLECYSTECTOMY N/A 1/1/2019    Procedure: LAPAROSCOPIC CHOLECYSTECTOMY;  Surgeon: Melia Bentley MD;  Location:  OR     ZZC NONSPECIFIC PROCEDURE  4/01    nasal polyp excision     Z NONSPECIFIC PROCEDURE  1/03    RIH repair     Z NONSPECIFIC PROCEDURE  April 2011     right knee arthroscopy     Current Outpatient Medications   Medication Sig Dispense Refill     acetaminophen (TYLENOL) 325 MG tablet Take 2 tablets (650 mg) by mouth every 6 hours as needed for mild pain 100 tablet 3     Ascorbic Acid (VITAMIN C PO) Take 500 mg by mouth daily        aspirin 81 MG EC tablet Take 81 mg by mouth daily       atorvastatin (LIPITOR) 40 MG tablet Take 1 tablet (40 mg) by mouth daily 90 tablet 0     blood glucose (ONETOUCH ULTRA) test strip TEST 3 TIMES DAILY 200 each 1     blood glucose monitoring (ONE TOUCH ULTRASOFT) lancets USE 3 TIMES DAILY 200 each 3     Blood Glucose Monitoring Suppl (ONE TOUCH ULTRA SYSTEM KIT) W/DEVICE KIT One touch ultra 2 if covered by insurance 1 kit 0     celecoxib (CELEBREX) 200 MG capsule TAKE 1 CAPSULE BY MOUTH DAILY WITH FOOD FOR ARTHRITIS PAIN 90 capsule 1     finasteride (PROSCAR) 5 MG tablet TAKE ONE TABLET BY MOUTH ONE TIME DAILY  90 tablet 3     insulin aspart prot & aspart (NOVOLOG MIX 70/30 FLEXPEN) (70-30) 100 UNIT/ML pen INJECT 30 UNITS BEFORE BREAKFAST & 20 UNITS BEFORE SUPPER 45 mL 3     insulin pen needle (ULTICARE MICRO) 32G X 4 MM miscellaneous use test 3 times a daily 100 each 1     insulin pen needle (ULTICARE MICRO) 32G X 4 MM miscellaneous TEST 3 TIMES DAILY 100 each 0     NOVOLOG FLEXPEN 100 UNIT/ML soln Inject 6 Units Subcutaneous At Bedtime 15 mL 3     sennosides (SENOKOT) 8.6 MG tablet Take 2 tablets by mouth 2 times daily       Skin Protectants, Misc. (EUCERIN) cream Apply topically 2 times daily       tamsulosin (FLOMAX) 0.4 MG capsule TAKE ONE CAPSULE BY MOUTH ONE TIME DAILY 90 capsule 3     triamcinolone (KENALOG) 0.1 % external cream Apply topically 2 times daily As needed for skin rash 60 g 2       Allergies   Allergen Reactions     No Known Drug Allergies         Social History     Tobacco Use     Smoking status: Former Smoker     Packs/day: 0.50     Years: 42.00     Pack years: 21.00     Quit date: 2001     Years since quittin.8     Smokeless tobacco: Never Used   Substance Use Topics     Alcohol use: No     Alcohol/week: 0.0 standard drinks     Family History   Problem Relation Age of Onset     Diabetes Mother          age 85     Family History Negative Father          age 38,   in war     Diabetes Sister      Diabetes Brother         borderline diabetic     History   Drug Use No         Objective     /60   Pulse 87   Temp 98.2  F (36.8  C) (Temporal)   Resp 16   Wt 81.6 kg (180 lb)   SpO2 96%   BMI 28.19 kg/m      Physical Exam  Eye: PERRL, EOMI. Left cataract  HENT: ear canals and TM's normal and nose and mouth without ulcers or lesions   Neck: no adenopathy. Thyroid normal to palpation. No bruits  Pulm: Lungs clear to auscultation   CV: Regular rates and rhythm  GI: Soft, nontender, Normal active bowel sounds, No hepatosplenomegaly or masses palpable  Ext: Peripheral pulses intact. Minimal BLE edema.  Neuro: Normal strength and tone, sensory exam  Mildly reduced to LTS distal BLE. Stable gait with cane      Recent Labs   Lab Test 20  1053 20  0843 19  0801 19  0801 19  0725 19  0725   HGB  --   --   --  13.3  --  11.9*   PLT  --   --   --  234  --  118*    139   < > 137   < > 142   POTASSIUM 4.1 3.5   < > 3.7   < > 2.9*   CR 0.88 0.88   < > 0.81   < > 0.76   A1C 8.8* 8.8*   < >  --   --   --     < > = values in this interval not displayed.        Diagnostics:  No labs were ordered during this visit besides above    EKG - not indicated for cataract surgery      Revised Cardiac Risk Index (RCRI):  The patient has the following serious cardiovascular risks for perioperative complications:   - Diabetes Mellitus (on Insulin) = 1 point     RCRI Interpretation: 1 point: Class II (low risk - 0.9% complication rate)    Pt has good exercise tolerance riding on stationary bike for 30 min daily without chest pain or shortness of breath          Assessment & Plan   The proposed surgical procedure is considered LOW risk.    Preop general physical exam      Cataract of left eye, unspecified cataract type      Type 2 diabetes mellitus without complication, with long-term current use of insulin (H)   A1C 8.8 but blood sugars at goal in AM < 130  and generally < 180 in PM. A1C chronically has read high compared to glucometer or CGM readings. Similar when pt also followed by Endo    Hyperlipidemia LDL goal <100  Controlled with statin    Disorder of right acoustic nerve  Pt declines surgery. Stable gait with cane. No recent falls    Pt instructions:   Stop bedtime Novolog insulin  The night before surgery, reduce  70/30 insulin to 15 units  Nothing to eat/drink after midnight prior to surgery  Do not take 70/30 insulin the AM of surgery   Resume usual 70/30 insulinm doses with dinner/supper after surgery  Repeat nonfasting A1C lab in mid February. Will send letter with results   See me in 6 month, earlier as needed            RECOMMENDATION:  APPROVAL GIVEN to proceed with proposed procedure, without further diagnostic evaluation.    Signed Electronically by: Alfredo Aragon MD    Copy of this evaluation report is provided to requesting physician.    Preop [facility] Preop Guidelines    Revised Cardiac Risk Index

## 2020-11-15 DIAGNOSIS — E11.9 TYPE 2 DIABETES MELLITUS WITHOUT COMPLICATION, WITH LONG-TERM CURRENT USE OF INSULIN (H): ICD-10-CM

## 2020-11-15 DIAGNOSIS — Z79.4 TYPE 2 DIABETES MELLITUS WITHOUT COMPLICATION, WITH LONG-TERM CURRENT USE OF INSULIN (H): ICD-10-CM

## 2020-11-16 PROBLEM — D64.9 ANEMIA, UNSPECIFIED TYPE: Status: RESOLVED | Noted: 2018-01-04 | Resolved: 2020-11-16

## 2020-11-16 PROBLEM — N20.0 CALCULUS OF KIDNEY: Status: RESOLVED | Noted: 2019-01-01 | Resolved: 2020-11-16

## 2020-11-16 PROBLEM — H26.9 CATARACT OF LEFT EYE, UNSPECIFIED CATARACT TYPE: Status: ACTIVE | Noted: 2020-11-16

## 2020-11-16 RX ORDER — BLOOD SUGAR DIAGNOSTIC
STRIP MISCELLANEOUS
Qty: 300 EACH | Refills: 3 | Status: SHIPPED | OUTPATIENT
Start: 2020-11-16 | End: 2022-03-25

## 2020-11-16 RX ORDER — PEN NEEDLE, DIABETIC 32GX 5/32"
NEEDLE, DISPOSABLE MISCELLANEOUS
Qty: 200 EACH | Refills: 3 | Status: SHIPPED | OUTPATIENT
Start: 2020-11-16 | End: 2021-06-30

## 2020-12-17 DIAGNOSIS — E78.5 HYPERLIPIDEMIA LDL GOAL <100: ICD-10-CM

## 2020-12-17 DIAGNOSIS — E11.9 TYPE 2 DIABETES MELLITUS WITHOUT COMPLICATION, WITH LONG-TERM CURRENT USE OF INSULIN (H): ICD-10-CM

## 2020-12-17 DIAGNOSIS — Z79.4 TYPE 2 DIABETES MELLITUS WITHOUT COMPLICATION, WITH LONG-TERM CURRENT USE OF INSULIN (H): ICD-10-CM

## 2020-12-18 RX ORDER — LANCETS
EACH MISCELLANEOUS
Qty: 200 EACH | Refills: 0 | Status: SHIPPED | OUTPATIENT
Start: 2020-12-18 | End: 2021-11-11

## 2020-12-18 RX ORDER — ATORVASTATIN CALCIUM 40 MG/1
40 TABLET, FILM COATED ORAL DAILY
Qty: 90 TABLET | Refills: 0 | Status: SHIPPED | OUTPATIENT
Start: 2020-12-18 | End: 2021-02-23

## 2021-01-12 ENCOUNTER — MEDICAL CORRESPONDENCE (OUTPATIENT)
Dept: HEALTH INFORMATION MANAGEMENT | Facility: CLINIC | Age: 81
End: 2021-01-12

## 2021-01-15 ENCOUNTER — HEALTH MAINTENANCE LETTER (OUTPATIENT)
Age: 81
End: 2021-01-15

## 2021-01-18 DIAGNOSIS — Z53.9 DIAGNOSIS NOT YET DEFINED: Primary | ICD-10-CM

## 2021-01-18 PROCEDURE — G0179 MD RECERTIFICATION HHA PT: HCPCS | Performed by: INTERNAL MEDICINE

## 2021-01-19 ENCOUNTER — MEDICAL CORRESPONDENCE (OUTPATIENT)
Dept: HEALTH INFORMATION MANAGEMENT | Facility: CLINIC | Age: 81
End: 2021-01-19

## 2021-01-29 DIAGNOSIS — Z53.9 DIAGNOSIS NOT YET DEFINED: Primary | ICD-10-CM

## 2021-01-29 PROCEDURE — G0179 MD RECERTIFICATION HHA PT: HCPCS | Performed by: INTERNAL MEDICINE

## 2021-02-10 ENCOUNTER — IMMUNIZATION (OUTPATIENT)
Dept: PEDIATRICS | Facility: CLINIC | Age: 81
End: 2021-02-10
Payer: COMMERCIAL

## 2021-02-10 PROCEDURE — 0001A PR COVID VAC PFIZER DIL RECON 30 MCG/0.3 ML IM: CPT

## 2021-02-10 PROCEDURE — 91300 PR COVID VAC PFIZER DIL RECON 30 MCG/0.3 ML IM: CPT

## 2021-02-21 DIAGNOSIS — M19.91 PRIMARY OSTEOARTHRITIS, UNSPECIFIED SITE: ICD-10-CM

## 2021-02-21 DIAGNOSIS — E78.5 HYPERLIPIDEMIA LDL GOAL <100: ICD-10-CM

## 2021-02-23 RX ORDER — ATORVASTATIN CALCIUM 40 MG/1
40 TABLET, FILM COATED ORAL DAILY
Qty: 90 TABLET | Refills: 1 | Status: SHIPPED | OUTPATIENT
Start: 2021-02-23 | End: 2021-08-30

## 2021-02-23 NOTE — TELEPHONE ENCOUNTER
Atorvastatin    Prescription approved per [facility] Refill Protocol.    Rosangela FERRARAN, RN, PHN

## 2021-02-23 NOTE — TELEPHONE ENCOUNTER
Celebrex    Routing refill request to provider for review/approval because:  Failed protocol d/t age    Rosangela FERRARAN, RN, PHN

## 2021-02-24 RX ORDER — CELECOXIB 200 MG/1
CAPSULE ORAL
Qty: 90 CAPSULE | Refills: 1 | Status: SHIPPED | OUTPATIENT
Start: 2021-02-24 | End: 2021-09-01

## 2021-03-03 ENCOUNTER — IMMUNIZATION (OUTPATIENT)
Dept: PEDIATRICS | Facility: CLINIC | Age: 81
End: 2021-03-03
Attending: INTERNAL MEDICINE
Payer: COMMERCIAL

## 2021-03-03 PROCEDURE — 0002A PR COVID VAC PFIZER DIL RECON 30 MCG/0.3 ML IM: CPT

## 2021-03-03 PROCEDURE — 91300 PR COVID VAC PFIZER DIL RECON 30 MCG/0.3 ML IM: CPT

## 2021-03-12 DIAGNOSIS — N40.1 BENIGN PROSTATIC HYPERPLASIA WITH LOWER URINARY TRACT SYMPTOMS, SYMPTOM DETAILS UNSPECIFIED: ICD-10-CM

## 2021-03-14 RX ORDER — FINASTERIDE 5 MG/1
TABLET, FILM COATED ORAL
Qty: 90 TABLET | Refills: 0 | Status: SHIPPED | OUTPATIENT
Start: 2021-03-14 | End: 2021-06-15

## 2021-03-16 ENCOUNTER — OFFICE VISIT (OUTPATIENT)
Dept: INTERNAL MEDICINE | Facility: CLINIC | Age: 81
End: 2021-03-16
Payer: COMMERCIAL

## 2021-03-16 VITALS
DIASTOLIC BLOOD PRESSURE: 64 MMHG | BODY MASS INDEX: 28.88 KG/M2 | WEIGHT: 184 LBS | HEART RATE: 88 BPM | SYSTOLIC BLOOD PRESSURE: 132 MMHG | RESPIRATION RATE: 18 BRPM | TEMPERATURE: 98.3 F | HEIGHT: 67 IN | OXYGEN SATURATION: 98 %

## 2021-03-16 DIAGNOSIS — E78.5 HYPERLIPIDEMIA LDL GOAL <100: ICD-10-CM

## 2021-03-16 DIAGNOSIS — H25.9 SENILE CATARACT, UNSPECIFIED AGE-RELATED CATARACT TYPE, UNSPECIFIED LATERALITY: ICD-10-CM

## 2021-03-16 DIAGNOSIS — E11.9 TYPE 2 DIABETES MELLITUS WITHOUT COMPLICATION, WITH LONG-TERM CURRENT USE OF INSULIN (H): ICD-10-CM

## 2021-03-16 DIAGNOSIS — Z01.818 PREOP GENERAL PHYSICAL EXAM: Primary | ICD-10-CM

## 2021-03-16 DIAGNOSIS — Z79.4 TYPE 2 DIABETES MELLITUS WITHOUT COMPLICATION, WITH LONG-TERM CURRENT USE OF INSULIN (H): ICD-10-CM

## 2021-03-16 LAB — HBA1C MFR BLD: 9.1 % (ref 0–5.6)

## 2021-03-16 PROCEDURE — 36415 COLL VENOUS BLD VENIPUNCTURE: CPT | Performed by: PHYSICIAN ASSISTANT

## 2021-03-16 PROCEDURE — 99215 OFFICE O/P EST HI 40 MIN: CPT | Performed by: PHYSICIAN ASSISTANT

## 2021-03-16 PROCEDURE — 83036 HEMOGLOBIN GLYCOSYLATED A1C: CPT | Performed by: PHYSICIAN ASSISTANT

## 2021-03-16 ASSESSMENT — MIFFLIN-ST. JEOR: SCORE: 1493.25

## 2021-03-16 NOTE — PROGRESS NOTES
[address] [address]  Phone: [phone]  Primary Provider: Alfredo Aragon  Pre-op Performing Provider:    DOLLY GREENBERG  PHONE,       PREOPERATIVE EVALUATION:  Today's date: 3/16/2021    Rabia Reed is a 80 year old male who presents for a preoperative evaluation.    Surgical Information:  Surgery/Procedure: Cataract surgery right eye  Surgery Location: [facility]  Surgeon: Dr Younger  Surgery Date: 03/23/2021  Time of Surgery: TBD  Where patient plans to recover: At home with family  Fax number for surgical facility: [phone]    Type of Anesthesia Anticipated: Local with MAC    Assessment & Plan     The proposed surgical procedure is considered LOW risk.    Preop general physical exam      Senile cataract, unspecified age-related cataract type, unspecified laterality      Type 2 diabetes mellitus without complication, with long-term current use of insulin (H)    - Hemoglobin A1c    Hyperlipidemia LDL goal <100             Risks and Recommendations:  The patient has the following additional risks and recommendations for perioperative complications:   - No identified additional risk factors other than previously addressed    Medication Instructions:  Patient is to take all scheduled medications on the day of surgery EXCEPT for modifications listed below:   - Bleeding risk is low for this procedure (e.g. dental, skin, cataract).   - Statins: Continue taking on the day of surgery.    - mixed insulin (70/30m 75/25, 50/50): HOLD day of surgery  The night before surgery, reduce  70/30 insulin to 15 units  Nothing to eat/drink after midnight prior to surgery  Do not take 70/30 insulin the AM of surgery   Resume usual 70/30 insulin doses with dinner/supper after surgery    RECOMMENDATION:  APPROVAL GIVEN to proceed with proposed procedure, without further diagnostic evaluation.                      Subjective     HPI  related to upcoming procedure: right eye cataract      Preop Questions 3/16/2021   1. Have you ever had a heart attack or stroke? No   2. Have you ever had surgery on your heart or blood vessels, such as a stent placement, a coronary artery bypass, or surgery on an artery in your head, neck, heart, or legs? No   3. Do you have chest pain with activity? No   4. Do you have a history of  heart failure? No   5. Do you currently have a cold, bronchitis or symptoms of other infection? No   6. Do you have a cough, shortness of breath, or wheezing? No   7. Do you or anyone in your family have previous history of blood clots? No   8. Do you or does anyone in your family have a serious bleeding problem such as prolonged bleeding following surgeries or cuts? No   9. Have you ever had problems with anemia or been told to take iron pills? No   10. Have you had any abnormal blood loss such as black, tarry or bloody stools? No   11. Have you ever had a blood transfusion? YES - with a surgery    11a. Have you ever had a transfusion reaction? No   12. Are you willing to have a blood transfusion if it is medically needed before, during, or after your surgery? Yes   13. Have you or any of your relatives ever had problems with anesthesia? No   14. Do you have sleep apnea, excessive snoring or daytime drowsiness? No   15. Do you have any artifical heart valves or other implanted medical devices like a pacemaker, defibrillator, or continuous glucose monitor? No   16. Do you have artificial joints? YES - total knee bilaterally    17. Are you allergic to latex? No     Health Care Directive:  Patient has a Health Care Directive on file      Preoperative Review of :   reviewed - no record of controlled substances prescribed.      Status of Chronic Conditions:  DIABETES - Patient has a longstanding history of DiabetesType Type II . Patient is being treated with insulin injections and denies significant side effects. Control has been  fair. Complicating factors include but are not limited to: hyperlipidemia.     HYPERLIPIDEMIA - Patient has a long history of significant Hyperlipidemia requiring medication for treatment with recent good control. Patient reports no problems or side effects with the medication.       Review of Systems  CONSTITUTIONAL: NEGATIVE for fever, chills, change in weight  INTEGUMENTARY/SKIN: NEGATIVE for worrisome rashes, moles or lesions  ENT/MOUTH: NEGATIVE for ear, mouth and throat problems  RESP: NEGATIVE for significant cough or SOB  CV: NEGATIVE for chest pain, palpitations or peripheral edema  GI: NEGATIVE for nausea, abdominal pain, heartburn, or change in bowel habits    Patient Active Problem List    Diagnosis Date Noted     Cataract of left eye, unspecified cataract type 11/16/2020     Priority: Medium     Status post total left knee replacement 09/14/2017     Priority: Medium     Type 2 diabetes mellitus without complication, with long-term current use of insulin (H) 01/29/2017     Priority: Medium     Erectile dysfunction, unspecified erectile dysfunction type 01/25/2016     Priority: Medium     Elevated prostate specific antigen (PSA) 11/06/2013     Priority: Medium     BPH (benign prostatic hypertrophy) 12/23/2010     Priority: Medium     HYPERLIPIDEMIA LDL GOAL <100 10/31/2010     Priority: Medium     Disorder of acoustic nerve      Priority: Medium     acoustical neuroma right side  Problem list name updated by automated process. 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     Priority: Medium      Past Medical History:   Diagnosis Date     Anemia, unspecified type 1/4/2018     Calculus of kidney 01/2019    renal calculi 1.4m left     Chondromalacia of patella 5/03    right     Disorders of acoustic nerve 2/08    acoustical neuroma right side     Elevated prostate specific antigen (PSA) 11/6/2013     Essential hypertension, benign      Hyperlipidemia  LDL goal <100       Impotence of organic origin      Inguinal hernia without mention of obstruction or gangrene, unilateral or unspecified, (not specified as recurrent) 1/03    right     Memory loss      Osteoarthritis of both hands, unspecified osteoarthritis type 3/19/2017     Tobacco use disorder      Type 2 diabetes mellitus without complication  (goal A1C<7) 10/24/2015     Unspecified nasal polyp      Urinary frequency     nocturia x 5     Past Surgical History:   Procedure Laterality Date     ARTHROPLASTY KNEE Right 9/14/2017    Procedure: ARTHROPLASTY KNEE;  RIGHT TOTAL KNEE ARTHROPLASTY ;  Surgeon: Darnell Allen MD;  Location:  OR     ARTHROPLASTY KNEE Left 10/5/2018    Procedure: ARTHROPLASTY KNEE;  LEFT TOTAL KNEE ARTHROPLASTY ;  Surgeon: Darnell Allen MD;  Location:  OR     C APPENDECTOMY       ENDOSCOPIC RETROGRADE CHOLANGIOPANCREATOGRAM N/A 12/31/2018    Procedure: COMBINED ENDOSCOPIC RETROGRADE CHOLANGIOPANCREATOGRAPHY, SPHINCTEROTOMY;  Surgeon: Jaime Canales MD;  Location:  OR     ENDOSCOPIC RETROGRADE CHOLANGIOPANCREATOGRAM N/A 12/31/2018    Procedure: Combined Endoscopic Retrograde Cholangiopancreatography, Remove Stone/Balloon;  Surgeon: Jaime Canales MD;  Location:  OR     ESOPHAGOSCOPY, GASTROSCOPY, DUODENOSCOPY (EGD), COMBINED N/A 12/31/2018    Procedure: COMBINED ENDOSCOPIC ULTRASOUND, ESOPHAGOSCOPY, GASTROSCOPY, DUODENOSCOPY (EGD);  Surgeon: Jaime Canales MD;  Location:  OR     LAPAROSCOPIC CHOLECYSTECTOMY N/A 1/1/2019    Procedure: LAPAROSCOPIC CHOLECYSTECTOMY;  Surgeon: Melia Bentley MD;  Location:  OR     ZC NONSPECIFIC PROCEDURE  4/01    nasal polyp excision     [facility] [place] NONSPECIFIC PROCEDURE  1/03    RIH repair     [facility] [place] NONSPECIFIC PROCEDURE  April 2011     right knee arthroscopy     Current Outpatient Medications   Medication Sig Dispense Refill     acetaminophen (TYLENOL) 325 MG tablet Take 2 tablets (650 mg) by mouth every 6 hours  "as needed for mild pain 100 tablet 3     Ascorbic Acid (VITAMIN C PO) Take 500 mg by mouth daily       aspirin 81 MG EC tablet Take 81 mg by mouth daily       atorvastatin (LIPITOR) 40 MG tablet Take 1 tablet (40 mg) by mouth daily. 90 tablet 1     blood glucose monitoring (ONE TOUCH ULTRASOFT) lancets USE 3 TIMES DAILY 200 each 0     Blood Glucose Monitoring Suppl (ONE TOUCH ULTRA SYSTEM KIT) W/DEVICE KIT One touch ultra 2 if covered by insurance 1 kit 0     celecoxib (CELEBREX) 200 MG capsule TAKE 1 CAPSULE BY MOUTH DAILY WITH FOOD FOR ARTHRITIS PAIN 90 capsule 1     finasteride (PROSCAR) 5 MG tablet TAKE ONE TABLET BY MOUTH ONE TIME DAILY  90 tablet 0     insulin aspart prot & aspart (NOVOLOG MIX 70/30 FLEXPEN) (70-30) 100 UNIT/ML pen INJECT 30 UNITS BEFORE BREAKFAST & 20 UNITS BEFORE SUPPER 45 mL 3     insulin pen needle (ULTICARE MICRO) 32G X 4 MM miscellaneous use test 2 times a daily 200 each 3     insulin pen needle (ULTICARE MICRO) 32G X 4 MM miscellaneous TEST 3 TIMES DAILY 100 each 0     ONETOUCH ULTRA test strip TEST 3 TIMES DAILY 300 each 3     sennosides (SENOKOT) 8.6 MG tablet Take 2 tablets by mouth 2 times daily       Skin Protectants, Misc. (EUCERIN) cream Apply topically 2 times daily       tamsulosin (FLOMAX) 0.4 MG capsule TAKE ONE CAPSULE BY MOUTH ONE TIME DAILY 90 capsule 3     triamcinolone (KENALOG) 0.1 % external cream Apply topically 2 times daily As needed for skin rash 60 g 2       Allergies   Allergen Reactions     No Known Drug Allergies         Social History     Tobacco Use     Smoking status: Former Smoker     Packs/day: 0.50     Years: 42.00     Pack years: 21.00     Quit date: 2001     Years since quittin.1     Smokeless tobacco: Never Used   Substance Use Topics     Alcohol use: No     Alcohol/week: 0.0 standard drinks       History   Drug Use No         Objective     /64   Pulse 88   Temp 98.3  F (36.8  C) (Tympanic)   Resp 18   Ht 1.702 m (5' 7\")   Wt 83.5 " kg (184 lb)   SpO2 98%   BMI 28.82 kg/m      Physical Exam  GENERAL APPEARANCE: healthy, alert and no distress  HENT: ear canals and TM's normal and nose and mouth without ulcers or lesions  RESP: lungs clear to auscultation - no rales, rhonchi or wheezes  CV: regular rate and rhythm, normal S1 S2, no S3 or S4 and no murmur, click or rub   ABDOMEN: soft, nontender, no HSM or masses and bowel sounds normal  NEURO: Normal strength and tone, sensory exam grossly normal, mentation intact and speech normal  PSYCH: mentation appears normal and affect normal/bright    Recent Labs   Lab Test 11/02/20  1053 01/13/20  0843    139   POTASSIUM 4.1 3.5   CR 0.88 0.88   A1C 8.8* 8.8*        Diagnostics:  Labs pending at this time.  Results will be reviewed when available. - not required for surgery -   Due to recheck A1C  No EKG required for low risk surgery (cataract, skin procedure, breast biopsy, etc).    Revised Cardiac Risk Index (RCRI):  The patient has the following serious cardiovascular risks for perioperative complications:   - Diabetes Mellitus (on Insulin) = 1 point     RCRI Interpretation: 1 point: Class II (low risk - 0.9% complication rate)           Signed Electronically by: Dolores De La Cruz PA-C  Copy of this evaluation report is provided to requesting physician.

## 2021-03-16 NOTE — PATIENT INSTRUCTIONS
The night before surgery, reduce  70/30 insulin to 15 units  Nothing to eat/drink after midnight prior to surgery  Do not take 70/30 insulin the AM of surgery   Resume usual 70/30 insulin doses with dinner/supper after surgery    Preparing for Your Surgery  Getting started  A nurse will call you to review your health history and instructions. They will give you an arrival time based on your scheduled surgery time.  Please be ready to share the following:    Your doctor's clinic name and phone number    Your medical, surgical and anesthesia history    A list of allergies and sensitivities    A list of medicines, including herbal treatments and over-the-counter drugs    Whether the patient has a legal guardian (ask how to send us the papers in advance)  If you have a child who's having surgery, please ask for a copy of Preparing for Your Child's Surgery.    Preparing for surgery    Within 30 days of surgery: Have a pre-op exam (sometimes called an H&P, or History and Physical). This can be done at a clinic or pre-operative center.  ? If you're having a , you may not need this exam. Talk to your care team    At your pre-op exam, talk to your care team about all medicines you take. If you need to stop any medicines before surgery, ask when to start taking them again.  ? We do this for your safety. Many medicines can make you bleed too much during surgery. Some change how well surgery (anesthesia) drugs work.    Call your insurance company to let them know you're having surgery. (If you don't have insurance, call [phone].)    Call your clinic if there's any change in your health. This includes signs of a cold or flu (sore throat, runny nose, cough, rash, fever). It also includes a scrape or scratch near the surgery site.    If you have questions on the day of surgery, call your hospital or surgery center.  Eating and drinking guidelines  For your safety: Unless your surgeon tells you otherwise, follow the  guidelines below.    Eat and drink as usual until 8 hours before surgery. After that, no food or milk.    Drink clear liquids until 2 hours before surgery. These are liquids you can see through, like water, Gatorade and Propel Water. You may also have black coffee and tea (no cream or milk).    Nothing by mouth within 2 hours of surgery. This includes gum, candy and breath mints.    If you drink, stop drinking alcohol the night before surgery.    If your care team tells you to take medicine on the morning of surgery, it's okay to take it with a sip of water.  Preventing infection    Shower or bathe the night before and morning of your surgery. Follow the instructions your clinic gave you. (If no instructions, use regular soap.)    Don't shave or clip hair near your surgery site. We'll remove the hair if needed.    Don't smoke or vape the morning of surgery. You may chew nicotine gum up to 2 hours before surgery. A nicotine patch is okay.  ? Note: Some surgeries require you to completely quit smoking and nicotine. Check with your surgeon.    Your care team will make every effort to keep you safe from infection. We will:  ? Clean our hands often with soap and water (or an alcohol-based hand rub).  ? Clean the skin at your surgery site with a special soap that kills germs.  ? Give you a special gown to keep you warm. (Cold raises the risk of infection.)  ? Wear special hair covers, masks, gowns and gloves during surgery.  ? Give antibiotic medicine, if prescribed. Not all surgeries need antibiotics.  What to bring on the day of surgery    Photo ID and insurance card    Copy of your health care directive, if you have one    Glasses and hearing aides (bring cases)  ? You can't wear contacts during surgery    Inhaler and eye drops, if you use them (tell us about these when you arrive)    CPAP machine or breathing device, if you use them    A few personal items, if spending the night    If you have . . .  ? A pacemaker or  ICD (cardiac defibrillator): Bring the ID card.  ? An implanted stimulator: Bring the remote control.  ? A legal guardian: Bring a copy of the certified (court-stamped) guardianship papers.  Please remove any jewelry, including body piercings. Leave jewelry and other valuables at home.  If you're going home the day of surgery  Important: If you don't follow the rules below, we must cancel your surgery.     Arrange for someone to drive you home after surgery. You may not drive, take a taxi or take public transportation by yourself (unless you'll have local anesthesia only).    Arrange for a responsible adult to stay with you overnight. If you don't, we may keep you in the hospital overnight, and you may need to pay the costs yourself.  Questions?   If you have any questions for your care team, list them here: _________________________________________________________________________________________________________________________________________________________________________________________________________________________________________________________________________________________________________________________  For informational purposes only. Not to replace the advice of your health care provider. Copyright   2003, 2019 [facility]. All rights reserved. Clinically reviewed by Brooke Zuniga MD. Flukle [id number] - REV 4/20.

## 2021-03-19 ENCOUNTER — PATIENT OUTREACH (OUTPATIENT)
Dept: GERIATRIC MEDICINE | Facility: CLINIC | Age: 81
End: 2021-03-19

## 2021-03-19 NOTE — PROGRESS NOTES
[facility] Care Coordination Contact    Called adult son Brandan to complete six month assessment and left a message requesting a return call.  ANASTACIA Velázquez  [facility]  [phone]

## 2021-03-22 NOTE — PROGRESS NOTES
[facility] Care Coordination Contact      [facility] Six-Month Telephone Assessment    6 month telephone assessment completed on 3/22/21. Received a return call from member's son, Brandan.    ER visits: No  Hospitalizations: No  TCU stays: No  Significant health status changes: Son reports that member is doing well. Shared that member has right cataract surgery tomorrow.   Falls/Injuries: No  ADL/IADL changes: No  Changes in services: No    Caregiver Assessment follow up:  NA    Goals: See POC in chart for goal progress documentation.     Will see member in 6 months for an annual health risk assessment.   Encouraged member to call CC with any questions or concerns in the meantime.     ANASTACIA Velázquez  [facility]  [phone]

## 2021-04-28 NOTE — PROGRESS NOTES
[facility] Care Coordination Contact    [facility] Six-Month Telephone Assessment    6 month telephone assessment completed on 6/7/18.    ER visits: No  Hospitalizations: No  TCU stays: No  Significant health status changes: Pain has increased  Falls/Injuries: No  ADL/IADL changes: No  Changes in services: No    Caregiver Assessment follow up: NA      Goals: See POC in chart for goal progress documentation.  Shared that member continues to have pain in his left leg (knee replacement a couple of months ago was on the right leg and there is no pain in that leg) and he has brought him into the MD about the pain, and the MD told that him that it was something in his back, and when he saw the spine MD he was given an epidural and this did not help.  Member had an MRI and xrays of back and no results were found.  Member is going back to the knee MD regarding the pain.  Son shared that he has talked with the MD about pain medication and the MDs have told him no.  Son shared that member has been refusing to get out of his room or bed, and reports sever pain and shared that pain medications do not work and has even told his son that he wants to cut off his leg to make the pain go away. Had an MRI today of the left knee and will be following up with the Knee MD. Son shared that he would keep this CM posted.    Will see member in 6 months for an annual health risk assessment.   Encouraged member to call CC with any questions or concerns in the meantime.   ANASTACIA Velázquez  [facility]  [phone]             same name as above

## 2021-06-14 DIAGNOSIS — N40.1 BENIGN PROSTATIC HYPERPLASIA WITH LOWER URINARY TRACT SYMPTOMS, SYMPTOM DETAILS UNSPECIFIED: ICD-10-CM

## 2021-06-15 RX ORDER — FINASTERIDE 5 MG/1
TABLET, FILM COATED ORAL
Qty: 90 TABLET | Refills: 1 | Status: SHIPPED | OUTPATIENT
Start: 2021-06-15 | End: 2021-11-08

## 2021-06-18 DIAGNOSIS — E11.9 TYPE 2 DIABETES MELLITUS WITHOUT COMPLICATION, WITH LONG-TERM CURRENT USE OF INSULIN (H): ICD-10-CM

## 2021-06-18 DIAGNOSIS — Z79.4 TYPE 2 DIABETES MELLITUS WITHOUT COMPLICATION, WITH LONG-TERM CURRENT USE OF INSULIN (H): ICD-10-CM

## 2021-06-21 NOTE — TELEPHONE ENCOUNTER
Novolog mix  Routing refill request to provider for review/approval because:  Labs not current:  HgbA1c

## 2021-06-28 DIAGNOSIS — E11.9 TYPE 2 DIABETES MELLITUS WITHOUT COMPLICATION, WITH LONG-TERM CURRENT USE OF INSULIN (H): ICD-10-CM

## 2021-06-28 DIAGNOSIS — Z79.4 TYPE 2 DIABETES MELLITUS WITHOUT COMPLICATION, WITH LONG-TERM CURRENT USE OF INSULIN (H): ICD-10-CM

## 2021-06-28 NOTE — TELEPHONE ENCOUNTER
Reason for Call:  Medication or medication refill:    Do you use a [facility]?  Name of the pharmacy and phone number for the current request:  [facility] [address] [place] - [phone]    Name of the medication requested: Pin needles    Other request: Using them 3 times a day instead of 2 times a day so he is going through them faster. Will be out by tmr morning and needs a refill with new directions for 3 times a day use.     Can we leave a detailed message on this number? NA    Phone number patient can be reached at: Call pharmacy back at [phone]    No need to talk to anyone, please just send new e-script      Best Time: any    Call taken on 6/28/2021 at 5:42 PM by Cesia Rene

## 2021-06-30 RX ORDER — PEN NEEDLE, DIABETIC 32GX 5/32"
NEEDLE, DISPOSABLE MISCELLANEOUS
Qty: 300 EACH | Refills: 3 | Status: SHIPPED | OUTPATIENT
Start: 2021-06-30 | End: 2022-08-25

## 2021-07-02 ENCOUNTER — TELEPHONE (OUTPATIENT)
Dept: INTERNAL MEDICINE | Facility: CLINIC | Age: 81
End: 2021-07-02

## 2021-07-02 NOTE — TELEPHONE ENCOUNTER
Prior Authorization Retail Medication Request    Medication/Dose: NOVOLOG MIX 70/30 FLEXPEN  ICD code (if different than what is on RX):  E11.9,Z79.4  Previously Tried and Failed:    Rationale:      Insurance Name:  [employer]  Insurance ID:  [id number]      Pharmacy Information (if different than what is on RX)  Name:  [facility]  Phone:  [phone]

## 2021-07-02 NOTE — TELEPHONE ENCOUNTER
PA Initiation    Medication: novolog mix  Insurance Company: [employer] - Phone [phone] Fax [phone]  Pharmacy Filling the Rx: [id number] - [place], [place] - [address] [facility]  Filling Pharmacy Phone: [phone]  Filling Pharmacy Fax:    Start Date: 7/2/2021    Central Prior Authorization Team   Phone: [phone]

## 2021-07-06 ENCOUNTER — TELEPHONE (OUTPATIENT)
Dept: INTERNAL MEDICINE | Facility: CLINIC | Age: 81
End: 2021-07-06

## 2021-07-06 NOTE — TELEPHONE ENCOUNTER
Prior Authorization Approval    Authorization Effective Date: 6/2/2021  Authorization Expiration Date: 7/2/2022  Medication: novolog mix  Approved Dose/Quantity: 45  Reference #: [id number]   Insurance Company: [employer] - Phone [phone] Fax [phone]  Which Pharmacy is filling the prescription (Not needed for infusion/clinic administered): [id number] - [place] [place] [facility] [address] [facility]  Pharmacy Notified: Yes **Instructed pharmacy to notify patient when script is ready to /ship.**  Patient Notified: Yes

## 2021-07-06 NOTE — LETTER
[facility]  [address] [address]  [phone]      7/6/2021       Rabia Reed  [address] [address]        Dear Rabia,  Your healthcare team cares about your health. To provide you with the best care,   we have reviewed your chart and based on our findings, we see that you are due to:     Annual Wellness Follow up-Please contact number listed above to schedule or schedule via Sensentiahart.     If you have already completed these items, please contact the clinic via phone or   tinyclueshart so your care team can review and update your records. Thank you for   choosing [facility] for your healthcare needs. For any questions,   concerns, or to schedule an appointment please contact the clinic.     Healthy Regards,    Your [facility] Care Team

## 2021-07-06 NOTE — TELEPHONE ENCOUNTER
Patient Quality Outreach      Summary:    Patient has the following on his problem list/HM:     Diabetes    Last A1C:  Lab Results   Component Value Date    A1C 9.1 2021    A1C 8.8 2020       Last LDL:    Lab Results   Component Value Date    LDL 59 2020       Is the patient on a Statin? Yes          Is the patient on Aspirin? Yes    Medications     HMG CoA Reductase Inhibitors     atorvastatin (LIPITOR) 40 MG tablet       Salicylates     aspirin 81 MG EC tablet             Last three blood pressure readings:  BP Readings from Last 3 Encounters:   21 132/64   20 126/60   20 (!) 152/72            Tobacco Use      Smoking status: Former Smoker        Packs/day: 0.50        Years: 42.00        Pack years: 21        Quit date: 2001        Years since quittin.4      Smokeless tobacco: Never Used          Immunizations     No immunizations due        Patient is due/failing the following:   A1C and Eye Exam and Annual wellness, date due: Never Completed    Type of outreach:    Sent letter.    Questions for provider review:    None                                                                                                                                     Dolores Ellington CMA       Chart routed to none.

## 2021-07-07 NOTE — TELEPHONE ENCOUNTER
Received voicemail from [facility] at [facility] stating that this is still not going through for them, called [employer] at [phone]. Per rep a couple things are actually going on, one pharmacy is actually just running the Novolog flexpen 100unit/ml not the Mix and 2) patient has a paid claim last for the Mix on May 31st for a 90 day supply and her test claim is coming up as refill too soon and the soonest it would go through is August 3rd.   Routing to clinic to verify as per Monse Núñez at [facility], stated they need a prior authorization for the Novolog flexpen 100 that she uses at bedtime but there is no current script on file in her medications list.

## 2021-07-11 ENCOUNTER — HEALTH MAINTENANCE LETTER (OUTPATIENT)
Age: 81
End: 2021-07-11

## 2021-07-16 ENCOUNTER — TRANSFERRED RECORDS (OUTPATIENT)
Dept: HEALTH INFORMATION MANAGEMENT | Facility: CLINIC | Age: 81
End: 2021-07-16

## 2021-07-16 LAB — RETINOPATHY: NEGATIVE

## 2021-07-22 ENCOUNTER — TELEPHONE (OUTPATIENT)
Dept: INTERNAL MEDICINE | Facility: CLINIC | Age: 81
End: 2021-07-22

## 2021-07-22 NOTE — TELEPHONE ENCOUNTER
Reason for Call:  Form, our goal is to have forms completed with 72 hours, however, some forms may require a visit or additional information.    Type of letter, form or note: medical form from [facility]    Who is the form from?  Patient s son     Where did the form come from: Patient or family brought in       What clinic location was the form placed at? [facility]     Where the form was placed: Given to MA/RN    What number is listed as a contact on the form?  Angel rankin  [phone]       Additional comments  no    Call taken on 7/22/2021 at 7:39 AM by Samina Hart

## 2021-07-30 ENCOUNTER — OFFICE VISIT (OUTPATIENT)
Dept: INTERNAL MEDICINE | Facility: CLINIC | Age: 81
End: 2021-07-30
Payer: COMMERCIAL

## 2021-07-30 VITALS
SYSTOLIC BLOOD PRESSURE: 136 MMHG | RESPIRATION RATE: 16 BRPM | HEART RATE: 89 BPM | TEMPERATURE: 97.8 F | DIASTOLIC BLOOD PRESSURE: 78 MMHG | OXYGEN SATURATION: 97 % | BODY MASS INDEX: 28.19 KG/M2 | WEIGHT: 180 LBS

## 2021-07-30 DIAGNOSIS — M53.3 SACROILIAC JOINT PAIN: ICD-10-CM

## 2021-07-30 DIAGNOSIS — Z79.4 TYPE 2 DIABETES MELLITUS WITHOUT COMPLICATION, WITH LONG-TERM CURRENT USE OF INSULIN (H): Primary | ICD-10-CM

## 2021-07-30 DIAGNOSIS — E11.9 TYPE 2 DIABETES MELLITUS WITHOUT COMPLICATION, WITH LONG-TERM CURRENT USE OF INSULIN (H): Primary | ICD-10-CM

## 2021-07-30 DIAGNOSIS — L30.9 DERMATITIS: ICD-10-CM

## 2021-07-30 PROBLEM — H26.9 CATARACT OF LEFT EYE, UNSPECIFIED CATARACT TYPE: Status: RESOLVED | Noted: 2020-11-16 | Resolved: 2021-07-30

## 2021-07-30 LAB — HBA1C MFR BLD: 8.3 % (ref 0–5.6)

## 2021-07-30 PROCEDURE — 36415 COLL VENOUS BLD VENIPUNCTURE: CPT | Performed by: INTERNAL MEDICINE

## 2021-07-30 PROCEDURE — 83036 HEMOGLOBIN GLYCOSYLATED A1C: CPT | Performed by: INTERNAL MEDICINE

## 2021-07-30 PROCEDURE — 99207 PR FOOT EXAM NO CHARGE: CPT | Mod: 25 | Performed by: INTERNAL MEDICINE

## 2021-07-30 PROCEDURE — 99214 OFFICE O/P EST MOD 30 MIN: CPT | Performed by: INTERNAL MEDICINE

## 2021-07-30 RX ORDER — TRIAMCINOLONE ACETONIDE 1 MG/G
CREAM TOPICAL 2 TIMES DAILY
Qty: 60 G | Refills: 2 | Status: SHIPPED | OUTPATIENT
Start: 2021-07-30 | End: 2022-03-25

## 2021-07-30 NOTE — PROGRESS NOTES
ASSESSMENT:   1. Type 2 diabetes mellitus without complication, with long-term current use of insulin (H)  Previous A1c above goal but daily blood sugars at goal.  Chronic history of A1c reading higher than blood sugars even when CGM has been used in the past.  Due for lab follow-up.  Continue current medications.    - Hemoglobin A1c; Future  - Hemoglobin A1c  - FOOT EXAM    2. Dermatitis  Well-controlled with as needed use of topical steroid.  Medication refilled  - triamcinolone (KENALOG) 0.1 % external cream; Apply topically 2 times daily As needed for skin rash  Dispense: 60 g; Refill: 2    3. Sacroiliac joint pain  Mild pain symptoms.  Not preventing patient from usual ambulation in order stationary bike exercise.  Offered option of physical therapy or SI joint injection with patient declines.  Already on Celebrex.  Discussed stretching exercises and may use Tylenol as needed      PLAN:   Forms completd for [employer] for diet assistance  Continue current meds  Labs today as ordered  Follow up in 6 months. Earlier as needed  I would recommend you receive an influenza (flu) vaccine  this Fall (October or early November)  If back worsens, then call and will refer to physical therapy or  to ortho for possible injection          (Chart documentation was completed, in part, with Wannado voice-recognition software. Even though reviewed, some grammatical, spelling, and word errors may remain.)    Alfredo Aragon MD  Internal Medicine Department  [facility]      Elma Camarillo is a 81 year old who presents for the following health issues     HPI     Chief Complaint   Patient presents with     Forms     Froms re:Patient's diet       Most recent lab results reviewed with pt.       Son helps with translation today. Pt declines phone interperter  Denies CP, SOB, abdominal pain, polyuria, polydipsia, vision changes, extremity numbness/parasthesias.  Previously has seen dermatology for rash  "on his calves.  Occasional use of triamcinolone which controls symptoms well.  Needs refill  On stationary bike for exercise daily for 30-40 min   Has glasses. Had cataract surgery in  March 2021.  Seeing well at this time   Balance has been stable recently. No falls.  Ambulating still with a cane.  History of acoustical neuroma, nonsurgical   On DM diet with assistance through [employer] and needs forms completed   Has some pain in bilateral  SI joint areas of back despite  Celebrex. No trauma. No radiation into LEs. Worsens some with prolonged standing and walking. Pt declines treatment at this time    On 70/30 insulin taking 30 units in the morning with breakfast and 20 units in the evening with supper.  Also takes 8 units of NovoLog insulin at that time.  If not taking the NovoLog at bedtime, then patient's morning blood sugars are often about 180.  Patient denies any hypoglycemic episodes in the night   Sugars in -140   Later in the day after meals approx 160       Additional ROS:   Constitutional, HEENT, Cardiovascular, Pulmonary, GI and , Neuro, MSK and Psych review of systems/symptoms are otherwise negative or unchanged from previous, except as noted above.      OBJECTIVE:  /78   Pulse 89   Temp 97.8  F (36.6  C) (Temporal)   Resp 16   Wt 81.6 kg (180 lb)   SpO2 97%   BMI 28.19 kg/m     Estimated body mass index is 28.19 kg/m  as calculated from the following:    Height as of 3/16/21: 1.702 m (5' 7\").    Weight as of this encounter: 81.6 kg (180 lb).     Neck: no adenopathy. Thyroid normal to palpation. No bruits  Pulm: Lungs clear to auscultation   CV: Regular rates and rhythm  GI: Soft, nontender, Normal active bowel sounds, No hepatosplenomegaly or masses palpable  Ext: Peripheral pulses intact. No edema.  Neuro: Normal strength and tone, sensory exam minimally reduced light touch sensation distal bilateral lower extremity.  Stable gait with cane  Back: Tenderness to palpation bilateral  " paralumbar SI joint area. Neg SLRT bilaterally.   No tenderness palpation along the spinal processes  Skin: Minimal dryness on skin of calves bu no current acute inflammation/redness

## 2021-07-30 NOTE — TELEPHONE ENCOUNTER
Forms completed and faxed [phone]. Also, mailed to sonBrandan with address on file Copy kept to scan in chart.

## 2021-07-30 NOTE — PATIENT INSTRUCTIONS
Forms completd for [employer] for diet assistance  Continue current meds  Labs today as ordered  Follow up in 6 months. Earlier as needed  I would recommend you receive an influenza (flu) vaccine  this Fall (October or early November)  If back worsens, then call and will refer to physical therapy or  to ortho for possible injection

## 2021-08-03 ENCOUNTER — TELEPHONE (OUTPATIENT)
Dept: INTERNAL MEDICINE | Facility: CLINIC | Age: 81
End: 2021-08-03

## 2021-08-03 DIAGNOSIS — Z79.4 TYPE 2 DIABETES MELLITUS WITHOUT COMPLICATION, WITH LONG-TERM CURRENT USE OF INSULIN (H): Primary | ICD-10-CM

## 2021-08-03 DIAGNOSIS — E11.9 TYPE 2 DIABETES MELLITUS WITHOUT COMPLICATION, WITH LONG-TERM CURRENT USE OF INSULIN (H): Primary | ICD-10-CM

## 2021-08-04 ENCOUNTER — PATIENT OUTREACH (OUTPATIENT)
Dept: GERIATRIC MEDICINE | Facility: CLINIC | Age: 81
End: 2021-08-04

## 2021-08-04 NOTE — PROGRESS NOTES
[facility] Care Coordination Contact    Called adult son Brandan to schedule annual HRA home visit. HRA has been scheduled for 8/12/21 at 11am. .   ANASTACIA Velázquez  [facility]  [phone]

## 2021-08-06 RX ORDER — INSULIN LISPRO 100 [IU]/ML
8 INJECTION, SOLUTION INTRAVENOUS; SUBCUTANEOUS AT BEDTIME
Qty: 15 ML | Refills: 3 | Status: SHIPPED | OUTPATIENT
Start: 2021-08-06 | End: 2022-04-11

## 2021-08-06 NOTE — TELEPHONE ENCOUNTER
PCP:     Pharmacy called stating they need an order for insulin, either a Humalog Rx or a Novalog Rx with updated dosing.    Pt has no active insulin Rx at this time. Thanks!    Divya Grant, MSN, RN   [facility] Triage

## 2021-08-06 NOTE — TELEPHONE ENCOUNTER
Spoke with patient's son and explained PCP message below.    Divya Grant, MSN, RN   [facility] Triage

## 2021-08-06 NOTE — TELEPHONE ENCOUNTER
Pharmacy is calling again about the insulin, patient is out of her medication.     Lia Mccullough RN

## 2021-08-06 NOTE — TELEPHONE ENCOUNTER
(Discussion with pharmacy, NovoLog no longer covered but insurance will cover Humalog.  Prescription written for Humalog to take at bedtime to substitute for previous NovoLog dosage.  Patient speaks limited English.  Please update patient's son Brandan [phone] that prescription has been sent

## 2021-08-12 ENCOUNTER — PATIENT OUTREACH (OUTPATIENT)
Dept: GERIATRIC MEDICINE | Facility: CLINIC | Age: 81
End: 2021-08-12

## 2021-08-12 ASSESSMENT — PATIENT HEALTH QUESTIONNAIRE - PHQ9: SUM OF ALL RESPONSES TO PHQ QUESTIONS 1-9: 2

## 2021-08-16 ASSESSMENT — LIFESTYLE VARIABLES: HOW OFTEN DO YOU HAVE A DRINK CONTAINING ALCOHOL: NEVER

## 2021-08-16 NOTE — PROGRESS NOTES
[facility] Care Coordination Contact    [facility] Home Visit Assessment     Home visit for Health Risk Assessment with Rabia Reed completed on August 12, 2021.  [id number]-Odeick assessment completed    Type of residence:: Private home - stairs (Threshold steps into home)  Current living arrangement:: I live in a private home with family     Assessment completed with:: Patient, Care Team Member, Children (Son: Brandan Reed (Brandan interpreted over the phone due to hearing impairment))    Current Care Plan  Member currently receiving the following home care services: None. Son, who is an RN does medications set up.  Member currently receiving the following community resources: PCA    Health Issues: Reports health as fair. Continues to report sever headaches. Shared that pain medications do not help. Years ago post knee surgery and was put on strong medications and was able to have relief from the headaches, but is not able to remain on the narco. Has even tried the pain clinic with no relief. Shared that he does his best to keep noises down, take OTC pain medications and will try to rest when he has a headache. Reports that he continues to get weaker and has had increase pain over the past year.Reports that he continues to have knee pain, reporting that this is daily, and effects his ability to walk longer distances. Does have an acoustic tumor. 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 Reports that his hearing in her right ear is diminished, and chooses not to wear a hearing aide. In past assessments, interpreters have had to yell into member's ear for him to hear properly. Has had no hospitalizations or ED visits in the past year. Son reports that member's blood sugars are stable.    Medication Review  Medication reconciliation completed in Epic: Yes  Medication set-up & administration: Family/informal caregiver sets up weekly.  Family caregiver  administers medications. Family administers insulin and will assist with reminders to take medications.  Medication Risk Assessment Medication (1 or more, place referral to MTM): N/A: No risk factors identified.   MTM Referral Placed: No: No risk factors idenified    Mental/Behavioral Health   Depression Screening:   PHQ-9 Total Score: 2  Mental health DX:: No        Falls Assessment:   Fallen 2 or more times in the past year?: No   Any fall with injury in the past year?: No    ADL/IADL Dependencies:   Dependent ADLs:: Ambulation-walker, Bathing, Dressing, Grooming, Transfers, Toileting  Dependent IADLs:: Cleaning, Cooking, Laundry, Shopping, Meal Preparation, Medication Management, Money Management, Transportation    [facility] sponsored benefits: Shared information re: Silver Sneakers/gym memberships, ASA, Calcium +D.    PCA Assessment completed at visit: Yes Annual PCA assessment indicated 14 units per day of PCA. This is the same as the previous assessment.     Elderly Waiver Eligibility: Yes-will continue on EW    Care Plan & Recommendations: Member wants to continue to live with his family and have his family be his PCA. Wants to continue with nutritional supplements, so in the event his headaches are severe and looses appetite, is able to drink a cane of glucerna. Has incontinent products PRN, and orders them when he is ill.    See [facility] for detailed assessment information.    Follow-Up Plan: Member informed of future contact, plan to f/u with member with a 6 month telephone assessment.  Contact information shared with member and family, encouraged member to call with any questions or concerns at any time.    [place] care continuum providers: Please refer to Health Care Home on the Epic Problem List to view this patient's [facility] Care Plan Summary.    Charis Garcia ERIC  [facility]  [phone]

## 2021-08-17 ENCOUNTER — PATIENT OUTREACH (OUTPATIENT)
Dept: GERIATRIC MEDICINE | Facility: CLINIC | Age: 81
End: 2021-08-17

## 2021-08-17 NOTE — LETTER
August 17, 2021    Important [employer] Information    PIYUSH BOND  [address] [address]  Your Care Plan  Dear Piyush,  When we spoke recently, I promised to send you a Care Plan. The plan enclosed is a summary of our discussion. It includes the steps we agreed would help you meet your health goals. In addition, I can help you with:  O[employer]JasnOU  This program is available to members who need a ride to medical and dental visits. To schedule a ride, call [phone] or [phone] (toll free). TTY: 711. You can call 8 a.m. to 8 p.m. Seven days a week. Access to a representative may be limited at times.    RACTIV   The RACTIV program empowers you to improve your health through education and exercise. To learn more, visit [employer], or call [employer] at [phone] (toll free) (TTY:711) from 7 a.m. - 7 p.m. Central Time, Monday-Friday.  Health Care Directive   This form helps you outline your health care wishes. You can request a form from me and I will answer any questions you have before you discuss it with your doctor.   Annual Physical  Take a key step on your path to good health and set up an annual physical at your clinic.  Questions?  Call me at [phone] Monday-Friday between 8am and 5pm.  TTY: 711. As we discussed, I plan to be in touch with you again in 6 months to follow up via phone.  Sincerely,    ANASTACIA Velázquez    E-mail: [email or web address]  Phone: [phone]    Care Manager  [facility]    cc: member records                                                                                             CB5 ([facility]) (5-2020)    Civil Rights Notice  Discrimination is against the law. [employer] does not discriminate on the basis of any of the following:    Race    Color    National Origin    Creed    Christianity    Age    Public Assistance Status    Receipt of Health Care Services    Disability (including physical or mental  impairment)    Sex (including sex stereotypes and gender identity)    Marital Status    Political Beliefs    Medical Condition    Genetic Information    Sexual Orientation    Claims Experience    Medical History    Health Status    Auxiliary Aids and Services:  [employer] provides auxiliary aids and services, like qualified interpreters or information in accessible formats, free of charge and in a timely manner, to ensure an equal opportunity to participate in our health care programs. Contact [employer] at [employer]/contact medicaid or call [phone] (toll free); TTY:931 or at [employer]/contactmedicaid.    Language Assistance Services:  [employer] provides translated documents and spoken language interpreting, free of charge and in a timely manner, when language assistance services are necessary to ensure limited English speakers have meaningful access to our information and services. Contact [employer] at [phone] (toll free); TTY: 258 or [employer]/contactmedicaid.     Civil Rights Complaints  You have the right to file a discrimination complaint if you believe you were treated in a discriminatory way by [employer]. You may contact any of the following four agencies directly to file a discrimination complaint.    U.S. Department of Health and Human Services  Office for Civil Rights (OCR)  You have the right to file a complaint with the OCR, a federal agency, if you believe you have been discriminated against because of any of the following:    Race    Disability    Color    Sex    National Origin    Age    Christian (in some cases)    Contact the OCR directly to file a complaint:         Director         U.S. Department of Health and Human Services  Office for Civil Rights         [address] [place] [address]         Customer Response Center: Toll-free: [phone]          TDD: [phone]         Email: [email or web address]    [facility]  Rights (MDHR)  In [place], you have the right to file a complaint with the MDHR if you believe you have been discriminated against because of any of the following:      Race    Color    National Origin    Muslim    Creed    Sex    Sexual Orientation    Marital Status    Public Assistance Status    Disability    Contact the MDHR directly to file a complaint:         [place] Department of Human Rights         [address] [address]         [phone] (voice)          [phone] (toll free)         711 or [phone] ([place] Relay)         [phone] (Fax)         [email or web address] (Email)     [place] Department of Human Services (DHS)  You have the right to file a complaint with [facility] if you believe you have been discriminated against in our health care programs because of any of the following:    Race    Color    National Origin    Creed    Muslim    Age    Public Assistance Status    Receipt of Health Care Services    Disability (including physical or mental impairment)    Sex (including sex stereotypes and gender identity)    Marital Status    Political Beliefs    Medical Condition    Genetic Information    Sexual Orientation    Claims Experience    Medical History    Health Status    Complaints must be in writing and filed within 180 days of the date you discovered the alleged discrimination. The complaint must contain your name and address and describe the discrimination you are complaining about. After we get your complaint, we will review it and notify you in writing about whether we have authority to investigate. If we do, we will investigate the complaint.      [facility] will notify you in writing of the investigation s outcome. You have a right to appeal the outcome if you disagree with the decision. To appeal, you must send a written request to have [facility] review the investigation outcome. Be brief and state why you disagree with the decision. Include  additional information you think is important.      If you file a complaint in this way, the people who work for the agency named in the complaint cannot retaliate against you. This means they cannot punish you in any way for filing a complaint. Filing a complaint in this way does not stop you from seeking out other legal or administration actions.     Contact DHS directly to file a discrimination complaint:        Civil Rights Coordinator        [place] Department of Human Services        Equal Opportunity and Access Division        [address] [address]        [place] [address] [phone] (voice) or use your preferred relay service     [facility] Complaint Notice   You have the right to file a complaint with [employer] if you believe you have been discriminated against because of any of the following:       Medical condition    Health status    Receipt of health care services    Claims experience    Medical history    Genetic information    Disability (including mental or physical impairment)    Marital status    Age    Sex (including sex stereotypes and gender identity)    Sexual orientation    National origin    Race    Color    Restorationism    Creed    Public assistance status    Political beliefs    You can file a complaint and ask for help in filing a complaint in person or by mail, phone, fax, or email at:     [facility] Civil Rights Coordinator  [facility] [address] [facility]  [address], [place] [address] [phone] (voice and fax) or TTE:331  Email: [email or web address]    American Indians can begin or continue to use [facility] and [facility] ([facility]) clinics. We will not require prior approval or impose any conditions for you to get services at these clinics. For elders age 65 years and older this includes Elderly Waiver (EW) services accessed through the [place]. If a doctor or other provider in a [facility] or [facility] clinic refers you to a provider in our network, we will not  require you to see your primary care provider prior to the referral.

## 2021-08-17 NOTE — PROGRESS NOTES
[facility] Care Coordination Contact    Received after visit chart from care coordinator.  Completed following tasks: Mailed copy of care plan to client, Updated services in access and Submitted referrals/auths for PCA and supplies     Provider Signature - Full Care Plan:  Member indicates that they would like their POC shared with the following EW providers:  [facility] ([facility]).  Letter and full POC faxed to providers for signature.       and [facility]:  Faxed completed PCA assessment to PCA Agency and mailed copies to member.  Faxed MD Communication to PCP.  Emailed referral form for auth to [facility].    **THIS ASSESSMENT WAS DONE OVER THE PHONE, SENT POC/PCA SIG PAGES TO MEMBER ASKING THEM TO SIGN AND RETURN IN SASE**    Rubina Chapa  Care Management Specialist  [facility]  [phone]

## 2021-08-27 DIAGNOSIS — M19.91 PRIMARY OSTEOARTHRITIS, UNSPECIFIED SITE: ICD-10-CM

## 2021-08-27 DIAGNOSIS — E78.5 HYPERLIPIDEMIA LDL GOAL <100: ICD-10-CM

## 2021-08-27 DIAGNOSIS — R35.0 URINE FREQUENCY: ICD-10-CM

## 2021-08-30 RX ORDER — ATORVASTATIN CALCIUM 40 MG/1
40 TABLET, FILM COATED ORAL DAILY
Qty: 90 TABLET | Refills: 0 | Status: SHIPPED | OUTPATIENT
Start: 2021-08-30 | End: 2021-11-08

## 2021-08-30 RX ORDER — TAMSULOSIN HYDROCHLORIDE 0.4 MG/1
CAPSULE ORAL
Qty: 90 CAPSULE | Refills: 2 | Status: SHIPPED | OUTPATIENT
Start: 2021-08-30 | End: 2022-06-07

## 2021-08-30 NOTE — TELEPHONE ENCOUNTER
celebrex Failed protocol.    please route to  team if patient needs an appointment     Oumou DAVISRN BSN  [facility]  [phone]

## 2021-09-01 RX ORDER — CELECOXIB 200 MG/1
CAPSULE ORAL
Qty: 90 CAPSULE | Refills: 1 | Status: SHIPPED | OUTPATIENT
Start: 2021-09-01 | End: 2022-03-23

## 2021-09-05 ENCOUNTER — HEALTH MAINTENANCE LETTER (OUTPATIENT)
Age: 81
End: 2021-09-05

## 2021-09-16 NOTE — PROGRESS NOTES
[facility] Care Coordination Contact    Per CC, mailed POC and PCA sig pages for a second time asking member to sign and return in SASE.    Rubina Chapa  Care Management Specialist  [facility]  [phone]

## 2021-11-08 DIAGNOSIS — N40.1 BENIGN PROSTATIC HYPERPLASIA WITH LOWER URINARY TRACT SYMPTOMS, SYMPTOM DETAILS UNSPECIFIED: ICD-10-CM

## 2021-11-08 DIAGNOSIS — E78.5 HYPERLIPIDEMIA LDL GOAL <100: ICD-10-CM

## 2021-11-08 RX ORDER — FINASTERIDE 5 MG/1
TABLET, FILM COATED ORAL
Qty: 90 TABLET | Refills: 1 | Status: SHIPPED | OUTPATIENT
Start: 2021-11-08 | End: 2022-07-06

## 2021-11-08 RX ORDER — ATORVASTATIN CALCIUM 40 MG/1
40 TABLET, FILM COATED ORAL DAILY
Qty: 90 TABLET | Refills: 0 | Status: SHIPPED | OUTPATIENT
Start: 2021-11-08 | End: 2022-02-10

## 2021-11-08 NOTE — TELEPHONE ENCOUNTER
Prescription approved per [facility] Refill Protocol.  Stephanie Sanches RN

## 2021-11-08 NOTE — TELEPHONE ENCOUNTER
Routing refill request to provider for review/approval because:  Labs not current:    LDL Cholesterol Calculated   Date Value Ref Range Status   11/02/2020 59 <100 mg/dL Final     Comment:     Desirable:       <100 mg/dl       Stephanie Sanches RN

## 2021-11-11 DIAGNOSIS — Z79.4 TYPE 2 DIABETES MELLITUS WITHOUT COMPLICATION, WITH LONG-TERM CURRENT USE OF INSULIN (H): ICD-10-CM

## 2021-11-11 DIAGNOSIS — E11.9 TYPE 2 DIABETES MELLITUS WITHOUT COMPLICATION, WITH LONG-TERM CURRENT USE OF INSULIN (H): ICD-10-CM

## 2021-11-11 RX ORDER — LANCETS
EACH MISCELLANEOUS
Qty: 200 EACH | Refills: 0 | Status: SHIPPED | OUTPATIENT
Start: 2021-11-11 | End: 2022-01-14

## 2021-11-11 NOTE — TELEPHONE ENCOUNTER
Prescription approved per [facility] Refill Protocol.  Keshav Leal RN  [facility] Triage Nurse

## 2021-12-12 ENCOUNTER — HOME INFUSION (PRE-WILLOW HOME INFUSION) (OUTPATIENT)
Dept: PHARMACY | Facility: CLINIC | Age: 81
End: 2021-12-12
Payer: COMMERCIAL

## 2021-12-14 ENCOUNTER — HOME INFUSION (PRE-WILLOW HOME INFUSION) (OUTPATIENT)
Dept: PHARMACY | Facility: CLINIC | Age: 81
End: 2021-12-14
Payer: COMMERCIAL

## 2021-12-24 NOTE — PROGRESS NOTES
This is a recent snapshot of the patient's [facility] medical record.  For current drug dose and complete information and questions, call [phone] or In Basket pool, fv home infusion ([id number])  CSN Number:  [id number]

## 2022-01-03 DIAGNOSIS — E11.9 TYPE 2 DIABETES MELLITUS WITHOUT COMPLICATION, WITH LONG-TERM CURRENT USE OF INSULIN (H): ICD-10-CM

## 2022-01-03 DIAGNOSIS — Z79.4 TYPE 2 DIABETES MELLITUS WITHOUT COMPLICATION, WITH LONG-TERM CURRENT USE OF INSULIN (H): ICD-10-CM

## 2022-01-05 RX ORDER — INSULIN ASPART 100 [IU]/ML
INJECTION, SUSPENSION SUBCUTANEOUS
Qty: 45 ML | Refills: 1 | Status: SHIPPED | OUTPATIENT
Start: 2022-01-05 | End: 2022-04-11

## 2022-01-05 NOTE — TELEPHONE ENCOUNTER
Routing refill request to provider for review/approval because:  Labs out of range:    Lab Results   Component Value Date    A1C 8.3 07/30/2021    A1C 9.1 03/16/2021    A1C 8.8 11/02/2020    A1C 8.8 01/13/2020    A1C 8.4 09/25/2019    A1C 7.9 04/15/2019       Labs not current:    Creatinine   Date Value Ref Range Status   11/02/2020 0.88 0.66 - 1.25 mg/dL Final     Last office visit 7/30/21    Creatinine and A1c labs pended. please route to team for patient to schedule labs and diabetes follow up as needed.     Honey Ackerman RN

## 2022-01-12 DIAGNOSIS — E11.9 TYPE 2 DIABETES MELLITUS WITHOUT COMPLICATION, WITH LONG-TERM CURRENT USE OF INSULIN (H): ICD-10-CM

## 2022-01-12 DIAGNOSIS — Z79.4 TYPE 2 DIABETES MELLITUS WITHOUT COMPLICATION, WITH LONG-TERM CURRENT USE OF INSULIN (H): ICD-10-CM

## 2022-01-14 RX ORDER — LANCETS
EACH MISCELLANEOUS
Qty: 300 EACH | Refills: 0 | Status: SHIPPED | OUTPATIENT
Start: 2022-01-14 | End: 2022-03-25

## 2022-01-14 NOTE — TELEPHONE ENCOUNTER
Prescription approved per [facility] Refill Protocol.  Keshav Leal RN  [facility] Triage Nurse

## 2022-02-06 DIAGNOSIS — Z79.4 TYPE 2 DIABETES MELLITUS WITHOUT COMPLICATION, WITH LONG-TERM CURRENT USE OF INSULIN (H): Primary | ICD-10-CM

## 2022-02-06 DIAGNOSIS — E78.5 HYPERLIPIDEMIA LDL GOAL <100: ICD-10-CM

## 2022-02-06 DIAGNOSIS — E11.9 TYPE 2 DIABETES MELLITUS WITHOUT COMPLICATION, WITH LONG-TERM CURRENT USE OF INSULIN (H): Primary | ICD-10-CM

## 2022-02-07 NOTE — TELEPHONE ENCOUNTER
Routing refill request to provider for review/approval because:  Labs out of range  Labs not current    Carola NESBITT RN  EP Triage

## 2022-02-10 RX ORDER — ATORVASTATIN CALCIUM 40 MG/1
40 TABLET, FILM COATED ORAL DAILY
Qty: 90 TABLET | Refills: 0 | Status: SHIPPED | OUTPATIENT
Start: 2022-02-10 | End: 2022-04-17

## 2022-02-10 NOTE — TELEPHONE ENCOUNTER
Patient overdue for fasting blood and urine labs regarding diabetes and cholesterol.  Call patient's son Brandan and assist with scheduling a fasting lab appointment in the next couple weeks.  Atorvastatin refilled for 3 months.  Also schedule patient for follow-up appointment with me in clinic in the next couple months with next available standard appointment

## 2022-02-18 ENCOUNTER — LAB (OUTPATIENT)
Dept: LAB | Facility: CLINIC | Age: 82
End: 2022-02-18
Payer: COMMERCIAL

## 2022-02-18 DIAGNOSIS — E11.9 TYPE 2 DIABETES MELLITUS WITHOUT COMPLICATION, WITH LONG-TERM CURRENT USE OF INSULIN (H): ICD-10-CM

## 2022-02-18 DIAGNOSIS — Z79.4 TYPE 2 DIABETES MELLITUS WITHOUT COMPLICATION, WITH LONG-TERM CURRENT USE OF INSULIN (H): ICD-10-CM

## 2022-02-18 DIAGNOSIS — E78.5 HYPERLIPIDEMIA LDL GOAL <100: Primary | ICD-10-CM

## 2022-02-18 LAB
ALBUMIN SERPL-MCNC: 3.3 G/DL (ref 3.4–5)
ALP SERPL-CCNC: 144 U/L (ref 40–150)
ALT SERPL W P-5'-P-CCNC: 40 U/L (ref 0–70)
ANION GAP SERPL CALCULATED.3IONS-SCNC: 3 MMOL/L (ref 3–14)
AST SERPL W P-5'-P-CCNC: 25 U/L (ref 0–45)
BILIRUB SERPL-MCNC: 0.4 MG/DL (ref 0.2–1.3)
BUN SERPL-MCNC: 21 MG/DL (ref 7–30)
CALCIUM SERPL-MCNC: 8.8 MG/DL (ref 8.5–10.1)
CHLORIDE BLD-SCNC: 105 MMOL/L (ref 94–109)
CHOLEST SERPL-MCNC: 162 MG/DL
CO2 SERPL-SCNC: 30 MMOL/L (ref 20–32)
CREAT SERPL-MCNC: 0.96 MG/DL (ref 0.66–1.25)
CREAT UR-MCNC: 54 MG/DL
FASTING STATUS PATIENT QL REPORTED: YES
GFR SERPL CREATININE-BSD FRML MDRD: 78 ML/MIN/1.73M2
GLUCOSE BLD-MCNC: 180 MG/DL (ref 70–99)
HBA1C MFR BLD: 9.2 % (ref 0–5.6)
HDLC SERPL-MCNC: 83 MG/DL
LDLC SERPL CALC-MCNC: 63 MG/DL
MICROALBUMIN UR-MCNC: 30 MG/L
MICROALBUMIN/CREAT UR: 55.56 MG/G CR (ref 0–17)
NONHDLC SERPL-MCNC: 79 MG/DL
POTASSIUM BLD-SCNC: 3.8 MMOL/L (ref 3.4–5.3)
PROT SERPL-MCNC: 7.6 G/DL (ref 6.8–8.8)
SODIUM SERPL-SCNC: 138 MMOL/L (ref 133–144)
TRIGL SERPL-MCNC: 78 MG/DL

## 2022-02-18 PROCEDURE — 36415 COLL VENOUS BLD VENIPUNCTURE: CPT

## 2022-02-18 PROCEDURE — 80061 LIPID PANEL: CPT

## 2022-02-18 PROCEDURE — 83036 HEMOGLOBIN GLYCOSYLATED A1C: CPT

## 2022-02-18 PROCEDURE — 80053 COMPREHEN METABOLIC PANEL: CPT

## 2022-02-18 PROCEDURE — 82043 UR ALBUMIN QUANTITATIVE: CPT

## 2022-02-20 ENCOUNTER — HEALTH MAINTENANCE LETTER (OUTPATIENT)
Age: 82
End: 2022-02-20

## 2022-02-23 NOTE — PROGRESS NOTES
This is a recent snapshot of the patient's [facility] medical record.  For current drug dose and complete information and questions, call [phone] or In Basket pool, fv home infusion ([id number])  CSN Number:  [id number]

## 2022-02-28 ENCOUNTER — PATIENT OUTREACH (OUTPATIENT)
Dept: GERIATRIC MEDICINE | Facility: CLINIC | Age: 82
End: 2022-02-28
Payer: COMMERCIAL

## 2022-02-28 NOTE — PROGRESS NOTES
"[facility] Care Coordination Contact      [facility] Six-Month Telephone Assessment    6 month telephone assessment completed on 2/28/22.    ER visits: No  Hospitalizations: No  TCU stays: No  Significant health status changes: Son shared that member's pain has been up and down. Shared that today member's pain is well managed but other days the pain is overwhelming.   Falls/Injuries: No  ADL/IADL changes: No  Changes in services: No    Caregiver Assessment follow up:  NA    Goals: See POC in chart for goal progress documentation.  Reviewed that A1C has increase to 9.2. Encouraged to be mindful of diabetic diet and hidden sugars. Son shared that food lately have been \"holiday\" foods and that member will return to a more normal diet.     Will see member in 6 months for an annual health risk assessment.   Encouraged member to call CC with any questions or concerns in the meantime.     ANASTACIA Velázquez  [facility]  [phone]        "

## 2022-03-21 DIAGNOSIS — M19.91 PRIMARY OSTEOARTHRITIS, UNSPECIFIED SITE: ICD-10-CM

## 2022-03-23 RX ORDER — CELECOXIB 200 MG/1
CAPSULE ORAL
Qty: 90 CAPSULE | Refills: 1 | Status: SHIPPED | OUTPATIENT
Start: 2022-03-23 | End: 2022-07-06

## 2022-03-23 NOTE — TELEPHONE ENCOUNTER
Routing refill request to provider for review/approval because:  Failed protocol due to:    Patient is age 6-64 years    Normal CBC on file in past 12 months     Pt scheduled for an appt 4/11/22. Med pended for 30 days.    Amee Veloz RN

## 2022-03-24 DIAGNOSIS — L30.9 DERMATITIS: ICD-10-CM

## 2022-03-24 DIAGNOSIS — Z79.4 TYPE 2 DIABETES MELLITUS WITHOUT COMPLICATION, WITH LONG-TERM CURRENT USE OF INSULIN (H): ICD-10-CM

## 2022-03-24 DIAGNOSIS — E11.9 TYPE 2 DIABETES MELLITUS WITHOUT COMPLICATION, WITH LONG-TERM CURRENT USE OF INSULIN (H): ICD-10-CM

## 2022-03-25 RX ORDER — TRIAMCINOLONE ACETONIDE 1 MG/G
CREAM TOPICAL 2 TIMES DAILY
Qty: 60 G | Refills: 0 | Status: SHIPPED | OUTPATIENT
Start: 2022-03-25 | End: 2023-01-09

## 2022-03-25 RX ORDER — BLOOD SUGAR DIAGNOSTIC
STRIP MISCELLANEOUS
Qty: 300 STRIP | Refills: 0 | Status: SHIPPED | OUTPATIENT
Start: 2022-03-25 | End: 2022-05-18

## 2022-03-25 RX ORDER — LANCETS
EACH MISCELLANEOUS
Qty: 300 EACH | Refills: 0 | Status: SHIPPED | OUTPATIENT
Start: 2022-03-25 | End: 2022-12-29

## 2022-03-25 NOTE — TELEPHONE ENCOUNTER
Prescription approved per [facility] Refill Protocol.  Keshav Leal RN  [facility] Triage Nurse

## 2022-04-11 ENCOUNTER — OFFICE VISIT (OUTPATIENT)
Dept: INTERNAL MEDICINE | Facility: CLINIC | Age: 82
End: 2022-04-11
Payer: COMMERCIAL

## 2022-04-11 VITALS
OXYGEN SATURATION: 96 % | DIASTOLIC BLOOD PRESSURE: 68 MMHG | SYSTOLIC BLOOD PRESSURE: 126 MMHG | BODY MASS INDEX: 28.98 KG/M2 | WEIGHT: 185 LBS | HEART RATE: 86 BPM | TEMPERATURE: 97.5 F | RESPIRATION RATE: 16 BRPM

## 2022-04-11 DIAGNOSIS — E11.9 TYPE 2 DIABETES MELLITUS WITHOUT COMPLICATION, WITH LONG-TERM CURRENT USE OF INSULIN (H): ICD-10-CM

## 2022-04-11 DIAGNOSIS — Z23 HIGH PRIORITY FOR 2019-NCOV VACCINE: Primary | ICD-10-CM

## 2022-04-11 DIAGNOSIS — E78.5 HYPERLIPIDEMIA LDL GOAL <100: ICD-10-CM

## 2022-04-11 DIAGNOSIS — R80.9 PROTEINURIA, UNSPECIFIED TYPE: ICD-10-CM

## 2022-04-11 DIAGNOSIS — Z79.4 TYPE 2 DIABETES MELLITUS WITHOUT COMPLICATION, WITH LONG-TERM CURRENT USE OF INSULIN (H): ICD-10-CM

## 2022-04-11 PROCEDURE — 99214 OFFICE O/P EST MOD 30 MIN: CPT | Performed by: INTERNAL MEDICINE

## 2022-04-11 PROCEDURE — 0054A COVID-19,PF,PFIZER (12+ YRS): CPT | Performed by: INTERNAL MEDICINE

## 2022-04-11 PROCEDURE — 91305 COVID-19,PF,PFIZER (12+ YRS): CPT | Performed by: INTERNAL MEDICINE

## 2022-04-11 RX ORDER — INSULIN LISPRO 100 [IU]/ML
14 INJECTION, SOLUTION INTRAVENOUS; SUBCUTANEOUS AT BEDTIME
Qty: 30 ML | Refills: 3 | Status: SHIPPED | OUTPATIENT
Start: 2022-04-11 | End: 2023-01-09

## 2022-04-11 RX ORDER — INSULIN ASPART 100 [IU]/ML
INJECTION, SUSPENSION SUBCUTANEOUS
Qty: 60 ML | Refills: 3 | Status: SHIPPED | OUTPATIENT
Start: 2022-04-11 | End: 2023-01-09

## 2022-04-11 NOTE — PATIENT INSTRUCTIONS
Continue higher dose 70/30 insulin,  35 units  in AM before breakfast and 25 units before supper/ dinner along with Humalog 14 units  before bedtime  that you started 2 weeks ago  Continue other meds.  Atorvastatin refilled on file at pharmacy  Call  [phone] or use [employer] to schedule a future lab appointment  non-fasting on Monday, July 18, 2022 at 2pm for A1c and urine protein.  If urine protein level remains elevated in 3 months, will start low-dose blood pressure medication (lisinopril)  Vaccinations: Pfizer covid vaccine booster today

## 2022-04-11 NOTE — PROGRESS NOTES
ASSESSMENT:   1. Type 2 diabetes mellitus without complication, with long-term current use of insulin (H)  A1c above goal but blood sugars have been doing well now since recent increase in insulin dosing by patient 2 weeks ago.  Will continue that higher dosages and recheck A1c 3 months  - insulin aspart prot & aspart (NOVOLOG MIX 70/30 FLEXPEN) (70-30) 100 UNIT/ML pen; INJECT 35 UNITS BEFORE BREAKFAST & 25 UNITS BEFORE SUPPER  Dispense: 60 mL; Refill: 3  - insulin lispro (HUMALOG KWIKPEN) 100 UNIT/ML (1 unit dial) KWIKPEN; Inject 14 Units Subcutaneous At Bedtime  Dispense: 30 mL; Refill: 3  - Hemoglobin A1c; Future  - Comprehensive metabolic panel; Future  - Hemoglobin A1c; Future    2. High priority for 2019-nCoV vaccine  Candidate for COVID booster  - COVID-19,PF,PFIZER (12+ Yrs GRAY LABEL)    3. Hyperlipidemia LDL goal <100  Lipids at goal.  Continue statin therapy.  Repeat lab 1 year  - atorvastatin (LIPITOR) 40 MG tablet; Take 1 tablet (40 mg) by mouth daily  Dispense: 90 tablet; Refill: 3  - Comprehensive metabolic panel; Future  - Lipid panel reflex to direct LDL Fasting; Future    4. Proteinuria, unspecified type  Proteinuria worsened.  Possibly related to worsened blood sugar.  Patient not on ACE inhibitor at this time.  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  We will recheck urine protein level in 3 months.  If remains elevated, will start low-dose ACE inhibitor therapy with lisinopril  - Albumin Random Urine Quantitative with Creat Ratio; Future      PLAN:   Continue higher dose 70/30 insulin,  35 units  in AM before breakfast and 25 units before supper/ dinner along with Humalog 14 units  before bedtime  that you started 2 weeks ago  Continue other meds.  Atorvastatin refilled on file at pharmacy  Call  [phone] or use [employer] to schedule a future lab appointment  non-fasting on Monday, July 18, 2022 at 2pm for  A1c and urine protein.  If urine protein level remains elevated in 3 months, will start low-dose blood pressure medication (lisinopril)  Vaccinations: Pfizer covid vaccine booster today      (Chart documentation was completed, in part, with [employer] voice-recognition software. Even though reviewed, some grammatical, spelling, and word errors may remain.)    Alfredo Aragon MD  Internal Medicine Department  [facility] SHEY [place] is a 81 year old who presents for the following health issues     Hyperlipidemia    History of Present Illness       Diabetes:   He presents for follow up of diabetes.  He is checking home blood glucose three times daily. He checks blood glucose before meals and at bedtime.  Blood glucose is sometimes over 200 and never under 70. He is aware of hypoglycemia symptoms including dizziness. He is concerned about other.  He is having blurry vision.         Hyperlipidemia:  He presents for follow up of hyperlipidemia.  He is taking medication to lower cholesterol. He is not having myalgia or other side effects to statin medications.    He eats 2-3 servings of fruits and vegetables daily.He consumes 1 sweetened beverage(s) daily.He exercises with enough effort to increase his heart rate 30 to 60 minutes per day.  He exercises with enough effort to increase his heart rate 7 days per week.   He is taking medications regularly.      Most recent lab results reviewed with pt.      Component      Latest Ref Rng & Units 11/2/2020 3/16/2021 7/30/2021 2/18/2022   Sodium      133 - 144 mmol/L 138   138   Potassium      3.4 - 5.3 mmol/L 4.1   3.8   Chloride      94 - 109 mmol/L 107   105   Carbon Dioxide      20 - 32 mmol/L 33 (H)   30   Anion Gap      3 - 14 mmol/L <1 (L)   3   Glucose      70 - 99 mg/dL 117 (H)   180 (H)   Urea Nitrogen      7 - 30 mg/dL 18   21   Creatinine      0.66 - 1.25 mg/dL 0.88   0.96   GFR Estimate      >60 mL/min/1.73m2 80   78   GFR Estimate  If Black      >60 mL/min/1.73:m2 >90      Calcium      8.5 - 10.1 mg/dL 9.0   8.8   Bilirubin Total      0.2 - 1.3 mg/dL 0.6   0.4   Albumin      3.4 - 5.0 g/dL 3.6   3.3 (L)   Protein Total      6.8 - 8.8 g/dL 7.7   7.6   Alkaline Phosphatase      40 - 150 U/L 123      ALT      0 - 70 U/L 44   40   AST      0 - 45 U/L 30   25   Alkaline Phosphatase      40 - 150 U/L    144   Cholesterol      <200 mg/dL 172   162   Triglycerides      <150 mg/dL 74   78   HDL Cholesterol      >=40 mg/dL 98   83   LDL Cholesterol Calculated      <=100 mg/dL 59   63   Non HDL Cholesterol      <130 mg/dL 74   79   Patient Fasting > 8hrs?          Yes   Creatinine Urine      mg/dL 116   54   Albumin Urine mg/L      mg/L 38   30   Albumin Urine mg/g Cr      0.00 - 17.00 mg/g Cr 32.76 (H)   55.56 (H)   Hemoglobin A1C POCT      0 - 5.6 % 8.8 (H) 9.1 (H)     Hemoglobin A1C      0.0 - 5.6 %   8.3 (H) 9.2 (H)     Patient exercising by walking on a treadmill for 40 minutes daily.  Denies chest pain, shortness of breath.  No acute vision changes.  Has eye appointment scheduled for October.  A1c worsened.  Patient states blood sugars have been running high recently.  He increased insulin doses 2 weeks ago taking 35 units of 70/30 insulin in the morning and 25 units before supper and 14 units of Humalog at bedtime.  Since that time, blood sugars now range 120-1 20 9 in the morning.  They had often been in the 150s or 160s earlier.  Patient following diabetic diet.  Balance has been stable.  No recent falls.    Additional ROS:   Constitutional, HEENT, Cardiovascular, Pulmonary, GI and , Neuro, MSK and Psych review of systems/symptoms are otherwise negative or unchanged from previous, except as noted above.      OBJECTIVE:  /68   Pulse 86   Temp 97.5  F (36.4  C) (Temporal)   Resp 16   Wt 83.9 kg (185 lb)   SpO2 96%   BMI 28.98 kg/m     Estimated body mass index is 28.98 kg/m  as calculated from the following:    Height as of 3/16/21:  "1.702 m (5' 7\").    Weight as of this encounter: 83.9 kg (185 lb).  Eye: PERRL, EOMI  HENT: ear canals and TM's normal and nose and mouth without ulcers or lesions   Neck: no adenopathy. Thyroid normal to palpation. No bruits  Pulm: Lungs clear to auscultation   CV: Regular rates and rhythm  GI: Soft, nontender, Normal active bowel sounds, No hepatosplenomegaly or masses palpable  Ext: Peripheral pulses intact. No edema.  Neuro: Normal strength and tone, sensory exam mildly reduced light touch sensation distal bilateral lower extremity.  Stable gait                "

## 2022-04-17 RX ORDER — ATORVASTATIN CALCIUM 40 MG/1
40 TABLET, FILM COATED ORAL DAILY
Qty: 90 TABLET | Refills: 3 | Status: SHIPPED | OUTPATIENT
Start: 2022-04-17 | End: 2023-01-09

## 2022-05-16 DIAGNOSIS — E11.9 TYPE 2 DIABETES MELLITUS WITHOUT COMPLICATION, WITH LONG-TERM CURRENT USE OF INSULIN (H): ICD-10-CM

## 2022-05-16 DIAGNOSIS — Z79.4 TYPE 2 DIABETES MELLITUS WITHOUT COMPLICATION, WITH LONG-TERM CURRENT USE OF INSULIN (H): ICD-10-CM

## 2022-05-18 RX ORDER — BLOOD SUGAR DIAGNOSTIC
STRIP MISCELLANEOUS
Qty: 300 STRIP | Refills: 0 | Status: SHIPPED | OUTPATIENT
Start: 2022-05-18 | End: 2022-08-25

## 2022-05-18 NOTE — TELEPHONE ENCOUNTER
Prescription approved per [facility] Refill Protocol.  Keshav Leal RN  [facility] Triage Nurse

## 2022-06-17 ENCOUNTER — TELEPHONE (OUTPATIENT)
Dept: INTERNAL MEDICINE | Facility: CLINIC | Age: 82
End: 2022-06-17

## 2022-06-17 NOTE — TELEPHONE ENCOUNTER
Prior Authorization Retail Medication Request    Medication/Dose: insulin aspart prot & aspart (NOVOLOG MIX 70/30 FLEXPEN) (70-30) 100 UNIT/ML pen  ICD code (if different than what is on RX):  Type 2 diabetes mellitus without complication, with long-term current use of insulin (H) [E11.9, Z79.4]   Previously Tried and Failed:    Rationale:      Insurance Name:  [employer]  Insurance ID:  [id number]    Pharmacy Information (if different than what is on RX)  Name:  [id number] - [facility] [address]  Phone:  [phone]

## 2022-06-22 NOTE — TELEPHONE ENCOUNTER
Prior Authorization Approval    Authorization Effective Date: 5/23/2022  Authorization Expiration Date: 6/22/2023  Medication: insulin aspart prot & aspart (NOVOLOG MIX 70/30 FLEXPEN) (70-30) 100 UNIT/ML pen PA INITIATED 6/22/22  Approved Dose/Quantity: 60ml  Reference #:     Insurance Company: [employer] - Phone [phone] Fax [phone]  Expected CoPay:       CoPay Card Available:      Foundation Assistance Needed:    Which Pharmacy is filling the prescription (Not needed for infusion/clinic administered): [facility][id number] - [place], [place] - [address] [facility]  Pharmacy Notified: Yes  Patient Notified: Comment:  Pharmacy will notify patient.

## 2022-06-28 DIAGNOSIS — N52.9 ERECTILE DYSFUNCTION, UNSPECIFIED ERECTILE DYSFUNCTION TYPE: ICD-10-CM

## 2022-06-30 RX ORDER — VARDENAFIL HCL 20 MG
TABLET ORAL
Qty: 2 TABLET | Refills: 0 | OUTPATIENT
Start: 2022-06-30

## 2022-06-30 NOTE — TELEPHONE ENCOUNTER
Routing refill request to provider for review/approval because:   Absence of Alpha Blockers on Med list    Medication is active on med list       Keshav Leal RN  [facility] Triage Nurse

## 2022-07-01 ENCOUNTER — TELEPHONE (OUTPATIENT)
Dept: INTERNAL MEDICINE | Facility: CLINIC | Age: 82
End: 2022-07-01

## 2022-07-01 NOTE — TELEPHONE ENCOUNTER
Called and spoke to the patient's son, Brandan. Brandan states the patient has not been taking this medication and has declined the need to set up an appointment. Brandan is not sure why a refill request was sent to the clinic.     Brandan did state he dropped off 2 forms for Dr. Aragon to complete (one for the patient and one for the patient's wife). Brandan wants to confirm Dr. Aragon has received these forms. Fax number provided to send to when complete and Brandan would like a call to come pick the forms up when complete.    Routing to PCP for review.    Amee Veloz RN

## 2022-07-01 NOTE — TELEPHONE ENCOUNTER
Reason for Call:  Form, our goal is to have forms completed with 72 hours, however, some forms may require a visit or additional information.    Type of letter, form or note:  Special Diet Information Request    Who is the form from?: [facility] (if other please explain)    Where did the form come from: Patient or family brought in       What clinic location was the form placed at?: Internal Medicine    Where the form was placed: Pcp's Folder    What number is listed as a contact on the form?:        Additional comments:  Please fax completed form to [facility] at [phone]. After faxing call patient at [phone] for .    Call taken on 7/1/2022 at 9:36 AM by Tasneem Bolton

## 2022-07-01 NOTE — TELEPHONE ENCOUNTER
Levitra not prescribed in 5 years.  Emp[facility]ic refill declined.  Would recommend telephone virtual visit if patient's son can be there at patient's home at the same time to help translate or clinic visit to discuss erectile function further as patient is at more risk for side effects now potentially age 82 with medication use.  Inform patient's son Brandan who speaks English well

## 2022-07-05 DIAGNOSIS — M19.91 PRIMARY OSTEOARTHRITIS, UNSPECIFIED SITE: ICD-10-CM

## 2022-07-05 DIAGNOSIS — N40.1 BENIGN PROSTATIC HYPERPLASIA WITH LOWER URINARY TRACT SYMPTOMS, SYMPTOM DETAILS UNSPECIFIED: ICD-10-CM

## 2022-07-05 PROBLEM — I10 ESSENTIAL HYPERTENSION, BENIGN: Status: ACTIVE | Noted: 2022-07-05

## 2022-07-06 ENCOUNTER — PATIENT OUTREACH (OUTPATIENT)
Dept: GERIATRIC MEDICINE | Facility: CLINIC | Age: 82
End: 2022-07-06

## 2022-07-06 RX ORDER — FINASTERIDE 5 MG/1
TABLET, FILM COATED ORAL
Qty: 90 TABLET | Refills: 3 | Status: SHIPPED | OUTPATIENT
Start: 2022-07-06 | End: 2023-01-09

## 2022-07-06 RX ORDER — CELECOXIB 200 MG/1
CAPSULE ORAL
Qty: 90 CAPSULE | Refills: 1 | Status: SHIPPED | OUTPATIENT
Start: 2022-07-06 | End: 2023-01-09

## 2022-07-06 NOTE — TELEPHONE ENCOUNTER
Routing refill request to provider for review/approval because:  NSAID Medications Failed 07/05/2022 04:23 PM   Protocol Details  Patient is age 6-64 years

## 2022-07-06 NOTE — TELEPHONE ENCOUNTER
Forms faxed, attempted to call Brandan but could not LVM as mail box was full. If he or patient calls back, please let them know that forms have been placed at internal medicine .

## 2022-07-06 NOTE — TELEPHONE ENCOUNTER
Forms completed and in [place] basket.  Please fax and then contact patient son Brandan as pt doesn't speak English to  copy also as requested. I also filled out forms for pt's wife  Rajan Joshua and those are also being faxed and will be left for Brandan to PU. Mentioning this so that he is not called by another nurse with 2 separate calls  for two separate encounters Rabia and Rajan both regarding their forms being ready to PU.

## 2022-07-06 NOTE — TELEPHONE ENCOUNTER
Normal GFR and no GI side effects.  Celebrex refilled for 6 months.  Finasteride refilled for 1 year

## 2022-07-07 NOTE — PROGRESS NOTES
[facility] Care Coordination Contact    [facility] Home Visit Assessment     Home visit for Health Risk Assessment with Rabia Reed completed on July 6, 2022.   Member reports feeling more weak this year but still able to maintain routines. Member is not getting out much d/t Covid in the past years and fear of falling.     Type of residence:: Private home - stairs ([facility] steps into home)  Current living arrangement:: I live in a private home with family     Assessment completed with:: Patient, Care Team Member, Children (Son: Brandan Reed (Brandan interpreted over the phone due to hearing impairment))    Current Care Plan  Member currently receiving the following home care services: None    Member currently receiving the following community resources: PCA and Nutritional supplements      Medication Review  Medication reconciliation completed in Epic: Yes  Medication set-up & administration: Family/informal caregiver sets up daily.  Family caregiver administers medications.  Medication Risk Assessment Medication (1 or more, place referral to MTM): N/A: No risk factors identified  MTM Referral Placed: No: No risk factors idenified    Mental/Behavioral Health   Depression Screening: n/a          Mental health DX:: No        Falls Assessment: not completed            ADL/IADL Dependencies:   Dependent ADLs:: Ambulation-walker, Bathing, Dressing, Grooming, Transfers, Toileting  Dependent IADLs:: Cleaning, Cooking, Laundry, Shopping, Meal Preparation, Medication Management, Money Management, Transportation    [facility] Plan sponsored benefits: Shared information re: Silver Sneakers/gym memberships, ASA, Calcium +D.    PCA Assessment completed at visit: Yes Annual PCA assessment indicated 14 units per day of PCA. This is the same as the previous assessment.     Elderly Waiver Eligibility: Yes-will continue on EW    Care Plan & Recommendations: Continue current POC. PCA 14 units per day provided by family.  Glucerna 2 cans per day. Incontinence products as needed     See [facility] for detailed assessment information.    Follow-Up Plan: Member informed of future contact, plan to f/u with member with a 6 month telephone assessment.  Contact information shared with member and family, encouraged member to call with any questions or concerns at any time.    [place] care continuum providers: Please see Snapshot and Care Management Flowsheets for Specific details of care plan.    This CC note routed to PCP.    Dafne Renteria RN,BSN  [facility] Case Coordinator   [phone]

## 2022-07-12 ENCOUNTER — PATIENT OUTREACH (OUTPATIENT)
Dept: GERIATRIC MEDICINE | Facility: CLINIC | Age: 82
End: 2022-07-12

## 2022-07-12 NOTE — LETTER
July 12, 2022    Important [employer] Information    PIYUSH BOND  [address] [address]  Your Care Plan  Dear Piyush,  When we spoke recently, I promised to send you a Care Plan. The plan enclosed is a summary of our discussion. It includes the steps we agreed would help you meet your health goals. In addition, I can help you with:  Kksxwmd-I-FpuoKX  This program is available to members who need a ride to medical and dental visits. To schedule a ride, call [phone] or [phone] (toll free). TTY: 711. You can call 8 a.m. to 8 p.m. Seven days a week. Access to a representative may be limited at times.   One Pass  One Pass is your no-cost, complete, fitness solution for your mind and body. To learn more visit [employer]/fitness or call One Pass, toll-free 1 [phone] (TTY: 463) 8 a.m. to 9 p.m. Monday-Friday.  Health Care Directive   This form helps you outline your health care wishes. You can request a form from me and I will answer any questions you have before you discuss it with your doctor.   Annual Physical  Take a key step on your path to good health and set up an annual physical at your clinic.  Questions?  Call me at [phone] Monday-Friday between 8am and 5pm.  TTY: 711. As we discussed, I plan to be in touch with you again in 6 months to follow up via phone.  Sincerely,    Dafne Renteria RN    E-mail: [email or web address]  Phone: [phone]      [employer]    cc: member records                                                                                             CB5 (Os) (5-2020)    Civil Rights Notice  Discrimination is against the law. [employer] does not discriminate on the basis of any of the following:    Race    Color    National Origin    Creed    Lutheran    Age    Public Assistance Status    Receipt of Health Care Services    Disability (including physical or mental impairment)    Sex (including sex stereotypes and gender  identity)    Marital Status    Political Beliefs    Medical Condition    Genetic Information    Sexual Orientation    Claims Experience    Medical History    Health Status    Auxiliary Aids and Services:  [employer] provides auxiliary aids and services, like qualified interpreters or information in accessible formats, free of charge and in a timely manner, to ensure an equal opportunity to participate in our health care programs. Contact [employer] at [employer]/contact medicaid or call [phone] (toll free); TTY:719 or at [employer]/contactmedicaid.    Language Assistance Services:  [employer] provides translated documents and spoken language interpreting, free of charge and in a timely manner, when language assistance services are necessary to ensure limited English speakers have meaningful access to our information and services. Contact [employer] at [phone] (toll free); TTY: 222 or [employer]/contactmedicaid.     Civil Rights Complaints  You have the right to file a discrimination complaint if you believe you were treated in a discriminatory way by [facility]. You may contact any of the following four agencies directly to file a discrimination complaint.    U.S. Department of Health and Human Services  Office for Civil Rights (OCR)  You have the right to file a complaint with the OCR, a federal agency, if you believe you have been discriminated against because of any of the following:    Race    Disability    Color    Sex    National Origin    Age    Episcopalian (in some cases)    Contact the OCR directly to file a complaint:         Director         U.S. Department of Health and Human Services  Office for Civil Rights         [address] [place] [address], [place] [address]         Customer Response Center: Toll-free: [phone]          TDD: [phone]         Email: [email or web address]    [place] Department of Human Rights (MD)  In [place], you have the right to file a  complaint with the MDHR if you believe you have been discriminated against because of any of the following:      Race    Color    National Origin    Yazidi    Creed    Sex    Sexual Orientation    Marital Status    Public Assistance Status    Disability    Contact the MDHR directly to file a complaint:         [facility] of Human Rights         [address] [address]         [phone] (voice)          [phone] (toll free)         711 or [phone] ([place] Relay)         [phone] (Fax)         [email or web address] (Email)     [place] Department of Human Services (DHS)  You have the right to file a complaint with [facility] if you believe you have been discriminated against in our health care programs because of any of the following:    Race    Color    National Origin    Creed    Yazidi    Age    Public Assistance Status    Receipt of Health Care Services    Disability (including physical or mental impairment)    Sex (including sex stereotypes and gender identity)    Marital Status    Political Beliefs    Medical Condition    Genetic Information    Sexual Orientation    Claims Experience    Medical History    Health Status    Complaints must be in writing and filed within 180 days of the date you discovered the alleged discrimination. The complaint must contain your name and address and describe the discrimination you are complaining about. After we get your complaint, we will review it and notify you in writing about whether we have authority to investigate. If we do, we will investigate the complaint.      [facility] will notify you in writing of the investigation s outcome. You have a right to appeal the outcome if you disagree with the decision. To appeal, you must send a written request to have [facility] review the investigation outcome. Be brief and state why you disagree with the decision. Include additional information you think is important.      If you  file a complaint in this way, the people who work for the agency named in the complaint cannot retaliate against you. This means they cannot punish you in any way for filing a complaint. Filing a complaint in this way does not stop you from seeking out other legal or administration actions.     Contact DHS directly to file a discrimination complaint:        Civil Rights Coordinator        [place] Department of Human Services        Equal Opportunity and Access Division        [address] [address]        [place] [address] [phone] (voice) or use your preferred relay service     [facility] Complaint Notice   You have the right to file a complaint with [facility] if you believe you have been discriminated against because of any of the following:       Medical condition    Health status    Receipt of health care services    Claims experience    Medical history    Genetic information    Disability (including mental or physical impairment)    Marital status    Age    Sex (including sex stereotypes and gender identity)    Sexual orientation    National origin    Race    Color    Bahai    Creed    Public assistance status    Political beliefs    You can file a complaint and ask for help in filing a complaint in person or by mail, phone, fax, or email at:     [facility] Civil Rights Coordinator  [employer] [facility]  [address], [place] [address] [phone] (voice and fax) or TTU:588  Email: [email or web address]    American Indians can begin or continue to use [place] and  Health Services (IHS) clinics. We will not require prior approval or impose any conditions for you to get services at these clinics. For elders age 65 years and older this includes Elderly Waiver (EW) services accessed through the [place]. If a doctor or other provider in a [place] or [facility] clinic refers you to a provider in our network, we will not require you to see your primary care provider prior to the  referral.

## 2022-07-12 NOTE — PROGRESS NOTES
[facility] Care Coordination Contact    Received after visit chart from care coordinator.  Completed following tasks: Mailed copy of care plan to client, Updated services in Database, Submitted referrals/auths for PCA, Mailed copy of POC signature sheet for member to sign and return in SASE  and Sent the following resources/forms: [facility] safe med disposal    , Provider Signature - No POC Shared:  Member indicates that they do not want their POC shared with any EW providers.     and [facility]:  Faxed completed PCA assessment to PCA Agency and mailed copies to member.  Faxed MD Communication to PCP.  Emailed referral form for auth to [facility]. Mailed PCA sig page to member to sign and return in SASE.     Linette Balbuena  Care Management Specialist   [facility]   [phone]

## 2022-07-22 ENCOUNTER — MEDICAL CORRESPONDENCE (OUTPATIENT)
Dept: HEALTH INFORMATION MANAGEMENT | Facility: CLINIC | Age: 82
End: 2022-07-22

## 2022-08-04 NOTE — PROGRESS NOTES
[facility] Care Coordination Contact    Per CC, mailed POC/PCA sig page for a 2nd time asking member to sign and return in SASE.     Rubina Chapa  Care Management Specialist  [facility]  [phone]

## 2022-08-17 ENCOUNTER — PATIENT OUTREACH (OUTPATIENT)
Dept: GERIATRIC MEDICINE | Facility: CLINIC | Age: 82
End: 2022-08-17

## 2022-08-18 NOTE — PROGRESS NOTES
No call to member. [facility] Regulatory Update.  ANASTACIA Velázquez  [facility]  [phone]

## 2022-08-24 DIAGNOSIS — Z79.4 TYPE 2 DIABETES MELLITUS WITHOUT COMPLICATION, WITH LONG-TERM CURRENT USE OF INSULIN (H): ICD-10-CM

## 2022-08-24 DIAGNOSIS — E11.9 TYPE 2 DIABETES MELLITUS WITHOUT COMPLICATION, WITH LONG-TERM CURRENT USE OF INSULIN (H): ICD-10-CM

## 2022-08-25 RX ORDER — BLOOD SUGAR DIAGNOSTIC
STRIP MISCELLANEOUS
Qty: 300 STRIP | Refills: 0 | Status: SHIPPED | OUTPATIENT
Start: 2022-08-25 | End: 2022-11-02

## 2022-08-25 RX ORDER — PEN NEEDLE, DIABETIC 32GX 5/32"
NEEDLE, DISPOSABLE MISCELLANEOUS
Qty: 300 EACH | Refills: 0 | Status: SHIPPED | OUTPATIENT
Start: 2022-08-25 | End: 2023-01-09

## 2022-08-25 NOTE — TELEPHONE ENCOUNTER
Prescription approved per [facility] Refill Protocol.  Amee Veloz, RN  [facility] Triage Nurse

## 2022-09-21 ENCOUNTER — TRANSFERRED RECORDS (OUTPATIENT)
Dept: HEALTH INFORMATION MANAGEMENT | Facility: CLINIC | Age: 82
End: 2022-09-21

## 2022-10-23 ENCOUNTER — HEALTH MAINTENANCE LETTER (OUTPATIENT)
Age: 82
End: 2022-10-23

## 2022-10-31 DIAGNOSIS — R35.0 URINE FREQUENCY: ICD-10-CM

## 2022-10-31 DIAGNOSIS — E11.9 TYPE 2 DIABETES MELLITUS WITHOUT COMPLICATION, WITH LONG-TERM CURRENT USE OF INSULIN (H): ICD-10-CM

## 2022-10-31 DIAGNOSIS — Z79.4 TYPE 2 DIABETES MELLITUS WITHOUT COMPLICATION, WITH LONG-TERM CURRENT USE OF INSULIN (H): ICD-10-CM

## 2022-11-01 RX ORDER — TAMSULOSIN HYDROCHLORIDE 0.4 MG/1
CAPSULE ORAL
Qty: 90 CAPSULE | Refills: 0 | Status: SHIPPED | OUTPATIENT
Start: 2022-11-01 | End: 2023-01-09

## 2022-11-01 NOTE — TELEPHONE ENCOUNTER
Routing refill request to provider for review/approval because:  Script dispense volume not present, please review.    Justice L. Phoenix, RN

## 2022-11-02 RX ORDER — BLOOD SUGAR DIAGNOSTIC
STRIP MISCELLANEOUS
Qty: 300 STRIP | Refills: 3 | Status: SHIPPED | OUTPATIENT
Start: 2022-11-02 | End: 2023-06-08

## 2022-11-04 NOTE — PROGRESS NOTES
Name: Lucien Morrison ADMIT: 2022   : 1967  PCP: Ran Bragg MD    MRN: [id number] LOS: 1 days   AGE/SEX: 55 y.o. male  ROOM: [facility]     Subjective   Subjective   Alert. Denies pain and shortness of breath. Family at bedside. Discussed with RN no new issues    Review of Systems   Unable to perform ROS: Other (intellectual disability)      Objective   Objective   Vital Signs  Temp:  [97.6 °F (36.4 °C)-98.1 °F (36.7 °C)] 97.6 °F (36.4 °C)  Heart Rate:  [65-94] 65  Resp:  [16] 16  BP: (104-152)/(62-92) 152/92  SpO2:  [88 %-100 %] 98 %  on  Flow (L/min):  [2] 2;   Device (Oxygen Therapy): nasal cannula  Body mass index is 22.02 kg/m².    Physical Exam  Constitutional:       General: He is not in acute distress.     Appearance: He is ill-appearing (chronic). He is not toxic-appearing.   Cardiovascular:      Rate and Rhythm: Normal rate and regular rhythm.   Pulmonary:      Effort: Pulmonary effort is normal.      Breath sounds: Normal breath sounds. No wheezing, rhonchi or rales.   Abdominal:      General: Bowel sounds are normal.      Palpations: Abdomen is soft.      Tenderness: There is no abdominal tenderness. There is no guarding.      Hernia: No hernia is present.   Musculoskeletal:      Comments: left knee dressed   Skin:     General: Skin is warm and dry.   Neurological:      Mental Status: He is alert. Mental status is at baseline.     Results Review  I reviewed the patient's new clinical results.  Results from last 7 days   Lab Units 22  0836 22  0657 22  0822  1653   WBC 10*3/mm3 6.66 5.73 6.23 9.05   HEMOGLOBIN g/dL 11.2* 9.4* 9.7* 11.0*   PLATELETS 10*3/mm3 307 393 420 471*     Results from last 7 days   Lab Units 22  0836 22  0657 22  0822  1653   SODIUM mmol/L  --  139 140 140   POTASSIUM mmol/L  --  3.5 3.5 3.5   CHLORIDE mmol/L  --  101 102 100   CO2 mmol/L  --  32.1* 30.3* 33.0*   BUN mg/dL  --  10 12 14   CREATININE mg/dL 0.52*  [facility] Care Coordination Contact    [facility] Home Visit Assessment     Home visit for Health Risk Assessment with Rabia Reed completed on September 9, 2020. Remote assessment completed due to COVID19.  Due to member's hearing impairment over the phone, it was too difficult for member to hear  over the phone. Son interpreted for member so he could hear.    Type of residence:: Private home - stairs([facility] steps into home)  Current living arrangement:: I live in a private home with family. Member's spouse stays at the same house during the day, but at night she stays with member's daughter.    Assessment completed with:: Patient, Care Team Member, Children(Son: Brandan Reed (Brandan interpreted over the phone due to hearing impairment)    Current Care Plan  Member currently receiving the following home care services:  None   Member currently receiving the following community resources: PCA, incontinent products and nutritional supplements.    Health Issues: Reports that his health is the same. Reports that he continues to get weaker and has had increase pain over the past year.Reports that he continues to have knee pain, reporting that this is daily, and effects his ability to walk longer distances. 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 Reports that his hearing in her right ear is diminished, and chooses not to wear a hearing aide. In past assessments, interpreters have had to yell into member's ear for him to hear properly. Has had no hospitalizations or ED visits in the past year. Son reports that member's blood sugars are stable.    Medication Review  Medication reconciliation completed in Epic: Yes  Medication set-up & administration: Independent-does not set up and Family/informal caregiver sets up daily.  Family caregiver administers  0.57* 0.62* 0.77   GLUCOSE mg/dL  --  106* 90 111*     Lab Results   Component Value Date    ANIONGAP 5.9 11/03/2022     Estimated Creatinine Clearance: 145.6 mL/min (A) (by C-G formula based on SCr of 0.52 mg/dL (L)).    Results from last 7 days   Lab Units 11/01/22  1653   ALBUMIN g/dL 3.40*   BILIRUBIN mg/dL <0.2   ALK PHOS U/L 87   AST (SGOT) U/L 12   ALT (SGPT) U/L 10     Results from last 7 days   Lab Units 11/03/22  0657 11/02/22  0826 11/01/22  1653   CALCIUM mg/dL 8.5* 9.2 9.4   ALBUMIN g/dL  --   --  3.40*     Results from last 7 days   Lab Units 11/01/22  1653   LACTATE mmol/L 1.1     Hemoglobin A1C   Date/Time Value Ref Range Status   11/02/2022 0826 6.00 (H) 4.80 - 5.60 % Final       No radiology results for the last day    Scheduled Meds  amLODIPine, 5 mg, Oral, Daily  aspirin EC, 325 mg, Oral, Daily  ceFAZolin, 2 g, Intravenous, Q8H  clonazePAM, 1 mg, Oral, BID  docusate sodium, 100 mg, Oral, BID  escitalopram, 20 mg, Oral, Daily  ferrous sulfate, 325 mg, Oral, Daily With Breakfast  hydroCHLOROthiazide, 12.5 mg, Oral, Daily  levothyroxine, 137 mcg, Oral, Q AM  paliperidone, 12 mg, Oral, Daily  tamsulosin, 0.4 mg, Oral, Nightly    Continuous Infusions   PRN Meds  •  acetaminophen  •  bisacodyl  •  HYDROcodone-acetaminophen  •  melatonin  •  nitroglycerin  •  ondansetron **OR** ondansetron  •  [COMPLETED] Insert peripheral IV **AND** sodium chloride     Diet  Diet Regular; Cardiac    I have personally reviewed:  [x]  Laboratory   [x]  Microbiology   []  Radiology   [x]  EKG/Telemetry  SR on telemetry  []  Cardiology/Vascular   []  Pathology   []  Records     Assessment/Plan     Active Hospital Problems    Diagnosis  POA   • **MSSA (methicillin susceptible Staphylococcus aureus) infection [A49.01]  Unknown   • Infection of left knee (HCC) [M00.9]  Unknown   • Post-operative state [Z98.890]  Not Applicable   • Hypertension [I10]  Yes   • Disease of thyroid gland [E07.9]  Yes   • Mental deficiency [F79]   medications.  Medication Risk Assessment Medication (1 or more, place referral to MTM): N/A: No risk factors identified  MTM Referral Placed: No: No risk factors idenified    Mental/Behavioral Health   Depression Screening:   PHQ-9 Total Score: 3  Mental health DX:: No        Falls Assessment:   Fallen 2 or more times in the past year?: No   Any fall with injury in the past year?: No    ADL/IADL Dependencies:   Dependent ADLs:: Ambulation-walker, Bathing, Dressing, Grooming, Transfers, Toileting  Dependent IADLs:: Cleaning, Cooking, Laundry, Shopping, Meal Preparation, Medication Management, Money Management, Transportation    [facility] Plan sponsored benefits: Shared information re: Silver Sneakers/gym memberships, ASA, Calcium +D.    PCA Assessment completed at visit: Yes Annual PCA assessment indicated 14 units per day of PCA. This is the same as the previous assessment.     Elderly Waiver Eligibility: Yes-will continue on EW    Care Plan & Recommendations: member wants to continue to live with his son, in his son's home. Member wants to continue with PCA services to assist with daily care needs. Member will continue to reach out when more incontinent products are needed, as member only uses them when he is ill. Will continue to receive glucerna monthly.    See [facility] for detailed assessment information.    Follow-Up Plan: Member informed of future contact, plan to f/u with member with a 6 month telephone assessment.  Contact information shared with member and family, encouraged member to call with any questions or concerns at any time.    [place] care continuum providers: Please refer to Health Care Home on the Epic Problem List to view this patient's [facility] Care Plan Summary.  Charis Garcia ERIC  [facility]  [phone]         Yes      Resolved Hospital Problems   No resolved problems to display.     55 y.o. male with history of left patella fracture s/p repair and revision. External fixator removed a month ago. Presented from facility with drainage from knee.    MSSA left knee infection  -IV antibiotics for a few days  -wound MSSA (blood contaminant)  -ortho possible I+D    intellectual disability, impulse control disorder    hypothyroidism  -levothyroxine     anemia  -iron and percent sat low  -PO Fe  -hgb stable    HTN  -controlled    hyperglycemia  -a1c 6.00    SCDs for DVT prophylaxis    Discussed with family, nursing staff, CCP and care team on multidisciplinary rounds    Discharge: MALLORY Arnold MD  [facility]  11/04/22

## 2022-11-17 ENCOUNTER — PATIENT OUTREACH (OUTPATIENT)
Dept: GERIATRIC MEDICINE | Facility: CLINIC | Age: 82
End: 2022-11-17

## 2022-11-17 NOTE — PROGRESS NOTES
[facility] Care Coordination Contact     W, spoke w/ Mbr son Brandan about Mbr dental appt. Brandan, noted Mbr has dentures and does not want to go to dentist.   Brandan, will ask  Mbr if he wants to change mind and schedule if he does.      Itzel Bender, RAFA  [facility]  [phone]

## 2022-12-27 DIAGNOSIS — Z79.4 TYPE 2 DIABETES MELLITUS WITHOUT COMPLICATION, WITH LONG-TERM CURRENT USE OF INSULIN (H): ICD-10-CM

## 2022-12-27 DIAGNOSIS — E11.9 TYPE 2 DIABETES MELLITUS WITHOUT COMPLICATION, WITH LONG-TERM CURRENT USE OF INSULIN (H): ICD-10-CM

## 2022-12-29 RX ORDER — LANCETS
EACH MISCELLANEOUS
Qty: 300 EACH | Refills: 0 | Status: SHIPPED | OUTPATIENT
Start: 2022-12-29 | End: 2023-01-09

## 2022-12-29 NOTE — TELEPHONE ENCOUNTER
Prescription approved per [facility] Refill Protocol.  Nayeli Headley RN  [facility]

## 2023-01-09 ENCOUNTER — OFFICE VISIT (OUTPATIENT)
Dept: INTERNAL MEDICINE | Facility: CLINIC | Age: 83
End: 2023-01-09
Payer: COMMERCIAL

## 2023-01-09 VITALS
BODY MASS INDEX: 29.81 KG/M2 | OXYGEN SATURATION: 95 % | SYSTOLIC BLOOD PRESSURE: 142 MMHG | DIASTOLIC BLOOD PRESSURE: 68 MMHG | HEART RATE: 94 BPM | WEIGHT: 185.5 LBS | HEIGHT: 66 IN

## 2023-01-09 DIAGNOSIS — Z23 HIGH PRIORITY FOR 2019-NCOV VACCINE: ICD-10-CM

## 2023-01-09 DIAGNOSIS — N40.1 BENIGN PROSTATIC HYPERPLASIA WITH LOWER URINARY TRACT SYMPTOMS, SYMPTOM DETAILS UNSPECIFIED: ICD-10-CM

## 2023-01-09 DIAGNOSIS — R80.9 PROTEINURIA, UNSPECIFIED TYPE: ICD-10-CM

## 2023-01-09 DIAGNOSIS — L30.9 DERMATITIS: ICD-10-CM

## 2023-01-09 DIAGNOSIS — M19.91 PRIMARY OSTEOARTHRITIS, UNSPECIFIED SITE: ICD-10-CM

## 2023-01-09 DIAGNOSIS — E11.9 TYPE 2 DIABETES MELLITUS WITHOUT COMPLICATION, WITH LONG-TERM CURRENT USE OF INSULIN (H): ICD-10-CM

## 2023-01-09 DIAGNOSIS — R35.0 URINE FREQUENCY: ICD-10-CM

## 2023-01-09 DIAGNOSIS — E78.5 HYPERLIPIDEMIA LDL GOAL <100: ICD-10-CM

## 2023-01-09 DIAGNOSIS — I10 ESSENTIAL HYPERTENSION, BENIGN: ICD-10-CM

## 2023-01-09 DIAGNOSIS — Z00.00 MEDICARE ANNUAL WELLNESS VISIT, SUBSEQUENT: ICD-10-CM

## 2023-01-09 DIAGNOSIS — Z79.4 TYPE 2 DIABETES MELLITUS WITHOUT COMPLICATION, WITH LONG-TERM CURRENT USE OF INSULIN (H): ICD-10-CM

## 2023-01-09 LAB
ALBUMIN SERPL BCG-MCNC: 3.9 G/DL (ref 3.5–5.2)
ALP SERPL-CCNC: 122 U/L (ref 40–129)
ALT SERPL W P-5'-P-CCNC: 29 U/L (ref 10–50)
ANION GAP SERPL CALCULATED.3IONS-SCNC: 7 MMOL/L (ref 7–15)
AST SERPL W P-5'-P-CCNC: 31 U/L (ref 10–50)
BILIRUB SERPL-MCNC: 0.4 MG/DL
BUN SERPL-MCNC: 18.3 MG/DL (ref 8–23)
CALCIUM SERPL-MCNC: 9 MG/DL (ref 8.8–10.2)
CHLORIDE SERPL-SCNC: 105 MMOL/L (ref 98–107)
CHOLEST SERPL-MCNC: 176 MG/DL
CREAT SERPL-MCNC: 1.06 MG/DL (ref 0.67–1.17)
CREAT UR-MCNC: 66.7 MG/DL
DEPRECATED HCO3 PLAS-SCNC: 28 MMOL/L (ref 22–29)
ERYTHROCYTE [DISTWIDTH] IN BLOOD BY AUTOMATED COUNT: 12 % (ref 10–15)
GFR SERPL CREATININE-BSD FRML MDRD: 69 ML/MIN/1.73M2
GLUCOSE SERPL-MCNC: 330 MG/DL (ref 70–99)
HBA1C MFR BLD: 9.3 % (ref 0–5.6)
HCT VFR BLD AUTO: 42.9 % (ref 40–53)
HDLC SERPL-MCNC: 80 MG/DL
HGB BLD-MCNC: 13.6 G/DL (ref 13.3–17.7)
LDLC SERPL CALC-MCNC: 68 MG/DL
MCH RBC QN AUTO: 32.5 PG (ref 26.5–33)
MCHC RBC AUTO-ENTMCNC: 31.7 G/DL (ref 31.5–36.5)
MCV RBC AUTO: 103 FL (ref 78–100)
MICROALBUMIN UR-MCNC: 31.5 MG/L
MICROALBUMIN/CREAT UR: 47.23 MG/G CR (ref 0–17)
NONHDLC SERPL-MCNC: 96 MG/DL
PLATELET # BLD AUTO: 204 10E3/UL (ref 150–450)
POTASSIUM SERPL-SCNC: 4.1 MMOL/L (ref 3.4–5.3)
PROT SERPL-MCNC: 7 G/DL (ref 6.4–8.3)
RBC # BLD AUTO: 4.18 10E6/UL (ref 4.4–5.9)
SODIUM SERPL-SCNC: 140 MMOL/L (ref 136–145)
TRIGL SERPL-MCNC: 140 MG/DL
WBC # BLD AUTO: 6.4 10E3/UL (ref 4–11)

## 2023-01-09 PROCEDURE — 80061 LIPID PANEL: CPT | Performed by: INTERNAL MEDICINE

## 2023-01-09 PROCEDURE — 80053 COMPREHEN METABOLIC PANEL: CPT | Performed by: INTERNAL MEDICINE

## 2023-01-09 PROCEDURE — 82043 UR ALBUMIN QUANTITATIVE: CPT | Performed by: INTERNAL MEDICINE

## 2023-01-09 PROCEDURE — G0439 PPPS, SUBSEQ VISIT: HCPCS | Performed by: INTERNAL MEDICINE

## 2023-01-09 PROCEDURE — 36415 COLL VENOUS BLD VENIPUNCTURE: CPT | Performed by: INTERNAL MEDICINE

## 2023-01-09 PROCEDURE — 82570 ASSAY OF URINE CREATININE: CPT | Performed by: INTERNAL MEDICINE

## 2023-01-09 PROCEDURE — 91312 COVID-19 VACCINE BIVALENT BOOSTER 12+ (PFIZER): CPT | Performed by: INTERNAL MEDICINE

## 2023-01-09 PROCEDURE — 85027 COMPLETE CBC AUTOMATED: CPT | Performed by: INTERNAL MEDICINE

## 2023-01-09 PROCEDURE — 99214 OFFICE O/P EST MOD 30 MIN: CPT | Mod: 25 | Performed by: INTERNAL MEDICINE

## 2023-01-09 PROCEDURE — 0124A COVID-19 VACCINE BIVALENT BOOSTER 12+ (PFIZER): CPT | Performed by: INTERNAL MEDICINE

## 2023-01-09 PROCEDURE — 83036 HEMOGLOBIN GLYCOSYLATED A1C: CPT | Performed by: INTERNAL MEDICINE

## 2023-01-09 RX ORDER — LANCETS
EACH MISCELLANEOUS
Qty: 300 EACH | Refills: 3 | Status: SHIPPED | OUTPATIENT
Start: 2023-01-09 | End: 2024-02-12

## 2023-01-09 RX ORDER — CELECOXIB 200 MG/1
CAPSULE ORAL
Qty: 90 CAPSULE | Refills: 3 | Status: SHIPPED | OUTPATIENT
Start: 2023-01-09 | End: 2023-10-06

## 2023-01-09 RX ORDER — TAMSULOSIN HYDROCHLORIDE 0.4 MG/1
0.8 CAPSULE ORAL DAILY
Qty: 180 CAPSULE | Refills: 3 | Status: SHIPPED | OUTPATIENT
Start: 2023-01-09 | End: 2024-02-12

## 2023-01-09 RX ORDER — TRIAMCINOLONE ACETONIDE 1 MG/G
CREAM TOPICAL 2 TIMES DAILY
Qty: 60 G | Refills: 3 | Status: SHIPPED | OUTPATIENT
Start: 2023-01-09 | End: 2024-02-12

## 2023-01-09 RX ORDER — PEN NEEDLE, DIABETIC 32GX 5/32"
NEEDLE, DISPOSABLE MISCELLANEOUS
Qty: 300 EACH | Refills: 3 | Status: SHIPPED | OUTPATIENT
Start: 2023-01-09 | End: 2024-02-12

## 2023-01-09 RX ORDER — INSULIN LISPRO 100 [IU]/ML
14 INJECTION, SOLUTION INTRAVENOUS; SUBCUTANEOUS AT BEDTIME
Qty: 30 ML | Refills: 3 | Status: SHIPPED | OUTPATIENT
Start: 2023-01-09 | End: 2024-04-10

## 2023-01-09 RX ORDER — FINASTERIDE 5 MG/1
1 TABLET, FILM COATED ORAL DAILY
Qty: 90 TABLET | Refills: 3 | Status: SHIPPED | OUTPATIENT
Start: 2023-01-09 | End: 2024-02-12

## 2023-01-09 RX ORDER — ATORVASTATIN CALCIUM 40 MG/1
40 TABLET, FILM COATED ORAL DAILY
Qty: 90 TABLET | Refills: 3 | Status: SHIPPED | OUTPATIENT
Start: 2023-01-09 | End: 2024-02-06

## 2023-01-09 RX ORDER — INSULIN ASPART 100 [IU]/ML
INJECTION, SUSPENSION SUBCUTANEOUS
Qty: 60 ML | Refills: 3 | Status: SHIPPED | OUTPATIENT
Start: 2023-01-09 | End: 2023-10-06

## 2023-01-09 ASSESSMENT — ENCOUNTER SYMPTOMS
COUGH: 0
JOINT SWELLING: 0
DYSURIA: 0
HEMATOCHEZIA: 0
CHILLS: 0
ARTHRALGIAS: 0
CONSTIPATION: 0
EYE PAIN: 0
FEVER: 0
NERVOUS/ANXIOUS: 0
PALPITATIONS: 0
WEAKNESS: 0
DIZZINESS: 0
ABDOMINAL PAIN: 0
PARESTHESIAS: 0
HEARTBURN: 0
MYALGIAS: 0
HEADACHES: 1
DIARRHEA: 0
SORE THROAT: 0
SHORTNESS OF BREATH: 0
HEMATURIA: 0
FREQUENCY: 0
NAUSEA: 0

## 2023-01-09 ASSESSMENT — ACTIVITIES OF DAILY LIVING (ADL)
CURRENT_FUNCTION: TELEPHONE REQUIRES ASSISTANCE
CURRENT_FUNCTION: MONEY MANAGEMENT REQUIRES ASSISTANCE
CURRENT_FUNCTION: LAUNDRY REQUIRES ASSISTANCE
CURRENT_FUNCTION: TRANSPORTATION REQUIRES ASSISTANCE
CURRENT_FUNCTION: MEDICATION ADMINISTRATION REQUIRES ASSISTANCE
CURRENT_FUNCTION: PREPARING MEALS REQUIRES ASSISTANCE
CURRENT_FUNCTION: HOUSEWORK REQUIRES ASSISTANCE
CURRENT_FUNCTION: SHOPPING REQUIRES ASSISTANCE

## 2023-01-09 NOTE — PATIENT INSTRUCTIONS
Increase Tamsulosin to 0.4mg capsule,2 capsules  (0.8mg) once a day for urination flow  Use Triamcinolone steroid cream applied to rash areas of legs twice a day as needed until improved  Continue other medications  Prescriptions refilled.    Labs today as ordered  Vaccinations: Pfizer covid vaccine  Get tetanus (TD) vaccine at [facility] pharmacy in February  Follow-up with [facility] [phone] re: left shoulder pain issues  Pt was informed regarding extra E&M billing for management of new or established medical issues not related to today's wellness/screening visit

## 2023-01-09 NOTE — PROGRESS NOTES
"SUBJECTIVE:   Rabia is a 82 year old who presents for Preventive Visit.     Due to language barrier, a phone  was present during the history-taking, physical examnation and subsequent discussion with this patient.  Patient also accompanied by his daughter    Patient has been advised of split billing requirements and indicates understanding: Yes  Are you in the first 12 months of your Medicare coverage?  No    Healthy Habits:     In general, how would you rate your overall health?  Good    Frequency of exercise:  6-7 days/week    Duration of exercise:  30-45 minutes    Do you usually eat at least 4 servings of fruit and vegetables a day, include whole grains    & fiber and avoid regularly eating high fat or \"junk\" foods?  Yes    Taking medications regularly:  Yes    Barriers to taking medications:  None    Medication side effects:  Not applicable    Ability to successfully perform activities of daily living:  Telephone requires assistance, transportation requires assistance, shopping requires assistance, preparing meals requires assistance, housework requires assistance, laundry requires assistance, medication administration requires assistance and money management requires assistance    Home Safety:  No safety concerns identified    Hearing Impairment:  Difficulty following a conversation in a noisy restaurant or crowded room, need to ask people to speak up or repeat themselves, difficulty understanding soft or whispered speech and difficulty understanding speech on the telephone    In the past 6 months, have you been bothered by leaking of urine?  No    In general, how would you rate your overall mental or emotional health?  Good      PHQ-2 Total Score: 1    Additional concerns today:  No      Have you ever done Advance Care Planning? (For example, a Health Directive, POLST, or a discussion with a medical provider or your loved ones about your wishes): Yes, advance care planning is on file.       Fall " risk  Fallen 2 or more times in the past year?: No  Any fall with injury in the past year?: No    Cognitive Screening   1) Repeat 3 items (Leader, Season, Table)    2) Clock draw: ABNORMAL Numbers and clock hands incorrect  3) 3 item recall: Recalls 2 objects   Results: ABNORMAL clock, 1-2 items recalled: PROBABLE COGNITIVE IMPAIRMENT, **INFORM PROVIDER**    Mini-CogTM Copyright MICHEL Jenkins. Licensed by the author for use in [facility]; reprinted with permission (polo@[facility]). All rights reserved.      Do you have sleep apnea, excessive snoring or daytime drowsiness?: no    Reviewed and updated as needed this visit by clinical staff                  Reviewed and updated as needed this visit by Provider                 Social History     Tobacco Use     Smoking status: Former     Packs/day: 0.50     Years: 42.00     Pack years: 21.00     Types: Cigarettes     Quit date: 2001     Years since quittin.9     Smokeless tobacco: Never   Substance Use Topics     Alcohol use: No     Alcohol/week: 0.0 standard drinks         Alcohol Use 2015   Prescreen: >3 drinks/day or >7 drinks/week? The patient does not drink >3 drinks per day nor >7 drinks per week.               Current providers sharing in care for this patient include:   Patient Care Team:  Alfredo Aragon MD as PCP - General  Charis Garcia LSW as Lead Care Coordinator  Alfredo Aragon MD as Assigned PCP    The following health maintenance items are reviewed in Epic and correct as of today:  Health Maintenance   Topic Date Due     MEDICARE ANNUAL WELLNESS VISIT  Never done     ZOSTER IMMUNIZATION (2 of 3) 2010     COVID-19 Vaccine (5 - Booster for Pfizer series) 2022     EYE EXAM  2022     DIABETIC FOOT EXAM  2022     ANNUAL REVIEW OF HM ORDERS  2022     FALL RISK ASSESSMENT  2022     A1C  2022     DTAP/TDAP/TD IMMUNIZATION (3 - Td or Tdap) 2022     INFLUENZA VACCINE (1) 2022     PHQ-2 (once  per calendar year)  01/01/2023     ALT  02/18/2023     BMP  02/18/2023     LIPID  02/18/2023     MICROALBUMIN  02/18/2023     ADVANCE CARE PLANNING  10/07/2023     Pneumococcal Vaccine: 65+ Years  Completed     IPV IMMUNIZATION  Aged Out     MENINGITIS IMMUNIZATION  Aged Out     Labs reviewed in EPIC          Review of Systems   Constitutional: Negative for chills and fever.   HENT: Positive for hearing loss. Negative for congestion, ear pain and sore throat.    Eyes: Negative for pain and visual disturbance.   Respiratory: Negative for cough and shortness of breath.    Cardiovascular: Negative for chest pain, palpitations and peripheral edema.   Gastrointestinal: Negative for abdominal pain, constipation, diarrhea, heartburn, hematochezia and nausea.   Genitourinary: Negative for dysuria, frequency, genital sores, hematuria and urgency.   Musculoskeletal: Positive for back pain. Negative for arthralgias, joint swelling and myalgias.   Skin: Negative for rash.   Neurological: Positive for dizziness. Negative for weakness and paresthesias.   Psychiatric/Behavioral: Negative for mood changes. The patient is not nervous/anxious.      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  History of schwannoma.  Using a hearing aid on the left side.  Chronic imbalance from the schwannoma.  Patient had declined previous surgery.  Using a cane with ambulation.  Still able to use a treadmill some at home.  No recent falls.  Blood sugars in the morning generally 142-150.  Previously 170 in the evening on average  Stable chronic back pain.  History of L4-5 spondylolisthesis.  Occasional nocturia and has some urinary hesitancy with hx BPH. .  Denies bowel or bladder incontinence.  Followed by [facility].  Using intermittent Tylenol with improved pain.  Also occasional left shoulder pain with abduction.  Denies trauma  Followed by  [place]  [facility] clinic and getting injection in the eye every 2 months for macular degeneration.  Vision stable.  Has a few  "spots on legs.  Occasional itching.    OBJECTIVE:   BP (!) 142/68   Pulse 94   Ht 1.683 m (5' 6.25\")   Wt 84.1 kg (185 lb 8 oz)   SpO2 95%   BMI 29.71 kg/m   Estimated body mass index is 28.98 kg/m  as calculated from the following:    Height as of 3/16/21: 1.702 m (5' 7\").    Weight as of 4/11/22: 83.9 kg (185 lb).  Physical Exam  Eye: PERRL, EOMI  HENT: ear canals and TM's normal and nose and mouth without ulcers or lesions. Hearing aid leftr. Minimal hearing right to finger rub  Neck: no adenopathy. Thyroid normal to palpation. No bruits  Pulm: Lungs clear to auscultation   CV: Regular rates and rhythm  GI: Soft, nontender, Normal active bowel sounds, No hepatosplenomegaly or masses palpable  Ext: Peripheral pulses intact. Minimal BLE edema.  Tenderness to abduction left shoulder beyond 90 degrees.  No tenderness to internal/external rotation  Neuro: Normal strength and tone, sensory exam grossly normal. Stable  but slower gait with cane. Neg Romberg  Back: Mild tenderness to palpation bilateral paralumbar musculature.  Negative straight leg raise test bilaterally   : Normal-appearing male external genitalia.  No scrotal masses.  No abdominal hernia. Prostate exam 2 plus in size and without nodularity  Skin: Few scattered eczematous patches on lower extremities.    ASSESSMENT / PLAN:   1. Medicare annual wellness visit, subsequent  Declines Shingrix vaccination.  COVID booster today.  Other healthcare maintenance up-to-date for age.  Patient lives with his wife and other family members feels sssafe in his home. Working with care coordinator also for other services. No recent falls and independent in ADLs    2. Type 2 diabetes mellitus without complication, with long-term current use of insulin (H)  A1c chronically runs higher than blood sugar averages in the sevens been consistent throughout his life even when CGM's have previously been used.  Continue current insulin doses for now.  Labs today as " ordered  - insulin aspart prot & aspart (NOVOLOG MIX 70/30 FLEXPEN) (70-30) 100 UNIT/ML pen; INJECT 35 UNITS BEFORE BREAKFAST & 25 UNITS BEFORE SUPPER  Dispense: 60 mL; Refill: 3  - insulin lispro (HUMALOG KWIKPEN) 100 UNIT/ML (1 unit dial) KWIKPEN; Inject 14 Units Subcutaneous At Bedtime  Dispense: 30 mL; Refill: 3  - blood glucose monitoring (ONE TOUCH ULTRASOFT) lancets; Use to test blood sugar 3 times daily or as directed.  Dispense: 300 each; Refill: 3  - insulin pen needle (BD ISREAL U/F) 32G X 4 MM miscellaneous; use test 3 times a daily  Dispense: 300 each; Refill: 3  - Hemoglobin A1c  - Comprehensive metabolic panel    3. Essential hypertension, benign  Blood pressure acceptable for age.   Prior history of mild proteinuria.  Await lab results.  If proteinuria persists, will add ACE inhibitor  - CBC with platelets; Future  - CBC with platelets  - Albumin Random Urine Quantitative with Creat Ratio    4. Urine frequency  Symptomatic despite Flomax.  Will increase to 2 capsules daily.  BPH on exam  - tamsulosin (FLOMAX) 0.4 MG capsule; Take 2 capsules (0.8 mg) by mouth daily  Dispense: 180 capsule; Refill: 3    5. Hyperlipidemia LDL goal <100  Previously controlled.  Continue statin therapy.  Labs as ordered  - atorvastatin (LIPITOR) 40 MG tablet; Take 1 tablet (40 mg) by mouth daily  Dispense: 90 tablet; Refill: 3  - Comprehensive metabolic panel  - Lipid panel reflex to direct LDL Fasting    6. Primary osteoarthritis, unspecified site  General arthritis symptoms controlled with Celebrex.   Weill have patient follow-up with [facility] regarding left shoulder issues and possible rotator cuff syndrome  - celecoxib (CELEBREX) 200 MG capsule; TAKE 1 CAPSULE BY MOUTH DAILY WITH FOOD FOR ARTHRITIS PAIN  Dispense: 90 capsule; Refill: 3    7. Benign prostatic hyperplasia with lower urinary tract symptoms, symptom details unspecified  See #4.  Continue finasteride in addition  - finasteride (PROSCAR) 5 MG tablet; Take 1 tablet  (5 mg) by mouth daily  Dispense: 90 tablet; Refill: 3    8. Dermatitis  Mild eczematous skin changes.  Triamcinolone twice daily as needed  - triamcinolone (KENALOG) 0.1 % external cream; Apply topically 2 times daily As needed for skin rash  Dispense: 60 g; Refill: 3    9. Proteinuria, unspecified type  History of mild proteinuria.  Labs ordered.  If persist, will add ACE inhibitor  - Albumin Random Urine Quantitative with Creat Ratio    10. High priority for 2019-nCoV vaccine  Candidate for COVID booster vaccination  - COVID-19,PF,PFIZER BOOSTER BIVALENT 12+Yrs      Patient has been advised of split billing requirements and indicates understanding: Yes      COUNSELING:  Reviewed preventive health counseling, as reflected in patient instructions        He reports that he quit smoking about 21 years ago. He has a 21.00 pack-year smoking history. He has never used smokeless tobacco.      Appropriate preventive services were discussed with this patient, including applicable screening as appropriate for cardiovascular disease, diabetes, osteopenia/osteoporosis, and glaucoma.  As appropriate for age/gender, discussed screening for colorectal cancer, prostate cancer, breast cancer, and cervical cancer. Checklist reviewing preventive services available has been given to the patient.    Reviewed patients plan of care and provided an AVS. The Basic Care Plan (routine screening as documented in Health Maintenance) for Rabia meets the Care Plan requirement. This Care Plan has been established and reviewed with the Patient and daughter.    PLAN:  Increase Tamsulosin to 0.4mg capsule,2 capsules  (0.8mg) once a day for urination flow  Use Triamcinolone steroid cream applied to rash areas of legs twice a day as needed until improved  Continue other medications  Prescriptions refilled.    Labs today as ordered  Vaccinations: Pfizer covid vaccine  Get tetanus (TD) vaccine at [facility] pharmacy in February  Follow-up with [facility]  [phone] re: left shoulder pain issues  Pt was informed regarding extra E&M billing for management of new or established medical issues not related to today's wellness/screening visit      Alfredo Aragon MD  [facility]

## 2023-01-10 ENCOUNTER — TRANSFERRED RECORDS (OUTPATIENT)
Dept: HEALTH INFORMATION MANAGEMENT | Facility: CLINIC | Age: 83
End: 2023-01-10

## 2023-01-10 LAB — RETINOPATHY: NORMAL

## 2023-01-16 ASSESSMENT — ENCOUNTER SYMPTOMS
BACK PAIN: 1
DIZZINESS: 1

## 2023-01-17 ENCOUNTER — PATIENT OUTREACH (OUTPATIENT)
Dept: GERIATRIC MEDICINE | Facility: CLINIC | Age: 83
End: 2023-01-17

## 2023-01-17 NOTE — PROGRESS NOTES
[facility] Care Coordination Contact      [facility] Six-Month Telephone Assessment    6 month telephone assessment completed on 1/17/23.    ER visits: No  Hospitalizations: No  TCU stays: No  Significant health status changes: Son shared that member has been doing PT every week, and that his pain is much better managed. And member will do the home exercise program every night as well. A1C increased to 9.3. Son shared that member tries to follow a diabetic diet but does enjoy eating rice every day. Son shared that member has been struggling more with his vision. Goes to the eye MD for injections and reports that member states this is painful and that member can't see for a couple of days after each injection. Son shared the next appointment is 1/31/23.  Falls/Injuries: No  ADL/IADL changes: No  Changes in services: No    Caregiver Assessment follow up:  NA    Goals: See POC in chart for goal progress documentation.      Will see member in 6 months for an annual health risk assessment.   Encouraged member to call CC with any questions or concerns in the meantime.     ANASTACIA Velázquez  [facility]  [phone]

## 2023-01-31 ENCOUNTER — TRANSFERRED RECORDS (OUTPATIENT)
Dept: HEALTH INFORMATION MANAGEMENT | Facility: CLINIC | Age: 83
End: 2023-01-31

## 2023-02-27 ENCOUNTER — TRANSFERRED RECORDS (OUTPATIENT)
Dept: HEALTH INFORMATION MANAGEMENT | Facility: CLINIC | Age: 83
End: 2023-02-27

## 2023-04-10 ENCOUNTER — TRANSFERRED RECORDS (OUTPATIENT)
Dept: HEALTH INFORMATION MANAGEMENT | Facility: CLINIC | Age: 83
End: 2023-04-10
Payer: COMMERCIAL

## 2023-04-10 LAB — RETINOPATHY: NORMAL

## 2023-04-26 ENCOUNTER — TRANSCRIBE ORDERS (OUTPATIENT)
Dept: OTHER | Age: 83
End: 2023-04-26

## 2023-04-26 DIAGNOSIS — H35.30 AMD (AGE RELATED MACULAR DEGENERATION): Primary | ICD-10-CM

## 2023-05-01 DIAGNOSIS — H35.3211 EXUDATIVE AGE-RELATED MACULAR DEGENERATION OF RIGHT EYE WITH ACTIVE CHOROIDAL NEOVASCULARIZATION (H): Primary | ICD-10-CM

## 2023-05-31 ENCOUNTER — PATIENT OUTREACH (OUTPATIENT)
Dept: GERIATRIC MEDICINE | Facility: CLINIC | Age: 83
End: 2023-05-31
Payer: COMMERCIAL

## 2023-05-31 NOTE — PROGRESS NOTES
[facility] Care Coordination Contact    Received an email from member's son, Brandan with available dates for member's annual assessment. Scheduled assessment from dates and times offered. Selected 6/5/23 at 10am. Called Zee Chaves and scheduled an .  Emailed Brandan back with the assessment date of 6/5/23 at 10am.  ANASTACIA Velázquez  [facility]  [phone]

## 2023-06-01 NOTE — PROGRESS NOTES
[facility] Care Coordination Contact    Received confirmation back from member's son, that the assessment is set.  ANASTACIA Velázquez  [facility]  [phone]

## 2023-06-05 ENCOUNTER — PATIENT OUTREACH (OUTPATIENT)
Dept: GERIATRIC MEDICINE | Facility: CLINIC | Age: 83
End: 2023-06-05
Payer: COMMERCIAL

## 2023-06-05 SDOH — HEALTH STABILITY: PHYSICAL HEALTH: ON AVERAGE, HOW MANY MINUTES DO YOU ENGAGE IN EXERCISE AT THIS LEVEL?: 0 MIN

## 2023-06-05 SDOH — HEALTH STABILITY: PHYSICAL HEALTH: ON AVERAGE, HOW MANY DAYS PER WEEK DO YOU ENGAGE IN MODERATE TO STRENUOUS EXERCISE (LIKE A BRISK WALK)?: 0 DAYS

## 2023-06-05 SDOH — ECONOMIC STABILITY: FOOD INSECURITY: WITHIN THE PAST 12 MONTHS, THE FOOD YOU BOUGHT JUST DIDN'T LAST AND YOU DIDN'T HAVE MONEY TO GET MORE.: NEVER TRUE

## 2023-06-05 SDOH — ECONOMIC STABILITY: FOOD INSECURITY: WITHIN THE PAST 12 MONTHS, YOU WORRIED THAT YOUR FOOD WOULD RUN OUT BEFORE YOU GOT MONEY TO BUY MORE.: NEVER TRUE

## 2023-06-05 SDOH — ECONOMIC STABILITY: INCOME INSECURITY: IN THE LAST 12 MONTHS, WAS THERE A TIME WHEN YOU WERE NOT ABLE TO PAY THE MORTGAGE OR RENT ON TIME?: NO

## 2023-06-05 ASSESSMENT — PATIENT HEALTH QUESTIONNAIRE - PHQ9: SUM OF ALL RESPONSES TO PHQ QUESTIONS 1-9: 1

## 2023-06-05 ASSESSMENT — LIFESTYLE VARIABLES
HOW OFTEN DO YOU HAVE A DRINK CONTAINING ALCOHOL: NEVER
AUDIT-C TOTAL SCORE: 0
HOW OFTEN DO YOU HAVE SIX OR MORE DRINKS ON ONE OCCASION: NEVER
HOW MANY STANDARD DRINKS CONTAINING ALCOHOL DO YOU HAVE ON A TYPICAL DAY: PATIENT DOES NOT DRINK
SKIP TO QUESTIONS 9-10: 1

## 2023-06-05 ASSESSMENT — SOCIAL DETERMINANTS OF HEALTH (SDOH): HOW HARD IS IT FOR YOU TO PAY FOR THE VERY BASICS LIKE FOOD, HOUSING, MEDICAL CARE, AND HEATING?: NOT HARD AT ALL

## 2023-06-05 NOTE — Clinical Note
Dr. Aragon, I am the [facility] Care Coordinator for Rabia. I was out to his home on 6/5/23 for his annual assessment. No concerns at this time. Has daily PCA care provided by family. Thank you for your review. Charis

## 2023-06-06 NOTE — PROGRESS NOTES
"[facility] Care Coordination Contact    [facility] Home Visit Assessment     Home visit for Health Risk Assessment with Rabia Reed completed on June 5, 2023    Type of residence:: Private home - stairs (Threshold steps into home)  Current living arrangement:: Member lives in a private home with family. Member reports that he is rarely at home alone.    Assessment completed with:: Patient, Care Team Member, Children, Other (Son: Brandan Reed ; : Lj Pryor ; and FVP Trainee: Shayy Hall)    Current Care Plan  Member currently receiving the following home care services: NA  Member currently receiving the following community resources: PCA    Health Issues: Reports that his health is fair. Continues to report daily pain from headaches, knee pain, low back pain and left shoulder pain (reporting that he is not able to lift his left arm  his shoulder).  Continues to share that his headaches will cause pain levels of 9-10. He shared that noise will increase the headaches, and that when the headaches are severe it is hard to do any tasks. Continues to take pain medication, but reports that he gets little relief from this. Continues to check blood sugars daily and family assists with insulin injections. Son is a hospital RN, and will regularly check blood pressure as well. Reports that his hands will \"freeze\" and can't close hands to make a fist, and isn't able to grab items or hold onto times. Continues to report that he has a tremor in both hands and that his right hand is worse than the left. No observed tremors at this assessment. No hospitalizations or ED visits in the past year.     Medication Review  Medication reconciliation completed in Epic: Yes  Medication set-up & administration: Family/informal caregiver sets up daily.  Family caregiver administers medications.  Medication Risk Assessment Medication (1 or more, place referral to MTM): N/A: No risk factors identified  MTM " Referral Placed: No: No risk factors idenified    Mental/Behavioral Health   Depression Screening:   PHQ-9 Total Score: 1  Mental health DX:: No        Falls Assessment:   Fallen 2 or more times in the past year?: No   Any fall with injury in the past year?: No    ADL/IADL Dependencies:   Dependent ADLs:: Ambulation-walker, Bathing, Dressing, Grooming, Transfers, Toileting  Dependent IADLs:: Cleaning, Cooking, Laundry, Shopping, Meal Preparation, Medication Management, Money Management, Transportation    [facility] sponsored benefits: Shared information re: Silver Sneakers/gym memberships, ASA, Calcium +D.    PCA Assessment completed at visit: Yes Annual PCA assessment indicated 14 units per day of PCA. This is the same as the previous assessment.     Elderly Waiver Eligibility: Yes-will continue on elderly waiver.    Care Plan & Recommendations: Member wants to continue to live with family with his family as his PCA. He wants to continue to receive glucerna for nutritional supplement. Due to needing glucerna needs to be open to elderly waiver. Member mentioned for the first time that if his care becomes too much for his family to manage, that he would consider a nursing home. Family shared that member has observed how much care his wife has been and the burden it has caused on family, and member has been stating that if his care is too much for family that NH would be where he wants to go.     See [facility] for detailed assessment information.    Follow-Up Plan: Member informed of future contact, plan to f/u with member with a 6 month telephone assessment.  Contact information shared with member and family, encouraged member to call with any questions or concerns at any time.    [place] care continuum providers: Please see Snapshot and Care Management Flowsheets for Specific details of care plan.    This CC note routed to PCP.    Charis Garcia, ANASTACIA  [facility]  [phone]

## 2023-06-07 DIAGNOSIS — E11.9 TYPE 2 DIABETES MELLITUS WITHOUT COMPLICATION, WITH LONG-TERM CURRENT USE OF INSULIN (H): ICD-10-CM

## 2023-06-07 DIAGNOSIS — Z79.4 TYPE 2 DIABETES MELLITUS WITHOUT COMPLICATION, WITH LONG-TERM CURRENT USE OF INSULIN (H): ICD-10-CM

## 2023-06-08 ENCOUNTER — PATIENT OUTREACH (OUTPATIENT)
Dept: GERIATRIC MEDICINE | Facility: CLINIC | Age: 83
End: 2023-06-08
Payer: COMMERCIAL

## 2023-06-08 RX ORDER — BLOOD SUGAR DIAGNOSTIC
STRIP MISCELLANEOUS
Qty: 300 STRIP | Refills: 0 | Status: SHIPPED | OUTPATIENT
Start: 2023-06-08 | End: 2024-01-26

## 2023-06-08 NOTE — LETTER
June 8, 2023    Important [employer] Information    PIYUSH BOND  [address] [address]  Your Care Plan  Dear Piyush,  When we spoke recently, I promised to send you a Care Plan. The plan enclosed is a summary of our discussion. It includes the steps we agreed would help you meet your health goals. In addition, I can help you with:  [id number]  This program is available to members who need a ride to medical and dental visits. To schedule a ride, call [phone] or [phone] (toll free). TTY: 711. You can call Monday - Thursday 8 a.m. to 5 p.m. and Fridays 9 a.m. to 5 p.m.  One Pass  One Pass is your no-cost, complete, fitness solution for your mind and body. To learn more visit [employer]/fitness or call One Pass, toll-free 1 [phone] (TTY: 711) 8 a.m. to 9 p.m. Monday-Friday.  Health Care Directive   This form helps you outline your health care wishes. You can request a form from me and I will answer any questions you have before you discuss it with your doctor.   Annual Physical  Take a key step on your path to good health and set up an annual physical at your clinic.  Questions?  Call me at [phone] Monday-Friday between 8am and 5pm.  TTY: 711. As we discussed, I plan to be in touch with you again in 6 months to follow up via phone.  Sincerely,    ANASTACIA Velázquez    E-mail: [email or web address]  Phone: [phone]    Care Manager  [facility]    cc: member records

## 2023-06-08 NOTE — TELEPHONE ENCOUNTER
Prescription approved per [facility] Refill Protocol.  Amee Veloz, RN  [facility] Triage Nurse

## 2023-06-08 NOTE — PROGRESS NOTES
[facility] Care Coordination Contact    Received after visit chart from care coordinator.  Completed following tasks: Mailed copy of care plan to client, Updated services in Database, Submitted referrals/auths for PCA and Mailed Safe Medication Disposal   , Provider Signature - No POC Shared:  Member indicates that they do not want their POC shared with any EW providers.     and [employer]:  Faxed completed PCA assessment to PCA Agency and mailed copies to member.  Faxed MD Communication to PCP.  Emailed referral form for auth to [employer].    Berta Mcfarland  Care Management Specialist  [facility]  [phone]

## 2023-07-13 ENCOUNTER — TELEPHONE (OUTPATIENT)
Dept: INTERNAL MEDICINE | Facility: CLINIC | Age: 83
End: 2023-07-13
Payer: COMMERCIAL

## 2023-07-13 NOTE — TELEPHONE ENCOUNTER
Reason for Call:  Form, our goal is to have forms completed with 72 hours, however, some forms may require a visit or additional information.    Type of letter, form or note:  medical    Who is the form from?: Patient    Where did the form come from: Patient or family brought in       What clinic location was the form placed at?: Internal Medicine    Where the form was placed: Given to MA/RN    What number is listed as a contact on the form?: [phone]       Additional comments: none    Call taken on 7/13/2023 at 5:54 PM by Mikaela Pierson MA

## 2023-07-17 NOTE — TELEPHONE ENCOUNTER
Form reviewed. Requires that pt be seen in the last 60 days to complete form and qualify. LAst appt 1/9/23 so  not able to complete form.  Pt doesn't speak English very well. Call pt's son Janel help set up end of day open virtual video visit when he can be there too to help translate or schedule pt to see me in clinic in next same day/next day [facility] open appt slot next week  where standard phone  can be used

## 2023-07-27 ENCOUNTER — OFFICE VISIT (OUTPATIENT)
Dept: OPHTHALMOLOGY | Facility: CLINIC | Age: 83
End: 2023-07-27
Attending: OPHTHALMOLOGY
Payer: COMMERCIAL

## 2023-07-27 DIAGNOSIS — H35.3211 EXUDATIVE AGE-RELATED MACULAR DEGENERATION OF RIGHT EYE WITH ACTIVE CHOROIDAL NEOVASCULARIZATION (H): Primary | ICD-10-CM

## 2023-07-27 DIAGNOSIS — H35.30 AMD (AGE RELATED MACULAR DEGENERATION): ICD-10-CM

## 2023-07-27 PROCEDURE — 92134 CPTRZ OPH DX IMG PST SGM RTA: CPT | Performed by: OPHTHALMOLOGY

## 2023-07-27 PROCEDURE — 99207 FUNDUS AUTOFLUORESCENCE IMAGE (FAF) OU (BOTH EYES): CPT | Mod: 26 | Performed by: OPHTHALMOLOGY

## 2023-07-27 PROCEDURE — G0463 HOSPITAL OUTPT CLINIC VISIT: HCPCS | Performed by: OPHTHALMOLOGY

## 2023-07-27 PROCEDURE — 92250 FUNDUS PHOTOGRAPHY W/I&R: CPT | Performed by: OPHTHALMOLOGY

## 2023-07-27 PROCEDURE — 99204 OFFICE O/P NEW MOD 45 MIN: CPT | Mod: GC | Performed by: OPHTHALMOLOGY

## 2023-07-27 ASSESSMENT — VISUAL ACUITY
OS_CC: 20/80
METHOD: SNELLEN - LINEAR
OD_PH_CC: 20/150
CORRECTION_TYPE: GLASSES
OS_CC+: -1
OD_CC: 20/200

## 2023-07-27 ASSESSMENT — TONOMETRY
OS_IOP_MMHG: UNABLE
IOP_METHOD: TONOPEN
OD_IOP_MMHG: 14
OS_IOP_MMHG: 14
IOP_METHOD: ICARE
OD_IOP_MMHG: 04

## 2023-07-27 ASSESSMENT — CONF VISUAL FIELD
OD_INFERIOR_TEMPORAL_RESTRICTION: 0
OD_NORMAL: 1
OD_INFERIOR_NASAL_RESTRICTION: 0
METHOD: COUNTING FINGERS
OD_SUPERIOR_TEMPORAL_RESTRICTION: 0
OD_SUPERIOR_NASAL_RESTRICTION: 0
OS_SUPERIOR_NASAL_RESTRICTION: 3

## 2023-07-27 ASSESSMENT — EXTERNAL EXAM - LEFT EYE: OS_EXAM: BROW PTOSIS

## 2023-07-27 ASSESSMENT — CUP TO DISC RATIO
OD_RATIO: 0.3
OS_RATIO: 0.3

## 2023-07-27 ASSESSMENT — REFRACTION_WEARINGRX
SPECS_TYPE: BIFOCAL
OS_CYLINDER: +1.25
OS_ADD: +3.00
OD_ADD: +3.00
OD_CYLINDER: +2.50
OD_AXIS: 001
OS_SPHERE: +0.25
OS_AXIS: 015
OD_SPHERE: -0.50

## 2023-07-27 ASSESSMENT — SLIT LAMP EXAM - LIDS
COMMENTS: DERMATOCHALASIS WITH LATERAL HOODING
COMMENTS: DERMATOCHALASIS WITH LATERAL HOODING

## 2023-07-27 ASSESSMENT — EXTERNAL EXAM - RIGHT EYE: OD_EXAM: BROW PTOSIS

## 2023-07-27 NOTE — LETTER
7/27/2023       RE: Rabia Reed  [address] [address]     Dear Colleague,    Thank you for referring your patient, Rabia Reed, to the [facility] [place] at [facility]. Please see a copy of my visit note below.     CC: Wet AMD  First appointment with me    Referred by: Dr. Arevalo    HPI: Rabia Reed is a 83 year old year-old patient with history of wet AMD. He previously followed with [facility] with Dr. Arevalo. He had previously been receiving Eylea injection every 6-10 weeks, last injection in April. He was referred here for PDT right eye     PMH: T2DM, HTN    Past ocular history:   Wet AMD each eye  Pseudophakia each eye     Retinal Imaging:    OCT mac 07/27/23   RE: central GA, drusen/PED, small parafoveal IRF, large nasal/inferior SRF, ERM with lamellar hole  LE: dense vitreous opacities, parafoveal IRF, drusen/PED, ERM    Assessment & Plan:    #Exudative AMD each eye   Last Eylea injection April 2023, receiving [id number] right eye   No exudation on left eye today  -PDT right eye today    #ERM each eye  -Monitor    #Diabetes without retinopathy  Last A1c 9.3 in 1/9/23  -Discussed excellent BG/BP control      They currently have follow up with Dr. Arevalo in 1-2 weeks to continue injection treatment   Patient will follow up with us in the next month with fluorescein angiography transits right eye and photodynamic therapy (PDT) right eye.    Again, thank you for allowing me to participate in the care of your patient.      Sincerely,    Nany Heart M.D.  Professor of Ophthalmology  Vitreoretinal Surgeon  [employer] Endowed Chair  Department of Ophthalmology & Visual Neurosciences  [facility]  Phone:  [phone]   Fax:  [phone]

## 2023-07-27 NOTE — PROGRESS NOTES
CC: Wet AMD  First appointment with me    Referred by: Dr. Arevalo for photodynamic therapy (PDT) right eye     HPI: Rabia Reed is a 83 year old year-old patient with history of wet AMD. He previously followed with [facility] with Dr. Arevalo. He had previously been receiving Eylea injection every 6-10 weeks, last injection in April. He was referred here for PDT right eye     PMH: T2DM, HTN    Past ocular history:   Wet AMD each eye  Pseudophakia each eye     Retinal Imaging:    OCT mac 07/27/23   RE: central GA, drusen/PED, small parafoveal IRF, large nasal/inferior SRF, ERM with lamellar hole  LE: dense vitreous opacities, parafoveal IRF, drusen/PED, ERM    Assessment & Plan:    #Exudative AMD each eye   Last Eylea injection April 2023, receiving [id number] right eye   No exudation on left eye today  -Discussed photodynamic therapy but he is tired and would not tolerate today.   -Follow up with 1 week Eylea and photodynamic therapy right eye     #ERM each eye  -Monitor    #Diabetes without retinopathy  Last A1c 9.3 in 1/9/23  -Discussed excellent BG/BP control      They currently have follow up with Dr. Arevalo August 24. Patient due for injection and stated could not find earlier bob.  Patient will follow up with us in one week with fluorescein angiography transits right eye and photodynamic therapy (PDT) right eye and possible Eylea inj if approved.  Will follow up with Dr. MCNEIL to continue injections    Corrine Levin MD  Ophthalmology Resident, PGY3    ~~~~~~~~~~~~~~~~~~~~~~~~~~~~~~~~~~   Complete documentation of historical and exam elements from today's encounter can be found in the full encounter summary report (not reduplicated in this progress note).  I personally obtained the chief complaint(s) and history of present illness.  I confirmed and edited as necessary the review of systems, past medical/surgical history, family history, social history, and examination findings as documented by  others; and I examined the patient myself.  I personally reviewed the relevant tests, images, and reports as documented above.  I formulated and edited as necessary the assessment and plan and discussed the findings and management plan with the patient and family    Nany Heart MD  Professor of Ophthalmology.   Vitreo-retinal surgeon   Department of Ophthalmology and Visual Neurosciences   [facility]  Phone: [phone]   Fax: [phone]

## 2023-07-27 NOTE — LETTER
7/27/2023       RE: Rabia Reed  [address] [place] [id number]     Dear Colleague,    Thank you for referring your patient, Rabia Reed, to the [facility] at [facility]. Please see a copy of my visit note below.       CC: Wet AMD  First appointment with me    Referred by: Dr. Arevalo for photodynamic therapy (PDT) right eye     HPI: Rabia Reed is a 83 year old year-old patient with history of wet AMD. He previously followed with [facility] with Dr. Arevalo. He had previously been receiving Eylea injection every 6-10 weeks, last injection in April. He was referred here for PDT right eye     PMH: T2DM, HTN    Past ocular history:   Wet AMD each eye  Pseudophakia each eye     Retinal Imaging:    OCT mac 07/27/23   RE: central GA, drusen/PED, small parafoveal IRF, large nasal/inferior SRF, ERM with lamellar hole  LE: dense vitreous opacities, parafoveal IRF, drusen/PED, ERM    Assessment & Plan:    #Exudative AMD each eye   Last Eylea injection April 2023, receiving [id number] right eye   No exudation on left eye today  -Discussed photodynamic therapy but he is tired and would not tolerate today.   -Follow up with 1 week Eylea and photodynamic therapy right eye     #ERM each eye  -Monitor    #Diabetes without retinopathy  Last A1c 9.3 in 1/9/23  -Discussed excellent BG/BP control      They currently have follow up with Dr. Arevalo August 24. Patient due for injection and stated could not find earlier bob.  Patient will follow up with us in one week with fluorescein angiography transits right eye and photodynamic therapy (PDT) right eye and possible Eylea inj if approved.  Will follow up with Dr. MCNEIL to continue injections      Again, thank you for allowing me to participate in the care of your patient.      Sincerely,    MD Nany Treadwell MD  Professor of Ophthalmology.   Vitreo-retinal  surgeon   Department of Ophthalmology and Visual Neurosciences   [facility]  Phone: [phone]   Fax: [phone]

## 2023-07-27 NOTE — NURSING NOTE
Chief Complaints and History of Present Illnesses   Patient presents with    Consult For     Exudative age-related macular degeneration of right eye with active choroidal neovascularization referred by Dr Arevalo     Chief Complaint(s) and History of Present Illness(es)       Consult For              Laterality: right eye    Course: stable    Associated symptoms: abnormal color vision and itching.  Negative for eye pain and flashes    Treatments tried: artificial tears    Pain scale: 1/10    Comments: Exudative age-related macular degeneration of right eye with active choroidal neovascularization referred by Dr Arevalo              Comments    Exam interpreted.  Patient states that he sees cloudy movement across his vision in both eyes.   He tells em that his vision seems stable in both eyes.  Sometimes he looks at his black shirt and it appears to turn red.    Lab Results       Component                Value               Date                       A1C                      9.3                 01/09/2023                 A1C                      9.2                 02/18/2022                 A1C                      8.3                 07/30/2021                 A1C                      9.1                 03/16/2021                 A1C                      8.8                 11/02/2020                 A1C                      8.8                 01/13/2020               Kayley Jenkins, COT 9:53 AM  July 27, 2023

## 2023-07-28 DIAGNOSIS — Z53.9 DIAGNOSIS NOT YET DEFINED: Primary | ICD-10-CM

## 2023-07-28 PROCEDURE — G0179 MD RECERTIFICATION HHA PT: HCPCS | Performed by: INTERNAL MEDICINE

## 2023-08-03 ENCOUNTER — VIRTUAL VISIT (OUTPATIENT)
Dept: INTERNAL MEDICINE | Facility: CLINIC | Age: 83
End: 2023-08-03
Payer: COMMERCIAL

## 2023-08-03 ENCOUNTER — OFFICE VISIT (OUTPATIENT)
Dept: OPHTHALMOLOGY | Facility: CLINIC | Age: 83
End: 2023-08-03
Attending: OPHTHALMOLOGY
Payer: COMMERCIAL

## 2023-08-03 VITALS — WEIGHT: 178 LBS | HEIGHT: 65 IN | BODY MASS INDEX: 29.66 KG/M2

## 2023-08-03 DIAGNOSIS — H35.3211 EXUDATIVE AGE-RELATED MACULAR DEGENERATION OF RIGHT EYE WITH ACTIVE CHOROIDAL NEOVASCULARIZATION (H): Primary | ICD-10-CM

## 2023-08-03 DIAGNOSIS — R80.9 PROTEINURIA, UNSPECIFIED TYPE: ICD-10-CM

## 2023-08-03 DIAGNOSIS — Z79.4 TYPE 2 DIABETES MELLITUS WITHOUT COMPLICATION, WITH LONG-TERM CURRENT USE OF INSULIN (H): Primary | ICD-10-CM

## 2023-08-03 DIAGNOSIS — E11.9 TYPE 2 DIABETES MELLITUS WITHOUT COMPLICATION, WITH LONG-TERM CURRENT USE OF INSULIN (H): Primary | ICD-10-CM

## 2023-08-03 PROCEDURE — 99207 PR DROP WITH A PROCEDURE: CPT | Performed by: OPHTHALMOLOGY

## 2023-08-03 PROCEDURE — 250N000011 HC RX IP 250 OP 636: Performed by: OPHTHALMOLOGY

## 2023-08-03 PROCEDURE — 99213 OFFICE O/P EST LOW 20 MIN: CPT | Mod: VID | Performed by: INTERNAL MEDICINE

## 2023-08-03 PROCEDURE — 67221 OCULAR PHOTODYNAMIC THER: CPT | Mod: RT | Performed by: OPHTHALMOLOGY

## 2023-08-03 PROCEDURE — 67028 INJECTION EYE DRUG: CPT | Mod: RT | Performed by: OPHTHALMOLOGY

## 2023-08-03 RX ADMIN — VERTEPORFIN FOR INJECTION 11.4 MG: 15 INJECTION, POWDER, LYOPHILIZED, FOR SOLUTION INTRAVENOUS at 15:22

## 2023-08-03 RX ADMIN — AFLIBERCEPT 2 MG: 40 INJECTION, SOLUTION INTRAVITREAL at 15:24

## 2023-08-03 ASSESSMENT — CONF VISUAL FIELD
OS_SUPERIOR_NASAL_RESTRICTION: 3
OD_INFERIOR_NASAL_RESTRICTION: 0
OD_INFERIOR_TEMPORAL_RESTRICTION: 0
OD_SUPERIOR_NASAL_RESTRICTION: 0
METHOD: COUNTING FINGERS
OD_NORMAL: 1
OD_SUPERIOR_TEMPORAL_RESTRICTION: 0

## 2023-08-03 ASSESSMENT — REFRACTION_WEARINGRX
OS_ADD: +3.00
OD_AXIS: 001
SPECS_TYPE: BIFOCAL
OD_ADD: +3.00
OS_CYLINDER: +1.25
OS_AXIS: 015
OD_SPHERE: -0.50
OD_CYLINDER: +2.50
OS_SPHERE: +0.25

## 2023-08-03 ASSESSMENT — SLIT LAMP EXAM - LIDS
COMMENTS: DERMATOCHALASIS WITH LATERAL HOODING
COMMENTS: DERMATOCHALASIS WITH LATERAL HOODING

## 2023-08-03 ASSESSMENT — VISUAL ACUITY
OS_CC+: -1
OS_CC: 20/70
OD_CC: 20/200
METHOD: SNELLEN - LINEAR

## 2023-08-03 ASSESSMENT — CUP TO DISC RATIO
OD_RATIO: 0.3
OS_RATIO: 0.3

## 2023-08-03 ASSESSMENT — EXTERNAL EXAM - RIGHT EYE: OD_EXAM: BROW PTOSIS

## 2023-08-03 ASSESSMENT — EXTERNAL EXAM - LEFT EYE: OS_EXAM: BROW PTOSIS

## 2023-08-03 ASSESSMENT — TONOMETRY
OD_IOP_MMHG: 15
OS_IOP_MMHG: 17
IOP_METHOD: TONOPEN

## 2023-08-03 NOTE — NURSING NOTE
"Chief Complaints and History of Present Illnesses   Patient presents with    Exudative Macular Degeneration Follow Up     Chief Complaint(s) and History of Present Illness(es)       Exudative Macular Degeneration Follow Up              Laterality: right eye    Associated symptoms: floaters.  Negative for eye pain    Pain scale: 0/10              Comments    Rabia is here to continue care for Exudative age-related macular degeneration of right eye with active choroidal neovascularization. She was here last week and states no vision change.  He sees \"cloudy spots\" in vision     Alex Tripathi COT 12:54 PM August 3, 2023                      "

## 2023-08-03 NOTE — PROGRESS NOTES
Rabia is a 83 year old who is being evaluated via a billable video visit.      How would you like to obtain your AVS? Hello Markethart  If the video visit is dropped, the invitation should be resent by: Text to cell phone: [phone]  Will anyone else be joining your video visit? No          ASSESSMENT:    1. Type 2 diabetes mellitus without complication, with long-term current use of insulin (H)  Uncontrolled based on last A1c below reported blood sugars more at goal for age currently.  Continue current insulin therapy for now.  Labs in the next 1 to 2 weeks nonfasting as ordered  - Albumin Random Urine Quantitative with Creat Ratio; Future  - Hemoglobin A1c; Future  - Basic metabolic panel; Future    2. Proteinuria, unspecified type  Mild proteinuria based on last lab result, improved in January. Repeat lab next 1-2 weeks. If worsened, will add ACEI  - Albumin Random Urine Quantitative with Creat Ratio; Future  - Basic metabolic panel; Future      PLAN:   Forms completed for [facility] to food supplementation with Glucerna, etc  Continue current medications  Call  [phone] or use [facility] to schedule a future lab appointment  non-fasting in 1-2 weeks. Blood and urine. You do NOT have to fast fpor this lab test  Covid  booster  and influenza/flu vaccinations in the  Fall (October).  May get through a pharmacy or at clinic  Continue daily exercise  See me in 6 months in clinic for follow-up or earlier based on recommendations with upcoming lab results. Because non-acute appointments to see me in clinic are currently booking out about 3 months at the clinic (for multiple reasons), please use [facility] or call the appointment line 4-5 months in advance of this future appointment with me.           Video-Visit Details    Type of service:  Video Visit    Video Start Time: 5:15pm    Video End Time:5:26pm    Originating Location (pt. Location): Home    Distant Location (provider location):  [facility]  [employer]     Platform used for Video Visit: Dago Aragon MD  Internal Medicine Department  [facility]  Internal Medicine Department      (Chart documentation was completed, in part, with [employer] voice-recognition software. Even though reviewed, some grammatical, spelling, and word errors may remain.)         Elma Camarillo is a 83 year old, presenting for the following health issues:  Forms      History of Present Illness       Diabetes:   He presents for follow up of diabetes.  He is checking home blood glucose three times daily.   He checks blood glucose before meals and at bedtime.  Blood glucose is sometimes over 200 and sometimes under 70. He is aware of hypoglycemia symptoms including shakiness.    He has no concerns regarding his diabetes at this time.   He is not experiencing numbness or burning in feet, excessive thirst, blurry vision, weight changes or redness, sores or blisters on feet.           He eats 2-3 servings of fruits and vegetables daily.He consumes 0 sweetened beverage(s) daily.He exercises with enough effort to increase his heart rate 30 to 60 minutes per day.  He exercises with enough effort to increase his heart rate 7 days per week.   He is taking medications regularly.     Most recent lab results reviewed with pt.      HPI:  Patient seen today with his son who helps translate a few words but in general, patient understanding English well.  Has been getting fluid supplementation including Glucerna from [facility] and needs visit within 60 days opf form completion approving further supplementation to help maintain  weight, protein status, etc and forms being completed tpday  Denies chest pain, shortness of breath, abdominal pain.  Riding a stationary bike at home for 30 minutes daily in the evening.  Currently on mixed insulin 35 units in the morning and 25 units in the evening with meals and 7 units of Humalog at bedtime.  With that,  patient states morning blood sugars before breakfast range 125-130, 150-160 for lunch and bedtime generally 180-200  Underwent eye laser treatment today through ophthalmology.     Additional ROS:   Constitutional, HEENT, Cardiovascular, Pulmonary, GI and , Neuro, MSK and Psych review of systems/symptoms are otherwise negative or unchanged from previous, except as noted above.           Objective :  No vitals obtained today    Physical Exam:  GENERAL:  alert and no distress  EYES: Eyes grossly normal to inspection, conjunctivae and sclerae normal  RESP: no audible wheeze, cough, or visible cyanosis.  No visible retractions or increased work of breathing.  Able to speak fully in complete sentences.  NEURO: Cranial nerves grossly intact, mentation intact and speech normal  PSYCH: mentation appears normal, affect normal/bright, judgement and insight intact, normal speech and appearance well-groomed

## 2023-08-04 NOTE — PATIENT INSTRUCTIONS
Forms completed for [facility] to food supplementation with Glucerna, etc  Continue current medications  Call  [phone] or use [facility] to schedule a future lab appointment  non-fasting in 1-2 weeks. Blood and urine. You do NOT have to fast fpor this lab test  Covid  booster  and influenza/flu vaccinations in the  Fall (October).  May get through a pharmacy or at clinic  Continue daily exercise  See me in 6 months in clinic for follow-up or earlier based on recommendations with upcoming lab results. Because non-acute appointments to see me in clinic are currently booking out about 3 months at the clinic (for multiple reasons), please use [employer] or call the appointment line 4-5 months in advance of this future appointment with me.    [facility] Emergency Department    [address]    [facility] [address]    Phone:  [phone]    Fax:  [phone]                                       Caitlyn Brower   MRN: [id number]    Department:  [facility] Emergency Department   Date of Visit:  5/19/2018           After Visit Summary Signature Page     I have received my discharge instructions, and my questions have been answered. I have discussed any challenges I see with this plan with the nurse or doctor.    ..........................................................................................................................................  Patient/Patient Representative Signature      ..........................................................................................................................................  Patient Representative Print Name and Relationship to Patient    ..................................................               ................................................  Date                                            Time    ..........................................................................................................................................  Reviewed by Signature/Title    ...................................................              ..............................................  Date                                                            Time

## 2023-08-04 NOTE — TELEPHONE ENCOUNTER
Completed form faxed to [facility] at [phone]. Original mailed to patients home address, copy made and sent to be scanned into chart.

## 2023-08-27 ENCOUNTER — HEALTH MAINTENANCE LETTER (OUTPATIENT)
Age: 83
End: 2023-08-27

## 2023-10-06 ENCOUNTER — OFFICE VISIT (OUTPATIENT)
Dept: INTERNAL MEDICINE | Facility: CLINIC | Age: 83
End: 2023-10-06
Payer: COMMERCIAL

## 2023-10-06 ENCOUNTER — TRANSFERRED RECORDS (OUTPATIENT)
Dept: HEALTH INFORMATION MANAGEMENT | Facility: CLINIC | Age: 83
End: 2023-10-06

## 2023-10-06 VITALS
TEMPERATURE: 98 F | HEIGHT: 65 IN | SYSTOLIC BLOOD PRESSURE: 120 MMHG | OXYGEN SATURATION: 98 % | BODY MASS INDEX: 30.22 KG/M2 | HEART RATE: 86 BPM | DIASTOLIC BLOOD PRESSURE: 58 MMHG | WEIGHT: 181.4 LBS

## 2023-10-06 DIAGNOSIS — H93.3X1 DISORDER OF RIGHT ACOUSTIC NERVE: ICD-10-CM

## 2023-10-06 DIAGNOSIS — R42 DIZZINESS: ICD-10-CM

## 2023-10-06 DIAGNOSIS — Z23 FLU VACCINE NEED: ICD-10-CM

## 2023-10-06 DIAGNOSIS — M54.16 LUMBAR RADICULOPATHY: ICD-10-CM

## 2023-10-06 DIAGNOSIS — R20.0 FACIAL NUMBNESS: ICD-10-CM

## 2023-10-06 DIAGNOSIS — E11.9 TYPE 2 DIABETES MELLITUS WITHOUT COMPLICATION, WITH LONG-TERM CURRENT USE OF INSULIN (H): ICD-10-CM

## 2023-10-06 DIAGNOSIS — Z79.4 TYPE 2 DIABETES MELLITUS WITHOUT COMPLICATION, WITH LONG-TERM CURRENT USE OF INSULIN (H): ICD-10-CM

## 2023-10-06 LAB
ANION GAP SERPL CALCULATED.3IONS-SCNC: 10 MMOL/L (ref 7–15)
BUN SERPL-MCNC: 22.8 MG/DL (ref 8–23)
CALCIUM SERPL-MCNC: 9.4 MG/DL (ref 8.8–10.2)
CHLORIDE SERPL-SCNC: 103 MMOL/L (ref 98–107)
CREAT SERPL-MCNC: 0.82 MG/DL (ref 0.67–1.17)
DEPRECATED HCO3 PLAS-SCNC: 28 MMOL/L (ref 22–29)
EGFRCR SERPLBLD CKD-EPI 2021: 86 ML/MIN/1.73M2
GLUCOSE SERPL-MCNC: 276 MG/DL (ref 70–99)
HBA1C MFR BLD: 9.8 % (ref 0–5.6)
POTASSIUM SERPL-SCNC: 3.8 MMOL/L (ref 3.4–5.3)
SODIUM SERPL-SCNC: 141 MMOL/L (ref 135–145)

## 2023-10-06 PROCEDURE — 80048 BASIC METABOLIC PNL TOTAL CA: CPT | Performed by: INTERNAL MEDICINE

## 2023-10-06 PROCEDURE — G0008 ADMIN INFLUENZA VIRUS VAC: HCPCS | Performed by: INTERNAL MEDICINE

## 2023-10-06 PROCEDURE — 99214 OFFICE O/P EST MOD 30 MIN: CPT | Mod: 25 | Performed by: INTERNAL MEDICINE

## 2023-10-06 PROCEDURE — 83036 HEMOGLOBIN GLYCOSYLATED A1C: CPT | Performed by: INTERNAL MEDICINE

## 2023-10-06 PROCEDURE — 36415 COLL VENOUS BLD VENIPUNCTURE: CPT | Performed by: INTERNAL MEDICINE

## 2023-10-06 PROCEDURE — 90662 IIV NO PRSV INCREASED AG IM: CPT | Performed by: INTERNAL MEDICINE

## 2023-10-06 RX ORDER — INSULIN ASPART 100 [IU]/ML
INJECTION, SUSPENSION SUBCUTANEOUS
Qty: 60 ML | Refills: 3 | Status: SHIPPED | OUTPATIENT
Start: 2023-10-06

## 2023-10-06 RX ORDER — ASPIRIN 325 MG
325 TABLET ORAL DAILY
Qty: 90 TABLET | Refills: 3 | Status: SHIPPED | OUTPATIENT
Start: 2023-10-06 | End: 2024-09-30

## 2023-10-06 ASSESSMENT — ENCOUNTER SYMPTOMS
BACK PAIN: 1
HEADACHES: 1
NUMBNESS: 1

## 2023-10-06 NOTE — PATIENT INSTRUCTIONS
Flu vaccine today   Labs as ordered   Stop Celebrex/Celecoxib for now   Change Aspirin to 325mg tab, 1 tab daily for stroke prevention   70/30 insulin refilled  Continue other medications as taking   MRI brain and lumbar spine at [facility]. Schedulers will call to schedule or you may call [phone]   Future management will be based on results.

## 2023-10-06 NOTE — PROGRESS NOTES
ASSESSMENT:    1. Dizziness   Not able to induce today. Intermittent. Denies palpations when present. Known acoustical neuroma. Has declined prior surgery.? TIA vs neuroma vs other. Will treat with full ASA 325mg daily and get MRI brain  - MR Brain w/o & w Contrast; Future  - aspirin (ASA) 325 MG tablet; Take 1 tablet (325 mg) by mouth daily  Dispense: 90 tablet; Refill: 3    2. Lumbar radiculopathy   Hx L4-5 spondylolisthesis. No B/B incontinence. Has not improved with prior  PT. Will get LS MRI  - MR Lumbar Spine w/o Contrast; Future    3. Facial numbness   Minimal sx today. See #1.  NO pain to suggest trigeminal neuralgia  - MR Brain w/o & w Contrast; Future  - aspirin (ASA) 325 MG tablet; Take 1 tablet (325 mg) by mouth daily  Dispense: 90 tablet; Refill: 3    4. Type 2 diabetes mellitus without complication, with long-term current use of insulin (H)   Blood sugars fairly close to goal with age. Prior A1C elevated. Needs lab recheck. Continue same insulin pending lab reuslts  - Hemoglobin A1c; Future  - Basic metabolic panel; Future  - insulin aspart prot & aspart (NOVOLOG MIX 70/30 FLEXPEN) (70-30) 100 UNIT/ML pen; INJECT 35 UNITS BEFORE BREAKFAST & 25 UNITS BEFORE SUPPER  Dispense: 60 mL; Refill: 3  - Hemoglobin A1c  - Basic metabolic panel    5. Disorder of right acoustic nerve  See #1. NO recent fall. HAs declined prior surgery  - MR Brain w/o & w Contrast; Future    6. Flu vaccine need  - INFLUENZA VACCINE 65+ (FLUZONE HD)      PLAN:   Flu vaccine today   Labs as ordered   Stop Celebrex/Celecoxib for now   Change Aspirin to 325mg tab, 1 tab daily for stroke prevention   70/30 insulin refilled  Continue other medications as taking   MRI brain and lumbar spine at [facility]. Schedulers will call to schedule or you may call [phone]   Future management will be based on results.        (Chart documentation was completed, in part, with [employer] voice-recognition software. Even though reviewed, some  grammatical, spelling, and word errors may remain.)    Alfredo Aragon MD  Internal Medicine Department  [facility]JENNIFER Camarillo is a 83 year old, presenting for the following health issues:  Diabetes, Back Pain, Headache, and Numbness (Right face and right arm/)      10/6/2023     7:14 AM   Additional Questions   Roomed by Alfreda NEWMAN CMA       History of Present Illness       Back Pain:  He presents for follow up of back pain. Patient's back pain is a new problem.    Original cause of back pain: not sure  First noticed back pain: 1-4 weeks ago  Patient feels back pain: comes and goesLocation of back pain:  Right lower back, left lower back, right buttock and left buttock  Description of back pain: cramping and shooting  Back pain spreads: right buttocks, left buttocks, right thigh and left thigh    Since patient first noticed back pain, pain is: gradually worsening  Does back pain interfere with his job:  Not applicable  On a scale of 1-10 (10 being the worst), patient describes pain as:  8  What makes back pain worse: bending, certain positions and standing   Acupuncture: not tried  Acetaminophen: not helpful  Activity or exercise: not helpful  Chiropractor:  Not tried  Cold: not helpful  Heat: not helpful  Massage: not tried  Muscle relaxants: not tried  Opioids: not tried  Physical Therapy: not helpful  Rest: helpful  Steroid Injection: not tried  Stretching: not helpful  Surgery: not tried  Topical pain relievers: not helpful  Other healthcare providers patient is seeing for back pain: Other    Diabetes:   He presents for follow up of diabetes.  He is checking home blood glucose three times daily.   He checks blood glucose before meals and at bedtime.  Blood glucose is sometimes over 200 and never under 70. He is aware of hypoglycemia symptoms including shakiness.    He has no concerns regarding his diabetes at this time.   He is not experiencing numbness or burning in  "feet, excessive thirst, blurry vision, weight changes or redness, sores or blisters on feet.           Headaches:   Since the patient's last clinic visit, headaches are: worsened  The patient is getting headaches:  Daily  He is not able to do normal daily activities when he has a migraine.  The patient is taking the following rescue/relief medications:  Tylenol   Patient states \"I get only a small amount of relief\" from the rescue/relief medications.   The patient is taking the following medications to prevent migraines:  No medications to prevent migraines  In the past 4 weeks, the patient has gone to an Urgent Care or Emergency Room 0 times times due to headaches.    Reason for visit:  Diabetes check up, back pain, tingling sensation on Right face, right arm    He eats 2-3 servings of fruits and vegetables daily.He consumes 0 sweetened beverage(s) daily.He exercises with enough effort to increase his heart rate 30 to 60 minutes per day.  He exercises with enough effort to increase his heart rate 7 days per week.   He is taking medications regularly.           Review of Systems   Musculoskeletal:  Positive for back pain.   Neurological:  Positive for numbness and headaches.         Most recent lab results reviewed with pt.        AM  Sugars 130-140.  160-170 in PM    No exertional chest pain with walking, shortness of breath, abd pain   Occ dizziness with known right vestibular schwannoma  back in 2012 that pt did not want to treat at that time. Describes as lightheaded and not having vertigo with position changes or when lying down. Not feeling dizzy sitting at appt today.  Usually happens after walking 10 min. Not walking with cane or other assistance currently .  NO recent falls   Gets some numbness of \"crawling feeling\" in right facial area both forehead and cheek.  Occ on leftside too but less often    Also has tingling \"ants\" feeling RUE. No RUE weakness. NO LUE sx. No LE tingling   Face and RUE sx for 2 " "weeks and came on acutely. Comes and goes every 2-3 hours. No neck pain   Pain in low back     Additional ROS:   Constitutional, HEENT, Cardiovascular, Pulmonary, GI and , Neuro, MSK and Psych review of systems/symptoms are otherwise negative or unchanged from previous, except as noted above.      OBJECTIVE:  /58   Pulse 86   Temp 98  F (36.7  C) (Oral)   Ht 1.651 m (5' 5\")   Wt 82.3 kg (181 lb 6.4 oz)   SpO2 98%   BMI 30.19 kg/m     Estimated body mass index is 30.19 kg/m  as calculated from the following:    Height as of this encounter: 1.651 m (5' 5\").    Weight as of this encounter: 82.3 kg (181 lb 6.4 oz).  Eye: PERRL, EOMI  HENT: ear canals and TM's normal and nose and mouth without ulcers or lesions. Reduced hearing right  Neck: no adenopathy. Thyroid normal to palpation. No bruits  Pulm: Lungs clear to auscultation   CV: Regular rates and rhythm  GI: Soft, nontender, Normal active bowel sounds, No hepatosplenomegaly or masses palpable  Ext: Peripheral pulses intact. No edema.  Neuro: Normal strength and tone, sensory exam grossly normal to LTS despite subjective sx in extremities. SLight reduction right face 2nd and 3rd branches.  Equivocal Romberg. Neg Hallpike. Slower but stable gait without assistance today  Back: Mild tenderness to palpation bilateral paralumbar musculature.  Negative straight leg raise test bilaterally             "

## 2023-10-09 ENCOUNTER — TELEPHONE (OUTPATIENT)
Dept: INTERNAL MEDICINE | Facility: CLINIC | Age: 83
End: 2023-10-09
Payer: COMMERCIAL

## 2023-10-09 NOTE — TELEPHONE ENCOUNTER
[place] pharmacist with [facility] calling to let us know pt's Novolog Mix 70/30 U/mL pen now needs a prior authorization.     States pt only has about a week's worth of medication left. Sending this HP to [facility].    RENETTA MICHAEL RN on 10/9/2023 at 8:52 AM

## 2023-10-09 NOTE — TELEPHONE ENCOUNTER
Central Prior Authorization Team   Phone: [phone]    PA Initiation    Medication: Novolog Mix 70/30 U/mL  Insurance Company: [employer] Non-Specialty PA's - Phone [phone] Fax [phone]  Pharmacy Filling the Rx: [id number] - [facility] [address] [facility]  Filling Pharmacy Phone: [phone]  Filling Pharmacy Fax:    Start Date: 10/9/2023

## 2023-10-10 NOTE — TELEPHONE ENCOUNTER
Prior Authorization Approval    Authorization Effective Date: 9/9/2023  Authorization Expiration Date: 10/8/2024  Medication: Novolog Mix 70/30 U/mL  Reference #:     Insurance Company: [employer] Non-Specialty PA's - Phone [phone] Fax [phone]  Which Pharmacy is filling the prescription (Not needed for infusion/clinic administered): [id number] - [place], [place] - [address] [facility]  Pharmacy Notified: Yes  Patient Notified: Instructed pharmacy to notify patient when script is ready to /ship.

## 2023-10-19 ENCOUNTER — ANCILLARY PROCEDURE (OUTPATIENT)
Dept: MRI IMAGING | Facility: CLINIC | Age: 83
End: 2023-10-19
Attending: INTERNAL MEDICINE
Payer: COMMERCIAL

## 2023-10-19 DIAGNOSIS — R20.0 FACIAL NUMBNESS: ICD-10-CM

## 2023-10-19 DIAGNOSIS — R42 DIZZINESS: ICD-10-CM

## 2023-10-19 DIAGNOSIS — M54.16 LUMBAR RADICULOPATHY: ICD-10-CM

## 2023-10-19 DIAGNOSIS — H93.3X1 DISORDER OF RIGHT ACOUSTIC NERVE: ICD-10-CM

## 2023-10-19 PROCEDURE — 255N000002 HC RX 255 OP 636: Mod: JZ | Performed by: INTERNAL MEDICINE

## 2023-10-19 PROCEDURE — 72148 MRI LUMBAR SPINE W/O DYE: CPT

## 2023-10-19 PROCEDURE — 70553 MRI BRAIN STEM W/O & W/DYE: CPT

## 2023-10-19 PROCEDURE — A9585 GADOBUTROL INJECTION: HCPCS | Mod: JZ | Performed by: INTERNAL MEDICINE

## 2023-10-19 RX ORDER — GADOBUTROL 604.72 MG/ML
8 INJECTION INTRAVENOUS ONCE
Status: COMPLETED | OUTPATIENT
Start: 2023-10-19 | End: 2023-10-19

## 2023-10-19 RX ADMIN — GADOBUTROL 8 ML: 604.72 INJECTION INTRAVENOUS at 14:37

## 2023-10-24 ENCOUNTER — PATIENT OUTREACH (OUTPATIENT)
Dept: GERIATRIC MEDICINE | Facility: CLINIC | Age: 83
End: 2023-10-24
Payer: COMMERCIAL

## 2023-10-24 NOTE — PROGRESS NOTES
[facility] Care Coordination Contact    Received an email from:  [email or web address]  Zee Moscoso / NATALYA  [place] Office  [address], [place], [place], [address]  Phone: [phone]  Fax: [phone]    At [facility] Care regarding member's spouse and how [employer] [facility] had gone out of business and that all PCA referrals were being changed over to [facility].     Called member's son, Brandan, regarding the fact that member's PCA agency was no longer in business. Brandan confirmed the change in agency affective 10/23/23 and that he and his family have been working to get this changed over. Requested of CMS to issue new authorizations, and send the PCA assessment to the new agency.  ANASTACIA Velázquez  [facility]  [phone]

## 2023-10-25 ENCOUNTER — PATIENT OUTREACH (OUTPATIENT)
Dept: GERIATRIC MEDICINE | Facility: CLINIC | Age: 83
End: 2023-10-25
Payer: COMMERCIAL

## 2023-10-25 NOTE — PROGRESS NOTES
[facility] Care Coordination Contact    Received after visit chart from care coordinator.  Completed following tasks: Submitted referrals/auths for pca and Updated services in Database  , Member Signature - POC Change:  Per CC, member has made a change to their POC.  Care Plan Change Letter mailed to member for signature with a self-addressed return envelope.    , and [employer]:  Faxed completed PCA assessment to PCA Agency and mailed copies to member.  Emailed referral form for auth to [employer].    Berta Mcfarland  Care Management Specialist  [facility]  [phone]

## 2023-10-26 ENCOUNTER — TRANSFERRED RECORDS (OUTPATIENT)
Dept: HEALTH INFORMATION MANAGEMENT | Facility: CLINIC | Age: 83
End: 2023-10-26
Payer: COMMERCIAL

## 2023-11-28 DIAGNOSIS — R35.0 URINE FREQUENCY: ICD-10-CM

## 2023-11-28 RX ORDER — TAMSULOSIN HYDROCHLORIDE 0.4 MG/1
0.8 CAPSULE ORAL DAILY
Qty: 180 CAPSULE | Refills: 0 | OUTPATIENT
Start: 2023-11-28

## 2023-11-29 NOTE — PROGRESS NOTES
[facility] Care Coordination Contact    2nd Attempt: Signed Letter not received from member, resent per process.    Rubina Chapa  Care Management Specialist  [facility]  [phone]

## 2023-12-01 ENCOUNTER — PATIENT OUTREACH (OUTPATIENT)
Dept: GERIATRIC MEDICINE | Facility: CLINIC | Age: 83
End: 2023-12-01
Payer: COMMERCIAL

## 2023-12-01 NOTE — PROGRESS NOTES
[facility] Care Coordination Contact      [facility] Six-Month Telephone Assessment    6 month telephone assessment completed on 12/1/2023. Completed 6 month assessment with member's son, Brandan.    ER visits: No  Hospitalizations: No  TCU stays: No  Significant health status changes: Had an MRI and there is compression in the spine on nerves that is causing the pain, and a steroid injection is what is recommended and member is waiting on [employer] for a prior authorization. Blood pressure has been good. A1C increased to 9.8.   Falls/Injuries: No  ADL/IADL changes: No. But is not walking as much due to the pain in his back and this has limited his walking, and struggles more with walking around the home. Son is hopeful that the steroid will help with the pain.  Changes in services: No. Still has not gotten set up with the new PCA agency. Encouraged son to have all family members who would be able to support member at home to be paid as PCAs so family can rotate more easily.    Caregiver Assessment follow up:  Son shared his sister is doing the paid care giver, but is still not set up with the new agency. Son inquired if there would be an option of a non-family member being the paid PCA. Reviewed the current staffing shortage within the PCA profession and the added need of a [place] speaking PCA, would be difficult to find. Son shared that is why his sister has not finalized being the PCA with the new agency, as she has been trying to find an agency that has available [place] PCA speaking PCAs who would be able to fill in from time to time. Reviewed that family is welcome to continue to look for additional support, but that to this Care Coordinator's knowledge it would be very difficult to find a [place] speaking PCA who would only work limitedly to support family. Not interested in [facility] as member's wife also has PCA and [facility] is not an option for her, and family prefers to have the same agency for  both member and his wife.     Goals: See POC in chart for goal progress documentation.      Will see member in 6 months for an annual health risk assessment.   Encouraged member to call CC with any questions or concerns in the meantime.     ANASTACIA Velázquez  [facility]  [phone]

## 2024-01-02 NOTE — PROGRESS NOTES
No Letter Received: 60 day tracking of letter complete, no letter received from member. Tracking discontinued.    Mariza Parr  Case Management Specialist  [facility]  [phone]

## 2024-01-04 ENCOUNTER — PATIENT OUTREACH (OUTPATIENT)
Dept: CARE COORDINATION | Facility: CLINIC | Age: 84
End: 2024-01-04
Payer: COMMERCIAL

## 2024-01-18 ENCOUNTER — PATIENT OUTREACH (OUTPATIENT)
Dept: CARE COORDINATION | Facility: CLINIC | Age: 84
End: 2024-01-18
Payer: COMMERCIAL

## 2024-01-26 DIAGNOSIS — E11.9 TYPE 2 DIABETES MELLITUS WITHOUT COMPLICATION, WITH LONG-TERM CURRENT USE OF INSULIN (H): ICD-10-CM

## 2024-01-26 DIAGNOSIS — Z79.4 TYPE 2 DIABETES MELLITUS WITHOUT COMPLICATION, WITH LONG-TERM CURRENT USE OF INSULIN (H): ICD-10-CM

## 2024-01-26 RX ORDER — BLOOD SUGAR DIAGNOSTIC
STRIP MISCELLANEOUS
Qty: 300 STRIP | Refills: 0 | Status: SHIPPED | OUTPATIENT
Start: 2024-01-26 | End: 2024-08-05

## 2024-02-04 DIAGNOSIS — E78.5 HYPERLIPIDEMIA LDL GOAL <100: ICD-10-CM

## 2024-02-06 RX ORDER — ATORVASTATIN CALCIUM 40 MG/1
40 TABLET, FILM COATED ORAL DAILY
Qty: 90 TABLET | Refills: 1 | Status: SHIPPED | OUTPATIENT
Start: 2024-02-06 | End: 2024-09-09

## 2024-02-08 ENCOUNTER — TRANSFERRED RECORDS (OUTPATIENT)
Dept: HEALTH INFORMATION MANAGEMENT | Facility: CLINIC | Age: 84
End: 2024-02-08
Payer: COMMERCIAL

## 2024-02-11 DIAGNOSIS — E11.9 TYPE 2 DIABETES MELLITUS WITHOUT COMPLICATION, WITH LONG-TERM CURRENT USE OF INSULIN (H): ICD-10-CM

## 2024-02-11 DIAGNOSIS — R35.0 URINE FREQUENCY: ICD-10-CM

## 2024-02-11 DIAGNOSIS — Z79.4 TYPE 2 DIABETES MELLITUS WITHOUT COMPLICATION, WITH LONG-TERM CURRENT USE OF INSULIN (H): ICD-10-CM

## 2024-02-11 DIAGNOSIS — N40.1 BENIGN PROSTATIC HYPERPLASIA WITH LOWER URINARY TRACT SYMPTOMS, SYMPTOM DETAILS UNSPECIFIED: ICD-10-CM

## 2024-02-11 DIAGNOSIS — L30.9 DERMATITIS: ICD-10-CM

## 2024-02-12 RX ORDER — TAMSULOSIN HYDROCHLORIDE 0.4 MG/1
0.8 CAPSULE ORAL DAILY
Qty: 180 CAPSULE | Refills: 0 | Status: SHIPPED | OUTPATIENT
Start: 2024-02-12 | End: 2024-06-13

## 2024-02-12 RX ORDER — LANCETS
EACH MISCELLANEOUS
Qty: 300 EACH | Refills: 0 | Status: SHIPPED | OUTPATIENT
Start: 2024-02-12 | End: 2024-08-05

## 2024-02-12 RX ORDER — TRIAMCINOLONE ACETONIDE 1 MG/G
CREAM TOPICAL 2 TIMES DAILY
Qty: 60 G | Refills: 0 | Status: SHIPPED | OUTPATIENT
Start: 2024-02-12 | End: 2024-08-05

## 2024-02-12 RX ORDER — FINASTERIDE 5 MG/1
1 TABLET, FILM COATED ORAL DAILY
Qty: 90 TABLET | Refills: 0 | Status: SHIPPED | OUTPATIENT
Start: 2024-02-12 | End: 2024-05-30

## 2024-02-12 RX ORDER — PEN NEEDLE, DIABETIC 32GX 5/32"
NEEDLE, DISPOSABLE MISCELLANEOUS
Qty: 300 EACH | Refills: 0 | Status: SHIPPED | OUTPATIENT
Start: 2024-02-12 | End: 2024-08-05

## 2024-02-23 ENCOUNTER — TRANSFERRED RECORDS (OUTPATIENT)
Dept: HEALTH INFORMATION MANAGEMENT | Facility: CLINIC | Age: 84
End: 2024-02-23
Payer: COMMERCIAL

## 2024-02-23 LAB — RETINOPATHY: NEGATIVE

## 2024-02-27 ENCOUNTER — TRANSCRIBE ORDERS (OUTPATIENT)
Dept: OTHER | Age: 84
End: 2024-02-27

## 2024-02-27 DIAGNOSIS — H35.30 AGE-RELATED MACULAR DEGENERATION: Primary | ICD-10-CM

## 2024-02-27 DIAGNOSIS — Z96.1 PSEUDOPHAKIA: ICD-10-CM

## 2024-02-28 ENCOUNTER — APPOINTMENT (OUTPATIENT)
Dept: INTERPRETER SERVICES | Facility: CLINIC | Age: 84
End: 2024-02-28
Payer: COMMERCIAL

## 2024-03-24 ENCOUNTER — HEALTH MAINTENANCE LETTER (OUTPATIENT)
Age: 84
End: 2024-03-24

## 2024-04-02 NOTE — PROGRESS NOTES
CC: Wet AMD    Interval history: Here for follow up for PDT only    Referred by: Dr. Arevalo for photodynamic therapy (PDT) right eye     HPI: Rabia Reed is a 83 year old year-old patient with history of wet AMD. He previously followed with [facility] with Dr. Arevalo. He had previously been receiving Eylea injection every 6-10 weeks, last injection in April. He was referred here for PDT right eye     PMH: T2DM, HTN    Past ocular history:   Wet AMD each eye  Pseudophakia each eye     Retinal Imaging:    OCT mac 07/27/23   RE: central GA, drusen/PED, small parafoveal IRF, large nasal/inferior SRF, ERM with lamellar hole  LE: dense vitreous opacities, parafoveal IRF/PTMH, drusen/PED, Epiretinal membrane    fluorescein angiography/ICG  Normal AV and choroidal filling both eyes  Right eye with late macula hyperfluorescence consistent with staining and leakage  Left eye with macula hyperfluorescence consistent with window's defects no significant late leakage  Assessment    #Exudative AMD each eye   Last Eylea injection April 2023, receiving [id number] right eye   No exudation on left eye today  -Discussed photodynamic therapy and light restrictions following therapy. R/b/a Visudyne and PDT discussed and patient elects to proceed  Plan for photodynamic therapy (PDT) 08/03/23   -Eylea today 08/03/23     #ERM each eye  -Monitor    #Diabetes without retinopathy  Last A1c 9.3 in 1/9/23  -Discussed excellent BG/BP control    PLAN: 08/03/23 PDT and Eylea today  Follow up with  Dr. Arevalo as scheduled    Thank you for entrusting us with your care  Corrine Levin MD, PGY3  Ophthalmology Resident  [facility]    ~~~~~~~~~~~~~~~~~~~~~~~~~~~~~~~~~~   Complete documentation of historical and exam elements from today's encounter can be found in the full encounter summary report (not reduplicated in this progress note).  I personally obtained the chief complaint(s) and history of present illness.  I  confirmed and edited as necessary the review of systems, past medical/surgical history, family history, social history, and examination findings as documented by others; and I examined the patient myself.  I personally reviewed the relevant tests, images, and reports as documented above.  I personally reviewed the ophthalmic test(s) associated with this encounter, agree with the interpretation(s) as documented by the resident/fellow, and have edited the corresponding report(s) as necessary.   I formulated and edited as necessary the assessment and plan and discussed the findings and management plan with the patient and family and No resident or fellow assisted with the procedures performed.  I performed the procedures myself.    Nany Heart MD  Professor of Ophthalmology.   Vitreo-retinal surgeon   Department of Ophthalmology and Visual Neurosciences   [facility]  Phone: [phone]   Fax: [phone]       Statement Selected

## 2024-04-04 ENCOUNTER — OFFICE VISIT (OUTPATIENT)
Dept: UROLOGY | Facility: CLINIC | Age: 84
End: 2024-04-04
Payer: COMMERCIAL

## 2024-04-04 VITALS
SYSTOLIC BLOOD PRESSURE: 131 MMHG | BODY MASS INDEX: 27.43 KG/M2 | WEIGHT: 181 LBS | OXYGEN SATURATION: 96 % | HEART RATE: 81 BPM | DIASTOLIC BLOOD PRESSURE: 84 MMHG | HEIGHT: 68 IN

## 2024-04-04 DIAGNOSIS — R35.1 BENIGN PROSTATIC HYPERPLASIA WITH NOCTURIA: Primary | ICD-10-CM

## 2024-04-04 DIAGNOSIS — N40.1 BENIGN PROSTATIC HYPERPLASIA WITH NOCTURIA: Primary | ICD-10-CM

## 2024-04-04 PROCEDURE — 51798 US URINE CAPACITY MEASURE: CPT | Performed by: NURSE PRACTITIONER

## 2024-04-04 PROCEDURE — 99204 OFFICE O/P NEW MOD 45 MIN: CPT | Mod: 25 | Performed by: NURSE PRACTITIONER

## 2024-04-04 NOTE — PROGRESS NOTES
Urology Outpatient Visit    Name: Rabia Reed    MRN: [id number]   YOB: 1940               Chief Complaint:   LUTS         Impression and Plan:   Impression / Plan:   Rabia Reed is a 83 year old male with BPH w LUTS      -It was my pleasure to meet with Mr. Reed in clinic today to discuss his lower urinary tract symptoms. Patient presentation is not consistent with prostate cancer, UTI, urinary obstruction, prostatitis, neurogenic bladder, diabetes, nor diuretic related. I explained that his lower urinary tract symptoms are likely secondary to benign prostatic hyperplasia (BPH). We reviewed the natural history of BPH, its prevalence, and management options. We discussed that, in general, treatment of BPH is based on the extent of bother caused by lower urinary tract symptoms. We discussed that he does not have much for additional pharmacologic options given he is already on Flomax and Finasteride.    Following our discussion, Mr. Reed expressed an interest in proceeding with cystoscopy for further evaluation and likely subsequent surgical management of his BPH.    Thank you for the opportunity to participate in the care of Rabia Reed.     JERMAIN Navas, CNP  [facility] - Department of Urology  [phone]          History of Present Illness:   Rabia Reed is a 83 year old male with Hx T2DM, BPH on Flomax & finasteride. Here today w CC of urinary urgency every 45 min - 1 hr, occasional urge incontinence, nocturia. Sx x 10 yr but worse over past couple months. No gross hematuria. Son described history of office based procedure (possible Rezume) ~2013.    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    Elevated prostate  specific antigen (PSA) 11/6/2013    Essential hypertension, benign     Hyperlipidemia LDL goal <100      Impotence of organic origin     Inguinal hernia without mention of obstruction or gangrene, unilateral or unspecified, (not specified as recurrent) 1/03    right    Memory loss     Osteoarthritis of both hands, unspecified osteoarthritis type 3/19/2017    Tobacco use disorder     Type 2 diabetes mellitus without complication  (goal A1C<7) 10/24/2015    Unspecified nasal polyp     Urinary frequency     nocturia x 5          Past Surgical History:     Past Surgical History:   Procedure Laterality Date    ARTHROPLASTY KNEE Right 09/14/2017    Procedure: ARTHROPLASTY KNEE;  RIGHT TOTAL KNEE ARTHROPLASTY ;  Surgeon: Darnell Allen MD;  Location:  OR    ARTHROPLASTY KNEE Left 10/05/2018    Procedure: ARTHROPLASTY KNEE;  LEFT TOTAL KNEE ARTHROPLASTY ;  Surgeon: Darnell Allen MD;  Location:  OR    CATARACT IOL, RT/LT      ENDOSCOPIC RETROGRADE CHOLANGIOPANCREATOGRAM N/A 12/31/2018    Procedure: COMBINED ENDOSCOPIC RETROGRADE CHOLANGIOPANCREATOGRAPHY, SPHINCTEROTOMY;  Surgeon: Jaime Canales MD;  Location:  OR    ENDOSCOPIC RETROGRADE CHOLANGIOPANCREATOGRAM N/A 12/31/2018    Procedure: Combined Endoscopic Retrograde Cholangiopancreatography, Remove Stone/Balloon;  Surgeon: Jaime Canales MD;  Location:  OR    ESOPHAGOSCOPY, GASTROSCOPY, DUODENOSCOPY (EGD), COMBINED N/A 12/31/2018    Procedure: COMBINED ENDOSCOPIC ULTRASOUND, ESOPHAGOSCOPY, GASTROSCOPY, DUODENOSCOPY (EGD);  Surgeon: Jaime Canales MD;  Location:  OR    LAPAROSCOPIC CHOLECYSTECTOMY N/A 01/01/2019    Procedure: LAPAROSCOPIC CHOLECYSTECTOMY;  Surgeon: Melia Bentley MD;  Location:  OR    ZC APPENDECTOMY      [facility] NONSPECIFIC PROCEDURE  04/01/2001    nasal polyp excision    Z NONSPECIFIC PROCEDURE  01/01/2003    RIH repair    Z NONSPECIFIC PROCEDURE  04/01/2011     right knee arthroscopy          Social  History:     Social History     Tobacco Use    Smoking status: Former     Packs/day: 0.50     Years: 42.00     Additional pack years: 0.00     Total pack years: 21.00     Types: Cigarettes     Quit date: 2001     Years since quittin.2    Smokeless tobacco: Never   Substance Use Topics    Alcohol use: No     Alcohol/week: 0.0 standard drinks of alcohol          Family History:     Family History   Problem Relation Age of Onset    Diabetes Mother          age 85    Family History Negative Father          age 38,  in war    Diabetes Brother         borderline diabetic    Diabetes Sister     Glaucoma No family hx of     Macular Degeneration No family hx of           Allergies:   No Known Allergies       Medications:     Current Outpatient Medications   Medication Sig Dispense Refill    acetaminophen (TYLENOL) 325 MG tablet Take 2 tablets (650 mg) by mouth every 6 hours as needed for mild pain 100 tablet 3    Ascorbic Acid (VITAMIN C PO) Take 500 mg by mouth daily      aspirin (ASA) 325 MG tablet Take 1 tablet (325 mg) by mouth daily 90 tablet 3    atorvastatin (LIPITOR) 40 MG tablet Take 1 tablet (40 mg) by mouth daily 90 tablet 1    BD ISREAL U/F 32G X 4 MM insulin pen needle use test 3 times a daily 300 each 0    blood glucose (ONETOUCH ULTRA) test strip TEST 3 TIMES DAILY 300 strip 0    blood glucose monitoring (ONE TOUCH ULTRASOFT) lancets Use to test blood sugar 3 times daily or as directed. 300 each 0    Blood Glucose Monitoring Suppl (ONE TOUCH ULTRA SYSTEM KIT) W/DEVICE KIT One touch ultra 2 if covered by insurance 1 kit 0    Carboxymethylcellulose Sodium (REFRESH TEARS OP)       finasteride (PROSCAR) 5 MG tablet Take 1 tablet (5 mg) by mouth daily 90 tablet 0    insulin aspart prot & aspart (NOVOLOG MIX 70/30 FLEXPEN) (70-30) 100 UNIT/ML pen INJECT 35 UNITS BEFORE BREAKFAST & 25 UNITS BEFORE SUPPER 60 mL 3    insulin lispro (HUMALOG KWIKPEN) 100 UNIT/ML (1 unit dial) KWIKPEN Inject  14 Units Subcutaneous At Bedtime 30 mL 3    insulin pen needle (ULTICARE MICRO) 32G X 4 MM miscellaneous TEST 3 TIMES DAILY 100 each 0    sennosides (SENOKOT) 8.6 MG tablet Take 2 tablets by mouth 2 times daily      Skin Protectants, Misc. (EUCERIN) cream Apply topically 2 times daily      tamsulosin (FLOMAX) 0.4 MG capsule Take 2 capsules (0.8 mg) by mouth daily 180 capsule 0    triamcinolone (KENALOG) 0.1 % external cream Apply topically 2 times daily As needed for skin rash 60 g 0     Current Facility-Administered Medications   Medication Dose Route Frequency Provider Last Rate Last Admin    aflibercept (EYLEA) injection prefilled syringe 2 mg  2 mg Intravitreal Q28 Days Nany Heart MD   2 mg at 08/03/23 1524    verteporfin (VISUDYNE) injection 13.2 mg  13.2 mg Intravenous Q90 Days Nany Heart MD              Review of Systems:    ROS: See HPI for pertinent details.  Remainder of 10-point ROS negative.         Physical Exam:   VS:  T: Data Unavailable    HR: 81    BP: 131/84    RR: Data Unavailable     GEN:  Alert.  NAD.   HEENT:  Sclerae anicteric.    CV:  No obvious jugular venous distension.  LUNGS: No respiratory distress, breathing comfortably wo accessory muscle use.  ABD:  ND.     SKIN:  Dry. No visible rashes.   NEURO:  CN grossly intact.           Data:   All laboratory data reviewed:    Lab Results   Component Value Date    PSA 1.03 10/06/2017    PSA 5.54 02/18/2014    PSA 4.56 05/26/2010    PSA 2.88 04/11/2008    PSA 3.10 10/27/2006    PSA 4.25 05/24/2005    PSA 1.77 02/19/2004    PSA 1.7 08/23/2002     Lab Results   Component Value Date    CR 0.82 10/06/2023    CR 1.06 01/09/2023    CR 0.96 02/18/2022    CR 0.88 11/02/2020    CR 0.88 01/13/2020    CR 0.84 09/25/2019    CR 0.97 04/15/2019    CR 0.84 01/11/2019    CR 0.81 01/06/2019    CR 0.80 01/05/2019    CR 0.71 01/04/2019    CR 0.76 01/03/2019    CR 0.91 01/02/2019     Lab Results   Component Value Date    GFRESTIMATED  86 10/06/2023    GFRESTIMATED 69 01/09/2023    GFRESTIMATED 78 02/18/2022    GFRESTIMATED 80 11/02/2020    GFRESTIMATED 80 01/13/2020    GFRESTIMATED 82 09/25/2019    GFRESTIMATED 73 04/15/2019    GFRESTIMATED 82 01/11/2019    GFRESTIMATED 84 01/06/2019    GFRESTIMATED 84 01/05/2019    GFRESTIMATED 88 01/04/2019    GFRESTIMATED 86 01/03/2019    GFRESTIMATED 79 01/02/2019     Color Urine (no units)   Date Value   12/30/2018 Yellow     Appearance Urine (no units)   Date Value   12/30/2018 Slightly Cloudy     Glucose Urine (mg/dL)   Date Value   12/30/2018 150 (A)     Bilirubin Urine (no units)   Date Value   12/30/2018 Negative     Ketones Urine (mg/dL)   Date Value   12/30/2018 40 (A)     Specific Gravity Urine (no units)   Date Value   12/30/2018 1.015     pH Urine (pH)   Date Value   12/30/2018 7.5 (H)     Protein Albumin Urine (mg/dL)   Date Value   12/30/2018 30 (A)     Urobilinogen Urine (EU/dL)   Date Value   09/07/2017 0.2     Nitrite Urine (no units)   Date Value   12/30/2018 Negative     Leukocyte Esterase Urine (no units)   Date Value   12/30/2018 Negative

## 2024-04-04 NOTE — NURSING NOTE
Chief Complaint   Patient presents with    Urinary Problem     Per patient feel like he doesn't completely empty        Patient son her with him in the visit  Patient feel like he doesn't;t empty his bladder   Patient void in clinic   Patient bladder was scan  Patient PVR 18ml today          Nilsa Phillip, A

## 2024-04-04 NOTE — LETTER
4/4/2024       RE: Rabia Reed  [address] [place] [id number]     Dear Colleague,    Thank you for referring your patient, Rabia Reed, to the [facility] at [facility]. Please see a copy of my visit note below.      Urology Outpatient Visit    Name: Rabia Reed    MRN: [id number]   YOB: 1940               Chief Complaint:   LUTS         Impression and Plan:   Impression / Plan:   Rabia Reed is a 83 year old male with BPH w LUTS      -It was my pleasure to meet with Mr. Reed in clinic today to discuss his lower urinary tract symptoms. Patient presentation is not consistent with prostate cancer, UTI, urinary obstruction, prostatitis, neurogenic bladder, diabetes, nor diuretic related. I explained that his lower urinary tract symptoms are likely secondary to benign prostatic hyperplasia (BPH). We reviewed the natural history of BPH, its prevalence, and management options. We discussed that, in general, treatment of BPH is based on the extent of bother caused by lower urinary tract symptoms. We discussed that he does not have much for additional pharmacologic options given he is already on Flomax and Finasteride.    Following our discussion, Mr. Reed expressed an interest in proceeding with cystoscopy for further evaluation and likely subsequent surgical management of his BPH.    Thank you for the opportunity to participate in the care of Rabia Reed.     JERMAIN Navas, CNP   [facility] - Department of Urology  [phone]          History of Present Illness:   Rabia Reed is a 83 year old male with Hx T2DM, BPH on Flomax & finasteride. Here today w CC of urinary urgency every 45 min - 1 hr, occasional urge incontinence, nocturia. Sx x 10 yr but worse over past couple months. No gross hematuria. Son described history of office based procedure (possible Rezume) ~2013.    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    Elevated prostate specific antigen (PSA) 11/6/2013    Essential hypertension, benign     Hyperlipidemia LDL goal <100      Impotence of organic origin     Inguinal hernia without mention of obstruction or gangrene, unilateral or unspecified, (not specified as recurrent) 1/03    right    Memory loss     Osteoarthritis of both hands, unspecified osteoarthritis type 3/19/2017    Tobacco use disorder     Type 2 diabetes mellitus without complication  (goal A1C<7) 10/24/2015    Unspecified nasal polyp     Urinary frequency     nocturia x 5          Past Surgical History:     Past Surgical History:   Procedure Laterality Date    ARTHROPLASTY KNEE Right 09/14/2017    Procedure: ARTHROPLASTY KNEE;  RIGHT TOTAL KNEE ARTHROPLASTY ;  Surgeon: Darnell Allen MD;  Location:  OR    ARTHROPLASTY KNEE Left 10/05/2018    Procedure: ARTHROPLASTY KNEE;  LEFT TOTAL KNEE ARTHROPLASTY ;  Surgeon: Darnell Allen MD;  Location:  OR    CATARACT IOL, RT/LT      ENDOSCOPIC RETROGRADE CHOLANGIOPANCREATOGRAM N/A 12/31/2018    Procedure: COMBINED ENDOSCOPIC RETROGRADE CHOLANGIOPANCREATOGRAPHY, SPHINCTEROTOMY;  Surgeon: Jaime Canales MD;  Location:  OR    ENDOSCOPIC RETROGRADE CHOLANGIOPANCREATOGRAM N/A 12/31/2018    Procedure: Combined Endoscopic Retrograde Cholangiopancreatography, Remove Stone/Balloon;  Surgeon: Jaime Canales MD;  Location:  OR    ESOPHAGOSCOPY, GASTROSCOPY, DUODENOSCOPY (EGD), COMBINED N/A 12/31/2018    Procedure: COMBINED ENDOSCOPIC ULTRASOUND, ESOPHAGOSCOPY, GASTROSCOPY, DUODENOSCOPY (EGD);  Surgeon: Jaime Canales MD;  Location:  OR    LAPAROSCOPIC CHOLECYSTECTOMY N/A 01/01/2019    Procedure:  LAPAROSCOPIC CHOLECYSTECTOMY;  Surgeon: Melia Bentley MD;  Location: [facility] OR    ZZC APPENDECTOMY      ZZC NONSPECIFIC PROCEDURE  2001    nasal polyp excision    ZZC NONSPECIFIC PROCEDURE  2003    RIH repair    ZZC NONSPECIFIC PROCEDURE  2011     right knee arthroscopy          Social History:     Social History     Tobacco Use    Smoking status: Former     Packs/day: 0.50     Years: 42.00     Additional pack years: 0.00     Total pack years: 21.00     Types: Cigarettes     Quit date: 2001     Years since quittin.2    Smokeless tobacco: Never   Substance Use Topics    Alcohol use: No     Alcohol/week: 0.0 standard drinks of alcohol          Family History:     Family History   Problem Relation Age of Onset    Diabetes Mother          age 85    Family History Negative Father          age 38,  in war    Diabetes Brother         borderline diabetic    Diabetes Sister     Glaucoma No family hx of     Macular Degeneration No family hx of           Allergies:   No Known Allergies       Medications:     Current Outpatient Medications   Medication Sig Dispense Refill    acetaminophen (TYLENOL) 325 MG tablet Take 2 tablets (650 mg) by mouth every 6 hours as needed for mild pain 100 tablet 3    Ascorbic Acid (VITAMIN C PO) Take 500 mg by mouth daily      aspirin (ASA) 325 MG tablet Take 1 tablet (325 mg) by mouth daily 90 tablet 3    atorvastatin (LIPITOR) 40 MG tablet Take 1 tablet (40 mg) by mouth daily 90 tablet 1    BD ISREAL U/F 32G X 4 MM insulin pen needle use test 3 times a daily 300 each 0    blood glucose (ONETOUCH ULTRA) test strip TEST 3 TIMES DAILY 300 strip 0    blood glucose monitoring (ONE TOUCH ULTRASOFT) lancets Use to test blood sugar 3 times daily or as directed. 300 each 0    Blood Glucose Monitoring Suppl (ONE TOUCH ULTRA SYSTEM KIT) W/DEVICE KIT One touch ultra 2 if covered by insurance 1 kit 0    Carboxymethylcellulose Sodium (REFRESH TEARS OP)        finasteride (PROSCAR) 5 MG tablet Take 1 tablet (5 mg) by mouth daily 90 tablet 0    insulin aspart prot & aspart (NOVOLOG MIX 70/30 FLEXPEN) (70-30) 100 UNIT/ML pen INJECT 35 UNITS BEFORE BREAKFAST & 25 UNITS BEFORE SUPPER 60 mL 3    insulin lispro (HUMALOG KWIKPEN) 100 UNIT/ML (1 unit dial) KWIKPEN Inject 14 Units Subcutaneous At Bedtime 30 mL 3    insulin pen needle (ULTICARE MICRO) 32G X 4 MM miscellaneous TEST 3 TIMES DAILY 100 each 0    sennosides (SENOKOT) 8.6 MG tablet Take 2 tablets by mouth 2 times daily      Skin Protectants, Misc. (EUCERIN) cream Apply topically 2 times daily      tamsulosin (FLOMAX) 0.4 MG capsule Take 2 capsules (0.8 mg) by mouth daily 180 capsule 0    triamcinolone (KENALOG) 0.1 % external cream Apply topically 2 times daily As needed for skin rash 60 g 0     Current Facility-Administered Medications   Medication Dose Route Frequency Provider Last Rate Last Admin    aflibercept (EYLEA) injection prefilled syringe 2 mg  2 mg Intravitreal Q28 Days Nany Heart MD   2 mg at 08/03/23 1524    verteporfin (VISUDYNE) injection 13.2 mg  13.2 mg Intravenous Q90 Days Nany Heart MD              Review of Systems:    ROS: See HPI for pertinent details.  Remainder of 10-point ROS negative.         Physical Exam:   VS:  T: Data Unavailable    HR: 81    BP: 131/84    RR: Data Unavailable     GEN:  Alert.  NAD.   HEENT:  Sclerae anicteric.    CV:  No obvious jugular venous distension.  LUNGS: No respiratory distress, breathing comfortably wo accessory muscle use.  ABD:  ND.     SKIN:  Dry. No visible rashes.   NEURO:  CN grossly intact.           Data:   All laboratory data reviewed:    Lab Results   Component Value Date    PSA 1.03 10/06/2017    PSA 5.54 02/18/2014    PSA 4.56 05/26/2010    PSA 2.88 04/11/2008    PSA 3.10 10/27/2006    PSA 4.25 05/24/2005    PSA 1.77 02/19/2004    PSA 1.7 08/23/2002     Lab Results   Component Value Date    CR 0.82 10/06/2023    CR 1.06  01/09/2023    CR 0.96 02/18/2022    CR 0.88 11/02/2020    CR 0.88 01/13/2020    CR 0.84 09/25/2019    CR 0.97 04/15/2019    CR 0.84 01/11/2019    CR 0.81 01/06/2019    CR 0.80 01/05/2019    CR 0.71 01/04/2019    CR 0.76 01/03/2019    CR 0.91 01/02/2019     Lab Results   Component Value Date    GFRESTIMATED 86 10/06/2023    GFRESTIMATED 69 01/09/2023    GFRESTIMATED 78 02/18/2022    GFRESTIMATED 80 11/02/2020    GFRESTIMATED 80 01/13/2020    GFRESTIMATED 82 09/25/2019    GFRESTIMATED 73 04/15/2019    GFRESTIMATED 82 01/11/2019    GFRESTIMATED 84 01/06/2019    GFRESTIMATED 84 01/05/2019    GFRESTIMATED 88 01/04/2019    GFRESTIMATED 86 01/03/2019    GFRESTIMATED 79 01/02/2019     Color Urine (no units)   Date Value   12/30/2018 Yellow     Appearance Urine (no units)   Date Value   12/30/2018 Slightly Cloudy     Glucose Urine (mg/dL)   Date Value   12/30/2018 150 (A)     Bilirubin Urine (no units)   Date Value   12/30/2018 Negative     Ketones Urine (mg/dL)   Date Value   12/30/2018 40 (A)     Specific Gravity Urine (no units)   Date Value   12/30/2018 1.015     pH Urine (pH)   Date Value   12/30/2018 7.5 (H)     Protein Albumin Urine (mg/dL)   Date Value   12/30/2018 30 (A)     Urobilinogen Urine (EU/dL)   Date Value   09/07/2017 0.2     Nitrite Urine (no units)   Date Value   12/30/2018 Negative     Leukocyte Esterase Urine (no units)   Date Value   12/30/2018 Negative

## 2024-04-10 ENCOUNTER — OFFICE VISIT (OUTPATIENT)
Dept: INTERNAL MEDICINE | Facility: CLINIC | Age: 84
End: 2024-04-10
Payer: COMMERCIAL

## 2024-04-10 VITALS
TEMPERATURE: 97.7 F | RESPIRATION RATE: 16 BRPM | OXYGEN SATURATION: 97 % | SYSTOLIC BLOOD PRESSURE: 146 MMHG | WEIGHT: 181.7 LBS | HEART RATE: 84 BPM | DIASTOLIC BLOOD PRESSURE: 70 MMHG | BODY MASS INDEX: 27.63 KG/M2

## 2024-04-10 DIAGNOSIS — I10 ESSENTIAL HYPERTENSION, BENIGN: ICD-10-CM

## 2024-04-10 DIAGNOSIS — E11.9 TYPE 2 DIABETES MELLITUS WITHOUT COMPLICATION, WITH LONG-TERM CURRENT USE OF INSULIN (H): ICD-10-CM

## 2024-04-10 DIAGNOSIS — R53.83 OTHER FATIGUE: ICD-10-CM

## 2024-04-10 DIAGNOSIS — M54.16 LUMBAR RADICULOPATHY: ICD-10-CM

## 2024-04-10 DIAGNOSIS — E78.5 HYPERLIPIDEMIA LDL GOAL <100: ICD-10-CM

## 2024-04-10 DIAGNOSIS — G56.01 CARPAL TUNNEL SYNDROME OF RIGHT WRIST: ICD-10-CM

## 2024-04-10 DIAGNOSIS — Z79.4 TYPE 2 DIABETES MELLITUS WITHOUT COMPLICATION, WITH LONG-TERM CURRENT USE OF INSULIN (H): ICD-10-CM

## 2024-04-10 DIAGNOSIS — R06.02 SOB (SHORTNESS OF BREATH): ICD-10-CM

## 2024-04-10 PROCEDURE — 99214 OFFICE O/P EST MOD 30 MIN: CPT | Performed by: INTERNAL MEDICINE

## 2024-04-10 PROCEDURE — G2211 COMPLEX E/M VISIT ADD ON: HCPCS | Performed by: INTERNAL MEDICINE

## 2024-04-10 RX ORDER — INSULIN LISPRO 100 [IU]/ML
8 INJECTION, SOLUTION INTRAVENOUS; SUBCUTANEOUS AT BEDTIME
Status: SHIPPED
Start: 2024-04-10 | End: 2024-09-09

## 2024-04-10 RX ORDER — LOSARTAN POTASSIUM 50 MG/1
50 TABLET ORAL DAILY
Qty: 30 TABLET | Refills: 11 | Status: SHIPPED | OUTPATIENT
Start: 2024-04-10

## 2024-04-10 NOTE — PROGRESS NOTES
ASSESSMENT:    1. Type 2 diabetes mellitus without complication, with long-term current use of insulin (H)  Last A1c showed need for improved blood sugar control.  However recent blood sugars better and overall controlled for age.  Continue current insulin for now pending lab results below  - insulin lispro (HUMALOG KWIKPEN) 100 UNIT/ML (1 unit dial) KWIKPEN; Inject 8 Units Subcutaneous at bedtime  - TSH with free T4 reflex; Future  - Comprehensive metabolic panel; Future  - Albumin Random Urine Quantitative with Creat Ratio; Future  - Hemoglobin A1c; Future    2. Hyperlipidemia LDL goal <100  Previously controlled.  Continue statin therapy.  Labs ordered next 1 week  - Comprehensive metabolic panel; Future  - Lipid panel reflex to direct LDL Fasting; Future    3. Essential hypertension, benign  Blood pressure mildly elevated.  Also has some proteinuria.  - Comprehensive metabolic panel; Future  - losartan (COZAAR) 50 MG tablet; Take 1 tablet (50 mg) by mouth daily  Dispense: 30 tablet; Refill: 11    4. SOB (shortness of breath)   Intermittent. Breathing OK today. Lung exam clear. Labs as ordered. If OK, possible future stres test, chest imaging  - CBC with platelets; Future  - TSH with free T4 reflex; Future  - BNP-N terminal pro; Future    5. Other fatigue  ? Related to sleep interruption with nocturia for which pt has follow-up with Urology. NO known snoring. Labs to look for metabolic cause  - CBC with platelets; Future  - TSH with free T4 reflex; Future  - Cortisol; Future  - Comprehensive metabolic panel; Future    6. Lumbar radiculopathy   Stable. Strength in legs OK.  Management per [facility]    7. Carpal tunnel syndrome of right wrist  Mild, intermittent.  Patient declines treatment at this time.  Will monitor.  If worsening, patient will see [facility] hand surgeon         PLAN:   Start Losartan 50mg tab, 1 tab daily in AM for blood pressure  Have Urology check your blood pressure when seen in 1 month  Continue other  medications  Call  [phone] or use [employer] to schedule a future lab appointment  fasting in 1-2 week for blood and urine.   For fasting labs, please refrain from eating for 8 hours or more.   Drink 2 glasses of water before your lab appointment. It is fine to take your  oral medications on the morning of the lab test as usual  See Urology  in May for cytoscopy to see if can help urination and lessen number of times getting up at night to see if better sleep helps energy/fatigue  Range of motion stretching right arm in AM.  May consider trial of OTC wrist brace at night. If not improving, then see ortho  Follow-up with [facility] re: back issues and continue physical therapy exercises  Consider covid booster vaccination through pharmacy (pt declines today)           Elma Camarillo is a 83 year old, presenting for the following health issues:  Diabetes and Hypertension    History of Present Illness       Diabetes:   He presents for follow up of diabetes.  He is checking home blood glucose three times daily.   He checks blood glucose before meals and at bedtime.  Blood glucose is sometimes over 200 and never under 70. He is aware of hypoglycemia symptoms including shakiness, dizziness and weakness.    He has no concerns regarding his diabetes at this time.  He is having numbness in feet.            Hypertension: He presents for follow up of hypertension.  He does not check blood pressure  regularly outside of the clinic. Outside blood pressures have been over 140/90. He does not follow a low salt diet.     He eats 2-3 servings of fruits and vegetables daily.He consumes 0 sweetened beverage(s) daily.He exercises with enough effort to increase his heart rate 30 to 60 minutes per day.  He exercises with enough effort to increase his heart rate 7 days per week.   He is taking medications regularly.     Due to language barrier, a phone  was present during the history-taking, physical examnation and subsequent  discussion with this patient.    Seen with his son today   Most recent lab results reviewed with pt.      Component      Latest Ref Rng 1/9/2023  4:29 PM 1/9/2023  4:45 PM 10/6/2023  8:44 AM   Sodium      135 - 145 mmol/L 140   141    Potassium      3.4 - 5.3 mmol/L 4.1   3.8    Chloride      98 - 107 mmol/L 105   103    Carbon Dioxide (CO2)      22 - 29 mmol/L 28   28    Anion Gap      7 - 15 mmol/L 7   10    Urea Nitrogen      8.0 - 23.0 mg/dL 18.3   22.8    Creatinine      0.67 - 1.17 mg/dL 1.06   0.82    Calcium      8.8 - 10.2 mg/dL 9.0   9.4    Glucose      70 - 99 mg/dL 330 (H)   276 (H)    Alkaline Phosphatase      40 - 129 U/L 122      AST      10 - 50 U/L 31      ALT      10 - 50 U/L 29      Protein Total      6.4 - 8.3 g/dL 7.0      Albumin      3.5 - 5.2 g/dL 3.9      Bilirubin Total      <=1.2 mg/dL 0.4      GFR Estimate      >60 mL/min/1.73m2 69   86    WBC      4.0 - 11.0 10e3/uL 6.4      RBC Count      4.40 - 5.90 10e6/uL 4.18 (L)      Hemoglobin      13.3 - 17.7 g/dL 13.6      Hematocrit      40.0 - 53.0 % 42.9      MCV      78 - 100 fL 103 (H)      MCH      26.5 - 33.0 pg 32.5      MCHC      31.5 - 36.5 g/dL 31.7      RDW      10.0 - 15.0 % 12.0      Platelet Count      150 - 450 10e3/uL 204      Cholesterol      <200 mg/dL 176      Triglycerides      <150 mg/dL 140      HDL Cholesterol      >=40 mg/dL 80      LDL Cholesterol Calculated      <=100 mg/dL 68      Non HDL Cholesterol      <130 mg/dL 96      Albumin Urine mg/L      mg/L  31.5     Albumin Urine mg/g Cr      0.00 - 17.00 mg/g Cr  47.23 (H)     Creatinine Urine      mg/dL  66.7     Hemoglobin A1C      0.0 - 5.6 % 9.3 (H)   9.8 (H)         Sugars:  139,160,152  160,138,180  121,150,190  144,180,200  142,145,175  135,165,194  150    Using 70/30 insulin 35 units AM, 25 PM supper and Humalog 8 units bedtime  Eye exam up-to-date  Has some urinary hesitancy with nocturia reported during the night.  Saw urology 4/4/2024 and has follow-up 1 month  "later with plan for cystoscopy at that time.  Denies dysuria  Chronic low back pain.  Previous MRI showed severe stenosis at L3-4 and L4-5.  Patient status post LESI done through [facility] partial benefit.  Also doing physical therapy.  Denies leg weakness.  Urine symptoms as above but denies incontinence.  Chronic hearing loss right side.  Ambulating with a cane.  No recent fall.  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  Patient continues declined surgical management  Patient denies chest pain,  abdominal pain. Occ mild shortness of breath. Some at rest and some with exertion. Breathing well today. No cough, F/C or other URI sx. NO orthopnea, PND. No palpitations. Still doing some walking on treadmill at home but less. Not getting chest pain  when doing so and sometimes able to do walking without any shortness of breath   Generally following diabetic diet  Occasional tingling in right upper extremity mostly in the hand. Putting hand in  warm water will help symptoms.  Symptoms present for about 2 months. Mild today.    Some daytime fatigue. NO known snoring    Additional ROS:   Constitutional, HEENT, Cardiovascular, Pulmonary, GI and , Neuro, MSK and Psych review of systems/symptoms are otherwise negative or unchanged from previous, except as noted above.      OBJECTIVE:  BP (!) 146/70 (BP Location: Left arm, Patient Position: Sitting, Cuff Size: Adult Regular)   Pulse 84   Temp 97.7  F (36.5  C) (Temporal)   Resp 16   Wt 82.4 kg (181 lb 11.2 oz)   SpO2 97%   BMI 27.63 kg/m     Estimated body mass index is 27.63 kg/m  as calculated from the following:    Height as of 4/4/24: 1.727 m (5' 8\").    Weight as of this encounter: 82.4 kg (181 lb 11.2 oz).     HENT: ear canals and TM's normal and nose and mouth without ulcers or lesions. Chronic reduced right ear  Neck: no adenopathy. Thyroid normal to palpation. No bruits  Pulm: Lungs clear to auscultation   CV: Regular rates and " rhythm  GI: Soft, nontender, Normal active bowel sounds, No hepatosplenomegaly or masses palpable  Ext: Peripheral pulses intact. No edema.  Neuro: Normal strength and tone, sensory exam grossly normal except mild reduced light touch sensation median nerve distribution right hand. Slower but stable gait with cane  MSK:  Mild tenderness to palpation bilateral paralumbar musculature.  Negative straight leg raise test bilaterally.  DIP joints hands with osteoarthritis thickening bilaterally    The longitudinal plan of care for the diagnosis(es)/condition(s) as documented were addressed during this visit. Due to the added complexity in care, I will continue to support Rabia in the subsequent management and with ongoing continuity of care.    (Chart documentation was completed, in part, with [employer] voice-recognition software. Even though reviewed, some grammatical, spelling, and word errors may remain.)    Alfredo Aragon MD  Internal Medicine Department  [facility]

## 2024-04-19 ENCOUNTER — LAB (OUTPATIENT)
Dept: LAB | Facility: CLINIC | Age: 84
End: 2024-04-19
Payer: COMMERCIAL

## 2024-04-19 DIAGNOSIS — E11.9 TYPE 2 DIABETES MELLITUS WITHOUT COMPLICATION, WITH LONG-TERM CURRENT USE OF INSULIN (H): ICD-10-CM

## 2024-04-19 DIAGNOSIS — R06.02 SOB (SHORTNESS OF BREATH): ICD-10-CM

## 2024-04-19 DIAGNOSIS — R80.9 PROTEINURIA, UNSPECIFIED TYPE: ICD-10-CM

## 2024-04-19 DIAGNOSIS — R53.83 OTHER FATIGUE: ICD-10-CM

## 2024-04-19 DIAGNOSIS — E78.5 HYPERLIPIDEMIA LDL GOAL <100: ICD-10-CM

## 2024-04-19 DIAGNOSIS — Z79.4 TYPE 2 DIABETES MELLITUS WITHOUT COMPLICATION, WITH LONG-TERM CURRENT USE OF INSULIN (H): ICD-10-CM

## 2024-04-19 DIAGNOSIS — I10 ESSENTIAL HYPERTENSION, BENIGN: ICD-10-CM

## 2024-04-19 LAB
ERYTHROCYTE [DISTWIDTH] IN BLOOD BY AUTOMATED COUNT: 12.2 % (ref 10–15)
HBA1C MFR BLD: 9.4 % (ref 0–5.6)
HCT VFR BLD AUTO: 42.2 % (ref 40–53)
HGB BLD-MCNC: 13.8 G/DL (ref 13.3–17.7)
MCH RBC QN AUTO: 32.6 PG (ref 26.5–33)
MCHC RBC AUTO-ENTMCNC: 32.7 G/DL (ref 31.5–36.5)
MCV RBC AUTO: 100 FL (ref 78–100)
PLATELET # BLD AUTO: 210 10E3/UL (ref 150–450)
RBC # BLD AUTO: 4.23 10E6/UL (ref 4.4–5.9)
WBC # BLD AUTO: 6.8 10E3/UL (ref 4–11)

## 2024-04-19 PROCEDURE — 80061 LIPID PANEL: CPT

## 2024-04-19 PROCEDURE — 80053 COMPREHEN METABOLIC PANEL: CPT

## 2024-04-19 PROCEDURE — 83880 ASSAY OF NATRIURETIC PEPTIDE: CPT

## 2024-04-19 PROCEDURE — 83036 HEMOGLOBIN GLYCOSYLATED A1C: CPT

## 2024-04-19 PROCEDURE — 82043 UR ALBUMIN QUANTITATIVE: CPT

## 2024-04-19 PROCEDURE — 82533 TOTAL CORTISOL: CPT

## 2024-04-19 PROCEDURE — 36415 COLL VENOUS BLD VENIPUNCTURE: CPT

## 2024-04-19 PROCEDURE — 85027 COMPLETE CBC AUTOMATED: CPT

## 2024-04-19 PROCEDURE — 82570 ASSAY OF URINE CREATININE: CPT

## 2024-04-19 PROCEDURE — 84443 ASSAY THYROID STIM HORMONE: CPT

## 2024-04-20 LAB
ALBUMIN SERPL BCG-MCNC: 4 G/DL (ref 3.5–5.2)
ALP SERPL-CCNC: 114 U/L (ref 40–150)
ALT SERPL W P-5'-P-CCNC: 43 U/L (ref 0–70)
ANION GAP SERPL CALCULATED.3IONS-SCNC: 11 MMOL/L (ref 7–15)
AST SERPL W P-5'-P-CCNC: 32 U/L (ref 0–45)
BILIRUB SERPL-MCNC: 0.6 MG/DL
BUN SERPL-MCNC: 20.1 MG/DL (ref 8–23)
CALCIUM SERPL-MCNC: 9 MG/DL (ref 8.8–10.2)
CHLORIDE SERPL-SCNC: 103 MMOL/L (ref 98–107)
CHOLEST SERPL-MCNC: 159 MG/DL
CORTIS SERPL-MCNC: 8.1 UG/DL
CREAT SERPL-MCNC: 0.84 MG/DL (ref 0.67–1.17)
CREAT UR-MCNC: 77.2 MG/DL
DEPRECATED HCO3 PLAS-SCNC: 25 MMOL/L (ref 22–29)
EGFRCR SERPLBLD CKD-EPI 2021: 85 ML/MIN/1.73M2
FASTING STATUS PATIENT QL REPORTED: YES
GLUCOSE SERPL-MCNC: 124 MG/DL (ref 70–99)
HDLC SERPL-MCNC: 80 MG/DL
LDLC SERPL CALC-MCNC: 65 MG/DL
MICROALBUMIN UR-MCNC: 82.2 MG/L
MICROALBUMIN/CREAT UR: 106.48 MG/G CR (ref 0–17)
NONHDLC SERPL-MCNC: 79 MG/DL
NT-PROBNP SERPL-MCNC: 128 PG/ML (ref 0–1800)
POTASSIUM SERPL-SCNC: 4 MMOL/L (ref 3.4–5.3)
PROT SERPL-MCNC: 7.2 G/DL (ref 6.4–8.3)
SODIUM SERPL-SCNC: 139 MMOL/L (ref 135–145)
TRIGL SERPL-MCNC: 72 MG/DL
TSH SERPL DL<=0.005 MIU/L-ACNC: 3.02 UIU/ML (ref 0.3–4.2)

## 2024-04-23 ENCOUNTER — MEDICAL CORRESPONDENCE (OUTPATIENT)
Dept: HEALTH INFORMATION MANAGEMENT | Facility: CLINIC | Age: 84
End: 2024-04-23
Payer: COMMERCIAL

## 2024-05-03 ENCOUNTER — PATIENT OUTREACH (OUTPATIENT)
Dept: GERIATRIC MEDICINE | Facility: CLINIC | Age: 84
End: 2024-05-03
Payer: COMMERCIAL

## 2024-05-03 NOTE — PROGRESS NOTES
[facility] Care Coordination Contact    Received an email back from member's son, Brandan, confirming member's annual assessment for 5/22/24 at 3pm.   Called Zee James and scheduled an .   ANASTACIA Velázquez  [facility]  [phone]

## 2024-05-06 ENCOUNTER — OFFICE VISIT (OUTPATIENT)
Dept: UROLOGY | Facility: CLINIC | Age: 84
End: 2024-05-06
Payer: COMMERCIAL

## 2024-05-06 VITALS
OXYGEN SATURATION: 96 % | WEIGHT: 181 LBS | BODY MASS INDEX: 27.43 KG/M2 | DIASTOLIC BLOOD PRESSURE: 74 MMHG | HEART RATE: 95 BPM | SYSTOLIC BLOOD PRESSURE: 178 MMHG | HEIGHT: 68 IN

## 2024-05-06 DIAGNOSIS — N40.1 BENIGN PROSTATIC HYPERPLASIA WITH NOCTURIA: Primary | ICD-10-CM

## 2024-05-06 DIAGNOSIS — R35.1 BENIGN PROSTATIC HYPERPLASIA WITH NOCTURIA: Primary | ICD-10-CM

## 2024-05-06 DIAGNOSIS — N35.914 STRICTURE OF ANTERIOR URETHRA IN MALE, UNSPECIFIED STRICTURE TYPE: ICD-10-CM

## 2024-05-06 LAB
ALBUMIN UR-MCNC: NEGATIVE MG/DL
APPEARANCE UR: CLEAR
BILIRUB UR QL STRIP: NEGATIVE
COLOR UR AUTO: YELLOW
GLUCOSE UR STRIP-MCNC: >=1000 MG/DL
HGB UR QL STRIP: ABNORMAL
KETONES UR STRIP-MCNC: NEGATIVE MG/DL
LEUKOCYTE ESTERASE UR QL STRIP: ABNORMAL
NITRATE UR QL: NEGATIVE
PH UR STRIP: 5.5 [PH] (ref 5–7)
SP GR UR STRIP: 1.01 (ref 1–1.03)
UROBILINOGEN UR STRIP-ACNC: 0.2 E.U./DL

## 2024-05-06 PROCEDURE — 52281 CYSTOSCOPY AND TREATMENT: CPT | Performed by: STUDENT IN AN ORGANIZED HEALTH CARE EDUCATION/TRAINING PROGRAM

## 2024-05-06 PROCEDURE — 99214 OFFICE O/P EST MOD 30 MIN: CPT | Mod: 25 | Performed by: STUDENT IN AN ORGANIZED HEALTH CARE EDUCATION/TRAINING PROGRAM

## 2024-05-06 PROCEDURE — 81003 URINALYSIS AUTO W/O SCOPE: CPT | Mod: QW | Performed by: STUDENT IN AN ORGANIZED HEALTH CARE EDUCATION/TRAINING PROGRAM

## 2024-05-06 RX ORDER — LIDOCAINE HYDROCHLORIDE 20 MG/ML
JELLY TOPICAL ONCE
Status: COMPLETED | OUTPATIENT
Start: 2024-05-06 | End: 2024-05-06

## 2024-05-06 RX ADMIN — LIDOCAINE HYDROCHLORIDE 5 ML: 20 JELLY TOPICAL at 13:33

## 2024-05-06 ASSESSMENT — PAIN SCALES - GENERAL: PAINLEVEL: MODERATE PAIN (4)

## 2024-05-06 NOTE — NURSING NOTE
Chief Complaint   Patient presents with    Benign Prostatic Hypertrophy     And luts in clinic for a cystoscopy    Prior to the start of the procedure DR BOND and with procedural staff participation, I verbally confirmed the patient s identity using two indicators, relevant allergies, that the procedure was appropriate and matched the consent or emergent situation, and that the correct equipment/implants were available. Immediately prior to starting the procedure I conducted the Time Out with the procedural staff and re-confirmed the patient s name, procedure, and site/side. I have wiped the patient off with the povidone-Iodine solution, draped them,  used Lidocaine hydrochloride jelly, and instilled sterile water into the bladder. (The Joint Commission universal protocol was followed.)  Yes    5mL 2% lidocaine hydrochloride Urojet instilled into urethra.    NDC# [address]-5  Lot #: [id number]  Expiration Date:  11-25  Lokesh Trevino CMA

## 2024-05-06 NOTE — LETTER
"5/6/2024       RE: Rabia Reed  [address] [place] [id number]     Dear Colleague,    Thank you for referring your patient, Rabia Reed, to the [facility] at [facility]. Please see a copy of my visit note below.    CHIEF COMPLAINT   Rabia Reed who is a 83 year old male returns today for follow-up of BPH with LUTS.      Here with his son who is interpreting for him    HPI   Rabia Reed is a 83 year old male returns today for follow-up of BPH with LUTS    Symptoms are worsening. Apparently had a prior urology office procedure over 10 years ago. He complains of urinary urgency and urge incontinence, feeling of incomplete emptying, daytime frequency q1h    Note that he complains of two holes on his penis    Patient has been on max medical therapy with tamsulosin 0.8 mg daily and finasteride 5 mg daily    PHYSICAL EXAM  Patient is a 83 year old  male   Vitals: Blood pressure (!) 178/74, pulse 95, height 1.727 m (5' 8\"), weight 82.1 kg (181 lb), SpO2 96%.  Body mass index is 27.52 kg/m .  General Appearance Adult:   Alert, no acute distress, oriented  HENT: throat/mouth:normal, good dentition  Lungs: no respiratory distress, or pursed lip breathing  Heart: No obvious jugular venous distension present  Abdomen: nondistended  Musculoskeltal: extremities normal, no peripheral edema  Skin: no suspicious lesions or rashes  Neuro: Alert, oriented, speech and mentation normal  Psych: affect and mood normal  Gait: Normal  : uncirc phallus  Distal penile hypospadias. He has a distal pit and the true meatus is just proximal on the glans    PRE-PROCEDURE DIAGNOSIS: BPH with LUTS    POST-PROCEDURE DIAGNOSIS: BPH with LUTS, hypospadias, fossa navicularis stricture    PROCEDURE: Cystoscopy with urethral dilation    DESCRIPTION OF PROCEDURE: After informed consent was obtained, the patient was brought to the procedure room where he was placed in the " supine position with all pressure points well padded.  The penis was prepped and draped in sterile fashion    The penis had distal hypospadias. I could not get the scope into the true lumen due to stricture. The stricture was serially dilated with straight sounds from 12 Fr up to 20 Fr. The scope was then passed into the urethra and the fossa navicularis stricture was seen to have been dilated well.    Prostatic urethra was quite enlarged with trilobar obstructive hypertrophy with prominent intravesical protrusion  Severe trabeculation with numerous cellules  Some dependent debris but no stones  No bladder tumors    The flexible cystoscope was removed and the findings were described to the patient.       ASSESSMENT and PLAN  83 year old male returns today for follow-up of BPH with LUTS on max medical therapy. Chronic condition with exacerbation    Patient has hypospadias and had a fossa navicularis stricture which was dilated today    He has trilobar obstructive hypertrophy with sequelae of obstruction including trabeculation, cellules. Has worsening symptoms despite max medical therapy. Prostate has very large median lobe and probably not a good candidate for Rezum. Given size of the median lobe, would not recommend Greenlight laser photovaporization either. I discussed Aquablation as an option which should be coming in the next few months to [facility], vs. Referral for HOLEP, vs. Referral for prostatic arterial embolization.    Patient wants to wait for aquablation. Minor surgery with increased risk including DM, advanced age. Plan for overnight hospitalization. He understands that at this time we do not know exact timing on availablity          Reed Reed MD   [facility] Urology  [facility] Phone: [phone]

## 2024-05-06 NOTE — PROGRESS NOTES
"CHIEF COMPLAINT   Rabia Reed who is a 83 year old male returns today for follow-up of BPH with LUTS.      Here with his son who is interpreting for him    HPI   Rabia Reed is a 83 year old male returns today for follow-up of BPH with LUTS    Symptoms are worsening. Apparently had a prior urology office procedure over 10 years ago. He complains of urinary urgency and urge incontinence, feeling of incomplete emptying, daytime frequency q1h    Note that he complains of two holes on his penis    Patient has been on max medical therapy with tamsulosin 0.8 mg daily and finasteride 5 mg daily    PHYSICAL EXAM  Patient is a 83 year old  male   Vitals: Blood pressure (!) 178/74, pulse 95, height 1.727 m (5' 8\"), weight 82.1 kg (181 lb), SpO2 96%.  Body mass index is 27.52 kg/m .  General Appearance Adult:   Alert, no acute distress, oriented  HENT: throat/mouth:normal, good dentition  Lungs: no respiratory distress, or pursed lip breathing  Heart: No obvious jugular venous distension present  Abdomen: nondistended  Musculoskeltal: extremities normal, no peripheral edema  Skin: no suspicious lesions or rashes  Neuro: Alert, oriented, speech and mentation normal  Psych: affect and mood normal  Gait: Normal  : uncirc phallus  Distal penile hypospadias. He has a distal pit and the true meatus is just proximal on the glans    PRE-PROCEDURE DIAGNOSIS: BPH with LUTS    POST-PROCEDURE DIAGNOSIS: BPH with LUTS, hypospadias, fossa navicularis stricture    PROCEDURE: Cystoscopy with urethral dilation    DESCRIPTION OF PROCEDURE: After informed consent was obtained, the patient was brought to the procedure room where he was placed in the supine position with all pressure points well padded.  The penis was prepped and draped in sterile fashion    The penis had distal hypospadias. I could not get the scope into the true lumen due to stricture. The stricture was serially dilated with straight sounds from 12 Fr up to 20 Fr. The scope was " then passed into the urethra and the fossa navicularis stricture was seen to have been dilated well.    Prostatic urethra was quite enlarged with trilobar obstructive hypertrophy with prominent intravesical protrusion  Severe trabeculation with numerous cellules  Some dependent debris but no stones  No bladder tumors    The flexible cystoscope was removed and the findings were described to the patient.       ASSESSMENT and PLAN  83 year old male returns today for follow-up of BPH with LUTS on max medical therapy. Chronic condition with exacerbation    Patient has hypospadias and had a fossa navicularis stricture which was dilated today    He has trilobar obstructive hypertrophy with sequelae of obstruction including trabeculation, cellules. Has worsening symptoms despite max medical therapy. Prostate has very large median lobe and probably not a good candidate for Rezum. Given size of the median lobe, would not recommend Greenlight laser photovaporization either. I discussed Aquablation as an option which should be coming in the next few months to [facility], vs. Referral for HOLEP, vs. Referral for prostatic arterial embolization.    Patient wants to wait for aquablation. Minor surgery with increased risk including DM, advanced age. Plan for overnight hospitalization. He understands that at this time we do not know exact timing on availablity          Reed Reed MD   [facility]  [facility] Phone: [phone]     Action 3: Continue Hide Skinmedica Products: Yes Hide Cetaphil Products: No Continue Regimen: Spironolactone & tretinoin Discontinue Regimen: Seysara Detail Level: Generalized Continue Regimen: Spironolactone

## 2024-05-06 NOTE — PATIENT INSTRUCTIONS
"We talked about multiple options includin) Rezum (water vapor thermal therapy/steam): do not recommend given size of prostate    2) Greenlight laser photovaporization of the prostate: do not recommend due to large median lobe    3) referral for HOLEP    4) Aquablation    5) referral for prostatic artery embolization        AFTER YOUR CYSTOSCOPY  ?  ?  You have just completed a cystoscopy, or \"cysto\", which allowed your physician to learn more about your bladder (or to remove a stent placed after surgery). We suggest that you continue to avoid caffeine, fruit juice, and alcohol for the next 24 hours, however, you are encouraged to return to your normal activities.  ?  ?  A few things that are considered normal after your cystoscopy:  ?  * small amount of bleeding (or spotting) that clears within the next 24 hours  ?  * slight burning sensation with urination  ?  * sensation of needing to void (urinate) more frequently  ?  * the feeling of \"air\" in your urine  ?  * mild discomfort that is relieved with Tylenol    * bladder spasms  ?  ?  ?  Please contact our office promptly if you:  ?  * develop a fever above 101 degrees  ?  * are unable to urinate  ?  * develop bright red blood that does not stop  ?  * experience severe pain or swelling  ?  ?  ?  And of course, please contact our office with any concerns or questions [phone].  ?   "

## 2024-05-13 ENCOUNTER — PATIENT OUTREACH (OUTPATIENT)
Dept: GERIATRIC MEDICINE | Facility: CLINIC | Age: 84
End: 2024-05-13
Payer: COMMERCIAL

## 2024-05-13 NOTE — PROGRESS NOTES
[facility] Care Coordination Contact    Received an email from member's son, Brandan, requesting to change the assessment from the 22nd to the 20th at 11am. Emailed Brandan back that the 20th at 11am, will work. Spoke with Zee Chaves and rescheduled the .  ANASTACIA Velázquez  [facility]  [phone]

## 2024-05-20 ENCOUNTER — PATIENT OUTREACH (OUTPATIENT)
Dept: GERIATRIC MEDICINE | Facility: CLINIC | Age: 84
End: 2024-05-20
Payer: COMMERCIAL

## 2024-05-20 RX ORDER — CHOLECALCIFEROL (VITAMIN D3) 125 MCG
CAPSULE ORAL
COMMUNITY

## 2024-05-20 ASSESSMENT — LIFESTYLE VARIABLES
AUDIT-C TOTAL SCORE: 0
SKIP TO QUESTIONS 9-10: 1

## 2024-05-20 ASSESSMENT — PATIENT HEALTH QUESTIONNAIRE - PHQ9: SUM OF ALL RESPONSES TO PHQ QUESTIONS 1-9: 6

## 2024-05-20 NOTE — Clinical Note
Dr. Aragon, I am the [facility] Coordinator for Rabia. I was out to his home on the 20th. No current concerns at this time. Thank you for your review. Charis

## 2024-05-21 NOTE — PROGRESS NOTES
[facility] Care Coordination Contact    [facility] Home Visit Assessment     Home visit for Health Risk Assessment with Rabia Reed completed on May 20, 2024    Type of residence:: Private home - stairs ([place] steps into home)  Current living arrangement:: I live in a private home with family     Assessment completed with:: Patient, Care Team Member, Children, Other (Son: Brandan Reed ; : Lj Pryor ; and FVP Trainee: Joy Carrasco)    Current Care Plan  Member currently receiving the following home care services:   None  Member currently receiving the following community resources: PCA    Health Issues: Son reports that member's health has been stable. Continues with daily blood sugar readings and daily insulin. Son reports that the change in blood pressure medications has been helpful and noted that member has had a decrease in his headaches. Rabia shared that he continues to have nightmares that stem from his time in the [place] war and his time spent in a war camp. Rabia also shared that he has chronic headaches that stem from injuries he incurred during the war as well as his acoustic tumor. Rabia shared that his headaches are almost daily, and when the headaches are severe it makes that it difficult to do the simplest of tasks. No hospitalizations or ED visits in the past year.     Medication Review  Medication reconciliation completed in Epic: Yes  Medication set-up & administration: Family/informal caregiver sets up daily.  Family caregiver administers medications.  Medication Risk Assessment Medication (1 or more, place referral to MTM): N/A: No risk factors identified  MTM Referral Placed: No: No risk factors idenified    Mental/Behavioral Health   Depression Screening:   PHQ-9 Total Score: 6  Mental health DX:: No     No dx of mental health.     Falls Assessment:   Fallen 2 or more times in the past year?: No   Any fall with injury in the past year?: No    ADL/IADL Dependencies:    Dependent ADLs:: Ambulation-walker, Bathing, Dressing, Grooming, Transfers, Toileting  Dependent IADLs:: Cleaning, Cooking, Laundry, Shopping, Meal Preparation, Medication Management, Money Management, Transportation    Health Plan sponsored benefits: [employer] [facility]: Shared information regarding One [employer] Fitness Program. Reviewed preventative health screening and health plan supplemental benefits/incentives. Reviewed medication disposal form.    PCA Assessment completed at visit: Yes Annual PCA assessment indicated 3.5 hours per day of PCA. This is the same as the previous assessment.     Elderly Waiver Eligibility: Yes-will continue on EW    Care Plan & Recommendations: Member wants to continue to live independently with his family. His family will continue to be his PCA. Offered adult day care and member was accepting of this services and will place referral for 1 day per week. Continue with glucerna PRN and incontinent products PRN.    See LTCC for detailed assessment information.    Follow-Up Plan: Member informed of future contact, plan to f/u with member with a 6 month telephone assessment.  Contact information shared with member and family, encouraged member to call with any questions or concerns at any time.    [facility] care continuum providers: Please see Snapshot and Care Management Flowsheets for Specific details of care plan.    This CC note routed to PCP, Alfredo Aragon, ANASTACIA  [facility]  [phone]

## 2024-05-24 ENCOUNTER — PATIENT OUTREACH (OUTPATIENT)
Dept: GERIATRIC MEDICINE | Facility: CLINIC | Age: 84
End: 2024-05-24
Payer: COMMERCIAL

## 2024-05-24 NOTE — PROGRESS NOTES
[facility] Care Coordination Contact    Received after visit chart from care coordinator.  Completed following tasks: Mailed copy of care plan/support plan to member, Mailed [place] Choices signature sheet pages 3-4, Mailed Safe Medication Disposal , Mailed [employer] Leave Behind Letter, Submitted referrals/auths for PCA, and Updated services in Database  , Provider Signature - Full Care Plan:  Member indicates that they would like their POC shared with the following EW providers:  [facility].  Letter and full POC faxed to providers for signature.    , and [employer]:  Faxed completed PCA assessment to PCA Agency and mailed copies to member.  Faxed MD Communication to PCP.  Emailed referral form for auth to [employer].    Support is open CC is waiting to hear about [facility].  We did not send the Provider letter for [facility] until we hear back from them if the member is going to be going to [facility].    Berta Mcfarland  Care Management Specialist  [facility]  [phone]

## 2024-05-24 NOTE — PROGRESS NOTES
[facility] Care Coordination Contact    Received a return call from Josselin Ferris at [facility]. Placed referral for 1 day per week with Josselin. Josselin shared that she would reach out to member regarding a start date. Once the start date is known an auth will be placed.  ANASTACIA Velázquez  [facility]  [phone]

## 2024-05-24 NOTE — PROGRESS NOTES
[facility] Care Coordination Contact    Called [facility] adult day care and left a message requesting a return call to place referral for 1 day per week of adult day care.  ANASTACIA Velázquez  [facility]  [phone]

## 2024-05-24 NOTE — LETTER
May 24, 2024    PIYUSH BOND  [address] [address]    Gris Camarillo,    I hope you ve been well since we last spoke. Attached is your copy of your personalized Support Plan which summarizes our conversation.   My goal is to help you keep your health on track. Your Support Plan includes the steps we agreed would help you with that. We can talk about these steps during our next meeting or sooner if you d like.   Here are additional programs and services I can help you with:  Get to appointments with [facility]  If you need a ride to medical or dental visits, [facility] can help. Call to schedule a ride.   2 [phone] (TTY:[phone]), 8 a.m. - 6 p.m. CT, Monday - Friday  Access One-Pass to boost your health  Get a gym membership, at-home fitness classes, and free access to fun activities that help your brain. To get access, go to [employer] or call One-Pass.    7 [phone] (TTY:712), 8 a.m. - 9 p.m. CT, Monday - Friday  Get help paying for healthy food and utilities  As a member, you get these benefits:  $150 monthly allowance to buy healthy food at participating grocery stores  One $0 ride per day to participating grocery stores and back home  $100 monthly allowance to help pay your utility bills  If you don t know how to access these benefits, I'm here to help you and answer questions.  Set up your health care directive  A directive is a legal form that explains your health care wishes. You can request this form from me, and I ll walk you through it before you discuss your plans with your doctor.  Schedule your annual physical  I can help set up your annual physical at your clinic of choice. It's an important step towards good health.    Have questions? I m here to help.  Call me at [phone] (TTY:[phone]) 8am - 5pm, Monday - Friday.  I ll reach out to you again in 6 months to follow up via telephone.  Warm regards,    ANASTACIA Velázquez    E-mail:  [email or web address]  Phone: [phone]    Care Manager  [facility]    cc: member records                                                        American Indians can continue or begin to use [facility] and [facility] ([facility]) clinics. We will not require prior approval or impose any conditions for you to get services at these clinics. For elders age 65 years and older this includes Elderly Waiver (EW) services accessed through the [place]. If a doctor or other provider in a [facility] or [facility] clinic refers you to a provider in our network, we will not require you to see your primary care provider prior to the referral.      [id number]

## 2024-05-29 DIAGNOSIS — N40.1 BENIGN PROSTATIC HYPERPLASIA WITH LOWER URINARY TRACT SYMPTOMS, SYMPTOM DETAILS UNSPECIFIED: ICD-10-CM

## 2024-05-30 RX ORDER — FINASTERIDE 5 MG/1
1 TABLET, FILM COATED ORAL DAILY
Qty: 90 TABLET | Refills: 1 | Status: SHIPPED | OUTPATIENT
Start: 2024-05-30

## 2024-06-06 ENCOUNTER — NURSE TRIAGE (OUTPATIENT)
Dept: INTERNAL MEDICINE | Facility: CLINIC | Age: 84
End: 2024-06-06
Payer: COMMERCIAL

## 2024-06-06 ENCOUNTER — PATIENT OUTREACH (OUTPATIENT)
Dept: GERIATRIC MEDICINE | Facility: CLINIC | Age: 84
End: 2024-06-06
Payer: COMMERCIAL

## 2024-06-06 DIAGNOSIS — R60.9 EDEMA, UNSPECIFIED TYPE: Primary | ICD-10-CM

## 2024-06-06 RX ORDER — POTASSIUM CHLORIDE 750 MG/1
10 TABLET, EXTENDED RELEASE ORAL DAILY
Qty: 30 TABLET | Refills: 1 | Status: SHIPPED | OUTPATIENT
Start: 2024-06-06 | End: 2024-08-05

## 2024-06-06 RX ORDER — FUROSEMIDE 20 MG
20 TABLET ORAL DAILY
Qty: 30 TABLET | Refills: 1 | Status: SHIPPED | OUTPATIENT
Start: 2024-06-06 | End: 2024-08-05

## 2024-06-06 NOTE — TELEPHONE ENCOUNTER
Pt's son, Brandan, RN calling reporting new swelling in both feet, +1 or +2. Denies shortness of breath, believes its a side effect of medication losartan.     Requesting appointment. VV scheduled.     Routing to provider; pt to stop taking losartan?       Reason for Disposition   Caller has URGENT medicine question about med that PCP or specialist prescribed and triager unable to answer question    Additional Information   Negative: Intentional drug overdose and suicidal thoughts or ideas   Negative: Drug overdose and triager unable to answer question   Negative: Caller requesting a renewal or refill of a medicine patient is currently taking   Negative: Caller requesting information unrelated to medicine   Negative: Caller requesting information about COVID-19 Vaccine   Negative: Caller requesting information about Emergency Contraception   Negative: Caller requesting information about Combined Birth Control Pills   Negative: Caller requesting information about Progestin Birth Control Pills   Negative: Caller requesting information about Post-Op pain or medicines   Negative: Caller requesting a prescription antibiotic (such as penicillin) for Strep throat and has a positive culture result   Negative: Caller requesting a prescription anti-viral med (such as Tamiflu) and has influenza (flu) symptoms   Negative: Immunization reaction suspected   Negative: Rash while taking a medicine or within 3 days of stopping it   Negative: Asthma and having symptoms of asthma (cough, wheezing, etc.)   Negative: Symptom of illness (e.g., headache, abdominal pain, earache, vomiting) that are more than mild   Negative: Breastfeeding questions about mother's medicines and diet   Negative: MORE THAN A DOUBLE DOSE of a prescription or over-the-counter (OTC) drug   Negative: DOUBLE DOSE (an extra dose or lesser amount) of prescription drug and any symptoms (e.g., dizziness, nausea, pain, sleepiness)   Negative: DOUBLE DOSE (an extra dose or  lesser amount) of over-the-counter (OTC) drug and any symptoms (e.g., dizziness, nausea, pain, sleepiness)   Negative: Took another person's prescription drug   Negative: DOUBLE DOSE (an extra dose or lesser amount) of prescription drug and NO symptoms  (Exception: A double dose of antibiotics.)   Negative: Diabetes drug error or overdose (e.g., took wrong type of insulin or took extra dose)   Negative: Caller has medication question about med NOT prescribed by PCP and triager unable to answer question (e.g., compatibility with other med, storage)   Negative: Prescription not at pharmacy and was prescribed by PCP recently  (Exception: triager has access to EMR and prescription is recorded there. Go to Home Care and confirm for pharmacy.)   Negative: Pharmacy calling with prescription question and triager unable to answer question    Protocols used: Medication Question Call-A-OH

## 2024-06-06 NOTE — TELEPHONE ENCOUNTER
"See me instead Friday 6/14/24  in clinic at  the open 8:30am \"next week\" spot  for appt with me rather than the previously mentioned 11:00am  spot  "

## 2024-06-06 NOTE — TELEPHONE ENCOUNTER
Called and spoke with pt's son to relay provider note below.   He verbalized understanding and declined questions at this time.     He is unable to drive his father to the clinic on 6/14 after 10am.   He states he is available: 6/14 7am-10am, 6/17 anytime, 6/19 anytime    Routing back to PCP to suggest another in-clinic appt time if possible.    Jun 12, 2024  4:30 PM  [facility]  [facility] ([facility] ) [phone]     Thank you,  Mojgan Lim, RN     discussion/demonstration

## 2024-06-06 NOTE — TELEPHONE ENCOUNTER
Losartan should not cause edema.  Not put on the medications that should cause edema either.  Concern for under dietary discretion with increased sodium intake, change in renal function or change in heart function recently.  He is losartan Will for blood pressure control and controlling protein loss in the urine.  Stopping the medication would actually worsen edema issues likely with higher blood pressure and higher protein loss.  For now, patient to start furosemide 20 mg tablet, 1 tablet daily in the morning along with potassium 10mg  tablet, 1 tablet daily in the morning for edema.  Prescription sent to patient's pharmacy.  Patient to have a nonfasting blood test done next Wed 6/12/24 and see me in clinic next Friday 6/14/24 at 11:00am (arrival 10:40am) ain the current next day appt slot for further examination and review of lab results. Based on response and results,will determine in ECHO needed. Call pt's everton Gipson as pt doesn't speak English and schedule lab and MD appts. Future lab ordered

## 2024-06-06 NOTE — PROGRESS NOTES
[facility] Care Coordination Contact    Received a voice mail message from Brenda at [facility] ([facility] agency) requesting a return call at [phone] regarding if member requires a responsible party, as the son states that he is the guardian.  Called Brenda back and updated her that to this Care Coordinator's knowledge member does not have a guardian and the son has never take member to court to seek this. Reviewed that member has a health care directive, but that doesn't mean that member isn't able to make his own decisions. Reviewed with Brenda that this Care Coordinator's assessment and observations are that member is able to make his own decisions when it come to his care, his medical treatment, who he wants to manage his care. Reviewed that member will typically default to whatever is most convenient for family and will typically ask that family sign paperwork as this is convenient for him. Brenda agreed that the biggest issue is that member didn't want to be the one to sign paperwork and that she will talk with member and the family about this, and encourage a stamp if this would be better for member. Brenda had no further questions.  ANASTACIA Velázquez  [facility]  [phone]

## 2024-06-06 NOTE — TELEPHONE ENCOUNTER
Called and spoke with Brandan to schedule pt per provider note below.    Jun 12, 2024  4:30 PM  LAB with [facility]  [facility] ([facility] ) [phone]     Jun 14, 2024  8:30 AM  (Arrive by 8:10 AM)  Provider Visit with Alfredo Aragon MD  [facility] ([facility] ) [phone]     Thank you,  Mojgan Lim, RN

## 2024-06-11 DIAGNOSIS — R35.0 URINE FREQUENCY: ICD-10-CM

## 2024-06-12 ENCOUNTER — LAB (OUTPATIENT)
Dept: LAB | Facility: CLINIC | Age: 84
End: 2024-06-12
Payer: COMMERCIAL

## 2024-06-12 DIAGNOSIS — R60.9 EDEMA, UNSPECIFIED TYPE: ICD-10-CM

## 2024-06-12 PROCEDURE — 80048 BASIC METABOLIC PNL TOTAL CA: CPT

## 2024-06-12 PROCEDURE — 36415 COLL VENOUS BLD VENIPUNCTURE: CPT

## 2024-06-13 LAB
ANION GAP SERPL CALCULATED.3IONS-SCNC: 10 MMOL/L (ref 7–15)
BUN SERPL-MCNC: 23.8 MG/DL (ref 8–23)
CALCIUM SERPL-MCNC: 9 MG/DL (ref 8.8–10.2)
CHLORIDE SERPL-SCNC: 101 MMOL/L (ref 98–107)
CREAT SERPL-MCNC: 1.04 MG/DL (ref 0.67–1.17)
DEPRECATED HCO3 PLAS-SCNC: 26 MMOL/L (ref 22–29)
EGFRCR SERPLBLD CKD-EPI 2021: 70 ML/MIN/1.73M2
GLUCOSE SERPL-MCNC: 305 MG/DL (ref 70–99)
POTASSIUM SERPL-SCNC: 4.3 MMOL/L (ref 3.4–5.3)
SODIUM SERPL-SCNC: 137 MMOL/L (ref 135–145)

## 2024-06-13 RX ORDER — TAMSULOSIN HYDROCHLORIDE 0.4 MG/1
0.8 CAPSULE ORAL DAILY
Qty: 180 CAPSULE | Refills: 3 | Status: SHIPPED | OUTPATIENT
Start: 2024-06-13

## 2024-06-14 ENCOUNTER — OFFICE VISIT (OUTPATIENT)
Dept: INTERNAL MEDICINE | Facility: CLINIC | Age: 84
End: 2024-06-14
Payer: COMMERCIAL

## 2024-06-14 VITALS
OXYGEN SATURATION: 97 % | SYSTOLIC BLOOD PRESSURE: 100 MMHG | BODY MASS INDEX: 27.71 KG/M2 | HEIGHT: 68 IN | DIASTOLIC BLOOD PRESSURE: 61 MMHG | TEMPERATURE: 97.3 F | WEIGHT: 182.8 LBS | HEART RATE: 67 BPM

## 2024-06-14 DIAGNOSIS — E11.9 TYPE 2 DIABETES MELLITUS WITHOUT COMPLICATION, WITH LONG-TERM CURRENT USE OF INSULIN (H): ICD-10-CM

## 2024-06-14 DIAGNOSIS — R60.0 BILATERAL LEG EDEMA: Primary | ICD-10-CM

## 2024-06-14 DIAGNOSIS — Z79.4 TYPE 2 DIABETES MELLITUS WITHOUT COMPLICATION, WITH LONG-TERM CURRENT USE OF INSULIN (H): ICD-10-CM

## 2024-06-14 DIAGNOSIS — M79.661 BILATERAL CALF PAIN: ICD-10-CM

## 2024-06-14 DIAGNOSIS — M79.662 BILATERAL CALF PAIN: ICD-10-CM

## 2024-06-14 LAB — D DIMER PPP FEU-MCNC: 0.89 UG/ML FEU (ref 0–0.5)

## 2024-06-14 PROCEDURE — 36415 COLL VENOUS BLD VENIPUNCTURE: CPT | Performed by: INTERNAL MEDICINE

## 2024-06-14 PROCEDURE — 85379 FIBRIN DEGRADATION QUANT: CPT | Performed by: INTERNAL MEDICINE

## 2024-06-14 PROCEDURE — G2211 COMPLEX E/M VISIT ADD ON: HCPCS | Performed by: INTERNAL MEDICINE

## 2024-06-14 PROCEDURE — 99214 OFFICE O/P EST MOD 30 MIN: CPT | Performed by: INTERNAL MEDICINE

## 2024-06-14 NOTE — PROGRESS NOTES
ASSESSMENT:   1. Bilateral leg edema  Bilateral edema.  D-dimer ordered . If positive, will get venous doppler U/S BLE. If negative., will increase Lasix  - D dimer, quantitative; Future    2. Bilateral calf pain  See #1  - D dimer, quantitative; Future    3. Type 2 diabetes mellitus without complication, with long-term current use of insulin (H)   Most recent A1C from  April high at 9.4 but more recent  blood sugars better. Recheck A1C in 3 mos and pt will try to reduce carb intake. If still > 8, will add Jardiance  - Hemoglobin A1c; Future      PLAN:  Continue diabetic medications  Call  [phone] or use [employer] to schedule a future lab appointment  non-fasting in 3 months to recheck A1C   D dimer lab this AM. If positive, will get BLE venous doppler ultrasound of the legs at [facility] today. If D dimer normal, will increase Furosemide dosage and check ECHO     Addendum 5:30pm 6/14/24  D-dimer result came back at 0.89.  Adjusted for age, normal result would be less than 0.85 so borderline result.  Spoke with imaging scheduler and no availability to add an ultrasound appointment on at either  [facility] or [facility]. No openings available until Monday unless pt  was seen in ER tonight. Need to r/o DVT but given no travel, sx in both legs and minimal D dimer elevation above age-adjusted  level, suspicion is low so don't feel need to commit pt to going to ER for full visit tonight. Spoke with pt's everton Gipson and explained thought process. Will increase Lasix to 40mg daily in AM starting tomorrow with same potassium dose and will get U/S  next Monday instead and Brandan will call update to clinic also on Monday re: pt's leg sx status with higher diuretic dose use. If pt has increasing peg pain or swelling  or new SOB over the weekend despite higher diuretic dose use, Brandan will then have pt be seen in ER. Central scheduler stated she will call Brandan in approx 30 min to set up Monday appt for ultrasound when appts  available    Subjective   Rabia is a 83 year old, presenting for the following health issues:  Edema    History of Present Illness       Reason for visit:  Edema on bilateral lower extremities  Symptom onset:  1-2 weeks ago  Symptoms include:  Swelling both legs, feet, ankles, with pain around calves  Symptom intensity:  Mild  Symptom progression:  Improving  Had these symptoms before:  No  What makes it worse:  Standing or seating for too long  What makes it better:  Taking LasixHe consumes 0 sweetened beverage(s) daily.He exercises with enough effort to increase his heart rate 30 to 60 minutes per day.  He exercises with enough effort to increase his heart rate 5 days per week.   He is taking medications regularly.     Most recent lab results reviewed with pt.      Component      Latest Ref Rng 4/19/2024  10:02 AM 4/19/2024  10:09 AM 6/12/2024  4:21 PM   Sodium      135 - 145 mmol/L 139   137    Potassium      3.4 - 5.3 mmol/L 4.0   4.3    Carbon Dioxide (CO2)      22 - 29 mmol/L 25   26    Anion Gap      7 - 15 mmol/L 11   10    Urea Nitrogen      8.0 - 23.0 mg/dL 20.1   23.8 (H)    Creatinine      0.67 - 1.17 mg/dL 0.84   1.04    GFR Estimate      >60 mL/min/1.73m2 85   70    Calcium      8.8 - 10.2 mg/dL 9.0   9.0    Chloride      98 - 107 mmol/L 103   101    Glucose      70 - 99 mg/dL 124 (H)   305 (H)    Alkaline Phosphatase      40 - 150 U/L 114      AST      0 - 45 U/L 32      ALT      0 - 70 U/L 43      Protein Total      6.4 - 8.3 g/dL 7.2      Albumin      3.5 - 5.2 g/dL 4.0      Bilirubin Total      <=1.2 mg/dL 0.6      Creatinine Urine      mg/dL  77.2     Albumin Urine mg/L      mg/L  82.2     Albumin Urine mg/g Cr      0.00 - 17.00 mg/g Cr  106.48 (H)     Hemoglobin A1C      0.0 - 5.6 % 9.4 (H)      TSH      0.30 - 4.20 uIU/mL 3.02      N-Terminal Pro Bnp      0 - 1,800 pg/mL 128            Due to language barrier, a video  was present during the history-taking, physical examnation and  "subsequent discussion with this patient.      Seen with  daughter    See recent triage note 6/6/24. New edema.  Was started on lasix and potassium at that time  Weight up 1 pound from last visit  Denies chest pain, shortness of breath with exertion, abdominal pain,   No orthopnea, PND. Still doing some walking on treadmill for 10min daily   Since  starting  diuretic, leg swelling  is  better.  Has some mild calf pain bilaterally. No recent travel  Sugars 201 after meals and 120-130 fasting in AM   No NSAIDS   Stable chronic LBP. Ambulating with cane. No recent fall       Additional ROS:   Constitutional, HEENT, Cardiovascular, Pulmonary, GI and , Neuro, MSK and Psych review of systems/symptoms are otherwise negative or unchanged from previous, except as noted above.      OBJECTIVE:  /61   Pulse 67   Temp 97.3  F (36.3  C) (Temporal)   Ht 1.727 m (5' 8\")   Wt 82.9 kg (182 lb 12.8 oz)   SpO2 97%   BMI 27.79 kg/m     Estimated body mass index is 27.79 kg/m  as calculated from the following:    Height as of this encounter: 1.727 m (5' 8\").    Weight as of this encounter: 82.9 kg (182 lb 12.8 oz).     Neck: no adenopathy. Thyroid normal to palpation. No bruits. No JVD  Pulm: Lungs clear to auscultation   CV: Regular rates and rhythm  GI: Soft, nontender, Normal active bowel sounds, No hepatosplenomegaly or masses palpable  Ext: Peripheral pulses intact. 1 plus BLE calf edema. Mild tenderness to palpation bilateral calf musculature.   Neuro: Normal strength and tone in LEs. Slower and slight antalgic  but stable gait with cane    The longitudinal plan of care for the diagnosis(es)/condition(s) as documented were addressed during this visit. Due to the added complexity in care, I will continue to support Rabia in the subsequent management and with ongoing continuity of care.    (Chart documentation was completed, in part, with [employer] voice-recognition software. Even though reviewed, some grammatical, spelling, " and word errors may remain.)    Alfredo Aragon MD  Internal Medicine Department  [facility]

## 2024-06-17 ENCOUNTER — HOSPITAL ENCOUNTER (OUTPATIENT)
Dept: ULTRASOUND IMAGING | Facility: CLINIC | Age: 84
Discharge: HOME | End: 2024-06-17
Attending: INTERNAL MEDICINE | Admitting: INTERNAL MEDICINE
Payer: COMMERCIAL

## 2024-06-17 DIAGNOSIS — M79.661 BILATERAL CALF PAIN: ICD-10-CM

## 2024-06-17 DIAGNOSIS — M79.662 BILATERAL CALF PAIN: ICD-10-CM

## 2024-06-17 DIAGNOSIS — R60.0 BILATERAL LEG EDEMA: ICD-10-CM

## 2024-06-17 PROCEDURE — T1013 SIGN LANG/ORAL INTERPRETER: HCPCS | Mod: GT

## 2024-06-17 PROCEDURE — 93970 EXTREMITY STUDY: CPT

## 2024-06-26 NOTE — PROGRESS NOTES
2nd Attempt: Signed Letter not received from [facility], resent per process.    Mariza Parr  Case Management Specialist  [facility]  [phone]

## 2024-07-16 ENCOUNTER — PATIENT OUTREACH (OUTPATIENT)
Dept: GERIATRIC MEDICINE | Facility: CLINIC | Age: 84
End: 2024-07-16
Payer: COMMERCIAL

## 2024-07-16 NOTE — PROGRESS NOTES
[facility] Care Coordination Contact    Received a voice mail message from [facility], Josselin, and she shared that member has agreed to do a trial of adult day care. Josselin shared that she will set up a day next week and will keep this Care Coordinator posted on how member does and if he wants to continue to attend.  ANASTACIA Velázquez  [facility]  [phone]

## 2024-07-19 ENCOUNTER — PATIENT OUTREACH (OUTPATIENT)
Dept: GERIATRIC MEDICINE | Facility: CLINIC | Age: 84
End: 2024-07-19
Payer: COMMERCIAL

## 2024-07-19 NOTE — PROGRESS NOTES
[facility] Care Coordination Contact    Received an email from [facility] and was informed that member will do a trial day on 7/23/24 for adult day care. Josselin will let this Care Coordinator know if member chooses to continue with weekly adult day care and if he does when to start the authorization for 1 day per week of adult day care.  ANASTACIA Velázquez  [facility]  [phone]

## 2024-07-29 ENCOUNTER — PATIENT OUTREACH (OUTPATIENT)
Dept: GERIATRIC MEDICINE | Facility: CLINIC | Age: 84
End: 2024-07-29
Payer: COMMERCIAL

## 2024-07-29 NOTE — PROGRESS NOTES
[facility] Care Coordination Contact    Received after visit chart from care coordinator.  Completed following tasks: Submitted referrals/auths for ADC and Updated services in Database   and Provider Signature - Full Care Plan:  Member indicates that they would like their POC shared with the following EW providers:  [facility].  Letter and full POC faxed to providers for signature.    Berta Mcfarland  Care Management Specialist  [facility]  [phone]

## 2024-07-29 NOTE — PROGRESS NOTES
[facility] Care Coordination Contact    Received an email from Josselin at [facility] adult day care. In her email she shared that member did attend adult day care on 7/23/24 and did enjoy going and would to continue at two days per week.   Update the support plan to include adult day care with [facility] adult day care for 2 days per week along with two RT rides per week. With a start date of 7/23/24-5/31/25.  Emailed Josselin and son, Brandan, back with notification that the authorizations will be completed to and to reach out if there are questions.  ANASTACIA Velázquez  [facility]  [phone]

## 2024-08-02 DIAGNOSIS — Z79.4 TYPE 2 DIABETES MELLITUS WITHOUT COMPLICATION, WITH LONG-TERM CURRENT USE OF INSULIN (H): ICD-10-CM

## 2024-08-02 DIAGNOSIS — E11.9 TYPE 2 DIABETES MELLITUS WITHOUT COMPLICATION, WITH LONG-TERM CURRENT USE OF INSULIN (H): ICD-10-CM

## 2024-08-02 DIAGNOSIS — R60.9 EDEMA, UNSPECIFIED TYPE: ICD-10-CM

## 2024-08-02 DIAGNOSIS — L30.9 DERMATITIS: ICD-10-CM

## 2024-08-05 RX ORDER — PEN NEEDLE, DIABETIC 32GX 5/32"
NEEDLE, DISPOSABLE MISCELLANEOUS
Qty: 300 EACH | Refills: 0 | Status: SHIPPED | OUTPATIENT
Start: 2024-08-05

## 2024-08-05 RX ORDER — POTASSIUM CHLORIDE 750 MG/1
10 TABLET, EXTENDED RELEASE ORAL DAILY
Qty: 90 TABLET | Refills: 2 | Status: SHIPPED | OUTPATIENT
Start: 2024-08-05

## 2024-08-05 RX ORDER — BLOOD SUGAR DIAGNOSTIC
STRIP MISCELLANEOUS
Qty: 300 STRIP | Refills: 0 | Status: SHIPPED | OUTPATIENT
Start: 2024-08-05

## 2024-08-05 RX ORDER — FUROSEMIDE 20 MG
20 TABLET ORAL DAILY
Qty: 90 TABLET | Refills: 2 | Status: SHIPPED | OUTPATIENT
Start: 2024-08-05

## 2024-08-05 RX ORDER — LANCETS
EACH MISCELLANEOUS
Qty: 300 EACH | Refills: 0 | Status: SHIPPED | OUTPATIENT
Start: 2024-08-05

## 2024-08-05 RX ORDER — TRIAMCINOLONE ACETONIDE 1 MG/G
CREAM TOPICAL 2 TIMES DAILY
Qty: 60 G | Refills: 0 | Status: SHIPPED | OUTPATIENT
Start: 2024-08-05 | End: 2024-09-30

## 2024-08-08 NOTE — PROGRESS NOTES
No Letter Received: 60 day tracking of letter complete, no letter received from [facility]. Tracking discontinued.    Berta Mcfarland  Care Management Specialist  [facility]  [phone]

## 2024-08-29 NOTE — PROGRESS NOTES
2nd Attempt: Signed Letter not received from [facility], resent per process.    Berta Mcfarland  Care Management Specialist  [facility]  [phone]

## 2024-09-09 DIAGNOSIS — Z79.4 TYPE 2 DIABETES MELLITUS WITHOUT COMPLICATION, WITH LONG-TERM CURRENT USE OF INSULIN (H): ICD-10-CM

## 2024-09-09 DIAGNOSIS — E78.5 HYPERLIPIDEMIA LDL GOAL <100: ICD-10-CM

## 2024-09-09 DIAGNOSIS — E11.9 TYPE 2 DIABETES MELLITUS WITHOUT COMPLICATION, WITH LONG-TERM CURRENT USE OF INSULIN (H): ICD-10-CM

## 2024-09-09 RX ORDER — INSULIN LISPRO 100 [IU]/ML
14 INJECTION, SOLUTION INTRAVENOUS; SUBCUTANEOUS AT BEDTIME
Qty: 30 ML | Refills: 1 | Status: SHIPPED | OUTPATIENT
Start: 2024-09-09

## 2024-09-09 RX ORDER — ATORVASTATIN CALCIUM 40 MG/1
40 TABLET, FILM COATED ORAL DAILY
Qty: 90 TABLET | Refills: 1 | Status: SHIPPED | OUTPATIENT
Start: 2024-09-09

## 2024-09-30 DIAGNOSIS — R20.0 FACIAL NUMBNESS: ICD-10-CM

## 2024-09-30 DIAGNOSIS — R42 DIZZINESS: ICD-10-CM

## 2024-09-30 DIAGNOSIS — L30.9 DERMATITIS: ICD-10-CM

## 2024-09-30 RX ORDER — TRIAMCINOLONE ACETONIDE 1 MG/G
CREAM TOPICAL 2 TIMES DAILY
Qty: 60 G | Refills: 0 | Status: SHIPPED | OUTPATIENT
Start: 2024-09-30

## 2024-09-30 RX ORDER — ASPIRIN 325 MG
325 TABLET ORAL DAILY
Qty: 90 TABLET | Refills: 1 | Status: SHIPPED | OUTPATIENT
Start: 2024-09-30

## 2024-10-20 ENCOUNTER — HEALTH MAINTENANCE LETTER (OUTPATIENT)
Age: 84
End: 2024-10-20

## 2024-11-06 DIAGNOSIS — E11.9 TYPE 2 DIABETES MELLITUS WITHOUT COMPLICATION, WITH LONG-TERM CURRENT USE OF INSULIN (H): ICD-10-CM

## 2024-11-06 DIAGNOSIS — Z79.4 TYPE 2 DIABETES MELLITUS WITHOUT COMPLICATION, WITH LONG-TERM CURRENT USE OF INSULIN (H): ICD-10-CM

## 2024-11-06 RX ORDER — PEN NEEDLE, DIABETIC 32GX 5/32"
NEEDLE, DISPOSABLE MISCELLANEOUS
Qty: 300 EACH | Refills: 1 | Status: SHIPPED | OUTPATIENT
Start: 2024-11-06

## 2024-11-06 RX ORDER — BLOOD SUGAR DIAGNOSTIC
STRIP MISCELLANEOUS
Qty: 300 STRIP | Refills: 1 | Status: SHIPPED | OUTPATIENT
Start: 2024-11-06

## 2024-11-07 ENCOUNTER — TELEPHONE (OUTPATIENT)
Dept: INTERNAL MEDICINE | Facility: CLINIC | Age: 84
End: 2024-11-07
Payer: COMMERCIAL

## 2024-11-07 RX ORDER — INSULIN ASPART 100 [IU]/ML
INJECTION, SUSPENSION SUBCUTANEOUS
Qty: 60 ML | Refills: 0 | Status: SHIPPED | OUTPATIENT
Start: 2024-11-07

## 2024-11-07 NOTE — TELEPHONE ENCOUNTER
NOVOLOG MIX 70/30 FLEXPEN (70-30) 100 UNIT/ML susp  is not covered by patients insurance.  Do you want to send in for a PA or do you want to try a different insulin?

## 2024-12-19 DIAGNOSIS — L30.9 DERMATITIS: ICD-10-CM

## 2024-12-19 DIAGNOSIS — E11.9 TYPE 2 DIABETES MELLITUS WITHOUT COMPLICATION, WITH LONG-TERM CURRENT USE OF INSULIN (H): ICD-10-CM

## 2024-12-19 DIAGNOSIS — Z79.4 TYPE 2 DIABETES MELLITUS WITHOUT COMPLICATION, WITH LONG-TERM CURRENT USE OF INSULIN (H): ICD-10-CM

## 2024-12-19 DIAGNOSIS — N40.1 BENIGN PROSTATIC HYPERPLASIA WITH LOWER URINARY TRACT SYMPTOMS, SYMPTOM DETAILS UNSPECIFIED: ICD-10-CM

## 2024-12-19 RX ORDER — FINASTERIDE 5 MG/1
1 TABLET, FILM COATED ORAL DAILY
Qty: 90 TABLET | Refills: 0 | Status: SHIPPED | OUTPATIENT
Start: 2024-12-19

## 2024-12-19 RX ORDER — LANCETS 30 GAUGE
EACH MISCELLANEOUS
Qty: 300 EACH | Refills: 0 | Status: SHIPPED | OUTPATIENT
Start: 2024-12-19

## 2024-12-19 RX ORDER — LANCETS 30 GAUGE
EACH MISCELLANEOUS
Qty: 300 EACH | Refills: 0 | OUTPATIENT
Start: 2024-12-19

## 2024-12-19 RX ORDER — TRIAMCINOLONE ACETONIDE 1 MG/G
CREAM TOPICAL 2 TIMES DAILY
Qty: 80 G | Refills: 0 | OUTPATIENT
Start: 2024-12-19

## 2024-12-19 RX ORDER — TRIAMCINOLONE ACETONIDE 1 MG/G
CREAM TOPICAL 2 TIMES DAILY
Qty: 80 G | Refills: 0 | Status: SHIPPED | OUTPATIENT
Start: 2024-12-19

## 2024-12-19 RX ORDER — FINASTERIDE 5 MG/1
1 TABLET, FILM COATED ORAL DAILY
Qty: 90 TABLET | Refills: 0 | OUTPATIENT
Start: 2024-12-19

## 2025-02-10 ENCOUNTER — OFFICE VISIT (OUTPATIENT)
Dept: INTERNAL MEDICINE | Facility: CLINIC | Age: 85
End: 2025-02-10
Payer: COMMERCIAL

## 2025-02-10 VITALS
DIASTOLIC BLOOD PRESSURE: 67 MMHG | OXYGEN SATURATION: 97 % | TEMPERATURE: 98.6 F | WEIGHT: 176 LBS | HEIGHT: 68 IN | RESPIRATION RATE: 19 BRPM | HEART RATE: 66 BPM | BODY MASS INDEX: 26.67 KG/M2 | SYSTOLIC BLOOD PRESSURE: 160 MMHG

## 2025-02-10 DIAGNOSIS — J06.9 UPPER RESPIRATORY TRACT INFECTION, UNSPECIFIED TYPE: Primary | ICD-10-CM

## 2025-02-10 DIAGNOSIS — E11.9 TYPE 2 DIABETES MELLITUS WITHOUT COMPLICATION, WITH LONG-TERM CURRENT USE OF INSULIN (H): Primary | ICD-10-CM

## 2025-02-10 DIAGNOSIS — Z79.4 TYPE 2 DIABETES MELLITUS WITH DIABETIC MICROALBUMINURIA, WITH LONG-TERM CURRENT USE OF INSULIN (H): ICD-10-CM

## 2025-02-10 DIAGNOSIS — M48.062 SPINAL STENOSIS, LUMBAR REGION, WITH NEUROGENIC CLAUDICATION: ICD-10-CM

## 2025-02-10 DIAGNOSIS — Z79.4 TYPE 2 DIABETES MELLITUS WITHOUT COMPLICATION, WITH LONG-TERM CURRENT USE OF INSULIN (H): Primary | ICD-10-CM

## 2025-02-10 DIAGNOSIS — J06.9 UPPER RESPIRATORY TRACT INFECTION, UNSPECIFIED TYPE: ICD-10-CM

## 2025-02-10 DIAGNOSIS — E78.5 HYPERLIPIDEMIA LDL GOAL <100: ICD-10-CM

## 2025-02-10 DIAGNOSIS — E11.29 TYPE 2 DIABETES MELLITUS WITH DIABETIC MICROALBUMINURIA, WITH LONG-TERM CURRENT USE OF INSULIN (H): ICD-10-CM

## 2025-02-10 DIAGNOSIS — R80.9 TYPE 2 DIABETES MELLITUS WITH DIABETIC MICROALBUMINURIA, WITH LONG-TERM CURRENT USE OF INSULIN (H): ICD-10-CM

## 2025-02-10 LAB
ERYTHROCYTE [DISTWIDTH] IN BLOOD BY AUTOMATED COUNT: 12.2 % (ref 10–15)
EST. AVERAGE GLUCOSE BLD GHB EST-MCNC: 229 MG/DL
HBA1C MFR BLD: 9.6 % (ref 0–5.6)
HCT VFR BLD AUTO: 39.7 % (ref 40–53)
HGB BLD-MCNC: 13.2 G/DL (ref 13.3–17.7)
MCH RBC QN AUTO: 32.3 PG (ref 26.5–33)
MCHC RBC AUTO-ENTMCNC: 33.2 G/DL (ref 31.5–36.5)
MCV RBC AUTO: 97 FL (ref 78–100)
PLATELET # BLD AUTO: 220 10E3/UL (ref 150–450)
RBC # BLD AUTO: 4.09 10E6/UL (ref 4.4–5.9)
WBC # BLD AUTO: 7.1 10E3/UL (ref 4–11)

## 2025-02-10 PROCEDURE — T1013 SIGN LANG/ORAL INTERPRETER: HCPCS

## 2025-02-10 PROCEDURE — 80061 LIPID PANEL: CPT | Performed by: INTERNAL MEDICINE

## 2025-02-10 PROCEDURE — 3077F SYST BP >= 140 MM HG: CPT | Performed by: INTERNAL MEDICINE

## 2025-02-10 PROCEDURE — 82570 ASSAY OF URINE CREATININE: CPT | Performed by: INTERNAL MEDICINE

## 2025-02-10 PROCEDURE — 99214 OFFICE O/P EST MOD 30 MIN: CPT | Performed by: INTERNAL MEDICINE

## 2025-02-10 PROCEDURE — 83036 HEMOGLOBIN GLYCOSYLATED A1C: CPT | Performed by: INTERNAL MEDICINE

## 2025-02-10 PROCEDURE — 82043 UR ALBUMIN QUANTITATIVE: CPT | Performed by: INTERNAL MEDICINE

## 2025-02-10 PROCEDURE — 3078F DIAST BP <80 MM HG: CPT | Performed by: INTERNAL MEDICINE

## 2025-02-10 PROCEDURE — 36415 COLL VENOUS BLD VENIPUNCTURE: CPT | Performed by: INTERNAL MEDICINE

## 2025-02-10 PROCEDURE — 80053 COMPREHEN METABOLIC PANEL: CPT | Performed by: INTERNAL MEDICINE

## 2025-02-10 PROCEDURE — 85027 COMPLETE CBC AUTOMATED: CPT | Performed by: INTERNAL MEDICINE

## 2025-02-10 RX ORDER — ACYCLOVIR 800 MG/1
TABLET ORAL
Qty: 6 EACH | Refills: 1 | Status: SHIPPED | OUTPATIENT
Start: 2025-02-10

## 2025-02-10 RX ORDER — KETOROLAC TROMETHAMINE 30 MG/ML
1 INJECTION, SOLUTION INTRAMUSCULAR; INTRAVENOUS CONTINUOUS
Qty: 1 EACH | Refills: 1 | Status: SHIPPED | OUTPATIENT
Start: 2025-02-10

## 2025-02-10 RX ORDER — GUAIFENESIN 600 MG/1
1200 TABLET, EXTENDED RELEASE ORAL 2 TIMES DAILY PRN
Qty: 30 TABLET | Refills: 0 | Status: SHIPPED | OUTPATIENT
Start: 2025-02-10

## 2025-02-10 RX ORDER — INSULIN ASPART 100 [IU]/ML
INJECTION, SUSPENSION SUBCUTANEOUS
Status: SHIPPED
Start: 2025-02-10

## 2025-02-10 RX ORDER — LORATADINE 10 MG/1
10 TABLET ORAL DAILY
Qty: 30 TABLET | Refills: 1 | Status: SHIPPED | OUTPATIENT
Start: 2025-02-10

## 2025-02-10 RX ORDER — AZITHROMYCIN 250 MG/1
TABLET, FILM COATED ORAL
Qty: 6 TABLET | Refills: 0 | Status: SHIPPED | OUTPATIENT
Start: 2025-02-10 | End: 2025-02-15

## 2025-02-10 NOTE — PROGRESS NOTES
"  {PROVIDER CHARTING PREFERENCE:763085}    Subjective   Rabia is a 84 year old, presenting for the following health issues:  Diabetes  Pt has been having a cough and runny nose for the past 10 days, pt is not taking any otc medication for symptoms. Pt reports no fever with symptoms, pt state cough is productive cough and mucus is white in color that pt is coughing up    Pt is looking for a senor glucose monitor     History of Present Illness       Diabetes:   He presents for follow up of diabetes.  He is checking home blood glucose three times daily.   He checks blood glucose before meals.  Blood glucose is sometimes over 200 and never under 70. He is aware of hypoglycemia symptoms including shakiness and weakness.    He has no concerns regarding his diabetes at this time.  He is having numbness in feet and burning in feet.  The patient has had a diabetic eye exam in the last 12 months. Eye exam performed on 1/15/2024. Location of last eye exam [facility].        Hyperlipidemia:  He presents for follow up of hyperlipidemia.   He is taking medication to lower cholesterol. He is not having myalgia or other side effects to statin medications.    He eats 2-3 servings of fruits and vegetables daily.He consumes 1 sweetened beverage(s) daily.He exercises with enough effort to increase his heart rate 10 to 19 minutes per day.    He is taking medications regularly.       {MA/LPN/RN Pre-Provider Visit Orders- hCG/UA/Strep (Optional):172178}  {SUPERLIST (Optional):107715}  {additonal problems for provider to add (Optional):848287}    {ROS Picklists (Optional):767253}      Objective    BP (!) 160/67   Pulse 66   Temp 98.6  F (37  C) (Temporal)   Resp 19   Ht 1.727 m (5' 8\")   Wt 79.8 kg (176 lb)   SpO2 97%   BMI 26.76 kg/m    Body mass index is 26.76 kg/m .  Physical Exam   {Exam List (Optional):017091}    {Diagnostic Test Results (Optional):064458}        Signed Electronically by: Alfredo Aragon MD  {Email feedback " regarding this note to [email or web address]   :354826}   symptoms including shakiness and weakness.    He has no concerns regarding his diabetes at this time.  He is having numbness in feet and burning in feet.  The patient has had a diabetic eye exam in the last 12 months. Eye exam performed on 1/15/2024. Location of last eye exam [facility].        Hyperlipidemia:  He presents for follow up of hyperlipidemia.   He is taking medication to lower cholesterol. He is not having myalgia or other side effects to statin medications.    He eats 2-3 servings of fruits and vegetables daily.He consumes 1 sweetened beverage(s) daily.He exercises with enough effort to increase his heart rate 10 to 19 minutes per day.    He is taking medications regularly.       Most recent lab results reviewed with pt.      Component      Latest Ref Rng 4/19/2024  10:02 AM 4/19/2024  10:09 AM   Sodium      135 - 145 mmol/L 139     Potassium      3.4 - 5.3 mmol/L 4.0     Carbon Dioxide (CO2)      22 - 29 mmol/L 25     Anion Gap      7 - 15 mmol/L 11     Urea Nitrogen      8.0 - 23.0 mg/dL 20.1     Creatinine      0.67 - 1.17 mg/dL 0.84     GFR Estimate      >60 mL/min/1.73m2 85     Calcium      8.8 - 10.2 mg/dL 9.0     Chloride      98 - 107 mmol/L 103     Glucose      70 - 99 mg/dL 124 (H)     Alkaline Phosphatase      40 - 150 U/L 114     AST      0 - 45 U/L 32     ALT      0 - 70 U/L 43     Protein Total      6.4 - 8.3 g/dL 7.2     Albumin      3.5 - 5.2 g/dL 4.0     Bilirubin Total      <=1.2 mg/dL 0.6     WBC      4.0 - 11.0 10e3/uL 6.8     RBC Count      4.40 - 5.90 10e6/uL 4.23 (L)     Hemoglobin      13.3 - 17.7 g/dL 13.8     Hematocrit      40.0 - 53.0 % 42.2     MCV      78 - 100 fL 100     MCH      26.5 - 33.0 pg 32.6     MCHC      31.5 - 36.5 g/dL 32.7     RDW      10.0 - 15.0 % 12.2     Platelet Count      150 - 450 10e3/uL 210     Cholesterol      <200 mg/dL 159     Triglycerides      <150 mg/dL 72     HDL Cholesterol      >=40 mg/dL 80     LDL Cholesterol Calculated      <=100  "mg/dL 65     Non HDL Cholesterol      <130 mg/dL 79     Patient Fasting? Yes     Creatinine Urine      mg/dL  77.2    Albumin Urine mg/L      mg/L  82.2    Albumin Urine mg/g Cr      0.00 - 17.00 mg/g Cr  106.48 (H)    Hemoglobin A1C      0.0 - 5.6 % 9.4 (H)     TSH      0.30 - 4.20 uIU/mL 3.02     Cortisol Serum        ug/dL 8.1     N-Terminal Pro Bnp      0 - 1,800 pg/mL 128            Due to language barrier, a phone  was present during the history-taking, physical examnation and subsequent discussion with this patient.      URI sx for 10 days. Occ productive yellow/clear cough with clear rhinorrhea. No sinus pain. NO shortness of breath. Wife with same sx. No F/C.   Did not mike in glucometer. By pt memory, AM [id number]. After supper 190-200  Using 70/30 insulin 35 units AM nad 20 units supper plus humalog 8 units at bedtime  Using a stationary bike at home 30 min most days. Walking with a cane. 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 No note in chart  Chronic low back pain with some radiation into BLE and hips.  Followed by [facility].  Previous MRI through [facility] showed spinal stenosis at L3-5.  Underwent previous steroid injections of the lumbar spine with partial benefit but a year ago    Additional ROS:   Constitutional, HEENT, Cardiovascular, Pulmonary, GI and , Neuro, MSK and Psych review of systems/symptoms are otherwise negative or unchanged from previous, except as noted above.      OBJECTIVE:  BP (!) 142/64   Pulse 66   Temp 98.6  F (37  C) (Temporal)   Resp 19   Ht 1.727 m (5' 8\")   Wt 79.8 kg (176 lb)   SpO2 97%   BMI 26.76 kg/m     Estimated body mass index is 26.76 kg/m  as calculated from the following:    Height as of this encounter: 1.727 m (5' 8\").    Weight as of this encounter: 79.8 kg (176 lb).  Eye: PERRL, EOMI  HENT: ear canals and TM's normal and nose and mouth without ulcers or lesions.  Minimal tenderness palpation left maxillary " sinus.  No oropharyngeal erythema.  Dentures present  Neck: no adenopathy. Thyroid normal to palpation. No bruits  Pulm: Lungs clear to auscultation posterior.  Midline anterior rhonchi which clears with cough  CV: Regular rates and rhythm  GI: Soft, nontender, Normal active bowel sounds, No hepatosplenomegaly or masses palpable  Ext: Peripheral pulses intact. No edema.  Neuro: Normal strength and tone, sensory exam mildly reduced to light touch sensation feet bilaterally.  Slower but stable gait with a cane  Back: Tenderness to palpation bilateral paralumbar musculature.  Negative straight leg raise test bilaterally       (Chart documentation was completed, in part, with [employer] voice-recognition software. Even though reviewed, some grammatical, spelling, and word errors may remain.)    Alfredo Aragon MD  Internal Medicine Department  [facility]

## 2025-02-10 NOTE — PATIENT INSTRUCTIONS
Azithromycin 2 tablets today.  Then 1 tablet daily for 4 days for upper respiratory infection  Mucinex/guaifenesin 1 tablet twice a day as needed for productive cough  Loratadine 10 mg tablet, 1 tablet daily as needed for clear nasal drainage  Increase 70/30 insulin to use 37 units in the morning with breakfast and 20 units in the evening with dinner/supper.  Continue Humalog 8 units at bedtime  Schedule an appointment with [facility]  and Dr John Degroot to discuss possible repeat steroid injection in the low back for back pain radiating down into the left leg.  Call [phone] to schedule the appointment   Labs as ordered today  Continue other medications  Rx fpr Freele Lbre3 CGM  for blood sugar monitoring

## 2025-02-11 LAB
ALBUMIN SERPL BCG-MCNC: 3.8 G/DL (ref 3.5–5.2)
ALP SERPL-CCNC: 114 U/L (ref 40–150)
ALT SERPL W P-5'-P-CCNC: 30 U/L (ref 0–70)
ANION GAP SERPL CALCULATED.3IONS-SCNC: 11 MMOL/L (ref 7–15)
AST SERPL W P-5'-P-CCNC: 34 U/L (ref 0–45)
BILIRUB SERPL-MCNC: 0.4 MG/DL
BUN SERPL-MCNC: 21 MG/DL (ref 8–23)
CALCIUM SERPL-MCNC: 9.1 MG/DL (ref 8.8–10.4)
CHLORIDE SERPL-SCNC: 106 MMOL/L (ref 98–107)
CHOLEST SERPL-MCNC: 148 MG/DL
CREAT SERPL-MCNC: 1.02 MG/DL (ref 0.67–1.17)
CREAT UR-MCNC: 58.2 MG/DL
EGFRCR SERPLBLD CKD-EPI 2021: 72 ML/MIN/1.73M2
FASTING STATUS PATIENT QL REPORTED: NO
FASTING STATUS PATIENT QL REPORTED: NO
GLUCOSE SERPL-MCNC: 205 MG/DL (ref 70–99)
HCO3 SERPL-SCNC: 26 MMOL/L (ref 22–29)
HDLC SERPL-MCNC: 61 MG/DL
LDLC SERPL CALC-MCNC: 43 MG/DL
MICROALBUMIN UR-MCNC: 19.2 MG/L
MICROALBUMIN/CREAT UR: 32.99 MG/G CR (ref 0–17)
NONHDLC SERPL-MCNC: 87 MG/DL
POTASSIUM SERPL-SCNC: 4.2 MMOL/L (ref 3.4–5.3)
PROT SERPL-MCNC: 7.6 G/DL (ref 6.4–8.3)
SODIUM SERPL-SCNC: 143 MMOL/L (ref 135–145)
TRIGL SERPL-MCNC: 218 MG/DL

## 2025-02-28 DIAGNOSIS — R20.0 FACIAL NUMBNESS: ICD-10-CM

## 2025-02-28 DIAGNOSIS — E78.5 HYPERLIPIDEMIA LDL GOAL <100: ICD-10-CM

## 2025-02-28 DIAGNOSIS — N40.1 BENIGN PROSTATIC HYPERPLASIA WITH LOWER URINARY TRACT SYMPTOMS, SYMPTOM DETAILS UNSPECIFIED: ICD-10-CM

## 2025-02-28 DIAGNOSIS — R60.9 EDEMA, UNSPECIFIED TYPE: ICD-10-CM

## 2025-02-28 DIAGNOSIS — R42 DIZZINESS: ICD-10-CM

## 2025-03-02 VITALS
WEIGHT: 176 LBS | HEART RATE: 66 BPM | OXYGEN SATURATION: 97 % | TEMPERATURE: 98.6 F | DIASTOLIC BLOOD PRESSURE: 64 MMHG | BODY MASS INDEX: 26.67 KG/M2 | SYSTOLIC BLOOD PRESSURE: 142 MMHG | RESPIRATION RATE: 19 BRPM | HEIGHT: 68 IN

## 2025-03-02 PROBLEM — M48.062 SPINAL STENOSIS, LUMBAR REGION, WITH NEUROGENIC CLAUDICATION: Status: ACTIVE | Noted: 2025-03-02

## 2025-03-02 PROBLEM — Z79.4 TYPE 2 DIABETES MELLITUS WITH DIABETIC MICROALBUMINURIA, WITH LONG-TERM CURRENT USE OF INSULIN (H): Status: ACTIVE | Noted: 2025-03-02

## 2025-03-02 PROBLEM — R80.9 TYPE 2 DIABETES MELLITUS WITH DIABETIC MICROALBUMINURIA, WITH LONG-TERM CURRENT USE OF INSULIN (H): Status: ACTIVE | Noted: 2025-03-02

## 2025-03-02 PROBLEM — E11.29 TYPE 2 DIABETES MELLITUS WITH DIABETIC MICROALBUMINURIA, WITH LONG-TERM CURRENT USE OF INSULIN (H): Status: ACTIVE | Noted: 2025-03-02

## 2025-03-03 DIAGNOSIS — Z79.4 TYPE 2 DIABETES MELLITUS WITHOUT COMPLICATION, WITH LONG-TERM CURRENT USE OF INSULIN (H): ICD-10-CM

## 2025-03-03 DIAGNOSIS — E11.9 TYPE 2 DIABETES MELLITUS WITHOUT COMPLICATION, WITH LONG-TERM CURRENT USE OF INSULIN (H): ICD-10-CM

## 2025-03-03 RX ORDER — FUROSEMIDE 20 MG/1
20 TABLET ORAL DAILY
Qty: 90 TABLET | Refills: 0 | OUTPATIENT
Start: 2025-03-03

## 2025-03-03 RX ORDER — FINASTERIDE 5 MG/1
1 TABLET, FILM COATED ORAL DAILY
Qty: 90 TABLET | Refills: 2 | Status: SHIPPED | OUTPATIENT
Start: 2025-03-03

## 2025-03-03 RX ORDER — POTASSIUM CHLORIDE 750 MG/1
10 TABLET, EXTENDED RELEASE ORAL DAILY
Qty: 90 TABLET | Refills: 0 | OUTPATIENT
Start: 2025-03-03

## 2025-03-03 RX ORDER — LANCETS 30 GAUGE
EACH MISCELLANEOUS
Qty: 300 EACH | Refills: 0 | Status: SHIPPED | OUTPATIENT
Start: 2025-03-03

## 2025-03-03 RX ORDER — ATORVASTATIN CALCIUM 40 MG/1
40 TABLET, FILM COATED ORAL DAILY
Qty: 90 TABLET | Refills: 2 | Status: SHIPPED | OUTPATIENT
Start: 2025-03-03

## 2025-03-03 RX ORDER — ASPIRIN 325 MG
325 TABLET ORAL DAILY
Qty: 90 TABLET | Refills: 2 | Status: SHIPPED | OUTPATIENT
Start: 2025-03-03

## 2025-03-07 DIAGNOSIS — L30.9 DERMATITIS: ICD-10-CM

## 2025-03-07 DIAGNOSIS — Z79.4 TYPE 2 DIABETES MELLITUS WITHOUT COMPLICATION, WITH LONG-TERM CURRENT USE OF INSULIN (H): ICD-10-CM

## 2025-03-07 DIAGNOSIS — E11.9 TYPE 2 DIABETES MELLITUS WITHOUT COMPLICATION, WITH LONG-TERM CURRENT USE OF INSULIN (H): ICD-10-CM

## 2025-03-07 DIAGNOSIS — R60.9 EDEMA, UNSPECIFIED TYPE: ICD-10-CM

## 2025-03-10 RX ORDER — TRIAMCINOLONE ACETONIDE 1 MG/G
CREAM TOPICAL 2 TIMES DAILY
Qty: 80 G | Refills: 0 | Status: SHIPPED | OUTPATIENT
Start: 2025-03-10

## 2025-03-11 RX ORDER — POTASSIUM CHLORIDE 750 MG/1
10 TABLET, EXTENDED RELEASE ORAL DAILY
Qty: 90 TABLET | Refills: 1 | Status: SHIPPED | OUTPATIENT
Start: 2025-03-11

## 2025-03-11 RX ORDER — LANCETS 30 GAUGE
EACH MISCELLANEOUS
Qty: 300 EACH | Refills: 4 | Status: SHIPPED | OUTPATIENT
Start: 2025-03-11

## 2025-03-11 RX ORDER — FUROSEMIDE 20 MG/1
20 TABLET ORAL DAILY
Qty: 90 TABLET | Refills: 1 | Status: SHIPPED | OUTPATIENT
Start: 2025-03-11

## 2025-03-19 ENCOUNTER — TELEPHONE (OUTPATIENT)
Dept: INTERNAL MEDICINE | Facility: CLINIC | Age: 85
End: 2025-03-19

## 2025-03-19 NOTE — TELEPHONE ENCOUNTER
Gisele called to follow up on Gylerna Rx renewal request that was faxed to clinic on 3/10/25 to [phone]. This is not a new request, patient has been getting these since 2019.     This is a Glycerna drink form/Rx from [employer] that needs to be signed and dated.     She is refaxing to [phone].

## 2025-03-21 ENCOUNTER — MEDICAL CORRESPONDENCE (OUTPATIENT)
Dept: HEALTH INFORMATION MANAGEMENT | Facility: CLINIC | Age: 85
End: 2025-03-21
Payer: COMMERCIAL

## 2025-03-31 ENCOUNTER — PATIENT OUTREACH (OUTPATIENT)
Dept: GERIATRIC MEDICINE | Facility: CLINIC | Age: 85
End: 2025-03-31
Payer: COMMERCIAL

## 2025-03-31 NOTE — PROGRESS NOTES
[facility] Care Coordination Contact    Called adult son Brandan  to schedule annual HRA home visit. HRA has been scheduled for 4/17/25 at 1pm.  Called Zee James and scheduled an  for the home visit.  ANASTACIA Velázquez  [facility]  [phone]

## 2025-04-02 ENCOUNTER — TRANSFERRED RECORDS (OUTPATIENT)
Dept: HEALTH INFORMATION MANAGEMENT | Facility: CLINIC | Age: 85
End: 2025-04-02
Payer: COMMERCIAL

## 2025-04-02 LAB — RETINOPATHY: NEGATIVE

## 2025-04-13 ENCOUNTER — HEALTH MAINTENANCE LETTER (OUTPATIENT)
Age: 85
End: 2025-04-13

## 2025-04-17 ENCOUNTER — PATIENT OUTREACH (OUTPATIENT)
Dept: GERIATRIC MEDICINE | Facility: CLINIC | Age: 85
End: 2025-04-17
Payer: COMMERCIAL

## 2025-04-17 RX ORDER — ASPIRIN 81 MG
100 TABLET, DELAYED RELEASE (ENTERIC COATED) ORAL DAILY
COMMUNITY

## 2025-04-17 ASSESSMENT — LIFESTYLE VARIABLES
AUDIT-C TOTAL SCORE: 0
SKIP TO QUESTIONS 9-10: 1

## 2025-04-17 ASSESSMENT — PATIENT HEALTH QUESTIONNAIRE - PHQ9: SUM OF ALL RESPONSES TO PHQ QUESTIONS 1-9: 4

## 2025-04-17 NOTE — PROGRESS NOTES
[facility] Care Coordination Contact    [facility] Home Visit Assessment     Home visit for Health Risk Assessment with Rabia Reed completed on April 17, 2025    Type of residence:: Private home - stairs (Threshold steps into home)  Current living arrangement:: I live in a private home with family     Assessment completed with:: Patient, Care Team Member, Children, Other (Son: Brandan Reed ; : Freddie ; and FVP Trainee: Jimenez Harrison)    Current Care Plan  Member currently receiving the following home care services:  none  Member currently receiving the following community resources: PCA, Day Care    Health Issues: Member continues to report that he has frequent headaches and has pain in his left shoulder (which limits his range of motion). Reports that low back pain as well. In the past has reported that OTC pain medication does not help with the pain, but during this assessment reports that extra strength tylenol has been helpful, especially if he takes it in the morning. A1C has increased and son (who is an RN) shared that family has made the decision to allow member to more of what he likes. Noticed that there was a lot of ensure cans in the home and inquired if member is drinking the ensure or glucerna (which is recommended for diabetes). Son shared that member will drink both but prefers the ensure. Reminded member and son that the ensure is higher in sugar, and that glucerna is the preferred nutritional supplement for those with diabetes. Son shared that member continues to wear the freestyle yamile and this will send blood sugar readings to son's phone and has alerts when the sugars are too high or too low. No hospitalizations or ED visits in the past year.    Medication Review  Medication reconciliation completed in Epic: Yes  Medication set-up & administration: Family/informal caregiver sets up weekly.  Family caregiver administers medications.  Medication Risk Assessment Medication (1 or  more, place referral to MTM): N/A: No risk factors identified  MTM Referral Placed: No: No risk factors idenified    Mental/Behavioral Health   Depression Screening:   PHQ-9 Total Score: 4  Mental health DX:: No        Falls Assessment:   Fallen 2 or more times in the past year?: No   Any fall with injury in the past year?: No    ADL/IADL Dependencies:   Dependent ADLs:: Ambulation-walker, Bathing, Dressing, Grooming, Transfers, Toileting  Dependent IADLs:: Cleaning, Cooking, Laundry, Shopping, Meal Preparation, Medication Management, Money Management, Transportation    Health Plan sponsored benefits: [employer]: Shared information regarding [employer]. Reviewed preventative health screening and health plan supplemental benefits/incentives. Reviewed medication disposal form.    CFSS Assessment completed at visit: Yes Annual CFSS assessment indicated 3.5 hours per day of CFSS. This is the same as the previous assessment. Had discussion with member's son, Brandan, regarding the transition from PCA to CFSS. Brandan shared that he reviewed the email that this Care Coordinator had sent and he has already reached out to a CFSS Consultations Service agency, [facility], and would like to use this agency as the CFSS Consultation Services.     Elderly Waiver Eligibility: Yes-will continue on elderly waiver    Care Plan & Recommendations: Member wants to continue to live at his son, Brandan's home. He only wants his family to be his paid care givers. Wants to continue with nutritional supplement glucerna. Member hopes to be able to continue to return to going to adult day care when the weather is warmer. Will continue with authorization for 2 days per week.   Member rarely goes to adult day care and the current authorization is for 2 days with RT transportation to adult day care. Member would like to keep adult day care on his POC as he is hoping to start going more regularly. Will leave the adult day care at 2  days per week as member would like to keep his POC the same.    Son, Brandan, expressed that member would like to use [facility] for CFSS Consultation services. On 4/21/25, referral was placed to [facility] for CFSS Consultation.    See [place] detailed [place] information.    Follow-Up Plan: Member informed of future contact, plan to f/u with member with a 6 month telephone assessment.  Contact information shared with member and family, encouraged member to call with any questions or concerns at any time.    [facility] care continuum providers: Please see Snapshot and Care Management Flowsheets for Specific details of care plan.    This CC note routed to PCP, Alfredo Aragon, ANASTACIA  [facility]  [phone]

## 2025-04-17 NOTE — Clinical Note
Dr. Aragon. I am the [facility] Coordinator for Rabia. I was out to his home on the 17th for his annual assessment (Waiver and PCA assessment). No current concerns at this time. Family cares for him in the home. Please let me know if you have any questions. Thank you for your review. Charis.

## 2025-04-22 ENCOUNTER — PATIENT OUTREACH (OUTPATIENT)
Dept: GERIATRIC MEDICINE | Facility: CLINIC | Age: 85
End: 2025-04-22
Payer: COMMERCIAL

## 2025-04-22 NOTE — PROGRESS NOTES
[facility] Care Coordination Contact    Received after visit chart from care coordinator.  Completed following tasks: Mailed copy of support plan to member, Mailed [place] signature sheet pages 3-4, Mailed Safe Medication Disposal , Mailed [employer] Leave Behind Letter, Submitted referrals/auths for ADC, and Updated services in Database.  , Provider Signature - Full Support Plan:  Member indicates that they would like their support plan shared with the following EW providers:  [facility].  Letter and full support plan faxed to providers for signature.    , and [employer]:  Faxed completed CFSS assessment summary to CFSS Agency. Mailed member supplemental summary chart and assessment summary. Emailed referral form for auth to [employer].    Berta Mcfarland  Care Management Specialist  [facility]  [phone]

## 2025-04-22 NOTE — LETTER
April 22, 2025    PIYUSH BOND  [address] [address]      Gris Camarillo,    I hope you ve been well since we last spoke. Attached is your copy of your personalized Support Plan which summarizes our conversation.   My goal is to help you keep your health on track. Your Support Plan includes the steps we agreed would help you with that. We can talk about these steps during our next meeting or sooner if you d like.   Here are additional programs and services I can help you with:    Get to appointments with [facility]    If you need a ride to medical or dental visits, [facility] can help. Call to schedule a ride. [phone] ([phone]), 8 a.m. - 6 p.m. CT, Monday - Friday.    Access One-Pass to boost your health    Get a gym membership, at-home fitness classes, and free access to fun activities that help your brain. To get access, go to [employer] or call One-Pass.   [phone] ([phone]), 8 a.m. - 9 p.m. CT, Monday - Friday      Set up your health care directive  A directive is a legal form that explains your health care wishes. You can request this form from me, and I ll walk you through it before you discuss your plans with your doctor.    Schedule your annual physical  I can help set up your annual physical at your clinic of choice. It's an important step towards good health.      Have questions? I m here to help.  Call me at [phone] ([phone]) 8am - 5pm, Monday - Friday.  I ll reach out to you again in 6 months to follow up via telephone.  Warm regards,    ANASTACIA Velázquez    E-mail: [email or web address]  Phone: [phone]    Care Manager  [facility]    cc: member records                                                                    [id number]

## 2025-04-23 DIAGNOSIS — Z79.4 TYPE 2 DIABETES MELLITUS WITH DIABETIC MICROALBUMINURIA, WITH LONG-TERM CURRENT USE OF INSULIN (H): ICD-10-CM

## 2025-04-23 DIAGNOSIS — E11.29 TYPE 2 DIABETES MELLITUS WITH DIABETIC MICROALBUMINURIA, WITH LONG-TERM CURRENT USE OF INSULIN (H): ICD-10-CM

## 2025-04-23 DIAGNOSIS — R80.9 TYPE 2 DIABETES MELLITUS WITH DIABETIC MICROALBUMINURIA, WITH LONG-TERM CURRENT USE OF INSULIN (H): ICD-10-CM

## 2025-04-24 ENCOUNTER — MYC MEDICAL ADVICE (OUTPATIENT)
Dept: INTERNAL MEDICINE | Facility: CLINIC | Age: 85
End: 2025-04-24
Payer: COMMERCIAL

## 2025-04-24 NOTE — TELEPHONE ENCOUNTER
Clinic RN: Please investigate patient's chart or contact patient if the information cannot be found because  The dose is different from what is requested vs. What is on the med list.

## 2025-04-25 NOTE — TELEPHONE ENCOUNTER
Triage Patient Outreach    Attempt # 1    Was call answered?  No.  Left voicemail to return call to Triage at [facility] (Phone number is son Brandan's number)    Santa Encinas RN

## 2025-04-28 DIAGNOSIS — Z79.4 TYPE 2 DIABETES MELLITUS WITHOUT COMPLICATION, WITH LONG-TERM CURRENT USE OF INSULIN (H): ICD-10-CM

## 2025-04-28 DIAGNOSIS — L30.9 DERMATITIS: ICD-10-CM

## 2025-04-28 DIAGNOSIS — I10 ESSENTIAL HYPERTENSION, BENIGN: ICD-10-CM

## 2025-04-28 DIAGNOSIS — E11.9 TYPE 2 DIABETES MELLITUS WITHOUT COMPLICATION, WITH LONG-TERM CURRENT USE OF INSULIN (H): ICD-10-CM

## 2025-04-28 RX ORDER — LOSARTAN POTASSIUM 50 MG/1
50 TABLET ORAL DAILY
Qty: 90 TABLET | Refills: 3 | Status: SHIPPED | OUTPATIENT
Start: 2025-04-28

## 2025-04-28 RX ORDER — TRIAMCINOLONE ACETONIDE 1 MG/G
CREAM TOPICAL 2 TIMES DAILY
Qty: 80 G | Refills: 1 | Status: SHIPPED | OUTPATIENT
Start: 2025-04-28

## 2025-04-28 RX ORDER — PEN NEEDLE, DIABETIC 32GX 5/32"
NEEDLE, DISPOSABLE MISCELLANEOUS
Qty: 300 EACH | Refills: 2 | Status: SHIPPED | OUTPATIENT
Start: 2025-04-28

## 2025-04-28 NOTE — TELEPHONE ENCOUNTER
Called patient and reached patient's son (C2C on file). Son stated that patient is taking 35 units in the morning and 25 units in the evening. Routing to provider to review and advise.     Mae PALACIO RN  [facility] Triage Team

## 2025-04-30 RX ORDER — INSULIN ASPART 100 [IU]/ML
INJECTION, SUSPENSION SUBCUTANEOUS
Qty: 60 ML | Refills: 3 | Status: SHIPPED | OUTPATIENT
Start: 2025-04-30

## 2025-06-11 ENCOUNTER — PATIENT OUTREACH (OUTPATIENT)
Dept: CARE COORDINATION | Facility: CLINIC | Age: 85
End: 2025-06-11
Payer: COMMERCIAL

## 2025-06-27 DIAGNOSIS — R80.9 TYPE 2 DIABETES MELLITUS WITH DIABETIC MICROALBUMINURIA, WITH LONG-TERM CURRENT USE OF INSULIN (H): ICD-10-CM

## 2025-06-27 DIAGNOSIS — Z79.4 TYPE 2 DIABETES MELLITUS WITH DIABETIC MICROALBUMINURIA, WITH LONG-TERM CURRENT USE OF INSULIN (H): ICD-10-CM

## 2025-06-27 DIAGNOSIS — E11.29 TYPE 2 DIABETES MELLITUS WITH DIABETIC MICROALBUMINURIA, WITH LONG-TERM CURRENT USE OF INSULIN (H): ICD-10-CM

## 2025-06-30 DIAGNOSIS — R35.0 URINE FREQUENCY: ICD-10-CM

## 2025-06-30 DIAGNOSIS — J06.9 UPPER RESPIRATORY TRACT INFECTION, UNSPECIFIED TYPE: ICD-10-CM

## 2025-06-30 RX ORDER — ACYCLOVIR 800 MG/1
TABLET ORAL
Qty: 6 EACH | Refills: 1 | Status: SHIPPED | OUTPATIENT
Start: 2025-06-30

## 2025-07-01 DIAGNOSIS — E11.9 TYPE 2 DIABETES MELLITUS WITHOUT COMPLICATION, WITH LONG-TERM CURRENT USE OF INSULIN (H): ICD-10-CM

## 2025-07-01 DIAGNOSIS — L30.9 DERMATITIS: ICD-10-CM

## 2025-07-01 DIAGNOSIS — Z79.4 TYPE 2 DIABETES MELLITUS WITHOUT COMPLICATION, WITH LONG-TERM CURRENT USE OF INSULIN (H): ICD-10-CM

## 2025-07-01 RX ORDER — LORATADINE 10 MG/1
TABLET ORAL
Qty: 90 TABLET | Refills: 3 | Status: SHIPPED | OUTPATIENT
Start: 2025-07-01

## 2025-07-01 RX ORDER — TAMSULOSIN HYDROCHLORIDE 0.4 MG/1
0.8 CAPSULE ORAL DAILY
Qty: 180 CAPSULE | Refills: 3 | Status: SHIPPED | OUTPATIENT
Start: 2025-07-01

## 2025-07-02 RX ORDER — TRIAMCINOLONE ACETONIDE 1 MG/G
CREAM TOPICAL 2 TIMES DAILY
Qty: 80 G | Refills: 0 | Status: SHIPPED | OUTPATIENT
Start: 2025-07-02

## 2025-07-02 RX ORDER — PEN NEEDLE, DIABETIC 31 GX5/16"
NEEDLE, DISPOSABLE MISCELLANEOUS
Qty: 300 EACH | Refills: 0 | OUTPATIENT
Start: 2025-07-02

## 2025-07-15 ENCOUNTER — TELEPHONE (OUTPATIENT)
Dept: PHARMACY | Facility: OTHER | Age: 85
End: 2025-07-15
Payer: COMMERCIAL

## 2025-07-15 NOTE — TELEPHONE ENCOUNTER
MTM Recruitment: [employer]     Referral outreach attempt #1 on July 15, 2025      Outcome: left voicemail- Call back number [phone] via     Santa Kirkland PharmD BCPS  Medication Therapy Management Practitioner  Pharmacist

## 2025-07-21 ENCOUNTER — ANCILLARY PROCEDURE (OUTPATIENT)
Dept: GENERAL RADIOLOGY | Facility: CLINIC | Age: 85
End: 2025-07-21
Attending: INTERNAL MEDICINE
Payer: COMMERCIAL

## 2025-07-21 ENCOUNTER — OFFICE VISIT (OUTPATIENT)
Dept: INTERNAL MEDICINE | Facility: CLINIC | Age: 85
End: 2025-07-21
Payer: COMMERCIAL

## 2025-07-21 VITALS
WEIGHT: 181.2 LBS | OXYGEN SATURATION: 97 % | HEIGHT: 68 IN | DIASTOLIC BLOOD PRESSURE: 68 MMHG | HEART RATE: 82 BPM | TEMPERATURE: 97.1 F | SYSTOLIC BLOOD PRESSURE: 132 MMHG | RESPIRATION RATE: 17 BRPM | BODY MASS INDEX: 27.46 KG/M2

## 2025-07-21 DIAGNOSIS — R53.83 OTHER FATIGUE: ICD-10-CM

## 2025-07-21 DIAGNOSIS — N40.1 BENIGN PROSTATIC HYPERPLASIA WITH WEAK URINARY STREAM: ICD-10-CM

## 2025-07-21 DIAGNOSIS — M25.512 ACUTE PAIN OF LEFT SHOULDER: ICD-10-CM

## 2025-07-21 DIAGNOSIS — R39.12 BENIGN PROSTATIC HYPERPLASIA WITH WEAK URINARY STREAM: ICD-10-CM

## 2025-07-21 DIAGNOSIS — Z00.00 MEDICARE ANNUAL WELLNESS VISIT, SUBSEQUENT: ICD-10-CM

## 2025-07-21 DIAGNOSIS — E11.29 TYPE 2 DIABETES MELLITUS WITH DIABETIC MICROALBUMINURIA, WITH LONG-TERM CURRENT USE OF INSULIN (H): Primary | ICD-10-CM

## 2025-07-21 DIAGNOSIS — Z79.4 TYPE 2 DIABETES MELLITUS WITH DIABETIC MICROALBUMINURIA, WITH LONG-TERM CURRENT USE OF INSULIN (H): Primary | ICD-10-CM

## 2025-07-21 DIAGNOSIS — D64.9 ANEMIA, UNSPECIFIED TYPE: ICD-10-CM

## 2025-07-21 DIAGNOSIS — R80.9 PROTEINURIA, UNSPECIFIED TYPE: ICD-10-CM

## 2025-07-21 DIAGNOSIS — R80.9 TYPE 2 DIABETES MELLITUS WITH DIABETIC MICROALBUMINURIA, WITH LONG-TERM CURRENT USE OF INSULIN (H): Primary | ICD-10-CM

## 2025-07-21 DIAGNOSIS — I10 ESSENTIAL HYPERTENSION, BENIGN: ICD-10-CM

## 2025-07-21 DIAGNOSIS — G47.62 NOCTURNAL LEG CRAMPS: ICD-10-CM

## 2025-07-21 LAB
ALBUMIN SERPL BCG-MCNC: 4.2 G/DL (ref 3.5–5.2)
ALP SERPL-CCNC: 120 U/L (ref 40–150)
ALT SERPL W P-5'-P-CCNC: 37 U/L (ref 0–70)
ANION GAP SERPL CALCULATED.3IONS-SCNC: 11 MMOL/L (ref 7–15)
AST SERPL W P-5'-P-CCNC: 34 U/L (ref 0–45)
BILIRUB SERPL-MCNC: 0.4 MG/DL
BUN SERPL-MCNC: 30.7 MG/DL (ref 8–23)
CALCIUM SERPL-MCNC: 9.5 MG/DL (ref 8.8–10.4)
CHLORIDE SERPL-SCNC: 103 MMOL/L (ref 98–107)
CORTIS SERPL-MCNC: 8.6 UG/DL
CREAT SERPL-MCNC: 0.98 MG/DL (ref 0.67–1.17)
CREAT UR-MCNC: 44 MG/DL
EGFRCR SERPLBLD CKD-EPI 2021: 75 ML/MIN/1.73M2
EST. AVERAGE GLUCOSE BLD GHB EST-MCNC: 217 MG/DL
GLUCOSE SERPL-MCNC: 206 MG/DL (ref 70–99)
HBA1C MFR BLD: 9.2 % (ref 0–5.6)
HCO3 SERPL-SCNC: 24 MMOL/L (ref 22–29)
HGB BLD-MCNC: 12.4 G/DL (ref 13.3–17.7)
IRON BINDING CAPACITY (ROCHE): 249 UG/DL (ref 240–430)
IRON SATN MFR SERPL: 47 % (ref 15–46)
IRON SERPL-MCNC: 117 UG/DL (ref 61–157)
MCV RBC AUTO: 96 FL (ref 78–100)
MICROALBUMIN UR-MCNC: <12 MG/L
MICROALBUMIN/CREAT UR: NORMAL MG/G{CREAT}
POTASSIUM SERPL-SCNC: 4.4 MMOL/L (ref 3.4–5.3)
PROT SERPL-MCNC: 7.7 G/DL (ref 6.4–8.3)
SODIUM SERPL-SCNC: 138 MMOL/L (ref 135–145)
TSH SERPL DL<=0.005 MIU/L-ACNC: 1.66 UIU/ML (ref 0.3–4.2)
VIT B12 SERPL-MCNC: 626 PG/ML (ref 232–1245)

## 2025-07-21 PROCEDURE — 82043 UR ALBUMIN QUANTITATIVE: CPT | Performed by: INTERNAL MEDICINE

## 2025-07-21 PROCEDURE — 83550 IRON BINDING TEST: CPT | Performed by: INTERNAL MEDICINE

## 2025-07-21 PROCEDURE — 73030 X-RAY EXAM OF SHOULDER: CPT | Mod: TC | Performed by: RADIOLOGY

## 2025-07-21 PROCEDURE — 99214 OFFICE O/P EST MOD 30 MIN: CPT | Mod: 25 | Performed by: INTERNAL MEDICINE

## 2025-07-21 PROCEDURE — 83540 ASSAY OF IRON: CPT | Performed by: INTERNAL MEDICINE

## 2025-07-21 PROCEDURE — 83036 HEMOGLOBIN GLYCOSYLATED A1C: CPT | Performed by: INTERNAL MEDICINE

## 2025-07-21 PROCEDURE — 84443 ASSAY THYROID STIM HORMONE: CPT | Performed by: INTERNAL MEDICINE

## 2025-07-21 PROCEDURE — 82533 TOTAL CORTISOL: CPT | Performed by: INTERNAL MEDICINE

## 2025-07-21 PROCEDURE — G0439 PPPS, SUBSEQ VISIT: HCPCS | Performed by: INTERNAL MEDICINE

## 2025-07-21 PROCEDURE — 36415 COLL VENOUS BLD VENIPUNCTURE: CPT | Performed by: INTERNAL MEDICINE

## 2025-07-21 PROCEDURE — 82570 ASSAY OF URINE CREATININE: CPT | Performed by: INTERNAL MEDICINE

## 2025-07-21 PROCEDURE — 3075F SYST BP GE 130 - 139MM HG: CPT | Performed by: INTERNAL MEDICINE

## 2025-07-21 PROCEDURE — 85018 HEMOGLOBIN: CPT | Performed by: INTERNAL MEDICINE

## 2025-07-21 PROCEDURE — 3078F DIAST BP <80 MM HG: CPT | Performed by: INTERNAL MEDICINE

## 2025-07-21 PROCEDURE — 80053 COMPREHEN METABOLIC PANEL: CPT | Performed by: INTERNAL MEDICINE

## 2025-07-21 PROCEDURE — 82607 VITAMIN B-12: CPT | Performed by: INTERNAL MEDICINE

## 2025-07-21 RX ORDER — PRAMIPEXOLE DIHYDROCHLORIDE 0.12 MG/1
TABLET ORAL
Qty: 60 TABLET | Refills: 11 | Status: SHIPPED | OUTPATIENT
Start: 2025-07-21

## 2025-07-21 SDOH — HEALTH STABILITY: PHYSICAL HEALTH: ON AVERAGE, HOW MANY DAYS PER WEEK DO YOU ENGAGE IN MODERATE TO STRENUOUS EXERCISE (LIKE A BRISK WALK)?: 5 DAYS

## 2025-07-21 SDOH — HEALTH STABILITY: PHYSICAL HEALTH: ON AVERAGE, HOW MANY MINUTES DO YOU ENGAGE IN EXERCISE AT THIS LEVEL?: 30 MIN

## 2025-07-21 ASSESSMENT — SOCIAL DETERMINANTS OF HEALTH (SDOH): HOW OFTEN DO YOU GET TOGETHER WITH FRIENDS OR RELATIVES?: TWICE A WEEK

## 2025-07-21 NOTE — PATIENT INSTRUCTIONS
Labs as ordered  Try taking Pramipexole 0.125mg tab, 1 tab at bedtime to see if helps leg cramps. If still having cramps in 5 days, then increase to 2 tabs at bedtime for leg cramps.  If cramps better, then try reducing the amount of fluid intake consumed  Bring the continuous glucose monitor  I would recommend  a  Td vaccine  (tetanus risk reduction) and RSV vaccine (respiratory syncytial virus risk reduction). Get at a pharmacy.  If willing, would also recommend a covod booster and flu vaccination in later October   Xray left shoulder  Referral to [facility] for shoulder. Our [facility]  team will contact you via phone, text, email or MyChart within 2 business days to help you schedule your appointment, or you may contact the  Team at [phone].   Continue other  medications for now  Pt was informed regarding extra E&M billing for management of new or established medical issues not related to today's wellness/screening visit

## 2025-07-21 NOTE — PROGRESS NOTES
Preventive Care Visit  [facility]  Alfredo Aragon MD, Internal Medicine  Jul 21, 2025  {Provider  Link to SmartSet :568487}    {PROVIDER CHARTING PREFERENCE:937050}    Elma Camarillo is a 85 year old, presenting for the following:  Wellness Visit         HPI  ***   {MA/LPN/RN Pre-Provider Visit Orders- hCG/UA/Strep (Optional):121378}  {SUPERLIST (Optional):787106}  {additonal problems for provider to add (Optional):145265}  Advance Care Planning  {The storyboard will display whether the patient has ACP docs on file. Hover over the Code section in the storyboard to access the ACP documents. :236928}  {(AWV REQUIRED) Advance Care Planning Reviewed:713109}        7/21/2025   General Health   How would you rate your overall physical health? (!) FAIR   Feel stress (tense, anxious, or unable to sleep) Only a little   (!) STRESS CONCERN      7/21/2025   Nutrition   Diet: Low fat/cholesterol    Diabetic       Multiple values from one day are sorted in reverse-chronological order         7/21/2025   Exercise   Days per week of moderate/strenous exercise 5 days   Average minutes spent exercising at this level 30 min         7/21/2025   Social Factors   Frequency of gathering with friends or relatives Twice a week   Worry food won't last until get money to buy more No   Food not last or not have enough money for food? No   Do you have housing? (Housing is defined as stable permanent housing and does not include staying outside in a car, in a tent, in an abandoned building, in an overnight shelter, or couch-surfing.) Yes   Are you worried about losing your housing? No   Lack of transportation? No   Unable to get utilities (heat,electricity)? No         7/21/2025   Fall Risk   Fallen 2 or more times in the past year? No   Trouble with walking or balance? Yes   Gait Speed Test (Document in seconds) 6.12   Gait Speed Test Interpretation Greater than 5.01 seconds - ABNORMAL          7/21/2025   Activities of  Daily Living- Home Safety   Needs help with the following daily activites Transportation    Shopping    Preparing meals    Housework    Bathing    Laundry    Medication administration    Dressing   Do you have the help that you need? Yes for Some   Safety concerns in the home None of the above       Multiple values from one day are sorted in reverse-chronological order         7/21/2025   Dental   Dentist two times every year? (!) NO         7/21/2025   Hearing Screening   Hearing concerns? (!) I NEED TO ASK PEOPLE TO SPEAK UP OR REPEAT THEMSELVES.    (!) IT'S HARD TO FOLLOW A CONVERSATION IN A NOISY RESTAURANT OR CROWDED ROOM.    (!) TROUBLE UNDERSTANDING SOFT OR WHISPERED SPEECH.   Would you like a referral for hearing testing? No       Multiple values from one day are sorted in reverse-chronological order         7/21/2025   Driving Risk Screening   Patient/family members have concerns about driving No         7/21/2025   General Alertness/Fatigue Screening   Have you been more tired than usual lately? No         7/21/2025   Urinary Incontinence Screening   Bothered by leaking urine in past 6 months No         Today's PHQ-2 Score:       7/21/2025     2:28 AM   PHQ-2 ( 1999 [employer])   Q1: Little interest or pleasure in doing things 0   Q2: Feeling down, depressed or hopeless 1   PHQ-2 Score 1    Q1: Little interest or pleasure in doing things Not at all   Q2: Feeling down, depressed or hopeless Several days   PHQ-2 Score 1       Patient-reported           7/21/2025   Substance Use   Alcohol more than 3/day or more than 7/wk Not Applicable   Do you have a current opioid prescription? No   How severe/bad is pain from 1 to 10? 5/10   Do you use any other substances recreationally? No     Social History     Tobacco Use    Smoking status: Former     Current packs/day: 0.00     Average packs/day: 0.5 packs/day for 42.0 years (21.0 ttl pk-yrs)     Types: Cigarettes     Start date: 1/17/1959     Quit date: 1/17/2001      Years since quittin.5    Smokeless tobacco: Never   Vaping Use    Vaping status: Never Used   Substance Use Topics    Alcohol use: No    Drug use: No     {Provider  If there are gaps in the social history shown above, please follow the link to update and then refresh the note Link to Social and Substance History :035964}    {Provider  REQUIRED FOR AWV Use the storyboard to review patient history, after sections have been marked as reviewed, refresh note to capture documentation:839934}  {Provider   REQUIRED AWV use this link to review and update sexual activity history  after section has been marked as reviewed, refresh note to capture documentation:840651}  Reviewed and updated as needed this visit by Provider                    {HISTORY OPTIONS (Optional):334286}  Current providers sharing in care for this patient include:  Patient Care Team:  Alfredo Aragon MD as PCP - General  Charis Garcia LSW as Lead Care Coordinator  Alfredo Aragon MD as Assigned PCP  Nany Heart MD as MD (Ophthalmology)  John Degroot Andrew H, MD as Assigned Surgical Provider  No Ref-Primary, Physician    The following health maintenance items are reviewed in Epic and correct as of today:  Health Maintenance   Topic Date Due    ZOSTER VACCINE (2 of 3) 2010    RSV VACCINE (1 - 1-dose 75+ series) Never done    DIABETIC FOOT EXAM  2022    ANNUAL REVIEW OF HM ORDERS  2022    DTAP/TDAP/TD VACCINE (2 - Td or Tdap) 2022    MEDICARE ANNUAL WELLNESS VISIT  2024    COVID-19 VACCINE (2024- season) 2024    Medicare Annual MTM Pharmacist Visit (once per calendar year)  Never done    A1C  08/10/2025    INFLUENZA VACCINE (1) 2025    ALT  02/10/2026    BMP  02/10/2026    LIPID  02/10/2026    MICROALBUMIN  02/10/2026    EYE EXAM  2026    FALL RISK ASSESSMENT  2026    ADVANCE CARE PLANNING  2028    PHQ-2 (once per calendar year)  Completed    PNEUMOCOCCAL VACCINE  "50+ YEARS  Completed    HPV VACCINE  Aged Out    MENINGITIS VACCINE  Aged Out       {ROS Picklists (Optional):822657}     Objective    Exam  /68   Pulse 82   Temp 97.1  F (36.2  C) (Temporal)   Resp 17   Ht 1.727 m (5' 8\")   Wt 82.2 kg (181 lb 3.2 oz)   SpO2 97%   BMI 27.55 kg/m     Estimated body mass index is 27.55 kg/m  as calculated from the following:    Height as of this encounter: 1.727 m (5' 8\").    Weight as of this encounter: 82.2 kg (181 lb 3.2 oz).    Physical Exam  {Exam Choices (Optional):098425}  Gait and balance assessed per Gait Speed Test.  Result as above.  {Provider  The Mini-Cog is incomplete, use link to complete and refresh note Link to Mini-Cog :426182}      7/21/2025   Mini Cog   Mini-Cog Not Completed (choose reason) Language barrier and no  present     {A Mini-Cog total score of 0-2 suggests the possibility of dementia, score of 3-5 suggests no dementia:162754}         Signed Electronically by: Alfredo Aragon MD  {Email feedback regarding this note to [email or web address]   :491580}  " Screening   Have you been more tired than usual lately? No         2025   Urinary Incontinence Screening   Bothered by leaking urine in past 6 months No         Today's PHQ-2 Score:       2025     2:28 AM   PHQ-2 (  [employer])   Q1: Little interest or pleasure in doing things 0   Q2: Feeling down, depressed or hopeless 1   PHQ-2 Score 1    Q1: Little interest or pleasure in doing things Not at all   Q2: Feeling down, depressed or hopeless Several days   PHQ-2 Score 1       Patient-reported           2025   Substance Use   Alcohol more than 3/day or more than 7/wk Not Applicable   Do you have a current opioid prescription? No   How severe/bad is pain from 1 to 10? 5/10   Do you use any other substances recreationally? No     Social History     Tobacco Use    Smoking status: Former     Current packs/day: 0.00     Average packs/day: 0.5 packs/day for 42.0 years (21.0 ttl pk-yrs)     Types: Cigarettes     Start date: 1959     Quit date: 2001     Years since quittin.5    Smokeless tobacco: Never   Vaping Use    Vaping status: Never Used   Substance Use Topics    Alcohol use: No    Drug use: No      Pt's past medical history, family history, habits, medications and allergies were reviewed with the patient today.   Most recent lab results reviewed with pt. Problem list and histories reviewed & adjusted, as indicated.    Screening questionnaire responses above  regarding general health status, nutrition, exercise, social engagement, fall risk, ADL home safety, dental concerns, hearing concerns, driving concerns, alertness, incontinence concerns, mental health, substance concerns were reviewed with the patient today. Cognitive status  was also reviewed as below. Weight/BMI status reviewed.  Preventative Healthcare counseling provided to pt as applicable based on positive pt responses/concerns/results of above screening questionnaire.  See pt instructions for additional recommendations.   Otherwise reinforced continuation of good health care maintenance as above.  Also reviewed immunization guidelines,   eye screening for vision/glaucoma at last every other year, routine cancer screenings including annual skin cancer check with myself or dermatology, colon cancer screening for everyone until age 75 and then individualized to age 80, prostate cancer screening in men until age 75   See plan discussion above re: healthcare maintenance recommendations         Current providers sharing in care for this patient include:  Patient Care Team:  Alfredo Aragon MD as PCP - General  Charis Garcia LSW as Lead Care Coordinator  Alfredo Aragon MD as Assigned PCP  Nany Heart MD as MD (Ophthalmology)  John Degroot Andrew H, MD as Assigned Surgical Provider  No Ref-Primary, Physician    The following health maintenance items are reviewed in Epic and correct as of today:  Health Maintenance   Topic Date Due    ZOSTER VACCINE (2 of 3) 03/11/2010    RSV VACCINE (1 - 1-dose 75+ series) Never done    DIABETIC FOOT EXAM  07/30/2022    ANNUAL REVIEW OF HM ORDERS  07/30/2022    DTAP/TDAP/TD VACCINE (2 - Td or Tdap) 08/24/2022    MEDICARE ANNUAL WELLNESS VISIT  01/09/2024    COVID-19 VACCINE (7 - 2024-25 season) 09/01/2024    Medicare Annual MTM Pharmacist Visit (once per calendar year)  Never done    A1C  08/10/2025    INFLUENZA VACCINE (1) 09/01/2025    ALT  02/10/2026    BMP  02/10/2026    LIPID  02/10/2026    MICROALBUMIN  02/10/2026    EYE EXAM  04/02/2026    FALL RISK ASSESSMENT  07/21/2026    ADVANCE CARE PLANNING  01/16/2028    PHQ-2 (once per calendar year)  Completed    PNEUMOCOCCAL VACCINE 50+ YEARS  Completed    HPV VACCINE  Aged Out    MENINGITIS VACCINE  Aged Out         Review of Systems  CONSTITUTIONAL: NEGATIVE for fever, chills. Weight up 5 pounds  INTEGUMENTARY/SKIN: NEGATIVE for worrisome rashes, moles or lesions  EYES: NEGATIVE for irritation or acute changes. Last eye exam April  "2025 without pathology noted   ENT/MOUTH: NEGATIVE for  mouth and throat problems.  Denies ear pain.  Has chronic reduced hearing right ear  RESP: NEGATIVE for significant cough or SOB.  Rare postnasal drainage dry cough  CV: NEGATIVE for chest pain, palpitations or peripheral edema  GI: NEGATIVE for nausea, abdominal pain, heartburn, or change in bowel habits  : NEGATIVE for   dysuria, or hematuria.  Urine flow slower and urine frequency but every 3 hours.  Drinks 2 L of water per day.   Currently on Flomax/finasteride.  Declines surgery or other procedures for urine flow   at this time. Last saw Urology May 2024  MUSCULOSKELETAL:  POSITIVE for stable general arthralgias except for some recent left shoulder pain with abduction. No trauma hx  NEURO: NEGATIVE for  localized weakness. 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  Ambulating with a cane.  No recent falls.  Declines any surgical intervention for schwannoma so MRIs have not been done since 2023.  Occasional leg cramps at night.  Not with exercise.  Patient walking 10 to 20 minutes daily on a treadmill without leg cramps  ENDOCRINE: POSITIVE for  DM.  Not using CGM recently  Blood sugars in the morning range 120-130.   After supper about 300 and bedtime about 200 when was using CGM.  Patient has been doing insulin injections after he eats rather than before.  HEME: NEGATIVE for bleeding problems  PSYCHIATRIC: NEGATIVE for changes in mood or affect     Objective    Exam  /68   Pulse 82   Temp 97.1  F (36.2  C) (Temporal)   Resp 17   Ht 1.727 m (5' 8\")   Wt 82.2 kg (181 lb 3.2 oz)   SpO2 97%   BMI 27.55 kg/m     Estimated body mass index is 27.55 kg/m  as calculated from the following:    Height as of this encounter: 1.727 m (5' 8\").    Weight as of this encounter: 82.2 kg (181 lb 3.2 oz).    Physical Exam  General appearance -   alert, no distress  Skin - No rashes or lesions.  Head - normocephalic, atraumatic  Eyes - " DEBBI, EOMI, fundi exam with nondilated pupils negative.  Ears - External ears normal. Canals clear. TM's normal.  Nose/Sinuses - Nares normal. Septum midline. Mucosa normal. No drainage or sinus tenderness.  Oropharynx - No erythema, no adenopathy, no exudates.  Neck - Supple without adenopathy or thyromegaly. No bruits.  Lungs - Clear to auscultation without wheezes/rhonchi.  Heart - Regular rate and rhythm without murmurs, clicks, or gallops.  Nodes - No supraclavicular, axillary, or inguinal adenopathy palpable.  Abdomen - Abdomen soft, non-tender. BS normal. No masses or hepatosplenomegaly palpable. No bruits.  Extremities -No cyanosis, clubbing. Trace BLE ankle     Musculoskeletal -  Muscular strength intact.  Minimal tenderness to palpation bilateral paralumbar musculature.  Tenderness to internal/external rotation left shoulder in abduction beyond 70 degrees  Peripheral pulses - radial=4/4, femoral=4/4, posterior tibial=4/4, dorsalis pedis=4/4,  Neuro - Gait slightly antalgic but stable with use of a cane.  Sensation grossly WNL to light touch sensation feet bilaterally.  Genital - Normal-appearing male external genitalia. No scrotal masses or inguinal hernia palpable.   Rectal - deferred      Gait and balance assessed per Gait Speed Test.  Result as above.        7/21/2025   Mini Cog   Mini-Cog Not Completed (choose reason)  Pt declines           Signed Electronically by: Alfredo Aragon MD

## 2025-07-22 ENCOUNTER — PATIENT OUTREACH (OUTPATIENT)
Dept: CARE COORDINATION | Facility: CLINIC | Age: 85
End: 2025-07-22
Payer: COMMERCIAL

## 2025-07-24 ENCOUNTER — PATIENT OUTREACH (OUTPATIENT)
Dept: CARE COORDINATION | Facility: CLINIC | Age: 85
End: 2025-07-24
Payer: COMMERCIAL

## 2025-07-26 DIAGNOSIS — E11.9 TYPE 2 DIABETES MELLITUS WITHOUT COMPLICATION, WITH LONG-TERM CURRENT USE OF INSULIN (H): ICD-10-CM

## 2025-07-26 DIAGNOSIS — Z79.4 TYPE 2 DIABETES MELLITUS WITHOUT COMPLICATION, WITH LONG-TERM CURRENT USE OF INSULIN (H): ICD-10-CM

## 2025-07-28 RX ORDER — BLOOD SUGAR DIAGNOSTIC
STRIP MISCELLANEOUS
Qty: 300 STRIP | Refills: 2 | Status: SHIPPED | OUTPATIENT
Start: 2025-07-28

## 2025-08-11 DIAGNOSIS — L30.9 DERMATITIS: ICD-10-CM

## 2025-08-12 RX ORDER — TRIAMCINOLONE ACETONIDE 1 MG/G
CREAM TOPICAL 2 TIMES DAILY
Qty: 80 G | Refills: 0 | Status: SHIPPED | OUTPATIENT
Start: 2025-08-12

## (undated) DEVICE — BONE WAX 2.5GM W31G

## (undated) DEVICE — PACK TOTAL KNEE SOP15TKFSD

## (undated) DEVICE — SU ETHIBOND 1 CT-1 30" X425H

## (undated) DEVICE — ESU GROUND PAD UNIVERSAL W/O CORD

## (undated) DEVICE — DRAIN ROUND W/RESERV KIT JACKSON PRATT 10FR 400ML SU130-402D

## (undated) DEVICE — GLOVE PROTEXIS BLUE W/NEU-THERA 7.0  2D73EB70

## (undated) DEVICE — SOLUTION WOUND CLEANSING 3/4OZ 10% PVP EA-L3011FB-50

## (undated) DEVICE — GLOVE PROTEXIS MICRO 6.0  2D73PM60

## (undated) DEVICE — CAST PADDING 6" UNSTERILE 9046

## (undated) DEVICE — SU VICRYL 2-0 CT-1 27" UND J259H

## (undated) DEVICE — SU VICRYL 0 UR-6 27" J603H

## (undated) DEVICE — SOL WATER IRRIG 1000ML BOTTLE 2F7114

## (undated) DEVICE — IMM KNEE 20" 0814-2660

## (undated) DEVICE — LINEN TOWEL PACK X5 5464

## (undated) DEVICE — BONE CLEANING TIP INTERPULSE  0210-010-000

## (undated) DEVICE — BLADE SAW SAGITTAL STRK 13X90X1.27MM HD SYS 6 6113-127-090

## (undated) DEVICE — SURGICEL HEMOSTAT 3X4" NUKNIT 1943

## (undated) DEVICE — SUCTION CANISTER MEDIVAC LINER 3000ML W/LID 65651-530

## (undated) DEVICE — SYSTEM CLEARIFY VISUALIZATION 21-345

## (undated) DEVICE — DRAPE STERI TOWEL LG 1010

## (undated) DEVICE — GLOVE PROTEXIS POWDER FREE 7.5 ORTHOPEDIC 2D73ET75

## (undated) DEVICE — SUCTION IRR SYSTEM W/O TIP INTERPULSE HANDPIECE 0210-100-000

## (undated) DEVICE — MANIFOLD NEPTUNE 4 PORT 700-20

## (undated) DEVICE — PREP CHLORAPREP 26ML TINTED ORANGE  260815

## (undated) DEVICE — WRAP EZY KNEE

## (undated) DEVICE — SOL NACL 0.9% IRRIG 1000ML BOTTLE 2F7124

## (undated) DEVICE — SOL NACL 0.9% IRRIG 1000ML BOTTLE 07138-09

## (undated) DEVICE — ENDO POUCH UNIV RETRIEVAL SYSTEM INZII 10MM CD001

## (undated) DEVICE — SU VICRYL 0 CT-1 27" J340H

## (undated) DEVICE — CATH TRAY FOLEY COUDE SURESTEP 16FR W/URNE MTR STLK A304716A

## (undated) DEVICE — GLOVE PROTEXIS BLUE W/NEU-THERA 6.5  2D73EB65

## (undated) DEVICE — SUCTION TIP YANKAUER STR K87

## (undated) DEVICE — BLADE KNIFE SURG 10 371110

## (undated) DEVICE — BLADE SAW SAGITTAL STRK 18X90X1.37MM HD SYS 6 6118-137-090

## (undated) DEVICE — DRAPE SHEET REV FOLD 3/4 9349

## (undated) DEVICE — HOOD FLYTE W/PEELAWAY 408-800-100

## (undated) DEVICE — SU MONOCRYL 4-0 PS-2 18" UND Y496G

## (undated) DEVICE — PACK LAP CHOLE SLC15LCFSD

## (undated) DEVICE — CAST PADDING 6" STERILE 9046S

## (undated) DEVICE — ENDO TROCAR FIRST ENTRY KII FIOS Z-THRD 12X100MM CTF73

## (undated) DEVICE — GLOVE PROTEXIS W/NEU-THERA 7.0  2D73TE70

## (undated) DEVICE — BLADE SAW SAGITTAL STRK 19.5X95X1.27MM 2108-109-000S15

## (undated) DEVICE — DRSG KERLIX FLUFFS X5

## (undated) DEVICE — DRAPE STERI U 1015

## (undated) DEVICE — GLOVE PROTEXIS BLUE W/NEU-THERA 8.0  2D73EB80

## (undated) DEVICE — DEVICE SUTURE GRASPER TROCAR CLOSURE 14GA PMITCSG

## (undated) DEVICE — SU PDS II 0 ENDOLOOP EZ10G

## (undated) DEVICE — CLIP APPLIER ENDO 5MM M/L LIGAMAX EL5ML

## (undated) DEVICE — ENDO TROCAR FIRST ENTRY KII FIOS Z-THRD 05X100MM CTF03

## (undated) DEVICE — DRSG ABDOMINAL 07 1/2X8" 7197D

## (undated) DEVICE — BONE CEMENT MIXEVAC III HI VAC KIT  0206-015-000

## (undated) DEVICE — CAST PADDING 4" COTTON WEBRIL UNSTERILE 9084

## (undated) DEVICE — ESU PENCIL W/SMOKE EVAC NEPTUNE STRYKER 0703-046-000

## (undated) DEVICE — ESU HOLDER LAP INST DISP PURPLE LONG 330MM H-PRO-330

## (undated) DEVICE — DRSG ADAPTIC 3X8" 6113

## (undated) DEVICE — SOL NACL 0.9% INJ 1000ML BAG 2B1324X

## (undated) DEVICE — SUCTION IRR STRYKERFLOW II W/TIP 250-070-520

## (undated) DEVICE — PREP CHLORAPREP W/ORANGE TINT 10.5ML 260715

## (undated) DEVICE — ENDO TROCAR SLEEVE KII Z-THREADED 05X100MM CTS02

## (undated) DEVICE — DRSG GAUZE 4X4" 3033

## (undated) RX ORDER — LIDOCAINE HYDROCHLORIDE 20 MG/ML
INJECTION, SOLUTION EPIDURAL; INFILTRATION; INTRACAUDAL; PERINEURAL
Status: DISPENSED
Start: 2018-12-31

## (undated) RX ORDER — LIDOCAINE HYDROCHLORIDE 10 MG/ML
INJECTION, SOLUTION EPIDURAL; INFILTRATION; INTRACAUDAL; PERINEURAL
Status: DISPENSED
Start: 2019-01-01

## (undated) RX ORDER — HYDROMORPHONE HYDROCHLORIDE 1 MG/ML
INJECTION, SOLUTION INTRAMUSCULAR; INTRAVENOUS; SUBCUTANEOUS
Status: DISPENSED
Start: 2019-01-01

## (undated) RX ORDER — PROPOFOL 10 MG/ML
INJECTION, EMULSION INTRAVENOUS
Status: DISPENSED
Start: 2017-09-14

## (undated) RX ORDER — FENTANYL CITRATE 50 UG/ML
INJECTION, SOLUTION INTRAMUSCULAR; INTRAVENOUS
Status: DISPENSED
Start: 2018-10-05

## (undated) RX ORDER — LIDOCAINE HYDROCHLORIDE 20 MG/ML
INJECTION, SOLUTION EPIDURAL; INFILTRATION; INTRACAUDAL; PERINEURAL
Status: DISPENSED
Start: 2017-09-14

## (undated) RX ORDER — ONDANSETRON 2 MG/ML
INJECTION INTRAMUSCULAR; INTRAVENOUS
Status: DISPENSED
Start: 2018-12-31

## (undated) RX ORDER — FENTANYL CITRATE 50 UG/ML
INJECTION, SOLUTION INTRAMUSCULAR; INTRAVENOUS
Status: DISPENSED
Start: 2017-09-14

## (undated) RX ORDER — FENTANYL CITRATE 50 UG/ML
INJECTION, SOLUTION INTRAMUSCULAR; INTRAVENOUS
Status: DISPENSED
Start: 2018-12-31

## (undated) RX ORDER — DEXTROSE MONOHYDRATE 25 G/50ML
INJECTION, SOLUTION INTRAVENOUS
Status: DISPENSED
Start: 2018-10-05

## (undated) RX ORDER — PROPOFOL 10 MG/ML
INJECTION, EMULSION INTRAVENOUS
Status: DISPENSED
Start: 2019-01-01

## (undated) RX ORDER — CEFAZOLIN SODIUM 2 G/100ML
INJECTION, SOLUTION INTRAVENOUS
Status: DISPENSED
Start: 2018-10-05

## (undated) RX ORDER — BUPIVACAINE HYDROCHLORIDE AND EPINEPHRINE 5; 5 MG/ML; UG/ML
INJECTION, SOLUTION EPIDURAL; INTRACAUDAL; PERINEURAL
Status: DISPENSED
Start: 2019-01-01

## (undated) RX ORDER — KETOROLAC TROMETHAMINE 30 MG/ML
INJECTION, SOLUTION INTRAMUSCULAR; INTRAVENOUS
Status: DISPENSED
Start: 2017-09-14

## (undated) RX ORDER — FENTANYL CITRATE 50 UG/ML
INJECTION, SOLUTION INTRAMUSCULAR; INTRAVENOUS
Status: DISPENSED
Start: 2019-01-01

## (undated) RX ORDER — CEFAZOLIN SODIUM 1 G/3ML
INJECTION, POWDER, FOR SOLUTION INTRAMUSCULAR; INTRAVENOUS
Status: DISPENSED
Start: 2017-09-14

## (undated) RX ORDER — GLYCOPYRROLATE 0.2 MG/ML
INJECTION, SOLUTION INTRAMUSCULAR; INTRAVENOUS
Status: DISPENSED
Start: 2019-01-01

## (undated) RX ORDER — HYDROMORPHONE HYDROCHLORIDE 1 MG/ML
INJECTION, SOLUTION INTRAMUSCULAR; INTRAVENOUS; SUBCUTANEOUS
Status: DISPENSED
Start: 2018-10-05

## (undated) RX ORDER — HYDROMORPHONE HYDROCHLORIDE 1 MG/ML
INJECTION, SOLUTION INTRAMUSCULAR; INTRAVENOUS; SUBCUTANEOUS
Status: DISPENSED
Start: 2017-09-14

## (undated) RX ORDER — PROPOFOL 10 MG/ML
INJECTION, EMULSION INTRAVENOUS
Status: DISPENSED
Start: 2018-12-31

## (undated) RX ORDER — CEFAZOLIN SODIUM 1 G/3ML
INJECTION, POWDER, FOR SOLUTION INTRAMUSCULAR; INTRAVENOUS
Status: DISPENSED
Start: 2018-10-05

## (undated) RX ORDER — PROPOFOL 10 MG/ML
INJECTION, EMULSION INTRAVENOUS
Status: DISPENSED
Start: 2018-10-05

## (undated) RX ORDER — DEXAMETHASONE SODIUM PHOSPHATE 4 MG/ML
INJECTION, SOLUTION INTRA-ARTICULAR; INTRALESIONAL; INTRAMUSCULAR; INTRAVENOUS; SOFT TISSUE
Status: DISPENSED
Start: 2019-01-01

## (undated) RX ORDER — ONDANSETRON 2 MG/ML
INJECTION INTRAMUSCULAR; INTRAVENOUS
Status: DISPENSED
Start: 2019-01-01

## (undated) RX ORDER — CEFAZOLIN SODIUM 2 G/100ML
INJECTION, SOLUTION INTRAVENOUS
Status: DISPENSED
Start: 2017-09-14

## (undated) RX ORDER — DEXAMETHASONE SODIUM PHOSPHATE 4 MG/ML
INJECTION, SOLUTION INTRA-ARTICULAR; INTRALESIONAL; INTRAMUSCULAR; INTRAVENOUS; SOFT TISSUE
Status: DISPENSED
Start: 2018-12-31

## (undated) RX ORDER — LIDOCAINE HYDROCHLORIDE 20 MG/ML
INJECTION, SOLUTION EPIDURAL; INFILTRATION; INTRACAUDAL; PERINEURAL
Status: DISPENSED
Start: 2019-01-01

## (undated) RX ORDER — PIPERACILLIN SODIUM, TAZOBACTAM SODIUM 3; .375 G/15ML; G/15ML
INJECTION, POWDER, LYOPHILIZED, FOR SOLUTION INTRAVENOUS
Status: DISPENSED
Start: 2019-01-01

## (undated) RX ORDER — NEOSTIGMINE METHYLSULFATE 1 MG/ML
VIAL (ML) INJECTION
Status: DISPENSED
Start: 2019-01-01